# Patient Record
Sex: FEMALE | Race: WHITE | NOT HISPANIC OR LATINO | Employment: FULL TIME | URBAN - METROPOLITAN AREA
[De-identification: names, ages, dates, MRNs, and addresses within clinical notes are randomized per-mention and may not be internally consistent; named-entity substitution may affect disease eponyms.]

---

## 2018-04-23 ENCOUNTER — OFFICE VISIT (OUTPATIENT)
Dept: FAMILY MEDICINE CLINIC | Facility: CLINIC | Age: 53
End: 2018-04-23

## 2018-04-23 VITALS
HEART RATE: 84 BPM | TEMPERATURE: 97.2 F | OXYGEN SATURATION: 98 % | BODY MASS INDEX: 40.18 KG/M2 | WEIGHT: 256 LBS | SYSTOLIC BLOOD PRESSURE: 128 MMHG | DIASTOLIC BLOOD PRESSURE: 80 MMHG | HEIGHT: 67 IN

## 2018-04-23 DIAGNOSIS — G44.219 EPISODIC TENSION-TYPE HEADACHE, NOT INTRACTABLE: ICD-10-CM

## 2018-04-23 DIAGNOSIS — G89.29 CHRONIC PAIN OF LEFT KNEE: Primary | ICD-10-CM

## 2018-04-23 DIAGNOSIS — F31.9 BIPOLAR AFFECTIVE DISORDER, REMISSION STATUS UNSPECIFIED (HCC): ICD-10-CM

## 2018-04-23 DIAGNOSIS — I10 ESSENTIAL HYPERTENSION: ICD-10-CM

## 2018-04-23 DIAGNOSIS — G62.9 NEUROPATHY: ICD-10-CM

## 2018-04-23 DIAGNOSIS — E11.9 TYPE 2 DIABETES MELLITUS WITHOUT COMPLICATION, WITHOUT LONG-TERM CURRENT USE OF INSULIN (HCC): ICD-10-CM

## 2018-04-23 DIAGNOSIS — G45.9 TRANSIENT CEREBRAL ISCHEMIA, UNSPECIFIED TYPE: ICD-10-CM

## 2018-04-23 DIAGNOSIS — E78.2 MIXED HYPERLIPIDEMIA: ICD-10-CM

## 2018-04-23 DIAGNOSIS — M25.562 CHRONIC PAIN OF LEFT KNEE: Primary | ICD-10-CM

## 2018-04-23 PROCEDURE — 99214 OFFICE O/P EST MOD 30 MIN: CPT | Performed by: FAMILY MEDICINE

## 2018-04-23 NOTE — PROGRESS NOTES
Subjective   Marcela Ramírez is a 53 y.o. female    The patient, self-referred, presents to [facility] care.  Previously cared for at the [facility].  Multiple medical problems include GERD, asthma, bipolar disorder, colon polyps, depression, diabetes mellitus, diverticulosis, hypertension, hyperlipidemia, headaches and previous TIA.  Her primary concern today is that of peripheral neuropathy involving both upper and lower extremities both right and left sides.  She has been treated with gabapentin with minimal relief.  She is currently taking 600 mg of gabapentin 3 times a day.  She reports previous lab studies did not reveal any etiology for her neuropathy.  She has never had a nerve conduction testing. Patient has a psychiatrist at [facility] Dr. Reilly who takes care of her mental health needs.  Besides her neuropathy, patient complains of left knee pain.  This is been present for an extended period of time. She has had previous x-rays that were negative she reports occasional giving out and loosening of the knee.  She wears an elastic knee support.  She reports that her initial injury over a year ago was when her knee simply gave out while walking.  She had requested referral to an orthopedist by her primary care but this was refused.      Neurologic Problem   The patient's pertinent negatives include no syncope or weakness. Pertinent negatives include no abdominal pain, back pain, chest pain, confusion, diaphoresis, dizziness, fatigue, fever, headaches, light-headedness, nausea, palpitations, shortness of breath or vomiting.   Knee Pain    Pertinent negatives include no numbness.       The following portions of the patient's history were reviewed and updated as appropriate: allergies, current medications, past social history and problem list    Review of Systems   Constitutional: Negative for appetite change, chills, diaphoresis, fatigue, fever and unexpected weight change.   Eyes: Negative for visual  disturbance.   Respiratory: Negative for cough, chest tightness, shortness of breath and wheezing.    Cardiovascular: Negative for chest pain, palpitations and leg swelling.   Gastrointestinal: Negative for abdominal distention, abdominal pain, diarrhea, nausea and vomiting.        Patient experiencing heartburn/acid reflux     Endocrine: Negative for polydipsia, polyphagia and polyuria.   Genitourinary: Negative for dysuria, frequency and urgency.   Musculoskeletal: Negative for arthralgias, back pain and myalgias.   Skin: Negative for color change and rash.   Neurological: Negative for dizziness, tremors, syncope, weakness, light-headedness, numbness and headaches.   Hematological: Negative for adenopathy. Does not bruise/bleed easily.   Psychiatric/Behavioral: Positive for dysphoric mood and sleep disturbance. Negative for agitation, behavioral problems, confusion, decreased concentration, hallucinations and suicidal ideas. The patient is nervous/anxious. The patient is not hyperactive.        Objective     Vitals:    04/23/18 1313   BP: 128/80   Pulse: 84   Temp: 97.2 °F (36.2 °C)   SpO2: 98%       Physical Exam   Constitutional: She is oriented to person, place, and time. She appears well-developed and well-nourished. No distress.   HENT:   Head: Normocephalic.   Mouth/Throat: Oropharynx is clear and moist.   Eyes: Conjunctivae are normal. Pupils are equal, round, and reactive to light.   Neck: Neck supple. No JVD present. No thyromegaly present.   Cardiovascular: Normal rate, regular rhythm, normal heart sounds, intact distal pulses and normal pulses.    No murmur heard.  Pulmonary/Chest: Effort normal and breath sounds normal. No respiratory distress.   Abdominal: Soft. Bowel sounds are normal. There is no hepatosplenomegaly. There is no tenderness.   Musculoskeletal: She exhibits no edema.        Lumbar back: She exhibits decreased range of motion, tenderness, bony tenderness and pain. She exhibits no  swelling, no deformity and no spasm.   Lymphadenopathy:     She has no cervical adenopathy.   Neurological: She is alert and oriented to person, place, and time. She has normal reflexes. No sensory deficit. Coordination normal.   Skin: Skin is warm and dry. No rash noted. She is not diaphoretic.   Psychiatric: She has a normal mood and affect. Her behavior is normal. Judgment and thought content normal. Cognition and memory are normal. She is attentive.   Nursing note and vitals reviewed.      Assessment/Plan   Problem List Items Addressed This Visit     None      Visit Diagnoses     Chronic pain of left knee    -  Primary    Relevant Orders    Ambulatory Referral to Orthopedic Surgery    Neuropathy        Relevant Orders    EMG & Nerve Conduction Test    Bipolar affective disorder, remission status unspecified        Type 2 diabetes mellitus without complication, without long-term current use of insulin        Relevant Medications    metFORMIN (GLUCOPHAGE) 500 MG tablet    Mixed hyperlipidemia        Essential hypertension        Episodic tension-type headache, not intractable        Transient cerebral ischemia, unspecified type

## 2018-04-27 ENCOUNTER — HOSPITAL ENCOUNTER (EMERGENCY)
Facility: HOSPITAL | Age: 53
Discharge: HOME | End: 2018-04-28
Attending: EMERGENCY MEDICINE | Admitting: EMERGENCY MEDICINE

## 2018-04-27 ENCOUNTER — APPOINTMENT (OUTPATIENT)
Dept: GENERAL RADIOLOGY | Facility: HOSPITAL | Age: 53
End: 2018-04-27

## 2018-04-27 DIAGNOSIS — S86.912A STRAIN OF LEFT KNEE, INITIAL ENCOUNTER: ICD-10-CM

## 2018-04-27 DIAGNOSIS — Z86.39 HISTORY OF DIABETES MELLITUS: ICD-10-CM

## 2018-04-27 DIAGNOSIS — Z86.79 HISTORY OF HYPERTENSION: ICD-10-CM

## 2018-04-27 DIAGNOSIS — M25.562 ACUTE PAIN OF LEFT KNEE: Primary | ICD-10-CM

## 2018-04-27 PROCEDURE — 99283 EMERGENCY DEPT VISIT LOW MDM: CPT

## 2018-04-27 PROCEDURE — 73560 X-RAY EXAM OF KNEE 1 OR 2: CPT

## 2018-04-28 VITALS
HEIGHT: 67 IN | BODY MASS INDEX: 41.59 KG/M2 | HEART RATE: 80 BPM | RESPIRATION RATE: 16 BRPM | OXYGEN SATURATION: 98 % | DIASTOLIC BLOOD PRESSURE: 76 MMHG | SYSTOLIC BLOOD PRESSURE: 143 MMHG | WEIGHT: 265 LBS | TEMPERATURE: 98.8 F

## 2018-05-02 ENCOUNTER — OFFICE VISIT (OUTPATIENT)
Dept: ORTHOPEDIC SURGERY | Facility: CLINIC | Age: 53
End: 2018-05-02

## 2018-05-02 VITALS
DIASTOLIC BLOOD PRESSURE: 94 MMHG | HEIGHT: 67 IN | WEIGHT: 264.99 LBS | HEART RATE: 92 BPM | BODY MASS INDEX: 41.59 KG/M2 | SYSTOLIC BLOOD PRESSURE: 180 MMHG

## 2018-05-02 DIAGNOSIS — M25.562 CHRONIC PAIN OF LEFT KNEE: Primary | ICD-10-CM

## 2018-05-02 DIAGNOSIS — G89.29 CHRONIC PAIN OF LEFT KNEE: Primary | ICD-10-CM

## 2018-05-02 PROCEDURE — 99204 OFFICE O/P NEW MOD 45 MIN: CPT | Performed by: ORTHOPAEDIC SURGERY

## 2018-05-02 NOTE — PROGRESS NOTES
[facility] Note    Subjective     Chief Complaint   Patient presents with   • Left Knee - Pain        HPI      Marcela Ramírez is a 53 y.o. female.  She complains of left knee pain.  It started insidiously with a walk in November.  She was seen in the emergency room and told she had an MCL sprain.  She's had intermittent pain since then she's had locking catching and giving way.  Her pain is 1 out of 10 today but at times is 9 out of 10.  It is aching and shooting in her medial joint line.  She has tried a brace.  Past Medical History:   Diagnosis Date   • Asthma    • Bilateral ovarian cysts    • Bipolar 1 disorder    • Colon polyp    • Depression    • Diabetes mellitus    • Diverticulosis    • Gall stones    • GERD (gastroesophageal reflux disease)    • Headache    • Hyperlipidemia    • Hypertension    • Peripheral neuropathy    • TIA (transient ischemic attack)       Past Surgical History:   Procedure Laterality Date   • CHOLECYSTECTOMY     • COLONOSCOPY     • HYSTERECTOMY     • KNEE SURGERY      right knee      Family History   Problem Relation Age of Onset   • Arthritis Mother    • Arthritis Father    • Diabetes Father    • Hyperlipidemia Father    • Hypertension Father    • Asthma Brother    • Other Brother    • Cancer Maternal Aunt    • Depression Paternal Uncle      Social History     Social History   • Marital status:      Spouse name: N/A   • Number of children: N/A   • Years of education: N/A     Occupational History   • Not on file.     Social History Main Topics   • Smoking status: Never Smoker   • Smokeless tobacco: Never Used   • Alcohol use Yes      Comment: socially   • Drug use: No   • Sexual activity: Defer     Other Topics Concern   • Not on file     Social History Narrative   • No narrative on file      Current Outpatient Prescriptions on File Prior to Visit   Medication Sig Dispense Refill   • albuterol (PROVENTIL HFA;VENTOLIN HFA) 108 (90 Base) MCG/ACT inhaler  "Inhale 2 puffs Every 4 (Four) Hours As Needed for Wheezing. 1 inhaler 0   • ATORVASTATIN CALCIUM PO Take  by mouth.     • desvenlafaxine (PRISTIQ) 50 MG 24 hr tablet Take 50 mg by mouth Daily.     • GABAPENTIN PO Take 1 tablet by mouth 3 (Three) Times a Day. Pt is taking 300mg     • HYDROCHLOROTHIAZIDE PO Take 1 tablet by mouth Daily. Pt is on 25mg     • LamoTRIgine (LAMICTAL PO) Take 1 tablet by mouth Every Night. Pt is on 200mg     • LISINOPRIL PO Take 1 tablet by mouth Daily. Pt is on 20mg     • metFORMIN (GLUCOPHAGE) 500 MG tablet Take 500 mg by mouth 2 (Two) Times a Day With Meals. 2 tablets BID     • omeprazole (priLOSEC) 20 MG capsule Take 20 mg by mouth Daily.     • ROPINIROLE HCL PO Take  by mouth.       No current facility-administered medications on file prior to visit.       Allergies   Allergen Reactions   • Contrast Dye Swelling        The following portions of the patient's history were reviewed and updated as appropriate: allergies, current medications, past family history, past medical history, past social history, past surgical history and problem list.    Review of Systems   Constitutional: Positive for activity change and fatigue.   HENT: Negative.    Eyes: Positive for discharge.   Respiratory: Positive for cough and shortness of breath.    Cardiovascular: Negative.    Gastrointestinal: Positive for nausea.   Endocrine: Positive for heat intolerance.   Genitourinary: Negative.    Musculoskeletal: Positive for arthralgias and back pain.   Skin: Negative.    Allergic/Immunologic: Positive for environmental allergies.   Neurological: Negative.    Hematological: Negative.    Psychiatric/Behavioral: Positive for behavioral problems, decreased concentration and sleep disturbance. The patient is nervous/anxious.         Objective      Physical Exam  /94   Pulse 92   Ht 169.5 cm (66.73\")   Wt 120 kg (264 lb 15.9 oz)   LMP  (LMP Unknown)   BMI 41.84 kg/m²     Body mass index is 41.84 " kg/m².        GENERAL APPEARANCE: awake, alert & oriented x 3, in no acute distress and well developed, well nourished  PSYCH: normal mood and affect  LUNGS:  breathing nonlabored, no wheezing  EYES: sclera anicteric, pupils equal  CARDIOVASCULAR: palpable pulses dorsalis pedis, palpable posterior tibial bilaterally. Capillary refill less than 2 seconds  INTEGUMENTARY: skin intact, no clubbing, cyanosis  NEUROLOGIC:  Normal Sensation and reflexes             Ortho Exam  Peripheral Vascular:    Upper Extremity:   Inspection:  Left--no cyanotic nail beds Right--no cyanotic nail beds   Bilateral:  Pink nail beds with brisk capillary refill   Palpation:  Bilateral radial pulse normal    Musculoskeletal:  Global Assessment:  Overall assessment of Lower Extremity Muscle Strength and Tone:  Left quadriceps--5/5   Left hamstrings--5/5       Left tibialis anterior--5/5  Left gastroc-soleus--5/5  Left EHL--5/5      Lower Extremity:  Knee/Patella:  No digital clubbing or cyanosis.    Examination of left knee reveals:  Normal deep tendon reflexes, coordination, strength, tone, sensation.  No known fractures or deformities.    Inspection and Palpation:    Left knee:  Tenderness:  Medial joint line  Effusion:  none  Crepitus:  none  Pulses:  2+  Ecchymosis:  None  Warmth:  None       ROM:  Right:  Extension:0    Flexion:135  Left:  Extension:0     Flexion:135    Instability:    Left:  Lachman Test:  Negative, Varus stress test negative, Valgus stress test negative   Anterior Drawer Test:  Negative, Posterior Drawer Test:  Negative      Deformities/Malalignments/Discrepancies:    Left:  none  Right:  none    Functional Testing:    Left:  Radha's test: Positive  Patella grind test:  Negative  Q-angle:  Normal  Apprehension Sign:  Negative    Imaging/Studies  Imaging Results (last 7 days)     ** No results found for the last 168 hours. **      I reviewed the x-rays which are negative.    Assessment/Plan        ICD-10-CM ICD-9-CM    1. Chronic pain of left knee M25.562 719.46    G89.29 338.29       Orders Placed This Encounter   Procedures   • MRI Knee Left Without Contrast    I will see her back after the MRI.  She will get a note to be off work today.  She will follow-up after the MRI.    Medical Decision Making  Management Options : over-the-counter medicine  Data/Risk: radiology tests and independent visualization of imaging, lab tests, or EMG/NCV    Nahid Wallace MD  05/02/18  3:49 PM

## 2018-05-14 ENCOUNTER — HOSPITAL ENCOUNTER (OUTPATIENT)
Dept: MRI IMAGING | Facility: HOSPITAL | Age: 53
Discharge: HOME | End: 2018-05-14
Attending: ORTHOPAEDIC SURGERY | Admitting: ORTHOPAEDIC SURGERY

## 2018-05-14 DIAGNOSIS — G89.29 CHRONIC PAIN OF LEFT KNEE: ICD-10-CM

## 2018-05-14 DIAGNOSIS — M25.562 CHRONIC PAIN OF LEFT KNEE: ICD-10-CM

## 2018-05-14 PROCEDURE — 73721 MRI JNT OF LWR EXTRE W/O DYE: CPT

## 2018-05-16 ENCOUNTER — OFFICE VISIT (OUTPATIENT)
Dept: ORTHOPEDIC SURGERY | Facility: CLINIC | Age: 53
End: 2018-05-16

## 2018-05-16 VITALS
BODY MASS INDEX: 41.52 KG/M2 | WEIGHT: 264.55 LBS | HEIGHT: 67 IN | HEART RATE: 79 BPM | SYSTOLIC BLOOD PRESSURE: 164 MMHG | DIASTOLIC BLOOD PRESSURE: 89 MMHG

## 2018-05-16 DIAGNOSIS — M94.20 CHONDROMALACIA: Primary | ICD-10-CM

## 2018-05-16 DIAGNOSIS — G89.29 CHRONIC PAIN OF LEFT KNEE: ICD-10-CM

## 2018-05-16 DIAGNOSIS — M25.562 CHRONIC PAIN OF LEFT KNEE: ICD-10-CM

## 2018-05-16 PROCEDURE — 99213 OFFICE O/P EST LOW 20 MIN: CPT | Performed by: ORTHOPAEDIC SURGERY

## 2018-05-16 NOTE — PROGRESS NOTES
[facility] [place] [facility] Note    Subjective     Chief Complaint   Patient presents with   • Left Knee - Follow-up     2 week MRI f/u        HPI      Marcela Ramírez is a 53 y.o. female.  She is follow-up left knee pain.  She had an MRI.  Her pain is 8 out of 10.  It is popping.  It is worse with standing and walking.        Past Medical History:   Diagnosis Date   • Asthma    • Bilateral ovarian cysts    • Bipolar 1 disorder    • Colon polyp    • Depression    • Diabetes mellitus    • Diverticulosis    • Gall stones    • GERD (gastroesophageal reflux disease)    • Headache    • Hyperlipidemia    • Hypertension    • Peripheral neuropathy    • TIA (transient ischemic attack)       Past Surgical History:   Procedure Laterality Date   • CHOLECYSTECTOMY     • COLONOSCOPY     • HYSTERECTOMY     • KNEE SURGERY      right knee      Family History   Problem Relation Age of Onset   • Arthritis Mother    • Arthritis Father    • Diabetes Father    • Hyperlipidemia Father    • Hypertension Father    • Asthma Brother    • Other Brother    • Cancer Maternal Aunt    • Depression Paternal Uncle      Social History     Social History   • Marital status:      Spouse name: N/A   • Number of children: N/A   • Years of education: N/A     Occupational History   • Not on file.     Social History Main Topics   • Smoking status: Never Smoker   • Smokeless tobacco: Never Used   • Alcohol use Yes      Comment: socially   • Drug use: No   • Sexual activity: Defer     Other Topics Concern   • Not on file     Social History Narrative   • No narrative on file      Current Outpatient Prescriptions on File Prior to Visit   Medication Sig Dispense Refill   • albuterol (PROVENTIL HFA;VENTOLIN HFA) 108 (90 Base) MCG/ACT inhaler Inhale 2 puffs Every 4 (Four) Hours As Needed for Wheezing. 1 inhaler 0   • ATORVASTATIN CALCIUM PO Take  by mouth.     • desvenlafaxine (PRISTIQ) 50 MG 24 hr tablet Take 50 mg by mouth Daily.     •  "GABAPENTIN PO Take 1 tablet by mouth 3 (Three) Times a Day. Pt is taking 300mg     • HYDROCHLOROTHIAZIDE PO Take 1 tablet by mouth Daily. Pt is on 25mg     • LamoTRIgine (LAMICTAL PO) Take 1 tablet by mouth Every Night. Pt is on 200mg     • LISINOPRIL PO Take 1 tablet by mouth Daily. Pt is on 20mg     • metFORMIN (GLUCOPHAGE) 500 MG tablet Take 500 mg by mouth 2 (Two) Times a Day With Meals. 2 tablets BID     • omeprazole (priLOSEC) 20 MG capsule Take 20 mg by mouth Daily.     • ROPINIROLE HCL PO Take  by mouth.       No current facility-administered medications on file prior to visit.       Allergies   Allergen Reactions   • Contrast Dye Swelling        The following portions of the patient's history were reviewed and updated as appropriate: allergies, current medications, past family history, past medical history, past social history, past surgical history and problem list.    Review of Systems   Constitutional: Positive for fatigue.        Night sweats   Eyes: Negative.    Respiratory: Negative.    Cardiovascular: Negative.    Gastrointestinal: Positive for nausea (from meds).   Endocrine: Positive for heat intolerance.   Genitourinary: Negative.    Musculoskeletal: Positive for arthralgias.   Skin: Negative.    Allergic/Immunologic: Positive for environmental allergies.   Neurological: Positive for numbness and headaches.   Hematological: Negative.    Psychiatric/Behavioral: Positive for decreased concentration and sleep disturbance. The patient is nervous/anxious.         Objective      Physical Exam  /89   Pulse 79   Ht 169.5 cm (66.73\")   Wt 120 kg (264 lb 8.8 oz)   LMP  (LMP Unknown)   BMI 41.77 kg/m²     Body mass index is 41.77 kg/m².        GENERAL APPEARANCE: awake, alert & oriented x 3, in no acute distress and well developed, well nourished  PSYCH: normal mood and affect  Ortho Exam  Peripheral Vascular:    Upper Extremity:   Inspection:  Left--no cyanotic nail beds Right--no cyanotic nail " beds   Bilateral:  Pink nail beds with brisk capillary refill   Palpation:  Bilateral radial pulse normal    Musculoskeletal:  Global Assessment:  Overall assessment of Lower Extremity Muscle Strength and Tone:  Left quadriceps--5/5   Left hamstrings--5/5       Left tibialis anterior--5/5  Left gastroc-soleus--5/5  Left EHL --5/5    Lower Extremity:  Knee/Patella:  No digital clubbing or cyanosis.    Examination of left knee reveals:  Normal deep tendon reflexes, coordination, strength, tone, sensation.  No known fractures or deformities.    Inspection and Palpation:  Left knee:  Tenderness:  Over the medial joint line and moderate severity  Effusion:  none  Crepitus:  Positive  Pulses:  2+  Ecchymosis:  None  Warmth:  None     ROM:  Right:  Extension:5    Flexion:120  Left:  Extension:5     Flexion:120    Instability:    Left:  Lachman Test:  Negative, Varus stress test negative, Valgus stress test negative    Deformities/Malalignments/Discrepancies:    Left:  Genu Varum   Right:  No deformity    Functional Testing:  Radha's test:  Negative  Patella grind test:  Positive  Q-angle:  normal    Imaging/Studies  Imaging Results (last 7 days)     ** No results found for the last 168 hours. **      I reviewed the MRI which shows chondral malacia evidence of prior MCL sprain and bone bruise.    Assessment/Plan        ICD-10-CM ICD-9-CM   1. Chondromalacia M94.20 733.92   2. Chronic pain of left knee M25.562 719.46    G89.29 338.29       Orders Placed This Encounter   Procedures   • Ambulatory Referral to Physical Therapy    She will go to physical therapy.  We will give her a hinged knee brace for support.  She'll follow-up in 3 weeks.  If she fails this treatment consider injection or arthroscopy.      Medical Decision Making  Management Options : over-the-counter medicine and physical/occupational therapy  Data/Risk: radiology tests and independent visualization of imaging, lab tests, or EMG/NCV    Nahid Wallace,  MD  05/16/18  10:17 AM         EMR Dragon/Transcription disclaimer:  Much of this encounter note is an electronic transcription of spoken language to printed text. Electronic transcription of spoken language may permit erroneous, or at times, nonsensical words or phrases to be inadvertently transcribed. Although I have reviewed the note for such errors, some may still exist.

## 2018-06-05 ENCOUNTER — HOSPITAL ENCOUNTER (OUTPATIENT)
Dept: NEUROLOGY | Facility: HOSPITAL | Age: 53
Discharge: HOME | End: 2018-06-05
Admitting: FAMILY MEDICINE

## 2018-06-05 DIAGNOSIS — G62.9 NEUROPATHY: ICD-10-CM

## 2018-06-05 PROCEDURE — 95886 MUSC TEST DONE W/N TEST COMP: CPT

## 2018-06-05 PROCEDURE — 95913 NRV CNDJ TEST 13/> STUDIES: CPT

## 2018-06-11 ENCOUNTER — TELEPHONE (OUTPATIENT)
Dept: FAMILY MEDICINE CLINIC | Facility: CLINIC | Age: 53
End: 2018-06-11

## 2018-06-11 DIAGNOSIS — G62.9 NEUROPATHY: Primary | ICD-10-CM

## 2018-06-11 NOTE — PROGRESS NOTES
Okay to refill her gabapentin ×2.  Looks like she takes 6 a day so she will need of 180 per prescription

## 2018-06-12 ENCOUNTER — TELEPHONE (OUTPATIENT)
Dept: FAMILY MEDICINE CLINIC | Facility: CLINIC | Age: 53
End: 2018-06-12

## 2018-06-12 RX ORDER — GABAPENTIN 300 MG/1
CAPSULE ORAL
Qty: 180 CAPSULE | Refills: 2 | OUTPATIENT
Start: 2018-06-12

## 2018-06-12 RX ORDER — GABAPENTIN 300 MG/1
CAPSULE ORAL
Qty: 180 CAPSULE | Refills: 2 | OUTPATIENT
Start: 2018-06-12 | End: 2018-08-21

## 2018-06-12 NOTE — TELEPHONE ENCOUNTER
----- Message from Sukhwinder Duke MD sent at 6/11/2018  5:02 PM EDT -----  Okay to refill her gabapentin ×2.  Looks like she takes 6 a day so she will need of 180 per prescription

## 2018-06-29 PROBLEM — H66.011 ACUTE SUPPURATIVE OTITIS MEDIA OF RIGHT EAR WITH SPONTANEOUS RUPTURE OF TYMPANIC MEMBRANE: Status: ACTIVE | Noted: 2018-06-29

## 2018-07-03 ENCOUNTER — OFFICE VISIT (OUTPATIENT)
Dept: FAMILY MEDICINE CLINIC | Facility: CLINIC | Age: 53
End: 2018-07-03

## 2018-07-03 VITALS
DIASTOLIC BLOOD PRESSURE: 90 MMHG | BODY MASS INDEX: 43.38 KG/M2 | SYSTOLIC BLOOD PRESSURE: 152 MMHG | TEMPERATURE: 96.5 F | HEIGHT: 67 IN | WEIGHT: 276.4 LBS | OXYGEN SATURATION: 97 % | HEART RATE: 86 BPM

## 2018-07-03 DIAGNOSIS — G62.9 NEUROPATHY: Primary | ICD-10-CM

## 2018-07-03 DIAGNOSIS — F41.9 ANXIETY: ICD-10-CM

## 2018-07-03 DIAGNOSIS — G56.23 ULNAR NEUROPATHY OF BOTH UPPER EXTREMITIES: ICD-10-CM

## 2018-07-03 DIAGNOSIS — F32.A DEPRESSION, UNSPECIFIED DEPRESSION TYPE: ICD-10-CM

## 2018-07-03 DIAGNOSIS — G56.03 BILATERAL CARPAL TUNNEL SYNDROME: ICD-10-CM

## 2018-07-03 DIAGNOSIS — H72.91 PERFORATED RIGHT TYMPANIC MEMBRANE ON EXAMINATION: ICD-10-CM

## 2018-07-03 DIAGNOSIS — IMO0001 CLASS 3 OBESITY WITHOUT SERIOUS COMORBIDITY WITH BODY MASS INDEX (BMI) OF 40.0 TO 44.9 IN ADULT, UNSPECIFIED OBESITY TYPE: ICD-10-CM

## 2018-07-03 RX ORDER — ALPRAZOLAM 0.5 MG/1
0.5 TABLET ORAL NIGHTLY PRN
Qty: 30 TABLET | Refills: 2 | Status: SHIPPED | OUTPATIENT
Start: 2018-07-03 | End: 2018-10-30 | Stop reason: SDUPTHER

## 2018-07-03 NOTE — PROGRESS NOTES
Subjective   Marcela Ramírez is a 53 y.o. female    Patient presents for follow-up after recent nerve conduction study.  She has evidence of diffuse mixed motor sensory polyneuropathy as well as bilateral carpal tunnel syndrome and bilateral ulnar neuropathies.  She has an upcoming appointment with neurology.  Other concerns and complaints today include chronic anxiety and depression, a recent upper respiratory infection with otitis media and perforation of her right TM and she is also asking for guidance regarding her obesity.      Peripheral Neuropathy   Pertinent negatives include no abdominal pain, arthralgias, chest pain, chills, coughing, diaphoresis, fatigue, fever, headaches, myalgias, nausea, numbness, rash, vomiting or weakness.   Carpal Tunnel   Pertinent negatives include no abdominal pain, arthralgias, chest pain, chills, coughing, diaphoresis, fatigue, fever, headaches, myalgias, nausea, numbness, rash, vomiting or weakness.       The following portions of the patient's history were reviewed and updated as appropriate: allergies, current medications, past social history and problem list    Review of Systems   Constitutional: Negative for appetite change, chills, diaphoresis, fatigue, fever and unexpected weight change.   Eyes: Negative for visual disturbance.   Respiratory: Negative for cough, chest tightness, shortness of breath and wheezing.    Cardiovascular: Negative for chest pain, palpitations and leg swelling.   Gastrointestinal: Negative for abdominal distention, abdominal pain, diarrhea, nausea and vomiting.        Patient experiencing heartburn/acid reflux     Endocrine: Negative for polydipsia, polyphagia and polyuria.   Genitourinary: Negative for dysuria, frequency and urgency.   Musculoskeletal: Negative for arthralgias, back pain and myalgias.   Skin: Negative for color change and rash.   Neurological: Negative for dizziness, tremors, syncope, weakness, light-headedness, numbness and  headaches.   Hematological: Negative for adenopathy. Does not bruise/bleed easily.   Psychiatric/Behavioral: Positive for dysphoric mood and sleep disturbance. Negative for agitation, behavioral problems, confusion, decreased concentration, hallucinations and suicidal ideas. The patient is nervous/anxious. The patient is not hyperactive.        Objective     Vitals:    07/03/18 1001   BP: 152/90   Pulse: 86   Temp: 96.5 °F (35.8 °C)   SpO2: 97%       Physical Exam   Constitutional: She is oriented to person, place, and time. She appears well-developed and well-nourished. No distress.   HENT:   Head: Normocephalic.   Mouth/Throat: Oropharynx is clear and moist.   Eyes: Conjunctivae are normal. Pupils are equal, round, and reactive to light.   Neck: Neck supple. No JVD present. No thyromegaly present.   Cardiovascular: Normal rate, regular rhythm, normal heart sounds, intact distal pulses and normal pulses.    No murmur heard.  Pulmonary/Chest: Effort normal and breath sounds normal. No respiratory distress.   Abdominal: Soft. Bowel sounds are normal. There is no hepatosplenomegaly. There is no tenderness.   Musculoskeletal: She exhibits no edema.        Lumbar back: She exhibits decreased range of motion, tenderness, bony tenderness and pain. She exhibits no swelling, no deformity and no spasm.   Lymphadenopathy:     She has no cervical adenopathy.   Neurological: She is alert and oriented to person, place, and time. She has normal reflexes. No sensory deficit. Coordination normal.   Skin: Skin is warm and dry. No rash noted. She is not diaphoretic.   Psychiatric: She has a normal mood and affect. Her behavior is normal. Judgment and thought content normal. Cognition and memory are normal. She is attentive.   Nursing note and vitals reviewed.      Assessment/Plan   Problem List Items Addressed This Visit        Other    Depression    Relevant Medications    ALPRAZolam (XANAX) 0.5 MG tablet    Anxiety      Other Visit  Diagnoses     Neuropathy    -  Primary    Bilateral carpal tunnel syndrome        Ulnar neuropathy of both upper extremities        Perforated right tympanic membrane on examination        Class 3 obesity without serious comorbidity with body mass index (BMI) of 40.0 to 44.9 in adult, unspecified obesity type (CMS/[facility])            Neurology consultation.    Low carb diet with daily walking for exercise.

## 2018-07-16 ENCOUNTER — TELEPHONE (OUTPATIENT)
Dept: FAMILY MEDICINE CLINIC | Facility: CLINIC | Age: 53
End: 2018-07-16

## 2018-07-16 RX ORDER — FLUCONAZOLE 150 MG/1
150 TABLET ORAL ONCE
Qty: 1 TABLET | Refills: 0 | Status: SHIPPED | OUTPATIENT
Start: 2018-07-16 | End: 2018-07-16

## 2018-07-16 NOTE — TELEPHONE ENCOUNTER
----- Message from Mroiah Rizvi sent at 7/16/2018  3:51 PM EDT -----  Contact: PT  HAS YEAST  INFECTION FROM BEING ON HER 2ND COURSE OF ANTIBIOTICS (EAR INFECTION, WAS SEEN AT [facility]). WOULD LIKE ROBBY CALLED IN TO     [facility] [address] [place] [address] [address] [place], [place] [address]Y AT [facility] - [phone]  - [phone] (Phone)  [phone] (Fax)

## 2018-07-17 ENCOUNTER — TELEPHONE (OUTPATIENT)
Dept: URGENT CARE | Facility: CLINIC | Age: 53
End: 2018-07-17

## 2018-07-18 ENCOUNTER — OFFICE VISIT (OUTPATIENT)
Dept: FAMILY MEDICINE CLINIC | Facility: CLINIC | Age: 53
End: 2018-07-18

## 2018-07-18 ENCOUNTER — TELEPHONE (OUTPATIENT)
Dept: URGENT CARE | Facility: CLINIC | Age: 53
End: 2018-07-18

## 2018-07-18 VITALS
HEART RATE: 85 BPM | TEMPERATURE: 98 F | OXYGEN SATURATION: 98 % | SYSTOLIC BLOOD PRESSURE: 128 MMHG | WEIGHT: 264 LBS | BODY MASS INDEX: 41.44 KG/M2 | HEIGHT: 67 IN | DIASTOLIC BLOOD PRESSURE: 72 MMHG

## 2018-07-18 DIAGNOSIS — H66.90 ACUTE OTITIS MEDIA, UNSPECIFIED OTITIS MEDIA TYPE: Primary | ICD-10-CM

## 2018-07-18 DIAGNOSIS — H72.91 PERFORATED RIGHT TYMPANIC MEMBRANE ON EXAMINATION: ICD-10-CM

## 2018-07-18 PROCEDURE — 99213 OFFICE O/P EST LOW 20 MIN: CPT | Performed by: FAMILY MEDICINE

## 2018-07-18 RX ORDER — FLUCONAZOLE 100 MG/1
100 TABLET ORAL DAILY
Qty: 10 TABLET | Refills: 1 | Status: SHIPPED | OUTPATIENT
Start: 2018-07-18 | End: 2018-07-24

## 2018-07-18 NOTE — PROGRESS NOTES
Subjective   Marcela Ramírez is a 53 y.o. female    Patient presents today complaining of persisting problem with her right ear and persisting vaginal yeast infection.  Patient was seen here on 7/3/18 with resolving right otitis media and TM perforation.  She continued to feel improvement with the right ear until approximately one week later when her symptoms worsened and she developed purulent drainage from the right ear canal.  She was seen at the urgent care center and oral antibiotics, Augmentin, were started.  The patient has continued to note drainage from the ear, decreased hearing and ear discomfort.  Her yeast infection symptoms have also continued.  She was given a referral to ENT and was told at urgent care that she had to go to  because of her [employer] insurance.  They did not give her an appointment until August 21.  Patient would like to be seen sooner than this because of her symptoms.      Earache    Associated symptoms include ear discharge. Pertinent negatives include no abdominal pain, coughing, diarrhea, headaches, rash or vomiting.       The following portions of the patient's history were reviewed and updated as appropriate: allergies, current medications, past social history and problem list    Review of Systems   Constitutional: Negative for appetite change, chills, diaphoresis, fatigue, fever and unexpected weight change.   HENT: Positive for ear discharge and ear pain.    Eyes: Negative for visual disturbance.   Respiratory: Negative for cough, chest tightness, shortness of breath and wheezing.    Cardiovascular: Negative for chest pain, palpitations and leg swelling.   Gastrointestinal: Negative for abdominal distention, abdominal pain, diarrhea, nausea and vomiting.        Patient experiencing heartburn/acid reflux     Endocrine: Negative for polydipsia, polyphagia and polyuria.   Genitourinary: Negative for dysuria, frequency and urgency.   Musculoskeletal: Negative for arthralgias,  back pain and myalgias.   Skin: Negative for color change and rash.   Neurological: Negative for dizziness, tremors, syncope, weakness, light-headedness, numbness and headaches.   Hematological: Negative for adenopathy. Does not bruise/bleed easily.   Psychiatric/Behavioral: Positive for dysphoric mood and sleep disturbance. Negative for agitation, behavioral problems, confusion, decreased concentration, hallucinations and suicidal ideas. The patient is nervous/anxious. The patient is not hyperactive.        Objective     Vitals:    07/18/18 1602   BP: 128/72   Pulse: 85   Temp: 98 °F (36.7 °C)   SpO2: 98%       Physical Exam   Constitutional: She appears well-developed and well-nourished.   HENT:   Head: Normocephalic.   Left Ear: Tympanic membrane and ear canal normal.   Right ear canal occluded with white debris.   Eyes: Pupils are equal, round, and reactive to light. Conjunctivae are normal.   Cardiovascular: Normal rate and regular rhythm.    Pulmonary/Chest: Effort normal and breath sounds normal.   Nursing note and vitals reviewed.      Assessment/Plan   Problem List Items Addressed This Visit     None      Visit Diagnoses     Acute otitis media, unspecified otitis media type    -  Primary    Relevant Orders    Ambulatory Referral to ENT (Otolaryngology) (Completed)    Perforated right tympanic membrane on examination        Relevant Orders    Ambulatory Referral to ENT (Otolaryngology) (Completed)

## 2018-07-18 NOTE — TELEPHONE ENCOUNTER
Pt called very upset because her referral appt is in August. It is due  to her insurance that she was referred to . Pt will consult with her PCP for follow up.

## 2018-07-23 ENCOUNTER — HOSPITAL ENCOUNTER (EMERGENCY)
Facility: HOSPITAL | Age: 53
Discharge: HOME | End: 2018-07-23
Attending: EMERGENCY MEDICINE | Admitting: EMERGENCY MEDICINE

## 2018-07-23 ENCOUNTER — APPOINTMENT (OUTPATIENT)
Dept: GENERAL RADIOLOGY | Facility: HOSPITAL | Age: 53
End: 2018-07-23

## 2018-07-23 VITALS
HEIGHT: 67 IN | BODY MASS INDEX: 40.81 KG/M2 | RESPIRATION RATE: 18 BRPM | DIASTOLIC BLOOD PRESSURE: 79 MMHG | TEMPERATURE: 98.4 F | WEIGHT: 260 LBS | SYSTOLIC BLOOD PRESSURE: 124 MMHG | HEART RATE: 64 BPM | OXYGEN SATURATION: 93 %

## 2018-07-23 DIAGNOSIS — K21.00 GASTROESOPHAGEAL REFLUX DISEASE WITH ESOPHAGITIS: ICD-10-CM

## 2018-07-23 DIAGNOSIS — K52.9 GASTROENTERITIS: ICD-10-CM

## 2018-07-23 DIAGNOSIS — R07.89 ATYPICAL CHEST PAIN: Primary | ICD-10-CM

## 2018-07-23 LAB
ALBUMIN SERPL-MCNC: 4.1 G/DL (ref 3.2–4.8)
ALBUMIN/GLOB SERPL: 1.4 G/DL (ref 1.5–2.5)
ALP SERPL-CCNC: 86 U/L (ref 25–100)
ALT SERPL W P-5'-P-CCNC: 63 U/L (ref 7–40)
ANION GAP SERPL CALCULATED.3IONS-SCNC: 8 MMOL/L (ref 3–11)
AST SERPL-CCNC: 36 U/L (ref 0–33)
BASOPHILS # BLD AUTO: 0.02 10*3/MM3 (ref 0–0.2)
BASOPHILS NFR BLD AUTO: 0.3 % (ref 0–1)
BILIRUB SERPL-MCNC: 0.4 MG/DL (ref 0.3–1.2)
BNP SERPL-MCNC: 41 PG/ML (ref 0–100)
BUN BLD-MCNC: 14 MG/DL (ref 9–23)
BUN/CREAT SERPL: 14 (ref 7–25)
CALCIUM SPEC-SCNC: 9.3 MG/DL (ref 8.7–10.4)
CHLORIDE SERPL-SCNC: 103 MMOL/L (ref 99–109)
CO2 SERPL-SCNC: 31 MMOL/L (ref 20–31)
CREAT BLD-MCNC: 1 MG/DL (ref 0.6–1.3)
DEPRECATED RDW RBC AUTO: 45.5 FL (ref 37–54)
EOSINOPHIL # BLD AUTO: 0.23 10*3/MM3 (ref 0–0.3)
EOSINOPHIL NFR BLD AUTO: 3.4 % (ref 0–3)
ERYTHROCYTE [DISTWIDTH] IN BLOOD BY AUTOMATED COUNT: 13.7 % (ref 11.3–14.5)
GFR SERPL CREATININE-BSD FRML MDRD: 58 ML/MIN/1.73
GLOBULIN UR ELPH-MCNC: 2.9 GM/DL
GLUCOSE BLD-MCNC: 130 MG/DL (ref 70–100)
HCT VFR BLD AUTO: 35.8 % (ref 34.5–44)
HGB BLD-MCNC: 11.5 G/DL (ref 11.5–15.5)
HOLD SPECIMEN: NORMAL
HOLD SPECIMEN: NORMAL
IMM GRANULOCYTES # BLD: 0.02 10*3/MM3 (ref 0–0.03)
IMM GRANULOCYTES NFR BLD: 0.3 % (ref 0–0.6)
LIPASE SERPL-CCNC: 30 U/L (ref 6–51)
LYMPHOCYTES # BLD AUTO: 2.07 10*3/MM3 (ref 0.6–4.8)
LYMPHOCYTES NFR BLD AUTO: 30.6 % (ref 24–44)
MCH RBC QN AUTO: 29.6 PG (ref 27–31)
MCHC RBC AUTO-ENTMCNC: 32.1 G/DL (ref 32–36)
MCV RBC AUTO: 92.3 FL (ref 80–99)
MONOCYTES # BLD AUTO: 0.56 10*3/MM3 (ref 0–1)
MONOCYTES NFR BLD AUTO: 8.3 % (ref 0–12)
NEUTROPHILS # BLD AUTO: 3.88 10*3/MM3 (ref 1.5–8.3)
NEUTROPHILS NFR BLD AUTO: 57.4 % (ref 41–71)
PLATELET # BLD AUTO: 202 10*3/MM3 (ref 150–450)
PMV BLD AUTO: 10.9 FL (ref 6–12)
POTASSIUM BLD-SCNC: 3.6 MMOL/L (ref 3.5–5.5)
PROT SERPL-MCNC: 7 G/DL (ref 5.7–8.2)
RBC # BLD AUTO: 3.88 10*6/MM3 (ref 3.89–5.14)
SODIUM BLD-SCNC: 142 MMOL/L (ref 132–146)
TROPONIN I SERPL-MCNC: <0.006 NG/ML
TROPONIN I SERPL-MCNC: <0.006 NG/ML
WBC NRBC COR # BLD: 6.76 10*3/MM3 (ref 3.5–10.8)
WHOLE BLOOD HOLD SPECIMEN: NORMAL
WHOLE BLOOD HOLD SPECIMEN: NORMAL

## 2018-07-23 PROCEDURE — 25010000002 ONDANSETRON PER 1 MG: Performed by: EMERGENCY MEDICINE

## 2018-07-23 PROCEDURE — 93005 ELECTROCARDIOGRAM TRACING: CPT | Performed by: EMERGENCY MEDICINE

## 2018-07-23 PROCEDURE — 83690 ASSAY OF LIPASE: CPT | Performed by: EMERGENCY MEDICINE

## 2018-07-23 PROCEDURE — 99285 EMERGENCY DEPT VISIT HI MDM: CPT

## 2018-07-23 PROCEDURE — 80053 COMPREHEN METABOLIC PANEL: CPT | Performed by: EMERGENCY MEDICINE

## 2018-07-23 PROCEDURE — 96361 HYDRATE IV INFUSION ADD-ON: CPT

## 2018-07-23 PROCEDURE — 71045 X-RAY EXAM CHEST 1 VIEW: CPT

## 2018-07-23 PROCEDURE — 85025 COMPLETE CBC W/AUTO DIFF WBC: CPT | Performed by: EMERGENCY MEDICINE

## 2018-07-23 PROCEDURE — 93005 ELECTROCARDIOGRAM TRACING: CPT

## 2018-07-23 PROCEDURE — 83880 ASSAY OF NATRIURETIC PEPTIDE: CPT | Performed by: EMERGENCY MEDICINE

## 2018-07-23 PROCEDURE — 96375 TX/PRO/DX INJ NEW DRUG ADDON: CPT

## 2018-07-23 PROCEDURE — 96374 THER/PROPH/DIAG INJ IV PUSH: CPT

## 2018-07-23 PROCEDURE — 84484 ASSAY OF TROPONIN QUANT: CPT | Performed by: EMERGENCY MEDICINE

## 2018-07-23 RX ORDER — ONDANSETRON 2 MG/ML
4 INJECTION INTRAMUSCULAR; INTRAVENOUS ONCE
Status: COMPLETED | OUTPATIENT
Start: 2018-07-23 | End: 2018-07-23

## 2018-07-23 RX ORDER — ALUMINA, MAGNESIA, AND SIMETHICONE 2400; 2400; 240 MG/30ML; MG/30ML; MG/30ML
15 SUSPENSION ORAL ONCE
Status: COMPLETED | OUTPATIENT
Start: 2018-07-23 | End: 2018-07-23

## 2018-07-23 RX ORDER — FAMOTIDINE 10 MG/ML
20 INJECTION, SOLUTION INTRAVENOUS ONCE
Status: COMPLETED | OUTPATIENT
Start: 2018-07-23 | End: 2018-07-23

## 2018-07-23 RX ORDER — PANTOPRAZOLE SODIUM 40 MG/10ML
40 INJECTION, POWDER, LYOPHILIZED, FOR SOLUTION INTRAVENOUS ONCE
Status: COMPLETED | OUTPATIENT
Start: 2018-07-23 | End: 2018-07-23

## 2018-07-23 RX ORDER — SODIUM CHLORIDE 0.9 % (FLUSH) 0.9 %
10 SYRINGE (ML) INJECTION AS NEEDED
Status: DISCONTINUED | OUTPATIENT
Start: 2018-07-23 | End: 2018-07-23 | Stop reason: HOSPADM

## 2018-07-23 RX ORDER — ASPIRIN 81 MG/1
81 TABLET ORAL DAILY
Qty: 30 TABLET | Refills: 0 | Status: SHIPPED | OUTPATIENT
Start: 2018-07-23 | End: 2019-08-21

## 2018-07-23 RX ORDER — NITROGLYCERIN 0.4 MG/1
0.4 TABLET SUBLINGUAL
Qty: 100 TABLET | Refills: 0 | Status: SHIPPED | OUTPATIENT
Start: 2018-07-23 | End: 2020-04-17

## 2018-07-23 RX ORDER — FAMOTIDINE 40 MG/1
40 TABLET, FILM COATED ORAL DAILY
Qty: 14 TABLET | Refills: 0 | Status: SHIPPED | OUTPATIENT
Start: 2018-07-23 | End: 2018-08-21

## 2018-07-23 RX ORDER — OMEPRAZOLE 20 MG/1
20 CAPSULE, DELAYED RELEASE ORAL DAILY
Qty: 14 CAPSULE | Refills: 0 | Status: SHIPPED | OUTPATIENT
Start: 2018-07-23 | End: 2019-01-04 | Stop reason: SDUPTHER

## 2018-07-23 RX ADMIN — FAMOTIDINE 20 MG: 10 INJECTION INTRAVENOUS at 12:17

## 2018-07-23 RX ADMIN — ONDANSETRON 4 MG: 2 INJECTION INTRAMUSCULAR; INTRAVENOUS at 12:17

## 2018-07-23 RX ADMIN — SODIUM CHLORIDE 1000 ML: 9 INJECTION, SOLUTION INTRAVENOUS at 12:16

## 2018-07-23 RX ADMIN — PANTOPRAZOLE SODIUM 40 MG: 40 INJECTION, POWDER, FOR SOLUTION INTRAVENOUS at 12:16

## 2018-07-23 RX ADMIN — ALUMINUM HYDROXIDE, MAGNESIUM HYDROXIDE, AND DIMETHICONE 15 ML: 400; 400; 40 SUSPENSION ORAL at 12:18

## 2018-07-23 RX ADMIN — LIDOCAINE HYDROCHLORIDE 15 ML: 20 SOLUTION ORAL; TOPICAL at 12:18

## 2018-07-24 ENCOUNTER — OFFICE VISIT (OUTPATIENT)
Dept: NEUROLOGY | Facility: CLINIC | Age: 53
End: 2018-07-24

## 2018-07-24 ENCOUNTER — APPOINTMENT (OUTPATIENT)
Dept: LAB | Facility: HOSPITAL | Age: 53
End: 2018-07-24

## 2018-07-24 ENCOUNTER — LAB (OUTPATIENT)
Dept: LAB | Facility: HOSPITAL | Age: 53
End: 2018-07-24

## 2018-07-24 ENCOUNTER — TRANSCRIBE ORDERS (OUTPATIENT)
Dept: LAB | Facility: HOSPITAL | Age: 53
End: 2018-07-24

## 2018-07-24 VITALS
HEART RATE: 67 BPM | OXYGEN SATURATION: 97 % | HEIGHT: 67 IN | WEIGHT: 260 LBS | DIASTOLIC BLOOD PRESSURE: 80 MMHG | SYSTOLIC BLOOD PRESSURE: 142 MMHG | BODY MASS INDEX: 40.81 KG/M2

## 2018-07-24 DIAGNOSIS — G25.81 RESTLESS LEGS SYNDROME (RLS): ICD-10-CM

## 2018-07-24 DIAGNOSIS — H60.391 INFECTION OF RIGHT EXTERNAL EAR: Primary | ICD-10-CM

## 2018-07-24 DIAGNOSIS — G62.9 POLYNEUROPATHY: Primary | ICD-10-CM

## 2018-07-24 DIAGNOSIS — H60.391 INFECTION OF RIGHT EXTERNAL EAR: ICD-10-CM

## 2018-07-24 LAB
FOLATE SERPL-MCNC: 7.64 NG/ML (ref 3.2–20)
HBA1C MFR BLD: 7.3 % (ref 4.8–5.6)
IRON 24H UR-MRATE: 76 MCG/DL (ref 50–175)
TSH SERPL DL<=0.05 MIU/L-ACNC: 3.23 MIU/ML (ref 0.35–5.35)
VIT B12 BLD-MCNC: 639 PG/ML (ref 211–911)

## 2018-07-24 PROCEDURE — 83540 ASSAY OF IRON: CPT | Performed by: PHYSICIAN ASSISTANT

## 2018-07-24 PROCEDURE — 87070 CULTURE OTHR SPECIMN AEROBIC: CPT

## 2018-07-24 PROCEDURE — 87147 CULTURE TYPE IMMUNOLOGIC: CPT

## 2018-07-24 PROCEDURE — 87186 SC STD MICRODIL/AGAR DIL: CPT

## 2018-07-24 PROCEDURE — 36415 COLL VENOUS BLD VENIPUNCTURE: CPT | Performed by: PHYSICIAN ASSISTANT

## 2018-07-24 PROCEDURE — 87106 FUNGI IDENTIFICATION YEAST: CPT

## 2018-07-24 PROCEDURE — 84443 ASSAY THYROID STIM HORMONE: CPT | Performed by: PHYSICIAN ASSISTANT

## 2018-07-24 PROCEDURE — 82607 VITAMIN B-12: CPT | Performed by: PHYSICIAN ASSISTANT

## 2018-07-24 PROCEDURE — 87205 SMEAR GRAM STAIN: CPT

## 2018-07-24 PROCEDURE — 87077 CULTURE AEROBIC IDENTIFY: CPT

## 2018-07-24 PROCEDURE — 99215 OFFICE O/P EST HI 40 MIN: CPT | Performed by: PHYSICIAN ASSISTANT

## 2018-07-24 PROCEDURE — 82746 ASSAY OF FOLIC ACID SERUM: CPT | Performed by: PHYSICIAN ASSISTANT

## 2018-07-24 PROCEDURE — 83036 HEMOGLOBIN GLYCOSYLATED A1C: CPT | Performed by: PHYSICIAN ASSISTANT

## 2018-07-24 RX ORDER — ROPINIROLE 1 MG/1
1 TABLET, FILM COATED ORAL NIGHTLY
Qty: 30 TABLET | Refills: 11 | Status: SHIPPED | OUTPATIENT
Start: 2018-07-24 | End: 2019-02-05

## 2018-07-24 NOTE — PROGRESS NOTES
"Subjective     Chief Complaint: numbness, paresthesias, pain      History of Present Illness   Marcela Ramírez is a 53 y.o. female with a history of DM who comes to clinic today for evaluation of neuropathy. She initially noted symptoms in 2015 marked by numbness, paresthesias, and shooting pain in her upper and lower extremities bilaterally. This has worsened over time. She notes associated balance impairment as well as decreased  strength, though denies any clear associated weakness. Her symptoms are worse with increased activity. She is currently taking GBP 600mg TID, which may be somewhat beneficial.     She also notes RLS symptoms in her feet primarily at night. She is currently taking ropinirole 0.5mg nightly, which is somewhat beneficial. She states that Xanax is also beneficial for this.     Prior evaluation has included an EMG of the upper and lower extremities bilaterally which was notable for  a moderate axonal and demyelinating peripheral neuropathy, moderate CTS bilaterally, mild-moderate ulnar neuropathy on the left and mild ulnar neuropathy on the right.       I have reviewed and confirmed the past family, social and medical history as accurate on 7/24/18.     Review of Systems   Constitutional: Negative.    HENT: Negative.    Eyes: Negative.    Respiratory: Negative.    Cardiovascular: Negative.    Gastrointestinal: Negative.    Endocrine: Negative.    Genitourinary: Negative.    Musculoskeletal: Negative.    Skin: Negative.    Allergic/Immunologic: Negative.    Neurological: Positive for numbness.   Hematological: Negative.    Psychiatric/Behavioral: The patient is nervous/anxious.        Objective     /80 (BP Location: Right arm, Patient Position: Sitting, Cuff Size: Adult)   Pulse 67   Ht 170.2 cm (67\")   Wt 118 kg (260 lb)   LMP  (LMP Unknown)   SpO2 97%   BMI 40.72 kg/m²     General appearance today is normal.   Peripheral pulses were present and symmetric.  The ophthalmoscopic " exam today is unremarkable. The discs and posterior elements are unremarkable.      Physical Exam   Neurological: She has normal strength. She has a normal Finger-Nose-Finger Test and a normal Romberg Test. Gait normal.   Reflex Scores:       Tricep reflexes are 1+ on the right side and 1+ on the left side.       Bicep reflexes are 1+ on the right side and 1+ on the left side.       Brachioradialis reflexes are 1+ on the right side and 1+ on the left side.       Patellar reflexes are 1+ on the right side and 1+ on the left side.  Psychiatric: Her speech is normal.        Neurologic Exam     Mental Status   Speech: speech is normal   Level of consciousness: alert  Normal comprehension.     Cranial Nerves   Cranial nerves II through XII intact.     Motor Exam   Muscle bulk: normal  Overall muscle tone: normal    Strength   Strength 5/5 throughout.     Sensory Exam   Decreased sensation to light touch in upper and lower extremities bilaterally      Gait, Coordination, and Reflexes     Gait  Gait: normal    Coordination   Romberg: negative  Finger to nose coordination: normal    Tremor   Resting tremor: absent    Reflexes   Right brachioradialis: 1+  Left brachioradialis: 1+  Right biceps: 1+  Left biceps: 1+  Right triceps: 1+  Left triceps: 1+  Right patellar: 1+  Left patellar: 1+        Assessment/Plan   Marcela was seen today for neurologic problem.    Diagnoses and all orders for this visit:    Polyneuropathy  -     Folate  -     TSH  -     Vitamin B12  -     Iron  -     Hemoglobin A1c    Restless legs syndrome (RLS)    Other orders  -     rOPINIRole (REQUIP) 1 MG tablet; Take 1 tablet by mouth Every Night. Take 1 hour before bedtime.          Discussion/Summary   Marcela Ramírez comes to clinic today with a history concerning for a diabetic peripheral neuropathy, carpal tunnel syndrome, and restless leg syndrome. This was discussed at length with the patient. It was elected to obtain screening blood work .  After discussing potential treatment options, it was elected to increase her ropinirole to 1mg nightly and continue on her other medications unchanged. I discussed a referral to [facility] for her CTS, though this was declined for now. Additionally, I recommended that she wear wrist splints at night and discussed the importance of tight glucose control. She will then follow up in 1 month, or sooner if needed.   I spent 35 minutes out of 45 minutes face to face with the patient and discussing diagnosis, prognosis, diagnostic testing, evaluation, current status, treatment options and management as discussed above.       As part of this visit I reviewed prior lab results.      Gladys Wilder PA-C

## 2018-07-29 LAB
BACTERIA SPEC AEROBE CULT: ABNORMAL
BACTERIA SPEC AEROBE CULT: ABNORMAL
GRAM STN SPEC: ABNORMAL
GRAM STN SPEC: ABNORMAL

## 2018-08-06 ENCOUNTER — TRANSCRIBE ORDERS (OUTPATIENT)
Dept: LAB | Facility: HOSPITAL | Age: 53
End: 2018-08-06

## 2018-08-06 ENCOUNTER — LAB (OUTPATIENT)
Dept: LAB | Facility: HOSPITAL | Age: 53
End: 2018-08-06

## 2018-08-06 DIAGNOSIS — H60.391 INFECTION OF RIGHT EXTERNAL EAR: ICD-10-CM

## 2018-08-06 DIAGNOSIS — H60.391 INFECTION OF RIGHT EXTERNAL EAR: Primary | ICD-10-CM

## 2018-08-06 PROCEDURE — 87205 SMEAR GRAM STAIN: CPT

## 2018-08-06 PROCEDURE — 87070 CULTURE OTHR SPECIMN AEROBIC: CPT

## 2018-08-06 PROCEDURE — 87106 FUNGI IDENTIFICATION YEAST: CPT

## 2018-08-09 LAB
BACTERIA SPEC AEROBE CULT: ABNORMAL
GRAM STN SPEC: ABNORMAL
GRAM STN SPEC: ABNORMAL

## 2018-08-14 ENCOUNTER — TRANSCRIBE ORDERS (OUTPATIENT)
Dept: LAB | Facility: HOSPITAL | Age: 53
End: 2018-08-14

## 2018-08-14 ENCOUNTER — LAB (OUTPATIENT)
Dept: LAB | Facility: HOSPITAL | Age: 53
End: 2018-08-14

## 2018-08-14 DIAGNOSIS — H60.391 INFECTION OF RIGHT EXTERNAL EAR: ICD-10-CM

## 2018-08-14 DIAGNOSIS — H60.391 INFECTION OF RIGHT EXTERNAL EAR: Primary | ICD-10-CM

## 2018-08-14 PROCEDURE — 87070 CULTURE OTHR SPECIMN AEROBIC: CPT

## 2018-08-14 PROCEDURE — 87205 SMEAR GRAM STAIN: CPT

## 2018-08-14 PROCEDURE — 87147 CULTURE TYPE IMMUNOLOGIC: CPT

## 2018-08-14 PROCEDURE — 87077 CULTURE AEROBIC IDENTIFY: CPT

## 2018-08-14 PROCEDURE — 87186 SC STD MICRODIL/AGAR DIL: CPT

## 2018-08-14 PROCEDURE — 87106 FUNGI IDENTIFICATION YEAST: CPT

## 2018-08-18 LAB
BACTERIA SPEC AEROBE CULT: ABNORMAL
BACTERIA SPEC AEROBE CULT: ABNORMAL
GRAM STN SPEC: ABNORMAL

## 2018-08-21 ENCOUNTER — OFFICE VISIT (OUTPATIENT)
Dept: NEUROLOGY | Facility: CLINIC | Age: 53
End: 2018-08-21

## 2018-08-21 ENCOUNTER — TRANSCRIBE ORDERS (OUTPATIENT)
Dept: LAB | Facility: HOSPITAL | Age: 53
End: 2018-08-21

## 2018-08-21 ENCOUNTER — TELEPHONE (OUTPATIENT)
Dept: NEUROLOGY | Facility: CLINIC | Age: 53
End: 2018-08-21

## 2018-08-21 ENCOUNTER — LAB (OUTPATIENT)
Dept: LAB | Facility: HOSPITAL | Age: 53
End: 2018-08-21

## 2018-08-21 VITALS
BODY MASS INDEX: 40.81 KG/M2 | DIASTOLIC BLOOD PRESSURE: 84 MMHG | WEIGHT: 260 LBS | HEIGHT: 67 IN | SYSTOLIC BLOOD PRESSURE: 134 MMHG

## 2018-08-21 DIAGNOSIS — G25.81 RESTLESS LEGS SYNDROME (RLS): ICD-10-CM

## 2018-08-21 DIAGNOSIS — H60.391 INFECTION OF RIGHT EXTERNAL EAR: ICD-10-CM

## 2018-08-21 DIAGNOSIS — E11.42 DIABETIC PERIPHERAL NEUROPATHY (HCC): Primary | ICD-10-CM

## 2018-08-21 DIAGNOSIS — H60.391 INFECTION OF RIGHT EXTERNAL EAR: Primary | ICD-10-CM

## 2018-08-21 PROCEDURE — 87186 SC STD MICRODIL/AGAR DIL: CPT

## 2018-08-21 PROCEDURE — 87077 CULTURE AEROBIC IDENTIFY: CPT

## 2018-08-21 PROCEDURE — 87147 CULTURE TYPE IMMUNOLOGIC: CPT

## 2018-08-21 PROCEDURE — 99214 OFFICE O/P EST MOD 30 MIN: CPT | Performed by: PHYSICIAN ASSISTANT

## 2018-08-21 PROCEDURE — 87070 CULTURE OTHR SPECIMN AEROBIC: CPT

## 2018-08-21 PROCEDURE — 87205 SMEAR GRAM STAIN: CPT

## 2018-08-21 NOTE — TELEPHONE ENCOUNTER
----- Message from Gladys Wilder PA-C sent at 8/21/2018 10:46 AM EDT -----  Can you call in Lyrica for Ms. Ramírez? [50mg capsules, first week: take 50mg (one capsule) by month nightly. 2nd week and after: take 100mg (two capsules) nightly.] (60 capsules, 5 refills). Thanks

## 2018-08-21 NOTE — PROGRESS NOTES
"Subjective     Chief Complaint: numbness, paresthesias, pain       History of Present Illness   Marcela Ramírez is a 53 y.o. female who with a history of DM who comes to clinic today for evaluation of neuropathy. She initially noted symptoms in 2015 marked by numbness, paresthesias, and shooting pain in her upper and lower extremities bilaterally. This has worsened over time. She notes associated balance impairment as well as decreased  strength, though denies any clear associated weakness. Her symptoms are worse with increased activity. She is currently taking GBP 600mg TID, which may be somewhat beneficial.      She also notes RLS symptoms in her feet primarily at night. She is currently taking ropinirole 0.5mg nightly, which is somewhat beneficial. She states that Xanax is also beneficial for this.      Prior evaluation has included an EMG of the upper and lower extremities bilaterally which was notable for a moderate axonal and demyelinating peripheral neuropathy, moderate CTS bilaterally, mild-moderate ulnar neuropathy on the left and mild ulnar neuropathy on the right. Screening bloodwork was notable for a hemoglobin A1C of 7.3.     Today: Since her last visit in 7/18, she notes that her symptoms are essentially unchanged.      I have reviewed and confirmed the past family, social and medical history as accurate on 8/21/18.     Review of Systems   Constitutional: Negative.    HENT: Negative.    Eyes: Negative.    Respiratory: Negative.    Cardiovascular: Negative.    Gastrointestinal: Negative.    Endocrine: Negative.    Genitourinary: Negative.    Musculoskeletal: Negative.    Skin: Negative.    Allergic/Immunologic: Negative.    Neurological: Positive for numbness.   Hematological: Negative.    Psychiatric/Behavioral: Negative.        Objective     /84   Ht 170.2 cm (67.01\")   Wt 118 kg (260 lb)   LMP  (LMP Unknown)   BMI 40.71 kg/m²     General appearance today is normal.       Physical Exam "   Neurological: She has normal strength. She has a normal Finger-Nose-Finger Test. Gait normal.   Psychiatric: Her speech is normal.        Neurologic Exam     Mental Status   Speech: speech is normal   Level of consciousness: alert  Normal comprehension.     Cranial Nerves   Cranial nerves II through XII intact.     Motor Exam   Muscle bulk: normal  Overall muscle tone: normal    Strength   Strength 5/5 throughout.     Sensory Exam   Decreased sensation to light touch in upper and lower extremities bilaterally       Gait, Coordination, and Reflexes     Gait  Gait: normal    Coordination   Finger to nose coordination: normal    Tremor   Resting tremor: absent      Assessment/Plan   Marcela was seen today for peripheral neuropathy.    Diagnoses and all orders for this visit:    Diabetic peripheral neuropathy (CMS/HCC)    Restless legs syndrome (RLS)          Discussion/Summary   Marcela Ramírez returns to clinic today with a history consistent with a diabetic peripheral neuropathy, carpal tunnel syndrome, and restless leg syndrome. This was discussed in detail. I again reviewed her current status and treatment options .After discussing potential treatment options, it was elected to discontinue gabapentin and add Lyrica. She will continue on ropinirole unchanged. I again discussed the importance of tight glucose control and recommended a referral to nutrition, which she will consider in the future. She will then follow up in 6 weeks, or sooner if needed.   I spent 25 minutes minutes face to face with the patient with 20 minutes spent on discussing diagnosis, prognosis, evaluation, current status, treatment options and management as discussed above.       As part of this visit I reviewed prior lab results.      Gladys Wilder PA-C

## 2018-08-21 NOTE — TELEPHONE ENCOUNTER
Called in Rx to [facility]. Pharmacist states for insurance purposes he may have to split this into 2 different scripts, but otherwise it's taken care of.

## 2018-08-24 LAB
BACTERIA SPEC AEROBE CULT: ABNORMAL
BACTERIA SPEC AEROBE CULT: ABNORMAL
GRAM STN SPEC: ABNORMAL
GRAM STN SPEC: ABNORMAL
STREP GROUPING: ABNORMAL

## 2018-08-31 ENCOUNTER — APPOINTMENT (OUTPATIENT)
Dept: GENERAL RADIOLOGY | Facility: HOSPITAL | Age: 53
End: 2018-08-31

## 2018-08-31 ENCOUNTER — HOSPITAL ENCOUNTER (EMERGENCY)
Facility: HOSPITAL | Age: 53
Discharge: HOME | End: 2018-08-31
Attending: EMERGENCY MEDICINE | Admitting: EMERGENCY MEDICINE

## 2018-08-31 VITALS
DIASTOLIC BLOOD PRESSURE: 93 MMHG | HEART RATE: 87 BPM | HEIGHT: 67 IN | WEIGHT: 259 LBS | OXYGEN SATURATION: 96 % | SYSTOLIC BLOOD PRESSURE: 128 MMHG | TEMPERATURE: 98.6 F | BODY MASS INDEX: 40.65 KG/M2 | RESPIRATION RATE: 16 BRPM

## 2018-08-31 DIAGNOSIS — J18.9 COMMUNITY ACQUIRED PNEUMONIA OF RIGHT LOWER LOBE OF LUNG: Primary | ICD-10-CM

## 2018-08-31 LAB
ALBUMIN SERPL-MCNC: 4.55 G/DL (ref 3.2–4.8)
ALBUMIN/GLOB SERPL: 1.5 G/DL (ref 1.5–2.5)
ALP SERPL-CCNC: 93 U/L (ref 25–100)
ALT SERPL W P-5'-P-CCNC: 48 U/L (ref 7–40)
ANION GAP SERPL CALCULATED.3IONS-SCNC: 4 MMOL/L (ref 3–11)
AST SERPL-CCNC: 27 U/L (ref 0–33)
BASOPHILS # BLD AUTO: 0.03 10*3/MM3 (ref 0–0.2)
BASOPHILS NFR BLD AUTO: 0.3 % (ref 0–1)
BILIRUB SERPL-MCNC: 0.5 MG/DL (ref 0.3–1.2)
BNP SERPL-MCNC: 8 PG/ML (ref 0–100)
BUN BLD-MCNC: 19 MG/DL (ref 9–23)
BUN/CREAT SERPL: 17.4 (ref 7–25)
CALCIUM SPEC-SCNC: 9.7 MG/DL (ref 8.7–10.4)
CHLORIDE SERPL-SCNC: 103 MMOL/L (ref 99–109)
CO2 SERPL-SCNC: 30 MMOL/L (ref 20–31)
CREAT BLD-MCNC: 1.09 MG/DL (ref 0.6–1.3)
DEPRECATED RDW RBC AUTO: 47.1 FL (ref 37–54)
EOSINOPHIL # BLD AUTO: 0.3 10*3/MM3 (ref 0–0.3)
EOSINOPHIL NFR BLD AUTO: 2.8 % (ref 0–3)
ERYTHROCYTE [DISTWIDTH] IN BLOOD BY AUTOMATED COUNT: 13.8 % (ref 11.3–14.5)
GFR SERPL CREATININE-BSD FRML MDRD: 53 ML/MIN/1.73
GLOBULIN UR ELPH-MCNC: 3.1 GM/DL
GLUCOSE BLD-MCNC: 142 MG/DL (ref 70–100)
HCT VFR BLD AUTO: 39.8 % (ref 34.5–44)
HGB BLD-MCNC: 13 G/DL (ref 11.5–15.5)
HOLD SPECIMEN: NORMAL
HOLD SPECIMEN: NORMAL
IMM GRANULOCYTES # BLD: 0.02 10*3/MM3 (ref 0–0.03)
IMM GRANULOCYTES NFR BLD: 0.2 % (ref 0–0.6)
LIPASE SERPL-CCNC: 33 U/L (ref 6–51)
LYMPHOCYTES # BLD AUTO: 3.03 10*3/MM3 (ref 0.6–4.8)
LYMPHOCYTES NFR BLD AUTO: 28.3 % (ref 24–44)
MCH RBC QN AUTO: 30.5 PG (ref 27–31)
MCHC RBC AUTO-ENTMCNC: 32.7 G/DL (ref 32–36)
MCV RBC AUTO: 93.4 FL (ref 80–99)
MONOCYTES # BLD AUTO: 0.74 10*3/MM3 (ref 0–1)
MONOCYTES NFR BLD AUTO: 6.9 % (ref 0–12)
NEUTROPHILS # BLD AUTO: 6.59 10*3/MM3 (ref 1.5–8.3)
NEUTROPHILS NFR BLD AUTO: 61.7 % (ref 41–71)
PLATELET # BLD AUTO: 229 10*3/MM3 (ref 150–450)
PMV BLD AUTO: 11.2 FL (ref 6–12)
POTASSIUM BLD-SCNC: 3.7 MMOL/L (ref 3.5–5.5)
PROT SERPL-MCNC: 7.6 G/DL (ref 5.7–8.2)
RBC # BLD AUTO: 4.26 10*6/MM3 (ref 3.89–5.14)
SODIUM BLD-SCNC: 137 MMOL/L (ref 132–146)
TROPONIN I SERPL-MCNC: 0 NG/ML (ref 0–0.07)
WBC NRBC COR # BLD: 10.69 10*3/MM3 (ref 3.5–10.8)
WHOLE BLOOD HOLD SPECIMEN: NORMAL
WHOLE BLOOD HOLD SPECIMEN: NORMAL

## 2018-08-31 PROCEDURE — 96374 THER/PROPH/DIAG INJ IV PUSH: CPT

## 2018-08-31 PROCEDURE — 71045 X-RAY EXAM CHEST 1 VIEW: CPT

## 2018-08-31 PROCEDURE — 84484 ASSAY OF TROPONIN QUANT: CPT

## 2018-08-31 PROCEDURE — 80053 COMPREHEN METABOLIC PANEL: CPT | Performed by: EMERGENCY MEDICINE

## 2018-08-31 PROCEDURE — 94760 N-INVAS EAR/PLS OXIMETRY 1: CPT

## 2018-08-31 PROCEDURE — 83690 ASSAY OF LIPASE: CPT | Performed by: EMERGENCY MEDICINE

## 2018-08-31 PROCEDURE — 83880 ASSAY OF NATRIURETIC PEPTIDE: CPT | Performed by: EMERGENCY MEDICINE

## 2018-08-31 PROCEDURE — 94640 AIRWAY INHALATION TREATMENT: CPT

## 2018-08-31 PROCEDURE — 85025 COMPLETE CBC W/AUTO DIFF WBC: CPT | Performed by: EMERGENCY MEDICINE

## 2018-08-31 PROCEDURE — 25010000002 KETOROLAC TROMETHAMINE PER 15 MG: Performed by: EMERGENCY MEDICINE

## 2018-08-31 PROCEDURE — 99284 EMERGENCY DEPT VISIT MOD MDM: CPT

## 2018-08-31 PROCEDURE — 93005 ELECTROCARDIOGRAM TRACING: CPT

## 2018-08-31 PROCEDURE — 93005 ELECTROCARDIOGRAM TRACING: CPT | Performed by: EMERGENCY MEDICINE

## 2018-08-31 RX ORDER — DEXTROMETHORPHAN HYDROBROMIDE AND PROMETHAZINE HYDROCHLORIDE 15; 6.25 MG/5ML; MG/5ML
5 SYRUP ORAL 4 TIMES DAILY PRN
Qty: 120 ML | Refills: 0 | Status: SHIPPED | OUTPATIENT
Start: 2018-08-31 | End: 2018-09-19 | Stop reason: SDUPTHER

## 2018-08-31 RX ORDER — LEVOFLOXACIN 750 MG/1
750 TABLET ORAL DAILY
Qty: 7 TABLET | Refills: 0 | Status: SHIPPED | OUTPATIENT
Start: 2018-08-31 | End: 2019-01-29

## 2018-08-31 RX ORDER — ASPIRIN 81 MG/1
324 TABLET, CHEWABLE ORAL ONCE
Status: DISCONTINUED | OUTPATIENT
Start: 2018-08-31 | End: 2018-08-31 | Stop reason: HOSPADM

## 2018-08-31 RX ORDER — KETOROLAC TROMETHAMINE 15 MG/ML
15 INJECTION, SOLUTION INTRAMUSCULAR; INTRAVENOUS ONCE
Status: COMPLETED | OUTPATIENT
Start: 2018-08-31 | End: 2018-08-31

## 2018-08-31 RX ORDER — SODIUM CHLORIDE 0.9 % (FLUSH) 0.9 %
10 SYRINGE (ML) INJECTION AS NEEDED
Status: DISCONTINUED | OUTPATIENT
Start: 2018-08-31 | End: 2018-08-31 | Stop reason: HOSPADM

## 2018-08-31 RX ORDER — IPRATROPIUM BROMIDE AND ALBUTEROL SULFATE 2.5; .5 MG/3ML; MG/3ML
3 SOLUTION RESPIRATORY (INHALATION) ONCE
Status: COMPLETED | OUTPATIENT
Start: 2018-08-31 | End: 2018-08-31

## 2018-08-31 RX ADMIN — IPRATROPIUM BROMIDE AND ALBUTEROL SULFATE 3 ML: 2.5; .5 SOLUTION RESPIRATORY (INHALATION) at 07:20

## 2018-08-31 RX ADMIN — KETOROLAC TROMETHAMINE 15 MG: 15 INJECTION, SOLUTION INTRAMUSCULAR; INTRAVENOUS at 07:14

## 2018-09-09 ENCOUNTER — HOSPITAL ENCOUNTER (EMERGENCY)
Facility: HOSPITAL | Age: 53
Discharge: HOME | End: 2018-09-09
Attending: EMERGENCY MEDICINE | Admitting: EMERGENCY MEDICINE

## 2018-09-09 ENCOUNTER — APPOINTMENT (OUTPATIENT)
Dept: GENERAL RADIOLOGY | Facility: HOSPITAL | Age: 53
End: 2018-09-09

## 2018-09-09 VITALS
TEMPERATURE: 98.2 F | RESPIRATION RATE: 20 BRPM | BODY MASS INDEX: 41.12 KG/M2 | OXYGEN SATURATION: 99 % | HEIGHT: 67 IN | WEIGHT: 262 LBS | SYSTOLIC BLOOD PRESSURE: 156 MMHG | HEART RATE: 75 BPM | DIASTOLIC BLOOD PRESSURE: 98 MMHG

## 2018-09-09 DIAGNOSIS — J18.9 COMMUNITY ACQUIRED PNEUMONIA, UNSPECIFIED LATERALITY: Primary | ICD-10-CM

## 2018-09-09 LAB
ALBUMIN SERPL-MCNC: 4.5 G/DL (ref 3.2–4.8)
ALBUMIN/GLOB SERPL: 1.5 G/DL (ref 1.5–2.5)
ALP SERPL-CCNC: 105 U/L (ref 25–100)
ALT SERPL W P-5'-P-CCNC: 39 U/L (ref 7–40)
ANION GAP SERPL CALCULATED.3IONS-SCNC: 0 MMOL/L (ref 3–11)
AST SERPL-CCNC: 28 U/L (ref 0–33)
B-HCG UR QL: NEGATIVE
BACTERIA UR QL AUTO: NORMAL /HPF
BASOPHILS # BLD AUTO: 0.03 10*3/MM3 (ref 0–0.2)
BASOPHILS NFR BLD AUTO: 0.4 % (ref 0–1)
BILIRUB SERPL-MCNC: 0.3 MG/DL (ref 0.3–1.2)
BILIRUB UR QL STRIP: NEGATIVE
BNP SERPL-MCNC: 47 PG/ML (ref 0–100)
BUN BLD-MCNC: 18 MG/DL (ref 9–23)
BUN/CREAT SERPL: 17.6 (ref 7–25)
CALCIUM SPEC-SCNC: 9.1 MG/DL (ref 8.7–10.4)
CHLORIDE SERPL-SCNC: 105 MMOL/L (ref 99–109)
CLARITY UR: CLEAR
CO2 SERPL-SCNC: 34 MMOL/L (ref 20–31)
COLOR UR: YELLOW
CREAT BLD-MCNC: 1.02 MG/DL (ref 0.6–1.3)
DEPRECATED RDW RBC AUTO: 44.1 FL (ref 37–54)
EOSINOPHIL # BLD AUTO: 0.22 10*3/MM3 (ref 0–0.3)
EOSINOPHIL NFR BLD AUTO: 2.6 % (ref 0–3)
ERYTHROCYTE [DISTWIDTH] IN BLOOD BY AUTOMATED COUNT: 13.3 % (ref 11.3–14.5)
GFR SERPL CREATININE-BSD FRML MDRD: 57 ML/MIN/1.73
GLOBULIN UR ELPH-MCNC: 3.1 GM/DL
GLUCOSE BLD-MCNC: 190 MG/DL (ref 70–100)
GLUCOSE UR STRIP-MCNC: NEGATIVE MG/DL
HCT VFR BLD AUTO: 37.4 % (ref 34.5–44)
HGB BLD-MCNC: 12.3 G/DL (ref 11.5–15.5)
HGB UR QL STRIP.AUTO: NEGATIVE
HOLD SPECIMEN: NORMAL
HOLD SPECIMEN: NORMAL
HYALINE CASTS UR QL AUTO: NORMAL /LPF
IMM GRANULOCYTES # BLD: 0.06 10*3/MM3 (ref 0–0.03)
IMM GRANULOCYTES NFR BLD: 0.7 % (ref 0–0.6)
KETONES UR QL STRIP: NEGATIVE
LEUKOCYTE ESTERASE UR QL STRIP.AUTO: NEGATIVE
LYMPHOCYTES # BLD AUTO: 2.73 10*3/MM3 (ref 0.6–4.8)
LYMPHOCYTES NFR BLD AUTO: 32.7 % (ref 24–44)
MCH RBC QN AUTO: 30.2 PG (ref 27–31)
MCHC RBC AUTO-ENTMCNC: 32.9 G/DL (ref 32–36)
MCV RBC AUTO: 91.9 FL (ref 80–99)
MONOCYTES # BLD AUTO: 0.51 10*3/MM3 (ref 0–1)
MONOCYTES NFR BLD AUTO: 6.1 % (ref 0–12)
NEUTROPHILS # BLD AUTO: 4.85 10*3/MM3 (ref 1.5–8.3)
NEUTROPHILS NFR BLD AUTO: 58.2 % (ref 41–71)
NITRITE UR QL STRIP: NEGATIVE
PH UR STRIP.AUTO: 6 [PH] (ref 5–8)
PLATELET # BLD AUTO: 220 10*3/MM3 (ref 150–450)
PMV BLD AUTO: 10.5 FL (ref 6–12)
POTASSIUM BLD-SCNC: 3.5 MMOL/L (ref 3.5–5.5)
PROT SERPL-MCNC: 7.6 G/DL (ref 5.7–8.2)
PROT UR QL STRIP: ABNORMAL
RBC # BLD AUTO: 4.07 10*6/MM3 (ref 3.89–5.14)
RBC # UR: NORMAL /HPF
REF LAB TEST METHOD: NORMAL
SODIUM BLD-SCNC: 139 MMOL/L (ref 132–146)
SP GR UR STRIP: 1.03 (ref 1–1.03)
SQUAMOUS #/AREA URNS HPF: NORMAL /HPF
TROPONIN I SERPL-MCNC: 0 NG/ML (ref 0–0.07)
TROPONIN I SERPL-MCNC: 0 NG/ML (ref 0–0.07)
UROBILINOGEN UR QL STRIP: ABNORMAL
WBC NRBC COR # BLD: 8.34 10*3/MM3 (ref 3.5–10.8)
WBC UR QL AUTO: NORMAL /HPF
WHOLE BLOOD HOLD SPECIMEN: NORMAL
WHOLE BLOOD HOLD SPECIMEN: NORMAL

## 2018-09-09 PROCEDURE — 71045 X-RAY EXAM CHEST 1 VIEW: CPT

## 2018-09-09 PROCEDURE — 93005 ELECTROCARDIOGRAM TRACING: CPT | Performed by: EMERGENCY MEDICINE

## 2018-09-09 PROCEDURE — 83880 ASSAY OF NATRIURETIC PEPTIDE: CPT | Performed by: EMERGENCY MEDICINE

## 2018-09-09 PROCEDURE — 81001 URINALYSIS AUTO W/SCOPE: CPT | Performed by: EMERGENCY MEDICINE

## 2018-09-09 PROCEDURE — 81025 URINE PREGNANCY TEST: CPT | Performed by: EMERGENCY MEDICINE

## 2018-09-09 PROCEDURE — 80053 COMPREHEN METABOLIC PANEL: CPT | Performed by: EMERGENCY MEDICINE

## 2018-09-09 PROCEDURE — 93005 ELECTROCARDIOGRAM TRACING: CPT

## 2018-09-09 PROCEDURE — 99284 EMERGENCY DEPT VISIT MOD MDM: CPT

## 2018-09-09 PROCEDURE — 94760 N-INVAS EAR/PLS OXIMETRY 1: CPT

## 2018-09-09 PROCEDURE — 94799 UNLISTED PULMONARY SVC/PX: CPT

## 2018-09-09 PROCEDURE — 84484 ASSAY OF TROPONIN QUANT: CPT

## 2018-09-09 PROCEDURE — 94640 AIRWAY INHALATION TREATMENT: CPT

## 2018-09-09 PROCEDURE — 85025 COMPLETE CBC W/AUTO DIFF WBC: CPT

## 2018-09-09 RX ORDER — ALBUTEROL SULFATE 2.5 MG/3ML
5 SOLUTION RESPIRATORY (INHALATION) ONCE
Status: COMPLETED | OUTPATIENT
Start: 2018-09-09 | End: 2018-09-09

## 2018-09-09 RX ORDER — AZITHROMYCIN 250 MG/1
250 TABLET, FILM COATED ORAL DAILY
Qty: 6 TABLET | Refills: 0 | Status: SHIPPED | OUTPATIENT
Start: 2018-09-09 | End: 2018-10-02

## 2018-09-09 RX ORDER — SODIUM CHLORIDE 0.9 % (FLUSH) 0.9 %
10 SYRINGE (ML) INJECTION AS NEEDED
Status: DISCONTINUED | OUTPATIENT
Start: 2018-09-09 | End: 2018-09-10 | Stop reason: HOSPADM

## 2018-09-09 RX ADMIN — ALBUTEROL SULFATE 5 MG: 2.5 SOLUTION RESPIRATORY (INHALATION) at 21:43

## 2018-09-10 NOTE — ED PROVIDER NOTES
"Subjective   53-year-old female presents complaining of cough and general weakness.  Of note, the patient states that approximately 10 days ago she was diagnosed with pneumonia and completed an outpatient course of antibiotics.  She had several days off of work and felt well after being treated.  However, she states that she feels like she \"overdid it\" at work today and had recurrence of her symptoms.  She is experiencing increased weakness compared to baseline as well as a nonproductive cough.  She also endorses mild right-sided chest pain with cough.  No vomiting.  No diaphoresis.  No leg swelling.  No recent surgery.        History provided by:  Patient  Shortness of Breath   Severity:  Moderate  Onset quality:  Gradual  Duration:  10 days  Timing:  Intermittent  Progression:  Partially resolved  Chronicity:  New  Context comment:  Recent PNA  Relieved by:  Inhaler and rest  Worsened by:  Exertion  Associated symptoms: chest pain and cough    Associated symptoms: no fever        Review of Systems   Constitutional: Negative for fever.   Respiratory: Positive for cough and shortness of breath.    Cardiovascular: Positive for chest pain.   All other systems reviewed and are negative.      Past Medical History:   Diagnosis Date   • Asthma    • Bilateral ovarian cysts    • Bipolar 1 disorder (CMS/HCC)    • Colon polyp    • Depression    • Diabetes mellitus (CMS/HCC)    • Diverticulosis    • Gall stones    • GERD (gastroesophageal reflux disease)    • Headache    • Hyperlipidemia    • Hypertension    • Peripheral neuropathy    • TIA (transient ischemic attack)        Allergies   Allergen Reactions   • Contrast Dye Swelling       Past Surgical History:   Procedure Laterality Date   • CHOLECYSTECTOMY     • COLONOSCOPY     • HYSTERECTOMY     • KNEE SURGERY      right knee       Family History   Problem Relation Age of Onset   • Arthritis Mother    • Arthritis Father    • Diabetes Father    • Hyperlipidemia Father    • " "Hypertension Father    • Asthma Brother    • Other Brother    • Cancer Maternal Aunt    • Depression Paternal Uncle        Social History     Social History   • Marital status:      Social History Main Topics   • Smoking status: Never Smoker   • Smokeless tobacco: Never Used   • Alcohol use Yes      Comment: socially   • Drug use: No   • Sexual activity: Defer     Other Topics Concern   • Not on file         Objective   Physical Exam   Constitutional: She is oriented to person, place, and time. She appears well-developed and well-nourished. No distress.   Well-appearing female in no acute distress   HENT:   Head: Normocephalic and atraumatic.   Mouth/Throat: Oropharynx is clear and moist.   Neck: Normal range of motion. Neck supple.   Cardiovascular: Normal rate, regular rhythm, normal heart sounds and intact distal pulses.  Exam reveals no gallop and no friction rub.    No murmur heard.  Pulmonary/Chest: Effort normal and breath sounds normal. No respiratory distress. She has no wheezes. She has no rales.   Abdominal: Soft. Bowel sounds are normal. She exhibits no distension and no mass. There is no tenderness. There is no rebound and no guarding.   Musculoskeletal: Normal range of motion. She exhibits no edema.   Neurological: She is alert and oriented to person, place, and time.   Skin: Skin is warm and dry. No rash noted. She is not diaphoretic. No erythema.   Psychiatric: She has a normal mood and affect. Judgment and thought content normal.   Nursing note and vitals reviewed.      Procedures         ED Course  ED Course as of Sep 09 2323   Sun Sep 09, 2018   2139 53-year-old female presents complaining of cough and generalized weakness today.  She states that she was recently diagnosed with pneumonia and treated with outpatient antibiotics.  She felt well after completing her in about a course.  However, she states that she \"overdid it\" at work today and had return of her symptoms.  On arrival to the " ED, vision well-appearing.  Lungs clear.  Vital signs reassuring.  EKG unrevealing.  Labs unrevealing.  Albuterol given for bronchospasm.  We will obtain a chest x-ray and we will reassess following initial interventions.  [DD]   2237 Chest x-ray improved from prior but still suggestive of infectious process.  [DD]   2322 Repeat troponin/EKG negative/unchanged.  Doubt emergent cardiothoracic process at this time based on exam, history, clinical presentation, and gestalt, and risk stratification.  Patient appropriate for outpatient antibiotic treatment.  Prescription for Z-Lul.  She will follow-up with her primary care physician within one week.  Agreeable with plan and given appropriate return precautions.  [DD]      ED Course User Index  [DD] Jorge Barger MD     Recent Results (from the past 24 hour(s))   Comprehensive Metabolic Panel    Collection Time: 09/09/18  8:37 PM   Result Value Ref Range    Glucose 190 (H) 70 - 100 mg/dL    BUN 18 9 - 23 mg/dL    Creatinine 1.02 0.60 - 1.30 mg/dL    Sodium 139 132 - 146 mmol/L    Potassium 3.5 3.5 - 5.5 mmol/L    Chloride 105 99 - 109 mmol/L    CO2 34.0 (H) 20.0 - 31.0 mmol/L    Calcium 9.1 8.7 - 10.4 mg/dL    Total Protein 7.6 5.7 - 8.2 g/dL    Albumin 4.50 3.20 - 4.80 g/dL    ALT (SGPT) 39 7 - 40 U/L    AST (SGOT) 28 0 - 33 U/L    Alkaline Phosphatase 105 (H) 25 - 100 U/L    Total Bilirubin 0.3 0.3 - 1.2 mg/dL    eGFR Non African Amer 57 (L) >60 mL/min/1.73    Globulin 3.1 gm/dL    A/G Ratio 1.5 1.5 - 2.5 g/dL    BUN/Creatinine Ratio 17.6 7.0 - 25.0    Anion Gap 0.0 (L) 3.0 - 11.0 mmol/L   BNP    Collection Time: 09/09/18  8:37 PM   Result Value Ref Range    BNP 47.0 0.0 - 100.0 pg/mL   CBC Auto Differential    Collection Time: 09/09/18  8:37 PM   Result Value Ref Range    WBC 8.34 3.50 - 10.80 10*3/mm3    RBC 4.07 3.89 - 5.14 10*6/mm3    Hemoglobin 12.3 11.5 - 15.5 g/dL    Hematocrit 37.4 34.5 - 44.0 %    MCV 91.9 80.0 - 99.0 fL    MCH 30.2 27.0 - 31.0 pg     "MCHC 32.9 32.0 - 36.0 g/dL    RDW 13.3 11.3 - 14.5 %    RDW-SD 44.1 37.0 - 54.0 fl    MPV 10.5 6.0 - 12.0 fL    Platelets 220 150 - 450 10*3/mm3    Neutrophil % 58.2 41.0 - 71.0 %    Lymphocyte % 32.7 24.0 - 44.0 %    Monocyte % 6.1 0.0 - 12.0 %    Eosinophil % 2.6 0.0 - 3.0 %    Basophil % 0.4 0.0 - 1.0 %    Immature Grans % 0.7 (H) 0.0 - 0.6 %    Neutrophils, Absolute 4.85 1.50 - 8.30 10*3/mm3    Lymphocytes, Absolute 2.73 0.60 - 4.80 10*3/mm3    Monocytes, Absolute 0.51 0.00 - 1.00 10*3/mm3    Eosinophils, Absolute 0.22 0.00 - 0.30 10*3/mm3    Basophils, Absolute 0.03 0.00 - 0.20 10*3/mm3    Immature Grans, Absolute 0.06 (H) 0.00 - 0.03 10*3/mm3   POC Troponin, Rapid    Collection Time: 09/09/18  8:43 PM   Result Value Ref Range    Troponin I 0.00 0.00 - 0.07 ng/mL     Note: In addition to lab results from this visit, the labs listed above may include labs taken at another facility or during a different encounter within the last 24 hours. Please correlate lab times with ED admission and discharge times for further clarification of the services performed during this visit.    XR Chest 1 View    (Results Pending)     Vitals:    09/09/18 2029   BP: 178/93   BP Location: Left arm   Patient Position: Sitting   Pulse: 82   Resp: 18   Temp: 98.2 °F (36.8 °C)   TempSrc: Oral   SpO2: 98%   Weight: 119 kg (262 lb)   Height: 170.2 cm (67\")     Medications   sodium chloride 0.9 % flush 10 mL (not administered)   albuterol (PROVENTIL) nebulizer solution 0.083% 2.5 mg/3mL (not administered)     ECG/EMG Results (last 24 hours)     Procedure Component Value Units Date/Time    ECG 12 Lead [[id number]] Collected:  09/09/18 2035     Updated:  09/09/18 2036                    Recent Results (from the past 24 hour(s))   Comprehensive Metabolic Panel    Collection Time: 09/09/18  8:37 PM   Result Value Ref Range    Glucose 190 (H) 70 - 100 mg/dL    BUN 18 9 - 23 mg/dL    Creatinine 1.02 0.60 - 1.30 mg/dL    Sodium 139 132 - 146 mmol/L "    Potassium 3.5 3.5 - 5.5 mmol/L    Chloride 105 99 - 109 mmol/L    CO2 34.0 (H) 20.0 - 31.0 mmol/L    Calcium 9.1 8.7 - 10.4 mg/dL    Total Protein 7.6 5.7 - 8.2 g/dL    Albumin 4.50 3.20 - 4.80 g/dL    ALT (SGPT) 39 7 - 40 U/L    AST (SGOT) 28 0 - 33 U/L    Alkaline Phosphatase 105 (H) 25 - 100 U/L    Total Bilirubin 0.3 0.3 - 1.2 mg/dL    eGFR Non African Amer 57 (L) >60 mL/min/1.73    Globulin 3.1 gm/dL    A/G Ratio 1.5 1.5 - 2.5 g/dL    BUN/Creatinine Ratio 17.6 7.0 - 25.0    Anion Gap 0.0 (L) 3.0 - 11.0 mmol/L   BNP    Collection Time: 09/09/18  8:37 PM   Result Value Ref Range    BNP 47.0 0.0 - 100.0 pg/mL   Light Blue Top    Collection Time: 09/09/18  8:37 PM   Result Value Ref Range    Extra Tube hold for add-on    Green Top (Gel)    Collection Time: 09/09/18  8:37 PM   Result Value Ref Range    Extra Tube Hold for add-ons.    Lavender Top    Collection Time: 09/09/18  8:37 PM   Result Value Ref Range    Extra Tube hold for add-on    Gold Top - SST    Collection Time: 09/09/18  8:37 PM   Result Value Ref Range    Extra Tube Hold for add-ons.    CBC Auto Differential    Collection Time: 09/09/18  8:37 PM   Result Value Ref Range    WBC 8.34 3.50 - 10.80 10*3/mm3    RBC 4.07 3.89 - 5.14 10*6/mm3    Hemoglobin 12.3 11.5 - 15.5 g/dL    Hematocrit 37.4 34.5 - 44.0 %    MCV 91.9 80.0 - 99.0 fL    MCH 30.2 27.0 - 31.0 pg    MCHC 32.9 32.0 - 36.0 g/dL    RDW 13.3 11.3 - 14.5 %    RDW-SD 44.1 37.0 - 54.0 fl    MPV 10.5 6.0 - 12.0 fL    Platelets 220 150 - 450 10*3/mm3    Neutrophil % 58.2 41.0 - 71.0 %    Lymphocyte % 32.7 24.0 - 44.0 %    Monocyte % 6.1 0.0 - 12.0 %    Eosinophil % 2.6 0.0 - 3.0 %    Basophil % 0.4 0.0 - 1.0 %    Immature Grans % 0.7 (H) 0.0 - 0.6 %    Neutrophils, Absolute 4.85 1.50 - 8.30 10*3/mm3    Lymphocytes, Absolute 2.73 0.60 - 4.80 10*3/mm3    Monocytes, Absolute 0.51 0.00 - 1.00 10*3/mm3    Eosinophils, Absolute 0.22 0.00 - 0.30 10*3/mm3    Basophils, Absolute 0.03 0.00 - 0.20 10*3/mm3     Immature Grans, Absolute 0.06 (H) 0.00 - 0.03 10*3/mm3   POC Troponin, Rapid    Collection Time: 09/09/18  8:43 PM   Result Value Ref Range    Troponin I 0.00 0.00 - 0.07 ng/mL   Urinalysis With Microscopic If Indicated (No Culture) - Urine, Clean Catch    Collection Time: 09/09/18 10:21 PM   Result Value Ref Range    Color, UA Yellow Yellow, Straw    Appearance, UA Clear Clear    pH, UA 6.0 5.0 - 8.0    Specific Gravity, UA 1.026 1.001 - 1.030    Glucose, UA Negative Negative    Ketones, UA Negative Negative    Bilirubin, UA Negative Negative    Blood, UA Negative Negative    Protein, UA 30 mg/dL (1+) (A) Negative    Leuk Esterase, UA Negative Negative    Nitrite, UA Negative Negative    Urobilinogen, UA 0.2 E.U./dL 0.2 - 1.0 E.U./dL   Pregnancy, Urine - Urine, Clean Catch    Collection Time: 09/09/18 10:21 PM   Result Value Ref Range    HCG, Urine QL Negative Negative   Urinalysis, Microscopic Only - Urine, Clean Catch    Collection Time: 09/09/18 10:21 PM   Result Value Ref Range    RBC, UA 0-2 None Seen, 0-2 /HPF    WBC, UA None Seen None Seen, 0-2 /HPF    Bacteria, UA None Seen None Seen, Trace /HPF    Squamous Epithelial Cells, UA 0-2 None Seen, 0-2 /HPF    Hyaline Casts, UA None Seen 0 - 6 /LPF    Methodology Automated Microscopy    POC Troponin, Rapid    Collection Time: 09/09/18 11:02 PM   Result Value Ref Range    Troponin I 0.00 0.00 - 0.07 ng/mL     Note: In addition to lab results from this visit, the labs listed above may include labs taken at another facility or during a different encounter within the last 24 hours. Please correlate lab times with ED admission and discharge times for further clarification of the services performed during this visit.    XR Chest 1 View   Final Result     Decreased right infrahilar pulmonary opacity, pneumonia.  Peribronchial    cuffing and trace patchy pulmonary opacities remain.  Recommend followup chest    x-ray in 3 months to ensure resolution (noting that  "radiographic resolution of    pneumonia lags behind clinical improvement).      THIS DOCUMENT HAS BEEN ELECTRONICALLY SIGNED BY CHERELLE CUENCA MD        Vitals:    09/09/18 2029 09/09/18 2143   BP: 178/93 170/93   BP Location: Left arm    Patient Position: Sitting    Pulse: 82 75   Resp: 18 20   Temp: 98.2 °F (36.8 °C)    TempSrc: Oral    SpO2: 98% 98%   Weight: 119 kg (262 lb)    Height: 170.2 cm (67\")      Medications   sodium chloride 0.9 % flush 10 mL (not administered)   albuterol (PROVENTIL) nebulizer solution 0.083% 2.5 mg/3mL (5 mg Nebulization Given 9/9/18 2143)     ECG/EMG Results (last 24 hours)     Procedure Component Value Units Date/Time    ECG 12 Lead [[id number]] Collected:  09/09/18 2035     Updated:  09/09/18 2036    ECG 12 Lead [[id number]] Collected:  09/09/18 2252     Updated:  09/09/18 2254            MDM    Final diagnoses:   Community acquired pneumonia, unspecified laterality       Documentation assistance provided by aga Anglin.  Information recorded by the scribe was done at my direction and has been verified and validated by me.     Heath Anglin  09/09/18 2133       Jorge Barger MD  09/09/18 2066    "

## 2018-09-18 ENCOUNTER — OFFICE VISIT (OUTPATIENT)
Dept: FAMILY MEDICINE CLINIC | Facility: CLINIC | Age: 53
End: 2018-09-18

## 2018-09-18 ENCOUNTER — TELEPHONE (OUTPATIENT)
Dept: FAMILY MEDICINE CLINIC | Facility: CLINIC | Age: 53
End: 2018-09-18

## 2018-09-18 VITALS
BODY MASS INDEX: 40.49 KG/M2 | SYSTOLIC BLOOD PRESSURE: 130 MMHG | OXYGEN SATURATION: 99 % | WEIGHT: 258 LBS | DIASTOLIC BLOOD PRESSURE: 88 MMHG | HEART RATE: 88 BPM | HEIGHT: 67 IN | TEMPERATURE: 97.3 F

## 2018-09-18 DIAGNOSIS — H66.90 ACUTE OTITIS MEDIA, UNSPECIFIED OTITIS MEDIA TYPE: ICD-10-CM

## 2018-09-18 DIAGNOSIS — J18.9 PNEUMONIA OF RIGHT MIDDLE LOBE DUE TO INFECTIOUS ORGANISM: Primary | ICD-10-CM

## 2018-09-18 PROCEDURE — 99213 OFFICE O/P EST LOW 20 MIN: CPT | Performed by: FAMILY MEDICINE

## 2018-09-18 RX ORDER — DOXYCYCLINE HYCLATE 100 MG/1
100 CAPSULE ORAL 2 TIMES DAILY
Qty: 28 CAPSULE | Refills: 0 | Status: SHIPPED | OUTPATIENT
Start: 2018-09-18 | End: 2019-01-29

## 2018-09-18 NOTE — PROGRESS NOTES
Subjective   Marcela Ramírez is a 53 y.o. female    Chief Complaint  Pneumonia  Ear infection      History of Present Illness   Patient concerned due to recent diagnosis of pneumonia.  She was seen in [facility] emergency Department on 8/31/18 where a chest x-ray revealed a right infrahilar pneumonia.  Treated with Levaquin and seemed to get better.  Tried to go back to work but symptoms resumed so she went back to the emergency department where a follow-up chest x-ray revealed a persisting infiltrate.  Patient is frustrated that she has not returned to normal and cannot yet returned to work.  She has completed all of her antibiotics as directed.  Also has ongoing ear infection and is seeing ENT.  Has follow-up with them next week.      The following portions of the patient's history were reviewed and updated as appropriate: allergies, current medications, past social history and problem list    Review of Systems   Constitutional: Negative for chills, fatigue and fever.   HENT: Positive for ear pain and rhinorrhea. Negative for sore throat.    Respiratory: Positive for cough, chest tightness, shortness of breath and wheezing.    Cardiovascular: Positive for chest pain. Negative for leg swelling.   Gastrointestinal: Negative for abdominal pain, nausea and vomiting.   Musculoskeletal: Negative for neck pain.   Skin: Negative for rash.   Neurological: Positive for headaches.       Objective     Vitals:    09/18/18 1513   BP: 130/88   Pulse: 88   Temp: 97.3 °F (36.3 °C)   SpO2: 99%       Physical Exam   Constitutional: She is oriented to person, place, and time. She appears well-developed and well-nourished. No distress.   HENT:   Head: Normocephalic and atraumatic.   Nose: Nose normal.   Mouth/Throat: Oropharynx is clear and moist.   Eyes: Pupils are equal, round, and reactive to light. Conjunctivae are normal.   Neck: Neck supple. No JVD present.   Cardiovascular: Normal rate, regular rhythm and normal heart  sounds.    No murmur heard.  Pulmonary/Chest: Effort normal. No stridor. No respiratory distress. She has no wheezes. She has no rales.   Musculoskeletal: She exhibits no edema.   Lymphadenopathy:     She has no cervical adenopathy.   Neurological: She is alert and oriented to person, place, and time.   Skin: Skin is warm and dry. She is not diaphoretic.   Psychiatric: She has a normal mood and affect. Her behavior is normal.   Nursing note and vitals reviewed.      Assessment/Plan   Problem List Items Addressed This Visit     None      Visit Diagnoses     Pneumonia of right middle lobe due to infectious organism (CMS/[facility])    -  Primary    Relevant Medications    doxycycline (VIBRAMYCIN) 100 MG capsule    Acute otitis media, unspecified otitis media type            Off work for 2 weeks.  Follow-up in 2 weeks.  We'll repeat x-ray at that time.  If abnormalities persist she will need a CT scan of the chest.

## 2018-09-18 NOTE — TELEPHONE ENCOUNTER
----- Message from Moriah Rizvi sent at 9/18/2018  4:16 PM EDT -----  Contact: PT  NEEDS COUGH SYRUP CALLED IN TO     [facility] [address] [place] [address] [address] - [phone]  - [phone] (Phone)  [phone] (Fax)

## 2018-09-19 ENCOUNTER — TELEPHONE (OUTPATIENT)
Dept: FAMILY MEDICINE CLINIC | Facility: CLINIC | Age: 53
End: 2018-09-19

## 2018-09-19 RX ORDER — DEXTROMETHORPHAN HYDROBROMIDE AND PROMETHAZINE HYDROCHLORIDE 15; 6.25 MG/5ML; MG/5ML
5 SYRUP ORAL 4 TIMES DAILY PRN
Qty: 120 ML | Refills: 0 | Status: SHIPPED | OUTPATIENT
Start: 2018-09-19 | End: 2018-09-21 | Stop reason: SDUPTHER

## 2018-09-19 NOTE — TELEPHONE ENCOUNTER
----- Message from Marcela Ramírez sent at 9/19/2018  2:24 PM EDT -----  Regarding: Prescription Question  Contact: [phone]  Dr. Acosta,  When I was in your office yesterday, you gave me a new antibiotic which I picked up. I did call after I left to request a cough syrup but I have not heard from anyone.   Is it possible for you to call one in for me? It would make sleeping easier.     Thank you,     Helen Ramírez  1965

## 2018-09-20 ENCOUNTER — TELEPHONE (OUTPATIENT)
Dept: FAMILY MEDICINE CLINIC | Facility: CLINIC | Age: 53
End: 2018-09-20

## 2018-09-20 NOTE — TELEPHONE ENCOUNTER
----- Message from Marcela Ramírez sent at 9/19/2018  5:51 PM EDT -----  Regarding: RE: Prescription Question  Contact: [phone]  Thank you! I will after I get my meds.       ----- Message -----  From: SERGEI DÍAZ  Sent: 9/19/2018  4:13 PM EDT  To: SCOTT Ramírez  Subject: RE: Prescription Question  Ms. Ramírez,  Your cough syrup has been called to the pharmacy.  I hope you feel better soon.  Let us know if you need anything, and stay out of [facility]!!!  LOL    ----- Message -----  From: Marcela Ramírez  Sent: 9/19/2018   2:24 PM  To: [facility] [place]  Subject: Prescription Question                            Dr. Acosta,  When I was in your office yesterday, you gave me a new antibiotic which I picked up. I did call after I left to request a cough syrup but I have not heard from anyone.   Is it possible for you to call one in for me? It would make sleeping easier.     Thank you,     Scott Ramírez  1965

## 2018-09-21 RX ORDER — DEXTROMETHORPHAN HYDROBROMIDE AND PROMETHAZINE HYDROCHLORIDE 15; 6.25 MG/5ML; MG/5ML
5 SYRUP ORAL 4 TIMES DAILY PRN
Qty: 120 ML | Refills: 0 | OUTPATIENT
Start: 2018-09-21 | End: 2018-11-19

## 2018-09-27 ENCOUNTER — LAB REQUISITION (OUTPATIENT)
Dept: LAB | Facility: HOSPITAL | Age: 53
End: 2018-09-27

## 2018-09-27 DIAGNOSIS — H60.391 OTHER INFECTIVE OTITIS EXTERNA, RIGHT EAR: ICD-10-CM

## 2018-09-27 PROCEDURE — 87077 CULTURE AEROBIC IDENTIFY: CPT | Performed by: OTOLARYNGOLOGY

## 2018-09-27 PROCEDURE — 87147 CULTURE TYPE IMMUNOLOGIC: CPT | Performed by: OTOLARYNGOLOGY

## 2018-09-27 PROCEDURE — 87205 SMEAR GRAM STAIN: CPT | Performed by: OTOLARYNGOLOGY

## 2018-09-27 PROCEDURE — 87070 CULTURE OTHR SPECIMN AEROBIC: CPT | Performed by: OTOLARYNGOLOGY

## 2018-09-27 PROCEDURE — 87186 SC STD MICRODIL/AGAR DIL: CPT | Performed by: OTOLARYNGOLOGY

## 2018-09-30 LAB
BACTERIA SPEC AEROBE CULT: ABNORMAL
BACTERIA SPEC AEROBE CULT: ABNORMAL
GRAM STN SPEC: ABNORMAL

## 2018-10-02 ENCOUNTER — OFFICE VISIT (OUTPATIENT)
Dept: NEUROLOGY | Facility: CLINIC | Age: 53
End: 2018-10-02

## 2018-10-02 ENCOUNTER — OFFICE VISIT (OUTPATIENT)
Dept: FAMILY MEDICINE CLINIC | Facility: CLINIC | Age: 53
End: 2018-10-02

## 2018-10-02 VITALS
OXYGEN SATURATION: 98 % | DIASTOLIC BLOOD PRESSURE: 84 MMHG | SYSTOLIC BLOOD PRESSURE: 132 MMHG | BODY MASS INDEX: 41.12 KG/M2 | HEIGHT: 67 IN | TEMPERATURE: 97.2 F | HEART RATE: 82 BPM | WEIGHT: 262 LBS

## 2018-10-02 VITALS
HEIGHT: 67 IN | WEIGHT: 257.94 LBS | BODY MASS INDEX: 40.48 KG/M2 | DIASTOLIC BLOOD PRESSURE: 82 MMHG | SYSTOLIC BLOOD PRESSURE: 128 MMHG

## 2018-10-02 DIAGNOSIS — G25.81 RESTLESS LEGS SYNDROME (RLS): Primary | ICD-10-CM

## 2018-10-02 DIAGNOSIS — E11.42 DIABETIC PERIPHERAL NEUROPATHY (HCC): ICD-10-CM

## 2018-10-02 DIAGNOSIS — J18.9 PNEUMONIA OF RIGHT MIDDLE LOBE DUE TO INFECTIOUS ORGANISM: Primary | ICD-10-CM

## 2018-10-02 PROCEDURE — 99213 OFFICE O/P EST LOW 20 MIN: CPT | Performed by: FAMILY MEDICINE

## 2018-10-02 PROCEDURE — 99215 OFFICE O/P EST HI 40 MIN: CPT | Performed by: PHYSICIAN ASSISTANT

## 2018-10-02 NOTE — PROGRESS NOTES
Subjective   Marcela Ramírez is a 53 y.o. female    Chief Complaint:    Pneumonia  Fatigue      History of Present Illness   Patient presents today for follow-up after pneumonia that was diagnosed initially on August 31.  I saw the patient 2 weeks ago and she was still recuperating from her pneumonia.  She was struggling trying to return to work and not getting better.  Antibiotics were resumed and she was taken off work and was to return here today.  She is improved and has not noted any fever or chills.  She still has some chest discomfort from time to time.  She still also has some fatigue when exerting herself.  She would like to return to work.      The following portions of the patient's history were reviewed and updated as appropriate: allergies, current medications, past social history and problem list    Review of Systems   Constitutional: Negative for chills, fatigue and fever.   HENT: Negative for ear pain, rhinorrhea and sore throat.    Respiratory: Positive for cough. Negative for chest tightness, shortness of breath and wheezing.    Cardiovascular: Negative for chest pain and leg swelling.   Gastrointestinal: Negative for abdominal pain, nausea and vomiting.   Musculoskeletal: Negative for neck pain.   Skin: Negative for rash.   Neurological: Negative for headaches.       Objective     Vitals:    10/02/18 1522   BP: 132/84   Pulse: 82   Temp: 97.2 °F (36.2 °C)   SpO2: 98%       Physical Exam   Constitutional: She is oriented to person, place, and time. She appears well-developed and well-nourished. No distress.   HENT:   Head: Normocephalic and atraumatic.   Nose: Nose normal.   Mouth/Throat: Oropharynx is clear and moist.   Eyes: Pupils are equal, round, and reactive to light. Conjunctivae are normal.   Neck: Neck supple. No JVD present.   Cardiovascular: Normal rate, regular rhythm and normal heart sounds.    No murmur heard.  Pulmonary/Chest: Effort normal. No stridor. No respiratory distress. She  has no wheezes. She has no rales.   Musculoskeletal: She exhibits no edema.   Lymphadenopathy:     She has no cervical adenopathy.   Neurological: She is alert and oriented to person, place, and time.   Skin: Skin is warm and dry. She is not diaphoretic.   Psychiatric: She has a normal mood and affect. Her behavior is normal.   Nursing note and vitals reviewed.      Assessment/Plan   Problem List Items Addressed This Visit     None      Visit Diagnoses     Pneumonia of right middle lobe due to infectious organism (CMS/[facility])    -  Primary    Relevant Orders    XR Chest PA & Lateral        Patient will be allowed to return to work on 10/4/18 restricted to 20-25 hours of work per week.  She has an order to repeat her chest x-ray.

## 2018-10-02 NOTE — PROGRESS NOTES
"Subjective     Chief Complaint: numbness, paresthesias, pain       History of Present Illness   Marcela Ramírez is a 53 y.o. female with a history of DM who comes to clinic today for evaluation of neuropathy. She initially noted symptoms in 2015 marked by numbness, paresthesias, and shooting pain in her upper and lower extremities bilaterally. This has worsened over time. She notes associated balance impairment as well as decreased  strength, though denies any clear associated weakness. Her symptoms are worse with increased activity. She is currently taking GBP 600mg TID, which may be somewhat beneficial.      She also notes RLS symptoms in her feet primarily at night. She is currently taking ropinirole 0.5mg nightly, which is somewhat beneficial. She states that Xanax is also beneficial for this.      Prior evaluation has included an EMG of the upper and lower extremities bilaterally which was notable for a moderate axonal and demyelinating peripheral neuropathy, moderate CTS bilaterally, mild-moderate ulnar neuropathy on the left and mild ulnar neuropathy on the right. Screening bloodwork was notable for a hemoglobin A1C of 7.3.     Today: Since her last visit in 8/18, she notes that her symptoms are essentially unchanged. She is currently taking Lyrica, which she started approximately 6 days ago.       I have reviewed and confirmed the past family, social and medical history as accurate on 10/2/18.     Review of Systems   Constitutional: Negative.    HENT: Negative.    Eyes: Negative.    Respiratory: Negative.    Cardiovascular: Negative.    Gastrointestinal: Negative.    Endocrine: Negative.    Genitourinary: Negative.    Musculoskeletal: Negative.    Skin: Negative.    Allergic/Immunologic: Negative.    Neurological: Positive for numbness.        Tingling      Hematological: Negative.    Psychiatric/Behavioral: Negative.        Objective     /82   Ht 170.2 cm (67.01\")   Wt 117 kg (257 lb 15 oz)   " LMP  (LMP Unknown)   BMI 40.39 kg/m²     General appearance today is normal.       Physical Exam   Neurological: She has normal strength. She has a normal Finger-Nose-Finger Test. Gait normal.   Psychiatric: Her speech is normal.        Neurologic Exam     Mental Status   Speech: speech is normal   Level of consciousness: alert  Normal comprehension.     Cranial Nerves   Cranial nerves II through XII intact.     Motor Exam   Muscle bulk: normal  Overall muscle tone: normal    Strength   Strength 5/5 throughout.     Sensory Exam   Decreased sensation to light touch in upper and lower extremities bilaterally        Gait, Coordination, and Reflexes     Gait  Gait: normal    Coordination   Finger to nose coordination: normal    Tremor   Resting tremor: absent            Assessment/Plan   Marcela was seen today for peripheral neuropathy.    Diagnoses and all orders for this visit:    Restless legs syndrome (RLS)    Diabetic peripheral neuropathy (CMS/HCC)          Discussion/Summary   Marcela Ramírez returns to clinic today with a history consistent with a diabetic peripheral neuropathy, carpal tunnel syndrome, and restless leg syndrome. This was discussed at length. I again reviewed her current status and treatment options. After discussing potential treatment options, it was elected to continue on Lyrica unchanged for now as well as ropinirole.  I again discussed the importance of tight glucose control and recommended a referral to nutrition, which she will consider in the future. She will then follow up in 1 month, or sooner if needed.   I spent 40 minutes minutes face to face with the patient with 30 minutes spent on discussing diagnosis, prognosis, evaluation, current status, treatment options and management as discussed above.       As part of this visit I discussed the history with the patient .      Gladys Wilder PA-C

## 2018-10-09 ENCOUNTER — HOSPITAL ENCOUNTER (OUTPATIENT)
Dept: GENERAL RADIOLOGY | Facility: HOSPITAL | Age: 53
Discharge: HOME | End: 2018-10-09
Admitting: FAMILY MEDICINE

## 2018-10-09 DIAGNOSIS — J18.9 PNEUMONIA OF RIGHT MIDDLE LOBE DUE TO INFECTIOUS ORGANISM: ICD-10-CM

## 2018-10-09 PROCEDURE — 71046 X-RAY EXAM CHEST 2 VIEWS: CPT

## 2018-10-30 ENCOUNTER — OFFICE VISIT (OUTPATIENT)
Dept: FAMILY MEDICINE CLINIC | Facility: CLINIC | Age: 53
End: 2018-10-30

## 2018-10-30 VITALS
HEIGHT: 67 IN | TEMPERATURE: 97.6 F | SYSTOLIC BLOOD PRESSURE: 138 MMHG | WEIGHT: 266 LBS | BODY MASS INDEX: 41.75 KG/M2 | OXYGEN SATURATION: 99 % | DIASTOLIC BLOOD PRESSURE: 90 MMHG | HEART RATE: 79 BPM

## 2018-10-30 DIAGNOSIS — F41.9 ANXIETY: ICD-10-CM

## 2018-10-30 DIAGNOSIS — E66.01 CLASS 3 SEVERE OBESITY WITHOUT SERIOUS COMORBIDITY WITH BODY MASS INDEX (BMI) OF 40.0 TO 44.9 IN ADULT, UNSPECIFIED OBESITY TYPE (HCC): Primary | ICD-10-CM

## 2018-10-30 DIAGNOSIS — F51.04 PSYCHOPHYSIOLOGICAL INSOMNIA: ICD-10-CM

## 2018-10-30 DIAGNOSIS — E11.9 TYPE 2 DIABETES MELLITUS WITHOUT COMPLICATION, WITHOUT LONG-TERM CURRENT USE OF INSULIN (HCC): ICD-10-CM

## 2018-10-30 DIAGNOSIS — G25.81 RESTLESS LEGS SYNDROME (RLS): ICD-10-CM

## 2018-10-30 DIAGNOSIS — F32.A DEPRESSION, UNSPECIFIED DEPRESSION TYPE: ICD-10-CM

## 2018-10-30 DIAGNOSIS — G62.9 NEUROPATHY: ICD-10-CM

## 2018-10-30 PROCEDURE — 99214 OFFICE O/P EST MOD 30 MIN: CPT | Performed by: FAMILY MEDICINE

## 2018-10-30 RX ORDER — ALPRAZOLAM 0.5 MG/1
0.5 TABLET ORAL NIGHTLY PRN
Qty: 30 TABLET | Refills: 2 | Status: SHIPPED | OUTPATIENT
Start: 2018-10-30 | End: 2019-04-09 | Stop reason: SDUPTHER

## 2018-11-19 PROCEDURE — 87591 N.GONORRHOEAE DNA AMP PROB: CPT | Performed by: PHYSICIAN ASSISTANT

## 2018-11-19 PROCEDURE — 87491 CHLMYD TRACH DNA AMP PROBE: CPT | Performed by: PHYSICIAN ASSISTANT

## 2018-11-21 ENCOUNTER — TELEPHONE (OUTPATIENT)
Dept: URGENT CARE | Facility: CLINIC | Age: 53
End: 2018-11-21

## 2018-11-28 ENCOUNTER — TELEPHONE (OUTPATIENT)
Dept: FAMILY MEDICINE CLINIC | Facility: CLINIC | Age: 53
End: 2018-11-28

## 2018-11-28 NOTE — TELEPHONE ENCOUNTER
----- Message from Lynette Walker sent at 11/27/2018  2:11 PM EST -----  Contact: PATIENT  SHE NEEDS TO  A LETTER STATING THAT SHE IS OK TO RETURN TO WORK AT FULL HOURS. BACK IN OCT. SHE WAS RESTRICTED TO 20 - 25 HRS PER WEEK.    PLEASE CALL HER WHEN ITS READY TO BE PICKED -[phone]

## 2018-11-28 NOTE — TELEPHONE ENCOUNTER
[place], another message that I want Maricel to see.  I have no idea why this was sent to me it does not appear that I've ever seen this patient, please make sure it is sent to the provider that sees her.

## 2018-12-04 ENCOUNTER — TELEPHONE (OUTPATIENT)
Dept: FAMILY MEDICINE CLINIC | Facility: CLINIC | Age: 53
End: 2018-12-04

## 2018-12-04 NOTE — TELEPHONE ENCOUNTER
----- Message from Marcela Ramírez sent at 11/4/2018  7:44 PM EST -----  Regarding: Referral Request  Contact: [phone]  When I was sick I asked Dr. Acosta for a note reducing my hours at work. I am feeling 100% better and would like to see if he would write a statement for me to return to my normal working hours.     I can come by and pick it up if he will do this for me.     Thank you

## 2019-01-04 RX ORDER — OMEPRAZOLE 20 MG/1
20 CAPSULE, DELAYED RELEASE ORAL DAILY
Qty: 30 CAPSULE | Refills: 2 | Status: SHIPPED | OUTPATIENT
Start: 2019-01-04 | End: 2019-06-14 | Stop reason: SDUPTHER

## 2019-01-04 RX ORDER — HYDROCHLOROTHIAZIDE 25 MG/1
25 TABLET ORAL DAILY
Qty: 30 TABLET | Refills: 2 | Status: SHIPPED | OUTPATIENT
Start: 2019-01-04 | End: 2019-06-14 | Stop reason: SDUPTHER

## 2019-01-29 ENCOUNTER — OFFICE VISIT (OUTPATIENT)
Dept: PSYCHIATRY | Facility: CLINIC | Age: 54
End: 2019-01-29

## 2019-01-29 ENCOUNTER — DOCUMENTATION (OUTPATIENT)
Dept: BARIATRICS/WEIGHT MGMT | Facility: CLINIC | Age: 54
End: 2019-01-29

## 2019-01-29 ENCOUNTER — OFFICE VISIT (OUTPATIENT)
Dept: BARIATRICS/WEIGHT MGMT | Facility: CLINIC | Age: 54
End: 2019-01-29

## 2019-01-29 ENCOUNTER — OFFICE VISIT (OUTPATIENT)
Dept: FAMILY MEDICINE CLINIC | Facility: CLINIC | Age: 54
End: 2019-01-29

## 2019-01-29 VITALS
SYSTOLIC BLOOD PRESSURE: 128 MMHG | HEART RATE: 84 BPM | HEIGHT: 67 IN | TEMPERATURE: 97.8 F | OXYGEN SATURATION: 99 % | WEIGHT: 259 LBS | BODY MASS INDEX: 40.65 KG/M2 | DIASTOLIC BLOOD PRESSURE: 82 MMHG | RESPIRATION RATE: 18 BRPM

## 2019-01-29 VITALS
WEIGHT: 263 LBS | OXYGEN SATURATION: 99 % | TEMPERATURE: 97.8 F | BODY MASS INDEX: 41.28 KG/M2 | HEART RATE: 75 BPM | HEIGHT: 67 IN | SYSTOLIC BLOOD PRESSURE: 122 MMHG | DIASTOLIC BLOOD PRESSURE: 84 MMHG

## 2019-01-29 DIAGNOSIS — I10 HYPERTENSION, UNSPECIFIED TYPE: Primary | ICD-10-CM

## 2019-01-29 DIAGNOSIS — F31.9 BIPOLAR 1 DISORDER (HCC): Primary | ICD-10-CM

## 2019-01-29 DIAGNOSIS — E11.42 DIABETIC PERIPHERAL NEUROPATHY (HCC): ICD-10-CM

## 2019-01-29 DIAGNOSIS — E66.01 CLASS 3 SEVERE OBESITY WITHOUT SERIOUS COMORBIDITY WITH BODY MASS INDEX (BMI) OF 40.0 TO 44.9 IN ADULT, UNSPECIFIED OBESITY TYPE (HCC): Primary | ICD-10-CM

## 2019-01-29 DIAGNOSIS — J45.909 ASTHMA, UNSPECIFIED ASTHMA SEVERITY, UNSPECIFIED WHETHER COMPLICATED, UNSPECIFIED WHETHER PERSISTENT: ICD-10-CM

## 2019-01-29 DIAGNOSIS — R06.02 SHORTNESS OF BREATH ON EXERTION: ICD-10-CM

## 2019-01-29 DIAGNOSIS — K21.9 GASTROESOPHAGEAL REFLUX DISEASE, ESOPHAGITIS PRESENCE NOT SPECIFIED: ICD-10-CM

## 2019-01-29 DIAGNOSIS — E66.01 MORBID OBESITY (HCC): ICD-10-CM

## 2019-01-29 DIAGNOSIS — E78.5 HYPERLIPIDEMIA, UNSPECIFIED HYPERLIPIDEMIA TYPE: ICD-10-CM

## 2019-01-29 DIAGNOSIS — R11.0 NAUSEA: ICD-10-CM

## 2019-01-29 DIAGNOSIS — R82.998 DARK URINE: ICD-10-CM

## 2019-01-29 DIAGNOSIS — E66.01 MORBID OBESITY WITH BMI OF 40.0-44.9, ADULT (HCC): ICD-10-CM

## 2019-01-29 DIAGNOSIS — M54.50 ACUTE BILATERAL LOW BACK PAIN WITHOUT SCIATICA: ICD-10-CM

## 2019-01-29 DIAGNOSIS — G45.9 TIA (TRANSIENT ISCHEMIC ATTACK): ICD-10-CM

## 2019-01-29 DIAGNOSIS — G25.81 RLS (RESTLESS LEGS SYNDROME): ICD-10-CM

## 2019-01-29 DIAGNOSIS — F31.9 BIPOLAR 1 DISORDER (HCC): ICD-10-CM

## 2019-01-29 DIAGNOSIS — R60.0 LOWER EXTREMITY EDEMA: ICD-10-CM

## 2019-01-29 LAB
ALBUMIN SERPL-MCNC: 4.51 G/DL (ref 3.2–4.8)
ALBUMIN/GLOB SERPL: 1.5 G/DL (ref 1.5–2.5)
ALP SERPL-CCNC: 112 U/L (ref 25–100)
ALT SERPL-CCNC: 84 U/L (ref 7–40)
AST SERPL-CCNC: 68 U/L (ref 0–33)
BASOPHILS # BLD AUTO: 0.04 10*3/MM3 (ref 0–0.2)
BASOPHILS NFR BLD AUTO: 0.5 % (ref 0–1)
BILIRUB BLD-MCNC: NEGATIVE MG/DL
BILIRUB SERPL-MCNC: 0.4 MG/DL (ref 0.3–1.2)
BUN SERPL-MCNC: 21 MG/DL (ref 9–23)
BUN/CREAT SERPL: 19.6 (ref 7–25)
CALCIUM SERPL-MCNC: 9.6 MG/DL (ref 8.7–10.4)
CHLORIDE SERPL-SCNC: 95 MMOL/L (ref 99–109)
CHOLEST SERPL-MCNC: 199 MG/DL (ref 0–200)
CLARITY, POC: CLEAR
CO2 SERPL-SCNC: 30 MMOL/L (ref 20–31)
COLOR UR: ABNORMAL
CREAT SERPL-MCNC: 1.07 MG/DL (ref 0.6–1.3)
EOSINOPHIL # BLD AUTO: 0.23 10*3/MM3 (ref 0–0.3)
EOSINOPHIL NFR BLD AUTO: 2.7 % (ref 0–3)
ERYTHROCYTE [DISTWIDTH] IN BLOOD BY AUTOMATED COUNT: 12.8 % (ref 11.3–14.5)
GLOBULIN SER CALC-MCNC: 3 GM/DL
GLUCOSE SERPL-MCNC: 279 MG/DL (ref 70–100)
GLUCOSE UR STRIP-MCNC: NEGATIVE MG/DL
HBA1C MFR BLD: 8.7 % (ref 4.8–5.6)
HCT VFR BLD AUTO: 39.6 % (ref 34.5–44)
HDLC SERPL-MCNC: 50 MG/DL (ref 40–60)
HGB BLD-MCNC: 13 G/DL (ref 11.5–15.5)
IMM GRANULOCYTES # BLD AUTO: 0.02 10*3/MM3 (ref 0–0.03)
IMM GRANULOCYTES NFR BLD AUTO: 0.2 % (ref 0–0.6)
KETONES UR QL: NEGATIVE
LDLC SERPL CALC-MCNC: 92 MG/DL (ref 0–100)
LEUKOCYTE EST, POC: NEGATIVE
LYMPHOCYTES # BLD AUTO: 2.61 10*3/MM3 (ref 0.6–4.8)
LYMPHOCYTES NFR BLD AUTO: 30.1 % (ref 24–44)
MCH RBC QN AUTO: 30.1 PG (ref 27–31)
MCHC RBC AUTO-ENTMCNC: 32.8 G/DL (ref 32–36)
MCV RBC AUTO: 91.7 FL (ref 80–99)
MONOCYTES # BLD AUTO: 0.56 10*3/MM3 (ref 0–1)
MONOCYTES NFR BLD AUTO: 6.5 % (ref 0–12)
NEUTROPHILS # BLD AUTO: 5.2 10*3/MM3 (ref 1.5–8.3)
NEUTROPHILS NFR BLD AUTO: 60 % (ref 41–71)
NITRITE UR-MCNC: NEGATIVE MG/ML
PH UR: 5 [PH] (ref 5–8)
PLATELET # BLD AUTO: 238 10*3/MM3 (ref 150–450)
POTASSIUM SERPL-SCNC: 4.2 MMOL/L (ref 3.5–5.5)
PROT SERPL-MCNC: 7.5 G/DL (ref 5.7–8.2)
PROT UR STRIP-MCNC: ABNORMAL MG/DL
RBC # BLD AUTO: 4.32 10*6/MM3 (ref 3.89–5.14)
RBC # UR STRIP: NEGATIVE /UL
SODIUM SERPL-SCNC: 135 MMOL/L (ref 132–146)
SP GR UR: 1.03 (ref 1–1.03)
TRIGL SERPL-MCNC: 283 MG/DL (ref 0–150)
TSH SERPL DL<=0.005 MIU/L-ACNC: 4.88 MIU/ML (ref 0.35–5.35)
UROBILINOGEN UR QL: NORMAL
VLDLC SERPL CALC-MCNC: 56.6 MG/DL
WBC # BLD AUTO: 8.66 10*3/MM3 (ref 3.5–10.8)

## 2019-01-29 PROCEDURE — 99213 OFFICE O/P EST LOW 20 MIN: CPT | Performed by: FAMILY MEDICINE

## 2019-01-29 PROCEDURE — 90791 PSYCH DIAGNOSTIC EVALUATION: CPT | Performed by: PSYCHOLOGIST

## 2019-01-29 PROCEDURE — 99214 OFFICE O/P EST MOD 30 MIN: CPT | Performed by: PHYSICIAN ASSISTANT

## 2019-01-29 RX ORDER — PREGABALIN 25 MG/1
25 CAPSULE ORAL 2 TIMES DAILY
COMMUNITY
End: 2019-08-21

## 2019-01-29 NOTE — PROGRESS NOTES
PROGRESS NOTE    Data:  Marcela Ramírez is a 53 y.o. female who met with the undersigned for a scheduled individual outpatient therapy session from 8:15 - 8:55am.      Clinical Maneuvering/Intervention:      Pt talked about struggling with obesity for several years. Despite trying different weight loss plans and diets, the pt reported being unsuccessful in losing weight. She talked about seeking weight loss surgery and that she has been thinking about doing this for about 10 years. She did not pass her psychological evaluation several years ago, per pt, because she had untreated bipolar symptoms. A psychological evaluation was conducted in order to assess past and current level of functioning. Areas assessed included, but were not limited to: perception of social support, perception of ability to face and deal with challenges in life (positive functioning), anxiety symptoms, depressive symptoms, perspective on beliefs/belief system, coping skills for stress, intelligence level, addiction issues, etc. Therapeutic rapport was established. Interventions conducted today were geared towards assessing the pt's readiness for weight loss surgery and identifying and psychological contraindications for undergoing such a major life change. Social support was deemed strong (specific to weight loss surgery/weight loss in this manner and in a general sense): several family members and her boyfriend. Current psychological struggles were deemed moderate: bipolar symptoms (per pt, now managed at least moderately well), financial stress, and depression related to being overweight and having to take many medications for health problems. Coping skills for distress and related to undergoing a major life change such as weight loss surgery/weight loss were deemed strong and included: being thoughtful/waiting until she felt ready before seeking out weight loss surgery, using resources to meet her needs (Pristiq and counseling), relying on  her strong family support system, resilience, and belief in herself that she will be successful on this weight loss journey. The pt endorsed having characteristics of readiness to improve quality of life through the major life changes inherent in the journey of weight loss surgery: more excited than nervous, having a positive outlook on the process, quitting soda 3 days ago, etc. The pt could also articulate a deep sense of purpose in undergoing this major life change by saying that she want to do it because she believes that she deserves to have a higher quality of life.    Mental Status Exam  Hygiene:  good  Dress: normal  Attitude:  cooperative and proactive  Motor Activity: normal  Speech: normal  Mood:  elevated  Affect:  congruent  Thought Processes: normal  Thought Content:  normal  Suicidal Thoughts:  not endorsed  Homicidal Thoughts:  not endorsed  Crisis Safety Plan: not needed   Hallucinations:  none      Patient's Support Network Includes:  family, friends      Progress toward goal: there is evidence to suggest that she is taking measures to improve the quality of her life including seeking weight loss surgery      Functional Status: moderate      Prognosis: good    Assessment      The pt presented with symptoms congruent with bipolar disorder, symptoms being moderately to well-managed over the past couple of years with medication and counseling. The depressive side of this disorder is often exacerbated by feeling down/defeated by being overweight. She presents to be otherwise a fairly highly functioning person with several good coping skills for stress.    From a psychological standpoint, the pt presents as a good candidate for bariatric surgery.  She is motivated for the surgery, has showed readiness for the lifestyle change in terms of adjusting her eating habits, and seems to have appropriate expectations of how to prepare and how to live after surgery in order to lose weight  successfully.      Plan      In order to diminish symptoms of depression surrounding obesity, the pt is to follow up with her bariatric surgeon in order to receive weight loss surgery as soon as feasible/appropriate and based on success with compliance to adhering to the proper diet. She is to continue taking her medication to manage mood (Pristiq) as prescribed and continue with counseling (ongoing).    Maribel Hernandez, PhD, LP

## 2019-01-29 NOTE — PROGRESS NOTES
Subjective   Marcela Ramírez is a 53 y.o. female    Chief Complaint    Obesity  Back pain  Dark urine      History of Present Illness   Patient presents today beginning a six-month course of regular follow-up as she prepares to have bariatric surgery.  She is required to visit our office monthly for weight and blood pressure checks.  She continues following the prerequisites of the bariatric surgery department.  She is complaining today of some low back pain and dark urine.  She is concerned about her kidneys.  Office urinalysis other than showing than showing glucose and protein is negative for infection.  Her bariatric form is completed.      The following portions of the patient's history were reviewed and updated as appropriate: allergies, current medications, past social history and problem list    Review of Systems   Constitutional: Negative.    HENT: Negative.    Respiratory: Negative.    Cardiovascular: Negative.    Gastrointestinal: Negative.    Genitourinary: Negative.  Negative for difficulty urinating, dysuria, frequency, hematuria and urgency.   Musculoskeletal: Positive for back pain. Negative for arthralgias, gait problem and myalgias.   Neurological: Negative for dizziness, tremors, weakness and numbness.       Objective     Vitals:    01/29/19 1154   BP: 122/84   Pulse: 75   Temp: 97.8 °F (36.6 °C)   SpO2: 99%       Physical Exam   Constitutional: She appears well-developed.   obese   HENT:   Head: Normocephalic and atraumatic.   Eyes: Conjunctivae are normal.   Cardiovascular: Normal rate and regular rhythm.   Pulmonary/Chest: Effort normal and breath sounds normal.   Psychiatric: She has a normal mood and affect. Her behavior is normal.   Nursing note and vitals reviewed.      Assessment/Plan   Problem List Items Addressed This Visit        Endocrine    Diabetic peripheral neuropathy (CMS/HCC)      Other Visit Diagnoses     Acute bilateral low back pain without sciatica    -  Primary    Relevant  Orders    POC Urinalysis Dipstick, Automated (Completed)    Dark urine        Relevant Orders    POC Urinalysis Dipstick, Automated (Completed)

## 2019-01-29 NOTE — PROGRESS NOTES
"[facility] BARIATRIC SURGERY  [address]  [facility] [id number] [phone]      Patient  Name:  Marcela Ramírez  :  1965        Chief Complaint:  weight gain; unable to maintain weight loss    History of Present Illness:  Marcela Ramírez is a 53 y.o. female who presents today for evaluation, education and consultation regarding weight loss surgery. The patient is interested in sleeve gastrectomy.     Marcela has been overweight for at least 31 years, has been 35 pounds or more overweight for at least 25 years, has been 100 pounds or more overweight for 31 or more years and started dieting at age 43.      Previous diet attempts include: Low Carbohydrate, Calorie Counting, Caryl's Diet, Cabbage Soup and Fasting; None; Wellbutrin.  The most weight Marcela lost was 27 pounds on ate much less but was only able to maintain that weight loss for 1 month.  Her maximum lifetime weight is 285 pounds. Says she is \"tired of being fat, tired of not being able to wear real pants, tired of being exhausted.\" She has some friends who had bypass at other offices and \"failed\" also with a friend s/p LSG at [facility] and is doing great.     As above, patient has been overweight for many years, with numerous failed dietary/weight loss attempts.  She now has obesity related comorbidities and as such has decided to pursue weight loss surgery.    GI: GERD controlled with omeprazole 20mg. Remote EGD- esophagitis. No h/o HH or h pylori. S/p spenser with cholelithiasis. Has nausea wtih taking meds, avoid this by taking meds wtih milk    H/o TIA-mimicking symptoms x 8-9 episodes including right sided drooping/ weakness/ vision changes. Extensive workup was negative for CVA with angio imaging- per patient. Was determined to be related to anxiety/ getting really worked up. last episode 2017, says she has not been evaluated last 7 episodes, just waits in her house for symptoms to resolve. No " "residual effects. She sees neurologist for DM peripheral neuropathy. On ASA daily.     Psych: bipolar controlled on meds. Says she faithfully takes her bipolar meds to control symptoms, otherwise is \"really mean and angry\". Says her meds make her not care about anything, but people don't like her when she is off them.     Pulm:h/o asthma, has not required inhaler at all other than recent PNA.     DM: Diagnosed 2017, was on metformin in the past. Last A1C 7%      All past medical, surgical, social and family history have been obtained and discussed as pertinent to bariatric surgery as below.     Past Medical History:   Diagnosis Date   • Arthritis     knees, otc arthritis meds prn, no h/o injections.    • Asthma     has not required inhaler   • Bilateral ovarian cysts    • Bipolar 1 disorder (CMS/HCC)    • Boil     opens them herself   • Carpal tunnel syndrome    • Colon polyp    • Depression    • Diabetes mellitus (CMS/HCC)     Diagnosed 2017, was on metformin in the past. Last A1C 7%   • Diverticulosis    • Fatigue    • GERD (gastroesophageal reflux disease)     controlled with omeprazole 20mg. Remote EGD- esophagitis. No h/o HH or h pylori.    • Headache    • History of blood transfusion     2/2 heavy menses   • Hyperlipidemia    • Hypertension    • Lower extremity edema    • Morbid obesity with BMI of 40.0-44.9, adult (CMS/HCC)    • Nausea     wtih taking meds, avoid this by taking meds wtih milk   • Peripheral neuropathy     2/2 DM   • RLS (restless legs syndrome)    • S/P cholecystectomy     2/2 cholelithiasis   • Shortness of breath on exertion    • TIA (transient ischemic attack)     patient states 8-9 episodes of right sided drooping/ weakness/ vision changes. Workup was negative for CVA- thought to be related to anxiety. last episode 12/2017     Past Surgical History:   Procedure Laterality Date   • COLONOSCOPY  remote    diverticulosis   • ENDOSCOPY  remote    esophagitis    • KNEE ACL RECONSTRUCTION Right " 2013    with miniscal repair   • LAPAROSCOPIC CHOLECYSTECTOMY  2001    cholelithiasis   • LAPAROSCOPIC HYSTERECTOMY  2013    right ovary remains       Allergies   Allergen Reactions   • Contrast Dye Swelling     Nasal congestion/ snot, IV contrast only       Current Outpatient Medications:   •  ALPRAZolam (XANAX) 0.5 MG tablet, Take 1 tablet by mouth At Night As Needed for Anxiety., Disp: 30 tablet, Rfl: 2  •  aspirin 81 MG EC tablet, Take 1 tablet by mouth Daily., Disp: 30 tablet, Rfl: 0  •  desvenlafaxine (PRISTIQ) 50 MG 24 hr tablet, Take 50 mg by mouth Daily., Disp: , Rfl:   •  hydrochlorothiazide (HYDRODIURIL) 25 MG tablet, Take 1 tablet by mouth Daily., Disp: 30 tablet, Rfl: 2  •  LamoTRIgine (LAMICTAL PO), Take 1 tablet by mouth Every Night. Pt is on 200mg, Disp: , Rfl:   •  LISINOPRIL PO, Take 1 tablet by mouth Daily. Pt is on 20mg, Disp: , Rfl:   •  omeprazole (PRILOSEC) 20 MG capsule, Take 1 capsule by mouth Daily., Disp: 30 capsule, Rfl: 2  •  pregabalin (LYRICA) 25 MG capsule, Take 25 mg by mouth 2 (Two) Times a Day., Disp: , Rfl:   •  rOPINIRole (REQUIP) 1 MG tablet, Take 1 tablet by mouth Every Night. Take 1 hour before bedtime., Disp: 30 tablet, Rfl: 11  •  ipratropium (ATROVENT HFA) 17 MCG/ACT inhaler, Inhale 2 puffs 4 (Four) Times a Day., Disp: 1 inhaler, Rfl: 0  •  nitroglycerin (NITROSTAT) 0.4 MG SL tablet, Place 1 tablet under the tongue Every 5 (Five) Minutes As Needed for Chest Pain. Take no more than 3 doses in 15 minutes., Disp: 100 tablet, Rfl: 0    Social History     Socioeconomic History   • Marital status:      Spouse name: Not on file   • Number of children: Not on file   • Years of education: Not on file   • Highest education level: Not on file   Social Needs   • Financial resource strain: Not on file   • Food insecurity - worry: Not on file   • Food insecurity - inability: Not on file   • Transportation needs - medical: Not on file   • Transportation needs - non-medical: Not on  file   Occupational History   • Not on file   Tobacco Use   • Smoking status: Never Smoker   • Smokeless tobacco: Never Used   Substance and Sexual Activity   • Alcohol use: No     Frequency: Never   • Drug use: Yes     Types: Marijuana     Comment: helps with marijauna   • Sexual activity: Defer     Comment: no hormones   Other Topics Concern   • Not on file   Social History Narrative    Lives in [facility] with boyfriend, daughter and her boyfriend and granddaughter and stepson.      Family History   Problem Relation Age of Onset   • Arthritis Mother    • Arthritis Father    • Diabetes Father    • Hyperlipidemia Father    • Hypertension Father    • Asthma Brother    • Other Brother    • Cancer Maternal Aunt    • Depression Paternal Uncle        Review of Systems:  Constitutional:  Reports fatigue, weight gain and denies fevers, chills.  HEENT:  denies headache, ear pain or loss of hearing, blurred or double vision, nasal discharge or sore throat.  Cardiovascular:  Reports HTN, HLD, edema and denies CAD, Atrial Fib, hx heart disease, heart murmur, hx MI, chest pain, palpitations, hx DVT.  Respiratory:  Reports shortness of breath , wheezing, asthma and denies cough , sleep apnea, hx PE.  Gastrointestinal:  Reports dysphagia, vomiting, abdominal pain, heartburn, gallbladder issues and denies dysphagia, vomiting, abdominal pain, IBS, diarrhea, constipation, melena, blood in stool, liver disease, hx pancreatitis.  Genitourinary:  Reports none and denies history of  frequent UTI, incontinence, hematuria, dysuria, polyuria, polydipsia, renal insufficiency, renal failure.    Musculoskeletal:  Reports arthritis and denies fibromyalgia and autoimmune disease.  Neurological:  Reports headaches, numbness /tingling, stroke and denies migraines, dizziness, confusion, seizure.  Psychiatric:  Reports bipolar disorder and denies anxiety, depression, hx suicide attempt, hx self injury, eating disorder.  Endocrine:  Reports  diabetes and denies thyroid disease, gout.  Hematologic:  Reports h/o blood transfusion and denies anemia, bleeding disorder.  Skin:  Reports boils in the past and denies hx MRSA.      Physical Exam:  Vital Signs:  Weight: 117 kg (259 lb)   Body mass index is 40.57 kg/m².  Temp: 97.8 °F (36.6 °C)   Heart Rate: 84   BP: 128/82     Physical Exam   Constitutional: She is oriented to person, place, and time. She appears well-developed and well-nourished.   HENT:   Head: Normocephalic and atraumatic.   Mouth/Throat: Oropharynx is clear and moist.   Eyes: EOM are normal.   Neck: Normal range of motion. Neck supple. No thyromegaly present.   Cardiovascular: Normal rate, regular rhythm and normal heart sounds.   Pulmonary/Chest: Effort normal and breath sounds normal. No respiratory distress. She has no wheezes.   Abdominal: Soft. Bowel sounds are normal. She exhibits no distension. There is no tenderness.   Lap scars  Inferior umbo scar   Musculoskeletal: Normal range of motion.   Neurological: She is alert and oriented to person, place, and time.   Skin: Skin is warm and dry.   Psychiatric: She has a normal mood and affect. Her behavior is normal. Judgment and thought content normal.   Vitals reviewed.      Patient Active Problem List   Diagnosis   • Acute suppurative otitis media of right ear with spontaneous rupture of tympanic membrane   • Depression   • Anxiety   • Diabetic peripheral neuropathy (CMS/HCC)   • Restless legs syndrome (RLS)   • Hypertension   • Hyperlipidemia   • Diverticulosis   • Diabetes mellitus (CMS/HCC)   • TIA (transient ischemic attack)   • Peripheral neuropathy   • GERD (gastroesophageal reflux disease)   • Bipolar 1 disorder (CMS/HCC)   • Bilateral ovarian cysts   • S/P cholecystectomy   • Headache   • Lower extremity edema   • RLS (restless legs syndrome)   • Shortness of breath on exertion   • Carpal tunnel syndrome   • History of blood transfusion   • Nausea   • Colon polyp   • Asthma   •  Arthritis   • Boil   • Morbid obesity with BMI of 40.0-44.9, adult (CMS/[facility])   • Fatigue       Assessment:    Marcela Ramírez is a 53 y.o. year old female with medically complicated obesity pursuing sleeve gastrectomy.    Weight loss surgery is deemed medically necessary given the following obesity related comorbidities including hypertension, dyslipidemia, osteoarthritis, knee pain, GERD, gall bladder disease, asthma, edema and mental health disease with current Weight: 117 kg (259 lb) and Body mass index is 40.57 kg/m²..    Plan:  The consultation plan and program requirements were reviewed with the patient.  The patient has been advised that a letter of medical support must be obtained from her primary care physician or referring provider. A psychological evaluation will be arranged.  A nutritional evaluation will be performed.  The patient was advised to start a high protein and low carbohydrate diet.  Necessary lifestyle modifications were discussed.  Instructions on how to access MELIDA was given to the patient.  MELIDA is an internet based educational video that explains the surgical procedure chosen and answers basic questions regarding that procedure.     Preoperative testing will include: CBC, CMP, Lipids, TSH, HgA1C, H.Pylori, Pulmonary Function Testing, CXR, EKG and EGD     Additional preop clearances required prior to surgery: Cardiac and Neurology.  Patient will schedule with established neurologist.     The patient has been educated on expected postoperative lifestyle changes, including commitment to high protein diet, vitamin regimen, and exercise program.  They are aware that support groups are encouraged for optimal weight loss results. Patient understands that bariatric surgery is not cosmetic surgery but rather a tool to help make a lifelong commitment to lifestyle changes including diet, exercise, behavior modifications, and healthy habits. The procedure was discussed with the patient and all  questions were answered. The importance of avoiding ASA/ NSAIDS/ steroids/ tobacco/ hormones/ immunomodulators perioperatively was discussed.         Janis España PA-C

## 2019-01-29 NOTE — PROGRESS NOTES
"Weight Loss Surgery  Presurgical Nutrition Assessment     Marcela Ramírez  01/29/2019  [id number]  [id number]  1965  female    Surgery desired: Sleeve Gastrectomy  Ht 170.2 cm (67\"); Wt 119 kg (263 #); BMI 40.6  Past Medical History:   Diagnosis Date   • Arthritis     knees, otc arthritis meds prn, no h/o injections.    • Asthma     has not required inhaler   • Bilateral ovarian cysts    • Bipolar 1 disorder (CMS/HCC)    • Boil     opens them herself   • Carpal tunnel syndrome    • Colon polyp    • Depression    • Diabetes mellitus (CMS/HCC)     Diagnosed 2017, was on metformin in the past. Last A1C 7%   • Diverticulosis    • Fatigue    • GERD (gastroesophageal reflux disease)     controlled with omeprazole 20mg. Remote EGD- esophagitis. No h/o HH or h pylori.    • Headache    • History of blood transfusion     2/2 heavy menses   • Hyperlipidemia    • Hypertension    • Lower extremity edema    • Morbid obesity with BMI of 40.0-44.9, adult (CMS/HCC)    • Nausea     wtih taking meds, avoid this by taking meds wtih milk   • Peripheral neuropathy     2/2 DM   • RLS (restless legs syndrome)    • S/P cholecystectomy     2/2 cholelithiasis   • Shortness of breath on exertion    • TIA (transient ischemic attack)     patient states 8-9 episodes of right sided drooping/ weakness/ vision changes. Workup was negative for CVA- thought to be related to anxiety. last episode 12/2017     Past Surgical History:   Procedure Laterality Date   • COLONOSCOPY  remote    diverticulosis   • ENDOSCOPY  remote    esophagitis    • KNEE ACL RECONSTRUCTION Right 2013    with miniscal repair   • LAPAROSCOPIC CHOLECYSTECTOMY  2001    cholelithiasis   • LAPAROSCOPIC HYSTERECTOMY  2013    right ovary remains     Allergies   Allergen Reactions   • Contrast Dye Swelling     Nasal congestion/ snot, IV contrast only       Current Outpatient Medications:   •  ALPRAZolam (XANAX) 0.5 MG tablet, Take 1 tablet by mouth At Night As Needed for " Anxiety., Disp: 30 tablet, Rfl: 2  •  aspirin 81 MG EC tablet, Take 1 tablet by mouth Daily., Disp: 30 tablet, Rfl: 0  •  desvenlafaxine (PRISTIQ) 50 MG 24 hr tablet, Take 50 mg by mouth Daily., Disp: , Rfl:   •  hydrochlorothiazide (HYDRODIURIL) 25 MG tablet, Take 1 tablet by mouth Daily., Disp: 30 tablet, Rfl: 2  •  ipratropium (ATROVENT HFA) 17 MCG/ACT inhaler, Inhale 2 puffs 4 (Four) Times a Day., Disp: 1 inhaler, Rfl: 0  •  LamoTRIgine (LAMICTAL PO), Take 1 tablet by mouth Every Night. Pt is on 200mg, Disp: , Rfl:   •  LISINOPRIL PO, Take 1 tablet by mouth Daily. Pt is on 20mg, Disp: , Rfl:   •  nitroglycerin (NITROSTAT) 0.4 MG SL tablet, Place 1 tablet under the tongue Every 5 (Five) Minutes As Needed for Chest Pain. Take no more than 3 doses in 15 minutes., Disp: 100 tablet, Rfl: 0  •  omeprazole (PRILOSEC) 20 MG capsule, Take 1 capsule by mouth Daily., Disp: 30 capsule, Rfl: 2  •  pregabalin (LYRICA) 25 MG capsule, Take 25 mg by mouth 2 (Two) Times a Day., Disp: , Rfl:   •  rOPINIRole (REQUIP) 1 MG tablet, Take 1 tablet by mouth Every Night. Take 1 hour before bedtime., Disp: 30 tablet, Rfl: 11      Nutrition Assessment    Estimated energy needs:  1840 kcal    Estimated calories for weight loss:  1300 kcal    IBW (Pounds):  160 #        Excess body weight (Pounds):  105 #       Nutrition Recall  24 Hour recall: (B) (L) (D) -  Reviewed & discussed /c pt. Works a night shift as [employer] @ [employer] & usually sleeps in, skipping brkfast. Unusually, on food record date, ate 2 sausage patties & 2 scrambled eggs @10 am.  Lunch @ 2 pm = 2 oz sliced turkey on 2 slices rye bread.  No dinner eaten.  Recently changed eating habits.  Meal from [employer] is often turkey roll /c cheese inside /c soup.  Gave up reg [employer] 3 days ago, stating this was d/t kidney infection.  Long term had been drinking 2 to 4 litres qd.  Is using caffeine drops in water to help her manage w/o caffeine in [employer].  Drinks 4 oz milk /c meds HS.  No  coffee or tea intake.       Exercise  never - c/o of foot, knee & wrist pain d/t arthritis      Education    Provided information packet re:  Sleeve Gastrectomy  1. Reviewed guidelines for higher protein, limited carbohydrate diet to promote weight loss.  Encouraged patient to incorporate these principles of healthy eating from now until approximately 2 weeks prior to bariatric surgery date, when an even lower carbohydrate “liver-shrinking” regimen will be followed. (Information sheet re pre-op diet given).  Explained that after recovery from surgery this diet will again be followed to ensure further loss and for weight maintenance.    2. Encouraged patient to choose an acceptable protein supplement powder or shake for post-surgery liquid diet.  Provided product guidelines and examples.    3. Explained importance of goal setting to help in changing eating behaviors that are not conducive to weight loss.  Targeted several on a worksheet which also included spaces for patient to work on issues specific to them.  4. Provided follow-up options for support, including contact information for dietitians here, if desired.  Web-based support information and apps for smart phones and computers given.  Noted that monthly support group is offered at this clinic, and that support is associated with successful weight loss.    Recommend that team proceed with surgery and follow per protocol.      Nutrition Goals   Dietary Guidelines per information packet as described above  Protein goal:  grams per day   Carbohydrate goal:  100-140 grams per day  Eliminate soda, sweet tea, etc.     Exercise Goals  Continue current exercise routine   Add 15-30 minutes of activity per day as tolerated      Marleny Pedraza, YAMILEX  01/29/2019  12:24 PM

## 2019-02-05 ENCOUNTER — OFFICE VISIT (OUTPATIENT)
Dept: NEUROLOGY | Facility: CLINIC | Age: 54
End: 2019-02-05

## 2019-02-05 VITALS
DIASTOLIC BLOOD PRESSURE: 80 MMHG | SYSTOLIC BLOOD PRESSURE: 126 MMHG | BODY MASS INDEX: 41.28 KG/M2 | HEIGHT: 67 IN | WEIGHT: 263 LBS

## 2019-02-05 DIAGNOSIS — E11.42 DIABETIC PERIPHERAL NEUROPATHY (HCC): Primary | ICD-10-CM

## 2019-02-05 DIAGNOSIS — G25.81 RESTLESS LEGS SYNDROME (RLS): ICD-10-CM

## 2019-02-05 PROCEDURE — 99214 OFFICE O/P EST MOD 30 MIN: CPT | Performed by: PHYSICIAN ASSISTANT

## 2019-02-05 RX ORDER — ROPINIROLE 2 MG/1
2 TABLET, FILM COATED ORAL NIGHTLY
Qty: 30 TABLET | Refills: 11 | Status: SHIPPED | OUTPATIENT
Start: 2019-02-05 | End: 2019-03-19

## 2019-02-05 NOTE — PROGRESS NOTES
"Subjective     Chief Complaint: numbness, paresthesias, pain     History of Present Illness   Marcela Ramírez is a 53 y.o. female whowith a history of DM who comes to clinic today for evaluation of neuropathy. She initially noted symptoms in 2015 marked by numbness, paresthesias, and shooting pain in her upper and lower extremities bilaterally. This has worsened over time. She notes associated balance impairment as well as decreased  strength, though denies any clear associated weakness. Her symptoms are worse with increased activity. She is currently taking GBP 600mg TID, which may be somewhat beneficial.      She also notes RLS symptoms in her feet primarily at night. She is currently taking ropinirole 0.5mg nightly, which is somewhat beneficial. She states that Xanax is also beneficial for this.      Prior evaluation has included an EMG of the upper and lower extremities bilaterally which was notable for a moderate axonal and demyelinating peripheral neuropathy, moderate CTS bilaterally, mild-moderate ulnar neuropathy on the left and mild ulnar neuropathy on the right. Screening bloodwork was notable for a hemoglobin A1C of 7.3.     Today: Since her last visit in 10/8/18, she notes that her RLS has worsened. Her numbness, paresthesias and pain are unchanged.       I have reviewed and confirmed the past family, social and medical history as accurate on 2/5/19.     Review of Systems   Constitutional: Negative.    HENT: Negative.    Eyes: Negative.    Respiratory: Negative.    Cardiovascular: Negative.    Gastrointestinal: Negative.    Endocrine: Negative.    Genitourinary: Negative.    Musculoskeletal: Negative.    Skin: Negative.    Allergic/Immunologic: Negative.    Neurological: Positive for numbness.   Hematological: Negative.    Psychiatric/Behavioral: Negative.        Objective     /80   Ht 170.2 cm (67.01\")   Wt 119 kg (263 lb)   LMP  (LMP Unknown)   BMI 41.18 kg/m²     General appearance " today is normal.       Physical Exam   Neurological: She has normal strength. She has a normal Finger-Nose-Finger Test.   Psychiatric: Her speech is normal.        Neurologic Exam     Mental Status   Speech: speech is normal   Level of consciousness: alert  Normal comprehension.     Cranial Nerves   Cranial nerves II through XII intact.     Motor Exam   Muscle bulk: normal  Overall muscle tone: normal    Strength   Strength 5/5 throughout.     Sensory Exam   Light touch normal.     Gait, Coordination, and Reflexes     Coordination   Finger to nose coordination: normal    Tremor   Resting tremor: absent          Assessment/Plan   Marcela was seen today for restless legs syndrome and peripheral neuropathy.    Diagnoses and all orders for this visit:    Diabetic peripheral neuropathy (CMS/HCC)    Restless legs syndrome (RLS)    Other orders  -     rOPINIRole (REQUIP) 2 MG tablet; Take 1 tablet by mouth Every Night.          Discussion/Summary   Marcela Ramírez returns to clinic today with a history consistent with a diabetic peripheral neuropathy, carpal tunnel syndrome, and restless leg syndrome. This was discussed at length. I again reviewed her current status and treatment options. After discussing potential treatment options, it was elected to increase her ropinirole to 2mg nightly and continue on Lyrica unchanged.  I again discussed the importance of tight glucose control and recommended a referral to nutrition, which she will consider in the future. She will then follow up in 6 weeks, or sooner if needed.   I spent 25 minutes minutes face to face with the patient with 15 minutes spent on discussing diagnosis, prognosis, evaluation, current status, treatment options and management as discussed above.       As part of this visit I discussed the history with the patient .      Gladys Wilder PA-C

## 2019-02-06 ENCOUNTER — TELEPHONE (OUTPATIENT)
Dept: NEUROLOGY | Facility: CLINIC | Age: 54
End: 2019-02-06

## 2019-02-06 NOTE — TELEPHONE ENCOUNTER
Left detailed VM relaying message to Helen and letting her know she may either come pick this up or can call and have it sent to her.

## 2019-02-06 NOTE — TELEPHONE ENCOUNTER
----- Message from Gladys Wilder PA-C sent at 2/6/2019  9:54 AM EST -----  Would you care to let Ms. Ramírez know that I wrote her letter for surgery clearance? Thanks!!

## 2019-02-12 ENCOUNTER — APPOINTMENT (OUTPATIENT)
Dept: PULMONOLOGY | Facility: HOSPITAL | Age: 54
End: 2019-02-12

## 2019-02-18 ENCOUNTER — LAB REQUISITION (OUTPATIENT)
Dept: LAB | Facility: HOSPITAL | Age: 54
End: 2019-02-18

## 2019-02-18 DIAGNOSIS — E66.01 MORBID (SEVERE) OBESITY DUE TO EXCESS CALORIES (HCC): ICD-10-CM

## 2019-02-18 PROCEDURE — 88305 TISSUE EXAM BY PATHOLOGIST: CPT | Performed by: SURGERY

## 2019-02-19 LAB
CYTO UR: NORMAL
LAB AP CASE REPORT: NORMAL
LAB AP CLINICAL INFORMATION: NORMAL
PATH REPORT.FINAL DX SPEC: NORMAL
PATH REPORT.GROSS SPEC: NORMAL

## 2019-02-20 ENCOUNTER — TELEPHONE (OUTPATIENT)
Dept: BARIATRICS/WEIGHT MGMT | Facility: CLINIC | Age: 54
End: 2019-02-20

## 2019-02-20 NOTE — TELEPHONE ENCOUNTER
----- Message from Guido Greenberg MD sent at 2/18/2019  9:28 AM EST -----  I thought she might have some gastric varices on EGD - can you please order a CT scan of the abdomen/pelvis with IV and oral (barium based) contrast?  ty

## 2019-02-22 NOTE — TELEPHONE ENCOUNTER
Tried to contact pt, no answer. LVM. Pt did try to return my call on 2/21/2019, but was in clinic.

## 2019-02-23 NOTE — PROGRESS NOTES
[facility]  Consultation History and Physical  Marcela Ramírez  1965    VISIT DATE:  02/26/19    PCP:   Sukhwinder Acosta MD  [id number] [facility] [address] [place] [address]      CC:  Preop evaluation gastric bypass    Problem List:    1. HTN  2.  HLD Last lipid panel January 29, 2019 total cholesterol 199, triglycerides 283, HDL 50, LDL 92  3.  DM A1c 8.7 not currently on medications,  4.  Prior TIA, reports prior workup at  negative for stroke, told she has complex migraine follows with neurology  5.  GERD    History of Present Illness:  Marcela Ramírez  Is a 53 y.o. female with pertinent cardiac history detailed above.  Undergoing evaluation for possible gastric bypass.  She has hypertension, diabetes for which she is not currently on medication and hypertriglyceridemia.  EKG in clinic today with sinus rhythm normal axis no ischemic changes.  She complete complete greater than 4 metastases of activity without chest pain.  She had an episode of chest pain in September with negative cardiac enzymes normal EKG, and this was in the setting of resolving pneumonia.  No recurrences since.  She walks [id number] steps a day without limitation for her work at [employer]  No palpitations no syncope.  She does have increased social stressors and has been battling some depression worsening of her bipolar disorder.  She has had possible thoughts of harming herself in the past but states she has not acted on them because of her family and her overall improving health.  Blood pressure is controlled today      Patient Active Problem List    Diagnosis Date Noted   • Pre-op evaluation 02/26/2019   • Hypertension    • Hyperlipidemia    • Diverticulosis    • Diabetes mellitus (CMS/[facility])    • TIA (transient ischemic attack)      Note Last Updated: 1/29/2019     patient states 8-9 episodes of right sided drooping/ weakness/ vision changes. Workup was negative for CVA- thought to be  related to anxiety.      • Peripheral neuropathy    • GERD (gastroesophageal reflux disease)    • Bipolar 1 disorder (CMS/[facility])    • Bilateral ovarian cysts    • S/P cholecystectomy      Note Last Updated: 1/29/2019     2/2 cholelithiasis     • Headache    • Lower extremity edema    • RLS (restless legs syndrome)    • Shortness of breath on exertion    • Carpal tunnel syndrome    • History of blood transfusion      Note Last Updated: 1/29/2019     2/2 heavy menses     • Nausea      Note Last Updated: 1/29/2019     wtih taking meds, avoid this by taking meds wtih milk     • Colon polyp    • Asthma      Note Last Updated: 1/29/2019     has not required inhaler     • Arthritis      Note Last Updated: 1/29/2019     knees, otc arthritis meds prn, no h/o injections.      • Boil      Note Last Updated: 1/29/2019     opens them herself     • Morbid obesity with BMI of 40.0-44.9, adult (CMS/[facility])    • Diabetic peripheral neuropathy (CMS/HCC) 07/24/2018   • Restless legs syndrome (RLS) 07/24/2018   • Depression 07/03/2018   • Anxiety 07/03/2018   • Acute suppurative otitis media of right ear with spontaneous rupture of tympanic membrane 06/29/2018       Allergies   Allergen Reactions   • Contrast Dye Swelling     Nasal congestion/ snot, IV contrast only       Social History     Socioeconomic History   • Marital status:      Spouse name: Not on file   • Number of children: Not on file   • Years of education: Not on file   • Highest education level: Not on file   Social Needs   • Financial resource strain: Not on file   • Food insecurity - worry: Not on file   • Food insecurity - inability: Not on file   • Transportation needs - medical: Not on file   • Transportation needs - non-medical: Not on file   Occupational History   • Not on file   Tobacco Use   • Smoking status: Never Smoker   • Smokeless tobacco: Never Used   Substance and Sexual Activity   • Alcohol use: No     Frequency: Never   • Drug use: Yes     Types:  Marijuana     Comment: helps with marijauna   • Sexual activity: Defer     Comment: no hormones   Other Topics Concern   • Not on file   Social History Narrative    Lives in [place] [place] with boyfriend, daughter and her boyfriend and granddaughter and stepson.        Family History   Problem Relation Age of Onset   • Arthritis Mother    • Arthritis Father    • Diabetes Father    • Hyperlipidemia Father    • Hypertension Father    • Asthma Brother    • Other Brother    • Cancer Maternal Aunt    • Depression Paternal Uncle        Current Medications:    Current Outpatient Medications:   •  ALPRAZolam (XANAX) 0.5 MG tablet, Take 1 tablet by mouth At Night As Needed for Anxiety., Disp: 30 tablet, Rfl: 2  •  aspirin 81 MG EC tablet, Take 1 tablet by mouth Daily., Disp: 30 tablet, Rfl: 0  •  desvenlafaxine (PRISTIQ) 50 MG 24 hr tablet, Take 50 mg by mouth Daily., Disp: , Rfl:   •  hydrochlorothiazide (HYDRODIURIL) 25 MG tablet, Take 1 tablet by mouth Daily., Disp: 30 tablet, Rfl: 2  •  ipratropium (ATROVENT HFA) 17 MCG/ACT inhaler, Inhale 2 puffs 4 (Four) Times a Day., Disp: 1 inhaler, Rfl: 0  •  LamoTRIgine (LAMICTAL PO), Take 1 tablet by mouth Every Night. Pt is on 200mg, Disp: , Rfl:   •  LISINOPRIL PO, Take 1 tablet by mouth Daily. Pt is on 20mg, Disp: , Rfl:   •  nitroglycerin (NITROSTAT) 0.4 MG SL tablet, Place 1 tablet under the tongue Every 5 (Five) Minutes As Needed for Chest Pain. Take no more than 3 doses in 15 minutes., Disp: 100 tablet, Rfl: 0  •  omeprazole (PRILOSEC) 20 MG capsule, Take 1 capsule by mouth Daily., Disp: 30 capsule, Rfl: 2  •  pregabalin (LYRICA) 25 MG capsule, Take 25 mg by mouth 2 (Two) Times a Day., Disp: , Rfl:   •  rOPINIRole (REQUIP) 2 MG tablet, Take 1 tablet by mouth Every Night., Disp: 30 tablet, Rfl: 11  •  atorvastatin (LIPITOR) 20 MG tablet, Take 1 tablet by mouth Daily., Disp: 30 tablet, Rfl: 11     Review of Systems   Cardiovascular: Positive for dyspnea on exertion  "(Nonlimiting). Negative for chest pain, irregular heartbeat, leg swelling, near-syncope and orthopnea.   Psychiatric/Behavioral: Positive for depression and suicidal ideas.   All other systems reviewed and are negative.      Vitals:    02/26/19 1100   BP: 138/88   BP Location: Right arm   Patient Position: Sitting   Pulse: 95   SpO2: 97%   Weight: 119 kg (263 lb)   Height: 170.2 cm (67\")       Physical Exam   Constitutional: She is oriented to person, place, and time. She appears well-developed and well-nourished.   HENT:   Head: Normocephalic and atraumatic.   Eyes: EOM are normal.   Neck: Normal range of motion. Neck supple.   Cardiovascular: Normal rate, regular rhythm, normal heart sounds and intact distal pulses. Exam reveals no gallop and no friction rub.   No murmur heard.  No carotid bruits   Pulmonary/Chest: Effort normal and breath sounds normal.   Abdominal: Soft.   Musculoskeletal: Normal range of motion. She exhibits no edema.   Neurological: She is alert and oriented to person, place, and time.   Skin: Skin is warm and dry.   Nursing note and vitals reviewed.      Diagnostic Data:    ECG 12 Lead  Date/Time: 2/26/2019 11:07 AM  Performed by: Jhoan Sweeney MD  Authorized by: Jhoan Sweeney MD   Rhythm: sinus rhythm  Rate: normal  BPM: 83  QRS axis: normal    Clinical impression: normal ECG          Lab Results   Component Value Date    CHLPL 199 01/29/2019    TRIG 283 (H) 01/29/2019    HDL 50 01/29/2019     Lab Results   Component Value Date    GLUCOSE 190 (H) 09/09/2018    BUN 21 01/29/2019    CREATININE 1.07 01/29/2019     01/29/2019    K 4.2 01/29/2019    CL 95 (L) 01/29/2019    CO2 30.0 01/29/2019     Lab Results   Component Value Date    HGBA1C 8.70 (H) 01/29/2019     Lab Results   Component Value Date    WBC 8.66 01/29/2019    HGB 13.0 01/29/2019    HCT 39.6 01/29/2019     01/29/2019       Assessment:   Diagnosis Plan   1. Hyperlipidemia, unspecified hyperlipidemia " type         Plan:        1.  Pre-operative risk assessment  -EKG in clinic today normal sinus without acute ischemic changes  -Blood pressure is controlled on current medications  -She should start on a statin given her diabetes history and hypertriglyceridemia, started atorvastatin 20 mg daily  -She will follow-up with her primary care provider to discuss resuming medication for diabetes as her last A1c was 8.7 she is not on insulin  -Has a normal creatinine with GFR 55  -She can complete greater than 4 mets of activity without any chest pain.  -The TIA reported in her history was negative for any evidence of stroke and was felt to be related to complex migraine  -At this time I would say her revised cardiac risk index score is 0 and she can proceed with gastric sleeve surgery without further cardiac testing  -Continue current antihypertensives  -We will be happy to follow her on as-needed basis    2.  Bipolar, depressions, suicidal thoughts  -Patient is established with psychiatry, states she has contemplated this in the past but never acted on it.  States that her grandchildren and family would prevent her from carrying out the D.  I tried to provide her encouragement that overall her health trajectory is headed in a positive direction      Jhoan Sweeney MD

## 2019-02-25 ENCOUNTER — TELEPHONE (OUTPATIENT)
Dept: CARDIOLOGY | Facility: CLINIC | Age: 54
End: 2019-02-25

## 2019-02-25 DIAGNOSIS — E66.01 MORBID OBESITY WITH BMI OF 40.0-44.9, ADULT (HCC): ICD-10-CM

## 2019-02-25 DIAGNOSIS — K21.9 GASTROESOPHAGEAL REFLUX DISEASE, ESOPHAGITIS PRESENCE NOT SPECIFIED: ICD-10-CM

## 2019-02-25 NOTE — TELEPHONE ENCOUNTER
Called patient to remind them of their appointment on 02/26/2019 @ 1100. Told patient to arrive 30 minutes prior to appointment and bring detailed med list or medication bottles with them.    Left voicemail

## 2019-02-26 ENCOUNTER — CONSULT (OUTPATIENT)
Dept: CARDIOLOGY | Facility: CLINIC | Age: 54
End: 2019-02-26

## 2019-02-26 VITALS
HEIGHT: 67 IN | BODY MASS INDEX: 41.28 KG/M2 | WEIGHT: 263 LBS | HEART RATE: 95 BPM | DIASTOLIC BLOOD PRESSURE: 88 MMHG | OXYGEN SATURATION: 97 % | SYSTOLIC BLOOD PRESSURE: 138 MMHG

## 2019-02-26 DIAGNOSIS — E78.5 HYPERLIPIDEMIA, UNSPECIFIED HYPERLIPIDEMIA TYPE: Primary | ICD-10-CM

## 2019-02-26 DIAGNOSIS — R19.8 ABNORMAL FINDINGS ON ESOPHAGOGASTRODUODENOSCOPY (EGD): Primary | ICD-10-CM

## 2019-02-26 PROBLEM — Z01.818 PRE-OP EVALUATION: Status: ACTIVE | Noted: 2019-02-26

## 2019-02-26 PROCEDURE — 93000 ELECTROCARDIOGRAM COMPLETE: CPT | Performed by: INTERNAL MEDICINE

## 2019-02-26 PROCEDURE — 99244 OFF/OP CNSLTJ NEW/EST MOD 40: CPT | Performed by: INTERNAL MEDICINE

## 2019-02-26 RX ORDER — ATORVASTATIN CALCIUM 20 MG/1
20 TABLET, FILM COATED ORAL DAILY
Qty: 30 TABLET | Refills: 11 | Status: SHIPPED | OUTPATIENT
Start: 2019-02-26 | End: 2020-03-19

## 2019-02-26 RX ORDER — PREDNISONE 50 MG/1
50 TABLET ORAL TAKE AS DIRECTED
Qty: 3 TABLET | Refills: 0 | OUTPATIENT
Start: 2019-02-26 | End: 2019-03-11

## 2019-02-27 NOTE — TELEPHONE ENCOUNTER
Spoke with pt, wrote her a letter w/ instruction on ordering her CT scan and her Medication directions. I told pt to call back if she has any questions and we would be happy to help her in anyway, Pt verbalized understanding.

## 2019-02-28 ENCOUNTER — RESULTS ENCOUNTER (OUTPATIENT)
Dept: BARIATRICS/WEIGHT MGMT | Facility: CLINIC | Age: 54
End: 2019-02-28

## 2019-02-28 DIAGNOSIS — F31.9 BIPOLAR 1 DISORDER (HCC): ICD-10-CM

## 2019-02-28 DIAGNOSIS — E66.01 MORBID OBESITY WITH BMI OF 40.0-44.9, ADULT (HCC): ICD-10-CM

## 2019-02-28 DIAGNOSIS — I10 HYPERTENSION, UNSPECIFIED TYPE: ICD-10-CM

## 2019-02-28 DIAGNOSIS — G25.81 RLS (RESTLESS LEGS SYNDROME): ICD-10-CM

## 2019-02-28 DIAGNOSIS — R11.0 NAUSEA: ICD-10-CM

## 2019-02-28 DIAGNOSIS — R06.02 SHORTNESS OF BREATH ON EXERTION: ICD-10-CM

## 2019-02-28 DIAGNOSIS — R60.0 LOWER EXTREMITY EDEMA: ICD-10-CM

## 2019-02-28 DIAGNOSIS — K21.9 GASTROESOPHAGEAL REFLUX DISEASE, ESOPHAGITIS PRESENCE NOT SPECIFIED: ICD-10-CM

## 2019-02-28 DIAGNOSIS — G45.9 TIA (TRANSIENT ISCHEMIC ATTACK): ICD-10-CM

## 2019-02-28 DIAGNOSIS — J45.909 ASTHMA, UNSPECIFIED ASTHMA SEVERITY, UNSPECIFIED WHETHER COMPLICATED, UNSPECIFIED WHETHER PERSISTENT: ICD-10-CM

## 2019-02-28 DIAGNOSIS — E78.5 HYPERLIPIDEMIA, UNSPECIFIED HYPERLIPIDEMIA TYPE: ICD-10-CM

## 2019-03-01 ENCOUNTER — OFFICE VISIT (OUTPATIENT)
Dept: FAMILY MEDICINE CLINIC | Facility: CLINIC | Age: 54
End: 2019-03-01

## 2019-03-01 VITALS
OXYGEN SATURATION: 98 % | HEIGHT: 67 IN | BODY MASS INDEX: 40.81 KG/M2 | RESPIRATION RATE: 16 BRPM | WEIGHT: 260 LBS | HEART RATE: 85 BPM | DIASTOLIC BLOOD PRESSURE: 80 MMHG | SYSTOLIC BLOOD PRESSURE: 118 MMHG | TEMPERATURE: 98.6 F

## 2019-03-01 DIAGNOSIS — E66.01 CLASS 3 SEVERE OBESITY WITHOUT SERIOUS COMORBIDITY WITH BODY MASS INDEX (BMI) OF 40.0 TO 44.9 IN ADULT, UNSPECIFIED OBESITY TYPE (HCC): Primary | ICD-10-CM

## 2019-03-01 PROCEDURE — 99212 OFFICE O/P EST SF 10 MIN: CPT | Performed by: PHYSICIAN ASSISTANT

## 2019-03-01 NOTE — PROGRESS NOTES
Subjective   Marcela Ramírez is a 53 y.o. female  Weight Gain (Needs monthly paperwork completed for bariatrics)      History of Present Illness  Patient is a pleasant 53-year-old white female who comes in for obesity her BMI is over 40 patient states she has been trying to diet and exercise for weight check and go over diet plan  The following portions of the patient's history were reviewed and updated as appropriate: allergies, current medications, past social history and problem list    Review of Systems   Constitutional: Negative for appetite change, diaphoresis, fatigue and unexpected weight change.   Eyes: Negative for visual disturbance.   Respiratory: Negative for cough, chest tightness and shortness of breath.    Cardiovascular: Negative for chest pain, palpitations and leg swelling.   Gastrointestinal: Negative for diarrhea, nausea and vomiting.   Endocrine: Negative for polydipsia, polyphagia and polyuria.   Skin: Negative for color change and rash.   Neurological: Negative for dizziness, syncope, weakness, light-headedness, numbness and headaches.       Objective     Vitals:    03/01/19 1102   BP: 118/80   Pulse: 85   Resp: 16   Temp: 98.6 °F (37 °C)   SpO2: 98%       Physical Exam   Constitutional: She appears well-developed and well-nourished.   Neck: Neck supple. No JVD present. No thyromegaly present.   Cardiovascular: Normal rate, regular rhythm, normal heart sounds, intact distal pulses and normal pulses.   No murmur heard.  Pulmonary/Chest: Effort normal and breath sounds normal. No respiratory distress.   Abdominal: Soft. Bowel sounds are normal. There is no hepatosplenomegaly. There is no tenderness.   Musculoskeletal: She exhibits no edema.   Lymphadenopathy:     She has no cervical adenopathy.   Neurological: No sensory deficit.   Skin: Skin is warm and dry. She is not diaphoretic.   Nursing note and vitals reviewed.      Assessment/Plan     Diagnoses and all orders for this visit:    Class 3  severe obesity without serious comorbidity with body mass index (BMI) of 40.0 to 44.9 in adult, unspecified obesity type (CMS/HCC)    #1 discussed diet and exercise, low-carb low-calorie, exercise 3-5 times per week for 30 minutes follow-up in 1 month recheck weight she is a candidate for bariatric surgery

## 2019-03-04 RX ORDER — LISINOPRIL 20 MG/1
20 TABLET ORAL DAILY
Qty: 30 TABLET | Refills: 0 | Status: SHIPPED | OUTPATIENT
Start: 2019-03-04 | End: 2019-04-29 | Stop reason: SDUPTHER

## 2019-03-19 ENCOUNTER — OFFICE VISIT (OUTPATIENT)
Dept: NEUROLOGY | Facility: CLINIC | Age: 54
End: 2019-03-19

## 2019-03-19 VITALS — BODY MASS INDEX: 39.24 KG/M2 | WEIGHT: 250 LBS | HEIGHT: 67 IN | RESPIRATION RATE: 18 BRPM

## 2019-03-19 DIAGNOSIS — E11.42 DIABETIC PERIPHERAL NEUROPATHY (HCC): Primary | ICD-10-CM

## 2019-03-19 DIAGNOSIS — G25.81 RLS (RESTLESS LEGS SYNDROME): ICD-10-CM

## 2019-03-19 PROCEDURE — 99214 OFFICE O/P EST MOD 30 MIN: CPT | Performed by: PHYSICIAN ASSISTANT

## 2019-03-19 RX ORDER — ROPINIROLE 2 MG/1
4 TABLET, FILM COATED ORAL NIGHTLY
Qty: 60 TABLET | Refills: 11 | Status: SHIPPED | OUTPATIENT
Start: 2019-03-19 | End: 2019-09-25

## 2019-03-19 NOTE — PROGRESS NOTES
"Subjective     Chief Complaint: numbness, paresthesias, pain         History of Present Illness   Marcela Ramírez is a 53 y.o. female with a history of DM who comes to clinic today for evaluation of neuropathy. She initially noted symptoms in 2015 marked by numbness, paresthesias, and shooting pain in her upper and lower extremities bilaterally. This has worsened over time. She notes associated balance impairment as well as decreased  strength, though denies any clear associated weakness. Her symptoms are worse with increased activity. She is currently taking Lyrica, which is somewhat beneficial. She has previously taken GBP.     She also notes RLS symptoms in her feet primarily at night. She is currently taking ropinirole 0.5mg nightly, which is somewhat beneficial. She states that Xanax is also beneficial for this.      Prior evaluation has included an EMG of the upper and lower extremities bilaterally which was notable for a moderate axonal and demyelinating peripheral neuropathy, moderate CTS bilaterally, mild-moderate ulnar neuropathy on the left and mild ulnar neuropathy on the right. Screening bloodwork was notable for a hemoglobin A1C of 7.3.     Today: Since her last visit in 2/19, she notes that her RLS has worsened as well as her numbness.       I have reviewed and confirmed the past family, social and medical history as accurate on 3/19/19.     Review of Systems   Constitutional: Negative.    HENT: Negative.    Eyes: Negative.    Respiratory: Negative.    Cardiovascular: Negative.    Gastrointestinal: Negative.    Endocrine: Negative.    Genitourinary: Negative.    Musculoskeletal: Negative.    Skin: Negative.    Allergic/Immunologic: Negative.    Neurological: Positive for numbness.   Hematological: Negative.    Psychiatric/Behavioral: Negative.        Objective     Resp 18   Ht 170.2 cm (67\")   Wt 113 kg (250 lb)   LMP  (LMP Unknown)   BMI 39.16 kg/m²     General appearance today is normal. "       Physical Exam   Neurological: She has normal strength. She has a normal Finger-Nose-Finger Test. Gait normal.   Psychiatric: Her speech is normal.        Neurologic Exam     Mental Status   Speech: speech is normal   Level of consciousness: alert  Normal comprehension.     Cranial Nerves   Cranial nerves II through XII intact.     Motor Exam   Muscle bulk: normal  Overall muscle tone: normal    Strength   Strength 5/5 throughout.     Gait, Coordination, and Reflexes     Gait  Gait: normal    Coordination   Finger to nose coordination: normal    Tremor   Resting tremor: absent        Assessment/Plan   Marcela was seen today for peripheral neuropathy.    Diagnoses and all orders for this visit:    Diabetic peripheral neuropathy (CMS/[facility])  -     Ambulatory Referral to Diabetic Education    RLS (restless legs syndrome)    Other orders  -     rOPINIRole (REQUIP) 2 MG tablet; Take 2 tablets by mouth Every Night.          Discussion/Summary   Marcela Ramírez returns to clinic today with a history consistent with a diabetic peripheral neuropathy, carpal tunnel syndrome, and restless leg syndrome. This was discussed at length. I again reviewed her current status and treatment options. After discussing potential treatment options, it was elected to increase her ropinirole to 4mg nightly and continue on Lyrica unchanged.  I again discussed the importance of tight glucose control and have made a referral for diabetic education. She will then follow up in 1 month, or sooner if needed.   I spent 25 minutes face to face with the patient with 20 minutes spent on discussing diagnosis, prognosis, evaluation, current status, treatment options and management as discussed above.       As part of this visit I discussed the history with the patient .      Gladys Wilder PA-C

## 2019-03-26 ENCOUNTER — HOSPITAL ENCOUNTER (OUTPATIENT)
Dept: CT IMAGING | Facility: HOSPITAL | Age: 54
Discharge: HOME | End: 2019-03-26
Admitting: PHYSICIAN ASSISTANT

## 2019-03-26 DIAGNOSIS — R19.8 ABNORMAL FINDINGS ON ESOPHAGOGASTRODUODENOSCOPY (EGD): ICD-10-CM

## 2019-03-26 PROCEDURE — 74160 CT ABDOMEN W/CONTRAST: CPT

## 2019-03-26 PROCEDURE — 25010000002 IOPAMIDOL 61 % SOLUTION: Performed by: PHYSICIAN ASSISTANT

## 2019-03-26 PROCEDURE — 82565 ASSAY OF CREATININE: CPT

## 2019-03-26 RX ADMIN — IOPAMIDOL 95 ML: 612 INJECTION, SOLUTION INTRAVENOUS at 14:47

## 2019-03-26 RX ADMIN — BARIUM SULFATE 450 ML: 21 SUSPENSION ORAL at 13:15

## 2019-03-28 ENCOUNTER — OFFICE VISIT (OUTPATIENT)
Dept: FAMILY MEDICINE CLINIC | Facility: CLINIC | Age: 54
End: 2019-03-28

## 2019-03-28 VITALS
HEIGHT: 67 IN | OXYGEN SATURATION: 97 % | SYSTOLIC BLOOD PRESSURE: 122 MMHG | BODY MASS INDEX: 38.92 KG/M2 | WEIGHT: 248 LBS | DIASTOLIC BLOOD PRESSURE: 80 MMHG | TEMPERATURE: 98 F | HEART RATE: 90 BPM

## 2019-03-28 DIAGNOSIS — H60.63 CHRONIC OTITIS EXTERNA OF BOTH EARS, UNSPECIFIED TYPE: ICD-10-CM

## 2019-03-28 DIAGNOSIS — E66.01 CLASS 3 SEVERE OBESITY WITHOUT SERIOUS COMORBIDITY WITH BODY MASS INDEX (BMI) OF 40.0 TO 44.9 IN ADULT, UNSPECIFIED OBESITY TYPE (HCC): Primary | ICD-10-CM

## 2019-03-28 DIAGNOSIS — R11.0 NAUSEA: ICD-10-CM

## 2019-03-28 LAB — CREAT BLDA-MCNC: 1.1 MG/DL (ref 0.6–1.3)

## 2019-03-28 PROCEDURE — 99213 OFFICE O/P EST LOW 20 MIN: CPT | Performed by: FAMILY MEDICINE

## 2019-03-29 ENCOUNTER — TELEPHONE (OUTPATIENT)
Dept: FAMILY MEDICINE CLINIC | Facility: CLINIC | Age: 54
End: 2019-03-29

## 2019-03-29 DIAGNOSIS — R11.2 NAUSEA AND VOMITING, INTRACTABILITY OF VOMITING NOT SPECIFIED, UNSPECIFIED VOMITING TYPE: ICD-10-CM

## 2019-03-29 RX ORDER — AMOXICILLIN 500 MG/1
500 TABLET, FILM COATED ORAL 3 TIMES DAILY
Qty: 30 TABLET | Refills: 0 | OUTPATIENT
Start: 2019-03-29 | End: 2019-04-03

## 2019-03-29 RX ORDER — ONDANSETRON 4 MG/1
4 TABLET, ORALLY DISINTEGRATING ORAL EVERY 6 HOURS PRN
Qty: 30 TABLET | Refills: 1 | Status: SHIPPED | OUTPATIENT
Start: 2019-03-29 | End: 2019-08-21

## 2019-03-29 NOTE — TELEPHONE ENCOUNTER
Problem: Patient Care Overview  Goal: Plan of Care/Patient Progress Review  Outcome: No Change  Pt denies pain. A/o, however impulsive with disorganized thinking. Up w/ A-1 to st. By. Vitals stable on 4 liters NC. Droplet precautions maintained. Tele A-fib in the low 100's. On nebs and antibiotics for PNA and + BC. IV infiltrated, unable to re-start, waiting IV team. continue to monitor.        Should not work.  Send in prescriptions for Zofran 4 mg, #30, 1 every 6 hours as needed for nausea and amoxicillin 500 mg, #30, 1 capsule 3 times a day.

## 2019-03-29 NOTE — TELEPHONE ENCOUNTER
Called and Left VM that she should not go to work and we were sending in her meds.  Meds were sent to [facility] for her

## 2019-03-29 NOTE — TELEPHONE ENCOUNTER
"----- Message from Alejandra Fang sent at 3/29/2019 10:31 AM EDT -----  Contact: PT.  PT. SAW DR. RUDOLPH YESTERDAY; STILL NOT FEELING BETTER.  SYMPTOMS INCLUDE BENDING OVER:  FEEL'S LIKE PASSING OUT, NAUSEA, NOT ABLE TO DRINK WATER=STOMACH CRAMPING; EAR PAIN STILL.  PT. FEELING \"WRONG\".  PT. WONDERING WHAT IS NEXT PLAN OF CARE?  (PT. WORKS TODAY @ 3 PM; NOT KNOWING IF THIS IS ADVISABLE).    RX=[facility].    PT. CAN BE REACHED @ [facility] #. #PT. WANTING TO BE CALLED.#  "

## 2019-04-03 ENCOUNTER — HOSPITAL ENCOUNTER (EMERGENCY)
Facility: HOSPITAL | Age: 54
Discharge: HOME | End: 2019-04-03
Attending: EMERGENCY MEDICINE | Admitting: EMERGENCY MEDICINE

## 2019-04-03 ENCOUNTER — APPOINTMENT (OUTPATIENT)
Dept: GENERAL RADIOLOGY | Facility: HOSPITAL | Age: 54
End: 2019-04-03

## 2019-04-03 VITALS
DIASTOLIC BLOOD PRESSURE: 97 MMHG | RESPIRATION RATE: 16 BRPM | HEIGHT: 67 IN | TEMPERATURE: 98.7 F | HEART RATE: 87 BPM | OXYGEN SATURATION: 97 % | WEIGHT: 242 LBS | SYSTOLIC BLOOD PRESSURE: 138 MMHG | BODY MASS INDEX: 37.98 KG/M2

## 2019-04-03 DIAGNOSIS — N28.9 RENAL INSUFFICIENCY: ICD-10-CM

## 2019-04-03 DIAGNOSIS — E11.65 UNCONTROLLED TYPE 2 DIABETES MELLITUS WITH HYPERGLYCEMIA (HCC): Primary | ICD-10-CM

## 2019-04-03 LAB
ALBUMIN SERPL-MCNC: 4.72 G/DL (ref 3.2–4.8)
ALBUMIN/GLOB SERPL: 1.8 G/DL (ref 1.5–2.5)
ALP SERPL-CCNC: 176 U/L (ref 25–100)
ALT SERPL W P-5'-P-CCNC: 61 U/L (ref 7–40)
ANION GAP SERPL CALCULATED.3IONS-SCNC: 13 MMOL/L (ref 3–11)
AST SERPL-CCNC: 34 U/L (ref 0–33)
BACTERIA UR QL AUTO: NORMAL /HPF
BASOPHILS # BLD AUTO: 0.03 10*3/MM3 (ref 0–0.2)
BASOPHILS NFR BLD AUTO: 0.3 % (ref 0–1)
BILIRUB SERPL-MCNC: 0.5 MG/DL (ref 0.3–1.2)
BILIRUB UR QL STRIP: NEGATIVE
BUN BLD-MCNC: 32 MG/DL (ref 9–23)
BUN/CREAT SERPL: 22.5 (ref 7–25)
CALCIUM SPEC-SCNC: 10.2 MG/DL (ref 8.7–10.4)
CHLORIDE SERPL-SCNC: 88 MMOL/L (ref 99–109)
CLARITY UR: ABNORMAL
CO2 SERPL-SCNC: 23 MMOL/L (ref 20–31)
COLOR UR: YELLOW
CREAT BLD-MCNC: 1.42 MG/DL (ref 0.6–1.3)
DEPRECATED RDW RBC AUTO: 42.9 FL (ref 37–54)
EOSINOPHIL # BLD AUTO: 0.2 10*3/MM3 (ref 0–0.3)
EOSINOPHIL NFR BLD AUTO: 1.9 % (ref 0–3)
ERYTHROCYTE [DISTWIDTH] IN BLOOD BY AUTOMATED COUNT: 13 % (ref 11.3–14.5)
GFR SERPL CREATININE-BSD FRML MDRD: 39 ML/MIN/1.73
GLOBULIN UR ELPH-MCNC: 2.6 GM/DL
GLUCOSE BLD-MCNC: 672 MG/DL (ref 70–100)
GLUCOSE BLDC GLUCOMTR-MCNC: 530 MG/DL (ref 70–130)
GLUCOSE BLDC GLUCOMTR-MCNC: 562 MG/DL (ref 70–130)
GLUCOSE UR STRIP-MCNC: ABNORMAL MG/DL
HCT VFR BLD AUTO: 43.8 % (ref 34.5–44)
HGB BLD-MCNC: 14.9 G/DL (ref 11.5–15.5)
HGB UR QL STRIP.AUTO: NEGATIVE
HOLD SPECIMEN: NORMAL
HOLD SPECIMEN: NORMAL
HYALINE CASTS UR QL AUTO: NORMAL /LPF
IMM GRANULOCYTES # BLD AUTO: 0.03 10*3/MM3 (ref 0–0.05)
IMM GRANULOCYTES NFR BLD AUTO: 0.3 % (ref 0–0.6)
KETONES UR QL STRIP: NEGATIVE
LEUKOCYTE ESTERASE UR QL STRIP.AUTO: NEGATIVE
LYMPHOCYTES # BLD AUTO: 2.66 10*3/MM3 (ref 0.6–4.8)
LYMPHOCYTES NFR BLD AUTO: 25.5 % (ref 24–44)
MAGNESIUM SERPL-MCNC: 1.9 MG/DL (ref 1.3–2.7)
MCH RBC QN AUTO: 30.8 PG (ref 27–31)
MCHC RBC AUTO-ENTMCNC: 34 G/DL (ref 32–36)
MCV RBC AUTO: 90.7 FL (ref 80–99)
MONOCYTES # BLD AUTO: 0.59 10*3/MM3 (ref 0–1)
MONOCYTES NFR BLD AUTO: 5.7 % (ref 0–12)
NEUTROPHILS # BLD AUTO: 6.96 10*3/MM3 (ref 1.5–8.3)
NEUTROPHILS NFR BLD AUTO: 66.6 % (ref 41–71)
NITRITE UR QL STRIP: NEGATIVE
PH UR STRIP.AUTO: <=5 [PH] (ref 5–8)
PLATELET # BLD AUTO: 239 10*3/MM3 (ref 150–450)
PMV BLD AUTO: 11.9 FL (ref 6–12)
POTASSIUM BLD-SCNC: 4.9 MMOL/L (ref 3.5–5.5)
PROT SERPL-MCNC: 7.3 G/DL (ref 5.7–8.2)
PROT UR QL STRIP: NEGATIVE
RBC # BLD AUTO: 4.83 10*6/MM3 (ref 3.89–5.14)
RBC # UR: NORMAL /HPF
REF LAB TEST METHOD: NORMAL
SODIUM BLD-SCNC: 124 MMOL/L (ref 132–146)
SP GR UR STRIP: 1.03 (ref 1–1.03)
SQUAMOUS #/AREA URNS HPF: NORMAL /HPF
TROPONIN I SERPL-MCNC: 0 NG/ML (ref 0–0.07)
TROPONIN I SERPL-MCNC: 0 NG/ML (ref 0–0.07)
UROBILINOGEN UR QL STRIP: ABNORMAL
WBC NRBC COR # BLD: 10.44 10*3/MM3 (ref 3.5–10.8)
WBC UR QL AUTO: NORMAL /HPF
WHOLE BLOOD HOLD SPECIMEN: NORMAL
WHOLE BLOOD HOLD SPECIMEN: NORMAL

## 2019-04-03 PROCEDURE — 82962 GLUCOSE BLOOD TEST: CPT

## 2019-04-03 PROCEDURE — 81001 URINALYSIS AUTO W/SCOPE: CPT | Performed by: EMERGENCY MEDICINE

## 2019-04-03 PROCEDURE — 71045 X-RAY EXAM CHEST 1 VIEW: CPT

## 2019-04-03 PROCEDURE — 84484 ASSAY OF TROPONIN QUANT: CPT

## 2019-04-03 PROCEDURE — 85025 COMPLETE CBC W/AUTO DIFF WBC: CPT

## 2019-04-03 PROCEDURE — 83735 ASSAY OF MAGNESIUM: CPT | Performed by: EMERGENCY MEDICINE

## 2019-04-03 PROCEDURE — 63710000001 INSULIN REGULAR HUMAN PER 5 UNITS: Performed by: EMERGENCY MEDICINE

## 2019-04-03 PROCEDURE — 93005 ELECTROCARDIOGRAM TRACING: CPT | Performed by: EMERGENCY MEDICINE

## 2019-04-03 PROCEDURE — 80053 COMPREHEN METABOLIC PANEL: CPT | Performed by: EMERGENCY MEDICINE

## 2019-04-03 PROCEDURE — 99285 EMERGENCY DEPT VISIT HI MDM: CPT

## 2019-04-03 RX ORDER — OFLOXACIN 3 MG/ML
1 SOLUTION/ DROPS OPHTHALMIC 4 TIMES DAILY
COMMUNITY
End: 2019-07-26

## 2019-04-03 RX ORDER — SODIUM CHLORIDE 0.9 % (FLUSH) 0.9 %
10 SYRINGE (ML) INJECTION AS NEEDED
Status: DISCONTINUED | OUTPATIENT
Start: 2019-04-03 | End: 2019-04-03 | Stop reason: HOSPADM

## 2019-04-03 RX ADMIN — METFORMIN HYDROCHLORIDE 1000 MG: 500 TABLET, FILM COATED ORAL at 20:58

## 2019-04-03 RX ADMIN — SODIUM CHLORIDE 1000 ML: 9 INJECTION, SOLUTION INTRAVENOUS at 17:13

## 2019-04-03 RX ADMIN — INSULIN HUMAN 10 UNITS: 100 INJECTION, SOLUTION PARENTERAL at 17:14

## 2019-04-03 NOTE — ED PROVIDER NOTES
Subjective   Marcela Ramírez is a 53 y.o. female who presents to the ED with complaints of weakness. The patient reports that for the past week she has been experiencing weakness, dizziness, fatigue, and blurred vision. She states that she also has been experiencing palpitations for the past month and slight shortness of breath. She also complains of always being thirsty and urinating frequently. She states that she was diagnosed with a left ear infection last week and was prescribed antibiotics and has now developed a yeast infection. She denies dysuria, cough, rhinorrhea, abdominal pain, and chest pain. The patient has also been told that she has diabetes in the past, but has been attempting to control it with diet. She states that she was on metformin in the past. There are no other acute complaints at this time.         History provided by:  Patient  Weakness - Generalized   Severity:  Moderate  Onset quality:  Gradual  Duration:  1 week  Chronicity:  New  Relieved by:  Nothing  Associated symptoms: dizziness, frequency and shortness of breath    Associated symptoms: no abdominal pain, no chest pain, no cough and no dysuria        Review of Systems   Constitutional: Positive for fatigue.   HENT: Negative for rhinorrhea.    Eyes:        Blurred vision.    Respiratory: Positive for shortness of breath. Negative for cough.    Cardiovascular: Positive for palpitations. Negative for chest pain.   Gastrointestinal: Negative for abdominal pain.   Genitourinary: Positive for frequency. Negative for dysuria.   Neurological: Positive for dizziness and weakness.   All other systems reviewed and are negative.      Past Medical History:   Diagnosis Date   • Arthritis     knees, otc arthritis meds prn, no h/o injections.    • Asthma     has not required inhaler   • Bilateral ovarian cysts    • Bipolar 1 disorder (CMS/HCC)    • Boil     opens them herself   • Carpal tunnel syndrome    • Colon polyp    • Depression    • Diabetes  mellitus (CMS/HCC)     Diagnosed 2017, was on metformin in the past. Last A1C 7%   • Diverticulosis    • Fatigue    • GERD (gastroesophageal reflux disease)     controlled with omeprazole 20mg. Remote EGD- esophagitis. No h/o HH or h pylori.    • Headache    • History of blood transfusion     2/2 heavy menses   • Hyperlipidemia    • Hypertension    • Lower extremity edema    • Morbid obesity with BMI of 40.0-44.9, adult (CMS/HCC)    • Nausea     wtih taking meds, avoid this by taking meds wtih milk   • Peripheral neuropathy     2/2 DM   • RLS (restless legs syndrome)    • S/P cholecystectomy     2/2 cholelithiasis   • Shortness of breath on exertion    • TIA (transient ischemic attack)     patient states 8-9 episodes of right sided drooping/ weakness/ vision changes. Workup was negative for CVA- thought to be related to anxiety. last episode 12/2017       Allergies   Allergen Reactions   • Contrast Dye Swelling     Nasal congestion/ snot, IV contrast only       Past Surgical History:   Procedure Laterality Date   • COLONOSCOPY  remote    diverticulosis   • ENDOSCOPY  remote    esophagitis    • KNEE ACL RECONSTRUCTION Right 2013    with miniscal repair   • LAPAROSCOPIC CHOLECYSTECTOMY  2001    cholelithiasis   • LAPAROSCOPIC HYSTERECTOMY  2013    right ovary remains       Family History   Problem Relation Age of Onset   • Arthritis Mother    • Arthritis Father    • Diabetes Father    • Hyperlipidemia Father    • Hypertension Father    • Asthma Brother    • Other Brother    • Cancer Maternal Aunt    • Depression Paternal Uncle        Social History     Socioeconomic History   • Marital status:      Spouse name: Not on file   • Number of children: Not on file   • Years of education: Not on file   • Highest education level: Not on file   Tobacco Use   • Smoking status: Never Smoker   • Smokeless tobacco: Never Used   Substance and Sexual Activity   • Alcohol use: No     Frequency: Never   • Drug use: Yes      Types: Marijuana     Comment: helps with rae   • Sexual activity: Defer     Comment: no hormones   Social History Narrative    Lives in [facility] with boyfriend, daughter and her boyfriend and granddaughter and stepson.          Objective   Physical Exam   Constitutional: She is oriented to person, place, and time. She appears well-developed and well-nourished. No distress.   HENT:   Head: Normocephalic and atraumatic.   Pharynx benign. Airway patent. Left external auditory canal is a little narrow with debris in it.    Eyes: Conjunctivae are normal. No scleral icterus.   Neck: Normal range of motion. Neck supple.   Cardiovascular: Normal rate, regular rhythm and normal heart sounds.   Pulmonary/Chest: Effort normal and breath sounds normal.   Abdominal: Soft. There is no tenderness.   Musculoskeletal: Normal range of motion. She exhibits no edema.   No pitting edema.    Neurological: She is alert and oriented to person, place, and time.   Skin:   Skin is cool and slightly damp.    Psychiatric: She has a normal mood and affect. Her behavior is normal.   Nursing note and vitals reviewed.      Procedures         ED Course       Recent Results (from the past 24 hour(s))   Comprehensive Metabolic Panel    Collection Time: 04/03/19  3:49 PM   Result Value Ref Range    Glucose 672 (C) 70 - 100 mg/dL    BUN 32 (H) 9 - 23 mg/dL    Creatinine 1.42 (H) 0.60 - 1.30 mg/dL    Sodium 124 (L) 132 - 146 mmol/L    Potassium 4.9 3.5 - 5.5 mmol/L    Chloride 88 (L) 99 - 109 mmol/L    CO2 23.0 20.0 - 31.0 mmol/L    Calcium 10.2 8.7 - 10.4 mg/dL    Total Protein 7.3 5.7 - 8.2 g/dL    Albumin 4.72 3.20 - 4.80 g/dL    ALT (SGPT) 61 (H) 7 - 40 U/L    AST (SGOT) 34 (H) 0 - 33 U/L    Alkaline Phosphatase 176 (H) 25 - 100 U/L    Total Bilirubin 0.5 0.3 - 1.2 mg/dL    eGFR Non African Amer 39 (L) >60 mL/min/1.73    Globulin 2.6 gm/dL    A/G Ratio 1.8 1.5 - 2.5 g/dL    BUN/Creatinine Ratio 22.5 7.0 - 25.0    Anion Gap 13.0 (H) 3.0 -  11.0 mmol/L   Magnesium    Collection Time: 04/03/19  3:49 PM   Result Value Ref Range    Magnesium 1.9 1.3 - 2.7 mg/dL   Light Blue Top    Collection Time: 04/03/19  3:49 PM   Result Value Ref Range    Extra Tube hold for add-on    Green Top (Gel)    Collection Time: 04/03/19  3:49 PM   Result Value Ref Range    Extra Tube Hold for add-ons.    Lavender Top    Collection Time: 04/03/19  3:49 PM   Result Value Ref Range    Extra Tube hold for add-on    Gold Top - SST    Collection Time: 04/03/19  3:49 PM   Result Value Ref Range    Extra Tube Hold for add-ons.    CBC Auto Differential    Collection Time: 04/03/19  3:49 PM   Result Value Ref Range    WBC 10.44 3.50 - 10.80 10*3/mm3    RBC 4.83 3.89 - 5.14 10*6/mm3    Hemoglobin 14.9 11.5 - 15.5 g/dL    Hematocrit 43.8 34.5 - 44.0 %    MCV 90.7 80.0 - 99.0 fL    MCH 30.8 27.0 - 31.0 pg    MCHC 34.0 32.0 - 36.0 g/dL    RDW 13.0 11.3 - 14.5 %    RDW-SD 42.9 37.0 - 54.0 fl    MPV 11.9 6.0 - 12.0 fL    Platelets 239 150 - 450 10*3/mm3    Neutrophil % 66.6 41.0 - 71.0 %    Lymphocyte % 25.5 24.0 - 44.0 %    Monocyte % 5.7 0.0 - 12.0 %    Eosinophil % 1.9 0.0 - 3.0 %    Basophil % 0.3 0.0 - 1.0 %    Immature Grans % 0.3 0.0 - 0.6 %    Neutrophils, Absolute 6.96 1.50 - 8.30 10*3/mm3    Lymphocytes, Absolute 2.66 0.60 - 4.80 10*3/mm3    Monocytes, Absolute 0.59 0.00 - 1.00 10*3/mm3    Eosinophils, Absolute 0.20 0.00 - 0.30 10*3/mm3    Basophils, Absolute 0.03 0.00 - 0.20 10*3/mm3    Immature Grans, Absolute 0.03 0.00 - 0.05 10*3/mm3   POC Troponin, Rapid    Collection Time: 04/03/19  3:58 PM   Result Value Ref Range    Troponin I 0.00 0.00 - 0.07 ng/mL   Urinalysis With Microscopic If Indicated (No Culture) - Urine, Clean Catch    Collection Time: 04/03/19  4:10 PM   Result Value Ref Range    Color, UA Yellow Yellow, Straw    Appearance, UA Cloudy (A) Clear    pH, UA <=5.0 5.0 - 8.0    Specific Gravity, UA 1.028 1.001 - 1.030    Glucose, UA >=1000 mg/dL (3+) (A) Negative     Ketones, UA Negative Negative    Bilirubin, UA Negative Negative    Blood, UA Negative Negative    Protein, UA Negative Negative    Leuk Esterase, UA Negative Negative    Nitrite, UA Negative Negative    Urobilinogen, UA 0.2 E.U./dL 0.2 - 1.0 E.U./dL   Urinalysis, Microscopic Only - Urine, Clean Catch    Collection Time: 04/03/19  4:10 PM   Result Value Ref Range    RBC, UA 0-2 None Seen, 0-2 /HPF    WBC, UA 0-2 None Seen, 0-2 /HPF    Bacteria, UA None Seen None Seen, Trace /HPF    Squamous Epithelial Cells, UA 0-2 None Seen, 0-2 /HPF    Hyaline Casts, UA 0-6 0 - 6 /LPF    Methodology Automated Microscopy    POC Troponin, Rapid    Collection Time: 04/03/19  6:00 PM   Result Value Ref Range    Troponin I 0.00 0.00 - 0.07 ng/mL   POC Glucose Once    Collection Time: 04/03/19  6:02 PM   Result Value Ref Range    Glucose 562 (C) 70 - 130 mg/dL   POC Glucose Once    Collection Time: 04/03/19  8:20 PM   Result Value Ref Range    Glucose 530 (C) 70 - 130 mg/dL     Note: In addition to lab results from this visit, the labs listed above may include labs taken at another facility or during a different encounter within the last 24 hours. Please correlate lab times with ED admission and discharge times for further clarification of the services performed during this visit.    XR Chest 1 View    (Results Pending)     Vitals:    04/03/19 1630 04/03/19 1700 04/03/19 2030 04/03/19 2100   BP: 136/97 135/83 151/91 138/97   Pulse: 99 97 89 87   Resp:       Temp:       SpO2: 96% 97% 98% 97%   Weight:       Height:         Medications   sodium chloride 0.9 % bolus 1,000 mL (0 mL Intravenous Stopped 4/3/19 1823)   insulin regular (humuLIN R,novoLIN R) injection 10 Units (10 Units Subcutaneous Given 4/3/19 1714)   metFORMIN (GLUCOPHAGE) tablet 1,000 mg (1,000 mg Oral Given 4/3/19 2058)     ECG/EMG Results (last 24 hours)     Procedure Component Value Units Date/Time    ECG 12 Lead [[id number]] Collected:  04/03/19 1758     Updated:   04/03/19 1806    ECG 12 Lead [[id number]] Collected:  04/03/19 1546     Updated:  04/03/19 1825    Narrative:       Test Reason : Weak/Dizzy/AMS protocol  Blood Pressure : **/** mmHG  Vent. Rate : 095 BPM     Atrial Rate : 095 BPM     P-R Int : 132 ms          QRS Dur : 092 ms      QT Int : 340 ms       P-R-T Axes : 043 014 070 degrees     QTc Int : 427 ms    ** Poor data quality, interpretation may be adversely affected  Normal sinus rhythm  Nonspecific ST abnormality  When compared with ECG of 09-SEP-2018 22:52,  No significant change was found    Confirmed by POLI ARAYA MD ([id number]) on 4/3/2019 6:25:04 PM    Referred By:  RENNY           Confirmed By:POLI ARAYA MD        ECG 12 Lead   Final Result   Test Reason : 2ND SET   Blood Pressure : **/** mmHG   Vent. Rate : 086 BPM     Atrial Rate : 086 BPM      P-R Int : 140 ms          QRS Dur : 094 ms       QT Int : 352 ms       P-R-T Axes : 036 021 051 degrees      QTc Int : 421 ms      Normal sinus rhythm   Normal ECG   When compared with ECG of 03-APR-2019 15:46, (Unconfirmed)   No significant change was found   Confirmed by LINDSAY GLEZ MD ([id number]) on 4/3/2019 6:46:04 PM      Referred By:  MAGDALENO COLBERT           Confirmed By:LINDSAY GLEZ MD      ECG 12 Lead   Final Result   Test Reason : Weak/Dizzy/AMS protocol   Blood Pressure : **/** mmHG   Vent. Rate : 095 BPM     Atrial Rate : 095 BPM      P-R Int : 132 ms          QRS Dur : 092 ms       QT Int : 340 ms       P-R-T Axes : 043 014 070 degrees      QTc Int : 427 ms      ** Poor data quality, interpretation may be adversely affected   Normal sinus rhythm   Nonspecific ST abnormality   When compared with ECG of 09-SEP-2018 22:52,   No significant change was found      Confirmed by POLI ARAYA MD ([id number]) on 4/3/2019 6:25:04 PM      Referred By:  RENNY           Confirmed By:POLI ARAYA MD                        [facility]    Final diagnoses:   Uncontrolled type 2 diabetes mellitus with hyperglycemia (CMS/HCC)   Renal  insufficiency       Documentation assistance provided by aga Patton.  Information recorded by the scribe was done at my direction and has been verified and validated by me.     Gladys Patton  04/03/19 1725       Gladys Patton  04/03/19 1838       Nael Guerrero MD  04/04/19 0028

## 2019-04-04 ENCOUNTER — OFFICE VISIT (OUTPATIENT)
Dept: FAMILY MEDICINE CLINIC | Facility: CLINIC | Age: 54
End: 2019-04-04

## 2019-04-04 VITALS
TEMPERATURE: 97.9 F | HEIGHT: 67 IN | SYSTOLIC BLOOD PRESSURE: 142 MMHG | HEART RATE: 90 BPM | WEIGHT: 248 LBS | OXYGEN SATURATION: 98 % | DIASTOLIC BLOOD PRESSURE: 90 MMHG | BODY MASS INDEX: 38.92 KG/M2

## 2019-04-04 DIAGNOSIS — H60.63 CHRONIC OTITIS EXTERNA OF BOTH EARS, UNSPECIFIED TYPE: ICD-10-CM

## 2019-04-04 DIAGNOSIS — E11.65 UNCONTROLLED TYPE 2 DIABETES MELLITUS WITH HYPERGLYCEMIA (HCC): Primary | ICD-10-CM

## 2019-04-04 DIAGNOSIS — E11.9 TYPE 2 DIABETES MELLITUS WITHOUT COMPLICATION, WITHOUT LONG-TERM CURRENT USE OF INSULIN (HCC): ICD-10-CM

## 2019-04-04 DIAGNOSIS — E66.01 CLASS 3 SEVERE OBESITY WITHOUT SERIOUS COMORBIDITY WITH BODY MASS INDEX (BMI) OF 40.0 TO 44.9 IN ADULT, UNSPECIFIED OBESITY TYPE (HCC): ICD-10-CM

## 2019-04-04 DIAGNOSIS — E11.42 DIABETIC PERIPHERAL NEUROPATHY (HCC): ICD-10-CM

## 2019-04-04 LAB — GLUCOSE BLDC GLUCOMTR-MCNC: >599 MG/DL (ref 70–130)

## 2019-04-04 PROCEDURE — 99214 OFFICE O/P EST MOD 30 MIN: CPT | Performed by: FAMILY MEDICINE

## 2019-04-04 RX ORDER — METFORMIN HYDROCHLORIDE 500 MG/1
500 TABLET, EXTENDED RELEASE ORAL
Qty: 60 TABLET | Refills: 5 | Status: SHIPPED | OUTPATIENT
Start: 2019-04-04 | End: 2019-10-23 | Stop reason: SDUPTHER

## 2019-04-04 NOTE — PROGRESS NOTES
Subjective   Marcela Ramírez is a 53 y.o. female    Chief Complaint    Diabetes mellitus uncontrolled    History of Present Illness  Patient presents today complaining of uncontrolled diabetes.  She has been feeling ill for a couple of weeks now and has had recurring ear infections.  She has a history of type 2 diabetes mellitus but had stopped taking her metformin and has never been formally educated about her diabetes.  Her blood sugar in the emergency department last night was greater than 600.  She was given an injection of insulin and 1000 mg of oral metformin.  She is scheduled to start diabetes education sometime within the next week or so.  I have recommended she get a glucose monitor and begin monitoring her blood sugars closely.  I written a prescription for this.  I have also written a prescription for metformin 500 mg twice a day.  I will see her back in a couple of weeks.  She does not feel like she could work and so she will be given an excuse for work until I see her back.    The following portions of the patient's history were reviewed and updated as appropriate: allergies, current medications, past social history and problem list    Review of Systems   Constitutional: Negative for appetite change, diaphoresis, fatigue and unexpected weight change.   HENT: Negative.    Eyes: Negative for visual disturbance.   Respiratory: Negative for cough, chest tightness and shortness of breath.    Cardiovascular: Negative for chest pain, palpitations and leg swelling.   Gastrointestinal: Negative for diarrhea, nausea and vomiting.   Endocrine: Negative for polydipsia, polyphagia and polyuria.   Genitourinary: Negative.    Musculoskeletal: Negative.    Skin: Negative for color change and rash.   Neurological: Negative for dizziness, syncope, weakness, light-headedness, numbness and headaches.   Hematological: Negative.    Psychiatric/Behavioral: Negative.        Objective     Vitals:    04/04/19 0818   BP:  142/90   Pulse: 90   Temp: 97.9 °F (36.6 °C)   SpO2: 98%       Physical Exam   Constitutional: She is oriented to person, place, and time. She appears well-developed and well-nourished.   Neck: Neck supple. No JVD present. No thyromegaly present.   Cardiovascular: Normal rate, regular rhythm, normal heart sounds, intact distal pulses and normal pulses.   No murmur heard.  Pulmonary/Chest: Effort normal and breath sounds normal. No respiratory distress.   Abdominal: Soft. Bowel sounds are normal. There is no hepatosplenomegaly. There is no tenderness.   Musculoskeletal: She exhibits no edema.   Lymphadenopathy:     She has no cervical adenopathy.   Neurological: She is alert and oriented to person, place, and time. No sensory deficit.   Skin: Skin is warm and dry. She is not diaphoretic.   Psychiatric: She has a normal mood and affect. Her behavior is normal.   Nursing note and vitals reviewed.      Assessment/Plan   Problem List Items Addressed This Visit        Endocrine    Diabetic peripheral neuropathy (CMS/HCC)    Relevant Medications    metFORMIN ER (GLUCOPHAGE-XR) 500 MG 24 hr tablet    Diabetes mellitus (CMS/HCC)    Relevant Medications    metFORMIN ER (GLUCOPHAGE-XR) 500 MG 24 hr tablet      Other Visit Diagnoses     Uncontrolled type 2 diabetes mellitus with hyperglycemia (CMS/HCC)    -  Primary    Relevant Medications    metFORMIN ER (GLUCOPHAGE-XR) 500 MG 24 hr tablet    Class 3 severe obesity without serious comorbidity with body mass index (BMI) of 40.0 to 44.9 in adult, unspecified obesity type (CMS/HCC)        Chronic otitis externa of both ears, unspecified type

## 2019-04-04 NOTE — DISCHARGE INSTRUCTIONS
Drink lots of fluids as your high blood sugar makes you dehydrated.    See Dr. Cabezas tomorrow morning as planned.  You will obviously need to discuss further diabetes care; you may very well need to be started on insulin.

## 2019-04-08 ENCOUNTER — HOSPITAL ENCOUNTER (EMERGENCY)
Facility: HOSPITAL | Age: 54
Discharge: HOME | End: 2019-04-09
Attending: EMERGENCY MEDICINE | Admitting: EMERGENCY MEDICINE

## 2019-04-08 ENCOUNTER — APPOINTMENT (OUTPATIENT)
Dept: GENERAL RADIOLOGY | Facility: HOSPITAL | Age: 54
End: 2019-04-08

## 2019-04-08 ENCOUNTER — TELEPHONE (OUTPATIENT)
Dept: FAMILY MEDICINE CLINIC | Facility: CLINIC | Age: 54
End: 2019-04-08

## 2019-04-08 DIAGNOSIS — R73.9 HYPERGLYCEMIA: ICD-10-CM

## 2019-04-08 DIAGNOSIS — R11.2 NON-INTRACTABLE VOMITING WITH NAUSEA, UNSPECIFIED VOMITING TYPE: ICD-10-CM

## 2019-04-08 DIAGNOSIS — K76.0 FATTY LIVER: Primary | ICD-10-CM

## 2019-04-08 DIAGNOSIS — E86.0 DEHYDRATION: ICD-10-CM

## 2019-04-08 DIAGNOSIS — R10.13 EPIGASTRIC PAIN: ICD-10-CM

## 2019-04-08 DIAGNOSIS — R93.5 ABNORMAL COMPUTED TOMOGRAPHY ANGIOGRAPHY (CTA) OF ABDOMEN AND PELVIS: ICD-10-CM

## 2019-04-08 PROCEDURE — 84484 ASSAY OF TROPONIN QUANT: CPT

## 2019-04-08 PROCEDURE — 80053 COMPREHEN METABOLIC PANEL: CPT | Performed by: EMERGENCY MEDICINE

## 2019-04-08 PROCEDURE — 93005 ELECTROCARDIOGRAM TRACING: CPT | Performed by: EMERGENCY MEDICINE

## 2019-04-08 PROCEDURE — 83690 ASSAY OF LIPASE: CPT | Performed by: EMERGENCY MEDICINE

## 2019-04-08 PROCEDURE — 85025 COMPLETE CBC W/AUTO DIFF WBC: CPT | Performed by: EMERGENCY MEDICINE

## 2019-04-08 PROCEDURE — 99284 EMERGENCY DEPT VISIT MOD MDM: CPT

## 2019-04-08 RX ORDER — ONDANSETRON 2 MG/ML
4 INJECTION INTRAMUSCULAR; INTRAVENOUS ONCE
Status: COMPLETED | OUTPATIENT
Start: 2019-04-08 | End: 2019-04-09

## 2019-04-08 RX ORDER — SODIUM CHLORIDE 0.9 % (FLUSH) 0.9 %
10 SYRINGE (ML) INJECTION AS NEEDED
Status: DISCONTINUED | OUTPATIENT
Start: 2019-04-08 | End: 2019-04-09 | Stop reason: HOSPADM

## 2019-04-08 NOTE — TELEPHONE ENCOUNTER
----- Message from Alejandra Fagn sent at 4/8/2019  3:32 PM EDT -----  Contact: PT.  PT. SEE'S DR. RUDOLPH  PT. WENT TO ER ON Wednesday.  PT. STATED HER BLOOD SUGAR MONITOR RESULTS WERE LOW; THEN SPIKED IN AFTERNOON TO:  387 .  WHAT IS NEXT PLAN OF CARE?    PT. CAN BE REACHED @ [facility] #.

## 2019-04-09 ENCOUNTER — APPOINTMENT (OUTPATIENT)
Dept: GENERAL RADIOLOGY | Facility: HOSPITAL | Age: 54
End: 2019-04-09

## 2019-04-09 ENCOUNTER — APPOINTMENT (OUTPATIENT)
Dept: CT IMAGING | Facility: HOSPITAL | Age: 54
End: 2019-04-09

## 2019-04-09 VITALS
HEART RATE: 72 BPM | WEIGHT: 242 LBS | TEMPERATURE: 98.3 F | DIASTOLIC BLOOD PRESSURE: 74 MMHG | BODY MASS INDEX: 37.98 KG/M2 | SYSTOLIC BLOOD PRESSURE: 115 MMHG | OXYGEN SATURATION: 95 % | RESPIRATION RATE: 15 BRPM | HEIGHT: 67 IN

## 2019-04-09 DIAGNOSIS — F41.9 ANXIETY: ICD-10-CM

## 2019-04-09 DIAGNOSIS — F51.04 PSYCHOPHYSIOLOGICAL INSOMNIA: ICD-10-CM

## 2019-04-09 LAB
ALBUMIN SERPL-MCNC: 3.7 G/DL (ref 3.5–5.2)
ALBUMIN/GLOB SERPL: 1.2 G/DL
ALP SERPL-CCNC: 142 U/L (ref 39–117)
ALT SERPL W P-5'-P-CCNC: 51 U/L (ref 1–33)
ANION GAP SERPL CALCULATED.3IONS-SCNC: 13 MMOL/L
AST SERPL-CCNC: 31 U/L (ref 1–32)
BASOPHILS # BLD AUTO: 0.03 10*3/MM3 (ref 0–0.2)
BASOPHILS NFR BLD AUTO: 0.4 % (ref 0–1.5)
BILIRUB SERPL-MCNC: 0.3 MG/DL (ref 0.2–1.2)
BILIRUB UR QL STRIP: NEGATIVE
BUN BLD-MCNC: 20 MG/DL (ref 6–20)
BUN/CREAT SERPL: 22.2 (ref 7–25)
CALCIUM SPEC-SCNC: 8.7 MG/DL (ref 8.6–10.5)
CHLORIDE SERPL-SCNC: 94 MMOL/L (ref 98–107)
CLARITY UR: CLEAR
CO2 SERPL-SCNC: 28 MMOL/L (ref 22–29)
COLOR UR: YELLOW
CREAT BLD-MCNC: 0.9 MG/DL (ref 0.57–1)
DEPRECATED RDW RBC AUTO: 41.3 FL (ref 37–54)
EOSINOPHIL # BLD AUTO: 0.13 10*3/MM3 (ref 0–0.4)
EOSINOPHIL NFR BLD AUTO: 1.9 % (ref 0.3–6.2)
ERYTHROCYTE [DISTWIDTH] IN BLOOD BY AUTOMATED COUNT: 12.5 % (ref 12.3–15.4)
GFR SERPL CREATININE-BSD FRML MDRD: 66 ML/MIN/1.73
GLOBULIN UR ELPH-MCNC: 3.2 GM/DL
GLUCOSE BLD-MCNC: 429 MG/DL (ref 65–99)
GLUCOSE BLDC GLUCOMTR-MCNC: 259 MG/DL (ref 70–130)
GLUCOSE UR STRIP-MCNC: ABNORMAL MG/DL
HCT VFR BLD AUTO: 35.7 % (ref 34–46.6)
HGB BLD-MCNC: 12.3 G/DL (ref 12–15.9)
HGB UR QL STRIP.AUTO: NEGATIVE
HOLD SPECIMEN: NORMAL
HOLD SPECIMEN: NORMAL
IMM GRANULOCYTES # BLD AUTO: 0.02 10*3/MM3 (ref 0–0.05)
IMM GRANULOCYTES NFR BLD AUTO: 0.3 % (ref 0–0.5)
KETONES UR QL STRIP: NEGATIVE
LEUKOCYTE ESTERASE UR QL STRIP.AUTO: NEGATIVE
LIPASE SERPL-CCNC: 39 U/L (ref 13–60)
LYMPHOCYTES # BLD AUTO: 2.34 10*3/MM3 (ref 0.7–3.1)
LYMPHOCYTES NFR BLD AUTO: 34.5 % (ref 19.6–45.3)
MCH RBC QN AUTO: 31.1 PG (ref 26.6–33)
MCHC RBC AUTO-ENTMCNC: 34.5 G/DL (ref 31.5–35.7)
MCV RBC AUTO: 90.2 FL (ref 79–97)
MONOCYTES # BLD AUTO: 0.38 10*3/MM3 (ref 0.1–0.9)
MONOCYTES NFR BLD AUTO: 5.6 % (ref 5–12)
NEUTROPHILS # BLD AUTO: 3.88 10*3/MM3 (ref 1.4–7)
NEUTROPHILS NFR BLD AUTO: 57.3 % (ref 42.7–76)
NITRITE UR QL STRIP: NEGATIVE
PH UR STRIP.AUTO: 7.5 [PH] (ref 5–8)
PLATELET # BLD AUTO: 179 10*3/MM3 (ref 140–450)
PMV BLD AUTO: 11 FL (ref 6–12)
POTASSIUM BLD-SCNC: 4 MMOL/L (ref 3.5–5.2)
PROT SERPL-MCNC: 6.9 G/DL (ref 6–8.5)
PROT UR QL STRIP: NEGATIVE
RBC # BLD AUTO: 3.96 10*6/MM3 (ref 3.77–5.28)
SODIUM BLD-SCNC: 135 MMOL/L (ref 136–145)
SP GR UR STRIP: 1.03 (ref 1–1.03)
TROPONIN I SERPL-MCNC: 0 NG/ML (ref 0–0.07)
UROBILINOGEN UR QL STRIP: ABNORMAL
WBC NRBC COR # BLD: 6.78 10*3/MM3 (ref 3.4–10.8)
WHOLE BLOOD HOLD SPECIMEN: NORMAL
WHOLE BLOOD HOLD SPECIMEN: NORMAL

## 2019-04-09 PROCEDURE — 74176 CT ABD & PELVIS W/O CONTRAST: CPT

## 2019-04-09 PROCEDURE — 96374 THER/PROPH/DIAG INJ IV PUSH: CPT

## 2019-04-09 PROCEDURE — 25010000002 PROMETHAZINE PER 50 MG: Performed by: EMERGENCY MEDICINE

## 2019-04-09 PROCEDURE — 96375 TX/PRO/DX INJ NEW DRUG ADDON: CPT

## 2019-04-09 PROCEDURE — 25010000002 ONDANSETRON PER 1 MG: Performed by: EMERGENCY MEDICINE

## 2019-04-09 PROCEDURE — 71045 X-RAY EXAM CHEST 1 VIEW: CPT

## 2019-04-09 PROCEDURE — 63710000001 INSULIN REGULAR HUMAN PER 5 UNITS: Performed by: EMERGENCY MEDICINE

## 2019-04-09 PROCEDURE — 81003 URINALYSIS AUTO W/O SCOPE: CPT | Performed by: EMERGENCY MEDICINE

## 2019-04-09 PROCEDURE — 82962 GLUCOSE BLOOD TEST: CPT

## 2019-04-09 RX ORDER — ONDANSETRON 2 MG/ML
4 INJECTION INTRAMUSCULAR; INTRAVENOUS ONCE
Status: DISCONTINUED | OUTPATIENT
Start: 2019-04-09 | End: 2019-04-09

## 2019-04-09 RX ORDER — PROMETHAZINE HYDROCHLORIDE 25 MG/ML
12.5 INJECTION, SOLUTION INTRAMUSCULAR; INTRAVENOUS ONCE
Status: COMPLETED | OUTPATIENT
Start: 2019-04-09 | End: 2019-04-09

## 2019-04-09 RX ORDER — FLUCONAZOLE 150 MG/1
150 TABLET ORAL ONCE
Qty: 1 TABLET | Refills: 1 | OUTPATIENT
Start: 2019-04-09 | End: 2019-04-11 | Stop reason: HOSPADM

## 2019-04-09 RX ORDER — PROMETHAZINE HYDROCHLORIDE 25 MG/1
25 TABLET ORAL EVERY 6 HOURS PRN
Qty: 8 TABLET | Refills: 0 | Status: SHIPPED | OUTPATIENT
Start: 2019-04-09 | End: 2019-08-21

## 2019-04-09 RX ADMIN — SODIUM CHLORIDE 1000 ML: 9 INJECTION, SOLUTION INTRAVENOUS at 00:16

## 2019-04-09 RX ADMIN — SODIUM CHLORIDE 1000 ML: 9 INJECTION, SOLUTION INTRAVENOUS at 01:18

## 2019-04-09 RX ADMIN — ONDANSETRON 4 MG: 2 INJECTION INTRAMUSCULAR; INTRAVENOUS at 00:14

## 2019-04-09 RX ADMIN — INSULIN HUMAN 8 UNITS: 100 INJECTION, SOLUTION PARENTERAL at 01:25

## 2019-04-09 RX ADMIN — PROMETHAZINE HYDROCHLORIDE 12.5 MG: 25 INJECTION INTRAMUSCULAR; INTRAVENOUS at 01:20

## 2019-04-09 NOTE — TELEPHONE ENCOUNTER
Pt went to ER last night and they did some testing including a CT of her ABD and esophagus?  She was put on an ABX, and wants to know if she can get some diflucan? Also wanted you to know she has an upcoming FU with you I think she said the 18th.

## 2019-04-09 NOTE — TELEPHONE ENCOUNTER
Patient is on metformin so it is unlikely that she is having any kind of low blood sugar associated with medication.  She may need instruction in how to use her glucometer.

## 2019-04-09 NOTE — DISCHARGE INSTRUCTIONS
Your CT scan did not show any significant acute pathology.  There are some nonspecific changes seen in your epigastric region.  Although I do not highly suspect malignancy you should follow-up with your gastroenterologist to further exclude this.

## 2019-04-09 NOTE — ED PROVIDER NOTES
"Subjective   Ms. Marcela Ramírez is a 53 y.o. female who presents to the ED with complaints of vomiting onset approximately one week ago. Patient reports she was evaluated in this ED on 04/03 for nausea, vomiting, and weakness, and diagnosed with uncontrolled DMII with hyperglycemia. She states prior to last week she had not been officially diagnosed with DM and had not been told to \"pay attention to it.\" One day post discharge she followed up with her PCP and has been started on a DM management plan including Metformin. She notes her lowest fasting glucose has been 387. Since discharge, the nausea, vomiting, and weakness have returned and progressively worsened. She also complains of frequent hard stools, epigastric abdominal pain secondary to frequent vomiting, decreased appetite secondary to nausea and vomiting. She denies fever, shortness of breath, chest pain, chills, dysuria, diarrhea, and any other acute symptoms at this time. Past medical history significant for peripheral neuropathy, GERD, asthma, colon polyp, depression, diverticulosis, TIA, bilateral ovarian cyst, cholecystectomy,  Morbid obesity, colonoscopy, hysterectomy, ACL repair.      Patient is scheduled for gastric sleeve surgery in July of this year.             History provided by:  Patient  Vomiting   The primary symptoms include abdominal pain, nausea and vomiting. Primary symptoms do not include fever or diarrhea. The illness began today. The onset was sudden.   The illness is also significant for constipation (hard stools). The illness does not include chills.       Review of Systems   Constitutional: Negative for chills and fever.   Respiratory: Negative for shortness of breath.    Gastrointestinal: Positive for abdominal pain, constipation (hard stools), nausea and vomiting. Negative for diarrhea.   Neurological: Positive for weakness (generalized).   All other systems reviewed and are negative.      Past Medical History:   Diagnosis " Date   • Arthritis     knees, otc arthritis meds prn, no h/o injections.    • Asthma     has not required inhaler   • Bilateral ovarian cysts    • Bipolar 1 disorder (CMS/[facility])    • Boil     opens them herself   • Carpal tunnel syndrome    • Colon polyp    • Depression    • Diabetes mellitus (CMS/[facility])     Diagnosed 2017, was on metformin in the past. Last A1C 7%   • Diverticulosis    • Fatigue    • GERD (gastroesophageal reflux disease)     controlled with omeprazole 20mg. Remote EGD- esophagitis. No h/o HH or h pylori.    • Headache    • History of blood transfusion     2/2 heavy menses   • Hyperlipidemia    • Hypertension    • Lower extremity edema    • Morbid obesity with BMI of 40.0-44.9, adult (CMS/[facility])    • Nausea     wtih taking meds, avoid this by taking meds wtih milk   • Peripheral neuropathy     2/2 DM   • RLS (restless legs syndrome)    • S/P cholecystectomy     2/2 cholelithiasis   • Shortness of breath on exertion    • TIA (transient ischemic attack)     patient states 8-9 episodes of right sided drooping/ weakness/ vision changes. Workup was negative for CVA- thought to be related to anxiety. last episode 12/2017       Allergies   Allergen Reactions   • Contrast Dye Swelling     Nasal congestion/ snot, IV contrast only       Past Surgical History:   Procedure Laterality Date   • COLONOSCOPY  remote    diverticulosis   • ENDOSCOPY  remote    esophagitis    • KNEE ACL RECONSTRUCTION Right 2013    with miniscal repair   • LAPAROSCOPIC CHOLECYSTECTOMY  2001    cholelithiasis   • LAPAROSCOPIC HYSTERECTOMY  2013    right ovary remains       Family History   Problem Relation Age of Onset   • Arthritis Mother    • Arthritis Father    • Diabetes Father    • Hyperlipidemia Father    • Hypertension Father    • Asthma Brother    • Other Brother    • Cancer Maternal Aunt    • Depression Paternal Uncle        Social History     Socioeconomic History   • Marital status:      Spouse name: Not on file   •  Number of children: Not on file   • Years of education: Not on file   • Highest education level: Not on file   Tobacco Use   • Smoking status: Never Smoker   • Smokeless tobacco: Never Used   Substance and Sexual Activity   • Alcohol use: No     Frequency: Never   • Drug use: Yes     Types: Marijuana     Comment: helps with marijauna   • Sexual activity: Defer     Comment: no hormones   Social History Narrative    Lives in [place] with boyfriend, daughter and her boyfriend and granddaughter and stepson.          Objective   Physical Exam   Constitutional: She is oriented to person, place, and time. She appears well-developed and well-nourished. No distress.   HENT:   Head: Normocephalic and atraumatic.   Right Ear: External ear normal.   Left Ear: External ear normal.   Nose: Nose normal.   Eyes: Conjunctivae are normal. No scleral icterus.   Neck: Normal range of motion.   Cardiovascular: Normal rate, regular rhythm and normal heart sounds. Exam reveals no gallop and no friction rub.   No murmur heard.  Pulmonary/Chest: Effort normal and breath sounds normal. No respiratory distress. She has no wheezes. She has no rales. She exhibits no tenderness.   Abdominal: Soft. Bowel sounds are normal. There is tenderness (Moderate epigastric tenderness to palpation.   ) in the epigastric area. There is no rebound and no guarding.   Musculoskeletal: Normal range of motion. She exhibits no edema (no peripheral edema, equal calf size).   Neurological: She is alert and oriented to person, place, and time.   Skin: Skin is warm and dry. She is not diaphoretic.   Psychiatric: She has a normal mood and affect. Her behavior is normal.   Nursing note and vitals reviewed.      Procedures         ED Course     .  Recent Results (from the past 24 hour(s))   Comprehensive Metabolic Panel    Collection Time: 04/08/19 11:46 PM   Result Value Ref Range    Glucose 429 (C) 65 - 99 mg/dL    BUN 20 6 - 20 mg/dL    Creatinine 0.90 0.57 -  1.00 mg/dL    Sodium 135 (L) 136 - 145 mmol/L    Potassium 4.0 3.5 - 5.2 mmol/L    Chloride 94 (L) 98 - 107 mmol/L    CO2 28.0 22.0 - 29.0 mmol/L    Calcium 8.7 8.6 - 10.5 mg/dL    Total Protein 6.9 6.0 - 8.5 g/dL    Albumin 3.70 3.50 - 5.20 g/dL    ALT (SGPT) 51 (H) 1 - 33 U/L    AST (SGOT) 31 1 - 32 U/L    Alkaline Phosphatase 142 (H) 39 - 117 U/L    Total Bilirubin 0.3 0.2 - 1.2 mg/dL    eGFR Non African Amer 66 >60 mL/min/1.73    Globulin 3.2 gm/dL    A/G Ratio 1.2 g/dL    BUN/Creatinine Ratio 22.2 7.0 - 25.0    Anion Gap 13.0 mmol/L   Lipase    Collection Time: 04/08/19 11:46 PM   Result Value Ref Range    Lipase 39 13 - 60 U/L   Light Blue Top    Collection Time: 04/08/19 11:46 PM   Result Value Ref Range    Extra Tube hold for add-on    Green Top (Gel)    Collection Time: 04/08/19 11:46 PM   Result Value Ref Range    Extra Tube Hold for add-ons.    Lavender Top    Collection Time: 04/08/19 11:46 PM   Result Value Ref Range    Extra Tube hold for add-on    Gold Top - SST    Collection Time: 04/08/19 11:46 PM   Result Value Ref Range    Extra Tube Hold for add-ons.    CBC Auto Differential    Collection Time: 04/08/19 11:46 PM   Result Value Ref Range    WBC 6.78 3.40 - 10.80 10*3/mm3    RBC 3.96 3.77 - 5.28 10*6/mm3    Hemoglobin 12.3 12.0 - 15.9 g/dL    Hematocrit 35.7 34.0 - 46.6 %    MCV 90.2 79.0 - 97.0 fL    MCH 31.1 26.6 - 33.0 pg    MCHC 34.5 31.5 - 35.7 g/dL    RDW 12.5 12.3 - 15.4 %    RDW-SD 41.3 37.0 - 54.0 fl    MPV 11.0 6.0 - 12.0 fL    Platelets 179 140 - 450 10*3/mm3    Neutrophil % 57.3 42.7 - 76.0 %    Lymphocyte % 34.5 19.6 - 45.3 %    Monocyte % 5.6 5.0 - 12.0 %    Eosinophil % 1.9 0.3 - 6.2 %    Basophil % 0.4 0.0 - 1.5 %    Immature Grans % 0.3 0.0 - 0.5 %    Neutrophils, Absolute 3.88 1.40 - 7.00 10*3/mm3    Lymphocytes, Absolute 2.34 0.70 - 3.10 10*3/mm3    Monocytes, Absolute 0.38 0.10 - 0.90 10*3/mm3    Eosinophils, Absolute 0.13 0.00 - 0.40 10*3/mm3    Basophils, Absolute 0.03 0.00 -  0.20 10*3/mm3    Immature Grans, Absolute 0.02 0.00 - 0.05 10*3/mm3   POC Troponin, Rapid    Collection Time: 04/08/19 11:51 PM   Result Value Ref Range    Troponin I 0.00 0.00 - 0.07 ng/mL   Urinalysis With Microscopic If Indicated (No Culture) - Urine, Clean Catch    Collection Time: 04/09/19 12:26 AM   Result Value Ref Range    Color, UA Yellow Yellow, Straw    Appearance, UA Clear Clear    pH, UA 7.5 5.0 - 8.0    Specific Gravity, UA 1.032 (H) 1.001 - 1.030    Glucose, UA >=1000 mg/dL (3+) (A) Negative    Ketones, UA Negative Negative    Bilirubin, UA Negative Negative    Blood, UA Negative Negative    Protein, UA Negative Negative    Leuk Esterase, UA Negative Negative    Nitrite, UA Negative Negative    Urobilinogen, UA 0.2 E.U./dL 0.2 - 1.0 E.U./dL   POC Glucose Once    Collection Time: 04/09/19  3:00 AM   Result Value Ref Range    Glucose 259 (H) 70 - 130 mg/dL     Note: In addition to lab results from this visit, the labs listed above may include labs taken at another facility or during a different encounter within the last 24 hours. Please correlate lab times with ED admission and discharge times for further clarification of the services performed during this visit.    XR Chest 1 View   Final Result   Mild cardiac silhouette enlargement, otherwise no active disease      Signer Name: Hue Farley MD    Signed: 4/9/2019 12:53 AM    Workstation Name: [email or web address]          CT Abdomen Pelvis Without Contrast   Final Result      1. Mild diffuse fatty infiltration liver.   2. There is fat stranding at the root of the mesentery which is new from prior study. This is a nonspecific finding which can be associated with areas infectious, inflammatory, or neoplastic diseases. On previous CT abdomen pelvis from 3/26/2019, concern   was raised for possible abnormal gastric wall thickening. Please correlate with any endoscopy results in the interval since malignancy is in the differential. Please correlate for any clinical  concern for pancreatitis or mesenteric adenitis.   3. Postcholecystectomy.   4. Post hysterectomy.   5. The appendix is radiographically normal. There is no evidence for bowel obstruction free air or drainable fluid collection.            Signer Name: Hue Farley MD    Signed: 4/9/2019 12:43 AM    Workstation Name: [email or web address]            Vitals:    04/09/19 0122 04/09/19 0130 04/09/19 0200 04/09/19 0230   BP:  160/87 133/70 140/79   Pulse: 76 72 69 70   Resp:       Temp:       TempSrc:       SpO2: 96% 97% 94% 94%   Weight:       Height:         Medications   sodium chloride 0.9 % flush 10 mL (not administered)   sodium chloride 0.9 % bolus 1,000 mL (0 mL Intravenous Stopped 4/9/19 0116)   ondansetron (ZOFRAN) injection 4 mg (4 mg Intravenous Given 4/9/19 0014)   insulin regular (humuLIN R,novoLIN R) injection 8 Units (8 Units Intravenous Given 4/9/19 0125)   sodium chloride 0.9 % bolus 1,000 mL (0 mL Intravenous Stopped 4/9/19 0319)   promethazine (PHENERGAN) injection 12.5 mg (12.5 mg Intravenous Given 4/9/19 0120)     ECG/EMG Results (last 24 hours)     Procedure Component Value Units Date/Time    ECG 12 Lead [[id number]] Collected:  04/08/19 2334     Updated:  04/08/19 2333        ECG 12 Lead           Pt feels much better following treatment and is comfortable with discharge at this time.                MDM    Final diagnoses:   Fatty liver   Abnormal computed tomography angiography (CTA) of abdomen and pelvis   Non-intractable vomiting with nausea, unspecified vomiting type   Hyperglycemia   Epigastric pain   Dehydration       Documentation assistance provided by aga Oconnor.  Information recorded by the scribe was done at my direction and has been verified and validated by me.     Vinod Oconnor  04/09/19 0000       Vinod Oconnor  04/09/19 0338       Gurmeet Lopez DO  04/11/19 1116

## 2019-04-10 ENCOUNTER — APPOINTMENT (OUTPATIENT)
Dept: GENERAL RADIOLOGY | Facility: HOSPITAL | Age: 54
End: 2019-04-10

## 2019-04-10 ENCOUNTER — HOSPITAL ENCOUNTER (EMERGENCY)
Facility: HOSPITAL | Age: 54
Discharge: HOME | End: 2019-04-11
Attending: EMERGENCY MEDICINE | Admitting: EMERGENCY MEDICINE

## 2019-04-10 DIAGNOSIS — R11.2 NAUSEA AND VOMITING, INTRACTABILITY OF VOMITING NOT SPECIFIED, UNSPECIFIED VOMITING TYPE: Primary | ICD-10-CM

## 2019-04-10 DIAGNOSIS — E86.0 DEHYDRATION: ICD-10-CM

## 2019-04-10 DIAGNOSIS — R73.9 HYPERGLYCEMIA: ICD-10-CM

## 2019-04-10 LAB
BASOPHILS # BLD AUTO: 0.03 10*3/MM3 (ref 0–0.2)
BASOPHILS NFR BLD AUTO: 0.4 % (ref 0–1.5)
BILIRUB UR QL STRIP: NEGATIVE
CLARITY UR: CLEAR
COLOR UR: YELLOW
DEPRECATED RDW RBC AUTO: 43 FL (ref 37–54)
EOSINOPHIL # BLD AUTO: 0.18 10*3/MM3 (ref 0–0.4)
EOSINOPHIL NFR BLD AUTO: 2.4 % (ref 0.3–6.2)
ERYTHROCYTE [DISTWIDTH] IN BLOOD BY AUTOMATED COUNT: 12.8 % (ref 12.3–15.4)
GLUCOSE UR STRIP-MCNC: ABNORMAL MG/DL
HCT VFR BLD AUTO: 36.2 % (ref 34–46.6)
HGB BLD-MCNC: 11.9 G/DL (ref 12–15.9)
HGB UR QL STRIP.AUTO: NEGATIVE
IMM GRANULOCYTES # BLD AUTO: 0.03 10*3/MM3 (ref 0–0.05)
IMM GRANULOCYTES NFR BLD AUTO: 0.4 % (ref 0–0.5)
KETONES UR QL STRIP: NEGATIVE
LEUKOCYTE ESTERASE UR QL STRIP.AUTO: NEGATIVE
LYMPHOCYTES # BLD AUTO: 2.54 10*3/MM3 (ref 0.7–3.1)
LYMPHOCYTES NFR BLD AUTO: 33.2 % (ref 19.6–45.3)
MCH RBC QN AUTO: 30.4 PG (ref 26.6–33)
MCHC RBC AUTO-ENTMCNC: 32.9 G/DL (ref 31.5–35.7)
MCV RBC AUTO: 92.6 FL (ref 79–97)
MONOCYTES # BLD AUTO: 0.39 10*3/MM3 (ref 0.1–0.9)
MONOCYTES NFR BLD AUTO: 5.1 % (ref 5–12)
NEUTROPHILS # BLD AUTO: 4.5 10*3/MM3 (ref 1.4–7)
NEUTROPHILS NFR BLD AUTO: 58.9 % (ref 42.7–76)
NITRITE UR QL STRIP: NEGATIVE
PH UR STRIP.AUTO: <=5 [PH] (ref 5–8)
PLATELET # BLD AUTO: 184 10*3/MM3 (ref 140–450)
PMV BLD AUTO: 11.3 FL (ref 6–12)
PROT UR QL STRIP: ABNORMAL
RBC # BLD AUTO: 3.91 10*6/MM3 (ref 3.77–5.28)
SP GR UR STRIP: 1.02 (ref 1–1.03)
UROBILINOGEN UR QL STRIP: ABNORMAL
WBC NRBC COR # BLD: 7.64 10*3/MM3 (ref 3.4–10.8)

## 2019-04-10 PROCEDURE — 83690 ASSAY OF LIPASE: CPT | Performed by: EMERGENCY MEDICINE

## 2019-04-10 PROCEDURE — 80053 COMPREHEN METABOLIC PANEL: CPT | Performed by: EMERGENCY MEDICINE

## 2019-04-10 PROCEDURE — 81003 URINALYSIS AUTO W/O SCOPE: CPT

## 2019-04-10 PROCEDURE — 99285 EMERGENCY DEPT VISIT HI MDM: CPT

## 2019-04-10 PROCEDURE — 71045 X-RAY EXAM CHEST 1 VIEW: CPT

## 2019-04-10 PROCEDURE — 85025 COMPLETE CBC W/AUTO DIFF WBC: CPT

## 2019-04-10 PROCEDURE — 84484 ASSAY OF TROPONIN QUANT: CPT

## 2019-04-10 PROCEDURE — 93005 ELECTROCARDIOGRAM TRACING: CPT

## 2019-04-10 PROCEDURE — 93005 ELECTROCARDIOGRAM TRACING: CPT | Performed by: EMERGENCY MEDICINE

## 2019-04-10 RX ORDER — SODIUM CHLORIDE 0.9 % (FLUSH) 0.9 %
10 SYRINGE (ML) INJECTION AS NEEDED
Status: DISCONTINUED | OUTPATIENT
Start: 2019-04-10 | End: 2019-04-11 | Stop reason: HOSPADM

## 2019-04-11 ENCOUNTER — OFFICE VISIT (OUTPATIENT)
Dept: GASTROENTEROLOGY | Facility: CLINIC | Age: 54
End: 2019-04-11

## 2019-04-11 ENCOUNTER — TELEPHONE (OUTPATIENT)
Dept: FAMILY MEDICINE CLINIC | Facility: CLINIC | Age: 54
End: 2019-04-11

## 2019-04-11 VITALS
HEART RATE: 71 BPM | WEIGHT: 253 LBS | SYSTOLIC BLOOD PRESSURE: 179 MMHG | DIASTOLIC BLOOD PRESSURE: 95 MMHG | BODY MASS INDEX: 39.63 KG/M2

## 2019-04-11 VITALS
HEART RATE: 62 BPM | DIASTOLIC BLOOD PRESSURE: 88 MMHG | BODY MASS INDEX: 38.45 KG/M2 | WEIGHT: 245 LBS | OXYGEN SATURATION: 95 % | SYSTOLIC BLOOD PRESSURE: 146 MMHG | RESPIRATION RATE: 16 BRPM | HEIGHT: 67 IN | TEMPERATURE: 98.2 F

## 2019-04-11 DIAGNOSIS — K92.0 HEMATEMESIS, PRESENCE OF NAUSEA NOT SPECIFIED: Primary | ICD-10-CM

## 2019-04-11 DIAGNOSIS — R10.84 GENERALIZED ABDOMINAL PAIN: ICD-10-CM

## 2019-04-11 DIAGNOSIS — R11.0 NAUSEA: Primary | ICD-10-CM

## 2019-04-11 DIAGNOSIS — R10.13 EPIGASTRIC PAIN: ICD-10-CM

## 2019-04-11 LAB
ALBUMIN SERPL-MCNC: 3.8 G/DL (ref 3.5–5.2)
ALBUMIN/GLOB SERPL: 1.4 G/DL
ALP SERPL-CCNC: 91 U/L (ref 39–117)
ALT SERPL W P-5'-P-CCNC: 57 U/L (ref 1–33)
ANION GAP SERPL CALCULATED.3IONS-SCNC: 11 MMOL/L
AST SERPL-CCNC: 32 U/L (ref 1–32)
BILIRUB SERPL-MCNC: 0.3 MG/DL (ref 0.2–1.2)
BUN BLD-MCNC: 13 MG/DL (ref 6–20)
BUN/CREAT SERPL: 12.6 (ref 7–25)
CALCIUM SPEC-SCNC: 9.2 MG/DL (ref 8.6–10.5)
CHLORIDE SERPL-SCNC: 94 MMOL/L (ref 98–107)
CO2 SERPL-SCNC: 30 MMOL/L (ref 22–29)
CREAT BLD-MCNC: 1.03 MG/DL (ref 0.57–1)
GFR SERPL CREATININE-BSD FRML MDRD: 56 ML/MIN/1.73
GLOBULIN UR ELPH-MCNC: 2.7 GM/DL
GLUCOSE BLD-MCNC: 286 MG/DL (ref 65–99)
HOLD SPECIMEN: NORMAL
HOLD SPECIMEN: NORMAL
LIPASE SERPL-CCNC: 27 U/L (ref 13–60)
POTASSIUM BLD-SCNC: 3.6 MMOL/L (ref 3.5–5.2)
PROT SERPL-MCNC: 6.5 G/DL (ref 6–8.5)
SODIUM BLD-SCNC: 135 MMOL/L (ref 136–145)
TROPONIN I SERPL-MCNC: 0 NG/ML (ref 0–0.07)
WHOLE BLOOD HOLD SPECIMEN: NORMAL
WHOLE BLOOD HOLD SPECIMEN: NORMAL

## 2019-04-11 PROCEDURE — 25010000002 ONDANSETRON PER 1 MG: Performed by: NURSE PRACTITIONER

## 2019-04-11 PROCEDURE — 96375 TX/PRO/DX INJ NEW DRUG ADDON: CPT

## 2019-04-11 PROCEDURE — 96374 THER/PROPH/DIAG INJ IV PUSH: CPT

## 2019-04-11 PROCEDURE — 99204 OFFICE O/P NEW MOD 45 MIN: CPT | Performed by: INTERNAL MEDICINE

## 2019-04-11 RX ORDER — ALPRAZOLAM 0.5 MG/1
TABLET ORAL
Qty: 30 TABLET | Refills: 1 | OUTPATIENT
Start: 2019-04-11 | End: 2019-05-28 | Stop reason: SDUPTHER

## 2019-04-11 RX ORDER — SODIUM CHLORIDE 0.9 % (FLUSH) 0.9 %
10 SYRINGE (ML) INJECTION AS NEEDED
Status: DISCONTINUED | OUTPATIENT
Start: 2019-04-11 | End: 2019-04-11 | Stop reason: HOSPADM

## 2019-04-11 RX ORDER — PANTOPRAZOLE SODIUM 40 MG/10ML
80 INJECTION, POWDER, LYOPHILIZED, FOR SOLUTION INTRAVENOUS ONCE
Status: COMPLETED | OUTPATIENT
Start: 2019-04-11 | End: 2019-04-11

## 2019-04-11 RX ORDER — ONDANSETRON 2 MG/ML
4 INJECTION INTRAMUSCULAR; INTRAVENOUS ONCE
Status: COMPLETED | OUTPATIENT
Start: 2019-04-11 | End: 2019-04-11

## 2019-04-11 RX ADMIN — ONDANSETRON 4 MG: 2 SOLUTION INTRAMUSCULAR; INTRAVENOUS at 00:52

## 2019-04-11 RX ADMIN — PANTOPRAZOLE SODIUM 80 MG: 40 INJECTION, POWDER, FOR SOLUTION INTRAVENOUS at 00:54

## 2019-04-11 RX ADMIN — SODIUM CHLORIDE 1000 ML: 9 INJECTION, SOLUTION INTRAVENOUS at 00:50

## 2019-04-11 NOTE — PROGRESS NOTES
PCP:  Sukhwinder Acosta MD Van Bussum, Robert Ritchie, MD  [address] [facility]    [place] [id number]    Chief Complaint   Patient presents with   • Vomiting     New patient    • Nausea   • Diarrhea   • Constipation        HPI   The patient comes in with difficulties with nausea and vomiting.  2 weeks ago the nausea began.  The vomiting started about 3 days ago.  She has had a small amount of hematemesis.  She has been to the emergency room on 3 occasions.  She has some epigastric pain.  She typically vomits up what sounds like bile.  She is being considered for gastric sleeve surgery.  There was some unusual vascularity seen in her stomach on upper endoscopy in February.  This was prior to her symptoms and performed by the bariatric surgeon.  She did have 2 CAT scans.  They seem to suggest thickness in the fundus of the stomach as well as some unusual stranding in the bruit of the mesentery.  She is post cholecystectomy.  She did have a history of stones.  She also has a history of diabetes.    Allergies   Allergen Reactions   • Contrast Dye Swelling     Nasal congestion/ snot, IV contrast only          Current Outpatient Medications:   •  ALPRAZolam (XANAX) 0.5 MG tablet, TAKE ONE TABLET BY MOUTH ONCE NIGHTLY AS NEEDED FOR ANXIETY, Disp: 30 tablet, Rfl: 1  •  aspirin 81 MG EC tablet, Take 1 tablet by mouth Daily., Disp: 30 tablet, Rfl: 0  •  atorvastatin (LIPITOR) 20 MG tablet, Take 1 tablet by mouth Daily., Disp: 30 tablet, Rfl: 11  •  desvenlafaxine (PRISTIQ) 50 MG 24 hr tablet, Take 50 mg by mouth Daily., Disp: , Rfl:   •  hydrochlorothiazide (HYDRODIURIL) 25 MG tablet, Take 1 tablet by mouth Daily., Disp: 30 tablet, Rfl: 2  •  ipratropium (ATROVENT HFA) 17 MCG/ACT inhaler, Inhale 2 puffs 4 (Four) Times a Day. (Patient taking differently: Inhale 2 puffs As Needed.), Disp: 1 inhaler, Rfl: 0  •  LamoTRIgine (LAMICTAL PO), Take 1 tablet by mouth Every Night. Pt is on 200mg, Disp: , Rfl:   •   lisinopril (PRINIVIL,ZESTRIL) 20 MG tablet, Take 1 tablet by mouth Daily. Pt is on 20mg, Disp: 30 tablet, Rfl: 0  •  LYRICA 50 MG capsule, TAKE ONE CAPSULE BY MOUTH EVERY NIGHT AT BEDTIME FOR 7 DAYS, THEN TWO CAPSULES EVERY NIGHT AT BEDTIME THEREAFTER, Disp: 60 capsule, Rfl: 4  •  metFORMIN ER (GLUCOPHAGE-XR) 500 MG 24 hr tablet, Take 1 tablet by mouth 2 (Two) Times a Day Before Meals., Disp: 60 tablet, Rfl: 5  •  nitroglycerin (NITROSTAT) 0.4 MG SL tablet, Place 1 tablet under the tongue Every 5 (Five) Minutes As Needed for Chest Pain. Take no more than 3 doses in 15 minutes., Disp: 100 tablet, Rfl: 0  •  ofloxacin (OCUFLOX) 0.3 % ophthalmic solution, Apply 1 drop to eye(s) as directed by provider 4 (Four) Times a Day., Disp: , Rfl:   •  omeprazole (PRILOSEC) 20 MG capsule, Take 1 capsule by mouth Daily., Disp: 30 capsule, Rfl: 2  •  ondansetron ODT (ZOFRAN-ODT) 4 MG disintegrating tablet, Take 1 tablet by mouth Every 6 (Six) Hours As Needed for Nausea or Vomiting., Disp: 30 tablet, Rfl: 1  •  pregabalin (LYRICA) 25 MG capsule, Take 25 mg by mouth 2 (Two) Times a Day., Disp: , Rfl:   •  promethazine (PHENERGAN) 25 MG tablet, Take 1 tablet by mouth Every 6 (Six) Hours As Needed for Nausea or Vomiting., Disp: 8 tablet, Rfl: 0  •  rOPINIRole (REQUIP) 2 MG tablet, Take 2 tablets by mouth Every Night., Disp: 60 tablet, Rfl: 11  No current facility-administered medications for this visit.      Past Medical History:   Diagnosis Date   • Arthritis     knees, otc arthritis meds prn, no h/o injections.    • Asthma     has not required inhaler   • Bilateral ovarian cysts    • Bipolar 1 disorder (CMS/[facility])    • Boil     opens them herself   • Carpal tunnel syndrome    • Colon polyp    • Depression    • Diabetes mellitus (CMS/[facility])     Diagnosed 2017, was on metformin in the past. Last A1C 7%   • Diverticulosis    • Fatigue    • GERD (gastroesophageal reflux disease)     controlled with omeprazole 20mg. Remote EGD- esophagitis.  No h/o HH or h pylori.    • Headache    • History of blood transfusion     2/2 heavy menses   • Hyperlipidemia    • Hypertension    • Lower extremity edema    • Morbid obesity with BMI of 40.0-44.9, adult (CMS/[facility]CC)    • Nausea     wtih taking meds, avoid this by taking meds wtih milk   • Peripheral neuropathy     2/2 DM   • RLS (restless legs syndrome)    • S/P cholecystectomy     2/2 cholelithiasis   • Shortness of breath on exertion    • TIA (transient ischemic attack)     patient states 8-9 episodes of right sided drooping/ weakness/ vision changes. Workup was negative for CVA- thought to be related to anxiety. last episode 12/2017       Past Surgical History:   Procedure Laterality Date   • COLONOSCOPY  remote    diverticulosis   • ENDOSCOPY  remote    esophagitis    • KNEE ACL RECONSTRUCTION Right 2013    with miniscal repair   • LAPAROSCOPIC CHOLECYSTECTOMY  2001    cholelithiasis   • LAPAROSCOPIC HYSTERECTOMY  2013    right ovary remains        Social History     Socioeconomic History   • Marital status:      Spouse name: Not on file   • Number of children: Not on file   • Years of education: Not on file   • Highest education level: Not on file   Tobacco Use   • Smoking status: Never Smoker   • Smokeless tobacco: Never Used   Substance and Sexual Activity   • Alcohol use: No     Frequency: Never   • Drug use: Yes     Types: Marijuana     Comment: helps with marijauna   • Sexual activity: Defer     Comment: no hormones   Social History Narrative    Lives in [facility] [place] [facility] with boyfriend, daughter and her boyfriend and granddaughter and stepson.         Family History   Problem Relation Age of Onset   • Arthritis Mother    • Arthritis Father    • Diabetes Father    • Hyperlipidemia Father    • Hypertension Father    • Asthma Brother    • Other Brother    • Cancer Maternal Aunt    • Depression Paternal Uncle         Review of Systems   Constitutional: Negative for unexpected weight loss.   HENT: Negative  for trouble swallowing.    Eyes: Negative.    Respiratory: Negative.    Gastrointestinal: Positive for diarrhea, nausea and vomiting. Negative for abdominal distention, abdominal pain, anal bleeding, blood in stool, constipation, rectal pain, GERD and indigestion.   Endocrine: Negative.    Genitourinary: Negative.    Musculoskeletal: Negative.    Skin: Negative.    Allergic/Immunologic: Negative.    Neurological: Negative.    Hematological: Negative.    Psychiatric/Behavioral: Negative.         Vitals:    04/11/19 1427   BP: 179/95   Pulse: 71        Physical Exam   General Appearance: Alert, in no acute distress   Head: Normocephalic, without obvious abnormality, atraumatic   Eyes: Lids and lashes normal, conjunctivae and sclerae normal, no icterus, no pallor, corneas clear, PERRLA   Ears: Ears appear intact with no abnormalities noted   Throat: No oral lesions, no thrush, oral mucosa moist   Neck: No adenopathy, supple, trachea midline, no thyromegaly, no JVD   Lungs: Clear to auscultation,respirations regular, even and unlabored Heart: Regular rhythm and normal rate, normal S1 and S2, no murmur, no gallop, no rub, no click   Chest Wall: Symmetrical respiratory expansion   Abdomen: Normal bowel sounds, no masses, no organomegaly, soft non-tender, non-distended, no guarding, no rebound tenderness   Extremities: Moves all extremities well, no edema, no cyanosis, no redness   Skin: No bleeding, bruising or rash   Neurologic: Cranial nerves 2 - 12 grossly intact, no focal deficits       Marcela was seen today for vomiting, nausea, diarrhea and constipation.    Diagnoses and all orders for this visit:    Nausea    Epigastric pain    Impressions and plan #1 nausea and epigastric pain with vomiting and a small amount of hematemesis: She needs a repeat upper endoscopy.  I need to evaluate this make sure we do not have peptic ulcer disease.  I need to evaluate the unusual vascularity mentioned on previous endoscopy  report.  She will continue her omeprazole at this point in time.  Her nausea started about the time of her CAT scan.  It is difficult for me to implicate the CAT scan as the cause.  I would think that any adverse effect from the CAT scan should have resolved by now.    Gilberto Mark MD

## 2019-04-11 NOTE — TELEPHONE ENCOUNTER
----- Message from Azalia Holloway sent at 4/10/2019  3:24 PM EDT -----  Patient said she is having stomach issues, and has seen Dr.Van Camacho for her gastro issues. She has been to the ER here at [facility] and was diagnosed with Gastroenteritis. This morning she spit up blood, and is having severe diarrhea. Patient wants you to call her at [phone]..regarding her current symptoms..  Thanks,  Azalia..

## 2019-04-11 NOTE — ED PROVIDER NOTES
Subjective   Ms. Marcela Ramírez is a 53 y.o. female who presents to the ED with c/o hematemesis. She reports she has been experiencing generalized abdominal cramping for the past 2 weeks. 2 days ago she developed vomiting and reports this morning she spit out some blood and has been having hematemesis throughout the day. She has had 5 episodes of vomiting today. She also notes she has observed an orange mucous substance on the toilet paper when she wipes after having bowel movements. She denies fever or chills. She does not have a gastroenterologist. There are no other acute symptoms at this time.          History provided by:  Patient  Vomiting   The primary symptoms include abdominal pain, nausea, vomiting and hematemesis. Primary symptoms do not include fever or diarrhea. The illness began yesterday. The onset was gradual. The problem has been gradually worsening.   The abdominal pain is generalized.   The hematemesis is not associated with weakness or dizziness.   The illness does not include chills.       Review of Systems   Constitutional: Negative for chills, diaphoresis and fever.   Respiratory: Negative for cough and shortness of breath.    Cardiovascular: Negative for chest pain.   Gastrointestinal: Positive for abdominal pain, hematemesis, nausea and vomiting. Negative for blood in stool and diarrhea.   Genitourinary: Negative for difficulty urinating, frequency and urgency.   Neurological: Negative for dizziness and weakness.   All other systems reviewed and are negative.      Past Medical History:   Diagnosis Date   • Arthritis     knees, otc arthritis meds prn, no h/o injections.    • Asthma     has not required inhaler   • Bilateral ovarian cysts    • Bipolar 1 disorder (CMS/HCC)    • Boil     opens them herself   • Carpal tunnel syndrome    • Colon polyp    • Depression    • Diabetes mellitus (CMS/HCC)     Diagnosed 2017, was on metformin in the past. Last A1C 7%   • Diverticulosis    • Fatigue     • GERD (gastroesophageal reflux disease)     controlled with omeprazole 20mg. Remote EGD- esophagitis. No h/o HH or h pylori.    • Headache    • History of blood transfusion     2/2 heavy menses   • Hyperlipidemia    • Hypertension    • Lower extremity edema    • Morbid obesity with BMI of 40.0-44.9, adult (CMS/[facility])    • Nausea     wtih taking meds, avoid this by taking meds wtih milk   • Peripheral neuropathy     2/2 DM   • RLS (restless legs syndrome)    • S/P cholecystectomy     2/2 cholelithiasis   • Shortness of breath on exertion    • TIA (transient ischemic attack)     patient states 8-9 episodes of right sided drooping/ weakness/ vision changes. Workup was negative for CVA- thought to be related to anxiety. last episode 12/2017       Allergies   Allergen Reactions   • Contrast Dye Swelling     Nasal congestion/ snot, IV contrast only       Past Surgical History:   Procedure Laterality Date   • COLONOSCOPY  remote    diverticulosis   • ENDOSCOPY  remote    esophagitis    • KNEE ACL RECONSTRUCTION Right 2013    with miniscal repair   • LAPAROSCOPIC CHOLECYSTECTOMY  2001    cholelithiasis   • LAPAROSCOPIC HYSTERECTOMY  2013    right ovary remains       Family History   Problem Relation Age of Onset   • Arthritis Mother    • Arthritis Father    • Diabetes Father    • Hyperlipidemia Father    • Hypertension Father    • Asthma Brother    • Other Brother    • Cancer Maternal Aunt    • Depression Paternal Uncle        Social History     Socioeconomic History   • Marital status:      Spouse name: Not on file   • Number of children: Not on file   • Years of education: Not on file   • Highest education level: Not on file   Tobacco Use   • Smoking status: Never Smoker   • Smokeless tobacco: Never Used   Substance and Sexual Activity   • Alcohol use: No     Frequency: Never   • Drug use: Yes     Types: Marijuana     Comment: helps with marijauna   • Sexual activity: Defer     Comment: no hormones   Social  History Narrative    Lives in [place] with boyfriend, daughter and her boyfriend and granddaughter and stepson.          Objective   Physical Exam   Constitutional: She is oriented to person, place, and time. She appears well-developed and well-nourished. She is cooperative.  Non-toxic appearance. No distress.   HENT:   Head: Normocephalic and atraumatic.   Right Ear: External ear normal.   Left Ear: External ear normal.   Nose: Nose normal.   Mouth/Throat: Oropharynx is clear and moist. No oropharyngeal exudate.   Eyes: Conjunctivae, EOM and lids are normal. Pupils are equal, round, and reactive to light.   Neck: Trachea normal, normal range of motion and full passive range of motion without pain.   Cardiovascular: Regular rhythm, normal heart sounds, intact distal pulses and normal pulses.   Pulmonary/Chest: Effort normal and breath sounds normal. No respiratory distress. She has no decreased breath sounds. She has no wheezes. She has no rhonchi. She has no rales.   Abdominal: Soft. Normal appearance and bowel sounds are normal. She exhibits no distension. There is tenderness (mild epigastric tenderness).   Musculoskeletal: Normal range of motion.   Neurological: She is alert and oriented to person, place, and time. She has normal strength. No cranial nerve deficit.   Skin: Skin is warm, dry and intact. No rash noted.   Psychiatric: She has a normal mood and affect. Her speech is normal and behavior is normal.   Nursing note and vitals reviewed.      Procedures         ED Course  ED Course as of Apr 11 0503   Wed Apr 10, 2019   2359 Hemoglobin: (!) 11.9 [KG]   Thu Apr 11, 2019   0021 Creatinine: (!) 1.03 [KG]   0021 Glucose: (!) 286 [KG]   0202 Patient is advised the results at this time.  Patient will be discharged home.  Patient to follow-up with GI.  Patient to take meds as ordered.  Patient to continue monitoring her blood sugar.  Patient to return to the ED as needed.  Patient agrees and verbalized  understanding.  [KG]      ED Course User Index  [KG] Lala Babcock, APRN     Recent Results (from the past 24 hour(s))   Comprehensive Metabolic Panel    Collection Time: 04/10/19 11:35 PM   Result Value Ref Range    Glucose 286 (H) 65 - 99 mg/dL    BUN 13 6 - 20 mg/dL    Creatinine 1.03 (H) 0.57 - 1.00 mg/dL    Sodium 135 (L) 136 - 145 mmol/L    Potassium 3.6 3.5 - 5.2 mmol/L    Chloride 94 (L) 98 - 107 mmol/L    CO2 30.0 (H) 22.0 - 29.0 mmol/L    Calcium 9.2 8.6 - 10.5 mg/dL    Total Protein 6.5 6.0 - 8.5 g/dL    Albumin 3.80 3.50 - 5.20 g/dL    ALT (SGPT) 57 (H) 1 - 33 U/L    AST (SGOT) 32 1 - 32 U/L    Alkaline Phosphatase 91 39 - 117 U/L    Total Bilirubin 0.3 0.2 - 1.2 mg/dL    eGFR Non African Amer 56 (L) >60 mL/min/1.73    Globulin 2.7 gm/dL    A/G Ratio 1.4 g/dL    BUN/Creatinine Ratio 12.6 7.0 - 25.0    Anion Gap 11.0 mmol/L   Lipase    Collection Time: 04/10/19 11:35 PM   Result Value Ref Range    Lipase 27 13 - 60 U/L   Light Blue Top    Collection Time: 04/10/19 11:35 PM   Result Value Ref Range    Extra Tube hold for add-on    Green Top (Gel)    Collection Time: 04/10/19 11:35 PM   Result Value Ref Range    Extra Tube Hold for add-ons.    Lavender Top    Collection Time: 04/10/19 11:35 PM   Result Value Ref Range    Extra Tube hold for add-on    Gold Top - SST    Collection Time: 04/10/19 11:35 PM   Result Value Ref Range    Extra Tube Hold for add-ons.    CBC Auto Differential    Collection Time: 04/10/19 11:35 PM   Result Value Ref Range    WBC 7.64 3.40 - 10.80 10*3/mm3    RBC 3.91 3.77 - 5.28 10*6/mm3    Hemoglobin 11.9 (L) 12.0 - 15.9 g/dL    Hematocrit 36.2 34.0 - 46.6 %    MCV 92.6 79.0 - 97.0 fL    MCH 30.4 26.6 - 33.0 pg    MCHC 32.9 31.5 - 35.7 g/dL    RDW 12.8 12.3 - 15.4 %    RDW-SD 43.0 37.0 - 54.0 fl    MPV 11.3 6.0 - 12.0 fL    Platelets 184 140 - 450 10*3/mm3    Neutrophil % 58.9 42.7 - 76.0 %    Lymphocyte % 33.2 19.6 - 45.3 %    Monocyte % 5.1 5.0 - 12.0 %    Eosinophil % 2.4 0.3  - 6.2 %    Basophil % 0.4 0.0 - 1.5 %    Immature Grans % 0.4 0.0 - 0.5 %    Neutrophils, Absolute 4.50 1.40 - 7.00 10*3/mm3    Lymphocytes, Absolute 2.54 0.70 - 3.10 10*3/mm3    Monocytes, Absolute 0.39 0.10 - 0.90 10*3/mm3    Eosinophils, Absolute 0.18 0.00 - 0.40 10*3/mm3    Basophils, Absolute 0.03 0.00 - 0.20 10*3/mm3    Immature Grans, Absolute 0.03 0.00 - 0.05 10*3/mm3   Urinalysis With Microscopic If Indicated (No Culture) - Urine, Clean Catch    Collection Time: 04/10/19 11:40 PM   Result Value Ref Range    Color, UA Yellow Yellow, Straw    Appearance, UA Clear Clear    pH, UA <=5.0 5.0 - 8.0    Specific Gravity, UA 1.020 1.001 - 1.030    Glucose,  mg/dL (1+) (A) Negative    Ketones, UA Negative Negative    Bilirubin, UA Negative Negative    Blood, UA Negative Negative    Protein, UA Trace (A) Negative    Leuk Esterase, UA Negative Negative    Nitrite, UA Negative Negative    Urobilinogen, UA 0.2 E.U./dL 0.2 - 1.0 E.U./dL   POC Troponin, Rapid    Collection Time: 04/10/19 11:51 PM   Result Value Ref Range    Troponin I 0.00 0.00 - 0.07 ng/mL     Note: In addition to lab results from this visit, the labs listed above may include labs taken at another facility or during a different encounter within the last 24 hours. Please correlate lab times with ED admission and discharge times for further clarification of the services performed during this visit.    XR Chest 1 View   Final Result   Mild vascular congestion and interstitial edema, new from prior study. Please correlate for clinical evidence of mild volume overload.      Signer Name: Hue Farley MD    Signed: 4/11/2019 1:22 AM    Workstation Name: [email or web address]            Vitals:    04/11/19 0110 04/11/19 0127 04/11/19 0130 04/11/19 0203   BP: 130/89  146/88    BP Location:       Patient Position:       Pulse: 66 61 62 62   Resp: 16      Temp:       TempSrc:       SpO2: 94% 94% 92% 95%   Weight:       Height:         Medications   sodium chloride 0.9 %  bolus 1,000 mL (0 mL Intravenous Stopped 4/11/19 0156)   pantoprazole (PROTONIX) injection 80 mg (80 mg Intravenous Given 4/11/19 0054)   ondansetron (ZOFRAN) injection 4 mg (4 mg Intravenous Given 4/11/19 0052)     ECG/EMG Results (last 24 hours)     Procedure Component Value Units Date/Time    ECG 12 Lead [[id number]] Collected:  04/10/19 2353     Updated:  04/10/19 2352        ECG 12 Lead                             MDM    Final diagnoses:   Nausea and vomiting, intractability of vomiting not specified, unspecified vomiting type   Dehydration   Hyperglycemia       Documentation assistance provided by aga Ornelas.  Information recorded by the scribe was done at my direction and has been verified and validated by me.     Brian Ornelas  04/11/19 0058       Lala Babcock, NOE  04/11/19 4264

## 2019-04-11 NOTE — TELEPHONE ENCOUNTER
MITCHEL:    Went back to ER last night, they want her to see Dr. Mark.   In ER stated she might have some swollen lymph nodes that might be causing the issues. Nausea is so bad she cannot eat anything,  Told her that she did not have any infx.  She told her she isnt 100% sure, WBC's were normal, heart workup was ok.  BS is going down, 337 this morning, and 297 in ER last night.

## 2019-04-16 ENCOUNTER — HOSPITAL ENCOUNTER (OUTPATIENT)
Dept: DIABETES SERVICES | Facility: HOSPITAL | Age: 54
Setting detail: RECURRING SERIES
Discharge: HOME | End: 2019-04-16

## 2019-04-16 PROCEDURE — G0109 DIAB MANAGE TRN IND/GROUP: HCPCS | Performed by: DIETITIAN, REGISTERED

## 2019-04-18 ENCOUNTER — OFFICE VISIT (OUTPATIENT)
Dept: FAMILY MEDICINE CLINIC | Facility: CLINIC | Age: 54
End: 2019-04-18

## 2019-04-18 VITALS
WEIGHT: 244 LBS | DIASTOLIC BLOOD PRESSURE: 94 MMHG | SYSTOLIC BLOOD PRESSURE: 142 MMHG | HEART RATE: 89 BPM | HEIGHT: 67 IN | OXYGEN SATURATION: 98 % | TEMPERATURE: 97.3 F | BODY MASS INDEX: 38.3 KG/M2

## 2019-04-18 DIAGNOSIS — R11.0 NAUSEA: ICD-10-CM

## 2019-04-18 DIAGNOSIS — E11.65 UNCONTROLLED TYPE 2 DIABETES MELLITUS WITH HYPERGLYCEMIA (HCC): Primary | ICD-10-CM

## 2019-04-18 DIAGNOSIS — E66.01 CLASS 3 SEVERE OBESITY WITHOUT SERIOUS COMORBIDITY WITH BODY MASS INDEX (BMI) OF 40.0 TO 44.9 IN ADULT, UNSPECIFIED OBESITY TYPE (HCC): ICD-10-CM

## 2019-04-18 PROCEDURE — 99213 OFFICE O/P EST LOW 20 MIN: CPT | Performed by: FAMILY MEDICINE

## 2019-04-19 ENCOUNTER — LAB REQUISITION (OUTPATIENT)
Dept: LAB | Facility: HOSPITAL | Age: 54
End: 2019-04-19

## 2019-04-19 ENCOUNTER — OUTSIDE FACILITY SERVICE (OUTPATIENT)
Dept: GASTROENTEROLOGY | Facility: CLINIC | Age: 54
End: 2019-04-19

## 2019-04-19 DIAGNOSIS — R11.2 NAUSEA WITH VOMITING: ICD-10-CM

## 2019-04-19 DIAGNOSIS — K92.0 HEMATEMESIS: ICD-10-CM

## 2019-04-19 DIAGNOSIS — R10.9 ABDOMINAL PAIN: ICD-10-CM

## 2019-04-19 DIAGNOSIS — R10.13 EPIGASTRIC PAIN: ICD-10-CM

## 2019-04-19 PROCEDURE — 43239 EGD BIOPSY SINGLE/MULTIPLE: CPT | Performed by: INTERNAL MEDICINE

## 2019-04-19 PROCEDURE — 88305 TISSUE EXAM BY PATHOLOGIST: CPT | Performed by: INTERNAL MEDICINE

## 2019-04-23 ENCOUNTER — OFFICE VISIT (OUTPATIENT)
Dept: NEUROLOGY | Facility: CLINIC | Age: 54
End: 2019-04-23

## 2019-04-23 VITALS
HEIGHT: 67 IN | DIASTOLIC BLOOD PRESSURE: 92 MMHG | BODY MASS INDEX: 38.3 KG/M2 | WEIGHT: 244 LBS | HEART RATE: 68 BPM | SYSTOLIC BLOOD PRESSURE: 146 MMHG

## 2019-04-23 DIAGNOSIS — G25.81 RLS (RESTLESS LEGS SYNDROME): ICD-10-CM

## 2019-04-23 DIAGNOSIS — E11.42 DIABETIC PERIPHERAL NEUROPATHY (HCC): Primary | ICD-10-CM

## 2019-04-23 PROCEDURE — 99214 OFFICE O/P EST MOD 30 MIN: CPT | Performed by: PHYSICIAN ASSISTANT

## 2019-04-23 RX ORDER — LISINOPRIL 20 MG/1
TABLET ORAL
Qty: 30 TABLET | Refills: 0 | Status: CANCELLED | OUTPATIENT
Start: 2019-04-23

## 2019-04-23 NOTE — PROGRESS NOTES
"Subjective     Chief Complaint: numbness, paresthesias, pain      History of Present Illness   Marcela Ramírez is a 54 y.o. female with a history of DM who comes to clinic today for evaluation of neuropathy. She initially noted symptoms in 2015 marked by numbness, paresthesias, and shooting pain in her upper and lower extremities bilaterally. This has worsened over time. She notes associated balance impairment as well as decreased  strength, though denies any clear associated weakness. Her symptoms are worse with increased activity. She is currently taking Lyrica, which is somewhat beneficial. She has previously taken GBP.     She also notes RLS symptoms in her feet primarily at night. She is currently taking ropinirole 0.5mg nightly, which is somewhat beneficial. She states that Xanax is also beneficial for this.      Prior evaluation has included an EMG of the upper and lower extremities bilaterally which was notable for a moderate axonal and demyelinating peripheral neuropathy, moderate CTS bilaterally, mild-moderate ulnar neuropathy on the left and mild ulnar neuropathy on the right. Screening bloodwork was notable for a hemoglobin A1C of 7.3.      Today: Since her last visit in 3/19, she notes that her numbness has improved. Her RLS is essentially unchanged.       I have reviewed and confirmed the past family, social and medical history as accurate on 4/23/19.     Review of Systems   Constitutional: Negative.    HENT: Negative.    Eyes: Negative.    Respiratory: Negative.    Cardiovascular: Negative.    Gastrointestinal: Negative.    Endocrine: Negative.    Genitourinary: Negative.    Musculoskeletal: Negative.    Skin: Negative.    Allergic/Immunologic: Negative.    Neurological: Positive for numbness.   Hematological: Negative.    Psychiatric/Behavioral: Negative.        Objective     Ht 170.2 cm (67\")   Wt 111 kg (244 lb)   LMP  (LMP Unknown)   BMI 38.22 kg/m²     General appearance today is " normal.         Physical Exam   Neurological: She has normal strength. She has a normal Finger-Nose-Finger Test.   Psychiatric: Her speech is normal.        Neurologic Exam     Mental Status   Speech: speech is normal   Level of consciousness: alert  Normal comprehension.     Cranial Nerves   Cranial nerves II through XII intact.     Motor Exam   Muscle bulk: normal  Overall muscle tone: normal    Strength   Strength 5/5 throughout.     Gait, Coordination, and Reflexes     Coordination   Finger to nose coordination: normal    Tremor   Resting tremor: absent        Assessment/Plan   Marcela was seen today for diabetic neuropathy.    Diagnoses and all orders for this visit:    Diabetic peripheral neuropathy (CMS/HCC)    RLS (restless legs syndrome)          Discussion/Summary   Marcela Ramírez returns to clinic today with a history consistent with a diabetic peripheral neuropathy, carpal tunnel syndrome, and restless leg syndrome. This was discussed at length. I again reviewed her current status and treatment options. After discussing potential treatment options, it was elected to continue on Lyrica and ropinirole unchanged for now given her other medical co morbidities. I again emphasized the importance of tight glucose control. She will then follow up in 3 months, or sooner if needed.   I spent 25 minutes face to face with the patient with 20 minutes spent on discussing diagnosis, prognosis, diagnostic testing, evaluation, current status, treatment options and management as discussed above.       As part of this visit I discussed the history with the patient .      Gladys Wilder PA-C

## 2019-04-29 ENCOUNTER — OFFICE VISIT (OUTPATIENT)
Dept: FAMILY MEDICINE CLINIC | Facility: CLINIC | Age: 54
End: 2019-04-29

## 2019-04-29 VITALS
WEIGHT: 243 LBS | TEMPERATURE: 97.4 F | OXYGEN SATURATION: 98 % | SYSTOLIC BLOOD PRESSURE: 130 MMHG | DIASTOLIC BLOOD PRESSURE: 80 MMHG | HEIGHT: 67 IN | HEART RATE: 80 BPM | BODY MASS INDEX: 38.14 KG/M2

## 2019-04-29 DIAGNOSIS — E66.01 CLASS 3 SEVERE OBESITY WITHOUT SERIOUS COMORBIDITY WITH BODY MASS INDEX (BMI) OF 40.0 TO 44.9 IN ADULT, UNSPECIFIED OBESITY TYPE (HCC): Primary | ICD-10-CM

## 2019-04-29 DIAGNOSIS — E11.65 UNCONTROLLED TYPE 2 DIABETES MELLITUS WITH HYPERGLYCEMIA (HCC): ICD-10-CM

## 2019-04-29 PROCEDURE — 99213 OFFICE O/P EST LOW 20 MIN: CPT | Performed by: FAMILY MEDICINE

## 2019-04-29 RX ORDER — LISINOPRIL 20 MG/1
20 TABLET ORAL DAILY
Qty: 30 TABLET | Refills: 11 | Status: SHIPPED | OUTPATIENT
Start: 2019-04-29 | End: 2020-05-19

## 2019-04-29 NOTE — PROGRESS NOTES
Subjective   Marcela Ramírez is a 54 y.o. female    Chief Complaint    Weight gain  New onset type 2 diabetes mellitus    History of Present Illness  Patient presents today for scheduled monthly follow-up regarding her weight gain.  She is being monitored by bariatrics in consideration for possible bariatric surgical intervention for obesity.  Since her program was started she has developed diabetes mellitus.  She has obviously had some changes with the program based on this.  Fortunately she has been able to lose some weight with diet changes although they have been minimal.  Her blood sugars have improved from greater than 300 to the upper 100s presently.    The following portions of the patient's history were reviewed and updated as appropriate: allergies, current medications, past social history and problem list    Review of Systems   Constitutional: Negative for appetite change, diaphoresis, fatigue and unexpected weight change.   Eyes: Negative for visual disturbance.   Respiratory: Negative for cough, chest tightness and shortness of breath.    Cardiovascular: Negative for chest pain, palpitations and leg swelling.   Gastrointestinal: Negative for diarrhea, nausea and vomiting.   Endocrine: Negative for polydipsia, polyphagia and polyuria.   Skin: Negative for color change and rash.   Neurological: Negative for dizziness, syncope, weakness, light-headedness, numbness and headaches.       Objective     Vitals:    04/29/19 1017   BP: 130/80   Pulse: 80   Temp: 97.4 °F (36.3 °C)   SpO2: 98%       Physical Exam   Constitutional: She appears well-developed and well-nourished.   Neck: Neck supple. No JVD present. No thyromegaly present.   Cardiovascular: Normal rate, regular rhythm, normal heart sounds, intact distal pulses and normal pulses.   No murmur heard.  Pulmonary/Chest: Effort normal and breath sounds normal. No respiratory distress.   Abdominal: Soft. Bowel sounds are normal. There is no  hepatosplenomegaly. There is no tenderness.   Musculoskeletal: She exhibits no edema.   Lymphadenopathy:     She has no cervical adenopathy.   Neurological: No sensory deficit.   Skin: Skin is warm and dry. She is not diaphoretic.   Psychiatric: She has a normal mood and affect. Her behavior is normal.   Nursing note and vitals reviewed.      Assessment/Plan   Problem List Items Addressed This Visit     None      Visit Diagnoses     Class 3 severe obesity without serious comorbidity with body mass index (BMI) of 40.0 to 44.9 in adult, unspecified obesity type (CMS/HCC)    -  Primary    Uncontrolled type 2 diabetes mellitus with hyperglycemia (CMS/HCC)

## 2019-05-07 ENCOUNTER — HOSPITAL ENCOUNTER (OUTPATIENT)
Dept: DIABETES SERVICES | Facility: HOSPITAL | Age: 54
Setting detail: RECURRING SERIES
Discharge: HOME | End: 2019-05-07

## 2019-05-07 PROCEDURE — G0109 DIAB MANAGE TRN IND/GROUP: HCPCS | Performed by: DIETITIAN, REGISTERED

## 2019-05-28 ENCOUNTER — OFFICE VISIT (OUTPATIENT)
Dept: FAMILY MEDICINE CLINIC | Facility: CLINIC | Age: 54
End: 2019-05-28

## 2019-05-28 ENCOUNTER — TELEPHONE (OUTPATIENT)
Dept: NEUROLOGY | Facility: CLINIC | Age: 54
End: 2019-05-28

## 2019-05-28 VITALS
TEMPERATURE: 97.6 F | HEIGHT: 67 IN | DIASTOLIC BLOOD PRESSURE: 78 MMHG | HEART RATE: 68 BPM | SYSTOLIC BLOOD PRESSURE: 120 MMHG | OXYGEN SATURATION: 98 % | BODY MASS INDEX: 38.77 KG/M2 | WEIGHT: 247 LBS

## 2019-05-28 DIAGNOSIS — F41.9 ANXIETY: ICD-10-CM

## 2019-05-28 DIAGNOSIS — E66.01 CLASS 3 SEVERE OBESITY WITHOUT SERIOUS COMORBIDITY WITH BODY MASS INDEX (BMI) OF 40.0 TO 44.9 IN ADULT, UNSPECIFIED OBESITY TYPE (HCC): Primary | ICD-10-CM

## 2019-05-28 DIAGNOSIS — F51.04 PSYCHOPHYSIOLOGICAL INSOMNIA: ICD-10-CM

## 2019-05-28 PROCEDURE — 99213 OFFICE O/P EST LOW 20 MIN: CPT | Performed by: FAMILY MEDICINE

## 2019-05-28 RX ORDER — ALPRAZOLAM 0.5 MG/1
0.5 TABLET ORAL NIGHTLY PRN
Qty: 30 TABLET | Refills: 5 | Status: SHIPPED | OUTPATIENT
Start: 2019-05-28 | End: 2020-01-10 | Stop reason: SDUPTHER

## 2019-05-28 RX ORDER — ALPRAZOLAM 0.5 MG/1
0.5 TABLET ORAL NIGHTLY PRN
Qty: 30 TABLET | Refills: 5 | OUTPATIENT
Start: 2019-05-28 | End: 2019-05-28 | Stop reason: SDUPTHER

## 2019-05-28 NOTE — PROGRESS NOTES
Subjective   Marcela Ramírez is a 54 y.o. female    Chief Complaint    Obesity  Anxiety  Insomnia    History of Present Illness  Patient presents today for follow-up visit regarding weight management.  She remains morbidly obese with a BMI of 38.7.  Her weight is gone up since her last visit 1 month ago.  She is requesting refill of her alprazolam which she uses primarily at bedtime to help control her anxiety so she can sleep.  She hopes to be following up soon with bariatric surgery to see if she will qualify for a procedure.    The following portions of the patient's history were reviewed and updated as appropriate: allergies, current medications, past social history and problem list    Review of Systems   Constitutional: Negative for appetite change, diaphoresis, fatigue and unexpected weight change.   Eyes: Negative for visual disturbance.   Respiratory: Negative for cough, chest tightness and shortness of breath.    Cardiovascular: Negative for chest pain, palpitations and leg swelling.   Gastrointestinal: Negative for abdominal pain, diarrhea, nausea and vomiting.   Endocrine: Negative for polydipsia, polyphagia and polyuria.   Skin: Negative for color change and rash.   Neurological: Negative for dizziness, tremors, syncope, weakness, light-headedness, numbness and headaches.   Psychiatric/Behavioral: Positive for dysphoric mood and sleep disturbance. Negative for agitation, behavioral problems, confusion, decreased concentration, hallucinations and suicidal ideas. The patient is nervous/anxious. The patient is not hyperactive.        Objective     Vitals:    05/28/19 1014   BP: 120/78   Pulse: 68   Temp: 97.6 °F (36.4 °C)   SpO2: 98%       Physical Exam   Constitutional: She is oriented to person, place, and time. She appears well-developed and well-nourished. No distress.   Eyes: Conjunctivae are normal.   Cardiovascular: Normal rate and regular rhythm.   Pulmonary/Chest: Effort normal and breath sounds  normal.   Neurological: She is alert and oriented to person, place, and time. Coordination normal.   Skin: She is not diaphoretic.   Psychiatric: She has a normal mood and affect. Her behavior is normal. Judgment and thought content normal. Cognition and memory are normal. She is attentive.   Nursing note and vitals reviewed.      Assessment/Plan   Problem List Items Addressed This Visit        Other    Anxiety    Relevant Medications    ALPRAZolam (XANAX) 0.5 MG tablet      Other Visit Diagnoses     Class 3 severe obesity without serious comorbidity with body mass index (BMI) of 40.0 to 44.9 in adult, unspecified obesity type (CMS/HCC)    -  Primary    Psychophysiological insomnia        Relevant Medications    ALPRAZolam (XANAX) 0.5 MG tablet

## 2019-05-28 NOTE — TELEPHONE ENCOUNTER
Gladys Wilder PA-C Dieruf, Stephanie S, MA             I wrote a letter for her on 2/6/19. It is under letters in the chart. Thanks!    Previous Messages      ----- Message -----   From: Anh Mckinney MA   Sent: 5/28/2019   1:32 PM   To: Gladys Wilder PA-C   Subject: Bariatric Clearance                               Gladys,   Did we verbally clear her for this surgery?     ----- Message -----   From: Maisha Chakraborty   Sent: 5/28/2019   1:24 PM   To: [facility]     Patient is stating that she was verbally told she is cleared for bariatric surgery. I am not sure if I am simply overlooking documentation in the chart or if she was not cleared yet. Is there any way someone could review and let me know? I appreciate your help!     Roxanna,   Maisha

## 2019-05-29 DIAGNOSIS — R94.4 DECREASED GFR: Primary | ICD-10-CM

## 2019-06-03 ENCOUNTER — HOSPITAL ENCOUNTER (OUTPATIENT)
Dept: PULMONOLOGY | Facility: HOSPITAL | Age: 54
Discharge: HOME | End: 2019-06-03
Admitting: PHYSICIAN ASSISTANT

## 2019-06-03 DIAGNOSIS — E66.01 MORBID OBESITY WITH BMI OF 40.0-44.9, ADULT (HCC): ICD-10-CM

## 2019-06-03 DIAGNOSIS — R06.02 SHORTNESS OF BREATH ON EXERTION: ICD-10-CM

## 2019-06-03 DIAGNOSIS — J45.909 ASTHMA, UNSPECIFIED ASTHMA SEVERITY, UNSPECIFIED WHETHER COMPLICATED, UNSPECIFIED WHETHER PERSISTENT: ICD-10-CM

## 2019-06-03 PROCEDURE — 94727 GAS DIL/WSHOT DETER LNG VOL: CPT

## 2019-06-03 PROCEDURE — 94727 GAS DIL/WSHOT DETER LNG VOL: CPT | Performed by: INTERNAL MEDICINE

## 2019-06-03 PROCEDURE — 94729 DIFFUSING CAPACITY: CPT | Performed by: INTERNAL MEDICINE

## 2019-06-03 PROCEDURE — 94060 EVALUATION OF WHEEZING: CPT | Performed by: INTERNAL MEDICINE

## 2019-06-03 PROCEDURE — 94060 EVALUATION OF WHEEZING: CPT

## 2019-06-03 PROCEDURE — 94729 DIFFUSING CAPACITY: CPT

## 2019-06-06 ENCOUNTER — APPOINTMENT (OUTPATIENT)
Dept: DIABETES SERVICES | Facility: HOSPITAL | Age: 54
End: 2019-06-06

## 2019-06-14 RX ORDER — OMEPRAZOLE 20 MG/1
CAPSULE, DELAYED RELEASE ORAL
Qty: 30 CAPSULE | Refills: 1 | Status: ON HOLD | OUTPATIENT
Start: 2019-06-14 | End: 2019-08-23 | Stop reason: SDUPTHER

## 2019-06-14 RX ORDER — HYDROCHLOROTHIAZIDE 25 MG/1
TABLET ORAL
Qty: 30 TABLET | Refills: 1 | Status: ON HOLD | OUTPATIENT
Start: 2019-06-14 | End: 2019-08-23 | Stop reason: SDUPTHER

## 2019-06-27 ENCOUNTER — OFFICE VISIT (OUTPATIENT)
Dept: FAMILY MEDICINE CLINIC | Facility: CLINIC | Age: 54
End: 2019-06-27

## 2019-06-27 VITALS
TEMPERATURE: 98.1 F | OXYGEN SATURATION: 98 % | BODY MASS INDEX: 38.77 KG/M2 | WEIGHT: 247 LBS | DIASTOLIC BLOOD PRESSURE: 82 MMHG | HEIGHT: 67 IN | SYSTOLIC BLOOD PRESSURE: 110 MMHG | HEART RATE: 78 BPM

## 2019-06-27 DIAGNOSIS — E66.01 CLASS 3 SEVERE OBESITY WITHOUT SERIOUS COMORBIDITY WITH BODY MASS INDEX (BMI) OF 40.0 TO 44.9 IN ADULT, UNSPECIFIED OBESITY TYPE (HCC): Primary | ICD-10-CM

## 2019-06-27 DIAGNOSIS — F41.9 ANXIETY: ICD-10-CM

## 2019-06-27 PROCEDURE — 99213 OFFICE O/P EST LOW 20 MIN: CPT | Performed by: FAMILY MEDICINE

## 2019-06-27 NOTE — PROGRESS NOTES
Subjective   Marcela Ramírez is a 54 y.o. female    Chief Complaint    Weight loss follow-up      History of Present Illness  Patient presents today for follow-up regarding weight loss attempt in consideration of bariatric procedure.  She has a form that will need to be completed today.  She admits to ongoing diet indiscretions.  Limited exercise.  She does work on a regular basis however.  She has an order for an H. pylori stool sample test that was ordered by the bariatric surgeon.  She is advised to take this to the lab so they can obtain a specimen and run the test.  She was under the impression that it was done in our office.  She has no new complaints today.    The following portions of the patient's history were reviewed and updated as appropriate: allergies, current medications, past social history and problem list    Review of Systems   Constitutional: Negative for appetite change, diaphoresis, fatigue and unexpected weight change.   Eyes: Negative for visual disturbance.   Respiratory: Negative for cough, chest tightness and shortness of breath.    Cardiovascular: Negative for chest pain, palpitations and leg swelling.   Gastrointestinal: Negative for abdominal pain, diarrhea, nausea and vomiting.   Endocrine: Negative for polydipsia, polyphagia and polyuria.   Skin: Negative for color change and rash.   Neurological: Negative for dizziness, tremors, syncope, weakness, light-headedness, numbness and headaches.   Psychiatric/Behavioral: Positive for dysphoric mood and sleep disturbance. Negative for agitation, behavioral problems, confusion, decreased concentration, hallucinations and suicidal ideas. The patient is nervous/anxious. The patient is not hyperactive.        Objective     Vitals:    06/27/19 1004   BP: 110/82   Pulse: 78   Temp: 98.1 °F (36.7 °C)   SpO2: 98%       Physical Exam   Constitutional: She is oriented to person, place, and time. She appears well-developed and well-nourished. No  distress.   Eyes: Conjunctivae are normal.   Cardiovascular: Normal rate and regular rhythm.   Pulmonary/Chest: Effort normal and breath sounds normal.   Neurological: She is alert and oriented to person, place, and time. Coordination normal.   Skin: She is not diaphoretic.   Psychiatric: She has a normal mood and affect. Her behavior is normal. Judgment and thought content normal. Cognition and memory are normal. She is attentive.   Nursing note and vitals reviewed.      Assessment/Plan   Problem List Items Addressed This Visit        Other    Anxiety      Other Visit Diagnoses     Class 3 severe obesity without serious comorbidity with body mass index (BMI) of 40.0 to 44.9 in adult, unspecified obesity type (CMS/HCC)    -  Primary        Form is completed for bariatric monitoring.

## 2019-07-19 LAB — H PYLORI AG STL QL IA: NEGATIVE

## 2019-07-26 ENCOUNTER — OFFICE VISIT (OUTPATIENT)
Dept: FAMILY MEDICINE CLINIC | Facility: CLINIC | Age: 54
End: 2019-07-26

## 2019-07-26 VITALS
HEIGHT: 67 IN | RESPIRATION RATE: 16 BRPM | WEIGHT: 254.8 LBS | OXYGEN SATURATION: 98 % | SYSTOLIC BLOOD PRESSURE: 142 MMHG | HEART RATE: 81 BPM | TEMPERATURE: 98 F | DIASTOLIC BLOOD PRESSURE: 90 MMHG | BODY MASS INDEX: 39.99 KG/M2

## 2019-07-26 DIAGNOSIS — E66.09 OBESITY DUE TO EXCESS CALORIES WITHOUT SERIOUS COMORBIDITY, UNSPECIFIED CLASSIFICATION: Primary | ICD-10-CM

## 2019-07-26 PROCEDURE — 99213 OFFICE O/P EST LOW 20 MIN: CPT | Performed by: PHYSICIAN ASSISTANT

## 2019-07-26 NOTE — PROGRESS NOTES
Subjective   Marcela Ramírez is a 54 y.o. female  Weight Check (F/U. Last O/V prior to bariatric surgery. Needs form signed. )      History of Present Illness  Patient is a pleasant 54-year-old white female who comes in for weight check/obesity patient is been trying to lose weight diet exercise she is not losing weight she is very frustrated.      The following portions of the patient's history were reviewed and updated as appropriate: allergies, current medications, past social history and problem list    Review of Systems   Constitutional: Negative for appetite change, diaphoresis, fatigue and unexpected weight change.   Eyes: Negative for visual disturbance.   Respiratory: Negative for cough, chest tightness and shortness of breath.    Cardiovascular: Negative for chest pain, palpitations and leg swelling.   Gastrointestinal: Negative for diarrhea, nausea and vomiting.   Endocrine: Negative for polydipsia, polyphagia and polyuria.   Skin: Negative for color change and rash.   Neurological: Negative for dizziness, syncope, weakness, light-headedness, numbness and headaches.       Objective     Vitals:    07/26/19 1030   BP: 142/90   Pulse: 81   Resp: 16   Temp: 98 °F (36.7 °C)   SpO2: 98%       Physical Exam   Constitutional: She appears well-developed and well-nourished.   Neck: Neck supple. No JVD present. No thyromegaly present.   Cardiovascular: Normal rate, regular rhythm, normal heart sounds, intact distal pulses and normal pulses.   No murmur heard.  Pulmonary/Chest: Effort normal and breath sounds normal. No respiratory distress.   Abdominal: Soft. Bowel sounds are normal. There is no hepatosplenomegaly. There is no tenderness.   Musculoskeletal: She exhibits no edema.   Lymphadenopathy:     She has no cervical adenopathy.   Neurological: No sensory deficit.   Skin: Skin is warm and dry. She is not diaphoretic.   Nursing note and vitals reviewed.      Assessment/Plan     Diagnoses and all orders for  this visit:    Obesity due to excess calories without serious comorbidity, unspecified classification    #1 continue diet and exercise lose weight follow-up with bariatric surgery

## 2019-08-07 DIAGNOSIS — R53.83 FATIGUE, UNSPECIFIED TYPE: Primary | ICD-10-CM

## 2019-08-07 DIAGNOSIS — R06.00 DYSPNEA, UNSPECIFIED TYPE: ICD-10-CM

## 2019-08-09 ENCOUNTER — LAB (OUTPATIENT)
Dept: LAB | Facility: HOSPITAL | Age: 54
End: 2019-08-09

## 2019-08-09 DIAGNOSIS — R06.00 DYSPNEA, UNSPECIFIED TYPE: ICD-10-CM

## 2019-08-09 DIAGNOSIS — R53.83 FATIGUE, UNSPECIFIED TYPE: ICD-10-CM

## 2019-08-09 LAB
ALBUMIN SERPL-MCNC: 4.7 G/DL (ref 3.5–5.2)
ALBUMIN/GLOB SERPL: 1.5 G/DL
ALP SERPL-CCNC: 91 U/L (ref 39–117)
ALT SERPL W P-5'-P-CCNC: 32 U/L (ref 1–33)
ANION GAP SERPL CALCULATED.3IONS-SCNC: 14.5 MMOL/L (ref 5–15)
AST SERPL-CCNC: 27 U/L (ref 1–32)
BASOPHILS # BLD AUTO: 0.06 10*3/MM3 (ref 0–0.2)
BASOPHILS NFR BLD AUTO: 0.6 % (ref 0–1.5)
BILIRUB SERPL-MCNC: 0.3 MG/DL (ref 0.2–1.2)
BUN BLD-MCNC: 19 MG/DL (ref 6–20)
BUN/CREAT SERPL: 18.3 (ref 7–25)
CALCIUM SPEC-SCNC: 9.8 MG/DL (ref 8.6–10.5)
CHLORIDE SERPL-SCNC: 98 MMOL/L (ref 98–107)
CO2 SERPL-SCNC: 28.5 MMOL/L (ref 22–29)
CREAT BLD-MCNC: 1.04 MG/DL (ref 0.57–1)
DEPRECATED RDW RBC AUTO: 48.2 FL (ref 37–54)
EOSINOPHIL # BLD AUTO: 0.24 10*3/MM3 (ref 0–0.4)
EOSINOPHIL NFR BLD AUTO: 2.3 % (ref 0.3–6.2)
ERYTHROCYTE [DISTWIDTH] IN BLOOD BY AUTOMATED COUNT: 13.5 % (ref 12.3–15.4)
GFR SERPL CREATININE-BSD FRML MDRD: 56 ML/MIN/1.73
GLOBULIN UR ELPH-MCNC: 3.2 GM/DL
GLUCOSE BLD-MCNC: 126 MG/DL (ref 65–99)
HCT VFR BLD AUTO: 40.6 % (ref 34–46.6)
HGB BLD-MCNC: 13 G/DL (ref 12–15.9)
IMM GRANULOCYTES # BLD AUTO: 0.06 10*3/MM3 (ref 0–0.05)
IMM GRANULOCYTES NFR BLD AUTO: 0.6 % (ref 0–0.5)
LYMPHOCYTES # BLD AUTO: 2.84 10*3/MM3 (ref 0.7–3.1)
LYMPHOCYTES NFR BLD AUTO: 27.7 % (ref 19.6–45.3)
MCH RBC QN AUTO: 31 PG (ref 26.6–33)
MCHC RBC AUTO-ENTMCNC: 32 G/DL (ref 31.5–35.7)
MCV RBC AUTO: 96.9 FL (ref 79–97)
MONOCYTES # BLD AUTO: 0.53 10*3/MM3 (ref 0.1–0.9)
MONOCYTES NFR BLD AUTO: 5.2 % (ref 5–12)
NEUTROPHILS # BLD AUTO: 6.54 10*3/MM3 (ref 1.7–7)
NEUTROPHILS NFR BLD AUTO: 63.6 % (ref 42.7–76)
NRBC BLD AUTO-RTO: 0 /100 WBC (ref 0–0.2)
PLATELET # BLD AUTO: 217 10*3/MM3 (ref 140–450)
PMV BLD AUTO: 11.7 FL (ref 6–12)
POTASSIUM BLD-SCNC: 4.1 MMOL/L (ref 3.5–5.2)
PROT SERPL-MCNC: 7.9 G/DL (ref 6–8.5)
RBC # BLD AUTO: 4.19 10*6/MM3 (ref 3.77–5.28)
SODIUM BLD-SCNC: 141 MMOL/L (ref 136–145)
WBC NRBC COR # BLD: 10.27 10*3/MM3 (ref 3.4–10.8)

## 2019-08-09 PROCEDURE — 80053 COMPREHEN METABOLIC PANEL: CPT

## 2019-08-09 PROCEDURE — 85025 COMPLETE CBC W/AUTO DIFF WBC: CPT

## 2019-08-09 PROCEDURE — 36415 COLL VENOUS BLD VENIPUNCTURE: CPT

## 2019-08-13 ENCOUNTER — CONSULT (OUTPATIENT)
Dept: BARIATRICS/WEIGHT MGMT | Facility: CLINIC | Age: 54
End: 2019-08-13

## 2019-08-13 VITALS
OXYGEN SATURATION: 99 % | BODY MASS INDEX: 38.53 KG/M2 | HEART RATE: 77 BPM | HEIGHT: 67 IN | SYSTOLIC BLOOD PRESSURE: 126 MMHG | WEIGHT: 245.5 LBS | TEMPERATURE: 97.8 F | DIASTOLIC BLOOD PRESSURE: 84 MMHG | RESPIRATION RATE: 18 BRPM

## 2019-08-13 DIAGNOSIS — E66.01 MORBID OBESITY DUE TO EXCESS CALORIES (HCC): Primary | ICD-10-CM

## 2019-08-13 PROCEDURE — 99214 OFFICE O/P EST MOD 30 MIN: CPT | Performed by: SURGERY

## 2019-08-13 RX ORDER — PANTOPRAZOLE SODIUM 40 MG/10ML
40 INJECTION, POWDER, LYOPHILIZED, FOR SOLUTION INTRAVENOUS ONCE
Status: CANCELLED | OUTPATIENT
Start: 2019-08-13 | End: 2019-08-13

## 2019-08-13 RX ORDER — SCOLOPAMINE TRANSDERMAL SYSTEM 1 MG/1
1 PATCH, EXTENDED RELEASE TRANSDERMAL ONCE
Status: CANCELLED | OUTPATIENT
Start: 2019-08-13 | End: 2019-08-13

## 2019-08-13 RX ORDER — CHLORHEXIDINE GLUCONATE 0.12 MG/ML
30 RINSE ORAL
Status: CANCELLED | OUTPATIENT
Start: 2019-08-13 | End: 2019-08-13

## 2019-08-13 RX ORDER — ACETAMINOPHEN 500 MG
1000 TABLET ORAL ONCE
Status: CANCELLED | OUTPATIENT
Start: 2019-08-13 | End: 2019-08-13

## 2019-08-13 RX ORDER — SODIUM CHLORIDE 0.9 % (FLUSH) 0.9 %
3 SYRINGE (ML) INJECTION EVERY 12 HOURS SCHEDULED
Status: CANCELLED | OUTPATIENT
Start: 2019-08-13

## 2019-08-13 RX ORDER — SODIUM CHLORIDE 9 MG/ML
150 INJECTION, SOLUTION INTRAVENOUS CONTINUOUS
Status: CANCELLED | OUTPATIENT
Start: 2019-08-13

## 2019-08-13 RX ORDER — SODIUM CHLORIDE 0.9 % (FLUSH) 0.9 %
3-10 SYRINGE (ML) INJECTION AS NEEDED
Status: CANCELLED | OUTPATIENT
Start: 2019-08-13

## 2019-08-13 RX ORDER — GABAPENTIN 100 MG/1
600 CAPSULE ORAL ONCE
Status: CANCELLED | OUTPATIENT
Start: 2019-08-13 | End: 2019-08-13

## 2019-08-14 PROBLEM — E66.01 MORBID OBESITY DUE TO EXCESS CALORIES: Status: ACTIVE | Noted: 2019-08-14

## 2019-08-19 ENCOUNTER — TELEPHONE (OUTPATIENT)
Dept: FAMILY MEDICINE CLINIC | Facility: CLINIC | Age: 54
End: 2019-08-19

## 2019-08-19 NOTE — PROGRESS NOTES
"[facility] BARIATRIC SURGERY  [address]  [facility] [id number] [phone]      Patient  Name:  Marcela Ramírez  :  1965      Date of Visit: 19    Chief Complaint:  weight gain; unable to maintain weight loss.  Evaluate for possible weight loss surgery    History of Present Illness:  Marcela Ramírez is a 54 y.o. female who presents today for evaluation, education and consultation regarding weight loss surgery.  Since last seen 2019 she has lost 9 pounds.The patient returns for final visit prior to LSG.  Original intake evaluation Janis España PA-C dated 2019 reviewed.    From her evaluation that day:     \"The most weight Marcela lost was 27 pounds on ate much less but was only able to maintain that weight loss for 1 month.  Her maximum lifetime weight is 285 pounds. Says she is \"tired of being fat, tired of not being able to wear real pants, tired of being exhausted.\" She has some friends who had bypass at other offices and \"failed\" also with a friend s/p LSG at [facility] and is doing great.      As above, patient has been overweight for many years, with numerous failed dietary/weight loss attempts.  She now has obesity related comorbidities and as such has decided to pursue weight loss surgery.     GI: GERD controlled with omeprazole 20mg. Remote EGD- esophagitis. No h/o HH or h pylori. S/p spenser with cholelithiasis. Has nausea wtih taking meds, avoid this by taking meds wtih milk     H/o TIA-mimicking symptoms x 8-9 episodes including right sided drooping/ weakness/ vision changes. Extensive workup was negative for CVA with angio imaging- per patient. Was determined to be related to anxiety/ getting really worked up. last episode 2017, says she has not been evaluated last 7 episodes, just waits in her house for symptoms to resolve. No residual effects. She sees neurologist for DM peripheral neuropathy. On ASA daily.      Psych: bipolar " "controlled on meds. Says she faithfully takes her bipolar meds to control symptoms, otherwise is \"really mean and angry\". Says her meds make her not care about anything, but people don't like her when she is off them.      Pulm:h/o asthma, has not required inhaler at all other than recent PNA.      DM: Diagnosed 2017, was on metformin in the past. Last A1C 7%\"       The patient has had issues with morbid obesity for years and only temporary success with non-surgical methods of weight loss.  The patient is seeking LSG to help with the morbid obesity related conditions of arthritis, asthma, ovarian cyst, bipolar disorder, boils, carpal tunnel syndrome, colonic polyps, depression, type II non-insulin-dependent diabetes mellitus, diverticulosis, fatigue, GE reflux disease, headaches, hyperlipidemia, hypertension, peripheral edema, chronic nausea, peripheral neuropathy, restless leg syndrome, history of TIAs, chronic kidney disease.    54-year-old morbidly obese white female from [place].  She dozed repeatedly during discussion of the risks benefits alternative therapies but did voice understanding to all matters concerning avoiding perioperative complications.  She said she tried to have weight loss surgery at [facility] 10 years ago but failed her psychological evaluation and was diagnosed with bipolar disorder at that time.  She has a very poor dietitian evaluation noting that she drank 2 to 4 L of Pepsi daily.  She says she has cut way back but had a few last week which she blames on her  enabling her.  I did emphasize that surgery will not be successful in the face of ongoing high fructose corn syrup but she seemed to have a little insight and seemed to have a poor grasp of discussed concepts in general.  She did pass her psychological evaluation.  She says her reflux symptoms are controlled with proton pump inhibitors.  She has never been told she had gastroparesis.  She said she tested negative for " "sleep apnea \"years ago\".  She said she was in the emergency room recently with a blood sugar of 672.      Past Medical History:   Diagnosis Date   • Arthritis     knees, otc arthritis meds prn, no h/o injections.    • Asthma     has not required inhaler   • Bilateral ovarian cysts    • Bipolar 1 disorder (CMS/[facility])    • Boil     opens them herself   • Carpal tunnel syndrome    • CKD (chronic kidney disease), symptom management only, stage 3 (moderate) (CMS/[facility])     Creatinine 1.04 GFR 56   • Colon polyp    • Depression    • Diabetes mellitus (CMS/[facility])     Diagnosed 2017, was on metformin in the past. Last A1C 7%   • Diverticulosis    • Fatigue    • GERD (gastroesophageal reflux disease)     controlled with omeprazole 20mg. Remote EGD- esophagitis.  EGD no hiatal hernia Z line 40 cm very thick mucosal folds cannot rule out varices.  CT scan thickened fundus, no varices seen   • Headache    • History of blood transfusion     2/2 heavy menses   • Hyperlipidemia    • Hypertension    • Lower extremity edema    • Morbid obesity with BMI of 40.0-44.9, adult (CMS/[facility])    • Nausea     wtih taking meds, avoid this by taking meds wtih milk   • Peripheral neuropathy     2/2 DM   • RLS (restless legs syndrome)    • S/P cholecystectomy     2/2 cholelithiasis   • Shortness of breath on exertion    • TIA (transient ischemic attack)     patient states 8-9 episodes of right sided drooping/ weakness/ vision changes. Workup was negative for CVA- thought to be related to anxiety. last episode 12/2017     Past Surgical History:   Procedure Laterality Date   • COLONOSCOPY  remote    diverticulosis   • ENDOSCOPY  remote    esophagitis    • KNEE ACL RECONSTRUCTION Right 2013    with miniscal repair   • LAPAROSCOPIC CHOLECYSTECTOMY  2001    cholelithiasis   • LAPAROSCOPIC HYSTERECTOMY  2013    right ovary remains       Allergies   Allergen Reactions   • Contrast Dye Swelling     Nasal congestion/ snot, IV contrast only       Current " Outpatient Medications:   •  ALPRAZolam (XANAX) 0.5 MG tablet, Take 1 tablet by mouth At Night As Needed for Anxiety., Disp: 30 tablet, Rfl: 5  •  atorvastatin (LIPITOR) 20 MG tablet, Take 1 tablet by mouth Daily., Disp: 30 tablet, Rfl: 11  •  desvenlafaxine (PRISTIQ) 50 MG 24 hr tablet, Take 50 mg by mouth Daily., Disp: , Rfl:   •  hydrochlorothiazide (HYDRODIURIL) 25 MG tablet, TAKE ONE TABLET BY MOUTH DAILY, Disp: 30 tablet, Rfl: 1  •  LamoTRIgine (LAMICTAL PO), Take 1 tablet by mouth Every Night. Pt is on 200mg, Disp: , Rfl:   •  lisinopril (PRINIVIL,ZESTRIL) 20 MG tablet, Take 1 tablet by mouth Daily., Disp: 30 tablet, Rfl: 11  •  LYRICA 50 MG capsule, TAKE ONE CAPSULE BY MOUTH EVERY NIGHT AT BEDTIME FOR 7 DAYS, THEN TWO CAPSULES EVERY NIGHT AT BEDTIME THEREAFTER, Disp: 60 capsule, Rfl: 4  •  metFORMIN ER (GLUCOPHAGE-XR) 500 MG 24 hr tablet, Take 1 tablet by mouth 2 (Two) Times a Day Before Meals., Disp: 60 tablet, Rfl: 5  •  omeprazole (priLOSEC) 20 MG capsule, TAKE ONE CAPSULE BY MOUTH DAILY, Disp: 30 capsule, Rfl: 1  •  ondansetron ODT (ZOFRAN-ODT) 4 MG disintegrating tablet, Take 1 tablet by mouth Every 6 (Six) Hours As Needed for Nausea or Vomiting., Disp: 30 tablet, Rfl: 1  •  ONE TOUCH ULTRA TEST test strip, TEST GLUCOSE TWO TIMES A DAY, Disp: 100 each, Rfl: 12  •  pregabalin (LYRICA) 25 MG capsule, Take 25 mg by mouth 2 (Two) Times a Day., Disp: , Rfl:   •  rOPINIRole (REQUIP) 2 MG tablet, Take 2 tablets by mouth Every Night., Disp: 60 tablet, Rfl: 11  •  aspirin 81 MG EC tablet, Take 1 tablet by mouth Daily., Disp: 30 tablet, Rfl: 0  •  ipratropium (ATROVENT HFA) 17 MCG/ACT inhaler, Inhale 2 puffs 4 (Four) Times a Day. (Patient taking differently: Inhale 2 puffs As Needed.), Disp: 1 inhaler, Rfl: 0  •  nitroglycerin (NITROSTAT) 0.4 MG SL tablet, Place 1 tablet under the tongue Every 5 (Five) Minutes As Needed for Chest Pain. Take no more than 3 doses in 15 minutes., Disp: 100 tablet, Rfl: 0  •   promethazine (PHENERGAN) 25 MG tablet, Take 1 tablet by mouth Every 6 (Six) Hours As Needed for Nausea or Vomiting., Disp: 8 tablet, Rfl: 0    Social History     Socioeconomic History   • Marital status:      Spouse name: Not on file   • Number of children: Not on file   • Years of education: Not on file   • Highest education level: Not on file   Tobacco Use   • Smoking status: Never Smoker   • Smokeless tobacco: Never Used   Substance and Sexual Activity   • Alcohol use: No     Frequency: Never   • Drug use: Yes     Types: Marijuana     Comment: helps with marijauna   • Sexual activity: Defer     Comment: no hormones   Social History Narrative    Lives in [facility] with boyfriend, daughter and her boyfriend and granddaughter and stepson.      Family History   Problem Relation Age of Onset   • Arthritis Mother    • Arthritis Father    • Diabetes Father    • Hyperlipidemia Father    • Hypertension Father    • Asthma Brother    • Other Brother    • Cancer Maternal Aunt    • Depression Paternal Uncle        Review of Systems   Constitutional: Positive for fatigue. Negative for chills, diaphoresis, fever and unexpected weight loss.   HENT: Negative for congestion and facial swelling.    Eyes: Negative for blurred vision, double vision and discharge.   Respiratory: Negative for chest tightness, shortness of breath and stridor.    Cardiovascular: Negative for chest pain, palpitations and leg swelling.   Gastrointestinal: Positive for GERD. Negative for blood in stool.   Endocrine: Negative for polydipsia.   Genitourinary: Negative for hematuria.   Musculoskeletal: Positive for arthralgias.   Skin: Negative for color change.   Allergic/Immunologic: Negative for immunocompromised state.   Neurological: Negative for confusion.   Psychiatric/Behavioral: Negative for self-injury.       I have reviewed the ROS and confirm that it's accurate today.    Physical Exam:  Vital Signs:  Weight: 111 kg (245 lb 8 oz)   Body  mass index is 38.45 kg/m².  Temp: 97.8 °F (36.6 °C)   Heart Rate: 77   BP: 126/84     Physical Exam   Constitutional: She is oriented to person, place, and time. She appears well-developed and well-nourished.   HENT:   Head: Normocephalic and atraumatic.   Nose: Nose normal.   Eyes: Conjunctivae and EOM are normal. Pupils are equal, round, and reactive to light.   Neck: Normal range of motion. Neck supple. Carotid bruit is not present. No tracheal deviation present. No thyromegaly present.   Cardiovascular: Normal rate, regular rhythm and normal heart sounds.   Pulmonary/Chest: Effort normal and breath sounds normal. No respiratory distress.   Abdominal: Soft. She exhibits no distension. There is no hepatosplenomegaly. There is no tenderness.   Scattered upper abdominal laparoscopy scars subumbilical vertical laparoscopy scar   Musculoskeletal: Normal range of motion. She exhibits no edema or deformity.   Neurological: She is alert and oriented to person, place, and time. No cranial nerve deficit. Coordination normal.   Skin: Skin is warm and dry. No rash noted.   Psychiatric: She has a normal mood and affect. Her behavior is normal. Judgment and thought content normal.   Vitals reviewed.      Patient Active Problem List   Diagnosis   • Acute suppurative otitis media of right ear with spontaneous rupture of tympanic membrane   • Depression   • Anxiety   • Diabetic peripheral neuropathy (CMS/HCC)   • Hypertension   • Hyperlipidemia   • Diverticulosis   • Diabetes mellitus (CMS/HCC)   • TIA (transient ischemic attack)   • Peripheral neuropathy   • GERD (gastroesophageal reflux disease)   • Bipolar 1 disorder (CMS/HCC)   • Bilateral ovarian cysts   • S/P cholecystectomy   • Headache   • Lower extremity edema   • RLS (restless legs syndrome)   • Shortness of breath on exertion   • Carpal tunnel syndrome   • History of blood transfusion   • Nausea   • Colon polyp   • Asthma   • Arthritis   • Boil   • Morbid obesity with  BMI of 40.0-44.9, adult (CMS/HCC)   • Pre-op evaluation   • Epigastric pain   • Morbid obesity due to excess calories (CMS/HCC)       Assessment:    Marcela Ramírez is a 54 y.o. year old female with medically complicated obesity.    Weight loss surgery is deemed medically necessary given the following obesity related comorbidities including arthritis, asthma, ovarian cyst, bipolar disorder, boils, carpal tunnel syndrome, colonic polyps, depression, type II non-insulin-dependent diabetes mellitus, diverticulosis, fatigue, GE reflux disease, headaches, hyperlipidemia, hypertension, peripheral edema, chronic nausea, peripheral neuropathy, restless leg syndrome, history of TIAs, chronic kidney disease with current Weight: 111 kg (245 lb 8 oz) and Body mass index is 38.45 kg/m²..    Encounter Diagnosis   Name Primary?   • Morbid obesity due to excess calories (CMS/HCC) Yes      Patient is aware that surgery is a tool, and that weight loss is not guaranteed but only seen in the context of appropriate use, follow up and exercise.    The patient was present for an approximately a 2.5 hour discussion of the purpose of weight loss surgery, how WLS is a tool to assist in achieving weight loss goals, the most common complications and how best to avoid them, and the strategies for short and long term weight loss.  Ample opportunity to discuss questions was available both in group and during the time of individual examination.    I reviewed [facility] report which is several pages and includes gabapentin Lyrica alprazolam pre-[facility] gastroenterology evaluation dated 4/19/2019 Dr. Gilberto Mark for nausea vomiting hematemesis.  EGD dated 4/19/2019 Dr. Mark showing no hiatal hernia, no obvious Kayla-Greenberg tear, enlarged folds of the antrum with gastritis, no active bleeding.  Pathology of the antrum polyp suggestive of hyperplastic gastric polyp and reactive gastropathic alteration.  Chest x-ray dated 4/11/2019 read as mild  vascular congestion and interstitial edema new from prior study please correlate for clinical evidence of mild volume overload.  Chest x-ray dated 4/9/2019 showing mild cardiac silhouette enlargement otherwise no active process.  EKG dated 4/10/2019 normal sinus rhythm.  CBC and CMP dated 8/9/2019 normal except for a glucose of 126 creatinine of 1.04 GFR 56 of note BUN was 19.  Psychological evaluation dated 1/29/2019 Maribel Hernandez, PhD good candidate.  Dietitian evaluation dated 1/29/2019 Marleny montemayor noting the patient gave a Pepsi 3 days ago (she has since resumed as above) and long-term has been drinking 2 to 4 L of Pepsi daily.  EGD by myself dated 2/18/2019 showing very thick mucosal folds possible prepyloric varices no hiatal hernia Z line 40 cm pathology of the antrum no significant histopathologic change and negative for H. pylori.  Biopsies of the distal esophagus suggestive but not diagnostic of reflux esophagitis.  Negative H. pylori stool antigen dated 7/17/2019.  Pulmonary function tests interpreted by Dr. Ariel Mercado MD showing mild airway obstruction with significant improvement with bronchodilators.  Of note FVC was 79 FEV1 74.  Note from Dr. Sweeney cardiology that it is okay to stop the patient's aspirin 1 week prior in 6 weeks after sleeve gastrectomy.  Cardiology clearance Dr. Sweeney dated 2/26/2019.  He did note at that time that she had suicidal thoughts.  Neurology clearance dated 2/6/2019 Gladys Wilder PA-C.  Nephrology clearance dated 7/22/2019 noting its related to heavy use of anti-inflammatory drugs in the past.  CT scan of the abdomen with IV and oral contrast dated 3/26/2019 with some questionable wall thickening of the fundus for which correlation with endoscopy is recommended, fatty infiltration of the liver.  No varices noted.  Diverticulosis is noted please see scanned records that I have reviewed and signed off on today.  All of this in addition to the patient's unique  "history and exam has been taken into consideration in determining their appropriate candidacy for weight loss surgery.    Complications  of laparoscopic/possible robotic gastric sleeve were discussed. The patient is well aware of the potential complications of surgery that include but not limited to bleeding, infections, deep venous thrombosis, pulmonary embolism, pulmonary complications such as pneumonia, cardiac events, hernias, small bowel obstruction, damage to the spleen or other organs, bowel injury, disfiguring scars, failure to lose weight, need for additional surgery, conversion to an open procedure, and death. Patient is also aware of complications which apply in this particular procedure that can include but are not limited to a \"leak\" at the staple line which in some instances may require conversion to gastric bypass.    The patient is aware if a hiatal hernia is encountered, it likely will be repaired.  R/B/A Rx to hiatal hernia repair were discussed as outlined in our long consent form.  Briefly risks in addition to those for LSG include recurrent hernia, MIYA, dysphagia, esophageal injury, pneumothorax, injury to the vagus nerves, injury to the thoracic duct, aorta or vena cava.    I discussed avoiding all tobacco products and second hand smoke at least 2 weeks pre-operatively and 6 weeks post-operatively to minimize the risk of sleeve leak.  This included discussing the importance of avoiding even secondhand smoke as the risk of leak is increased.  Examples discussed:  I made it very clear that the patient understands they should avoid even riding in a car where someone has previously smoked in the last 2 weeks, living in a house where someone smokes (even if it's in a separate room/patio/attached garage, etc.) we discussed that they should not have a conversation with a group of people who are smoking even if it's outside.  They can be around wood burning fires and barbecue.  I told them I do not " know if marijuana has a same effects but my overall recommendation is to avoid it for 2 weeks prior in 6 weeks after surgery.  They also are aware that nicotine may also increase the risk of leak and I strongly encouraged him to avoid that as well for 2 weeks prior in 6 weeks after surgery.    Discussed the risks, benefits and alternative therapies at great length as outlined in our extensive consent forms, consent videos, and educational teaching process under the direction of the center's .    A copy of the patient's signed informed consent is on file.    R/B/A Rx discussed to postop anticoagulation incl but not limited to bleeding, drug reaction, venothromboembolic events, etc. and the patient declined.    Plan: After evaluation today I think the patient is not an unreasonable candidate for laparoscopic sleeve gastrectomy.  She certainly has some significant medical and psychological issues but did pass her psychological evaluation.  Extremely poor dietitian evaluation and ongoing exposure to high fructose corn syrup in the form of Pepsi.  As discussed above I feel she did not seem to grasp weight loss concepts very well.  Both myself and Dr. Mark noted some significant abnormality with thickening of the mucosa in the antrum, biopsies were negative and CAT scan does not show any varices.  She has not been tested for gastroparesis and is a diabetic.  Given her pulmonary function test consider postoperative around-the-clock aerosols.  Other issues include bipolar disorder, chronic kidney disease, diabetes, history of previous blood transfusions, hyperlipidemia, hypertension, peripheral edema, chronic nausea, peripheral neuropathy, restless leg syndrome, history of TIAs.        Guido Greenberg MD

## 2019-08-19 NOTE — TELEPHONE ENCOUNTER
----- Message from Armida Babb sent at 8/19/2019  4:27 PM EDT -----  Contact: PATIENT  PATIENT WANTS DR. OLIVARES TO KNOW THAT HER GASTRO SLEEVE SURGERY IS Thursday.

## 2019-08-19 NOTE — H&P (VIEW-ONLY)
"[facility] BARIATRIC SURGERY  [address]  [facility] [id number] [phone]      Patient  Name:  Marcela Ramírez  :  1965      Date of Visit: 19    Chief Complaint:  weight gain; unable to maintain weight loss.  Evaluate for possible weight loss surgery    History of Present Illness:  Marcela Ramírez is a 54 y.o. female who presents today for evaluation, education and consultation regarding weight loss surgery.  Since last seen 2019 she has lost 9 pounds.The patient returns for final visit prior to LSG.  Original intake evaluation Janis España PA-C dated 2019 reviewed.    From her evaluation that day:     \"The most weight Marcela lost was 27 pounds on ate much less but was only able to maintain that weight loss for 1 month.  Her maximum lifetime weight is 285 pounds. Says she is \"tired of being fat, tired of not being able to wear real pants, tired of being exhausted.\" She has some friends who had bypass at other offices and \"failed\" also with a friend s/p LSG at [facility] and is doing great.      As above, patient has been overweight for many years, with numerous failed dietary/weight loss attempts.  She now has obesity related comorbidities and as such has decided to pursue weight loss surgery.     GI: GERD controlled with omeprazole 20mg. Remote EGD- esophagitis. No h/o HH or h pylori. S/p spenser with cholelithiasis. Has nausea wtih taking meds, avoid this by taking meds wtih milk     H/o TIA-mimicking symptoms x 8-9 episodes including right sided drooping/ weakness/ vision changes. Extensive workup was negative for CVA with angio imaging- per patient. Was determined to be related to anxiety/ getting really worked up. last episode 2017, says she has not been evaluated last 7 episodes, just waits in her house for symptoms to resolve. No residual effects. She sees neurologist for DM peripheral neuropathy. On ASA daily.      Psych: bipolar " "controlled on meds. Says she faithfully takes her bipolar meds to control symptoms, otherwise is \"really mean and angry\". Says her meds make her not care about anything, but people don't like her when she is off them.      Pulm:h/o asthma, has not required inhaler at all other than recent PNA.      DM: Diagnosed 2017, was on metformin in the past. Last A1C 7%\"       The patient has had issues with morbid obesity for years and only temporary success with non-surgical methods of weight loss.  The patient is seeking LSG to help with the morbid obesity related conditions of arthritis, asthma, ovarian cyst, bipolar disorder, boils, carpal tunnel syndrome, colonic polyps, depression, type II non-insulin-dependent diabetes mellitus, diverticulosis, fatigue, GE reflux disease, headaches, hyperlipidemia, hypertension, peripheral edema, chronic nausea, peripheral neuropathy, restless leg syndrome, history of TIAs, chronic kidney disease.    54-year-old morbidly obese white female from [place].  She dozed repeatedly during discussion of the risks benefits alternative therapies but did voice understanding to all matters concerning avoiding perioperative complications.  She said she tried to have weight loss surgery at [facility] 10 years ago but failed her psychological evaluation and was diagnosed with bipolar disorder at that time.  She has a very poor dietitian evaluation noting that she drank 2 to 4 L of Pepsi daily.  She says she has cut way back but had a few last week which she blames on her  enabling her.  I did emphasize that surgery will not be successful in the face of ongoing high fructose corn syrup but she seemed to have a little insight and seemed to have a poor grasp of discussed concepts in general.  She did pass her psychological evaluation.  She says her reflux symptoms are controlled with proton pump inhibitors.  She has never been told she had gastroparesis.  She said she tested negative for " "sleep apnea \"years ago\".  She said she was in the emergency room recently with a blood sugar of 672.      Past Medical History:   Diagnosis Date   • Arthritis     knees, otc arthritis meds prn, no h/o injections.    • Asthma     has not required inhaler   • Bilateral ovarian cysts    • Bipolar 1 disorder (CMS/[facility])    • Boil     opens them herself   • Carpal tunnel syndrome    • CKD (chronic kidney disease), symptom management only, stage 3 (moderate) (CMS/[facility])     Creatinine 1.04 GFR 56   • Colon polyp    • Depression    • Diabetes mellitus (CMS/[facility])     Diagnosed 2017, was on metformin in the past. Last A1C 7%   • Diverticulosis    • Fatigue    • GERD (gastroesophageal reflux disease)     controlled with omeprazole 20mg. Remote EGD- esophagitis.  EGD no hiatal hernia Z line 40 cm very thick mucosal folds cannot rule out varices.  CT scan thickened fundus, no varices seen   • Headache    • History of blood transfusion     2/2 heavy menses   • Hyperlipidemia    • Hypertension    • Lower extremity edema    • Morbid obesity with BMI of 40.0-44.9, adult (CMS/[facility])    • Nausea     wtih taking meds, avoid this by taking meds wtih milk   • Peripheral neuropathy     2/2 DM   • RLS (restless legs syndrome)    • S/P cholecystectomy     2/2 cholelithiasis   • Shortness of breath on exertion    • TIA (transient ischemic attack)     patient states 8-9 episodes of right sided drooping/ weakness/ vision changes. Workup was negative for CVA- thought to be related to anxiety. last episode 12/2017     Past Surgical History:   Procedure Laterality Date   • COLONOSCOPY  remote    diverticulosis   • ENDOSCOPY  remote    esophagitis    • KNEE ACL RECONSTRUCTION Right 2013    with miniscal repair   • LAPAROSCOPIC CHOLECYSTECTOMY  2001    cholelithiasis   • LAPAROSCOPIC HYSTERECTOMY  2013    right ovary remains       Allergies   Allergen Reactions   • Contrast Dye Swelling     Nasal congestion/ snot, IV contrast only       Current " Outpatient Medications:   •  ALPRAZolam (XANAX) 0.5 MG tablet, Take 1 tablet by mouth At Night As Needed for Anxiety., Disp: 30 tablet, Rfl: 5  •  atorvastatin (LIPITOR) 20 MG tablet, Take 1 tablet by mouth Daily., Disp: 30 tablet, Rfl: 11  •  desvenlafaxine (PRISTIQ) 50 MG 24 hr tablet, Take 50 mg by mouth Daily., Disp: , Rfl:   •  hydrochlorothiazide (HYDRODIURIL) 25 MG tablet, TAKE ONE TABLET BY MOUTH DAILY, Disp: 30 tablet, Rfl: 1  •  LamoTRIgine (LAMICTAL PO), Take 1 tablet by mouth Every Night. Pt is on 200mg, Disp: , Rfl:   •  lisinopril (PRINIVIL,ZESTRIL) 20 MG tablet, Take 1 tablet by mouth Daily., Disp: 30 tablet, Rfl: 11  •  LYRICA 50 MG capsule, TAKE ONE CAPSULE BY MOUTH EVERY NIGHT AT BEDTIME FOR 7 DAYS, THEN TWO CAPSULES EVERY NIGHT AT BEDTIME THEREAFTER, Disp: 60 capsule, Rfl: 4  •  metFORMIN ER (GLUCOPHAGE-XR) 500 MG 24 hr tablet, Take 1 tablet by mouth 2 (Two) Times a Day Before Meals., Disp: 60 tablet, Rfl: 5  •  omeprazole (priLOSEC) 20 MG capsule, TAKE ONE CAPSULE BY MOUTH DAILY, Disp: 30 capsule, Rfl: 1  •  ondansetron ODT (ZOFRAN-ODT) 4 MG disintegrating tablet, Take 1 tablet by mouth Every 6 (Six) Hours As Needed for Nausea or Vomiting., Disp: 30 tablet, Rfl: 1  •  ONE TOUCH ULTRA TEST test strip, TEST GLUCOSE TWO TIMES A DAY, Disp: 100 each, Rfl: 12  •  pregabalin (LYRICA) 25 MG capsule, Take 25 mg by mouth 2 (Two) Times a Day., Disp: , Rfl:   •  rOPINIRole (REQUIP) 2 MG tablet, Take 2 tablets by mouth Every Night., Disp: 60 tablet, Rfl: 11  •  aspirin 81 MG EC tablet, Take 1 tablet by mouth Daily., Disp: 30 tablet, Rfl: 0  •  ipratropium (ATROVENT HFA) 17 MCG/ACT inhaler, Inhale 2 puffs 4 (Four) Times a Day. (Patient taking differently: Inhale 2 puffs As Needed.), Disp: 1 inhaler, Rfl: 0  •  nitroglycerin (NITROSTAT) 0.4 MG SL tablet, Place 1 tablet under the tongue Every 5 (Five) Minutes As Needed for Chest Pain. Take no more than 3 doses in 15 minutes., Disp: 100 tablet, Rfl: 0  •   promethazine (PHENERGAN) 25 MG tablet, Take 1 tablet by mouth Every 6 (Six) Hours As Needed for Nausea or Vomiting., Disp: 8 tablet, Rfl: 0    Social History     Socioeconomic History   • Marital status:      Spouse name: Not on file   • Number of children: Not on file   • Years of education: Not on file   • Highest education level: Not on file   Tobacco Use   • Smoking status: Never Smoker   • Smokeless tobacco: Never Used   Substance and Sexual Activity   • Alcohol use: No     Frequency: Never   • Drug use: Yes     Types: Marijuana     Comment: helps with marijauna   • Sexual activity: Defer     Comment: no hormones   Social History Narrative    Lives in [place] with boyfriend, daughter and her boyfriend and granddaughter and stepson.      Family History   Problem Relation Age of Onset   • Arthritis Mother    • Arthritis Father    • Diabetes Father    • Hyperlipidemia Father    • Hypertension Father    • Asthma Brother    • Other Brother    • Cancer Maternal Aunt    • Depression Paternal Uncle        Review of Systems   Constitutional: Positive for fatigue. Negative for chills, diaphoresis, fever and unexpected weight loss.   HENT: Negative for congestion and facial swelling.    Eyes: Negative for blurred vision, double vision and discharge.   Respiratory: Negative for chest tightness, shortness of breath and stridor.    Cardiovascular: Negative for chest pain, palpitations and leg swelling.   Gastrointestinal: Positive for GERD. Negative for blood in stool.   Endocrine: Negative for polydipsia.   Genitourinary: Negative for hematuria.   Musculoskeletal: Positive for arthralgias.   Skin: Negative for color change.   Allergic/Immunologic: Negative for immunocompromised state.   Neurological: Negative for confusion.   Psychiatric/Behavioral: Negative for self-injury.       I have reviewed the ROS and confirm that it's accurate today.    Physical Exam:  Vital Signs:  Weight: 111 kg (245 lb 8 oz)   Body  mass index is 38.45 kg/m².  Temp: 97.8 °F (36.6 °C)   Heart Rate: 77   BP: 126/84     Physical Exam   Constitutional: She is oriented to person, place, and time. She appears well-developed and well-nourished.   HENT:   Head: Normocephalic and atraumatic.   Nose: Nose normal.   Eyes: Conjunctivae and EOM are normal. Pupils are equal, round, and reactive to light.   Neck: Normal range of motion. Neck supple. Carotid bruit is not present. No tracheal deviation present. No thyromegaly present.   Cardiovascular: Normal rate, regular rhythm and normal heart sounds.   Pulmonary/Chest: Effort normal and breath sounds normal. No respiratory distress.   Abdominal: Soft. She exhibits no distension. There is no hepatosplenomegaly. There is no tenderness.   Scattered upper abdominal laparoscopy scars subumbilical vertical laparoscopy scar   Musculoskeletal: Normal range of motion. She exhibits no edema or deformity.   Neurological: She is alert and oriented to person, place, and time. No cranial nerve deficit. Coordination normal.   Skin: Skin is warm and dry. No rash noted.   Psychiatric: She has a normal mood and affect. Her behavior is normal. Judgment and thought content normal.   Vitals reviewed.      Patient Active Problem List   Diagnosis   • Acute suppurative otitis media of right ear with spontaneous rupture of tympanic membrane   • Depression   • Anxiety   • Diabetic peripheral neuropathy (CMS/HCC)   • Hypertension   • Hyperlipidemia   • Diverticulosis   • Diabetes mellitus (CMS/HCC)   • TIA (transient ischemic attack)   • Peripheral neuropathy   • GERD (gastroesophageal reflux disease)   • Bipolar 1 disorder (CMS/HCC)   • Bilateral ovarian cysts   • S/P cholecystectomy   • Headache   • Lower extremity edema   • RLS (restless legs syndrome)   • Shortness of breath on exertion   • Carpal tunnel syndrome   • History of blood transfusion   • Nausea   • Colon polyp   • Asthma   • Arthritis   • Boil   • Morbid obesity with  BMI of 40.0-44.9, adult (CMS/HCC)   • Pre-op evaluation   • Epigastric pain   • Morbid obesity due to excess calories (CMS/HCC)       Assessment:    Marcela Ramírez is a 54 y.o. year old female with medically complicated obesity.    Weight loss surgery is deemed medically necessary given the following obesity related comorbidities including arthritis, asthma, ovarian cyst, bipolar disorder, boils, carpal tunnel syndrome, colonic polyps, depression, type II non-insulin-dependent diabetes mellitus, diverticulosis, fatigue, GE reflux disease, headaches, hyperlipidemia, hypertension, peripheral edema, chronic nausea, peripheral neuropathy, restless leg syndrome, history of TIAs, chronic kidney disease with current Weight: 111 kg (245 lb 8 oz) and Body mass index is 38.45 kg/m²..    Encounter Diagnosis   Name Primary?   • Morbid obesity due to excess calories (CMS/HCC) Yes      Patient is aware that surgery is a tool, and that weight loss is not guaranteed but only seen in the context of appropriate use, follow up and exercise.    The patient was present for an approximately a 2.5 hour discussion of the purpose of weight loss surgery, how WLS is a tool to assist in achieving weight loss goals, the most common complications and how best to avoid them, and the strategies for short and long term weight loss.  Ample opportunity to discuss questions was available both in group and during the time of individual examination.    I reviewed [facility] report which is several pages and includes gabapentin Lyrica alprazolam pre-[facility] gastroenterology evaluation dated 4/19/2019 Dr. Gilberto Mark for nausea vomiting hematemesis.  EGD dated 4/19/2019 Dr. Mark showing no hiatal hernia, no obvious Kayla-Greenberg tear, enlarged folds of the antrum with gastritis, no active bleeding.  Pathology of the antrum polyp suggestive of hyperplastic gastric polyp and reactive gastropathic alteration.  Chest x-ray dated 4/11/2019 read as mild  vascular congestion and interstitial edema new from prior study please correlate for clinical evidence of mild volume overload.  Chest x-ray dated 4/9/2019 showing mild cardiac silhouette enlargement otherwise no active process.  EKG dated 4/10/2019 normal sinus rhythm.  CBC and CMP dated 8/9/2019 normal except for a glucose of 126 creatinine of 1.04 GFR 56 of note BUN was 19.  Psychological evaluation dated 1/29/2019 Maribel Hernandez, PhD good candidate.  Dietitian evaluation dated 1/29/2019 Marleny montemayor noting the patient gave a Pepsi 3 days ago (she has since resumed as above) and long-term has been drinking 2 to 4 L of Pepsi daily.  EGD by myself dated 2/18/2019 showing very thick mucosal folds possible prepyloric varices no hiatal hernia Z line 40 cm pathology of the antrum no significant histopathologic change and negative for H. pylori.  Biopsies of the distal esophagus suggestive but not diagnostic of reflux esophagitis.  Negative H. pylori stool antigen dated 7/17/2019.  Pulmonary function tests interpreted by Dr. Ariel Mercado MD showing mild airway obstruction with significant improvement with bronchodilators.  Of note FVC was 79 FEV1 74.  Note from Dr. Sweeney cardiology that it is okay to stop the patient's aspirin 1 week prior in 6 weeks after sleeve gastrectomy.  Cardiology clearance Dr. Sweeney dated 2/26/2019.  He did note at that time that she had suicidal thoughts.  Neurology clearance dated 2/6/2019 Gladys Wilder PA-C.  Nephrology clearance dated 7/22/2019 noting its related to heavy use of anti-inflammatory drugs in the past.  CT scan of the abdomen with IV and oral contrast dated 3/26/2019 with some questionable wall thickening of the fundus for which correlation with endoscopy is recommended, fatty infiltration of the liver.  No varices noted.  Diverticulosis is noted please see scanned records that I have reviewed and signed off on today.  All of this in addition to the patient's unique  "history and exam has been taken into consideration in determining their appropriate candidacy for weight loss surgery.    Complications  of laparoscopic/possible robotic gastric sleeve were discussed. The patient is well aware of the potential complications of surgery that include but not limited to bleeding, infections, deep venous thrombosis, pulmonary embolism, pulmonary complications such as pneumonia, cardiac events, hernias, small bowel obstruction, damage to the spleen or other organs, bowel injury, disfiguring scars, failure to lose weight, need for additional surgery, conversion to an open procedure, and death. Patient is also aware of complications which apply in this particular procedure that can include but are not limited to a \"leak\" at the staple line which in some instances may require conversion to gastric bypass.    The patient is aware if a hiatal hernia is encountered, it likely will be repaired.  R/B/A Rx to hiatal hernia repair were discussed as outlined in our long consent form.  Briefly risks in addition to those for LSG include recurrent hernia, MIYA, dysphagia, esophageal injury, pneumothorax, injury to the vagus nerves, injury to the thoracic duct, aorta or vena cava.    I discussed avoiding all tobacco products and second hand smoke at least 2 weeks pre-operatively and 6 weeks post-operatively to minimize the risk of sleeve leak.  This included discussing the importance of avoiding even secondhand smoke as the risk of leak is increased.  Examples discussed:  I made it very clear that the patient understands they should avoid even riding in a car where someone has previously smoked in the last 2 weeks, living in a house where someone smokes (even if it's in a separate room/patio/attached garage, etc.) we discussed that they should not have a conversation with a group of people who are smoking even if it's outside.  They can be around wood burning fires and barbecue.  I told them I do not " know if marijuana has a same effects but my overall recommendation is to avoid it for 2 weeks prior in 6 weeks after surgery.  They also are aware that nicotine may also increase the risk of leak and I strongly encouraged him to avoid that as well for 2 weeks prior in 6 weeks after surgery.    Discussed the risks, benefits and alternative therapies at great length as outlined in our extensive consent forms, consent videos, and educational teaching process under the direction of the center's .    A copy of the patient's signed informed consent is on file.    R/B/A Rx discussed to postop anticoagulation incl but not limited to bleeding, drug reaction, venothromboembolic events, etc. and the patient declined.    Plan: After evaluation today I think the patient is not an unreasonable candidate for laparoscopic sleeve gastrectomy.  She certainly has some significant medical and psychological issues but did pass her psychological evaluation.  Extremely poor dietitian evaluation and ongoing exposure to high fructose corn syrup in the form of Pepsi.  As discussed above I feel she did not seem to grasp weight loss concepts very well.  Both myself and Dr. Mark noted some significant abnormality with thickening of the mucosa in the antrum, biopsies were negative and CAT scan does not show any varices.  She has not been tested for gastroparesis and is a diabetic.  Given her pulmonary function test consider postoperative around-the-clock aerosols.  Other issues include bipolar disorder, chronic kidney disease, diabetes, history of previous blood transfusions, hyperlipidemia, hypertension, peripheral edema, chronic nausea, peripheral neuropathy, restless leg syndrome, history of TIAs.        Guido Greenberg MD

## 2019-08-21 ENCOUNTER — APPOINTMENT (OUTPATIENT)
Dept: PREADMISSION TESTING | Facility: HOSPITAL | Age: 54
End: 2019-08-21

## 2019-08-21 DIAGNOSIS — E66.01 MORBID OBESITY DUE TO EXCESS CALORIES (HCC): ICD-10-CM

## 2019-08-21 LAB
ABO GROUP BLD: NORMAL
BLD GP AB SCN SERPL QL: NEGATIVE
HBA1C MFR BLD: 6.5 % (ref 4.8–5.6)
RH BLD: POSITIVE
T&S EXPIRATION DATE: NORMAL

## 2019-08-21 PROCEDURE — 36415 COLL VENOUS BLD VENIPUNCTURE: CPT

## 2019-08-21 PROCEDURE — 86900 BLOOD TYPING SEROLOGIC ABO: CPT | Performed by: SURGERY

## 2019-08-21 PROCEDURE — 86901 BLOOD TYPING SEROLOGIC RH(D): CPT | Performed by: SURGERY

## 2019-08-21 PROCEDURE — 83036 HEMOGLOBIN GLYCOSYLATED A1C: CPT | Performed by: ANESTHESIOLOGY

## 2019-08-21 PROCEDURE — 86850 RBC ANTIBODY SCREEN: CPT | Performed by: SURGERY

## 2019-08-21 NOTE — PAT
Patient to apply Chlorhexadine wipes  to surgical area (as instructed) the night before procedure and the AM of procedure. Wipes provided.    Patient instructed to drink 20 ounces (or until full) of Gatorade and it needs to be completed 1 hour before given arrival time for procedure (NO RED Gatorade)    Patient verbalized understanding.

## 2019-08-22 ENCOUNTER — ANESTHESIA (OUTPATIENT)
Dept: PERIOP | Facility: HOSPITAL | Age: 54
End: 2019-08-22

## 2019-08-22 ENCOUNTER — HOSPITAL ENCOUNTER (INPATIENT)
Facility: HOSPITAL | Age: 54
LOS: 2 days | Discharge: HOME | End: 2019-08-24
Attending: SURGERY | Admitting: SURGERY

## 2019-08-22 ENCOUNTER — ANESTHESIA EVENT (OUTPATIENT)
Dept: PERIOP | Facility: HOSPITAL | Age: 54
End: 2019-08-22

## 2019-08-22 DIAGNOSIS — E66.01 MORBID OBESITY DUE TO EXCESS CALORIES (HCC): ICD-10-CM

## 2019-08-22 LAB
ABO GROUP BLD: NORMAL
GLUCOSE BLDC GLUCOMTR-MCNC: 112 MG/DL (ref 70–130)
GLUCOSE BLDC GLUCOMTR-MCNC: 128 MG/DL (ref 70–130)
GLUCOSE BLDC GLUCOMTR-MCNC: 176 MG/DL (ref 70–130)
GLUCOSE BLDC GLUCOMTR-MCNC: 203 MG/DL (ref 70–130)
POTASSIUM BLD-SCNC: 3.7 MMOL/L (ref 3.5–5.2)
RH BLD: POSITIVE

## 2019-08-22 PROCEDURE — 0DB64Z3 EXCISION OF STOMACH, PERCUTANEOUS ENDOSCOPIC APPROACH, VERTICAL: ICD-10-PCS | Performed by: SURGERY

## 2019-08-22 PROCEDURE — 25010000002 ONDANSETRON PER 1 MG: Performed by: NURSE ANESTHETIST, CERTIFIED REGISTERED

## 2019-08-22 PROCEDURE — 86900 BLOOD TYPING SEROLOGIC ABO: CPT

## 2019-08-22 PROCEDURE — 25010000002 PROPOFOL 1000 MG/ML EMULSION: Performed by: NURSE ANESTHETIST, CERTIFIED REGISTERED

## 2019-08-22 PROCEDURE — 25010000002 PROPOFOL 10 MG/ML EMULSION: Performed by: NURSE ANESTHETIST, CERTIFIED REGISTERED

## 2019-08-22 PROCEDURE — 25010000002 NEOSTIGMINE 10 MG/10ML SOLUTION: Performed by: NURSE ANESTHETIST, CERTIFIED REGISTERED

## 2019-08-22 PROCEDURE — 25010000002 DEXAMETHASONE PER 1 MG: Performed by: NURSE ANESTHETIST, CERTIFIED REGISTERED

## 2019-08-22 PROCEDURE — 63710000001 INSULIN LISPRO (HUMAN) PER 5 UNITS: Performed by: SURGERY

## 2019-08-22 PROCEDURE — 25010000002 BUPRENORPHINE PER 0.1 MG: Performed by: ANESTHESIOLOGY

## 2019-08-22 PROCEDURE — 86901 BLOOD TYPING SEROLOGIC RH(D): CPT

## 2019-08-22 PROCEDURE — 43281 LAP PARAESOPHAG HERN REPAIR: CPT | Performed by: SURGERY

## 2019-08-22 PROCEDURE — 43775 LAP SLEEVE GASTRECTOMY: CPT | Performed by: SURGERY

## 2019-08-22 PROCEDURE — 0BQT4ZZ REPAIR DIAPHRAGM, PERCUTANEOUS ENDOSCOPIC APPROACH: ICD-10-PCS | Performed by: SURGERY

## 2019-08-22 PROCEDURE — 88307 TISSUE EXAM BY PATHOLOGIST: CPT | Performed by: SURGERY

## 2019-08-22 PROCEDURE — 82962 GLUCOSE BLOOD TEST: CPT

## 2019-08-22 PROCEDURE — 94799 UNLISTED PULMONARY SVC/PX: CPT

## 2019-08-22 PROCEDURE — 84132 ASSAY OF SERUM POTASSIUM: CPT | Performed by: ANESTHESIOLOGY

## 2019-08-22 PROCEDURE — 63710000001 INSULIN LISPRO (HUMAN) PER 5 UNITS

## 2019-08-22 PROCEDURE — 25010000002 DEXAMETHASONE SODIUM PHOSPHATE 10 MG/ML SOLUTION: Performed by: ANESTHESIOLOGY

## 2019-08-22 PROCEDURE — 0DJ08ZZ INSPECTION OF UPPER INTESTINAL TRACT, VIA NATURAL OR ARTIFICIAL OPENING ENDOSCOPIC: ICD-10-PCS | Performed by: SURGERY

## 2019-08-22 PROCEDURE — 25010000002 MIDAZOLAM PER 1 MG: Performed by: ANESTHESIOLOGY

## 2019-08-22 PROCEDURE — 25010000002 CEFAZOLIN PER 500 MG: Performed by: SURGERY

## 2019-08-22 PROCEDURE — 94640 AIRWAY INHALATION TREATMENT: CPT

## 2019-08-22 PROCEDURE — 25010000002 ENOXAPARIN PER 10 MG: Performed by: SURGERY

## 2019-08-22 PROCEDURE — 25010000002 FENTANYL CITRATE (PF) 100 MCG/2ML SOLUTION: Performed by: NURSE ANESTHETIST, CERTIFIED REGISTERED

## 2019-08-22 PROCEDURE — 94660 CPAP INITIATION&MGMT: CPT

## 2019-08-22 DEVICE — REINFORCED INTELLIGENT RELOAD, FOR USE WITH SIGNIA STAPLING SYSTEM
Type: IMPLANTABLE DEVICE | Site: ABDOMEN | Status: FUNCTIONAL
Brand: TRI-STAPLE 2.0

## 2019-08-22 DEVICE — BLACK REINFORCED INTELLIGENT RELOAD, FOR USE WITH SIGNIA STAPLING SYSTEM
Type: IMPLANTABLE DEVICE | Site: ABDOMEN | Status: FUNCTIONAL
Brand: TRI-STAPLE 2.0

## 2019-08-22 RX ORDER — ACETAMINOPHEN 500 MG
1000 TABLET ORAL EVERY 12 HOURS
Status: COMPLETED | OUTPATIENT
Start: 2019-08-22 | End: 2019-08-24

## 2019-08-22 RX ORDER — HYDROMORPHONE HYDROCHLORIDE 2 MG/1
2 TABLET ORAL EVERY 4 HOURS PRN
Status: DISCONTINUED | OUTPATIENT
Start: 2019-08-22 | End: 2019-08-24 | Stop reason: HOSPADM

## 2019-08-22 RX ORDER — ONDANSETRON 2 MG/ML
4 INJECTION INTRAMUSCULAR; INTRAVENOUS ONCE AS NEEDED
Status: DISCONTINUED | OUTPATIENT
Start: 2019-08-22 | End: 2019-08-22 | Stop reason: HOSPADM

## 2019-08-22 RX ORDER — SODIUM CHLORIDE AND POTASSIUM CHLORIDE 150; 450 MG/100ML; MG/100ML
125 INJECTION, SOLUTION INTRAVENOUS CONTINUOUS
Status: DISCONTINUED | OUTPATIENT
Start: 2019-08-23 | End: 2019-08-24 | Stop reason: HOSPADM

## 2019-08-22 RX ORDER — FAMOTIDINE 10 MG/ML
20 INJECTION, SOLUTION INTRAVENOUS ONCE
Status: CANCELLED | OUTPATIENT
Start: 2019-08-22 | End: 2019-08-22

## 2019-08-22 RX ORDER — ALBUTEROL SULFATE 2.5 MG/3ML
2.5 SOLUTION RESPIRATORY (INHALATION)
Status: DISCONTINUED | OUTPATIENT
Start: 2019-08-22 | End: 2019-08-24

## 2019-08-22 RX ORDER — PROMETHAZINE HYDROCHLORIDE 25 MG/ML
6.25 INJECTION, SOLUTION INTRAMUSCULAR; INTRAVENOUS ONCE AS NEEDED
Status: DISCONTINUED | OUTPATIENT
Start: 2019-08-22 | End: 2019-08-22 | Stop reason: HOSPADM

## 2019-08-22 RX ORDER — SODIUM CHLORIDE 0.9 % (FLUSH) 0.9 %
3-10 SYRINGE (ML) INJECTION AS NEEDED
Status: DISCONTINUED | OUTPATIENT
Start: 2019-08-22 | End: 2019-08-22 | Stop reason: HOSPADM

## 2019-08-22 RX ORDER — MIDAZOLAM HYDROCHLORIDE 1 MG/ML
2 INJECTION INTRAMUSCULAR; INTRAVENOUS ONCE
Status: COMPLETED | OUTPATIENT
Start: 2019-08-22 | End: 2019-08-22

## 2019-08-22 RX ORDER — OXYCODONE AND ACETAMINOPHEN 10; 325 MG/1; MG/1
1 TABLET ORAL EVERY 4 HOURS PRN
Status: DISCONTINUED | OUTPATIENT
Start: 2019-08-22 | End: 2019-08-22 | Stop reason: HOSPADM

## 2019-08-22 RX ORDER — DEXAMETHASONE SODIUM PHOSPHATE 10 MG/ML
INJECTION, SOLUTION INTRAMUSCULAR; INTRAVENOUS
Status: COMPLETED | OUTPATIENT
Start: 2019-08-22 | End: 2019-08-22

## 2019-08-22 RX ORDER — PROMETHAZINE HYDROCHLORIDE 25 MG/ML
12.5 INJECTION, SOLUTION INTRAMUSCULAR; INTRAVENOUS EVERY 6 HOURS PRN
Status: DISCONTINUED | OUTPATIENT
Start: 2019-08-22 | End: 2019-08-24 | Stop reason: HOSPADM

## 2019-08-22 RX ORDER — NALOXONE HCL 0.4 MG/ML
0.4 VIAL (ML) INJECTION
Status: DISCONTINUED | OUTPATIENT
Start: 2019-08-22 | End: 2019-08-24 | Stop reason: HOSPADM

## 2019-08-22 RX ORDER — ACETAMINOPHEN 325 MG/1
650 TABLET ORAL ONCE AS NEEDED
Status: DISCONTINUED | OUTPATIENT
Start: 2019-08-22 | End: 2019-08-22 | Stop reason: HOSPADM

## 2019-08-22 RX ORDER — ACETAMINOPHEN 650 MG/1
650 SUPPOSITORY RECTAL ONCE AS NEEDED
Status: DISCONTINUED | OUTPATIENT
Start: 2019-08-22 | End: 2019-08-22 | Stop reason: HOSPADM

## 2019-08-22 RX ORDER — HYDROMORPHONE HYDROCHLORIDE 1 MG/ML
0.5 INJECTION, SOLUTION INTRAMUSCULAR; INTRAVENOUS; SUBCUTANEOUS
Status: DISCONTINUED | OUTPATIENT
Start: 2019-08-22 | End: 2019-08-22 | Stop reason: HOSPADM

## 2019-08-22 RX ORDER — HYDRALAZINE HYDROCHLORIDE 20 MG/ML
10 INJECTION INTRAMUSCULAR; INTRAVENOUS
Status: DISCONTINUED | OUTPATIENT
Start: 2019-08-22 | End: 2019-08-24 | Stop reason: HOSPADM

## 2019-08-22 RX ORDER — HYDROCODONE BITARTRATE AND ACETAMINOPHEN 7.5; 325 MG/1; MG/1
1 TABLET ORAL EVERY 4 HOURS PRN
Status: DISCONTINUED | OUTPATIENT
Start: 2019-08-22 | End: 2019-08-24 | Stop reason: HOSPADM

## 2019-08-22 RX ORDER — FAMOTIDINE 20 MG/1
20 TABLET, FILM COATED ORAL ONCE
Status: CANCELLED | OUTPATIENT
Start: 2019-08-22 | End: 2019-08-22

## 2019-08-22 RX ORDER — GLYCOPYRROLATE 0.2 MG/ML
INJECTION INTRAMUSCULAR; INTRAVENOUS AS NEEDED
Status: DISCONTINUED | OUTPATIENT
Start: 2019-08-22 | End: 2019-08-22 | Stop reason: SURG

## 2019-08-22 RX ORDER — SODIUM CHLORIDE 9 MG/ML
INJECTION, SOLUTION INTRAVENOUS AS NEEDED
Status: DISCONTINUED | OUTPATIENT
Start: 2019-08-22 | End: 2019-08-22 | Stop reason: HOSPADM

## 2019-08-22 RX ORDER — ROCURONIUM BROMIDE 10 MG/ML
INJECTION, SOLUTION INTRAVENOUS AS NEEDED
Status: DISCONTINUED | OUTPATIENT
Start: 2019-08-22 | End: 2019-08-22 | Stop reason: SURG

## 2019-08-22 RX ORDER — FENTANYL CITRATE 50 UG/ML
INJECTION, SOLUTION INTRAMUSCULAR; INTRAVENOUS AS NEEDED
Status: DISCONTINUED | OUTPATIENT
Start: 2019-08-22 | End: 2019-08-22 | Stop reason: SURG

## 2019-08-22 RX ORDER — SCOLOPAMINE TRANSDERMAL SYSTEM 1 MG/1
1 PATCH, EXTENDED RELEASE TRANSDERMAL ONCE
Status: DISCONTINUED | OUTPATIENT
Start: 2019-08-22 | End: 2019-08-22

## 2019-08-22 RX ORDER — BUPRENORPHINE HYDROCHLORIDE 0.32 MG/ML
INJECTION INTRAMUSCULAR; INTRAVENOUS
Status: COMPLETED | OUTPATIENT
Start: 2019-08-22 | End: 2019-08-22

## 2019-08-22 RX ORDER — VECURONIUM BROMIDE 1 MG/ML
INJECTION, POWDER, LYOPHILIZED, FOR SOLUTION INTRAVENOUS AS NEEDED
Status: DISCONTINUED | OUTPATIENT
Start: 2019-08-22 | End: 2019-08-22 | Stop reason: SURG

## 2019-08-22 RX ORDER — GABAPENTIN 300 MG/1
600 CAPSULE ORAL ONCE
Status: COMPLETED | OUTPATIENT
Start: 2019-08-22 | End: 2019-08-22

## 2019-08-22 RX ORDER — FENTANYL CITRATE 50 UG/ML
50 INJECTION, SOLUTION INTRAMUSCULAR; INTRAVENOUS
Status: DISCONTINUED | OUTPATIENT
Start: 2019-08-22 | End: 2019-08-22 | Stop reason: HOSPADM

## 2019-08-22 RX ORDER — SODIUM CHLORIDE 0.9 % (FLUSH) 0.9 %
3 SYRINGE (ML) INJECTION EVERY 12 HOURS SCHEDULED
Status: DISCONTINUED | OUTPATIENT
Start: 2019-08-22 | End: 2019-08-22 | Stop reason: HOSPADM

## 2019-08-22 RX ORDER — PANTOPRAZOLE SODIUM 40 MG/10ML
40 INJECTION, POWDER, LYOPHILIZED, FOR SOLUTION INTRAVENOUS ONCE
Status: COMPLETED | OUTPATIENT
Start: 2019-08-22 | End: 2019-08-22

## 2019-08-22 RX ORDER — LAMOTRIGINE 100 MG/1
200 TABLET ORAL NIGHTLY
Status: DISCONTINUED | OUTPATIENT
Start: 2019-08-22 | End: 2019-08-24 | Stop reason: HOSPADM

## 2019-08-22 RX ORDER — LORAZEPAM 1 MG/1
1 TABLET ORAL EVERY 12 HOURS PRN
Status: DISCONTINUED | OUTPATIENT
Start: 2019-08-22 | End: 2019-08-24 | Stop reason: HOSPADM

## 2019-08-22 RX ORDER — ACETAMINOPHEN 500 MG
1000 TABLET ORAL ONCE
Status: COMPLETED | OUTPATIENT
Start: 2019-08-22 | End: 2019-08-22

## 2019-08-22 RX ORDER — LORAZEPAM 2 MG/ML
0.5 INJECTION INTRAMUSCULAR EVERY 12 HOURS PRN
Status: DISCONTINUED | OUTPATIENT
Start: 2019-08-22 | End: 2019-08-24 | Stop reason: HOSPADM

## 2019-08-22 RX ORDER — MAGNESIUM HYDROXIDE 1200 MG/15ML
LIQUID ORAL AS NEEDED
Status: DISCONTINUED | OUTPATIENT
Start: 2019-08-22 | End: 2019-08-22 | Stop reason: HOSPADM

## 2019-08-22 RX ORDER — ATORVASTATIN CALCIUM 20 MG/1
20 TABLET, FILM COATED ORAL NIGHTLY
Status: DISCONTINUED | OUTPATIENT
Start: 2019-08-22 | End: 2019-08-24 | Stop reason: HOSPADM

## 2019-08-22 RX ORDER — PROPOFOL 10 MG/ML
VIAL (ML) INTRAVENOUS AS NEEDED
Status: DISCONTINUED | OUTPATIENT
Start: 2019-08-22 | End: 2019-08-22 | Stop reason: SURG

## 2019-08-22 RX ORDER — DIPHENHYDRAMINE HYDROCHLORIDE 50 MG/ML
25 INJECTION INTRAMUSCULAR; INTRAVENOUS EVERY 4 HOURS PRN
Status: DISCONTINUED | OUTPATIENT
Start: 2019-08-22 | End: 2019-08-24 | Stop reason: HOSPADM

## 2019-08-22 RX ORDER — SODIUM CHLORIDE, SODIUM LACTATE, POTASSIUM CHLORIDE, CALCIUM CHLORIDE 600; 310; 30; 20 MG/100ML; MG/100ML; MG/100ML; MG/100ML
9 INJECTION, SOLUTION INTRAVENOUS CONTINUOUS
Status: DISCONTINUED | OUTPATIENT
Start: 2019-08-22 | End: 2019-08-22 | Stop reason: HOSPADM

## 2019-08-22 RX ORDER — CEFAZOLIN SODIUM 2 G/100ML
2 INJECTION, SOLUTION INTRAVENOUS EVERY 8 HOURS
Status: COMPLETED | OUTPATIENT
Start: 2019-08-22 | End: 2019-08-23

## 2019-08-22 RX ORDER — CEFAZOLIN SODIUM 2 G/100ML
2 INJECTION, SOLUTION INTRAVENOUS
Status: DISCONTINUED | OUTPATIENT
Start: 2019-08-22 | End: 2019-08-22 | Stop reason: HOSPADM

## 2019-08-22 RX ORDER — ONDANSETRON 2 MG/ML
INJECTION INTRAMUSCULAR; INTRAVENOUS AS NEEDED
Status: DISCONTINUED | OUTPATIENT
Start: 2019-08-22 | End: 2019-08-22 | Stop reason: SURG

## 2019-08-22 RX ORDER — SIMETHICONE 80 MG
80 TABLET,CHEWABLE ORAL 4 TIMES DAILY PRN
Status: DISCONTINUED | OUTPATIENT
Start: 2019-08-22 | End: 2019-08-24 | Stop reason: HOSPADM

## 2019-08-22 RX ORDER — NITROGLYCERIN 0.4 MG/1
0.4 TABLET SUBLINGUAL
Status: DISCONTINUED | OUTPATIENT
Start: 2019-08-22 | End: 2019-08-24 | Stop reason: HOSPADM

## 2019-08-22 RX ORDER — IPRATROPIUM BROMIDE AND ALBUTEROL SULFATE 2.5; .5 MG/3ML; MG/3ML
3 SOLUTION RESPIRATORY (INHALATION) ONCE AS NEEDED
Status: DISCONTINUED | OUTPATIENT
Start: 2019-08-22 | End: 2019-08-22 | Stop reason: HOSPADM

## 2019-08-22 RX ORDER — PROCHLORPERAZINE MALEATE 10 MG
10 TABLET ORAL EVERY 6 HOURS PRN
Status: DISCONTINUED | OUTPATIENT
Start: 2019-08-22 | End: 2019-08-24 | Stop reason: HOSPADM

## 2019-08-22 RX ORDER — NEOSTIGMINE METHYLSULFATE 1 MG/ML
INJECTION, SOLUTION INTRAVENOUS AS NEEDED
Status: DISCONTINUED | OUTPATIENT
Start: 2019-08-22 | End: 2019-08-22 | Stop reason: SURG

## 2019-08-22 RX ORDER — OXYCODONE AND ACETAMINOPHEN 7.5; 325 MG/1; MG/1
1 TABLET ORAL ONCE AS NEEDED
Status: DISCONTINUED | OUTPATIENT
Start: 2019-08-22 | End: 2019-08-22 | Stop reason: HOSPADM

## 2019-08-22 RX ORDER — NALOXONE HCL 0.4 MG/ML
0.1 VIAL (ML) INJECTION
Status: DISCONTINUED | OUTPATIENT
Start: 2019-08-22 | End: 2019-08-24 | Stop reason: HOSPADM

## 2019-08-22 RX ORDER — PANTOPRAZOLE SODIUM 40 MG/10ML
40 INJECTION, POWDER, LYOPHILIZED, FOR SOLUTION INTRAVENOUS
Status: DISCONTINUED | OUTPATIENT
Start: 2019-08-23 | End: 2019-08-24 | Stop reason: HOSPADM

## 2019-08-22 RX ORDER — GABAPENTIN 100 MG/1
100 CAPSULE ORAL 3 TIMES DAILY
Status: COMPLETED | OUTPATIENT
Start: 2019-08-22 | End: 2019-08-24

## 2019-08-22 RX ORDER — SODIUM CHLORIDE 9 MG/ML
150 INJECTION, SOLUTION INTRAVENOUS CONTINUOUS
Status: DISCONTINUED | OUTPATIENT
Start: 2019-08-22 | End: 2019-08-22 | Stop reason: HOSPADM

## 2019-08-22 RX ORDER — CHLORHEXIDINE GLUCONATE 0.12 MG/ML
30 RINSE ORAL
Status: COMPLETED | OUTPATIENT
Start: 2019-08-22 | End: 2019-08-22

## 2019-08-22 RX ORDER — LIDOCAINE HYDROCHLORIDE 10 MG/ML
0.5 INJECTION, SOLUTION EPIDURAL; INFILTRATION; INTRACAUDAL; PERINEURAL ONCE AS NEEDED
Status: COMPLETED | OUTPATIENT
Start: 2019-08-22 | End: 2019-08-22

## 2019-08-22 RX ORDER — CYANOCOBALAMIN 1000 UG/ML
1000 INJECTION, SOLUTION INTRAMUSCULAR; SUBCUTANEOUS ONCE
Status: COMPLETED | OUTPATIENT
Start: 2019-08-23 | End: 2019-08-23

## 2019-08-22 RX ORDER — ROPINIROLE 2 MG/1
4 TABLET, FILM COATED ORAL NIGHTLY
Status: DISCONTINUED | OUTPATIENT
Start: 2019-08-22 | End: 2019-08-24 | Stop reason: HOSPADM

## 2019-08-22 RX ORDER — LIDOCAINE HYDROCHLORIDE 10 MG/ML
INJECTION, SOLUTION EPIDURAL; INFILTRATION; INTRACAUDAL; PERINEURAL AS NEEDED
Status: DISCONTINUED | OUTPATIENT
Start: 2019-08-22 | End: 2019-08-22 | Stop reason: SURG

## 2019-08-22 RX ORDER — MORPHINE SULFATE 4 MG/ML
4 INJECTION, SOLUTION INTRAMUSCULAR; INTRAVENOUS
Status: DISCONTINUED | OUTPATIENT
Start: 2019-08-22 | End: 2019-08-24 | Stop reason: HOSPADM

## 2019-08-22 RX ORDER — DESVENLAFAXINE SUCCINATE 50 MG/1
50 TABLET, EXTENDED RELEASE ORAL NIGHTLY
Status: DISCONTINUED | OUTPATIENT
Start: 2019-08-22 | End: 2019-08-24 | Stop reason: HOSPADM

## 2019-08-22 RX ORDER — LISINOPRIL 20 MG/1
20 TABLET ORAL NIGHTLY
Status: DISCONTINUED | OUTPATIENT
Start: 2019-08-22 | End: 2019-08-24 | Stop reason: HOSPADM

## 2019-08-22 RX ORDER — PREGABALIN 100 MG/1
100 CAPSULE ORAL NIGHTLY
Status: DISCONTINUED | OUTPATIENT
Start: 2019-08-22 | End: 2019-08-24 | Stop reason: HOSPADM

## 2019-08-22 RX ORDER — SODIUM CHLORIDE 9 MG/ML
150 INJECTION, SOLUTION INTRAVENOUS CONTINUOUS
Status: DISCONTINUED | OUTPATIENT
Start: 2019-08-22 | End: 2019-08-24 | Stop reason: HOSPADM

## 2019-08-22 RX ORDER — ALPRAZOLAM 0.5 MG/1
0.5 TABLET ORAL NIGHTLY PRN
Status: DISCONTINUED | OUTPATIENT
Start: 2019-08-22 | End: 2019-08-24 | Stop reason: HOSPADM

## 2019-08-22 RX ORDER — METOCLOPRAMIDE HYDROCHLORIDE 5 MG/ML
10 INJECTION INTRAMUSCULAR; INTRAVENOUS EVERY 6 HOURS PRN
Status: DISCONTINUED | OUTPATIENT
Start: 2019-08-22 | End: 2019-08-24 | Stop reason: HOSPADM

## 2019-08-22 RX ORDER — MEPERIDINE HYDROCHLORIDE 25 MG/ML
12.5 INJECTION INTRAMUSCULAR; INTRAVENOUS; SUBCUTANEOUS
Status: DISCONTINUED | OUTPATIENT
Start: 2019-08-22 | End: 2019-08-22 | Stop reason: HOSPADM

## 2019-08-22 RX ORDER — PROMETHAZINE HYDROCHLORIDE 25 MG/1
25 SUPPOSITORY RECTAL ONCE AS NEEDED
Status: DISCONTINUED | OUTPATIENT
Start: 2019-08-22 | End: 2019-08-22 | Stop reason: HOSPADM

## 2019-08-22 RX ORDER — PROMETHAZINE HYDROCHLORIDE 25 MG/1
25 TABLET ORAL ONCE AS NEEDED
Status: DISCONTINUED | OUTPATIENT
Start: 2019-08-22 | End: 2019-08-22 | Stop reason: HOSPADM

## 2019-08-22 RX ORDER — DEXAMETHASONE SODIUM PHOSPHATE 4 MG/ML
INJECTION, SOLUTION INTRA-ARTICULAR; INTRALESIONAL; INTRAMUSCULAR; INTRAVENOUS; SOFT TISSUE AS NEEDED
Status: DISCONTINUED | OUTPATIENT
Start: 2019-08-22 | End: 2019-08-22 | Stop reason: SURG

## 2019-08-22 RX ORDER — BUPIVACAINE HYDROCHLORIDE 2.5 MG/ML
INJECTION, SOLUTION EPIDURAL; INFILTRATION; INTRACAUDAL
Status: COMPLETED | OUTPATIENT
Start: 2019-08-22 | End: 2019-08-22

## 2019-08-22 RX ADMIN — INSULIN LISPRO 3 UNITS: 100 INJECTION, SOLUTION INTRAVENOUS; SUBCUTANEOUS at 10:35

## 2019-08-22 RX ADMIN — LIDOCAINE HYDROCHLORIDE 50 MG: 10 INJECTION, SOLUTION EPIDURAL; INFILTRATION; INTRACAUDAL; PERINEURAL at 07:42

## 2019-08-22 RX ADMIN — ROCURONIUM BROMIDE 50 MG: 10 INJECTION INTRAVENOUS at 07:42

## 2019-08-22 RX ADMIN — SCOPALAMINE 1 PATCH: 1 PATCH, EXTENDED RELEASE TRANSDERMAL at 07:04

## 2019-08-22 RX ADMIN — DESVENLAFAXINE SUCCINATE 50 MG: 50 TABLET, EXTENDED RELEASE ORAL at 20:32

## 2019-08-22 RX ADMIN — NEOSTIGMINE METHYLSULFATE 5 MG: 1 INJECTION, SOLUTION INTRAVENOUS at 09:04

## 2019-08-22 RX ADMIN — SODIUM CHLORIDE 1000 ML: 9 INJECTION, SOLUTION INTRAVENOUS at 06:45

## 2019-08-22 RX ADMIN — LAMOTRIGINE 200 MG: 100 TABLET ORAL at 20:31

## 2019-08-22 RX ADMIN — GABAPENTIN 100 MG: 100 CAPSULE ORAL at 20:32

## 2019-08-22 RX ADMIN — EPHEDRINE SULFATE 10 MG: 50 INJECTION INTRAMUSCULAR; INTRAVENOUS; SUBCUTANEOUS at 07:57

## 2019-08-22 RX ADMIN — DEXAMETHASONE SODIUM PHOSPHATE 8 MG: 4 INJECTION, SOLUTION INTRAMUSCULAR; INTRAVENOUS at 08:08

## 2019-08-22 RX ADMIN — SODIUM CHLORIDE: 9 INJECTION, SOLUTION INTRAVENOUS at 07:41

## 2019-08-22 RX ADMIN — VECURONIUM BROMIDE 3 MG: 1 INJECTION, POWDER, LYOPHILIZED, FOR SOLUTION INTRAVENOUS at 08:02

## 2019-08-22 RX ADMIN — LIDOCAINE HYDROCHLORIDE 0.5 ML: 10 INJECTION, SOLUTION EPIDURAL; INFILTRATION; INTRACAUDAL; PERINEURAL at 07:04

## 2019-08-22 RX ADMIN — ALPRAZOLAM 0.5 MG: 0.5 TABLET ORAL at 20:44

## 2019-08-22 RX ADMIN — GLYCOPYRROLATE 0.8 MG: 0.2 INJECTION, SOLUTION INTRAMUSCULAR; INTRAVENOUS at 09:04

## 2019-08-22 RX ADMIN — SODIUM CHLORIDE 150 ML/HR: 9 INJECTION, SOLUTION INTRAVENOUS at 16:41

## 2019-08-22 RX ADMIN — BUPIVACAINE HYDROCHLORIDE 60 ML: 2.5 INJECTION, SOLUTION EPIDURAL; INFILTRATION; INTRACAUDAL; PERINEURAL at 07:49

## 2019-08-22 RX ADMIN — HYDROMORPHONE HYDROCHLORIDE 2 MG: 2 TABLET ORAL at 16:41

## 2019-08-22 RX ADMIN — PREGABALIN 100 MG: 100 CAPSULE ORAL at 20:31

## 2019-08-22 RX ADMIN — CHLORHEXIDINE GLUCONATE 15 ML: 1.2 RINSE ORAL at 07:00

## 2019-08-22 RX ADMIN — FENTANYL CITRATE 100 MCG: 50 INJECTION, SOLUTION INTRAMUSCULAR; INTRAVENOUS at 07:42

## 2019-08-22 RX ADMIN — ONDANSETRON 4 MG: 2 INJECTION INTRAMUSCULAR; INTRAVENOUS at 09:00

## 2019-08-22 RX ADMIN — LISINOPRIL 20 MG: 20 TABLET ORAL at 20:29

## 2019-08-22 RX ADMIN — SIMETHICONE CHEW TAB 80 MG 80 MG: 80 TABLET ORAL at 10:14

## 2019-08-22 RX ADMIN — ROPINIROLE HYDROCHLORIDE 4 MG: 2 TABLET, FILM COATED ORAL at 20:28

## 2019-08-22 RX ADMIN — BUPRENORPHINE HYDROCHLORIDE 0.3 MG: 0.32 INJECTION INTRAMUSCULAR; INTRAVENOUS at 07:49

## 2019-08-22 RX ADMIN — ACETAMINOPHEN 1000 MG: 500 TABLET, FILM COATED ORAL at 20:32

## 2019-08-22 RX ADMIN — ALBUTEROL SULFATE 2.5 MG: 2.5 SOLUTION RESPIRATORY (INHALATION) at 19:23

## 2019-08-22 RX ADMIN — SIMETHICONE CHEW TAB 80 MG 80 MG: 80 TABLET ORAL at 13:54

## 2019-08-22 RX ADMIN — PROPOFOL 25 MCG/KG/MIN: 10 INJECTION, EMULSION INTRAVENOUS at 07:51

## 2019-08-22 RX ADMIN — MIDAZOLAM HYDROCHLORIDE 2 MG: 1 INJECTION, SOLUTION INTRAMUSCULAR; INTRAVENOUS at 07:33

## 2019-08-22 RX ADMIN — GABAPENTIN 600 MG: 300 CAPSULE ORAL at 07:04

## 2019-08-22 RX ADMIN — PROPOFOL 200 MG: 10 INJECTION, EMULSION INTRAVENOUS at 07:42

## 2019-08-22 RX ADMIN — INSULIN LISPRO 2 UNITS: 100 INJECTION, SOLUTION INTRAVENOUS; SUBCUTANEOUS at 17:47

## 2019-08-22 RX ADMIN — ACETAMINOPHEN 1000 MG: 500 TABLET ORAL at 07:05

## 2019-08-22 RX ADMIN — DEXAMETHASONE SODIUM PHOSPHATE 4 MG: 10 INJECTION INTRAMUSCULAR; INTRAVENOUS at 07:49

## 2019-08-22 RX ADMIN — PANTOPRAZOLE SODIUM 40 MG: 40 INJECTION, POWDER, FOR SOLUTION INTRAVENOUS at 07:05

## 2019-08-22 RX ADMIN — GABAPENTIN 100 MG: 100 CAPSULE ORAL at 16:41

## 2019-08-22 RX ADMIN — CHLORHEXIDINE GLUCONATE 15 ML: 1.2 RINSE ORAL at 07:06

## 2019-08-22 RX ADMIN — ATORVASTATIN CALCIUM 20 MG: 20 TABLET, FILM COATED ORAL at 20:32

## 2019-08-22 RX ADMIN — HYDROMORPHONE HYDROCHLORIDE 2 MG: 2 TABLET ORAL at 21:58

## 2019-08-22 RX ADMIN — DEXTROSE MONOHYDRATE 2 G: 50 INJECTION, SOLUTION INTRAVENOUS at 16:41

## 2019-08-22 RX ADMIN — SODIUM CHLORIDE, POTASSIUM CHLORIDE, SODIUM LACTATE AND CALCIUM CHLORIDE 9 ML/HR: 600; 310; 30; 20 INJECTION, SOLUTION INTRAVENOUS at 10:35

## 2019-08-22 NOTE — PLAN OF CARE
Problem: Patient Care Overview  Goal: Plan of Care Review  Outcome: Ongoing (interventions implemented as appropriate)   08/22/19 0043   Coping/Psychosocial   Plan of Care Reviewed With patient;spouse   Plan of Care Review   Progress improving       Problem: Bariatric Surgery (Adult,Pediatric)  Goal: Signs and Symptoms of Listed Potential Problems Will be Absent, Minimized or Managed (Bariatric Surgery)  Outcome: Ongoing (interventions implemented as appropriate)   08/22/19 6707   Goal/Outcome Evaluation   Problems Assessed (Bariatric Surgery) all   Problems Present (Bariatric Surgery) bowel motility decreased;pain;postoperative nausea and vomiting

## 2019-08-22 NOTE — ANESTHESIA PROCEDURE NOTES
Airway  Urgency: elective    Date/Time: 8/22/2019 7:42 AM  End Time:8/22/2019 7:46 AM  Airway not difficult    General Information and Staff    Patient location during procedure: OR  CRNA: Xavier Pearson CRNA    Indications and Patient Condition  Indications for airway management: airway protection    Preoxygenated: yes  MILS not maintained throughout  Mask difficulty assessment: 1 - vent by mask    Final Airway Details  Final airway type: endotracheal airway      Successful airway: ETT  Cuffed: yes   Successful intubation technique: direct laryngoscopy  Endotracheal tube insertion site: oral  Blade: Matt  Blade size: 3  ETT size (mm): 7.0  Cormack-Lehane Classification: grade I - full view of glottis  Placement verified by: chest auscultation and capnometry   Measured from: lips  ETT to lips (cm): 22  Number of attempts at approach: 1    Additional Comments  Negative epigastric sounds, Breath sound equal bilaterally with symmetric chest rise and fall

## 2019-08-22 NOTE — BRIEF OP NOTE
GASTRIC SLEEVE LAPAROSCOPIC, PARAESOPHAGEAL HERNIA REPAIR LAPAROSCOPIC, ESOPHAGOGASTRODUODENOSCOPY  Progress Note    Marcela Ramírez  8/22/2019    Pre-op Diagnosis:   Morbid obesity due to excess calories (CMS/HCC) [E66.01]       Post-Op Diagnosis Codes:     * Morbid obesity due to excess calories (CMS/HCC) [E66.01]     * Paraesophageal hiatal hernia [K44.9]    Procedure/CPT® Codes:  MI LAP, YOUSUF RESTRICT PROC, LONGITUDINAL GASTRECTOMY [50595]  MI LAP, REPAIR PARAESOPHAGEAL HERNIA, INCL FUNDOPLASTY W/O MESH [39858]  MI ESOPHAGOGASTRODUODENOSCOPY TRANSORAL DIAGNOSTIC [87758]    Procedure(s):  GASTRIC SLEEVE LAPAROSCOPIC  PARAESOPHAGEAL HERNIA REPAIR LAPAROSCOPIC  ESOPHAGOGASTRODUODENOSCOPY    Surgeon(s):  Guido Greenberg MD    Anesthesia: General with Block    Staff:   Circulator: Urvashi Parsons RN  Scrub Person: Ynes Terrazas  Vendor Representative: Jamie Beck  Nursing Assistant: Jeniffer Herndon    Estimated Blood Loss: minimal    Urine Voided: * No values recorded between 8/22/2019  7:37 AM and 8/22/2019  9:04 AM *    Specimens:                Specimens     ID Source Type Tests Collected By Collected At Frozen?      A Stomach Tissue · TISSUE PATHOLOGY EXAM   Guido Greenberg MD 8/22/19 0810 No     Description: SUB-TOTAL GASTRECTOMY                Drains:      Findings:     Complications: none      Guido Greenberg MD     Date: 8/22/2019  Time: 9:04 AM

## 2019-08-22 NOTE — ANESTHESIA PREPROCEDURE EVALUATION
Anesthesia Evaluation     NPO Solid Status: > 8 hours  NPO Liquid Status: > 8 hours           Airway   Mallampati: II  TM distance: >3 FB  Possible difficult intubation  Dental      Pulmonary     breath sounds clear to auscultation  (+) decreased breath sounds,   (-) not a smoker  Cardiovascular     ECG reviewed  Rhythm: regular  Rate: normal    (+) hypertension,   (-) pacemaker, angina, murmur, cardiac stents      Neuro/Psych  (-) seizures  GI/Hepatic/Renal/Endo    (+) morbid obesity,  liver disease fatty liver disease, diabetes mellitus (NIDDM),     Musculoskeletal     Abdominal    Substance History      OB/GYN          Other                        Anesthesia Plan    ASA 3     general   (GA  TAPS)  intravenous induction     Plan discussed with CRNA.

## 2019-08-22 NOTE — ANESTHESIA PROCEDURE NOTES
Peripheral Block    Pre-sedation assessment completed: 8/22/2019 7:42 AM    Patient reassessed immediately prior to procedure    Patient location during procedure: OR  Start time: 8/22/2019 7:42 AM  Stop time: 8/22/2019 7:49 AM  Reason for block: at surgeon's request and post-op pain management  Performed by  Anesthesiologist: Tino Cody MD  Preanesthetic Checklist  Completed: patient identified, site marked, surgical consent, pre-op evaluation, timeout performed, IV checked, risks and benefits discussed and monitors and equipment checked  Prep:  Pt Position: supine  Sterile barriers:cap, gloves, sterile barriers and mask  Prep: ChloraPrep  Patient monitoring: blood pressure monitoring, continuous pulse oximetry and EKG  Procedure  Sedation:yes  Performed under: general  Guidance:ultrasound guided  Images:still images obtained, printed/placed on chart    Laterality:Bilateral  Block Type:TAP  Injection Technique:single-shot  Needle Type:short-bevel and echogenic  Needle Gauge:20 G  Resistance on Injection: none    Medications Used: buprenorphine (BUPRENEX) injection, 0.3 mg  dexamethasone sodium phosphate injection, 4 mg  bupivacaine PF (MARCAINE) 0.25 % injection, 60 mL  Med admintered at 8/22/2019 7:49 AM      Medications  Comment:Block Injection:  LA dose divided between Right and Left block        Post Assessment  Injection Assessment: negative aspiration for heme, incremental injection and no paresthesia on injection  Patient Tolerance:comfortable throughout block  Complications:no  Additional Notes      Under Ultrasound guidance, a BBraun 4inch 360 degree needle was advanced with Normal Saline hydro dissection of tissue.  The Internal Oblique and Transversus Abdominus muscles where visualized.  At or before the aponeurosis of Internal Oblique, local anesthetic spread was visualized in the Transversus Abdominus Plane. Injection was made incrementally with aspiration every 5 mls.  There was no   intravascular injection,  injection pressure was normal, there was no neural injection, and the procedure was completed without difficulty.  Thank You.

## 2019-08-22 NOTE — ANESTHESIA POSTPROCEDURE EVALUATION
Patient: Marcela Ramírez    Procedure Summary     Date:  08/22/19 Room / Location:   [facility]    Anesthesia Start:  0739 Anesthesia Stop:  0925    Procedures:       GASTRIC SLEEVE LAPAROSCOPIC (N/A Abdomen)      PARAESOPHAGEAL HERNIA REPAIR LAPAROSCOPIC (N/A Abdomen)      ESOPHAGOGASTRODUODENOSCOPY (N/A Esophagus) Diagnosis:       Morbid obesity due to excess calories (CMS/HCC)      Paraesophageal hiatal hernia      (Morbid obesity due to excess calories (CMS/HCC) [E66.01])    Surgeon:  Guido Greenberg MD Provider:  Tino Cody MD    Anesthesia Type:  general ASA Status:  3          Anesthesia Type: general  Last vitals  BP   145/85 (08/22/19 0650)   Temp   97.4 °F (36.3 °C) (08/22/19 0650)   Pulse   75 (08/22/19 0650)   Resp   18 (08/22/19 0650)     SpO2   95 % (08/22/19 0650)     Post Anesthesia Care and Evaluation    Patient location during evaluation: PACU  Patient participation: complete - patient participated  Level of consciousness: awake and alert  Pain score: 0  Pain management: adequate  Airway patency: patent  Anesthetic complications: No anesthetic complications  PONV Status: none  Cardiovascular status: hemodynamically stable and acceptable  Respiratory status: nonlabored ventilation, acceptable and nasal cannula  Hydration status: acceptable

## 2019-08-22 NOTE — OP NOTE
Preoperative Diagnosis:   Morbid with Multiple Co-Morbidities                                                                         Paraesophageal hiatal hernia    Postoperative Diagnosis:   Same    Procedure:                                                      Laparoscopic Sleeve Gastrectomy (85% subtotal vertical gastrectomy)                                                                        Laparoscopic paraesophageal hiatal hernia repair                                                                         EGD                                                                          Surgeon:                                                       MARCOS Greenberg MD    Anesthesia:                                                   GETA    EBL:                                                              Min    Fluids:                                                           Crystalloid    Specimens:                                                   Subtotal gastrectomy    Drains:                                                           None    Counts:                                                          Correct    Complications:                                               None    Indications:   This is a 54 year-old morbidly obese white female who presents for elective laparoscopic sleeve gastrectomy. She's undergone our extensive preoperative education teaching and consent process everything's in order and she wishes to proceed.    Operative technique:     The patient was brought to the operating room, and placed supine upon the operating room table.  SCD hose were placed, she underwent uneventful general endotracheal anesthesia per the anesthesiology staff, she received IV Ancef and subcutaneous Lovenox, the anesthesiology staff performed a tap block, and her abdomen was prepped and draped with ChloraPrep in a sterile fashion, an Ioban was used as well, a Magaña catheter was not placed.    The  peritoneal cavity was entered in the upper abdomen in the left midclavicular line using a 5 mm trocar utilizing an Optiview technique and the abdomen was insufflated to a pressure of 15 mmHg with CO2 gas.  Exploratory laparoscopy revealed no evidence of injury from the entrance technique, a massively enlarged smooth liver, omental adhesions to the LUQ, sp spenser, a rectus diastasis.  Under direct visualization a 5 mm trocar was placed in the left subcostal position.  Above into the right of the umbilicus a fascial splitting 12 mm trocar was placed, and above into the left of the umbilicus a 15 mm trocar was placed.  Very little subcutaneous fat noted in the upper abdomen.  Through a stab incision in the epigastrium a Cindy retractor was used to elevate portions of the left lobe of the liver.  Lateral to the retractor the liver would become ischemic but respond nicely to frequent repositioning. The hiatal hernia was not visible from the anterior view and this was photo documented.  Beginning approximately two thirds of the way around the greater curvature the stomach, the gastrocolic vessels were divided with the Ligasure device.  Splenomegaly noted. This proceeded proximally taking down all the short gastric vessels and exposing the left tess.  A posterolateral paraesophageal hiatal hernia was encountered and photodocumented.   The paraesophageal hiatal hernia sac was incised along the base of the left tess and this was extended up and across the phrenoesophageal membrane.  The pars flaccida was divided, there was not a replaced hepatic vessel.  The hernia sac was incised along the base of the right tess and this was extended up and across the phrenoesophageal membrane.  The paraesophageal hiatal hernia sac and it's lipomatous contents were dissected out of the mediastinum and reduced below the level of the crura. The GE junction was then lengthened to well below the level of the crura by dissecting areolar  tissue well into the mediastinum.  A 1/2 inch penrose drain was used temporarily for esophageal retraction.  The anterior and posterior vagus nerves were preserved.  The crura were dissected to their meeting point inferiorly.  The hiatal hernia repair was performed posteriorly with two interrupted 0-silk suture with good result, photodocumentation obtained before and after repair.  Gastrocolic vessels were then divided medially to a few cm proximal to the pylorus.  No varices seen incidentally.  Some of the filmy attachments of the posterior stomach to the pancreas and retroperitoneum were divided.  The previously placed orogastric tube was positioned into the distal antrum.  The stomach was marked with a kitner saturated with a marking pen 1 cm lateral to the angle of His, 3 cm from the angularis, and 6 cm from the pylorus.  The 25 cm Standard Bariatric clamp was then placed just medial to these markings.  The orogastric tube was removed.  The 85% subtotal vertical sleeve gastrectomy was then performed along the Standard Clamp using a [employer] articulating electronic linear stapler with attached dual absorbable strips.  The first firing was a 60 mm black load, the next 3 firings were 60 mm purple loads, and the final firing was a 45 mm purple load. The Standard Clamp was removed.  The sleeve was performed such that it was uniform in size, no hourglassing or narrowing, especially at the angularis, and the final firing was done a centimeter away from the angle of His to hopefully avoid incorporating esophageal fibers.  The subtotal gastrectomy specimen was placed into a large retrieval bag and withdrawn, it was a larger and thicker than average specimen.  The sleeve was submerged under saline.  Upper endoscopy was performed, and the endoscope was advanced into the duodenal bulb.  No air bubbles or leak seen, no active bleeding at the staple line, no narrowing at the angularis, no pyloric spasm or deformity,  no gastritis, once again enlarged mucosal folds seen prepyloric and photodocumented,  no hiatal hernia or Franco's esophagus, and the endoscope was withdrawn.  The subtotal gastrectomy specimen and sent to pathology for permanent section.  Irrigation fluid was suctioned free.  The sleeve was resting nicely and hemostatic.   Photodocumentation of the sleeve was obtained.  The Cindy retractor was removed. Fascia at the 15 mm trocar site incision was closed with a horizontal mattress 0 Vicryl suture placed with a suture passer under direct visualization and tying the knot extracorporeally.  Remaining trocars were removed under direct visualization, no bleeding noted from their sites.  Subcutaneous tissue in the 15 mm incision was closed with an interrupted 2-0 Vicryl plus suture, and skin in each incision was closed using 3-0 Monocryl plus in an interrupted subcuticular stitch followed by skin-a-fix.  The patient tolerated the procedure well without complication, was taken to the recovery room in stable condition.

## 2019-08-22 NOTE — PLAN OF CARE
Problem: Patient Care Overview  Goal: Plan of Care Review  Outcome: Ongoing (interventions implemented as appropriate)   08/22/19 1333   Coping/Psychosocial   Plan of Care Reviewed With patient;spouse;daughter   Plan of Care Review   Progress no change       Problem: Bariatric Surgery (Adult,Pediatric)  Goal: Signs and Symptoms of Listed Potential Problems Will be Absent, Minimized or Managed (Bariatric Surgery)  Outcome: Ongoing (interventions implemented as appropriate)   08/22/19 7113   Goal/Outcome Evaluation   Problems Assessed (Bariatric Surgery) all   Problems Present (Bariatric Surgery) pain

## 2019-08-23 ENCOUNTER — APPOINTMENT (OUTPATIENT)
Dept: GENERAL RADIOLOGY | Facility: HOSPITAL | Age: 54
End: 2019-08-23

## 2019-08-23 LAB
ALBUMIN SERPL-MCNC: 4.2 G/DL (ref 3.5–5.2)
ALBUMIN/GLOB SERPL: 1.6 G/DL
ALP SERPL-CCNC: 58 U/L (ref 39–117)
ALT SERPL W P-5'-P-CCNC: 41 U/L (ref 1–33)
ANION GAP SERPL CALCULATED.3IONS-SCNC: 12 MMOL/L (ref 5–15)
AST SERPL-CCNC: 33 U/L (ref 1–32)
BASOPHILS # BLD AUTO: 0.01 10*3/MM3 (ref 0–0.2)
BASOPHILS NFR BLD AUTO: 0.1 % (ref 0–1.5)
BILIRUB SERPL-MCNC: 0.3 MG/DL (ref 0.2–1.2)
BUN BLD-MCNC: 15 MG/DL (ref 6–20)
BUN/CREAT SERPL: 15.6 (ref 7–25)
CALCIUM SPEC-SCNC: 8.6 MG/DL (ref 8.6–10.5)
CHLORIDE SERPL-SCNC: 103 MMOL/L (ref 98–107)
CO2 SERPL-SCNC: 26 MMOL/L (ref 22–29)
CREAT BLD-MCNC: 0.96 MG/DL (ref 0.57–1)
CYTO UR: NORMAL
DEPRECATED RDW RBC AUTO: 48.4 FL (ref 37–54)
EOSINOPHIL # BLD AUTO: 0.04 10*3/MM3 (ref 0–0.4)
EOSINOPHIL NFR BLD AUTO: 0.3 % (ref 0.3–6.2)
ERYTHROCYTE [DISTWIDTH] IN BLOOD BY AUTOMATED COUNT: 13.8 % (ref 12.3–15.4)
GFR SERPL CREATININE-BSD FRML MDRD: 61 ML/MIN/1.73
GLOBULIN UR ELPH-MCNC: 2.7 GM/DL
GLUCOSE BLD-MCNC: 124 MG/DL (ref 65–99)
GLUCOSE BLDC GLUCOMTR-MCNC: 116 MG/DL (ref 70–130)
GLUCOSE BLDC GLUCOMTR-MCNC: 120 MG/DL (ref 70–130)
GLUCOSE BLDC GLUCOMTR-MCNC: 124 MG/DL (ref 70–130)
GLUCOSE BLDC GLUCOMTR-MCNC: 133 MG/DL (ref 70–130)
HCT VFR BLD AUTO: 29.5 % (ref 34–46.6)
HCT VFR BLD AUTO: 32.2 % (ref 34–46.6)
HGB BLD-MCNC: 10.4 G/DL (ref 12–15.9)
HGB BLD-MCNC: 9.5 G/DL (ref 12–15.9)
IMM GRANULOCYTES # BLD AUTO: 0.08 10*3/MM3 (ref 0–0.05)
IMM GRANULOCYTES NFR BLD AUTO: 0.6 % (ref 0–0.5)
IRON 24H UR-MRATE: 39 MCG/DL (ref 37–145)
LAB AP CASE REPORT: NORMAL
LAB AP CLINICAL INFORMATION: NORMAL
LYMPHOCYTES # BLD AUTO: 2.45 10*3/MM3 (ref 0.7–3.1)
LYMPHOCYTES NFR BLD AUTO: 18.5 % (ref 19.6–45.3)
MCH RBC QN AUTO: 31 PG (ref 26.6–33)
MCHC RBC AUTO-ENTMCNC: 32.3 G/DL (ref 31.5–35.7)
MCV RBC AUTO: 95.8 FL (ref 79–97)
MONOCYTES # BLD AUTO: 0.9 10*3/MM3 (ref 0.1–0.9)
MONOCYTES NFR BLD AUTO: 6.8 % (ref 5–12)
NEUTROPHILS # BLD AUTO: 9.79 10*3/MM3 (ref 1.7–7)
NEUTROPHILS NFR BLD AUTO: 73.7 % (ref 42.7–76)
NRBC BLD AUTO-RTO: 0 /100 WBC (ref 0–0.2)
PATH REPORT.FINAL DX SPEC: NORMAL
PATH REPORT.GROSS SPEC: NORMAL
PLATELET # BLD AUTO: 191 10*3/MM3 (ref 140–450)
PMV BLD AUTO: 11.1 FL (ref 6–12)
POTASSIUM BLD-SCNC: 4.4 MMOL/L (ref 3.5–5.2)
PROT SERPL-MCNC: 6.9 G/DL (ref 6–8.5)
RBC # BLD AUTO: 3.36 10*6/MM3 (ref 3.77–5.28)
SODIUM BLD-SCNC: 141 MMOL/L (ref 136–145)
TROPONIN T SERPL-MCNC: <0.01 NG/ML (ref 0–0.03)
WBC NRBC COR # BLD: 13.27 10*3/MM3 (ref 3.4–10.8)

## 2019-08-23 PROCEDURE — 25010000002 THIAMINE PER 100 MG: Performed by: SURGERY

## 2019-08-23 PROCEDURE — 84484 ASSAY OF TROPONIN QUANT: CPT | Performed by: SURGERY

## 2019-08-23 PROCEDURE — 0 DIATRIZOATE MEGLUMINE & SODIUM PER 1 ML: Performed by: SURGERY

## 2019-08-23 PROCEDURE — 25010000002 CEFAZOLIN PER 500 MG: Performed by: SURGERY

## 2019-08-23 PROCEDURE — 85014 HEMATOCRIT: CPT | Performed by: SURGERY

## 2019-08-23 PROCEDURE — 85018 HEMOGLOBIN: CPT | Performed by: SURGERY

## 2019-08-23 PROCEDURE — 25010000002 HYDROMORPHONE 1 MG/ML SOLUTION: Performed by: SURGERY

## 2019-08-23 PROCEDURE — 82962 GLUCOSE BLOOD TEST: CPT

## 2019-08-23 PROCEDURE — 83540 ASSAY OF IRON: CPT | Performed by: SURGERY

## 2019-08-23 PROCEDURE — 85025 COMPLETE CBC W/AUTO DIFF WBC: CPT | Performed by: SURGERY

## 2019-08-23 PROCEDURE — 74241: CPT

## 2019-08-23 PROCEDURE — 94799 UNLISTED PULMONARY SVC/PX: CPT

## 2019-08-23 PROCEDURE — 99024 POSTOP FOLLOW-UP VISIT: CPT | Performed by: SURGERY

## 2019-08-23 PROCEDURE — 80053 COMPREHEN METABOLIC PANEL: CPT | Performed by: SURGERY

## 2019-08-23 PROCEDURE — 25010000002 NA FERRIC GLUC CPLX PER 12.5 MG: Performed by: SURGERY

## 2019-08-23 PROCEDURE — 25010000002 CYANOCOBALAMIN PER 1000 MCG: Performed by: SURGERY

## 2019-08-23 PROCEDURE — 71046 X-RAY EXAM CHEST 2 VIEWS: CPT

## 2019-08-23 RX ORDER — SODIUM CHLORIDE 9 MG/ML
250 INJECTION, SOLUTION INTRAVENOUS CONTINUOUS
Status: ACTIVE | OUTPATIENT
Start: 2019-08-23 | End: 2019-08-24

## 2019-08-23 RX ORDER — HYDROCHLOROTHIAZIDE 25 MG/1
TABLET ORAL
Qty: 30 TABLET | Refills: 12 | OUTPATIENT
Start: 2019-08-23 | End: 2019-08-24 | Stop reason: HOSPADM

## 2019-08-23 RX ORDER — OMEPRAZOLE 20 MG/1
CAPSULE, DELAYED RELEASE ORAL
Qty: 30 CAPSULE | Refills: 12 | Status: SHIPPED | OUTPATIENT
Start: 2019-08-23 | End: 2019-08-24 | Stop reason: SDUPTHER

## 2019-08-23 RX ADMIN — SODIUM CHLORIDE 250 ML/HR: 9 INJECTION, SOLUTION INTRAVENOUS at 22:09

## 2019-08-23 RX ADMIN — ACETAMINOPHEN 1000 MG: 500 TABLET, FILM COATED ORAL at 20:37

## 2019-08-23 RX ADMIN — ALBUTEROL SULFATE 2.5 MG: 2.5 SOLUTION RESPIRATORY (INHALATION) at 13:09

## 2019-08-23 RX ADMIN — SIMETHICONE CHEW TAB 80 MG 80 MG: 80 TABLET ORAL at 06:20

## 2019-08-23 RX ADMIN — ROPINIROLE HYDROCHLORIDE 4 MG: 2 TABLET, FILM COATED ORAL at 20:36

## 2019-08-23 RX ADMIN — GABAPENTIN 100 MG: 100 CAPSULE ORAL at 20:36

## 2019-08-23 RX ADMIN — POTASSIUM CHLORIDE AND SODIUM CHLORIDE 125 ML/HR: 450; 150 INJECTION, SOLUTION INTRAVENOUS at 07:50

## 2019-08-23 RX ADMIN — DESVENLAFAXINE SUCCINATE 50 MG: 50 TABLET, EXTENDED RELEASE ORAL at 20:35

## 2019-08-23 RX ADMIN — POTASSIUM CHLORIDE AND SODIUM CHLORIDE 125 ML/HR: 450; 150 INJECTION, SOLUTION INTRAVENOUS at 21:59

## 2019-08-23 RX ADMIN — LISINOPRIL 20 MG: 20 TABLET ORAL at 20:38

## 2019-08-23 RX ADMIN — ACETAMINOPHEN 1000 MG: 500 TABLET, FILM COATED ORAL at 07:49

## 2019-08-23 RX ADMIN — CYANOCOBALAMIN 1000 MCG: 1000 INJECTION, SOLUTION INTRAMUSCULAR; SUBCUTANEOUS at 07:49

## 2019-08-23 RX ADMIN — LAMOTRIGINE 200 MG: 100 TABLET ORAL at 20:36

## 2019-08-23 RX ADMIN — HYDROMORPHONE HYDROCHLORIDE 1 MG: 1 INJECTION, SOLUTION INTRAMUSCULAR; INTRAVENOUS; SUBCUTANEOUS at 20:39

## 2019-08-23 RX ADMIN — PANTOPRAZOLE SODIUM 40 MG: 40 INJECTION, POWDER, FOR SOLUTION INTRAVENOUS at 05:52

## 2019-08-23 RX ADMIN — GABAPENTIN 100 MG: 100 CAPSULE ORAL at 16:35

## 2019-08-23 RX ADMIN — HYDROCODONE BITARTRATE AND ACETAMINOPHEN 1 TABLET: 7.5; 325 TABLET ORAL at 07:49

## 2019-08-23 RX ADMIN — PREGABALIN 100 MG: 100 CAPSULE ORAL at 20:38

## 2019-08-23 RX ADMIN — Medication 30 ML: at 09:18

## 2019-08-23 RX ADMIN — HYDROMORPHONE HYDROCHLORIDE 2 MG: 2 TABLET ORAL at 05:53

## 2019-08-23 RX ADMIN — SODIUM CHLORIDE 125 MG: 9 INJECTION, SOLUTION INTRAVENOUS at 11:29

## 2019-08-23 RX ADMIN — GABAPENTIN 100 MG: 100 CAPSULE ORAL at 09:47

## 2019-08-23 RX ADMIN — HYDROMORPHONE HYDROCHLORIDE 2 MG: 2 TABLET ORAL at 16:35

## 2019-08-23 RX ADMIN — ATORVASTATIN CALCIUM 20 MG: 20 TABLET, FILM COATED ORAL at 20:37

## 2019-08-23 RX ADMIN — SODIUM CHLORIDE 1000 ML: 9 INJECTION, SOLUTION INTRAVENOUS at 20:45

## 2019-08-23 RX ADMIN — ALBUTEROL SULFATE 2.5 MG: 2.5 SOLUTION RESPIRATORY (INHALATION) at 18:38

## 2019-08-23 RX ADMIN — ALPRAZOLAM 0.5 MG: 0.5 TABLET ORAL at 22:09

## 2019-08-23 RX ADMIN — ALBUTEROL SULFATE 2.5 MG: 2.5 SOLUTION RESPIRATORY (INHALATION) at 07:52

## 2019-08-23 RX ADMIN — DEXTROSE MONOHYDRATE 2 G: 50 INJECTION, SOLUTION INTRAVENOUS at 00:05

## 2019-08-23 RX ADMIN — LORAZEPAM 1 MG: 1 TABLET ORAL at 17:44

## 2019-08-23 RX ADMIN — SIMETHICONE CHEW TAB 80 MG 80 MG: 80 TABLET ORAL at 17:44

## 2019-08-23 RX ADMIN — FOLIC ACID 250 ML/HR: 5 INJECTION, SOLUTION INTRAMUSCULAR; INTRAVENOUS; SUBCUTANEOUS at 05:52

## 2019-08-23 NOTE — PAYOR COMM NOTE
"Attention: Lorin Grove, RN Utilization Review [phone]  Fax # [phone]    Ref # [id number]    Pt admitted for scheduled surgery.  Please review clinicals for continued inpt stay.       Mariia Viera (54 y.o. Female)     Date of Birth Social Security Number Address Home Phone MRN    1965  [address]  [address]  [facility] [address] [phone] [id number]    Religious Marital Status          Unknown        Admission Date Admission Type Admitting Provider Attending Provider Department, Room/Bed    8/22/19 Elective Guido Greenberg MD Weiss, George Derek, MD [address], [address]/1    Discharge Date Discharge Disposition Discharge Destination                       Attending Provider:  Guido Greenberg MD    Allergies:  Contrast Dye    Isolation:  None   Infection:  None   Code Status:  CPR    Ht:  170.2 cm (67.01\")   Wt:  111 kg (244 lb 11.4 oz)    Admission Cmt:  None   Principal Problem:  Morbid obesity due to excess calories (CMS/[facility]) [E66.01] More...                 Active Insurance as of 8/22/2019     Primary Coverage     Payor Plan Insurance Group Employer/Plan Group    [employer] [place] [employer]ICA[employer]      Payor Plan Address Payor Plan Phone Number Payor Plan Fax Number Effective Dates    [address] [address] [phone]  1/16/2018 - None Entered    [facility] [id number]       Subscriber Name Subscriber Birth Date Member ID       MARIIA VIERA 1965 [id number]                 Emergency Contacts      (Rel.) Home Phone Work Phone Mobile Phone    Samuel Tim (Significant Other) [phone] -- --               History & Physical      Guido Greenberg MD at 8/22/2019  7:29 AM          H&P reviewed. The patient was examined and there are no changes to the H&P.          Electronically signed by Guido Greenberg MD at 8/22/2019  7:29 AM   Source Note             [facility] BARIATRIC SURGERY  [address] "[address] [address]  [phone]      Patient  Name:  Marcela Ramírez  :  1965      Date of Visit: 19    Chief Complaint:  weight gain; unable to maintain weight loss.  Evaluate for possible weight loss surgery    History of Present Illness:  Marcela Ramírez is a 54 y.o. female who presents today for evaluation, education and consultation regarding weight loss surgery.  Since last seen 2019 she has lost 9 pounds.The patient returns for final visit prior to LSG.  Original intake evaluation Janis España PA-C dated 2019 reviewed.     From her evaluation that day:     \"The most weight Marcela lost was 27 pounds on ate much less but was only able to maintain that weight loss for 1 month.  Her maximum lifetime weight is 285 pounds. Says she is \"tired of being fat, tired of not being able to wear real pants, tired of being exhausted.\" She has some friends who had bypass at other offices and \"failed\" also with a friend s/p LSG at [place] and is doing great.      As above, patient has been overweight for many years, with numerous failed dietary/weight loss attempts.  She now has obesity related comorbidities and as such has decided to pursue weight loss surgery.     GI: GERD controlled with omeprazole 20mg. Remote EGD- esophagitis. No h/o HH or h pylori. S/p spenser with cholelithiasis. Has nausea wtih taking meds, avoid this by taking meds wtih milk     H/o TIA-mimicking symptoms x 8-9 episodes including right sided drooping/ weakness/ vision changes. Extensive workup was negative for CVA with angio imaging- per patient. Was determined to be related to anxiety/ getting really worked up. last episode 2017, says she has not been evaluated last 7 episodes, just waits in her house for symptoms to resolve. No residual effects. She sees neurologist for DM peripheral neuropathy. On ASA daily.      Psych: bipolar controlled on meds. Says she faithfully takes her bipolar meds " "to control symptoms, otherwise is \"really mean and angry\". Says her meds make her not care about anything, but people don't like her when she is off them.      Pulm:h/o asthma, has not required inhaler at all other than recent PNA.      DM: Diagnosed 2017, was on metformin in the past. Last A1C 7%\"       The patient has had issues with morbid obesity for years and only temporary success with non-surgical methods of weight loss.  The patient is seeking LSG to help with the morbid obesity related conditions of arthritis, asthma, ovarian cyst, bipolar disorder, boils, carpal tunnel syndrome, colonic polyps, depression, type II non-insulin-dependent diabetes mellitus, diverticulosis, fatigue, GE reflux disease, headaches, hyperlipidemia, hypertension, peripheral edema, chronic nausea, peripheral neuropathy, restless leg syndrome, history of TIAs, chronic kidney disease.    54-year-old morbidly obese white female from [place].  She dozed repeatedly during discussion of the risks benefits alternative therapies but did voice understanding to all matters concerning avoiding perioperative complications.  She said she tried to have weight loss surgery at [facility] 10 years ago but failed her psychological evaluation and was diagnosed with bipolar disorder at that time.  She has a very poor dietitian evaluation noting that she drank 2 to 4 L of Pepsi daily.  She says she has cut way back but had a few last week which she blames on her  enabling her.  I did emphasize that surgery will not be successful in the face of ongoing high fructose corn syrup but she seemed to have a little insight and seemed to have a poor grasp of discussed concepts in general.  She did pass her psychological evaluation.  She says her reflux symptoms are controlled with proton pump inhibitors.  She has never been told she had gastroparesis.  She said she tested negative for sleep apnea \"years ago\".  She said she was in the emergency room " recently with a blood sugar of 672.      Past Medical History:   Diagnosis Date   • Arthritis     knees, otc arthritis meds prn, no h/o injections.    • Asthma     has not required inhaler   • Bilateral ovarian cysts    • Bipolar 1 disorder (CMS/[facility])    • Boil     opens them herself   • Carpal tunnel syndrome    • CKD (chronic kidney disease), symptom management only, stage 3 (moderate) (CMS/[facility])     Creatinine 1.04 GFR 56   • Colon polyp    • Depression    • Diabetes mellitus (CMS/[facility])     Diagnosed 2017, was on metformin in the past. Last A1C 7%   • Diverticulosis    • Fatigue    • GERD (gastroesophageal reflux disease)     controlled with omeprazole 20mg. Remote EGD- esophagitis.  EGD no hiatal hernia Z line 40 cm very thick mucosal folds cannot rule out varices.  CT scan thickened fundus, no varices seen   • Headache    • History of blood transfusion     2/2 heavy menses   • Hyperlipidemia    • Hypertension    • Lower extremity edema    • Morbid obesity with BMI of 40.0-44.9, adult (CMS/[facility])    • Nausea     wtih taking meds, avoid this by taking meds wtih milk   • Peripheral neuropathy     2/2 DM   • RLS (restless legs syndrome)    • S/P cholecystectomy     2/2 cholelithiasis   • Shortness of breath on exertion    • TIA (transient ischemic attack)     patient states 8-9 episodes of right sided drooping/ weakness/ vision changes. Workup was negative for CVA- thought to be related to anxiety. last episode 12/2017     Past Surgical History:   Procedure Laterality Date   • COLONOSCOPY  remote    diverticulosis   • ENDOSCOPY  remote    esophagitis    • KNEE ACL RECONSTRUCTION Right 2013    with miniscal repair   • LAPAROSCOPIC CHOLECYSTECTOMY  2001    cholelithiasis   • LAPAROSCOPIC HYSTERECTOMY  2013    right ovary remains       Allergies   Allergen Reactions   • Contrast Dye Swelling     Nasal congestion/ snot, IV contrast only       Current Outpatient Medications:   •  ALPRAZolam (XANAX) 0.5 MG tablet, Take 1  tablet by mouth At Night As Needed for Anxiety., Disp: 30 tablet, Rfl: 5  •  atorvastatin (LIPITOR) 20 MG tablet, Take 1 tablet by mouth Daily., Disp: 30 tablet, Rfl: 11  •  desvenlafaxine (PRISTIQ) 50 MG 24 hr tablet, Take 50 mg by mouth Daily., Disp: , Rfl:   •  hydrochlorothiazide (HYDRODIURIL) 25 MG tablet, TAKE ONE TABLET BY MOUTH DAILY, Disp: 30 tablet, Rfl: 1  •  LamoTRIgine (LAMICTAL PO), Take 1 tablet by mouth Every Night. Pt is on 200mg, Disp: , Rfl:   •  lisinopril (PRINIVIL,ZESTRIL) 20 MG tablet, Take 1 tablet by mouth Daily., Disp: 30 tablet, Rfl: 11  •  LYRICA 50 MG capsule, TAKE ONE CAPSULE BY MOUTH EVERY NIGHT AT BEDTIME FOR 7 DAYS, THEN TWO CAPSULES EVERY NIGHT AT BEDTIME THEREAFTER, Disp: 60 capsule, Rfl: 4  •  metFORMIN ER (GLUCOPHAGE-XR) 500 MG 24 hr tablet, Take 1 tablet by mouth 2 (Two) Times a Day Before Meals., Disp: 60 tablet, Rfl: 5  •  omeprazole (priLOSEC) 20 MG capsule, TAKE ONE CAPSULE BY MOUTH DAILY, Disp: 30 capsule, Rfl: 1  •  ondansetron ODT (ZOFRAN-ODT) 4 MG disintegrating tablet, Take 1 tablet by mouth Every 6 (Six) Hours As Needed for Nausea or Vomiting., Disp: 30 tablet, Rfl: 1  •  ONE TOUCH ULTRA TEST test strip, TEST GLUCOSE TWO TIMES A DAY, Disp: 100 each, Rfl: 12  •  pregabalin (LYRICA) 25 MG capsule, Take 25 mg by mouth 2 (Two) Times a Day., Disp: , Rfl:   •  rOPINIRole (REQUIP) 2 MG tablet, Take 2 tablets by mouth Every Night., Disp: 60 tablet, Rfl: 11  •  aspirin 81 MG EC tablet, Take 1 tablet by mouth Daily., Disp: 30 tablet, Rfl: 0  •  ipratropium (ATROVENT HFA) 17 MCG/ACT inhaler, Inhale 2 puffs 4 (Four) Times a Day. (Patient taking differently: Inhale 2 puffs As Needed.), Disp: 1 inhaler, Rfl: 0  •  nitroglycerin (NITROSTAT) 0.4 MG SL tablet, Place 1 tablet under the tongue Every 5 (Five) Minutes As Needed for Chest Pain. Take no more than 3 doses in 15 minutes., Disp: 100 tablet, Rfl: 0  •  promethazine (PHENERGAN) 25 MG tablet, Take 1 tablet by mouth Every 6 (Six)  Hours As Needed for Nausea or Vomiting., Disp: 8 tablet, Rfl: 0    Social History     Socioeconomic History   • Marital status:      Spouse name: Not on file   • Number of children: Not on file   • Years of education: Not on file   • Highest education level: Not on file   Tobacco Use   • Smoking status: Never Smoker   • Smokeless tobacco: Never Used   Substance and Sexual Activity   • Alcohol use: No     Frequency: Never   • Drug use: Yes     Types: Marijuana     Comment: helps with marijauna   • Sexual activity: Defer     Comment: no hormones   Social History Narrative    Lives in [place] with boyfriend, daughter and her boyfriend and granddaughter and stepson.      Family History   Problem Relation Age of Onset   • Arthritis Mother    • Arthritis Father    • Diabetes Father    • Hyperlipidemia Father    • Hypertension Father    • Asthma Brother    • Other Brother    • Cancer Maternal Aunt    • Depression Paternal Uncle        Review of Systems   Constitutional: Positive for fatigue. Negative for chills, diaphoresis, fever and unexpected weight loss.   HENT: Negative for congestion and facial swelling.    Eyes: Negative for blurred vision, double vision and discharge.   Respiratory: Negative for chest tightness, shortness of breath and stridor.    Cardiovascular: Negative for chest pain, palpitations and leg swelling.   Gastrointestinal: Positive for GERD. Negative for blood in stool.   Endocrine: Negative for polydipsia.   Genitourinary: Negative for hematuria.   Musculoskeletal: Positive for arthralgias.   Skin: Negative for color change.   Allergic/Immunologic: Negative for immunocompromised state.   Neurological: Negative for confusion.   Psychiatric/Behavioral: Negative for self-injury.       I have reviewed the ROS and confirm that it's accurate today.    Physical Exam:  Vital Signs:  Weight: 111 kg (245 lb 8 oz)   Body mass index is 38.45 kg/m².  Temp: 97.8 °F (36.6 °C)   Heart Rate: 77   BP:  126/84     Physical Exam   Constitutional: She is oriented to person, place, and time. She appears well-developed and well-nourished.   HENT:   Head: Normocephalic and atraumatic.   Nose: Nose normal.   Eyes: Conjunctivae and EOM are normal. Pupils are equal, round, and reactive to light.   Neck: Normal range of motion. Neck supple. Carotid bruit is not present. No tracheal deviation present. No thyromegaly present.   Cardiovascular: Normal rate, regular rhythm and normal heart sounds.   Pulmonary/Chest: Effort normal and breath sounds normal. No respiratory distress.   Abdominal: Soft. She exhibits no distension. There is no hepatosplenomegaly. There is no tenderness.   Scattered upper abdominal laparoscopy scars subumbilical vertical laparoscopy scar   Musculoskeletal: Normal range of motion. She exhibits no edema or deformity.   Neurological: She is alert and oriented to person, place, and time. No cranial nerve deficit. Coordination normal.   Skin: Skin is warm and dry. No rash noted.   Psychiatric: She has a normal mood and affect. Her behavior is normal. Judgment and thought content normal.   Vitals reviewed.      Patient Active Problem List   Diagnosis   • Acute suppurative otitis media of right ear with spontaneous rupture of tympanic membrane   • Depression   • Anxiety   • Diabetic peripheral neuropathy (CMS/HCC)   • Hypertension   • Hyperlipidemia   • Diverticulosis   • Diabetes mellitus (CMS/HCC)   • TIA (transient ischemic attack)   • Peripheral neuropathy   • GERD (gastroesophageal reflux disease)   • Bipolar 1 disorder (CMS/HCC)   • Bilateral ovarian cysts   • S/P cholecystectomy   • Headache   • Lower extremity edema   • RLS (restless legs syndrome)   • Shortness of breath on exertion   • Carpal tunnel syndrome   • History of blood transfusion   • Nausea   • Colon polyp   • Asthma   • Arthritis   • Boil   • Morbid obesity with BMI of 40.0-44.9, adult (CMS/HCC)   • Pre-op evaluation   • Epigastric pain    • Morbid obesity due to excess calories (CMS/HCC)       Assessment:    Marcela Ramírez is a 54 y.o. year old female with medically complicated obesity.    Weight loss surgery is deemed medically necessary given the following obesity related comorbidities including arthritis, asthma, ovarian cyst, bipolar disorder, boils, carpal tunnel syndrome, colonic polyps, depression, type II non-insulin-dependent diabetes mellitus, diverticulosis, fatigue, GE reflux disease, headaches, hyperlipidemia, hypertension, peripheral edema, chronic nausea, peripheral neuropathy, restless leg syndrome, history of TIAs, chronic kidney disease with current Weight: 111 kg (245 lb 8 oz) and Body mass index is 38.45 kg/m²..    Encounter Diagnosis   Name Primary?   • Morbid obesity due to excess calories (CMS/HCC) Yes      Patient is aware that surgery is a tool, and that weight loss is not guaranteed but only seen in the context of appropriate use, follow up and exercise.    The patient was present for an approximately a 2.5 hour discussion of the purpose of weight loss surgery, how WLS is a tool to assist in achieving weight loss goals, the most common complications and how best to avoid them, and the strategies for short and long term weight loss.  Ample opportunity to discuss questions was available both in group and during the time of individual examination.    I reviewed [facility] report which is several pages and includes gabapentin Lyrica alprazolam pre-[place] gastroenterology evaluation dated 4/19/2019 Dr. Gilberto Mark for nausea vomiting hematemesis.  EGD dated 4/19/2019 Dr. Mark showing no hiatal hernia, no obvious Kayla-Greenberg tear, enlarged folds of the antrum with gastritis, no active bleeding.  Pathology of the antrum polyp suggestive of hyperplastic gastric polyp and reactive gastropathic alteration.  Chest x-ray dated 4/11/2019 read as mild vascular congestion and interstitial edema new from prior study please correlate  for clinical evidence of mild volume overload.  Chest x-ray dated 4/9/2019 showing mild cardiac silhouette enlargement otherwise no active process.  EKG dated 4/10/2019 normal sinus rhythm.  CBC and CMP dated 8/9/2019 normal except for a glucose of 126 creatinine of 1.04 GFR 56 of note BUN was 19.  Psychological evaluation dated 1/29/2019 Maribel Hernandez, PhD good candidate.  Dietitian evaluation dated 1/29/2019 Marleny montemayor noting the patient gave a Pepsi 3 days ago (she has since resumed as above) and long-term has been drinking 2 to 4 L of Pepsi daily.  EGD by myself dated 2/18/2019 showing very thick mucosal folds possible prepyloric varices no hiatal hernia Z line 40 cm pathology of the antrum no significant histopathologic change and negative for H. pylori.  Biopsies of the distal esophagus suggestive but not diagnostic of reflux esophagitis.  Negative H. pylori stool antigen dated 7/17/2019.  Pulmonary function tests interpreted by Dr. Ariel Mercado MD showing mild airway obstruction with significant improvement with bronchodilators.  Of note FVC was 79 FEV1 74.  Note from Dr. Sweeney cardiology that it is okay to stop the patient's aspirin 1 week prior in 6 weeks after sleeve gastrectomy.  Cardiology clearance Dr. Sweeney dated 2/26/2019.  He did note at that time that she had suicidal thoughts.  Neurology clearance dated 2/6/2019 Gladys Wilder PA-C.  Nephrology clearance dated 7/22/2019 noting its related to heavy use of anti-inflammatory drugs in the past.  CT scan of the abdomen with IV and oral contrast dated 3/26/2019 with some questionable wall thickening of the fundus for which correlation with endoscopy is recommended, fatty infiltration of the liver.  No varices noted.  Diverticulosis is noted please see scanned records that I have reviewed and signed off on today.  All of this in addition to the patient's unique history and exam has been taken into consideration in determining their  "appropriate candidacy for weight loss surgery.    Complications  of laparoscopic/possible robotic gastric sleeve were discussed. The patient is well aware of the potential complications of surgery that include but not limited to bleeding, infections, deep venous thrombosis, pulmonary embolism, pulmonary complications such as pneumonia, cardiac events, hernias, small bowel obstruction, damage to the spleen or other organs, bowel injury, disfiguring scars, failure to lose weight, need for additional surgery, conversion to an open procedure, and death. Patient is also aware of complications which apply in this particular procedure that can include but are not limited to a \"leak\" at the staple line which in some instances may require conversion to gastric bypass.    The patient is aware if a hiatal hernia is encountered, it likely will be repaired.  R/B/A Rx to hiatal hernia repair were discussed as outlined in our long consent form.  Briefly risks in addition to those for LSG include recurrent hernia, MIYA, dysphagia, esophageal injury, pneumothorax, injury to the vagus nerves, injury to the thoracic duct, aorta or vena cava.    I discussed avoiding all tobacco products and second hand smoke at least 2 weeks pre-operatively and 6 weeks post-operatively to minimize the risk of sleeve leak.  This included discussing the importance of avoiding even secondhand smoke as the risk of leak is increased.  Examples discussed:  I made it very clear that the patient understands they should avoid even riding in a car where someone has previously smoked in the last 2 weeks, living in a house where someone smokes (even if it's in a separate room/patio/attached garage, etc.) we discussed that they should not have a conversation with a group of people who are smoking even if it's outside.  They can be around wood burning fires and barbecue.  I told them I do not know if marijuana has a same effects but my overall recommendation is to " avoid it for 2 weeks prior in 6 weeks after surgery.  They also are aware that nicotine may also increase the risk of leak and I strongly encouraged him to avoid that as well for 2 weeks prior in 6 weeks after surgery.    Discussed the risks, benefits and alternative therapies at great length as outlined in our extensive consent forms, consent videos, and educational teaching process under the direction of the center's .    A copy of the patient's signed informed consent is on file.    R/B/A Rx discussed to postop anticoagulation incl but not limited to bleeding, drug reaction, venothromboembolic events, etc. and the patient declined.    Plan: After evaluation today I think the patient is not an unreasonable candidate for laparoscopic sleeve gastrectomy.  She certainly has some significant medical and psychological issues but did pass her psychological evaluation.  Extremely poor dietitian evaluation and ongoing exposure to high fructose corn syrup in the form of Pepsi.  As discussed above I feel she did not seem to grasp weight loss concepts very well.  Both myself and Dr. Mark noted some significant abnormality with thickening of the mucosa in the antrum, biopsies were negative and CAT scan does not show any varices.  She has not been tested for gastroparesis and is a diabetic.  Given her pulmonary function test consider postoperative around-the-clock aerosols.  Other issues include bipolar disorder, chronic kidney disease, diabetes, history of previous blood transfusions, hyperlipidemia, hypertension, peripheral edema, chronic nausea, peripheral neuropathy, restless leg syndrome, history of TIAs.        Guido Greenberg MD                 Electronically signed by Guido Greenberg MD at 8/18/2019  9:41 PM             Guido Greenberg MD at 8/13/2019  3:30 PM          [facility] BARIATRIC SURGERY  [address]  [place]  "[id number]  [phone]      Patient  Name:  Marcela Ramírez  :  1965      Date of Visit: 19    Chief Complaint:  weight gain; unable to maintain weight loss.  Evaluate for possible weight loss surgery    History of Present Illness:  Marcela Ramírez is a 54 y.o. female who presents today for evaluation, education and consultation regarding weight loss surgery.  Since last seen 2019 she has lost 9 pounds.The patient returns for final visit prior to LSG.  Original intake evaluation Janis España PA-C dated 2019 reviewed.     From her evaluation that day:     \"The most weight Marcela lost was 27 pounds on ate much less but was only able to maintain that weight loss for 1 month.  Her maximum lifetime weight is 285 pounds. Says she is \"tired of being fat, tired of not being able to wear real pants, tired of being exhausted.\" She has some friends who had bypass at other offices and \"failed\" also with a friend s/p LSG at [place] and is doing great.      As above, patient has been overweight for many years, with numerous failed dietary/weight loss attempts.  She now has obesity related comorbidities and as such has decided to pursue weight loss surgery.     GI: GERD controlled with omeprazole 20mg. Remote EGD- esophagitis. No h/o HH or h pylori. S/p spenser with cholelithiasis. Has nausea wtih taking meds, avoid this by taking meds wtih milk     H/o TIA-mimicking symptoms x 8-9 episodes including right sided drooping/ weakness/ vision changes. Extensive workup was negative for CVA with angio imaging- per patient. Was determined to be related to anxiety/ getting really worked up. last episode 2017, says she has not been evaluated last 7 episodes, just waits in her house for symptoms to resolve. No residual effects. She sees neurologist for DM peripheral neuropathy. On ASA daily.      Psych: bipolar controlled on meds. Says she faithfully takes her bipolar meds to control symptoms, otherwise " "is \"really mean and angry\". Says her meds make her not care about anything, but people don't like her when she is off them.      Pulm:h/o asthma, has not required inhaler at all other than recent PNA.      DM: Diagnosed 2017, was on metformin in the past. Last A1C 7%\"       The patient has had issues with morbid obesity for years and only temporary success with non-surgical methods of weight loss.  The patient is seeking LSG to help with the morbid obesity related conditions of arthritis, asthma, ovarian cyst, bipolar disorder, boils, carpal tunnel syndrome, colonic polyps, depression, type II non-insulin-dependent diabetes mellitus, diverticulosis, fatigue, GE reflux disease, headaches, hyperlipidemia, hypertension, peripheral edema, chronic nausea, peripheral neuropathy, restless leg syndrome, history of TIAs, chronic kidney disease.    54-year-old morbidly obese white female from [place].  She dozed repeatedly during discussion of the risks benefits alternative therapies but did voice understanding to all matters concerning avoiding perioperative complications.  She said she tried to have weight loss surgery at [facility] 10 years ago but failed her psychological evaluation and was diagnosed with bipolar disorder at that time.  She has a very poor dietitian evaluation noting that she drank 2 to 4 L of Pepsi daily.  She says she has cut way back but had a few last week which she blames on her  enabling her.  I did emphasize that surgery will not be successful in the face of ongoing high fructose corn syrup but she seemed to have a little insight and seemed to have a poor grasp of discussed concepts in general.  She did pass her psychological evaluation.  She says her reflux symptoms are controlled with proton pump inhibitors.  She has never been told she had gastroparesis.  She said she tested negative for sleep apnea \"years ago\".  She said she was in the emergency room recently with a blood sugar of " 672.      Past Medical History:   Diagnosis Date   • Arthritis     knees, otc arthritis meds prn, no h/o injections.    • Asthma     has not required inhaler   • Bilateral ovarian cysts    • Bipolar 1 disorder (CMS/[facility])    • Boil     opens them herself   • Carpal tunnel syndrome    • CKD (chronic kidney disease), symptom management only, stage 3 (moderate) (CMS/[facility])     Creatinine 1.04 GFR 56   • Colon polyp    • Depression    • Diabetes mellitus (CMS/[facility])     Diagnosed 2017, was on metformin in the past. Last A1C 7%   • Diverticulosis    • Fatigue    • GERD (gastroesophageal reflux disease)     controlled with omeprazole 20mg. Remote EGD- esophagitis.  EGD no hiatal hernia Z line 40 cm very thick mucosal folds cannot rule out varices.  CT scan thickened fundus, no varices seen   • Headache    • History of blood transfusion     2/2 heavy menses   • Hyperlipidemia    • Hypertension    • Lower extremity edema    • Morbid obesity with BMI of 40.0-44.9, adult (CMS/[facility])    • Nausea     wtih taking meds, avoid this by taking meds wtih milk   • Peripheral neuropathy     2/2 DM   • RLS (restless legs syndrome)    • S/P cholecystectomy     2/2 cholelithiasis   • Shortness of breath on exertion    • TIA (transient ischemic attack)     patient states 8-9 episodes of right sided drooping/ weakness/ vision changes. Workup was negative for CVA- thought to be related to anxiety. last episode 12/2017     Past Surgical History:   Procedure Laterality Date   • COLONOSCOPY  remote    diverticulosis   • ENDOSCOPY  remote    esophagitis    • KNEE ACL RECONSTRUCTION Right 2013    with miniscal repair   • LAPAROSCOPIC CHOLECYSTECTOMY  2001    cholelithiasis   • LAPAROSCOPIC HYSTERECTOMY  2013    right ovary remains       Allergies   Allergen Reactions   • Contrast Dye Swelling     Nasal congestion/ snot, IV contrast only       Current Outpatient Medications:   •  ALPRAZolam (XANAX) 0.5 MG tablet, Take 1 tablet by mouth At Night As Needed  for Anxiety., Disp: 30 tablet, Rfl: 5  •  atorvastatin (LIPITOR) 20 MG tablet, Take 1 tablet by mouth Daily., Disp: 30 tablet, Rfl: 11  •  desvenlafaxine (PRISTIQ) 50 MG 24 hr tablet, Take 50 mg by mouth Daily., Disp: , Rfl:   •  hydrochlorothiazide (HYDRODIURIL) 25 MG tablet, TAKE ONE TABLET BY MOUTH DAILY, Disp: 30 tablet, Rfl: 1  •  LamoTRIgine (LAMICTAL PO), Take 1 tablet by mouth Every Night. Pt is on 200mg, Disp: , Rfl:   •  lisinopril (PRINIVIL,ZESTRIL) 20 MG tablet, Take 1 tablet by mouth Daily., Disp: 30 tablet, Rfl: 11  •  LYRICA 50 MG capsule, TAKE ONE CAPSULE BY MOUTH EVERY NIGHT AT BEDTIME FOR 7 DAYS, THEN TWO CAPSULES EVERY NIGHT AT BEDTIME THEREAFTER, Disp: 60 capsule, Rfl: 4  •  metFORMIN ER (GLUCOPHAGE-XR) 500 MG 24 hr tablet, Take 1 tablet by mouth 2 (Two) Times a Day Before Meals., Disp: 60 tablet, Rfl: 5  •  omeprazole (priLOSEC) 20 MG capsule, TAKE ONE CAPSULE BY MOUTH DAILY, Disp: 30 capsule, Rfl: 1  •  ondansetron ODT (ZOFRAN-ODT) 4 MG disintegrating tablet, Take 1 tablet by mouth Every 6 (Six) Hours As Needed for Nausea or Vomiting., Disp: 30 tablet, Rfl: 1  •  ONE TOUCH ULTRA TEST test strip, TEST GLUCOSE TWO TIMES A DAY, Disp: 100 each, Rfl: 12  •  pregabalin (LYRICA) 25 MG capsule, Take 25 mg by mouth 2 (Two) Times a Day., Disp: , Rfl:   •  rOPINIRole (REQUIP) 2 MG tablet, Take 2 tablets by mouth Every Night., Disp: 60 tablet, Rfl: 11  •  aspirin 81 MG EC tablet, Take 1 tablet by mouth Daily., Disp: 30 tablet, Rfl: 0  •  ipratropium (ATROVENT HFA) 17 MCG/ACT inhaler, Inhale 2 puffs 4 (Four) Times a Day. (Patient taking differently: Inhale 2 puffs As Needed.), Disp: 1 inhaler, Rfl: 0  •  nitroglycerin (NITROSTAT) 0.4 MG SL tablet, Place 1 tablet under the tongue Every 5 (Five) Minutes As Needed for Chest Pain. Take no more than 3 doses in 15 minutes., Disp: 100 tablet, Rfl: 0  •  promethazine (PHENERGAN) 25 MG tablet, Take 1 tablet by mouth Every 6 (Six) Hours As Needed for Nausea or  Vomiting., Disp: 8 tablet, Rfl: 0    Social History     Socioeconomic History   • Marital status:      Spouse name: Not on file   • Number of children: Not on file   • Years of education: Not on file   • Highest education level: Not on file   Tobacco Use   • Smoking status: Never Smoker   • Smokeless tobacco: Never Used   Substance and Sexual Activity   • Alcohol use: No     Frequency: Never   • Drug use: Yes     Types: Marijuana     Comment: helps with marijauna   • Sexual activity: Defer     Comment: no hormones   Social History Narrative    Lives in [facility] with boyfriend, daughter and her boyfriend and granddaughter and stepson.      Family History   Problem Relation Age of Onset   • Arthritis Mother    • Arthritis Father    • Diabetes Father    • Hyperlipidemia Father    • Hypertension Father    • Asthma Brother    • Other Brother    • Cancer Maternal Aunt    • Depression Paternal Uncle        Review of Systems   Constitutional: Positive for fatigue. Negative for chills, diaphoresis, fever and unexpected weight loss.   HENT: Negative for congestion and facial swelling.    Eyes: Negative for blurred vision, double vision and discharge.   Respiratory: Negative for chest tightness, shortness of breath and stridor.    Cardiovascular: Negative for chest pain, palpitations and leg swelling.   Gastrointestinal: Positive for GERD. Negative for blood in stool.   Endocrine: Negative for polydipsia.   Genitourinary: Negative for hematuria.   Musculoskeletal: Positive for arthralgias.   Skin: Negative for color change.   Allergic/Immunologic: Negative for immunocompromised state.   Neurological: Negative for confusion.   Psychiatric/Behavioral: Negative for self-injury.       I have reviewed the ROS and confirm that it's accurate today.    Physical Exam:  Vital Signs:  Weight: 111 kg (245 lb 8 oz)   Body mass index is 38.45 kg/m².  Temp: 97.8 °F (36.6 °C)   Heart Rate: 77   BP: 126/84     Physical Exam    Constitutional: She is oriented to person, place, and time. She appears well-developed and well-nourished.   HENT:   Head: Normocephalic and atraumatic.   Nose: Nose normal.   Eyes: Conjunctivae and EOM are normal. Pupils are equal, round, and reactive to light.   Neck: Normal range of motion. Neck supple. Carotid bruit is not present. No tracheal deviation present. No thyromegaly present.   Cardiovascular: Normal rate, regular rhythm and normal heart sounds.   Pulmonary/Chest: Effort normal and breath sounds normal. No respiratory distress.   Abdominal: Soft. She exhibits no distension. There is no hepatosplenomegaly. There is no tenderness.   Scattered upper abdominal laparoscopy scars subumbilical vertical laparoscopy scar   Musculoskeletal: Normal range of motion. She exhibits no edema or deformity.   Neurological: She is alert and oriented to person, place, and time. No cranial nerve deficit. Coordination normal.   Skin: Skin is warm and dry. No rash noted.   Psychiatric: She has a normal mood and affect. Her behavior is normal. Judgment and thought content normal.   Vitals reviewed.      Patient Active Problem List   Diagnosis   • Acute suppurative otitis media of right ear with spontaneous rupture of tympanic membrane   • Depression   • Anxiety   • Diabetic peripheral neuropathy (CMS/HCC)   • Hypertension   • Hyperlipidemia   • Diverticulosis   • Diabetes mellitus (CMS/HCC)   • TIA (transient ischemic attack)   • Peripheral neuropathy   • GERD (gastroesophageal reflux disease)   • Bipolar 1 disorder (CMS/HCC)   • Bilateral ovarian cysts   • S/P cholecystectomy   • Headache   • Lower extremity edema   • RLS (restless legs syndrome)   • Shortness of breath on exertion   • Carpal tunnel syndrome   • History of blood transfusion   • Nausea   • Colon polyp   • Asthma   • Arthritis   • Boil   • Morbid obesity with BMI of 40.0-44.9, adult (CMS/HCC)   • Pre-op evaluation   • Epigastric pain   • Morbid obesity due  to excess calories (CMS/HCC)       Assessment:    Marcela Ramírez is a 54 y.o. year old female with medically complicated obesity.    Weight loss surgery is deemed medically necessary given the following obesity related comorbidities including arthritis, asthma, ovarian cyst, bipolar disorder, boils, carpal tunnel syndrome, colonic polyps, depression, type II non-insulin-dependent diabetes mellitus, diverticulosis, fatigue, GE reflux disease, headaches, hyperlipidemia, hypertension, peripheral edema, chronic nausea, peripheral neuropathy, restless leg syndrome, history of TIAs, chronic kidney disease with current Weight: 111 kg (245 lb 8 oz) and Body mass index is 38.45 kg/m²..    Encounter Diagnosis   Name Primary?   • Morbid obesity due to excess calories (CMS/HCC) Yes      Patient is aware that surgery is a tool, and that weight loss is not guaranteed but only seen in the context of appropriate use, follow up and exercise.    The patient was present for an approximately a 2.5 hour discussion of the purpose of weight loss surgery, how WLS is a tool to assist in achieving weight loss goals, the most common complications and how best to avoid them, and the strategies for short and long term weight loss.  Ample opportunity to discuss questions was available both in group and during the time of individual examination.    I reviewed [facility] report which is several pages and includes gabapentin Lyrica alprazolam pre-[facility] gastroenterology evaluation dated 4/19/2019 Dr. Gilberto Mark for nausea vomiting hematemesis.  EGD dated 4/19/2019 Dr. Mark showing no hiatal hernia, no obvious Kayla-Greenberg tear, enlarged folds of the antrum with gastritis, no active bleeding.  Pathology of the antrum polyp suggestive of hyperplastic gastric polyp and reactive gastropathic alteration.  Chest x-ray dated 4/11/2019 read as mild vascular congestion and interstitial edema new from prior study please correlate for clinical evidence of  mild volume overload.  Chest x-ray dated 4/9/2019 showing mild cardiac silhouette enlargement otherwise no active process.  EKG dated 4/10/2019 normal sinus rhythm.  CBC and CMP dated 8/9/2019 normal except for a glucose of 126 creatinine of 1.04 GFR 56 of note BUN was 19.  Psychological evaluation dated 1/29/2019 Maribel Hernandez, PhD good candidate.  Dietitian evaluation dated 1/29/2019 Marleny montemayor noting the patient gave a Pepsi 3 days ago (she has since resumed as above) and long-term has been drinking 2 to 4 L of Pepsi daily.  EGD by myself dated 2/18/2019 showing very thick mucosal folds possible prepyloric varices no hiatal hernia Z line 40 cm pathology of the antrum no significant histopathologic change and negative for H. pylori.  Biopsies of the distal esophagus suggestive but not diagnostic of reflux esophagitis.  Negative H. pylori stool antigen dated 7/17/2019.  Pulmonary function tests interpreted by Dr. Ariel Mercado MD showing mild airway obstruction with significant improvement with bronchodilators.  Of note FVC was 79 FEV1 74.  Note from Dr. Sweeney cardiology that it is okay to stop the patient's aspirin 1 week prior in 6 weeks after sleeve gastrectomy.  Cardiology clearance Dr. Sweeney dated 2/26/2019.  He did note at that time that she had suicidal thoughts.  Neurology clearance dated 2/6/2019 Gladys Wilder PA-C.  Nephrology clearance dated 7/22/2019 noting its related to heavy use of anti-inflammatory drugs in the past.  CT scan of the abdomen with IV and oral contrast dated 3/26/2019 with some questionable wall thickening of the fundus for which correlation with endoscopy is recommended, fatty infiltration of the liver.  No varices noted.  Diverticulosis is noted please see scanned records that I have reviewed and signed off on today.  All of this in addition to the patient's unique history and exam has been taken into consideration in determining their appropriate candidacy for weight  "loss surgery.    Complications  of laparoscopic/possible robotic gastric sleeve were discussed. The patient is well aware of the potential complications of surgery that include but not limited to bleeding, infections, deep venous thrombosis, pulmonary embolism, pulmonary complications such as pneumonia, cardiac events, hernias, small bowel obstruction, damage to the spleen or other organs, bowel injury, disfiguring scars, failure to lose weight, need for additional surgery, conversion to an open procedure, and death. Patient is also aware of complications which apply in this particular procedure that can include but are not limited to a \"leak\" at the staple line which in some instances may require conversion to gastric bypass.    The patient is aware if a hiatal hernia is encountered, it likely will be repaired.  R/B/A Rx to hiatal hernia repair were discussed as outlined in our long consent form.  Briefly risks in addition to those for LSG include recurrent hernia, MIYA, dysphagia, esophageal injury, pneumothorax, injury to the vagus nerves, injury to the thoracic duct, aorta or vena cava.    I discussed avoiding all tobacco products and second hand smoke at least 2 weeks pre-operatively and 6 weeks post-operatively to minimize the risk of sleeve leak.  This included discussing the importance of avoiding even secondhand smoke as the risk of leak is increased.  Examples discussed:  I made it very clear that the patient understands they should avoid even riding in a car where someone has previously smoked in the last 2 weeks, living in a house where someone smokes (even if it's in a separate room/patio/attached garage, etc.) we discussed that they should not have a conversation with a group of people who are smoking even if it's outside.  They can be around wood burning fires and barbecue.  I told them I do not know if marijuana has a same effects but my overall recommendation is to avoid it for 2 weeks prior in 6 " weeks after surgery.  They also are aware that nicotine may also increase the risk of leak and I strongly encouraged him to avoid that as well for 2 weeks prior in 6 weeks after surgery.    Discussed the risks, benefits and alternative therapies at great length as outlined in our extensive consent forms, consent videos, and educational teaching process under the direction of the center's .    A copy of the patient's signed informed consent is on file.    R/B/A Rx discussed to postop anticoagulation incl but not limited to bleeding, drug reaction, venothromboembolic events, etc. and the patient declined.    Plan: After evaluation today I think the patient is not an unreasonable candidate for laparoscopic sleeve gastrectomy.  She certainly has some significant medical and psychological issues but did pass her psychological evaluation.  Extremely poor dietitian evaluation and ongoing exposure to high fructose corn syrup in the form of Pepsi.  As discussed above I feel she did not seem to grasp weight loss concepts very well.  Both myself and Dr. Mark noted some significant abnormality with thickening of the mucosa in the antrum, biopsies were negative and CAT scan does not show any varices.  She has not been tested for gastroparesis and is a diabetic.  Given her pulmonary function test consider postoperative around-the-clock aerosols.  Other issues include bipolar disorder, chronic kidney disease, diabetes, history of previous blood transfusions, hyperlipidemia, hypertension, peripheral edema, chronic nausea, peripheral neuropathy, restless leg syndrome, history of TIAs.        Guido Greenberg MD                Electronically signed by Guido Greenberg MD at 8/18/2019  9:41 PM       ICU Vital Signs     Row Name 08/23/19 0800 08/23/19 0752 08/23/19 0732 08/23/19 0500 08/23/19 0048       Vitals    Temp  --  --  98.1 °F (36.7 °C)  98.2 °F (36.8 °C)  98.4 °F (36.9 °C)    Temp src  --  --  Oral   "Oral  Oral    Pulse  --  62  77  68  79    Heart Rate Source  --  --  Monitor  Monitor  Monitor    Resp  --  18  18  18  18    Resp Rate Source  --  --  Visual  Visual  Visual    BP  --  --  129/78  142/78  129/73    Noninvasive MAP (mmHg)  --  --  105  104  88    BP Location  --  --  Right arm  Right arm  Right arm    BP Method  --  --  Automatic  Automatic  Automatic    Patient Position  --  --  Lying  Lying  Lying       Oxygen Therapy    SpO2  --  98 %  96 %  98 %  99 %    Pulse Oximetry Type  --  Continuous  --  --  --    Device (Oxygen Therapy)  room air  --  --  --  --    Row Name 08/22/19 2200 08/22/19 2028 08/22/19 2000 08/22/19 1923 08/22/19 1800       Vitals    Temp  --  98.8 °F (37.1 °C)  --  --  --    Temp src  --  Oral  --  --  --    Pulse  --  77  --  71  --    Heart Rate Source  --  Monitor  --  --  --    Resp  --  18  --  20  --    Resp Rate Source  --  Visual  --  --  --    BP  --  154/87  --  --  --    BP Location  --  Right arm  --  --  --    BP Method  --  Automatic  --  --  --    Patient Position  --  Lying  --  --  --       Oxygen Therapy    SpO2  --  92 %  --  97 %  --    Device (Oxygen Therapy)  CPAP  --  nasal cannula  room air  room air    Flow (L/min)  --  --  2  --  --    Row Name 08/22/19 1649 08/22/19 1600 08/22/19 1546 08/22/19 1233 08/22/19 1200       Height and Weight    Height  --  --  --  170.2 cm (67.01\")  --    Height Method  --  --  --  Stated  --    Weight  --  --  --  111 kg (244 lb 11.4 oz)  --    Weight Method  --  --  --  Stated  --    Ideal Body Weight (IBW) (kg)  --  --  --  61.88  --    BSA (Calculated - sq m)  --  --  --  2.2 sq meters  --    BMI (Calculated)  --  --  --  38.3  --    Weight in (lb) to have BMI = 25  --  --  --  159.3  --       Vitals    Temp  98.6 °F (37 °C)  --  --  98 °F (36.7 °C)  98 °F (36.7 °C)    Temp src  Oral  --  --  Oral  Oral    Pulse  91  --  84  80  72    Heart Rate Source  Monitor  --  --  Monitor  Monitor    Resp  16  --  --  16 18    " Resp Rate Source  Visual  --  --  Visual  Visual    BP  150/90  --  --  149/75  145/79    Noninvasive MAP (mmHg)  112  --  --  --  --    BP Location  Right arm  --  --  Right arm  Right arm    BP Method  Automatic  --  --  Automatic  Automatic    Patient Position  Lying  --  --  Lying  Lying       Oxygen Therapy    SpO2  92 %  --  91 %  91 %  90 %    Pulse Oximetry Type  --  --  --  Continuous  --    Device (Oxygen Therapy)  --  room air  --  --  nasal cannula    Flow (L/min)  --  --  --  --  3    Oximetry Probe Site Changed  --  --  --  No  --    Row Name 08/22/19 1125 08/22/19 1115 08/22/19 1100 08/22/19 1045          Vitals    Temp  --  97.6 °F (36.4 °C)  98 °F (36.7 °C)  97.8 °F (36.6 °C)     Pulse  --  73  65  64     Resp  --  19 18  18     BP  --  150/83  157/92  150/89     Noninvasive MAP (mmHg)  --  123  130  --        Oxygen Therapy    SpO2  --  92 %  91 %  92 %     Pulse Oximetry Type  --  Continuous  Continuous  Continuous     Device (Oxygen Therapy)  nasal cannula  nasal cannula  nasal cannula  nasal cannula     Flow (L/min)  3  3  3  4         Hospital Medications (active)       Dose Frequency Start End    acetaminophen (TYLENOL) tablet 1,000 mg 1,000 mg Every 12 Hours 8/22/2019 8/24/2019    Sig - Route: Take 2 tablets by mouth Every 12 (Twelve) Hours. - Oral    albuterol (PROVENTIL) nebulizer solution 0.083% 2.5 mg/3mL 2.5 mg Every 6 Hours While Awake - RT 8/22/2019     Sig - Route: Take 2.5 mg by nebulization Every 6 (Six) Hours While Awake. - Nebulization    ALPRAZolam (XANAX) tablet 0.5 mg 0.5 mg Nightly PRN 8/22/2019     Sig - Route: Take 1 tablet by mouth At Night As Needed for Anxiety. - Oral    atorvastatin (LIPITOR) tablet 20 mg 20 mg Nightly 8/22/2019     Sig - Route: Take 1 tablet by mouth Every Night. - Oral    ceFAZolin in dextrose (ANCEF) IVPB solution 2 g 2 g Every 8 Hours 8/22/2019 8/23/2019    Sig - Route: Infuse 100 mL into a venous catheter Every 8 (Eight) Hours. - Intravenous     "cyanocobalamin injection 1,000 mcg 1,000 mcg Once 8/23/2019 8/23/2019    Sig - Route: Inject 1 mL into the appropriate muscle as directed by prescriber 1 (One) Time. - Intramuscular    desvenlafaxine (PRISTIQ) 24 hr tablet 50 mg 50 mg Nightly 8/22/2019     Sig - Route: Take 1 tablet by mouth Every Night. - Oral    diatrizoate meglumine-sodium (GASTROGRAFIN) 66-10 % solution 30 mL 30 mL Once in Imaging 8/23/2019 8/23/2019    Sig - Route: Take 30 mL by mouth Once. - Oral    Cosign for Ordering: Required by Guido Greenberg MD    diphenhydrAMINE (BENADRYL) injection 25 mg 25 mg Every 4 Hours PRN 8/22/2019     Sig - Route: Infuse 0.5 mL into a venous catheter Every 4 (Four) Hours As Needed for Itching. - Intravenous    ferric gluconate (FERRLECIT)125 MG IVPB 125 mg Once As Needed 8/23/2019     Sig - Route: Infuse 100 mL into a venous catheter 1 (One) Time As Needed (Iron < 50). - Intravenous    gabapentin (NEURONTIN) capsule 100 mg 100 mg 3 Times Daily 8/22/2019 8/24/2019    Sig - Route: Take 1 capsule by mouth 3 (Three) Times a Day. - Oral    hydrALAZINE (APRESOLINE) injection 10 mg 10 mg Every 30 Minutes PRN 8/22/2019     Sig - Route: Infuse 0.5 mL into a venous catheter Every 30 (Thirty) Minutes As Needed for High Blood Pressure. - Intravenous    HYDROcodone-acetaminophen (NORCO) 7.5-325 MG per tablet 1 tablet 1 tablet Every 4 Hours PRN 8/22/2019 9/1/2019    Sig - Route: Take 1 tablet by mouth Every 4 (Four) Hours As Needed for Moderate Pain  (Please give PO before IV if tolerated). - Oral    HYDROmorphone (DILAUDID) injection 1 mg 1 mg Every 2 Hours PRN 8/22/2019 9/1/2019    Sig - Route: Infuse 1 mL into a venous catheter Every 2 (Two) Hours As Needed for Severe Pain . - Intravenous    Linked Group 1:  \"And\" Linked Group Details        HYDROmorphone (DILAUDID) tablet 2 mg 2 mg Every 4 Hours PRN 8/22/2019 9/1/2019    Sig - Route: Take 1 tablet by mouth Every 4 (Four) Hours As Needed for Moderate Pain  or Severe " "Pain  (also use when can't give Lortab because of IV Tylenol.  Please give PO before IV if tolerated). - Oral    insulin lispro (humaLOG) injection 0-7 Units 0-7 Units 4 Times Daily Before Meals & Nightly 8/22/2019     Sig - Route: Inject 0-7 Units under the skin into the appropriate area as directed 4 (Four) Times a Day Before Meals & at Bedtime. - Subcutaneous    lamoTRIgine (LaMICtal) tablet 200 mg 200 mg Nightly 8/22/2019     Sig - Route: Take 2 tablets by mouth Every Night. - Oral    lisinopril (PRINIVIL,ZESTRIL) tablet 20 mg 20 mg Nightly 8/22/2019     Sig - Route: Take 1 tablet by mouth Every Night. - Oral    LORazepam (ATIVAN) injection 0.5 mg 0.5 mg Every 12 Hours PRN 8/22/2019 9/1/2019    Sig - Route: Infuse 0.25 mL into a venous catheter Every 12 (Twelve) Hours As Needed for Anxiety. - Intravenous    LORazepam (ATIVAN) tablet 1 mg 1 mg Every 12 Hours PRN 8/22/2019 9/1/2019    Sig - Route: Take 1 tablet by mouth Every 12 (Twelve) Hours As Needed for Anxiety. - Oral    metoclopramide (REGLAN) injection 10 mg 10 mg Every 6 Hours PRN 8/22/2019     Sig - Route: Infuse 2 mL into a venous catheter Every 6 (Six) Hours As Needed for Nausea or Vomiting. - Intravenous    Morphine sulfate (PF) injection 4 mg 4 mg Every 2 Hours PRN 8/22/2019 9/1/2019    Sig - Route: Infuse 1 mL into a venous catheter Every 2 (Two) Hours As Needed for Moderate Pain . - Intravenous    Linked Group 2:  \"And\" Linked Group Details        multiple vitamin (M.V.I. Adult) 10 mL, thiamine (B-1) 100 mg, folic acid 1 mg in sodium chloride 0.9 % 1,000 mL infusion 250 mL/hr Once 8/23/2019 8/23/2019    Sig - Route: Infuse 250 mL/hr into a venous catheter 1 (One) Time. - Intravenous    naloxone (NARCAN) injection 0.1 mg 0.1 mg Every 5 Minutes PRN 8/22/2019     Sig - Route: Infuse 0.25 mL into a venous catheter Every 5 (Five) Minutes As Needed for Respiratory Depression. - Intravenous    Linked Group 1:  \"And\" Linked Group Details        naloxone " "(NARCAN) injection 0.4 mg 0.4 mg Every 5 Minutes PRN 8/22/2019     Sig - Route: Infuse 1 mL into a venous catheter Every 5 (Five) Minutes As Needed for Respiratory Depression. - Intravenous    Linked Group 2:  \"And\" Linked Group Details        nitroglycerin (NITROSTAT) SL tablet 0.4 mg 0.4 mg Every 5 Minutes PRN 8/22/2019     Sig - Route: Place 1 tablet under the tongue Every 5 (Five) Minutes As Needed for Chest Pain. - Sublingual    pantoprazole (PROTONIX) injection 40 mg 40 mg Every Early Morning 8/23/2019     Sig - Route: Infuse 10 mL into a venous catheter Every Morning. - Intravenous    phenol (CHLORASEPTIC) 1.4 % liquid 2 spray 2 spray Every 2 Hours PRN 8/22/2019     Sig - Route: Apply 2 sprays to the mouth or throat Every 2 (Two) Hours As Needed for Sore Throat. - Mouth/Throat    pregabalin (LYRICA) capsule 100 mg 100 mg Nightly 8/22/2019     Sig - Route: Take 1 capsule by mouth Every Night. - Oral    prochlorperazine (COMPAZINE) tablet 10 mg 10 mg Every 6 Hours PRN 8/22/2019     Sig - Route: Take 1 tablet by mouth Every 6 (Six) Hours As Needed for Nausea or Vomiting. - Oral    promethazine (PHENERGAN) injection 12.5 mg 12.5 mg Every 6 Hours PRN 8/22/2019     Sig - Route: Infuse 0.5 mL into a venous catheter Every 6 (Six) Hours As Needed for Nausea or Vomiting. - Intravenous    Linked Group 3:  \"Or\" Linked Group Details        promethazine (PHENERGAN) injection 12.5 mg 12.5 mg Every 6 Hours PRN 8/22/2019     Sig - Route: Inject 0.5 mL into the appropriate muscle as directed by prescriber Every 6 (Six) Hours As Needed for Nausea or Vomiting. - Intramuscular    Linked Group 3:  \"Or\" Linked Group Details        rOPINIRole (REQUIP) tablet 4 mg 4 mg Nightly 8/22/2019     Sig - Route: Take 2 tablets by mouth Every Night. - Oral    simethicone (MYLICON) chewable tablet 80 mg 80 mg 4 Times Daily PRN 8/22/2019     Sig - Route: Chew 1 tablet 4 (Four) Times a Day As Needed for Flatulence. - Oral    sodium chloride 0.45 " "% with KCl 20 mEq/L infusion 125 mL/hr Continuous 8/23/2019     Sig - Route: Infuse 125 mL/hr into a venous catheter Continuous. - Intravenous    sodium chloride 0.9 % infusion 150 mL/hr Continuous 8/22/2019     Sig - Route: Infuse 150 mL/hr into a venous catheter Continuous. - Intravenous    acetaminophen (TYLENOL) suppository 650 mg (Discontinued) 650 mg Once As Needed 8/22/2019 8/22/2019    Sig - Route: Insert 1 suppository into the rectum 1 (One) Time As Needed for Mild Pain . - Rectal    Reason for Discontinue: Patient Transfer    Linked Group 4:  \"Or\" Linked Group Details        acetaminophen (TYLENOL) tablet 650 mg (Discontinued) 650 mg Once As Needed 8/22/2019 8/22/2019    Sig - Route: Take 2 tablets by mouth 1 (One) Time As Needed for Mild Pain . - Oral    Reason for Discontinue: Patient Transfer    Linked Group 4:  \"Or\" Linked Group Details        enoxaparin (LOVENOX) syringe 40 mg (Discontinued) 40 mg Daily 8/23/2019 8/23/2019    Sig - Route: Inject 0.4 mL under the skin into the appropriate area as directed Daily. - Subcutaneous    fentaNYL citrate (PF) (SUBLIMAZE) injection 50 mcg (Discontinued) 50 mcg Every 5 Minutes PRN 8/22/2019 8/22/2019    Sig - Route: Infuse 1 mL into a venous catheter Every 5 (Five) Minutes As Needed for Moderate Pain . - Intravenous    Reason for Discontinue: Patient Transfer    HYDROmorphone (DILAUDID) injection 0.5 mg (Discontinued) 0.5 mg Every 5 Minutes PRN 8/22/2019 8/22/2019    Sig - Route: Infuse 0.5 mL into a venous catheter Every 5 (Five) Minutes As Needed for Severe Pain . - Intravenous    Reason for Discontinue: Patient Transfer    ipratropium (ATROVENT HFA) inhaler 2 puff (Discontinued) 2 puff 4 Times Daily - RT 8/22/2019 8/22/2019    Sig - Route: Inhale 2 puffs 4 (Four) Times a Day. - Inhalation    Reason for Discontinue: Formulary change    ipratropium (ATROVENT) nebulizer solution 0.5 mg (Discontinued) 0.5 mg Every 6 Hours PRN 8/22/2019 8/22/2019    Sig - Route: " Take 2.5 mL by nebulization Every 6 (Six) Hours As Needed for Shortness of Air. - Nebulization    ipratropium-albuterol (DUO-NEB) nebulizer solution 3 mL (Discontinued) 3 mL Once As Needed 8/22/2019 8/22/2019    Sig - Route: Take 3 mL by nebulization 1 (One) Time As Needed for Wheezing or Shortness of Air (bronchospasm). - Nebulization    Reason for Discontinue: Patient Transfer    lactated ringers bolus 500 mL (Discontinued) 500 mL Once As Needed 8/22/2019 8/22/2019    Sig - Route: Infuse 500 mL into a venous catheter 1 (One) Time As Needed (for hypovolemia, call anesthesiologist before initiating). - Intravenous    Reason for Discontinue: Patient Transfer    lactated ringers infusion (Discontinued) 9 mL/hr Continuous 8/22/2019 8/22/2019    Sig - Route: Infuse 9 mL/hr into a venous catheter Continuous. - Intravenous    Reason for Discontinue: Patient Transfer    meperidine (DEMEROL) injection 12.5 mg (Discontinued) 12.5 mg Every 5 Minutes PRN 8/22/2019 8/22/2019    Sig - Route: Infuse 0.5 mL into a venous catheter Every 5 (Five) Minutes As Needed for Shivering (May repeat x 1). - Intravenous    Reason for Discontinue: Patient Transfer    ondansetron (ZOFRAN) injection 4 mg (Discontinued) 4 mg Once As Needed 8/22/2019 8/22/2019    Sig - Route: Infuse 2 mL into a venous catheter 1 (One) Time As Needed for Nausea or Vomiting. - Intravenous    Reason for Discontinue: Patient Transfer    oxyCODONE-acetaminophen (PERCOCET)  MG per tablet 1 tablet (Discontinued) 1 tablet Every 4 Hours PRN 8/22/2019 8/22/2019    Sig - Route: Take 1 tablet by mouth Every 4 (Four) Hours As Needed for Severe Pain . - Oral    Reason for Discontinue: Patient Transfer    oxyCODONE-acetaminophen (PERCOCET) 7.5-325 MG per tablet 1 tablet (Discontinued) 1 tablet Once As Needed 8/22/2019 8/22/2019    Sig - Route: Take 1 tablet by mouth 1 (One) Time As Needed for Moderate Pain . - Oral    Reason for Discontinue: Patient Transfer    promethazine  "(PHENERGAN) injection 6.25 mg (Discontinued) 6.25 mg Once As Needed 8/22/2019 8/22/2019    Sig - Route: Infuse 0.25 mL into a venous catheter 1 (One) Time As Needed for Nausea or Vomiting. - Intravenous    Reason for Discontinue: Patient Transfer    Linked Group 5:  \"Or\" Linked Group Details        promethazine (PHENERGAN) injection 6.25 mg (Discontinued) 6.25 mg Once As Needed 8/22/2019 8/22/2019    Sig - Route: Inject 0.25 mL into the appropriate muscle as directed by prescriber 1 (One) Time As Needed for Nausea or Vomiting. - Intramuscular    Reason for Discontinue: Patient Transfer    Linked Group 5:  \"Or\" Linked Group Details        promethazine (PHENERGAN) suppository 25 mg (Discontinued) 25 mg Once As Needed 8/22/2019 8/22/2019    Sig - Route: Insert 1 suppository into the rectum 1 (One) Time As Needed for Nausea or Vomiting. - Rectal    Reason for Discontinue: Patient Transfer    Linked Group 5:  \"Or\" Linked Group Details        promethazine (PHENERGAN) tablet 25 mg (Discontinued) 25 mg Once As Needed 8/22/2019 8/22/2019    Sig - Route: Take 1 tablet by mouth 1 (One) Time As Needed for Nausea or Vomiting. - Oral    Reason for Discontinue: Patient Transfer    Linked Group 5:  \"Or\" Linked Group Details        Scopolamine (TRANSDERM-SCOP) 1.5 MG/3DAYS patch 1 patch (Discontinued) 1 patch Once 8/22/2019 8/22/2019    Sig - Route: Place 1 patch on the skin as directed by provider 1 (One) Time. - Transdermal    sodium chloride 0.9 % infusion (Discontinued) 150 mL/hr Continuous 8/22/2019 8/22/2019    Sig - Route: Infuse 150 mL/hr into a venous catheter Continuous. - Intravenous    Reason for Discontinue: Patient Transfer          Orders (active)     Start     Ordered    08/23/19 1400  Hemoglobin & Hematocrit, Blood  Once     Comments:  Call hemoglobin less than 10      08/23/19 0816    08/23/19 1000  Diet Bariatric; Sleeve; 1; Stage 1 Clear Liquid Sugar Free (No Carbonation, No Straw)  Diet Effective 1000   "   Comments:  Clear liquid, bariatric stage I, may have caffeine.  May use straws.  No high fructose corn syrup products or carbonation.    08/22/19 0746    08/23/19 0900  ferric gluconate (FERRLECIT)125 MG IVPB  Once As Needed      08/22/19 0746    08/23/19 0800  sodium chloride 0.45 % with KCl 20 mEq/L infusion  Continuous      08/22/19 0746    08/23/19 0600  CBC & Differential  Daily     Comments:  With auto diff      08/22/19 0746    08/23/19 0600  Comprehensive Metabolic Panel  Daily      08/22/19 0746    08/23/19 0600  pantoprazole (PROTONIX) injection 40 mg  Every Early Morning      08/22/19 0746    08/23/19 0000  Request and Infuse Ferric Gluconate 125mg/110ml over 1 hour if lab result is < 50  Once     Comments:  Request and Infuse Ferric Gluconate 125mg/110ml Over 1 hour if Iron Lab Result Is < 50 (Normal Iron  mcg/dl)    08/22/19 0746    08/22/19 2100  atorvastatin (LIPITOR) tablet 20 mg  Nightly      08/22/19 0746    08/22/19 2100  desvenlafaxine (PRISTIQ) 24 hr tablet 50 mg  Nightly      08/22/19 0746    08/22/19 2100  lamoTRIgine (LaMICtal) tablet 200 mg  Nightly      08/22/19 0746    08/22/19 2100  lisinopril (PRINIVIL,ZESTRIL) tablet 20 mg  Nightly      08/22/19 0746    08/22/19 2100  pregabalin (LYRICA) capsule 100 mg  Nightly      08/22/19 0746    08/22/19 2100  rOPINIRole (REQUIP) tablet 4 mg  Nightly      08/22/19 0746    08/22/19 2000  acetaminophen (TYLENOL) tablet 1,000 mg  Every 12 Hours      08/22/19 0746    08/22/19 1600  gabapentin (NEURONTIN) capsule 100 mg  3 Times Daily      08/22/19 0746    08/22/19 1300  albuterol (PROVENTIL) nebulizer solution 0.083% 2.5 mg/3mL  Every 6 Hours While Awake - RT      08/22/19 0746    08/22/19 1130  Oscillating Positive Expiratory Pressure (OPEP)  Every 4 Hours While Awake - RT      08/22/19 0746    08/22/19 1130  insulin lispro (humaLOG) injection 0-7 Units  4 Times Daily Before Meals & Nightly      08/22/19 0746    08/22/19 0934  Pulse Oximetry,  Continuous  Continuous      08/22/19 0933    08/22/19 0800  Vital Signs  Every 2 Hours      08/22/19 0746    08/22/19 0800  Snack: Chewing Gum Three Times Daily x30 Minutes to Increase Saliva Flow and Provide Gas Pain Relief  3 Times Daily     Comments:  Chewing Gum Three Times Daily x30 Minutes to Increase Saliva Flow and Provide Gas Pain Relief    08/22/19 0746    08/22/19 0748  sodium chloride 0.9 % infusion  Continuous      08/22/19 0746    08/22/19 0745  Place Sequential Compression Device  Once      08/22/19 0746    08/22/19 0745  Maintain Sequential Compression Device  Continuous      08/22/19 0746    08/22/19 0744  Code Status and Medical Interventions:  Continuous      08/22/19 0746    08/22/19 0744  Intake and Output  Every Shift      08/22/19 0746    08/22/19 0744  Notify physician (specify)  Until Discontinued      08/22/19 0746    08/22/19 0744  Turn cough deep breathe  Once      08/22/19 0746    08/22/19 0744  Incentive Spirometry  Every Hour While Awake      08/22/19 0746    08/22/19 0744  Oxygen Therapy- Nasal Cannula; 2 LPM; Titrate for SPO2: per policy  Continuous      08/22/19 0746    08/22/19 0743  phenol (CHLORASEPTIC) 1.4 % liquid 2 spray  Every 2 Hours PRN      08/22/19 0746    08/22/19 0743  simethicone (MYLICON) chewable tablet 80 mg  4 Times Daily PRN      08/22/19 0746    08/22/19 0743  diphenhydrAMINE (BENADRYL) injection 25 mg  Every 4 Hours PRN      08/22/19 0746    08/22/19 0743  LORazepam (ATIVAN) tablet 1 mg  Every 12 Hours PRN      08/22/19 0746    08/22/19 0743  LORazepam (ATIVAN) injection 0.5 mg  Every 12 Hours PRN      08/22/19 0746    08/22/19 0743  HYDROcodone-acetaminophen (NORCO) 7.5-325 MG per tablet 1 tablet  Every 4 Hours PRN      08/22/19 0746    08/22/19 0743  Morphine sulfate (PF) injection 4 mg  Every 2 Hours PRN      08/22/19 0746    08/22/19 0743  naloxone (NARCAN) injection 0.4 mg  Every 5 Minutes PRN      08/22/19 0746    08/22/19 0743  HYDROmorphone (DILAUDID)  tablet 2 mg  Every 4 Hours PRN      08/22/19 0746    08/22/19 0743  HYDROmorphone (DILAUDID) injection 1 mg  Every 2 Hours PRN      08/22/19 0746    08/22/19 0743  naloxone (NARCAN) injection 0.1 mg  Every 5 Minutes PRN      08/22/19 0746    08/22/19 0743  hydrALAZINE (APRESOLINE) injection 10 mg  Every 30 Minutes PRN      08/22/19 0746    08/22/19 0743  promethazine (PHENERGAN) injection 12.5 mg  Every 6 Hours PRN      08/22/19 0746    08/22/19 0743  promethazine (PHENERGAN) injection 12.5 mg  Every 6 Hours PRN      08/22/19 0746    08/22/19 0743  metoclopramide (REGLAN) injection 10 mg  Every 6 Hours PRN      08/22/19 0746    08/22/19 0743  prochlorperazine (COMPAZINE) tablet 10 mg  Every 6 Hours PRN      08/22/19 0746    08/22/19 0743  nitroglycerin (NITROSTAT) SL tablet 0.4 mg  Every 5 Minutes PRN      08/22/19 0746    08/22/19 0742  ALPRAZolam (XANAX) tablet 0.5 mg  Nightly PRN      08/22/19 0746    Unscheduled  Ambulate with assistance 4 times per shift.  As Needed      08/22/19 0746    Unscheduled  Up in Chair  As Needed      08/22/19 0746    Unscheduled  SCDs while in bed.  As Needed      08/22/19 0746    Unscheduled  Patient May Use Home CPAP / BIPAP For Sleep or As Needed  As Needed      08/22/19 0746             Operative/Procedure Notes (all)      Guido Greenberg MD at 8/22/2019  8:01 AM  Version 1 of 1         GASTRIC SLEEVE LAPAROSCOPIC, PARAESOPHAGEAL HERNIA REPAIR LAPAROSCOPIC, ESOPHAGOGASTRODUODENOSCOPY  Progress Note    Marcela Ramírez  8/22/2019    Pre-op Diagnosis:   Morbid obesity due to excess calories (CMS/HCC) [E66.01]       Post-Op Diagnosis Codes:     * Morbid obesity due to excess calories (CMS/HCC) [E66.01]     * Paraesophageal hiatal hernia [K44.9]    Procedure/CPT® Codes:  LA LAP, YOUSUF RESTRICT PROC, LONGITUDINAL GASTRECTOMY [55178]  LA LAP, REPAIR PARAESOPHAGEAL HERNIA, INCL FUNDOPLASTY W/O MESH [45888]  LA ESOPHAGOGASTRODUODENOSCOPY TRANSORAL DIAGNOSTIC  [86449]    Procedure(s):  GASTRIC SLEEVE LAPAROSCOPIC  PARAESOPHAGEAL HERNIA REPAIR LAPAROSCOPIC  ESOPHAGOGASTRODUODENOSCOPY    Surgeon(s):  Guido Greenberg MD    Anesthesia: General with Block    Staff:   Circulator: Urvashi Parsons RN  Scrub Person: Ynes Terrazas  Vendor Representative: Jamie Beck  Nursing Assistant: Jeniffer Herndon    Estimated Blood Loss: minimal    Urine Voided: * No values recorded between 8/22/2019  7:37 AM and 8/22/2019  9:04 AM *    Specimens:                Specimens     ID Source Type Tests Collected By Collected At Frozen?      A Stomach Tissue · TISSUE PATHOLOGY EXAM   Guido Greenberg MD 8/22/19 0810 No     Description: SUB-TOTAL GASTRECTOMY                Drains:      Findings:     Complications: none      Guido Greenberg MD     Date: 8/22/2019  Time: 9:04 AM        Electronically signed by Guido Greenberg MD at 8/22/2019  9:04 AM     Guido Greenberg MD at 8/22/2019  8:01 AM  Version 1 of 1       Preoperative Diagnosis:   Morbid with Multiple Co-Morbidities                                                                         Paraesophageal hiatal hernia    Postoperative Diagnosis:   Same    Procedure:                                                      Laparoscopic Sleeve Gastrectomy (85% subtotal vertical gastrectomy)                                                                        Laparoscopic paraesophageal hiatal hernia repair                                                                         EGD                                                                          Surgeon:                                                       MARCOS Greenberg MD    Anesthesia:                                                   GETA    EBL:                                                              Min    Fluids:                                                           Crystalloid    Specimens:                                                    Subtotal gastrectomy    Drains:                                                           None    Counts:                                                          Correct    Complications:                                               None    Indications:   This is a 54 year-old morbidly obese white female who presents for elective laparoscopic sleeve gastrectomy. She's undergone our extensive preoperative education teaching and consent process everything's in order and she wishes to proceed.    Operative technique:     The patient was brought to the operating room, and placed supine upon the operating room table.  SCD hose were placed, she underwent uneventful general endotracheal anesthesia per the anesthesiology staff, she received IV Ancef and subcutaneous Lovenox, the anesthesiology staff performed a tap block, and her abdomen was prepped and draped with ChloraPrep in a sterile fashion, an Ioban was used as well, a Magaña catheter was not placed.    The peritoneal cavity was entered in the upper abdomen in the left midclavicular line using a 5 mm trocar utilizing an Optiview technique and the abdomen was insufflated to a pressure of 15 mmHg with CO2 gas.  Exploratory laparoscopy revealed no evidence of injury from the entrance technique, a massively enlarged smooth liver, omental adhesions to the LUQ, sp spenser, a rectus diastasis.  Under direct visualization a 5 mm trocar was placed in the left subcostal position.  Above into the right of the umbilicus a fascial splitting 12 mm trocar was placed, and above into the left of the umbilicus a 15 mm trocar was placed.  Very little subcutaneous fat noted in the upper abdomen.  Through a stab incision in the epigastrium a Cindy retractor was used to elevate portions of the left lobe of the liver.  Lateral to the retractor the liver would become ischemic but respond nicely to frequent repositioning. The hiatal hernia was not visible from the anterior view and this  was photo documented.  Beginning approximately two thirds of the way around the greater curvature the stomach, the gastrocolic vessels were divided with the Ligasure device.  Splenomegaly noted. This proceeded proximally taking down all the short gastric vessels and exposing the left tess.  A posterolateral paraesophageal hiatal hernia was encountered and photodocumented.   The paraesophageal hiatal hernia sac was incised along the base of the left tess and this was extended up and across the phrenoesophageal membrane.  The pars flaccida was divided, there was not a replaced hepatic vessel.  The hernia sac was incised along the base of the right tess and this was extended up and across the phrenoesophageal membrane.  The paraesophageal hiatal hernia sac and it's lipomatous contents were dissected out of the mediastinum and reduced below the level of the crura. The GE junction was then lengthened to well below the level of the crura by dissecting areolar tissue well into the mediastinum.  A 1/2 inch penrose drain was used temporarily for esophageal retraction.  The anterior and posterior vagus nerves were preserved.  The crura were dissected to their meeting point inferiorly.  The hiatal hernia repair was performed posteriorly with two interrupted 0-silk suture with good result, photodocumentation obtained before and after repair.  Gastrocolic vessels were then divided medially to a few cm proximal to the pylorus.  No varices seen incidentally.  Some of the filmy attachments of the posterior stomach to the pancreas and retroperitoneum were divided.  The previously placed orogastric tube was positioned into the distal antrum.  The stomach was marked with a kitner saturated with a marking pen 1 cm lateral to the angle of His, 3 cm from the angularis, and 6 cm from the pylorus.  The 25 cm Standard Bariatric clamp was then placed just medial to these markings.  The orogastric tube was removed.  The 85% subtotal vertical  sleeve gastrectomy was then performed along the Standard Clamp using a [employer] articulating electronic linear stapler with attached dual absorbable strips.  The first firing was a 60 mm black load, the next 3 firings were 60 mm purple loads, and the final firing was a 45 mm purple load. The Standard Clamp was removed.  The sleeve was performed such that it was uniform in size, no hourglassing or narrowing, especially at the angularis, and the final firing was done a centimeter away from the angle of His to hopefully avoid incorporating esophageal fibers.  The subtotal gastrectomy specimen was placed into a large retrieval bag and withdrawn, it was a larger and thicker than average specimen.  The sleeve was submerged under saline.  Upper endoscopy was performed, and the endoscope was advanced into the duodenal bulb.  No air bubbles or leak seen, no active bleeding at the staple line, no narrowing at the angularis, no pyloric spasm or deformity, no gastritis, once again enlarged mucosal folds seen prepyloric and photodocumented,  no hiatal hernia or Franco's esophagus, and the endoscope was withdrawn.  The subtotal gastrectomy specimen and sent to pathology for permanent section.  Irrigation fluid was suctioned free.  The sleeve was resting nicely and hemostatic.   Photodocumentation of the sleeve was obtained.  The Cindy retractor was removed. Fascia at the 15 mm trocar site incision was closed with a horizontal mattress 0 Vicryl suture placed with a suture passer under direct visualization and tying the knot extracorporeally.  Remaining trocars were removed under direct visualization, no bleeding noted from their sites.  Subcutaneous tissue in the 15 mm incision was closed with an interrupted 2-0 Vicryl plus suture, and skin in each incision was closed using 3-0 Monocryl plus in an interrupted subcuticular stitch followed by skin-a-fix.  The patient tolerated the procedure well without complication,  was taken to the recovery room in stable condition.    Electronically signed by Guido Greenberg MD at 8/22/2019  9:12 AM       Physician Progress Notes (all)     No notes of this type exist for this encounter.        Consult Notes (all)     No notes of this type exist for this encounter.

## 2019-08-23 NOTE — PLAN OF CARE
Problem: Patient Care Overview  Goal: Individualization and Mutuality  Outcome: Ongoing (interventions implemented as appropriate)   08/23/19 0457   Individualization   Patient Specific Goals (Include Timeframe) Monitor V/S and bleeding per protocol   Patient Specific Interventions Call light within reach, Provide chewing gum, Provide I/S instruction, Ambulate 3 times per shift       Problem: Bariatric Surgery (Adult,Pediatric)  Goal: Signs and Symptoms of Listed Potential Problems Will be Absent, Minimized or Managed (Bariatric Surgery)  Outcome: Ongoing (interventions implemented as appropriate)   08/23/19 0457   Goal/Outcome Evaluation   Problems Assessed (Bariatric Surgery) postoperative nausea and vomiting   Problems Present (Bariatric Surgery) postoperative nausea and vomiting;situational response

## 2019-08-23 NOTE — PLAN OF CARE
Problem: Patient Care Overview  Goal: Plan of Care Review  Outcome: Ongoing (interventions implemented as appropriate)   08/23/19 1810   Coping/Psychosocial   Plan of Care Reviewed With patient;spouse   Plan of Care Review   Progress improving       Problem: Bariatric Surgery (Adult,Pediatric)  Goal: Signs and Symptoms of Listed Potential Problems Will be Absent, Minimized or Managed (Bariatric Surgery)  Outcome: Ongoing (interventions implemented as appropriate)   08/23/19 1810   Goal/Outcome Evaluation   Problems Assessed (Bariatric Surgery) all   Problems Present (Bariatric Surgery) pain

## 2019-08-23 NOTE — PROGRESS NOTES
"Cc: POD#1 LSG/laparoscopic para esophageal hiatal hernia repair \"chest pain\"    Her  is in the room.  She complains of severe chest and upper back pain.  No cough or shortness of air.  She was able to get her spirometer to 1500 earlier but now the pain is too severe.  Witnessed 1000 with severe discomfort.  She says her abdominal pain is not too bad and got better after she passed some flatus.  She tolerated one protein shake and most of the second.  She says she is ambulating well but that her urine is dark.  No hematemesis or melena.  She denies any dizziness or lightheadedness when she is walked.  She says she has walked the hallways several times.  No fever or tachycardia pulse 76 respirations 16 blood pressure 119/65 she does not appear to be in acute distress but she does appear miserable.  Lungs clear to auscultation.  Heart regular rate and rhythm without tachycardia. III/VI JASWANT noted right second intercostal space.  Abdomen soft, nontender, nondistended, bowel sounds present.  Wounds look okay.  Extremity exam no edema.  H&H @ 1405 9.5/29.5  H/H @ 0721 10.4/32.2  CMP normal except for glucose of 124 SGPT 41 SGOT 33 iron is 39 white blood count 13.3 with 74 segs 19 lymphs 7 monocytes.  Hemoglobin A1c 6.50.  Upper GI unremarkable blood type O+ with negative antibody screen Rh+.    Imp: Postoperative day #1 sleeve gastrectomy and paraesophageal hiatal hernia repair.      - Significant chest pain and poor spirometry.  I suspect this is pulmonary.  She did have cardiology clearance preoperatively.  New heart murmur noted.  Paraesophageal hiatal hernia repair and decreased H&H and could have hemo-thorax.    -Decreased H&H.  No tachycardia or orthostatic symptoms.  She says her urine is dark.  She may have had a falsely elevated H&H preoperatively on chronic diuretic therapy. Iron level normal.  She may have bled postop - possible hemothorax, intraperitoneal bleed, or GI staple line bleed.    -New heart " murmur.      Plan: Continue liquids, out of bed, pulmonary toilet, VTE prophylaxis with frequent ambulation and SCD's.  Re-check H/H am.  Check CXR. Check UA for SG.  Bolus 1 liter NS.  Check troponin.  See orders.

## 2019-08-23 NOTE — PROGRESS NOTES
Discharge Planning Assessment  [facility]     Patient Name: Marcela Ramírez  MRN: [id number]  Today's Date: 8/23/2019    Admit Date: 8/22/2019    Discharge Needs Assessment     Row Name 08/23/19 1457       Living Environment    Lives With  significant other    Name(s) of Who Lives With Patient  Samuel Tim    Current Living Arrangements  home/apartment/condo    Primary Care Provided by  self    Provides Primary Care For  no one    Family Caregiver if Needed  significant other    Family Caregiver Names  SO: Samuel Tim    Quality of Family Relationships  supportive;involved;helpful    Able to Return to Prior Arrangements  yes    Living Arrangement Comments  Lives with SO in house with no steps       Resource/Environmental Concerns    Resource/Environmental Concerns  none    Transportation Concerns  car, none       Transition Planning    Patient/Family Anticipates Transition to  home    Patient/Family Anticipated Services at Transition  none    Transportation Anticipated  family or friend will provide       Discharge Needs Assessment    Readmission Within the Last 30 Days  no previous admission in last 30 days    Concerns to be Addressed  no discharge needs identified    Equipment Currently Used at Home  none    Anticipated Changes Related to Illness  none    Equipment Needed After Discharge  none        Discharge Plan     Row Name 08/23/19 1500       Plan    Plan  Home    Patient/Family in Agreement with Plan  yes    Plan Comments  Met with patient and SO in the room for initial discharge planning.  LIves with SO in apartment with no steps.  Independent with ADL.  No home health or DME.  PCP is Dr. Cabezas.  Plan is home.  Following for needs.     Final Discharge Disposition Code  01 - home or self-care        Destination      No service coordination in this encounter.      Durable Medical Equipment      No service coordination in this encounter.      Dialysis/Infusion      No service coordination in this  encounter.      Home Medical Care      No service coordination in this encounter.      Therapy      No service coordination in this encounter.      Community Resources      No service coordination in this encounter.          Demographic Summary     Row Name 08/23/19 1457       General Information    Admission Type  inpatient    Arrived From  operating room    Referral Source  admission list    Reason for Consult  discharge planning    Preferred Language  English        Functional Status     Row Name 08/23/19 1457       Functional Status    Usual Activity Tolerance  poor    Current Activity Tolerance  poor       Functional Status, IADL    Medications  independent    Meal Preparation  independent    Housekeeping  independent    Laundry  independent    Shopping  independent        Psychosocial    No documentation.       Abuse/Neglect    No documentation.       Legal    No documentation.       Substance Abuse    No documentation.       Patient Forms    No documentation.           Caron Palacios RN

## 2019-08-23 NOTE — PLAN OF CARE
Problem: Patient Care Overview  Goal: Plan of Care Review  Outcome: Ongoing (interventions implemented as appropriate)   08/23/19 0451   Coping/Psychosocial   Plan of Care Reviewed With patient   Plan of Care Review   Progress no change     Goal: Individualization and Mutuality  Outcome: Ongoing (interventions implemented as appropriate)   08/23/19 0451   Individualization   Patient Specific Goals (Include Timeframe) Monitor Potassium levels and V/S per protocol. Fall prevention protocol   Patient Specific Interventions Call light within reach, fall video education       Problem: Bariatric Surgery (Adult,Pediatric)  Goal: Signs and Symptoms of Listed Potential Problems Will be Absent, Minimized or Managed (Bariatric Surgery)  Outcome: Ongoing (interventions implemented as appropriate)   08/23/19 0451   Goal/Outcome Evaluation   Problems Assessed (Bariatric Surgery) infection;pain;postoperative nausea and vomiting;situational response;postoperative urinary retention   Problems Present (Bariatric Surgery) infection;pain;situational response

## 2019-08-24 VITALS
HEIGHT: 67 IN | SYSTOLIC BLOOD PRESSURE: 137 MMHG | BODY MASS INDEX: 38.41 KG/M2 | RESPIRATION RATE: 16 BRPM | TEMPERATURE: 98.5 F | OXYGEN SATURATION: 99 % | DIASTOLIC BLOOD PRESSURE: 76 MMHG | HEART RATE: 80 BPM | WEIGHT: 244.71 LBS

## 2019-08-24 LAB
ALBUMIN SERPL-MCNC: 3.7 G/DL (ref 3.5–5.2)
ALBUMIN/GLOB SERPL: 1.4 G/DL
ALP SERPL-CCNC: 65 U/L (ref 39–117)
ALT SERPL W P-5'-P-CCNC: 27 U/L (ref 1–33)
ANION GAP SERPL CALCULATED.3IONS-SCNC: 9 MMOL/L (ref 5–15)
AST SERPL-CCNC: 20 U/L (ref 1–32)
BASOPHILS # BLD AUTO: 0.02 10*3/MM3 (ref 0–0.2)
BASOPHILS NFR BLD AUTO: 0.2 % (ref 0–1.5)
BILIRUB SERPL-MCNC: 0.4 MG/DL (ref 0.2–1.2)
BILIRUB UR QL STRIP: NEGATIVE
BUN BLD-MCNC: 13 MG/DL (ref 6–20)
BUN/CREAT SERPL: 17.6 (ref 7–25)
CALCIUM SPEC-SCNC: 8.2 MG/DL (ref 8.6–10.5)
CHLORIDE SERPL-SCNC: 108 MMOL/L (ref 98–107)
CLARITY UR: CLEAR
CO2 SERPL-SCNC: 26 MMOL/L (ref 22–29)
COLOR UR: YELLOW
CREAT BLD-MCNC: 0.74 MG/DL (ref 0.57–1)
DEPRECATED RDW RBC AUTO: 49.6 FL (ref 37–54)
EOSINOPHIL # BLD AUTO: 0.24 10*3/MM3 (ref 0–0.4)
EOSINOPHIL NFR BLD AUTO: 2.8 % (ref 0.3–6.2)
ERYTHROCYTE [DISTWIDTH] IN BLOOD BY AUTOMATED COUNT: 14 % (ref 12.3–15.4)
GFR SERPL CREATININE-BSD FRML MDRD: 82 ML/MIN/1.73
GLOBULIN UR ELPH-MCNC: 2.7 GM/DL
GLUCOSE BLD-MCNC: 107 MG/DL (ref 65–99)
GLUCOSE BLDC GLUCOMTR-MCNC: 119 MG/DL (ref 70–130)
GLUCOSE BLDC GLUCOMTR-MCNC: 96 MG/DL (ref 70–130)
GLUCOSE UR STRIP-MCNC: NEGATIVE MG/DL
HCT VFR BLD AUTO: 28.2 % (ref 34–46.6)
HGB BLD-MCNC: 9 G/DL (ref 12–15.9)
HGB UR QL STRIP.AUTO: NEGATIVE
IMM GRANULOCYTES # BLD AUTO: 0.04 10*3/MM3 (ref 0–0.05)
IMM GRANULOCYTES NFR BLD AUTO: 0.5 % (ref 0–0.5)
KETONES UR QL STRIP: NEGATIVE
LEUKOCYTE ESTERASE UR QL STRIP.AUTO: NEGATIVE
LYMPHOCYTES # BLD AUTO: 2.29 10*3/MM3 (ref 0.7–3.1)
LYMPHOCYTES NFR BLD AUTO: 27.2 % (ref 19.6–45.3)
MCH RBC QN AUTO: 31.3 PG (ref 26.6–33)
MCHC RBC AUTO-ENTMCNC: 31.9 G/DL (ref 31.5–35.7)
MCV RBC AUTO: 97.9 FL (ref 79–97)
MONOCYTES # BLD AUTO: 0.67 10*3/MM3 (ref 0.1–0.9)
MONOCYTES NFR BLD AUTO: 7.9 % (ref 5–12)
NEUTROPHILS # BLD AUTO: 5.17 10*3/MM3 (ref 1.7–7)
NEUTROPHILS NFR BLD AUTO: 61.4 % (ref 42.7–76)
NITRITE UR QL STRIP: NEGATIVE
NRBC BLD AUTO-RTO: 0 /100 WBC (ref 0–0.2)
PH UR STRIP.AUTO: 5.5 [PH] (ref 5–8)
PLATELET # BLD AUTO: 156 10*3/MM3 (ref 140–450)
PMV BLD AUTO: 10.6 FL (ref 6–12)
POTASSIUM BLD-SCNC: 3.9 MMOL/L (ref 3.5–5.2)
PROT SERPL-MCNC: 6.4 G/DL (ref 6–8.5)
PROT UR QL STRIP: NEGATIVE
RBC # BLD AUTO: 2.88 10*6/MM3 (ref 3.77–5.28)
SODIUM BLD-SCNC: 143 MMOL/L (ref 136–145)
SP GR UR STRIP: 1.02 (ref 1–1.03)
UROBILINOGEN UR QL STRIP: NORMAL
WBC NRBC COR # BLD: 8.43 10*3/MM3 (ref 3.4–10.8)

## 2019-08-24 PROCEDURE — 82962 GLUCOSE BLOOD TEST: CPT

## 2019-08-24 PROCEDURE — 85025 COMPLETE CBC W/AUTO DIFF WBC: CPT | Performed by: SURGERY

## 2019-08-24 PROCEDURE — 80053 COMPREHEN METABOLIC PANEL: CPT | Performed by: SURGERY

## 2019-08-24 PROCEDURE — 99024 POSTOP FOLLOW-UP VISIT: CPT | Performed by: SURGERY

## 2019-08-24 PROCEDURE — 81003 URINALYSIS AUTO W/O SCOPE: CPT | Performed by: SURGERY

## 2019-08-24 RX ORDER — HYDROMORPHONE HYDROCHLORIDE 2 MG/1
2 TABLET ORAL EVERY 4 HOURS PRN
Qty: 15 TABLET | Refills: 0 | Status: SHIPPED | OUTPATIENT
Start: 2019-08-24 | End: 2019-11-01

## 2019-08-24 RX ORDER — ALBUTEROL SULFATE 2.5 MG/3ML
2.5 SOLUTION RESPIRATORY (INHALATION) EVERY 6 HOURS PRN
Status: DISCONTINUED | OUTPATIENT
Start: 2019-08-24 | End: 2019-08-24 | Stop reason: HOSPADM

## 2019-08-24 RX ORDER — PROMETHAZINE HYDROCHLORIDE 12.5 MG/1
12.5 TABLET ORAL EVERY 4 HOURS PRN
Qty: 20 TABLET | Refills: 0 | Status: SHIPPED | OUTPATIENT
Start: 2019-08-24 | End: 2019-08-30 | Stop reason: SDUPTHER

## 2019-08-24 RX ORDER — OMEPRAZOLE 20 MG/1
40 CAPSULE, DELAYED RELEASE ORAL DAILY
Qty: 60 CAPSULE | Refills: 0 | Status: SHIPPED | OUTPATIENT
Start: 2019-08-24 | End: 2020-09-17

## 2019-08-24 RX ADMIN — PANTOPRAZOLE SODIUM 40 MG: 40 INJECTION, POWDER, FOR SOLUTION INTRAVENOUS at 05:37

## 2019-08-24 RX ADMIN — GABAPENTIN 100 MG: 100 CAPSULE ORAL at 09:03

## 2019-08-24 RX ADMIN — HYDROCODONE BITARTRATE AND ACETAMINOPHEN 1 TABLET: 7.5; 325 TABLET ORAL at 06:21

## 2019-08-24 RX ADMIN — ACETAMINOPHEN 1000 MG: 500 TABLET, FILM COATED ORAL at 09:03

## 2019-08-24 NOTE — PLAN OF CARE
Problem: Patient Care Overview  Goal: Plan of Care Review  Outcome: Ongoing (interventions implemented as appropriate)   08/24/19 1515   Coping/Psychosocial   Plan of Care Reviewed With patient;spouse   Plan of Care Review   Progress improving       Problem: Bariatric Surgery (Adult,Pediatric)  Goal: Signs and Symptoms of Listed Potential Problems Will be Absent, Minimized or Managed (Bariatric Surgery)  Outcome: Ongoing (interventions implemented as appropriate)   08/24/19 1515   Goal/Outcome Evaluation   Problems Assessed (Bariatric Surgery) all   Problems Present (Bariatric Surgery) pain

## 2019-08-24 NOTE — PROGRESS NOTES
"Cc: POD#2 LSG/paraHHR \"I feel much better\"    Her  is in the room.  She looks and feels much better.  Her chest pain is much improved.  She is getting her spirometer to 1500.  No pulmonary complaints.  She is passing flatus, no bowel movement.  She is tolerating her diet well.  She is ambulating and voiding well.  She wants to go home.  No fever or tachycardia pulse 80 respirations 16 blood pressure 137/76  UO 2200 she is in no apparent distress.  Lungs clear to auscultation.  Heart regular rate and rhythm without murmur today.  Abdomen soft, nontender, nondistended, bowel sounds active.  Wounds look okay with some ecchymosis around her periumbilical incisions.  Soft, no induration.  Extremity exam no edema.  CMP normal except for glucose of 107 chloride of 108 calcium 8.2 white blood count normal at 8.43 with a normal differential H&H 9 and 28.2.  Chest x-ray shows some mild bibasilar discoid atelectasis    Impression: Doing okay postoperative day #2 laparoscopic sleeve gastrectomy and paraesophageal hiatal hernia repair.  Anemia without evidence of active bleeding.  Some ecchymosis around her periumbilical incisions noted today and she may have had some abdominal wall bleeding.  Mild bibasilar discoid atelectasis and chest pain much improved.    Plan: Discharge home.  Discharge instructions were discussed.  See orders  "

## 2019-08-24 NOTE — PLAN OF CARE
Problem: Patient Care Overview  Goal: Plan of Care Review  Outcome: Ongoing (interventions implemented as appropriate)   08/24/19 0146   Coping/Psychosocial   Plan of Care Reviewed With patient   Plan of Care Review   Progress improving   OTHER   Outcome Summary Patient's VSS, UOP adequate. Encouraged use of IS/FV. Pain being controlled with po and IV pain medication. SCD's in place. SO @ bedside. Passing flatus, positive bowel sounds. No c/o nausea. Will continue to monitor.       Problem: Bariatric Surgery (Adult,Pediatric)  Goal: Signs and Symptoms of Listed Potential Problems Will be Absent, Minimized or Managed (Bariatric Surgery)  Outcome: Ongoing (interventions implemented as appropriate)   08/24/19 0146   Goal/Outcome Evaluation   Problems Assessed (Bariatric Surgery) all   Problems Present (Bariatric Surgery) pain;situational response

## 2019-08-25 ENCOUNTER — READMISSION MANAGEMENT (OUTPATIENT)
Dept: CALL CENTER | Facility: HOSPITAL | Age: 54
End: 2019-08-25

## 2019-08-25 NOTE — OUTREACH NOTE
Prep Survey      Responses   Facility patient discharged from?  [place]   Is patient eligible?  Yes   Discharge diagnosis  lap gastric sleeve   Does the patient have one of the following disease processes/diagnoses(primary or secondary)?  General Surgery   Does the patient have Home health ordered?  No   Is there a DME ordered?  No   Prep survey completed?  Yes          Skylar Arroyo RN

## 2019-08-26 ENCOUNTER — READMISSION MANAGEMENT (OUTPATIENT)
Dept: CALL CENTER | Facility: HOSPITAL | Age: 54
End: 2019-08-26

## 2019-08-26 NOTE — DISCHARGE SUMMARY
Admission Diagnosis:   Morbid Obesity with Multiple Co-morbidities    Discharge Diagnosis:  Same, paraesophageal hiatal hernia    Principal Procedure Performed:   Laparoscopic sleeve gastrectomy, laparoscopic paraesophageal hiatal hernia repair, EGD    Other procedures performed: Upper GI    Complications: None    Consultations: None    History of present illness:  This is a 54-year-old morbidly obese patient who presents for elective laparoscopic sleeve gastrectomy.  The preoperative testing and evaluation is in order and the patient is admitted for elective surgery.    Hospital Course:  The patient was admitted and underwent uneventful surgery as described.  Intraoperatively found to have a small posterior lateral paraesophageal hiatal hernia which was reduced and repaired.  Postoperatively the patient was transferred to the bariatric telemetry unit. Upper GI on postoperative day #1 was unremarkable.  A lot of chest and upper back pain and poor incentive spirometry.  Chest x-ray showed some mild bibasilar discoid atelectasis.  Her H&H slowly drifted down during the admission and her Lovenox was held.  She had no orthostatic symptoms or tachycardia.  Discharge H&H 9 and 28.2.  She was on chronic diuretic therapy preoperatively and at least part of it may have been dilutional.   She did have some ecchymosis around her periumbilical incisions that developed on postoperative day #2 and was soft and nonindurated.  At the time of discharge on postoperative day #2 the patient is tolerating a diet and pain is controlled with oral medication.  The patient is afebrile and abdominal exam is appropriate, wounds look okay.  The patient is discharged home in good condition.  Discharge instructions were discussed.  The medication reconciliation has been completed.  The patient is to follow-up in 1-2 weeks in the office.

## 2019-08-26 NOTE — OUTREACH NOTE
General Surgery Week 1 Survey      Responses   Facility patient discharged from?  [place]   Does the patient have one of the following disease processes/diagnoses(primary or secondary)?  General Surgery   Is there a successful TCM telephone encounter documented?  No   Week 1 attempt successful?  No   Unsuccessful attempts  Attempt 1          Skylar Arroyo RN

## 2019-08-27 ENCOUNTER — READMISSION MANAGEMENT (OUTPATIENT)
Dept: CALL CENTER | Facility: HOSPITAL | Age: 54
End: 2019-08-27

## 2019-08-27 ENCOUNTER — APPOINTMENT (OUTPATIENT)
Dept: PREADMISSION TESTING | Facility: HOSPITAL | Age: 54
End: 2019-08-27

## 2019-08-27 NOTE — OUTREACH NOTE
General Surgery Week 1 Survey      Responses   Facility patient discharged from?  [place]   Does the patient have one of the following disease processes/diagnoses(primary or secondary)?  General Surgery   Is there a successful TCM telephone encounter documented?  No   Week 1 attempt successful?  Yes   Call start time  1120   Call end time  1127   Discharge diagnosis  lap gastric sleeve   Meds reviewed with patient/caregiver?  Yes   Is the patient having any side effects they believe may be caused by any medication additions or changes?  No   Does the patient have all medications related to this admission filled (includes all antibiotics, pain medications, etc.)  Yes   Is the patient taking all medications as directed (includes completed medication regime)?  Yes   Does the patient have a follow up appointment scheduled with their surgeon?  Yes [Followup on Aug 30 2019]   Has the patient kept scheduled appointments due by today?  N/A   Has home health visited the patient within 72 hours of discharge?  N/A   Psychosocial issues?  No   Did the patient receive a copy of their discharge instructions?  Yes   Nursing interventions  Reviewed instructions with patient   What is the patient's perception of their health status since discharge?  Improving   Nursing interventions  Nurse provided patient education   Is the patient /caregiver able to teach back basic post-op care?  Practice 'cough and deep breath', Drive as instructed by MD in discharge instructions, Take showers only when approved by MD-sponge bathe until then, Keep incision areas clean,dry and protected, No tub bath, swimming, or hot tub until instructed by MD, Lifting as instructed by MD in discharge instructions   Is the patient/caregiver able to teach back signs and symptoms of incisional infection?  Increased redness, swelling or pain at the incisonal site, Increased drainage or bleeding, Incisional warmth, Pus or odor from incision, Fever   Is the  patient/caregiver able to teach back steps to recovery at home?  Set small, achievable goals for return to baseline health, Rest and rebuild strength, gradually increase activity, Make a list of questions for surgeon's appointment   Is the patient/caregiver able to teach back the hierarchy of who to call/visit for symptoms/problems? PCP, Specialist, Home health nurse, Urgent Care, ED, 911  Yes   Week 1 call completed?  Yes          Bernardo Pereira RN

## 2019-08-30 ENCOUNTER — OFFICE VISIT (OUTPATIENT)
Dept: BARIATRICS/WEIGHT MGMT | Facility: CLINIC | Age: 54
End: 2019-08-30

## 2019-08-30 VITALS
HEART RATE: 89 BPM | SYSTOLIC BLOOD PRESSURE: 136 MMHG | BODY MASS INDEX: 38.45 KG/M2 | DIASTOLIC BLOOD PRESSURE: 84 MMHG | WEIGHT: 245 LBS | RESPIRATION RATE: 18 BRPM | HEIGHT: 67 IN | OXYGEN SATURATION: 98 % | TEMPERATURE: 97.7 F

## 2019-08-30 DIAGNOSIS — E66.9 OBESITY, CLASS II, BMI 35-39.9: Primary | ICD-10-CM

## 2019-08-30 DIAGNOSIS — Z98.84 STATUS POST BARIATRIC SURGERY: ICD-10-CM

## 2019-08-30 PROCEDURE — 99024 POSTOP FOLLOW-UP VISIT: CPT | Performed by: PHYSICIAN ASSISTANT

## 2019-08-30 RX ORDER — PROMETHAZINE HYDROCHLORIDE 12.5 MG/1
12.5 TABLET ORAL EVERY 4 HOURS PRN
Qty: 20 TABLET | Refills: 0 | OUTPATIENT
Start: 2019-08-30 | End: 2020-04-17

## 2019-08-30 RX ORDER — PROMETHAZINE HYDROCHLORIDE 12.5 MG/1
12.5 TABLET ORAL EVERY 4 HOURS PRN
Qty: 20 TABLET | Refills: 0 | Status: SHIPPED | OUTPATIENT
Start: 2019-08-30 | End: 2019-08-30 | Stop reason: SDUPTHER

## 2019-08-30 NOTE — PROGRESS NOTES
[facility] Bariatric Surgery  [address]  [facility] [id number] [phone]      Patient Name:  Marcela Ramírez.  :  1965      Reason for Visit:  POD #8    HPI:  Marcela Ramírez is a 54 y.o. female s/p LSG/ paraesophageal HHR by  on 19    POD#1 with c/o severe chest and upper back pain, poor spirometry, decreased H&H. POD#2 improved, ecchymosis around periumbilically incisions, d/c home.     Doing well.  Has nausea with taking home medication that predates LSG, no change since surgery.  Taking promethazine prior to home meds although not helping much.  Some abdominal soreness at incision site periumbilical, required minimal pain meds. Reflux noted in morning, a couple times throughout the day, says was recommended to take PPI BID on hospital d/c.  Chest pressure resolved.  Bruising of abdomen spread down a little further after d/c but is now improving, getting yellow hue.   No other issues/concerns.   Denies dysphagia, vomiting, pulmonary issues and fevers, dizziness.  Tolerating diet progression - on stage 1.  Getting 90-150g prot/day, protein cruz, yogurt, cottage cheese.  Yesterday got 35g- wasn't able to get more yesterday after overdoing it the night before resulting in nausea.  Drinking 64+ fluid oz/day, water, protein water. Has not started vitamins, plans to get them today.  On Omeprazole .  Holding ASA , NSAIDs , Tramadol, Hormones, Diuretics , Steroids and Immunologics.  Ambulating.     Presurgery weight: 245 pounds.  Today's weight is 111 kg (245 lb) pounds, today's  Body mass index is 38.37 kg/m²., and her weight loss since surgery is 0 pounds.       Final Diagnosis   STOMACH, SUBTOTAL GASTRECTOMY:  Benign gastric body type tissue with no significant histopathologic abnormalities.          Past Medical History:   Diagnosis Date   • Arthritis     knees, otc arthritis meds prn, no h/o injections.    • Asthma     has not required inhaler    • Bilateral ovarian cysts    • Bipolar 1 disorder (CMS/HCC)    • Boil     opens them herself   • Carpal tunnel syndrome    • CKD (chronic kidney disease), symptom management only, stage 3 (moderate) (CMS/[facility])     Creatinine 1.04 GFR 56   • Colon polyp    • Depression    • Diabetes mellitus (CMS/HCC)     Diagnosed 2017, was on metformin in the past. Last A1C 7%   • Diverticulosis    • Fatigue    • GERD (gastroesophageal reflux disease)     controlled with omeprazole 20mg. Remote EGD- esophagitis.  EGD no hiatal hernia Z line 40 cm very thick mucosal folds cannot rule out varices.  CT scan thickened fundus, no varices seen   • Headache    • History of bladder infections    • History of blood transfusion 2006 2/2 heavy menses   • History of bronchitis    • Hyperlipidemia    • Hypertension    • Lower extremity edema    • Morbid obesity with BMI of 40.0-44.9, adult (CMS/[facility])    • Nausea     wtih taking meds, avoid this by taking meds wtih milk   • Peripheral neuropathy     2/2 DM   • RLS (restless legs syndrome)    • S/P cholecystectomy     2/2 cholelithiasis   • Shortness of breath on exertion    • TIA (transient ischemic attack)     patient states 8-9 episodes of right sided drooping/ weakness/ vision changes. Workup was negative for CVA- thought to be related to anxiety. last episode 12/2017     Past Surgical History:   Procedure Laterality Date   • COLONOSCOPY  remote    diverticulosis   • ENDOMETRIAL ABLATION     • ENDOSCOPY  remote    esophagitis    • ENDOSCOPY N/A 8/22/2019    Procedure: ESOPHAGOGASTRODUODENOSCOPY;  Surgeon: Guido Greenberg MD;  Location:  [facility];  Service: Bariatric   • GASTRIC SLEEVE LAPAROSCOPIC N/A 8/22/2019    Procedure: GASTRIC SLEEVE LAPAROSCOPIC;  Surgeon: Guido Greenberg MD;  Location:  [facility];  Service: Bariatric   • KNEE ACL RECONSTRUCTION Right 2013    with miniscal repair   • LAPAROSCOPIC CHOLECYSTECTOMY  2001    cholelithiasis   • LAPAROSCOPIC HYSTERECTOMY  2013     right ovary remains   • PARAESOPHAGEAL HERNIA REPAIR N/A 8/22/2019    Procedure: PARAESOPHAGEAL HERNIA REPAIR LAPAROSCOPIC;  Surgeon: Guido Greenberg MD;  Location: [facility];  Service: Bariatric   • SINUS SURGERY     • TUBAL ABDOMINAL LIGATION       Outpatient Medications Marked as Taking for the 8/30/19 encounter (Office Visit) with Janis España PA-C   Medication Sig Dispense Refill   • ALPRAZolam (XANAX) 0.5 MG tablet Take 1 tablet by mouth At Night As Needed for Anxiety. 30 tablet 5   • atorvastatin (LIPITOR) 20 MG tablet Take 1 tablet by mouth Daily. 30 tablet 11   • desvenlafaxine (PRISTIQ) 50 MG 24 hr tablet Take 50 mg by mouth Daily.     • HYDROmorphone (DILAUDID) 2 MG tablet Take 1 tablet by mouth Every 4 (Four) Hours As Needed for Moderate Pain . 15 tablet 0   • LamoTRIgine (LAMICTAL PO) Take 1 tablet by mouth Every Night. Pt is on 200mg     • lisinopril (PRINIVIL,ZESTRIL) 20 MG tablet Take 1 tablet by mouth Daily. 30 tablet 11   • LYRICA 50 MG capsule TAKE ONE CAPSULE BY MOUTH EVERY NIGHT AT BEDTIME FOR 7 DAYS, THEN TWO CAPSULES EVERY NIGHT AT BEDTIME THEREAFTER 60 capsule 4   • metFORMIN ER (GLUCOPHAGE-XR) 500 MG 24 hr tablet Take 1 tablet by mouth 2 (Two) Times a Day Before Meals. 60 tablet 5   • nitroglycerin (NITROSTAT) 0.4 MG SL tablet Place 1 tablet under the tongue Every 5 (Five) Minutes As Needed for Chest Pain. Take no more than 3 doses in 15 minutes. 100 tablet 0   • omeprazole (priLOSEC) 20 MG capsule Take 2 capsules by mouth Daily. 60 capsule 0   • ONE TOUCH ULTRA TEST test strip TEST GLUCOSE TWO TIMES A  each 12   • promethazine (PHENERGAN) 12.5 MG tablet Take 1 tablet by mouth Every 4 (Four) Hours As Needed for Nausea. 20 tablet 0   • rOPINIRole (REQUIP) 2 MG tablet Take 2 tablets by mouth Every Night. 60 tablet 11     Allergies   Allergen Reactions   • Contrast Dye Swelling     Nasal congestion/ snot, IV contrast only       Social History     Socioeconomic History   •  "Marital status:      Spouse name: Not on file   • Number of children: Not on file   • Years of education: Not on file   • Highest education level: Not on file   Tobacco Use   • Smoking status: Never Smoker   • Smokeless tobacco: Never Used   Substance and Sexual Activity   • Alcohol use: Yes     Frequency: Never     Comment: maybe a glass 1-2   • Drug use: Yes     Types: Marijuana     Comment: about 4 years ago   • Sexual activity: Defer     Comment: no hormones   Social History Narrative    Lives in [facility] with boyfriend, daughter and her boyfriend and granddaughter and stepson.        /84 (BP Location: Left arm, Patient Position: Sitting, Cuff Size: Large Adult)   Pulse 89   Temp 97.7 °F (36.5 °C) (Temporal)   Resp 18   Ht 170.2 cm (67\")   Wt 111 kg (245 lb)   LMP  (LMP Unknown)   SpO2 98%   BMI 38.37 kg/m²   Physical Exam   Constitutional: She is oriented to person, place, and time. She appears well-developed and well-nourished.   HENT:   Head: Normocephalic and atraumatic.   Cardiovascular: Normal rate, regular rhythm and normal heart sounds.   Pulmonary/Chest: Effort normal and breath sounds normal. No respiratory distress. She has no wheezes.   Abdominal: Soft. Bowel sounds are normal. She exhibits no distension. There is no tenderness.   Incisions healing well  Dark purple ecchymosis mid abdomen inferior umbilical expending to lower abdomen with surrounding yellowish resolving bruising, soft, no induration or fluctuance.     Neurological: She is alert and oriented to person, place, and time.   Skin: Skin is warm and dry.   Psychiatric: She has a normal mood and affect. Her behavior is normal. Judgment and thought content normal.         Assessment:   POD #8 s/p LSG/ HHR by  on 8/22/19    ICD-10-CM ICD-9-CM   1. Obesity, Class II, BMI 35-39.9 E66.9 278.00   2. Status post bariatric surgery Z98.84 V45.86          Plan:  Doing well. Ecchymosis seemingly improving, VSS without " orthostatic symptoms. Utilize antiemetics prn, phenergan refilled, caution re: sedation. Continue to advance diet per manual.  Increase protein intake to 100g/day.  Increase exercise/activity as tolerated.  Reviewed lifting restrictions, nothing >25 lbs x 2 more weeks.  Start full vitamin regimen.  Continue PPI.  Continue to avoid ASA/NSAIDs/tramadol/tobacco x 6 weeks postop, steroids x 8 weeks postop.  Call w/ problems/concerns.    The patient was instructed to follow up in 3 weeks, sooner if needed.

## 2019-09-04 ENCOUNTER — READMISSION MANAGEMENT (OUTPATIENT)
Dept: CALL CENTER | Facility: HOSPITAL | Age: 54
End: 2019-09-04

## 2019-09-04 NOTE — OUTREACH NOTE
General Surgery Week 2 Survey      Responses   Facility patient discharged from?  [place]   Does the patient have one of the following disease processes/diagnoses(primary or secondary)?  General Surgery   Week 2 attempt successful?  No   Unsuccessful attempts  Attempt 1          Brianda Mena RN

## 2019-09-05 ENCOUNTER — READMISSION MANAGEMENT (OUTPATIENT)
Dept: CALL CENTER | Facility: HOSPITAL | Age: 54
End: 2019-09-05

## 2019-09-05 NOTE — OUTREACH NOTE
General Surgery Week 2 Survey      Responses   Facility patient discharged from?  [facility]   Does the patient have one of the following disease processes/diagnoses(primary or secondary)?  General Surgery   Week 2 attempt successful?  Yes   Call start time  1617   Call end time  1619   Discharge diagnosis  lap gastric sleeve   Is patient permission given to speak with other caregiver?  No   Meds reviewed with patient/caregiver?  Yes   Is the patient taking all medications as directed (includes completed medication regime)?  Yes   Has the patient kept scheduled appointments due by today?  Yes   Has home health visited the patient within 72 hours of discharge?  N/A   What is the patient's perception of their health status since discharge?  Improving   Week 2 call completed?  Yes   Wrap up additional comments  Getting her protein in, has not followed her fluid intake as closely.  Has lost 19 lb since surgery. Can have eggs tomorrow.            Swati Harrison RN

## 2019-09-13 ENCOUNTER — READMISSION MANAGEMENT (OUTPATIENT)
Dept: CALL CENTER | Facility: HOSPITAL | Age: 54
End: 2019-09-13

## 2019-09-13 NOTE — OUTREACH NOTE
General Surgery Week 3 Survey      Responses   Facility patient discharged from?  [facility]   Does the patient have one of the following disease processes/diagnoses(primary or secondary)?  General Surgery   Week 3 attempt successful?  No   Unsuccessful attempts  Attempt 1          Saray Dye RN

## 2019-09-15 ENCOUNTER — READMISSION MANAGEMENT (OUTPATIENT)
Dept: CALL CENTER | Facility: HOSPITAL | Age: 54
End: 2019-09-15

## 2019-09-15 NOTE — OUTREACH NOTE
General Surgery Week 3 Survey      Responses   Facility patient discharged from?  [facility]   Does the patient have one of the following disease processes/diagnoses(primary or secondary)?  General Surgery   Week 3 attempt successful?  Yes   Call start time  1241   Call end time  1244   Meds reviewed with patient/caregiver?  Yes   Is the patient taking all medications as directed (includes completed medication regime)?  Yes   Has the patient kept scheduled appointments due by today?  Yes   What is the patient's perception of their health status since discharge?  Improving   Week 3 call completed?  Yes   Revoked  No further contact(revokes)-requires comment   Graduated/Revoked comments  Back to work today, doing well has lost almost 30 lbs is excited, other than some nausea with meds is all           Caron Koenig RN

## 2019-09-23 NOTE — TELEPHONE ENCOUNTER
Pt is requesting refill RX pregabalin (LYRICA) 50 mg cap bid. Pt canceled last 2 scheduled appts and has no scheduled fu appt. Please advise. Thanks.

## 2019-09-24 RX ORDER — PREGABALIN 50 MG/1
CAPSULE ORAL
Qty: 60 CAPSULE | Refills: 0 | Status: SHIPPED | OUTPATIENT
Start: 2019-09-24 | End: 2019-11-01

## 2019-09-24 NOTE — TELEPHONE ENCOUNTER
Called pt and informed that she will need to schedule an appt before we can refill pregabalin (LYRICA). Pt scheduled appt for tomorrow. Thanks.

## 2019-09-24 NOTE — TELEPHONE ENCOUNTER
Called and informed pt that RX pregabalin (LYRICA) 50 mg was sent into pharmacy. Pt stated she was having some nausea with some of her medications and that she may hold on Lyrica until after appt. Informed pt that JESS Mclean  may be able to address her concerns at follow up appt tomorrow. Pt stated understanding. Thanks.

## 2019-09-25 ENCOUNTER — TELEPHONE (OUTPATIENT)
Dept: NEUROLOGY | Facility: CLINIC | Age: 54
End: 2019-09-25

## 2019-09-25 ENCOUNTER — OFFICE VISIT (OUTPATIENT)
Dept: NEUROLOGY | Facility: CLINIC | Age: 54
End: 2019-09-25

## 2019-09-25 VITALS
DIASTOLIC BLOOD PRESSURE: 80 MMHG | HEIGHT: 67 IN | SYSTOLIC BLOOD PRESSURE: 128 MMHG | WEIGHT: 232 LBS | BODY MASS INDEX: 36.41 KG/M2

## 2019-09-25 DIAGNOSIS — G25.81 RLS (RESTLESS LEGS SYNDROME): ICD-10-CM

## 2019-09-25 DIAGNOSIS — E11.42 DIABETIC PERIPHERAL NEUROPATHY (HCC): Primary | ICD-10-CM

## 2019-09-25 PROCEDURE — 99215 OFFICE O/P EST HI 40 MIN: CPT | Performed by: PHYSICIAN ASSISTANT

## 2019-09-25 NOTE — PROGRESS NOTES
"Subjective     Chief Complaint: numbness, paresthesias, pain      History of Present Illness   Marcela Ramírez is a 54 y.o. female who with a history of DM who comes to clinic today for evaluation of neuropathy. She initially noted symptoms in 2015 marked by numbness, paresthesias, and shooting pain in her upper and lower extremities bilaterally. This has worsened over time. She notes associated balance impairment as well as decreased  strength, though denies any clear associated weakness. Her symptoms are worse with increased activity. She is currently taking Lyrica, which is somewhat beneficial. She has previously taken GBP.     She also notes RLS symptoms in her feet primarily at night. She is currently taking ropinirole 0.5mg nightly, which is somewhat beneficial. She states that Xanax is also beneficial for this.      Prior evaluation has included an EMG of the upper and lower extremities bilaterally which was notable for a moderate axonal and demyelinating peripheral neuropathy, moderate CTS bilaterally, mild-moderate ulnar neuropathy on the left and mild ulnar neuropathy on the right. Screening bloodwork was notable for a hemoglobin A1C of 7.3.     Today: Since her last visit in 4/19, she notes that her neuropathy is essentially unchanged and her RLS symptoms have worsened. She discontinued Lyrica recently due to nausea. It is noted that she underwent a gastric sleeve in 8/19.       I have reviewed and confirmed the past family, social and medical history as accurate on 9/25/19.     Review of Systems   Constitutional: Negative.    HENT: Negative.    Eyes: Negative.    Respiratory: Negative.    Cardiovascular: Negative.    Gastrointestinal: Negative.    Endocrine: Negative.    Genitourinary: Negative.    Musculoskeletal: Negative.    Skin: Negative.    Allergic/Immunologic: Negative.    Neurological: Positive for numbness.   Hematological: Negative.        Objective     /80   Ht 170.2 cm (67\")   " Wt 105 kg (232 lb)   LMP  (LMP Unknown)   BMI 36.34 kg/m²     General appearance today is normal.       Physical Exam   Neurological: She has normal strength. She has a normal Finger-Nose-Finger Test. Gait normal.   Psychiatric: Her speech is normal.        Neurologic Exam     Mental Status   Speech: speech is normal   Level of consciousness: alert  Normal comprehension.     Cranial Nerves   Cranial nerves II through XII intact.     Motor Exam   Muscle bulk: normal  Overall muscle tone: normal    Strength   Strength 5/5 throughout.     Gait, Coordination, and Reflexes     Gait  Gait: normal    Coordination   Finger to nose coordination: normal    Tremor   Resting tremor: absent          Assessment/Plan   Marcela was seen today for restless legs syndrome.    Diagnoses and all orders for this visit:    Diabetic peripheral neuropathy (CMS/HCC)    RLS (restless legs syndrome)    Other orders  -     rotigotine (NEUPRO) 1 MG/24HR patch 24 hour 24 hour patch; Place 1 patch on the skin as directed by provider Daily.          Discussion/Summary   Marcela Ramírez returns to clinic today with a history consistent with a diabetic peripheral neuropathy, carpal tunnel syndrome, and restless leg syndrome. I again reviewed her current status and treatment options. After discussing potential treatment options, it was elected to discontinue ropinirole and add Neupro given her nausea. I again discussed the importance of tight glucose control. She will then follow up in 2 months, or sooner if needed.   I spent 40 minutes face to face with the patient with 25 minutes spent on discussing diagnosis, prognosis, diagnostic testing, evaluation, current status, treatment options and management as discussed above.       As part of this visit I discussed the history with the patient .      Gladys Wilder PA-C

## 2019-09-25 NOTE — TELEPHONE ENCOUNTER
Called Helen who states she received a notice from the pharmacy that the Neupro Patch is not covered by her insurance. Notified Helen this is the reason JESS Mclean gave her the sample at [facility] and I am currently working on a Prior Authorization right now. Notified should hear back about a denial/approval decision within 24-72 hours.   Patient stated understanding.

## 2019-09-25 NOTE — TELEPHONE ENCOUNTER
----- Message from Marisa Flores sent at 9/25/2019  2:09 PM EDT -----  Contact: Nolan Graham Pt called in regards to her RX not being covered by her insurance. Please call back and advise.

## 2019-09-26 ENCOUNTER — PRIOR AUTHORIZATION (OUTPATIENT)
Dept: NEUROLOGY | Facility: CLINIC | Age: 54
End: 2019-09-26

## 2019-09-27 ENCOUNTER — OFFICE VISIT (OUTPATIENT)
Dept: FAMILY MEDICINE CLINIC | Facility: CLINIC | Age: 54
End: 2019-09-27

## 2019-09-27 VITALS
BODY MASS INDEX: 36.1 KG/M2 | HEIGHT: 67 IN | DIASTOLIC BLOOD PRESSURE: 98 MMHG | HEART RATE: 68 BPM | SYSTOLIC BLOOD PRESSURE: 142 MMHG | WEIGHT: 230 LBS | OXYGEN SATURATION: 98 % | TEMPERATURE: 98 F

## 2019-09-27 DIAGNOSIS — F41.9 ANXIETY: ICD-10-CM

## 2019-09-27 DIAGNOSIS — F31.9 BIPOLAR 1 DISORDER (HCC): Primary | ICD-10-CM

## 2019-09-27 DIAGNOSIS — I10 ESSENTIAL HYPERTENSION: ICD-10-CM

## 2019-09-27 DIAGNOSIS — Z98.84 S/P LAPAROSCOPIC SLEEVE GASTRECTOMY: ICD-10-CM

## 2019-09-27 PROCEDURE — 99214 OFFICE O/P EST MOD 30 MIN: CPT | Performed by: FAMILY MEDICINE

## 2019-09-27 RX ORDER — DESVENLAFAXINE SUCCINATE 50 MG/1
50 TABLET, EXTENDED RELEASE ORAL DAILY
Qty: 30 TABLET | Refills: 11 | Status: SHIPPED | OUTPATIENT
Start: 2019-09-27 | End: 2020-09-17

## 2019-09-27 RX ORDER — LAMOTRIGINE 200 MG/1
200 TABLET ORAL NIGHTLY
Qty: 30 TABLET | Refills: 11 | Status: SHIPPED | OUTPATIENT
Start: 2019-09-27 | End: 2020-09-17

## 2019-09-27 NOTE — PROGRESS NOTES
Subjective   Marcela Ramírez is a 54 y.o. female    Chief Complaint    Bipolar disorder  Anxiety and depression  High blood pressure  Status post laparoscopic sleeve gastrectomy    History of Present Illness  Patient presents today with a primary concern of bipolar disorder, anxiety and depression.  She is asking that I take over the prescription duties for her Pristiq and Lamictal as she has missed too many appointments at her mental health providers office and been discharged from their practice.  Patient's blood pressure noted to be elevated today.  She states she has been compliant with her medication.  She had a laparoscopic sleeve gastrectomy recently and has had a nice recovery.  She has back to work and reports that her energy level is good.  She is losing weight nicely and actually having trouble getting in all of her protein supplements due to early satiety.    The following portions of the patient's history were reviewed and updated as appropriate: allergies, current medications, past social history and problem list    Review of Systems   Constitutional: Negative for appetite change and fatigue.   HENT: Negative.    Respiratory: Negative for chest tightness and shortness of breath.    Cardiovascular: Negative.    Gastrointestinal: Negative for abdominal pain, diarrhea and nausea.   Genitourinary: Negative.    Musculoskeletal: Negative.    Skin: Negative.    Neurological: Negative for dizziness, tremors, weakness, light-headedness and headaches.   Hematological: Negative.    Psychiatric/Behavioral: Positive for dysphoric mood and sleep disturbance. Negative for agitation, behavioral problems, confusion, decreased concentration and suicidal ideas. The patient is nervous/anxious.        Objective     Vitals:    09/27/19 0951   BP: 142/98   Pulse: 68   Temp: 98 °F (36.7 °C)   SpO2: 98%       Physical Exam   Constitutional: She is oriented to person, place, and time. She appears well-developed and  well-nourished.   Eyes: Conjunctivae are normal. Pupils are equal, round, and reactive to light.   Neck: Normal range of motion. Neck supple.   Cardiovascular: Normal rate and regular rhythm.   Pulmonary/Chest: Effort normal and breath sounds normal.   Abdominal: Soft. There is no tenderness.   Neurological: She is alert and oriented to person, place, and time.   Skin: Skin is warm and dry.   Psychiatric: She has a normal mood and affect. Her behavior is normal.   Nursing note and vitals reviewed.      Assessment/Plan   Problem List Items Addressed This Visit        Cardiovascular and Mediastinum    Hypertension       Other    Anxiety    Relevant Medications    desvenlafaxine (PRISTIQ) 50 MG 24 hr tablet    Bipolar 1 disorder (CMS/HCC) - Primary    Relevant Medications    desvenlafaxine (PRISTIQ) 50 MG 24 hr tablet    lamoTRIgine (LAMICTAL) 200 MG tablet      Other Visit Diagnoses     S/P laparoscopic sleeve gastrectomy

## 2019-10-02 ENCOUNTER — TELEPHONE (OUTPATIENT)
Dept: NEUROLOGY | Facility: CLINIC | Age: 54
End: 2019-10-02

## 2019-10-02 NOTE — TELEPHONE ENCOUNTER
Called Helen back and notified her that after completing a PA for her Neupro patch it has been approved! She should be able to go pick this up at her pharmacy today.  Helen stated understanding and will call if she has any problems.

## 2019-10-02 NOTE — TELEPHONE ENCOUNTER
----- Message from Marisa Flores sent at 10/2/2019  8:39 AM EDT -----  Contact: Nolan Graham Pt called in regards to getting another sample of rotigotine (NEUPRO) 1 MG/24HR .

## 2019-10-23 DIAGNOSIS — E11.65 UNCONTROLLED TYPE 2 DIABETES MELLITUS WITH HYPERGLYCEMIA (HCC): ICD-10-CM

## 2019-10-25 RX ORDER — METFORMIN HYDROCHLORIDE 500 MG/1
TABLET, EXTENDED RELEASE ORAL
Qty: 60 TABLET | Refills: 4 | Status: SHIPPED | OUTPATIENT
Start: 2019-10-25 | End: 2020-03-23

## 2019-11-01 ENCOUNTER — OFFICE VISIT (OUTPATIENT)
Dept: BARIATRICS/WEIGHT MGMT | Facility: CLINIC | Age: 54
End: 2019-11-01

## 2019-11-01 ENCOUNTER — HOSPITAL ENCOUNTER (EMERGENCY)
Facility: HOSPITAL | Age: 54
Discharge: HOME | End: 2019-11-01
Attending: EMERGENCY MEDICINE | Admitting: EMERGENCY MEDICINE

## 2019-11-01 ENCOUNTER — APPOINTMENT (OUTPATIENT)
Dept: CT IMAGING | Facility: HOSPITAL | Age: 54
End: 2019-11-01

## 2019-11-01 VITALS
TEMPERATURE: 95.5 F | SYSTOLIC BLOOD PRESSURE: 148 MMHG | RESPIRATION RATE: 18 BRPM | DIASTOLIC BLOOD PRESSURE: 86 MMHG | WEIGHT: 220 LBS | HEIGHT: 67 IN | BODY MASS INDEX: 34.53 KG/M2 | OXYGEN SATURATION: 99 % | HEART RATE: 75 BPM

## 2019-11-01 VITALS
TEMPERATURE: 100.4 F | HEIGHT: 67 IN | RESPIRATION RATE: 18 BRPM | SYSTOLIC BLOOD PRESSURE: 130 MMHG | HEART RATE: 79 BPM | BODY MASS INDEX: 34.53 KG/M2 | WEIGHT: 220 LBS | OXYGEN SATURATION: 98 % | DIASTOLIC BLOOD PRESSURE: 86 MMHG

## 2019-11-01 DIAGNOSIS — Z90.3 POSTGASTRECTOMY MALABSORPTION: ICD-10-CM

## 2019-11-01 DIAGNOSIS — R11.0 NAUSEA: ICD-10-CM

## 2019-11-01 DIAGNOSIS — K91.2 POSTGASTRECTOMY MALABSORPTION: ICD-10-CM

## 2019-11-01 DIAGNOSIS — E55.9 HYPOVITAMINOSIS D: ICD-10-CM

## 2019-11-01 DIAGNOSIS — R10.9 ABDOMINAL PAIN, UNSPECIFIED ABDOMINAL LOCATION: ICD-10-CM

## 2019-11-01 DIAGNOSIS — Z13.0 SCREENING, IRON DEFICIENCY ANEMIA: ICD-10-CM

## 2019-11-01 DIAGNOSIS — R10.32 LLQ PAIN: ICD-10-CM

## 2019-11-01 DIAGNOSIS — K52.9 COLITIS: Primary | ICD-10-CM

## 2019-11-01 DIAGNOSIS — Z13.21 MALNUTRITION SCREEN: ICD-10-CM

## 2019-11-01 DIAGNOSIS — E66.9 OBESITY, CLASS I, BMI 30-34.9: Primary | ICD-10-CM

## 2019-11-01 DIAGNOSIS — R53.83 FATIGUE, UNSPECIFIED TYPE: ICD-10-CM

## 2019-11-01 LAB
ALBUMIN SERPL-MCNC: 4.1 G/DL (ref 3.5–5.2)
ALBUMIN/GLOB SERPL: 1.2 G/DL
ALP SERPL-CCNC: 90 U/L (ref 39–117)
ALT SERPL W P-5'-P-CCNC: 13 U/L (ref 1–33)
ANION GAP SERPL CALCULATED.3IONS-SCNC: 9 MMOL/L (ref 5–15)
AST SERPL-CCNC: 18 U/L (ref 1–32)
BASOPHILS # BLD AUTO: 0.03 10*3/MM3 (ref 0–0.2)
BASOPHILS NFR BLD AUTO: 0.3 % (ref 0–1.5)
BILIRUB SERPL-MCNC: 0.5 MG/DL (ref 0.2–1.2)
BILIRUB UR QL STRIP: NEGATIVE
BUN BLD-MCNC: 12 MG/DL (ref 6–20)
BUN/CREAT SERPL: 12.9 (ref 7–25)
CALCIUM SPEC-SCNC: 9.5 MG/DL (ref 8.6–10.5)
CHLORIDE SERPL-SCNC: 100 MMOL/L (ref 98–107)
CLARITY UR: CLEAR
CO2 SERPL-SCNC: 32 MMOL/L (ref 22–29)
COLOR UR: YELLOW
CREAT BLD-MCNC: 0.93 MG/DL (ref 0.57–1)
DEPRECATED RDW RBC AUTO: 43.5 FL (ref 37–54)
EOSINOPHIL # BLD AUTO: 0.12 10*3/MM3 (ref 0–0.4)
EOSINOPHIL NFR BLD AUTO: 1 % (ref 0.3–6.2)
ERYTHROCYTE [DISTWIDTH] IN BLOOD BY AUTOMATED COUNT: 12.9 % (ref 12.3–15.4)
GFR SERPL CREATININE-BSD FRML MDRD: 63 ML/MIN/1.73
GLOBULIN UR ELPH-MCNC: 3.4 GM/DL
GLUCOSE BLD-MCNC: 96 MG/DL (ref 65–99)
GLUCOSE UR STRIP-MCNC: NEGATIVE MG/DL
HCT VFR BLD AUTO: 38.7 % (ref 34–46.6)
HGB BLD-MCNC: 12.4 G/DL (ref 12–15.9)
HGB UR QL STRIP.AUTO: NEGATIVE
HOLD SPECIMEN: NORMAL
HOLD SPECIMEN: NORMAL
IMM GRANULOCYTES # BLD AUTO: 0.04 10*3/MM3 (ref 0–0.05)
IMM GRANULOCYTES NFR BLD AUTO: 0.3 % (ref 0–0.5)
KETONES UR QL STRIP: NEGATIVE
LEUKOCYTE ESTERASE UR QL STRIP.AUTO: NEGATIVE
LIPASE SERPL-CCNC: 12 U/L (ref 13–60)
LYMPHOCYTES # BLD AUTO: 1.75 10*3/MM3 (ref 0.7–3.1)
LYMPHOCYTES NFR BLD AUTO: 15.1 % (ref 19.6–45.3)
MCH RBC QN AUTO: 29.6 PG (ref 26.6–33)
MCHC RBC AUTO-ENTMCNC: 32 G/DL (ref 31.5–35.7)
MCV RBC AUTO: 92.4 FL (ref 79–97)
MONOCYTES # BLD AUTO: 0.78 10*3/MM3 (ref 0.1–0.9)
MONOCYTES NFR BLD AUTO: 6.7 % (ref 5–12)
NEUTROPHILS # BLD AUTO: 8.86 10*3/MM3 (ref 1.7–7)
NEUTROPHILS NFR BLD AUTO: 76.6 % (ref 42.7–76)
NITRITE UR QL STRIP: NEGATIVE
NRBC BLD AUTO-RTO: 0 /100 WBC (ref 0–0.2)
PH UR STRIP.AUTO: 8.5 [PH] (ref 5–8)
PLATELET # BLD AUTO: 193 10*3/MM3 (ref 140–450)
PMV BLD AUTO: 10.9 FL (ref 6–12)
POTASSIUM BLD-SCNC: 4.1 MMOL/L (ref 3.5–5.2)
PROT SERPL-MCNC: 7.5 G/DL (ref 6–8.5)
PROT UR QL STRIP: NEGATIVE
RBC # BLD AUTO: 4.19 10*6/MM3 (ref 3.77–5.28)
SODIUM BLD-SCNC: 141 MMOL/L (ref 136–145)
SP GR UR STRIP: 1.02 (ref 1–1.03)
UROBILINOGEN UR QL STRIP: ABNORMAL
WBC NRBC COR # BLD: 11.58 10*3/MM3 (ref 3.4–10.8)
WHOLE BLOOD HOLD SPECIMEN: NORMAL
WHOLE BLOOD HOLD SPECIMEN: NORMAL

## 2019-11-01 PROCEDURE — 74176 CT ABD & PELVIS W/O CONTRAST: CPT

## 2019-11-01 PROCEDURE — 99284 EMERGENCY DEPT VISIT MOD MDM: CPT

## 2019-11-01 PROCEDURE — 96375 TX/PRO/DX INJ NEW DRUG ADDON: CPT

## 2019-11-01 PROCEDURE — 96376 TX/PRO/DX INJ SAME DRUG ADON: CPT

## 2019-11-01 PROCEDURE — 83690 ASSAY OF LIPASE: CPT | Performed by: EMERGENCY MEDICINE

## 2019-11-01 PROCEDURE — 80053 COMPREHEN METABOLIC PANEL: CPT | Performed by: EMERGENCY MEDICINE

## 2019-11-01 PROCEDURE — 96374 THER/PROPH/DIAG INJ IV PUSH: CPT

## 2019-11-01 PROCEDURE — 81003 URINALYSIS AUTO W/O SCOPE: CPT | Performed by: EMERGENCY MEDICINE

## 2019-11-01 PROCEDURE — 85025 COMPLETE CBC W/AUTO DIFF WBC: CPT | Performed by: EMERGENCY MEDICINE

## 2019-11-01 PROCEDURE — 99024 POSTOP FOLLOW-UP VISIT: CPT | Performed by: PHYSICIAN ASSISTANT

## 2019-11-01 PROCEDURE — 25010000002 ONDANSETRON PER 1 MG: Performed by: EMERGENCY MEDICINE

## 2019-11-01 PROCEDURE — 25010000002 HYDROMORPHONE PER 4 MG: Performed by: EMERGENCY MEDICINE

## 2019-11-01 RX ORDER — ONDANSETRON 2 MG/ML
4 INJECTION INTRAMUSCULAR; INTRAVENOUS ONCE
Status: COMPLETED | OUTPATIENT
Start: 2019-11-01 | End: 2019-11-01

## 2019-11-01 RX ORDER — HYDROMORPHONE HYDROCHLORIDE 1 MG/ML
0.5 INJECTION, SOLUTION INTRAMUSCULAR; INTRAVENOUS; SUBCUTANEOUS ONCE
Status: COMPLETED | OUTPATIENT
Start: 2019-11-01 | End: 2019-11-01

## 2019-11-01 RX ORDER — SODIUM CHLORIDE 0.9 % (FLUSH) 0.9 %
10 SYRINGE (ML) INJECTION AS NEEDED
Status: DISCONTINUED | OUTPATIENT
Start: 2019-11-01 | End: 2019-11-01 | Stop reason: HOSPADM

## 2019-11-01 RX ORDER — HYDROCODONE BITARTRATE AND ACETAMINOPHEN 5; 325 MG/1; MG/1
1 TABLET ORAL EVERY 6 HOURS PRN
Qty: 12 TABLET | Refills: 0 | OUTPATIENT
Start: 2019-11-01 | End: 2020-04-17

## 2019-11-01 RX ORDER — CIPROFLOXACIN 500 MG/1
500 TABLET, FILM COATED ORAL 2 TIMES DAILY
Qty: 20 TABLET | Refills: 0 | OUTPATIENT
Start: 2019-11-01 | End: 2020-04-17

## 2019-11-01 RX ORDER — METRONIDAZOLE 500 MG/1
500 TABLET ORAL 3 TIMES DAILY
Qty: 30 TABLET | Refills: 0 | OUTPATIENT
Start: 2019-11-01 | End: 2020-04-17

## 2019-11-01 RX ADMIN — SODIUM CHLORIDE 1000 ML: 9 INJECTION, SOLUTION INTRAVENOUS at 16:07

## 2019-11-01 RX ADMIN — ONDANSETRON 4 MG: 2 INJECTION INTRAMUSCULAR; INTRAVENOUS at 16:05

## 2019-11-01 RX ADMIN — HYDROMORPHONE HYDROCHLORIDE 0.5 MG: 1 INJECTION, SOLUTION INTRAMUSCULAR; INTRAVENOUS; SUBCUTANEOUS at 16:05

## 2019-11-01 RX ADMIN — HYDROMORPHONE HYDROCHLORIDE 0.5 MG: 1 INJECTION, SOLUTION INTRAMUSCULAR; INTRAVENOUS; SUBCUTANEOUS at 19:01

## 2019-11-01 NOTE — ED PROVIDER NOTES
Subjective   Marcela Ramírez is a 54 y.o female who presents to the ED with complaints of abdominal pain. Patient reports she began experiencing left lower abdominal pain with an onset 3 days ago. Her pain radiates into the suprapubic region of her abdomen. She also complains of fever and diarrhea. However, patients complains of a new onset of constipation which began today. No dysuria, frequency, or urgency. She has a past medical history of arthritis, asthma, CKD, diabetes mellitus, diverticulosis, GERD, hyperlipidemia, hypertension, peripheral neuropathy, and TIA. Additionally, patient had a recent gastric sleeve in 08/2019. There are no other acute symptoms at this time.        History provided by:  Patient  Abdominal Pain   Pain location:  LLQ  Pain radiates to:  Suprapubic region  Pain severity:  Moderate  Onset quality:  Sudden  Duration:  3 days  Timing:  Constant  Progression:  Worsening  Chronicity:  New  Associated symptoms: constipation, diarrhea and fever    Associated symptoms: no dysuria    Diarrhea:     Quality:  Mucous    Severity:  Moderate    Timing:  Constant    Progression:  Unchanged  Fever:     Timing:  Constant    Progression:  Improving      Review of Systems   Constitutional: Positive for fever.   Gastrointestinal: Positive for abdominal pain, constipation and diarrhea.   Genitourinary: Negative for dysuria, frequency and urgency.   All other systems reviewed and are negative.      Past Medical History:   Diagnosis Date   • Arthritis     knees, otc arthritis meds prn, no h/o injections.    • Asthma     has not required inhaler   • Bilateral ovarian cysts    • Bipolar 1 disorder (CMS/HCC)    • Boil     opens them herself   • Carpal tunnel syndrome    • CKD (chronic kidney disease), symptom management only, stage 3 (moderate) (CMS/HCC)     Creatinine 1.04 GFR 56   • Colon polyp    • Depression    • Diabetes mellitus (CMS/HCC)     Diagnosed 2017, was on metformin in the past. Last A1C 7%   •  Diverticulosis    • Fatigue    • GERD (gastroesophageal reflux disease)     controlled with omeprazole 20mg. Remote EGD- esophagitis.  EGD no hiatal hernia Z line 40 cm very thick mucosal folds cannot rule out varices.  CT scan thickened fundus, no varices seen   • Headache    • History of bladder infections    • History of blood transfusion 2006 2/2 heavy menses   • History of bronchitis    • Hyperlipidemia    • Hypertension    • Lower extremity edema    • Morbid obesity with BMI of 40.0-44.9, adult (CMS/HCC)    • Nausea     wtih taking meds, avoid this by taking meds wtih milk   • Peripheral neuropathy     2/2 DM   • RLS (restless legs syndrome)    • S/P cholecystectomy     2/2 cholelithiasis   • Shortness of breath on exertion    • TIA (transient ischemic attack)     patient states 8-9 episodes of right sided drooping/ weakness/ vision changes. Workup was negative for CVA- thought to be related to anxiety. last episode 12/2017       Allergies   Allergen Reactions   • Contrast Dye Swelling     Nasal congestion/ snot, IV contrast only       Past Surgical History:   Procedure Laterality Date   • COLONOSCOPY  remote    diverticulosis   • ENDOMETRIAL ABLATION     • ENDOSCOPY  remote    esophagitis    • ENDOSCOPY N/A 8/22/2019    Procedure: ESOPHAGOGASTRODUODENOSCOPY;  Surgeon: Guido Greenberg MD;  Location:  [facility];  Service: Bariatric   • GASTRIC SLEEVE LAPAROSCOPIC N/A 8/22/2019    Procedure: GASTRIC SLEEVE LAPAROSCOPIC;  Surgeon: Guido Greenberg MD;  Location:  [facility];  Service: Bariatric   • KNEE ACL RECONSTRUCTION Right 2013    with miniscal repair   • LAPAROSCOPIC CHOLECYSTECTOMY  2001    cholelithiasis   • LAPAROSCOPIC HYSTERECTOMY  2013    right ovary remains   • PARAESOPHAGEAL HERNIA REPAIR N/A 8/22/2019    Procedure: PARAESOPHAGEAL HERNIA REPAIR LAPAROSCOPIC;  Surgeon: Guido Greenberg MD;  Location:  [facility];  Service: Bariatric   • SINUS SURGERY     • TUBAL ABDOMINAL LIGATION          Family History   Problem Relation Age of Onset   • Arthritis Mother    • Arthritis Father    • Diabetes Father    • Hyperlipidemia Father    • Hypertension Father    • Asthma Brother    • Other Brother    • Cancer Maternal Aunt    • Depression Paternal Uncle        Social History     Socioeconomic History   • Marital status:      Spouse name: Not on file   • Number of children: Not on file   • Years of education: Not on file   • Highest education level: Not on file   Tobacco Use   • Smoking status: Never Smoker   • Smokeless tobacco: Never Used   Substance and Sexual Activity   • Alcohol use: Yes     Frequency: Never     Comment: maybe a glass 1-2   • Drug use: Yes     Types: Marijuana     Comment: about 4 years ago   • Sexual activity: Defer     Comment: no hormones   Social History Narrative    Lives in [facility] with boyfriend, daughter and her boyfriend and granddaughter and stepson.          Objective   Physical Exam   Constitutional: She is oriented to person, place, and time. She appears well-developed and well-nourished. No distress.   HENT:   Head: Normocephalic and atraumatic.   Nose: Nose normal.   Eyes: Conjunctivae are normal. No scleral icterus.   Neck: Normal range of motion. Neck supple.   Cardiovascular: Normal rate, regular rhythm and normal heart sounds.   No murmur heard.  Pulmonary/Chest: Effort normal and breath sounds normal. No respiratory distress.   Abdominal: Soft. Bowel sounds are normal. There is tenderness in the left lower quadrant. There is guarding. There is no rigidity and no rebound.   Voluntary guarding.   Musculoskeletal: Normal range of motion. She exhibits no edema.   Neurological: She is alert and oriented to person, place, and time.   Skin: Skin is warm and dry.   Psychiatric: She has a normal mood and affect. Her behavior is normal.   Nursing note and vitals reviewed.      Procedures         ED Course  ED Course as of Nov 01 1902 Fri Nov 01, 2019   1328  Temp: 100.4 °F (38 °C) [RS]   1858 AZIZA request number [id number]. AZIZA query complete. Treatment plan to include limited course of prescribed controlled substance. Risks including addiction, benefits, and alternatives presented to patient.    [PK]      ED Course User Index  [PK] Franc Perera  [RS] Yanick Cash MD     Recent Results (from the past 24 hour(s))   Comprehensive Metabolic Panel    Collection Time: 11/01/19  2:43 PM   Result Value Ref Range    Glucose 96 65 - 99 mg/dL    BUN 12 6 - 20 mg/dL    Creatinine 0.93 0.57 - 1.00 mg/dL    Sodium 141 136 - 145 mmol/L    Potassium 4.1 3.5 - 5.2 mmol/L    Chloride 100 98 - 107 mmol/L    CO2 32.0 (H) 22.0 - 29.0 mmol/L    Calcium 9.5 8.6 - 10.5 mg/dL    Total Protein 7.5 6.0 - 8.5 g/dL    Albumin 4.10 3.50 - 5.20 g/dL    ALT (SGPT) 13 1 - 33 U/L    AST (SGOT) 18 1 - 32 U/L    Alkaline Phosphatase 90 39 - 117 U/L    Total Bilirubin 0.5 0.2 - 1.2 mg/dL    eGFR Non African Amer 63 >60 mL/min/1.73    Globulin 3.4 gm/dL    A/G Ratio 1.2 g/dL    BUN/Creatinine Ratio 12.9 7.0 - 25.0    Anion Gap 9.0 5.0 - 15.0 mmol/L   Lipase    Collection Time: 11/01/19  2:43 PM   Result Value Ref Range    Lipase 12 (L) 13 - 60 U/L   Light Blue Top    Collection Time: 11/01/19  2:43 PM   Result Value Ref Range    Extra Tube hold for add-on    Green Top (Gel)    Collection Time: 11/01/19  2:43 PM   Result Value Ref Range    Extra Tube Hold for add-ons.    Lavender Top    Collection Time: 11/01/19  2:43 PM   Result Value Ref Range    Extra Tube hold for add-on    Gold Top - SST    Collection Time: 11/01/19  2:43 PM   Result Value Ref Range    Extra Tube Hold for add-ons.    CBC Auto Differential    Collection Time: 11/01/19  2:43 PM   Result Value Ref Range    WBC 11.58 (H) 3.40 - 10.80 10*3/mm3    RBC 4.19 3.77 - 5.28 10*6/mm3    Hemoglobin 12.4 12.0 - 15.9 g/dL    Hematocrit 38.7 34.0 - 46.6 %    MCV 92.4 79.0 - 97.0 fL    MCH 29.6 26.6 - 33.0 pg    MCHC 32.0 31.5 - 35.7  g/dL    RDW 12.9 12.3 - 15.4 %    RDW-SD 43.5 37.0 - 54.0 fl    MPV 10.9 6.0 - 12.0 fL    Platelets 193 140 - 450 10*3/mm3    Neutrophil % 76.6 (H) 42.7 - 76.0 %    Lymphocyte % 15.1 (L) 19.6 - 45.3 %    Monocyte % 6.7 5.0 - 12.0 %    Eosinophil % 1.0 0.3 - 6.2 %    Basophil % 0.3 0.0 - 1.5 %    Immature Grans % 0.3 0.0 - 0.5 %    Neutrophils, Absolute 8.86 (H) 1.70 - 7.00 10*3/mm3    Lymphocytes, Absolute 1.75 0.70 - 3.10 10*3/mm3    Monocytes, Absolute 0.78 0.10 - 0.90 10*3/mm3    Eosinophils, Absolute 0.12 0.00 - 0.40 10*3/mm3    Basophils, Absolute 0.03 0.00 - 0.20 10*3/mm3    Immature Grans, Absolute 0.04 0.00 - 0.05 10*3/mm3    nRBC 0.0 0.0 - 0.2 /100 WBC   Urinalysis With Microscopic If Indicated (No Culture) - Urine, Clean Catch    Collection Time: 11/01/19  2:44 PM   Result Value Ref Range    Color, UA Yellow Yellow, Straw    Appearance, UA Clear Clear    pH, UA 8.5 (H) 5.0 - 8.0    Specific Gravity, UA 1.018 1.001 - 1.030    Glucose, UA Negative Negative    Ketones, UA Negative Negative    Bilirubin, UA Negative Negative    Blood, UA Negative Negative    Protein, UA Negative Negative    Leuk Esterase, UA Negative Negative    Nitrite, UA Negative Negative    Urobilinogen, UA 0.2 E.U./dL 0.2 - 1.0 E.U./dL     Note: In addition to lab results from this visit, the labs listed above may include labs taken at another facility or during a different encounter within the last 24 hours. Please correlate lab times with ED admission and discharge times for further clarification of the services performed during this visit.    CT Abdomen Pelvis Without Contrast   Preliminary Result   Acute inflammatory change in the distal descending and   proximal sigmoid colon consistent with acute diverticulitis without   perforation or abscess.                Vitals:    11/01/19 1609 11/01/19 1630 11/01/19 1800 11/01/19 1822   BP:  134/77 129/70    BP Location:       Patient Position:       Pulse: 77 75 75 76   Resp:       Temp:        TempSrc:       SpO2: 98% 95% 94% 96%   Weight:       Height:         Medications   sodium chloride 0.9 % flush 10 mL (not administered)   HYDROmorphone (DILAUDID) injection 0.5 mg (not administered)   sodium chloride 0.9 % bolus 1,000 mL (1,000 mL Intravenous New Bag 11/1/19 1607)   HYDROmorphone (DILAUDID) injection 0.5 mg (0.5 mg Intravenous Given 11/1/19 1605)   ondansetron (ZOFRAN) injection 4 mg (4 mg Intravenous Given 11/1/19 1605)     ECG/EMG Results (last 24 hours)     ** No results found for the last 24 hours. **        No orders to display                     MDM  Number of Diagnoses or Management Options  Colitis: new and requires workup  LLQ pain: new and requires workup     Amount and/or Complexity of Data Reviewed  Clinical lab tests: reviewed and ordered  Tests in the radiology section of CPT®: reviewed and ordered  Tests in the medicine section of CPT®: ordered and reviewed  Discuss the patient with other providers: yes    Patient Progress  Patient progress: stable      Final diagnoses:   Colitis   LLQ pain       Documentation assistance provided by aga Perera.  Information recorded by the aga was done at my direction and has been verified and validated by me.     Franc Perera  11/01/19 4733       Enmanuel Pereira PA  11/02/19 2933

## 2019-11-01 NOTE — PROGRESS NOTES
"[facility]  [address]  [facility] [id number] [phone]        Patient Name:  Marcela Ramírez.  :  1965      Reason for Visit:   10 weeks postop postop      HPI: Marcela Ramírez is a 54 y.o. female  s/p LSG/ paraesophageal HHR by  on 19    LOV POD#8- overall doing well, 1 month appt cancelled.     Presents today with 2 days of acute onset lower abdominal pain, wraps around bilaterally into back, LLQ> RLQ.  Has been constant, though worse with standing/moving. Has doubled her over in pain.   Up until yesterday has been having daily, regular BM. Passed gas yesterday with no BM, had small BM today with large amount of mucus. Pain 10/10 at its worst, 5/10 at other times.  Described as \"poop pains\", but not relieved with passing BM.  Nausea x 1 day, decreased appetite, not eating much.  Denies any upper abdominal pain. Denies dysphagia, reflux, vomiting, pulmonary issues and fevers.  Has not seen PCP.  Has h/o diverticulosis, was told she had touch of diverticulitis in the past but pain later thought to be related to uterine issues. S/p hysterectomy with only R ovary remaining. Denies urinary symptoms of dysuria, hematuria. Denies blood in BM.  Prior to last 2 days, was doing great in regards to LSG. Getting 100+g prot/day, shake in coffee in mornings. Cheese, pepporoni, high protein.   Eating 3-4 times day.   Drinking 64+ fluid oz/day.  Taking MVI, B12, B1, Calcium, Vit D, iron and Vit C.  On Omeprazole , phenergan prn- has not taken it the last couple days.  Exercising- walking to and from work.    Presurgery weight: 245 pounds.  Today's weight is 99.8 kg (220 lb) pounds, today's  Body mass index is 34.46 kg/m²., and her weight loss since surgery is 25 pounds.      Past Medical History:   Diagnosis Date   • Arthritis     knees, otc arthritis meds prn, no h/o injections.    • Asthma     has not required inhaler   • Bilateral ovarian " cysts    • Bipolar 1 disorder (CMS/HCC)    • Boil     opens them herself   • Carpal tunnel syndrome    • CKD (chronic kidney disease), symptom management only, stage 3 (moderate) (CMS/[facility])     Creatinine 1.04 GFR 56   • Colon polyp    • Depression    • Diabetes mellitus (CMS/HCC)     Diagnosed 2017, was on metformin in the past. Last A1C 7%   • Diverticulosis    • Fatigue    • GERD (gastroesophageal reflux disease)     controlled with omeprazole 20mg. Remote EGD- esophagitis.  EGD no hiatal hernia Z line 40 cm very thick mucosal folds cannot rule out varices.  CT scan thickened fundus, no varices seen   • Headache    • History of bladder infections    • History of blood transfusion 2006    2/2 heavy menses   • History of bronchitis    • Hyperlipidemia    • Hypertension    • Lower extremity edema    • Morbid obesity with BMI of 40.0-44.9, adult (CMS/[facility])    • Nausea     wtih taking meds, avoid this by taking meds wtih milk   • Peripheral neuropathy     2/2 DM   • RLS (restless legs syndrome)    • S/P cholecystectomy     2/2 cholelithiasis   • Shortness of breath on exertion    • TIA (transient ischemic attack)     patient states 8-9 episodes of right sided drooping/ weakness/ vision changes. Workup was negative for CVA- thought to be related to anxiety. last episode 12/2017     Past Surgical History:   Procedure Laterality Date   • COLONOSCOPY  remote    diverticulosis   • ENDOMETRIAL ABLATION     • ENDOSCOPY  remote    esophagitis    • ENDOSCOPY N/A 8/22/2019    Procedure: ESOPHAGOGASTRODUODENOSCOPY;  Surgeon: Guido Greenberg MD;  Location:  [facility];  Service: Bariatric   • GASTRIC SLEEVE LAPAROSCOPIC N/A 8/22/2019    Procedure: GASTRIC SLEEVE LAPAROSCOPIC;  Surgeon: Guido Greenberg MD;  Location:  [facility];  Service: Bariatric   • KNEE ACL RECONSTRUCTION Right 2013    with miniscal repair   • LAPAROSCOPIC CHOLECYSTECTOMY  2001    cholelithiasis   • LAPAROSCOPIC HYSTERECTOMY  2013    right ovary remains    • PARAESOPHAGEAL HERNIA REPAIR N/A 8/22/2019    Procedure: PARAESOPHAGEAL HERNIA REPAIR LAPAROSCOPIC;  Surgeon: Guido Greenberg MD;  Location: [facility];  Service: Bariatric   • SINUS SURGERY     • TUBAL ABDOMINAL LIGATION       Outpatient Medications Marked as Taking for the 11/1/19 encounter (Office Visit) with Janis España PA-C   Medication Sig Dispense Refill   • ALPRAZolam (XANAX) 0.5 MG tablet Take 1 tablet by mouth At Night As Needed for Anxiety. 30 tablet 5   • atorvastatin (LIPITOR) 20 MG tablet Take 1 tablet by mouth Daily. 30 tablet 11   • desvenlafaxine (PRISTIQ) 50 MG 24 hr tablet Take 1 tablet by mouth Daily. 30 tablet 11   • lamoTRIgine (LAMICTAL) 200 MG tablet Take 1 tablet by mouth Every Night. Pt is on 200mg 30 tablet 11   • lisinopril (PRINIVIL,ZESTRIL) 20 MG tablet Take 1 tablet by mouth Daily. 30 tablet 11   • metFORMIN ER (GLUCOPHAGE-XR) 500 MG 24 hr tablet TAKE ONE TABLET BY MOUTH TWICE A DAY BEFORE MEALS 60 tablet 4   • omeprazole (priLOSEC) 20 MG capsule Take 2 capsules by mouth Daily. 60 capsule 0   • ONE TOUCH ULTRA TEST test strip TEST GLUCOSE TWO TIMES A  each 12   • promethazine (PHENERGAN) 12.5 MG tablet Take 1 tablet by mouth Every 4 (Four) Hours As Needed for Nausea. 20 tablet 0   • rotigotine (NEUPRO) 1 MG/24HR patch 24 hour 24 hour patch Place 1 patch on the skin as directed by provider Daily. 30 patch 11       Allergies   Allergen Reactions   • Contrast Dye Swelling     Nasal congestion/ snot, IV contrast only       Social History     Socioeconomic History   • Marital status:      Spouse name: Not on file   • Number of children: Not on file   • Years of education: Not on file   • Highest education level: Not on file   Tobacco Use   • Smoking status: Never Smoker   • Smokeless tobacco: Never Used   Substance and Sexual Activity   • Alcohol use: Yes     Frequency: Never     Comment: maybe a glass 1-2   • Drug use: Yes     Types: Marijuana     Comment:  "about 4 years ago   • Sexual activity: Defer     Comment: no hormones   Social History Narrative    Lives in [place] with boyfriend, daughter and her boyfriend and granddaughter and stepson.        /86 (BP Location: Left arm, Patient Position: Sitting, Cuff Size: Large Adult)   Pulse 75   Temp 95.5 °F (35.3 °C) (Temporal)   Resp 18   Ht 170.2 cm (67\")   Wt 99.8 kg (220 lb)   LMP  (LMP Unknown)   SpO2 99%   BMI 34.46 kg/m²     Physical Exam   Constitutional: She is oriented to person, place, and time. She appears well-developed and well-nourished.   Wincing in pain with lying back on exam table   HENT:   Head: Normocephalic and atraumatic.   Cardiovascular: Normal rate and regular rhythm.   Pulmonary/Chest: Effort normal and breath sounds normal.   Abdominal: Soft. Bowel sounds are normal. She exhibits no distension. There is tenderness (TTP diffusely over lower abdomen, worse in LLQ).   Incisions healing well   Neurological: She is alert and oriented to person, place, and time.   Skin: Skin is warm and dry.   Psychiatric: She has a normal mood and affect. Her behavior is normal. Judgment and thought content normal.         Assessment:  10 weeks s/p LSG/ paraesophageal HHR by  on 8/22/19      ICD-10-CM ICD-9-CM   1. Obesity, Class I, BMI 30-34.9 E66.9 278.00   2. Fatigue, unspecified type R53.83 780.79   3. Hypovitaminosis D E55.9 268.9   4. Screening, iron deficiency anemia Z13.0 V78.0   5. Malnutrition screen Z13.21 V77.2   6. Postgastrectomy malabsorption K91.2 579.3    Z90.3    7. Nausea R11.0 787.02   8. Abdominal pain, unspecified abdominal location R10.9 789.00         Plan: Abdominal pain Likely unrelated to LSG given symptom presentation. Discussed possible differential diagnosis, advised seeing PCP today if able or to ED for emergent evaluation with severe abdominal pain.  Otherwise, continue w/ good food choices and healthy habits.  Continue protein 70-100g/day.  Continue fluids " 64oz daily. Continue routine exercise.  Routine bariatric labs ordered.  Continue vitamins w/ adjustments pending lab results.  Call w/ problems/concerns.     The patient was instructed to follow up in 3 months, sooner if needed.      Total time spent w/ patient 25 minutes and 15 minutes spent counseling the patient on nutrition and necessary dietary/lifestyle modifications.

## 2019-11-10 LAB
25(OH)D3+25(OH)D2 SERPL-MCNC: 39 NG/ML (ref 30–100)
ALBUMIN SERPL-MCNC: 4.6 G/DL (ref 3.5–5.5)
ALBUMIN/GLOB SERPL: 1.7 {RATIO} (ref 1.2–2.2)
ALP SERPL-CCNC: 96 IU/L (ref 39–117)
ALT SERPL-CCNC: 14 IU/L (ref 0–32)
AST SERPL-CCNC: 16 IU/L (ref 0–40)
BASOPHILS # BLD AUTO: 0 X10E3/UL (ref 0–0.2)
BASOPHILS NFR BLD AUTO: 0 %
BILIRUB SERPL-MCNC: 0.5 MG/DL (ref 0–1.2)
BUN SERPL-MCNC: 13 MG/DL (ref 6–24)
BUN/CREAT SERPL: 13 (ref 9–23)
CALCIUM SERPL-MCNC: 9.8 MG/DL (ref 8.7–10.2)
CHLORIDE SERPL-SCNC: 97 MMOL/L (ref 96–106)
CO2 SERPL-SCNC: 25 MMOL/L (ref 20–29)
CREAT SERPL-MCNC: 1.02 MG/DL (ref 0.57–1)
EOSINOPHIL # BLD AUTO: 0.2 X10E3/UL (ref 0–0.4)
EOSINOPHIL NFR BLD AUTO: 1 %
ERYTHROCYTE [DISTWIDTH] IN BLOOD BY AUTOMATED COUNT: 13.5 % (ref 12.3–15.4)
FERRITIN SERPL-MCNC: 90 NG/ML (ref 15–150)
FOLATE SERPL-MCNC: 17 NG/ML
GLOBULIN SER CALC-MCNC: 2.7 G/DL (ref 1.5–4.5)
GLUCOSE SERPL-MCNC: 95 MG/DL (ref 65–99)
HCT VFR BLD AUTO: 39.2 % (ref 34–46.6)
HGB BLD-MCNC: 13.2 G/DL (ref 11.1–15.9)
IMM GRANULOCYTES # BLD AUTO: 0 X10E3/UL (ref 0–0.1)
IMM GRANULOCYTES NFR BLD AUTO: 0 %
IRON SERPL-MCNC: 18 UG/DL (ref 27–159)
LYMPHOCYTES # BLD AUTO: 1.8 X10E3/UL (ref 0.7–3.1)
LYMPHOCYTES NFR BLD AUTO: 14 %
Lab: NORMAL
MCH RBC QN AUTO: 30.1 PG (ref 26.6–33)
MCHC RBC AUTO-ENTMCNC: 33.7 G/DL (ref 31.5–35.7)
MCV RBC AUTO: 89 FL (ref 79–97)
METHYLMALONATE SERPL-SCNC: 140 NMOL/L (ref 0–378)
MONOCYTES # BLD AUTO: 0.7 X10E3/UL (ref 0.1–0.9)
MONOCYTES NFR BLD AUTO: 6 %
NEUTROPHILS # BLD AUTO: 9.9 X10E3/UL (ref 1.4–7)
NEUTROPHILS NFR BLD AUTO: 79 %
PLATELET # BLD AUTO: 236 X10E3/UL (ref 150–450)
POTASSIUM SERPL-SCNC: 4.2 MMOL/L (ref 3.5–5.2)
PREALB SERPL-MCNC: 23 MG/DL (ref 10–36)
PROT SERPL-MCNC: 7.3 G/DL (ref 6–8.5)
RBC # BLD AUTO: 4.39 X10E6/UL (ref 3.77–5.28)
SODIUM SERPL-SCNC: 140 MMOL/L (ref 134–144)
VIT B1 BLD-SCNC: 242.1 NMOL/L (ref 66.5–200)
WBC # BLD AUTO: 12.6 X10E3/UL (ref 3.4–10.8)

## 2019-11-14 ENCOUNTER — TELEPHONE (OUTPATIENT)
Dept: FAMILY MEDICINE CLINIC | Facility: CLINIC | Age: 54
End: 2019-11-14

## 2019-11-14 NOTE — TELEPHONE ENCOUNTER
Patient called to request for Dr. Acosta to call her in a prescription for a yeast infection. The patient stated that she went to the ER a couple of weeks ago and was diagnosed with diverticulitis and put on an antibiotic. After taking the antibiotic, the patient got a yeast infection. The patient said that she believes the diverticulitis is gone now and she is no longer experiencing those symptoms, but she needs medication to get rid of the yeast infection. I confirmed the correct pharmacy with the patient to be [facility]. If there are any questions or concerns please call the patient back at [phone] or the pharmacy at [facility] at [phone].

## 2019-11-14 NOTE — TELEPHONE ENCOUNTER
Patient called to request for Dr. Acosta to call her in a prescription for a yeast infection. The patient stated that she went to the ER a couple of weeks ago and was diagnosed with diverticulitis and put on an antibiotic. After taking the antibiotic, the patient got a yeast infection. The patient said that she believes the diverticulitis is gone now and she is no longer experiencing those symptoms, but she needs medication to get rid of the yeast infection. I confirmed the correct pharmacy with the patient to be [facility]. If there are any questions or concerns please call the patient back at [phone] or the pharmacy at [facility] at [phone].

## 2019-11-15 RX ORDER — FLUCONAZOLE 150 MG/1
150 TABLET ORAL ONCE
Qty: 1 TABLET | Refills: 0 | Status: SHIPPED | OUTPATIENT
Start: 2019-11-15 | End: 2019-11-15

## 2019-12-22 DIAGNOSIS — E11.42 DIABETIC PERIPHERAL NEUROPATHY (HCC): Primary | ICD-10-CM

## 2019-12-23 ENCOUNTER — TELEPHONE (OUTPATIENT)
Dept: NEUROLOGY | Facility: CLINIC | Age: 54
End: 2019-12-23

## 2019-12-23 NOTE — TELEPHONE ENCOUNTER
Called and left vm for pt to please call office to schedule fu appt to be evaluated so that we may continue refilling medication. Thanks.

## 2019-12-26 ENCOUNTER — TELEPHONE (OUTPATIENT)
Dept: NEUROLOGY | Facility: CLINIC | Age: 54
End: 2019-12-26

## 2019-12-26 NOTE — TELEPHONE ENCOUNTER
It looks like she follows with Gladys. Please either check with Gladys, or offer follow-up with her, and if she feels Lyrica should be refilled I'm happy to do so. Thanks.

## 2019-12-26 NOTE — TELEPHONE ENCOUNTER
Pt calling in to schedule appointment because she was informed she had to do so in order to refill Lyrica. I am showing that Lyrica was discontinued by  b/c therapy was completed. Please advise.

## 2019-12-26 NOTE — TELEPHONE ENCOUNTER
Called pt left vm regarding scheduling fu appt for evaluation so that we may continue refilling medications. Asked to please give office a call back. Thanks.

## 2019-12-27 DIAGNOSIS — E11.42 DIABETIC PERIPHERAL NEUROPATHY (HCC): ICD-10-CM

## 2019-12-27 DIAGNOSIS — G62.9 POLYNEUROPATHY: Primary | ICD-10-CM

## 2019-12-27 RX ORDER — PREGABALIN 50 MG/1
CAPSULE ORAL
Qty: 60 CAPSULE | Refills: 3 | Status: SHIPPED | OUTPATIENT
Start: 2019-12-27 | End: 2020-06-23

## 2019-12-27 RX ORDER — PREGABALIN 50 MG/1
100 CAPSULE ORAL NIGHTLY
Qty: 60 CAPSULE | Refills: 2 | Status: SHIPPED | OUTPATIENT
Start: 2019-12-27 | End: 2020-12-01 | Stop reason: SDUPTHER

## 2020-01-10 ENCOUNTER — TELEPHONE (OUTPATIENT)
Dept: FAMILY MEDICINE CLINIC | Facility: CLINIC | Age: 55
End: 2020-01-10

## 2020-01-10 DIAGNOSIS — F41.9 ANXIETY: ICD-10-CM

## 2020-01-10 DIAGNOSIS — F51.04 PSYCHOPHYSIOLOGICAL INSOMNIA: ICD-10-CM

## 2020-01-10 RX ORDER — ALPRAZOLAM 0.5 MG/1
0.5 TABLET ORAL NIGHTLY PRN
Qty: 30 TABLET | Refills: 0 | Status: SHIPPED | OUTPATIENT
Start: 2020-01-10 | End: 2020-01-28 | Stop reason: SDUPTHER

## 2020-01-10 NOTE — TELEPHONE ENCOUNTER
Patient requesting a refill on the following medication:    ALPRAZolam (XANAX) 0.5 MG tablet 30 tablet 5 5/28/2019     Sig - Route: Take 1 tablet by mouth At Night As Needed for Anxiety. - Oral    Class: Print    Associated Diagnoses     Anxiety       Psychophysiological insomnia       Pharmacy     [address] - [phone] [facility] [phone] FX

## 2020-01-14 ENCOUNTER — OFFICE VISIT (OUTPATIENT)
Dept: NEUROLOGY | Facility: CLINIC | Age: 55
End: 2020-01-14

## 2020-01-14 VITALS
BODY MASS INDEX: 33.9 KG/M2 | OXYGEN SATURATION: 97 % | WEIGHT: 216 LBS | HEIGHT: 67 IN | SYSTOLIC BLOOD PRESSURE: 142 MMHG | HEART RATE: 64 BPM | DIASTOLIC BLOOD PRESSURE: 90 MMHG

## 2020-01-14 DIAGNOSIS — G25.81 RLS (RESTLESS LEGS SYNDROME): ICD-10-CM

## 2020-01-14 DIAGNOSIS — E11.42 DIABETIC PERIPHERAL NEUROPATHY (HCC): Primary | ICD-10-CM

## 2020-01-14 PROCEDURE — 99214 OFFICE O/P EST MOD 30 MIN: CPT | Performed by: PHYSICIAN ASSISTANT

## 2020-01-14 NOTE — PROGRESS NOTES
"Subjective     Chief Complaint: numbness, paresthesias, pain          History of Present Illness   Marcela Ramírez is a 54 y.o. female with a history of DM who comes to clinic today for evaluation of neuropathy. She initially noted symptoms in 2015 marked by numbness, paresthesias, and shooting pain in her upper and lower extremities bilaterally. This has worsened over time. She notes associated balance impairment as well as decreased  strength, though denies any clear associated weakness. Her symptoms are worse with increased activity. She is currently taking Lyrica, which is somewhat beneficial. She has previously taken GBP.     She also notes RLS symptoms in her feet primarily at night. She is currently taking neupro 1mg daily. She previously tried ropinirole.      Prior evaluation has included an EMG of the upper and lower extremities bilaterally which was notable for a moderate axonal and demyelinating peripheral neuropathy, moderate CTS bilaterally, mild-moderate ulnar neuropathy on the left and mild ulnar neuropathy on the right. Screening bloodwork was notable for a hemoglobin A1C of 7.3.      Today: Since her last visit in 9/19, she notes that her symptoms have improved and are currently manageable.      I have reviewed and confirmed the past family, social and medical history as accurate on 1/14/2020.     Review of Systems   Constitutional: Negative.    HENT: Negative.    Eyes: Negative.    Respiratory: Negative.    Cardiovascular: Negative.    Gastrointestinal: Negative.    Endocrine: Negative.    Genitourinary: Negative.    Musculoskeletal: Negative.    Skin: Negative.    Allergic/Immunologic: Negative.    Neurological: Positive for numbness.   Hematological: Negative.    Psychiatric/Behavioral: Negative.        Objective     /90   Pulse 64   Ht 170.2 cm (67\")   Wt 98 kg (216 lb)   LMP  (LMP Unknown)   SpO2 97%   BMI 33.83 kg/m²     General appearance today is normal.       Physical " Exam   Neurological: She has normal strength. She has a normal Finger-Nose-Finger Test. Gait normal.   Psychiatric: Her speech is normal.        Neurologic Exam     Mental Status   Speech: speech is normal   Level of consciousness: alert  Normal comprehension.     Cranial Nerves   Cranial nerves II through XII intact.     Motor Exam   Muscle bulk: normal  Overall muscle tone: normal    Strength   Strength 5/5 throughout.     Gait, Coordination, and Reflexes     Gait  Gait: normal    Coordination   Finger to nose coordination: normal    Tremor   Resting tremor: absent          Assessment/Plan   Marcela was seen today for follow-up.    Diagnoses and all orders for this visit:    Diabetic peripheral neuropathy (CMS/HCC)    RLS (restless legs syndrome)          Discussion/Summary   Marcela Ramírez returns to clinic today with a history consistent with a diabetic peripheral neuropathy, carpal tunnel syndrome, and restless leg syndrome. I again reviewed her current status and treatment options. After discussing potential treatment options, it was elected to continue on her current medications unchanged as she is doing well overall. She will then follow up in 6 months , or sooner if needed.   I spent 25 minutes face to face with the patient with 15 minutes spent on discussing diagnosis, prognosis, evaluation, current status, treatment options and management as discussed above.       As part of this visit I discussed the history with the patient .      Gladys Wilder PA-C

## 2020-01-28 ENCOUNTER — OFFICE VISIT (OUTPATIENT)
Dept: FAMILY MEDICINE CLINIC | Facility: CLINIC | Age: 55
End: 2020-01-28

## 2020-01-28 ENCOUNTER — LAB (OUTPATIENT)
Dept: LAB | Facility: HOSPITAL | Age: 55
End: 2020-01-28

## 2020-01-28 VITALS
OXYGEN SATURATION: 99 % | RESPIRATION RATE: 16 BRPM | DIASTOLIC BLOOD PRESSURE: 84 MMHG | HEIGHT: 67 IN | BODY MASS INDEX: 34.06 KG/M2 | WEIGHT: 217 LBS | HEART RATE: 58 BPM | SYSTOLIC BLOOD PRESSURE: 128 MMHG

## 2020-01-28 DIAGNOSIS — E78.2 MIXED HYPERLIPIDEMIA: ICD-10-CM

## 2020-01-28 DIAGNOSIS — F41.9 ANXIETY: ICD-10-CM

## 2020-01-28 DIAGNOSIS — E11.65 UNCONTROLLED TYPE 2 DIABETES MELLITUS WITH HYPERGLYCEMIA (HCC): ICD-10-CM

## 2020-01-28 DIAGNOSIS — F51.04 PSYCHOPHYSIOLOGICAL INSOMNIA: ICD-10-CM

## 2020-01-28 DIAGNOSIS — E11.65 UNCONTROLLED TYPE 2 DIABETES MELLITUS WITH HYPERGLYCEMIA (HCC): Primary | ICD-10-CM

## 2020-01-28 LAB
CHOLEST SERPL-MCNC: 139 MG/DL (ref 0–200)
HBA1C MFR BLD: 5.6 % (ref 4.8–5.6)
HDLC SERPL-MCNC: 57 MG/DL (ref 40–60)
LDLC SERPL CALC-MCNC: 57 MG/DL (ref 0–100)
LDLC/HDLC SERPL: 0.99 {RATIO}
TRIGL SERPL-MCNC: 127 MG/DL (ref 0–150)
VLDLC SERPL-MCNC: 25.4 MG/DL (ref 5–40)

## 2020-01-28 PROCEDURE — 80061 LIPID PANEL: CPT

## 2020-01-28 PROCEDURE — 99214 OFFICE O/P EST MOD 30 MIN: CPT | Performed by: FAMILY MEDICINE

## 2020-01-28 PROCEDURE — 83036 HEMOGLOBIN GLYCOSYLATED A1C: CPT

## 2020-01-28 PROCEDURE — 36415 COLL VENOUS BLD VENIPUNCTURE: CPT

## 2020-01-28 RX ORDER — ALPRAZOLAM 0.5 MG/1
0.5 TABLET ORAL NIGHTLY PRN
Qty: 30 TABLET | Refills: 5 | Status: SHIPPED | OUTPATIENT
Start: 2020-01-28 | End: 2020-08-28 | Stop reason: SDUPTHER

## 2020-01-29 NOTE — PROGRESS NOTES
Subjective   Marcela Ramírez is a 54 y.o. female    Chief Complaint    Anxiety  Insomnia  Diabetes mellitus  High cholesterol    History of Present Illness  Patient presents today for a recheck related to chronic issues of anxiety and insomnia.  She also has a type 2 diabetes patient has had recent gastric sleeve surgery and significant weight loss.  She also has chronic hyperlipidemia.  She admits that her diet although improved is not good.  She eats admittedly from stress.  She initially had good weight loss after her gastric sleeve surgery but she plateaued quite early and has not lost any further weight.  She is due for follow-up lab testing.  She also is due today for multiple medication refills.    The following portions of the patient's history were reviewed and updated as appropriate: allergies, current medications, past social history and problem list    Review of Systems   Constitutional: Negative for appetite change and fatigue.   HENT: Negative.    Respiratory: Negative for chest tightness and shortness of breath.    Cardiovascular: Negative.    Gastrointestinal: Negative for abdominal pain, diarrhea and nausea.   Genitourinary: Negative.    Musculoskeletal: Negative.    Skin: Negative.    Neurological: Negative for dizziness, tremors, weakness, light-headedness and headaches.   Hematological: Negative.    Psychiatric/Behavioral: Positive for dysphoric mood and sleep disturbance. Negative for agitation, behavioral problems, confusion, decreased concentration and suicidal ideas. The patient is nervous/anxious.        Objective     Vitals:    01/28/20 1050   BP: 128/84   Pulse: 58   Resp: 16   SpO2: 99%       Physical Exam   Constitutional: She is oriented to person, place, and time. She appears well-developed and well-nourished.   Eyes: Pupils are equal, round, and reactive to light. Conjunctivae are normal.   Neck: Normal range of motion. Neck supple.   Cardiovascular: Normal rate and regular rhythm.    Pulmonary/Chest: Effort normal and breath sounds normal.   Abdominal: Soft. There is no tenderness.   Neurological: She is alert and oriented to person, place, and time.   Skin: Skin is warm and dry.   Psychiatric: She has a normal mood and affect. Her behavior is normal.   Nursing note and vitals reviewed.      Assessment/Plan   Problem List Items Addressed This Visit        Cardiovascular and Mediastinum    Hyperlipidemia    Relevant Orders    Lipid Panel       Other    Anxiety    Relevant Medications    ALPRAZolam (XANAX) 0.5 MG tablet      Other Visit Diagnoses     Uncontrolled type 2 diabetes mellitus with hyperglycemia (CMS/[facility])    -  Primary    Relevant Orders    Hemoglobin A1c (Completed)    Psychophysiological insomnia        Relevant Medications    ALPRAZolam (XANAX) 0.5 MG tablet

## 2020-01-31 ENCOUNTER — TELEPHONE (OUTPATIENT)
Dept: FAMILY MEDICINE CLINIC | Facility: CLINIC | Age: 55
End: 2020-01-31

## 2020-02-02 NOTE — TELEPHONE ENCOUNTER
Decrease metformin from twice a day to once a day.Decrease atorvastatin to 10 mg, currently on 20 mg, so cut tablet in half.Follow-up in 3 months to repeat labs to see if we can further wean off her medicines.

## 2020-03-19 RX ORDER — ATORVASTATIN CALCIUM 20 MG/1
TABLET, FILM COATED ORAL
Qty: 30 TABLET | Refills: 1 | Status: SHIPPED | OUTPATIENT
Start: 2020-03-19 | End: 2020-05-19 | Stop reason: SDUPTHER

## 2020-03-21 DIAGNOSIS — E11.65 UNCONTROLLED TYPE 2 DIABETES MELLITUS WITH HYPERGLYCEMIA (HCC): ICD-10-CM

## 2020-03-23 RX ORDER — METFORMIN HYDROCHLORIDE 500 MG/1
TABLET, EXTENDED RELEASE ORAL
Qty: 60 TABLET | Refills: 3 | Status: SHIPPED | OUTPATIENT
Start: 2020-03-23 | End: 2020-07-23

## 2020-03-23 RX ORDER — ROPINIROLE 2 MG/1
TABLET, FILM COATED ORAL
Qty: 60 TABLET | Refills: 10 | OUTPATIENT
Start: 2020-03-23 | End: 2020-04-17

## 2020-04-17 ENCOUNTER — E-VISIT (OUTPATIENT)
Dept: FAMILY MEDICINE CLINIC | Facility: CLINIC | Age: 55
End: 2020-04-17

## 2020-04-17 DIAGNOSIS — R05.9 COUGH: ICD-10-CM

## 2020-04-17 DIAGNOSIS — R50.9 FEVER, UNSPECIFIED FEVER CAUSE: Primary | ICD-10-CM

## 2020-04-17 DIAGNOSIS — R06.02 SHORTNESS OF BREATH: ICD-10-CM

## 2020-04-17 PROCEDURE — 87635 SARS-COV-2 COVID-19 AMP PRB: CPT | Performed by: FAMILY MEDICINE

## 2020-04-17 PROCEDURE — 99421 OL DIG E/M SVC 5-10 MIN: CPT | Performed by: FAMILY MEDICINE

## 2020-04-17 PROCEDURE — U0003 INFECTIOUS AGENT DETECTION BY NUCLEIC ACID (DNA OR RNA); SEVERE ACUTE RESPIRATORY SYNDROME CORONAVIRUS 2 (SARS-COV-2) (CORONAVIRUS DISEASE [COVID-19]), AMPLIFIED PROBE TECHNIQUE, MAKING USE OF HIGH THROUGHPUT TECHNOLOGIES AS DESCRIBED BY CMS-2020-01-R: HCPCS | Performed by: FAMILY MEDICINE

## 2020-04-17 NOTE — PATIENT INSTRUCTIONS
I feel it would be best if patient were evaluated for possible testing at the urgent care respiratory evaluation clinic on [facility]

## 2020-04-17 NOTE — PROGRESS NOTES
This is an E visit    Patient reports multiple symptoms including fever, chills, cough and shortness of breath.  Underlying risk factors include asthma.    Diagnosis fever with cough and shortness of breath    Plan recommend evaluation for possible COVID 19 testing at urgent care on [facility].    Time spent on the visit 8 minutes

## 2020-04-21 ENCOUNTER — TELEPHONE (OUTPATIENT)
Dept: URGENT CARE | Facility: CLINIC | Age: 55
End: 2020-04-21

## 2020-04-21 NOTE — TELEPHONE ENCOUNTER
Patient notified of negative covid-19 results. Patient states she is feeling some better but has persisting cough. Patient instructed to stay home until she is 7 days out from the onset of symptoms and she is 72 hours fever free, and to follow-up with PCP via E-visit if cough persists. Patient VU

## 2020-05-18 RX ORDER — ATORVASTATIN CALCIUM 20 MG/1
TABLET, FILM COATED ORAL
Qty: 30 TABLET | Refills: 0 | OUTPATIENT
Start: 2020-05-18

## 2020-05-19 RX ORDER — LISINOPRIL 20 MG/1
TABLET ORAL
Qty: 30 TABLET | Refills: 1 | Status: SHIPPED | OUTPATIENT
Start: 2020-05-19 | End: 2020-07-16

## 2020-05-19 NOTE — TELEPHONE ENCOUNTER
PHARMACY CALLED REQUESTING A REFILL FOR:  atorvastatin (LIPITOR) 20 MG tablet    [facility] [address], [place] - [address] AT [address] - [phone]  - [phone] FX

## 2020-05-20 RX ORDER — ATORVASTATIN CALCIUM 20 MG/1
20 TABLET, FILM COATED ORAL DAILY
Qty: 30 TABLET | Refills: 2 | Status: SHIPPED | OUTPATIENT
Start: 2020-05-20 | End: 2020-08-20

## 2020-06-22 DIAGNOSIS — E11.42 DIABETIC PERIPHERAL NEUROPATHY (HCC): ICD-10-CM

## 2020-06-23 RX ORDER — PREGABALIN 50 MG/1
CAPSULE ORAL
Qty: 60 CAPSULE | Refills: 2 | Status: SHIPPED | OUTPATIENT
Start: 2020-06-23 | End: 2020-09-23

## 2020-07-14 ENCOUNTER — OFFICE VISIT (OUTPATIENT)
Dept: NEUROLOGY | Facility: CLINIC | Age: 55
End: 2020-07-14

## 2020-07-14 VITALS — HEIGHT: 67 IN | BODY MASS INDEX: 32.65 KG/M2 | WEIGHT: 208 LBS | TEMPERATURE: 98.6 F

## 2020-07-14 DIAGNOSIS — E11.42 DIABETIC PERIPHERAL NEUROPATHY (HCC): Primary | ICD-10-CM

## 2020-07-14 DIAGNOSIS — F32.89 OTHER DEPRESSION: ICD-10-CM

## 2020-07-14 DIAGNOSIS — G25.81 RLS (RESTLESS LEGS SYNDROME): ICD-10-CM

## 2020-07-14 PROCEDURE — 99215 OFFICE O/P EST HI 40 MIN: CPT | Performed by: PHYSICIAN ASSISTANT

## 2020-07-14 NOTE — PROGRESS NOTES
"Subjective     Chief Complaint: numbness, paresthesias, pain       History of Present Illness   Marcela Ramírez is a 55 y.o. female with a history of DM who comes to clinic today for evaluation of neuropathy. She initially noted symptoms in 2015 marked by numbness, paresthesias, and shooting pain in her upper and lower extremities bilaterally. This has worsened over time. She notes associated balance impairment as well as decreased  strength, though denies any clear associated weakness. Her symptoms are worse with increased activity. She is currently taking Lyrica, which is somewhat beneficial. She has previously taken GBP.     She also notes RLS symptoms in her feet primarily at night. She is currently taking neupro 1mg daily. She previously tried ropinirole.      Prior evaluation has included an EMG of the upper and lower extremities bilaterally which was notable for a moderate axonal and demyelinating peripheral neuropathy, moderate CTS bilaterally, mild-moderate ulnar neuropathy on the left and mild ulnar neuropathy on the right. Screening bloodwork was notable for a hemoglobin A1C of 7.3.     Today: Since her last visit in 1/20, she feels essentially unchanged. She has noted increased anxiety related to family stressors. She is now drinking 1-2 shots a day.       I have reviewed and confirmed the past family, social and medical history as accurate on 7/14/2020.     Review of Systems   Constitutional: Negative.    HENT: Negative.    Eyes: Negative.    Respiratory: Negative.    Cardiovascular: Negative.    Gastrointestinal: Negative.    Endocrine: Negative.    Genitourinary: Negative.    Musculoskeletal: Negative.    Skin: Negative.    Allergic/Immunologic: Negative.    Neurological: Positive for numbness.   Hematological: Negative.    Psychiatric/Behavioral: Negative.        Objective     Temp 98.6 °F (37 °C)   Ht 170.2 cm (67\")   Wt 94.3 kg (208 lb)   LMP  (LMP Unknown)   BMI 32.58 kg/m²     General " appearance today is normal.       Physical Exam   Neurological: Gait normal.   Psychiatric: Her speech is normal.        Neurologic Exam     Mental Status   Speech: speech is normal   Level of consciousness: alert  Normal comprehension.     Cranial Nerves   Cranial nerves II through XII intact.     Motor Exam   Muscle bulk: normal    Gait, Coordination, and Reflexes     Gait  Gait: normal    Tremor   Resting tremor: absent          Assessment/Plan   Marcela was seen today for peripheral neuropathy.    Diagnoses and all orders for this visit:    Diabetic peripheral neuropathy (CMS/HCC)    RLS (restless legs syndrome)    Other depression  -     Ambulatory Referral to Psychology          Discussion/Summary   Marcela Ramírez to clinic today with a history consistent with a diabetic peripheral neuropathy, carpal tunnel syndrome, and restless leg syndrome. I again reviewed her current status and treatment options. After discussing potential treatment options, it was elected to continue on her current medications unchanged. I have also made a referral to Maribel Hernandez, PhD for her anxiety and depression. Additionally, I counseled her on the importance of alcohol cessation and tight glucose control. She will then follow up in 6 months , or sooner if needed.   I spent 40 minutes face to face with the patient with 25 minutes spent on discussing diagnosis, evaluation, current status, treatment options and management as discussed above.       As part of this visit I discussed the history with the patient .      Gladys Wilder PA-C

## 2020-07-14 NOTE — ADDENDUM NOTE
Addended by: RUPALI LOZANO on: 7/14/2020 01:37 PM     Modules accepted: Orders, Level of Service

## 2020-07-16 RX ORDER — LISINOPRIL 20 MG/1
TABLET ORAL
Qty: 30 TABLET | Refills: 6 | Status: SHIPPED | OUTPATIENT
Start: 2020-07-16 | End: 2021-03-01

## 2020-07-18 DIAGNOSIS — E11.65 UNCONTROLLED TYPE 2 DIABETES MELLITUS WITH HYPERGLYCEMIA (HCC): ICD-10-CM

## 2020-07-22 ENCOUNTER — E-VISIT (OUTPATIENT)
Dept: FAMILY MEDICINE CLINIC | Facility: CLINIC | Age: 55
End: 2020-07-22

## 2020-07-23 RX ORDER — METFORMIN HYDROCHLORIDE 500 MG/1
TABLET, EXTENDED RELEASE ORAL
Qty: 60 TABLET | Refills: 2 | Status: SHIPPED | OUTPATIENT
Start: 2020-07-23 | End: 2020-07-24 | Stop reason: SDUPTHER

## 2020-07-24 DIAGNOSIS — E11.65 UNCONTROLLED TYPE 2 DIABETES MELLITUS WITH HYPERGLYCEMIA (HCC): ICD-10-CM

## 2020-07-24 RX ORDER — METFORMIN HYDROCHLORIDE 500 MG/1
500 TABLET, EXTENDED RELEASE ORAL
Qty: 60 TABLET | Refills: 2 | Status: SHIPPED | OUTPATIENT
Start: 2020-07-24 | End: 2021-01-26

## 2020-08-20 RX ORDER — ATORVASTATIN CALCIUM 20 MG/1
TABLET, FILM COATED ORAL
Qty: 30 TABLET | Refills: 1 | Status: SHIPPED | OUTPATIENT
Start: 2020-08-20 | End: 2020-10-21

## 2020-08-28 ENCOUNTER — TELEPHONE (OUTPATIENT)
Dept: FAMILY MEDICINE CLINIC | Facility: CLINIC | Age: 55
End: 2020-08-28

## 2020-08-28 ENCOUNTER — OFFICE VISIT (OUTPATIENT)
Dept: PSYCHIATRY | Facility: CLINIC | Age: 55
End: 2020-08-28

## 2020-08-28 DIAGNOSIS — F10.20 ALCOHOLISM (HCC): ICD-10-CM

## 2020-08-28 DIAGNOSIS — F51.04 PSYCHOPHYSIOLOGICAL INSOMNIA: ICD-10-CM

## 2020-08-28 DIAGNOSIS — F41.9 ANXIETY: ICD-10-CM

## 2020-08-28 DIAGNOSIS — F31.9 BIPOLAR 1 DISORDER (HCC): Primary | ICD-10-CM

## 2020-08-28 PROCEDURE — 90791 PSYCH DIAGNOSTIC EVALUATION: CPT | Performed by: PSYCHOLOGIST

## 2020-08-28 RX ORDER — ALPRAZOLAM 0.5 MG/1
0.5 TABLET ORAL NIGHTLY PRN
Qty: 10 TABLET | Refills: 0 | Status: SHIPPED | OUTPATIENT
Start: 2020-08-28 | End: 2020-09-08 | Stop reason: SDUPTHER

## 2020-08-28 NOTE — PROGRESS NOTES
PROGRESS NOTE    Data:  Marcela Ramírez is a 55 y.o. female who met with the undersigned for a scheduled individual outpatient therapy session from 11:03 - 11:50am.      Clinical Maneuvering/Intervention:      The pt talked about struggling with several things: drinking too much (alcohol), her son being in prison and making poor choices in life (per pt), work stress, and keeping bipolar symptoms under control. Although she is doing better with the latter and she is grateful for her medication to help, it can be hard on her to not feel as much as she did before. She also struggles with weight. A psychological evaluation was conducted in order to assess past and current level of functioning. Areas assessed included, but were not limited to: perception of social support, perception of ability to face and deal with challenges in life (positive functioning), anxiety symptoms, depressive symptoms, perspective on beliefs/belief system, coping skills for stress, intelligence level, addiction issues, etc. Therapeutic rapport was established. Interventions conducted today were geared towards venting about pent up frustrations, identifying existing coping skills, and identifying counseling needs. Stressors were processed individually and in detail. Venting of frustrations was conducted in order to help the pt feel less tense emotionally and gain insight into issues. Feelings were processed and validated several times in session. Homework was assigned tailored to pt's needs. She expressed not wanting to start AA, so was assigned the task of seeing how many days she can go without drinking, noting two days ago as the date of her last drink. Recovery from alcoholism will be continued in subsequent sessions. Education was also provided as to the nature of counseling and what to expect in subsequent sessions. A treatment plan was initiated tailored to meeting pt’s presenting needs. The pt was encouraged to return for additional  sessions and agreed to do so.             Mental Status Exam  Hygiene:  good  Dress: normal  Attitude:  cooperative and proactive  Motor Activity: normal  Speech: normal  Mood:  tense, labile  Affect:  congruent  Thought Processes: normal  Thought Content:  normal  Suicidal Thoughts:  not endorsed  Homicidal Thoughts:  not endorsed  Crisis Safety Plan: not needed   Hallucinations:  none      Patient's Support Network Includes:  family, friends      Progress toward goal: there is evidence to suggest that she is taking measures to improve the quality of her life including seeking counseling and being open to the process.      Functional Status: moderate to high      Prognosis: good    Assessment      The pt presented to be struggling with managing her moods related to having bipolar disorder. She also struggles to stop drinking, but is unsure if she would call herself an alcoholic. It is highly likely that she is suffering from alcoholism, and that it is a concern to her, but there is ambivalence on her part about whether or not she really needs to quit altogether and/or how much help she thinks she need to quit.      Plan      In order to diminish symptoms of bipolar disorder and improve with mood management, she will continue with psychotropic medication as prescribed, see how many days she can go without a drink of alcohol (ongoing), and continue with counseling (ongoing).     Maribel Hernandez, PhD, LP

## 2020-08-28 NOTE — TELEPHONE ENCOUNTER
PATIENT CALLED WANTING A REFILL OF HER XANAX.  I MADE AN APPT  FOR THE PATIENT FOR NEXT WEEK; HOWEVER THE PATIENT IS OUT OF HER XANAX

## 2020-09-08 ENCOUNTER — OFFICE VISIT (OUTPATIENT)
Dept: FAMILY MEDICINE CLINIC | Facility: CLINIC | Age: 55
End: 2020-09-08

## 2020-09-08 VITALS
RESPIRATION RATE: 15 BRPM | HEIGHT: 67 IN | BODY MASS INDEX: 37.51 KG/M2 | HEART RATE: 60 BPM | SYSTOLIC BLOOD PRESSURE: 144 MMHG | DIASTOLIC BLOOD PRESSURE: 96 MMHG | OXYGEN SATURATION: 100 % | WEIGHT: 239 LBS | TEMPERATURE: 97.3 F

## 2020-09-08 DIAGNOSIS — F10.10 ALCOHOL ABUSE: ICD-10-CM

## 2020-09-08 DIAGNOSIS — F51.04 PSYCHOPHYSIOLOGICAL INSOMNIA: ICD-10-CM

## 2020-09-08 DIAGNOSIS — R63.5 WEIGHT GAIN: ICD-10-CM

## 2020-09-08 DIAGNOSIS — F41.9 ANXIETY: Primary | ICD-10-CM

## 2020-09-08 PROCEDURE — 99214 OFFICE O/P EST MOD 30 MIN: CPT | Performed by: FAMILY MEDICINE

## 2020-09-08 RX ORDER — ALPRAZOLAM 0.5 MG/1
0.5 TABLET ORAL NIGHTLY PRN
Qty: 30 TABLET | Refills: 5 | Status: SHIPPED | OUTPATIENT
Start: 2020-09-08 | End: 2021-03-22

## 2020-09-08 NOTE — PROGRESS NOTES
Subjective   Marcela Ramírez is a 55 y.o. female    Chief Complaint    Anxiety  Insomnia  Alcohol use  Weight gain    History of Present Illness  Patient presents today for renewal of her alprazolam which she uses primarily at night to help with nocturnal anxiety and insomnia.  She has been consistent with the medication use.  Unfortunately since I last saw the patient reports that she is started drinking alcohol again and after her bariatric procedure and subsequent weight loss she has now gained 31 pounds in 2 months.  She has cut back on her drinking at the present time and is seeing a psychologist for help.    I have spent 30 minutes face-to-face with the patient with greater than 50% of time spent counseling patient regarding alcohol use and abuse.      The following portions of the patient's history were reviewed and updated as appropriate: allergies, current medications, past social history and problem list    Review of Systems   Constitutional: Positive for unexpected weight change. Negative for appetite change and fatigue.   Respiratory: Negative for chest tightness and shortness of breath.    Cardiovascular: Negative for chest pain and palpitations.   Gastrointestinal: Negative for abdominal pain, diarrhea and nausea.   Musculoskeletal: Negative.    Skin: Negative.    Neurological: Negative for dizziness, tremors, weakness, light-headedness and headaches.   Psychiatric/Behavioral: Positive for dysphoric mood and sleep disturbance. Negative for agitation, behavioral problems, confusion, decreased concentration and suicidal ideas. The patient is nervous/anxious.        Objective     Vitals:    09/08/20 1020   BP: 144/96   Pulse: 60   Resp: 15   Temp: 97.3 °F (36.3 °C)   SpO2: 100%       Physical Exam   Constitutional: She is oriented to person, place, and time. She appears well-developed.   obese   HENT:   Head: Normocephalic and atraumatic.   Eyes: Pupils are equal, round, and reactive to light.  Conjunctivae are normal.   Neck: Neck supple. No thyromegaly present.   Cardiovascular: Normal rate and regular rhythm.   Pulmonary/Chest: Effort normal and breath sounds normal.   Neurological: She is alert and oriented to person, place, and time.   Psychiatric: She has a normal mood and affect. Her behavior is normal.   Nursing note and vitals reviewed.      Assessment/Plan   Problem List Items Addressed This Visit        Other    Anxiety - Primary    Relevant Medications    ALPRAZolam (XANAX) 0.5 MG tablet      Other Visit Diagnoses     Psychophysiological insomnia        Relevant Medications    ALPRAZolam (XANAX) 0.5 MG tablet    Alcohol abuse        Relevant Medications    ALPRAZolam (XANAX) 0.5 MG tablet    Weight gain            Encouraged to continue psychiatric care and abstinence from alcohol

## 2020-09-17 DIAGNOSIS — F31.9 BIPOLAR 1 DISORDER (HCC): ICD-10-CM

## 2020-09-17 DIAGNOSIS — F41.9 ANXIETY: ICD-10-CM

## 2020-09-17 RX ORDER — DESVENLAFAXINE SUCCINATE 50 MG/1
TABLET, EXTENDED RELEASE ORAL
Qty: 30 TABLET | Refills: 10 | Status: SHIPPED | OUTPATIENT
Start: 2020-09-17 | End: 2021-09-10

## 2020-09-17 RX ORDER — LAMOTRIGINE 200 MG/1
TABLET ORAL
Qty: 30 TABLET | Refills: 10 | Status: SHIPPED | OUTPATIENT
Start: 2020-09-17 | End: 2021-09-10

## 2020-09-17 RX ORDER — OMEPRAZOLE 20 MG/1
CAPSULE, DELAYED RELEASE ORAL
Qty: 30 CAPSULE | Refills: 11 | Status: SHIPPED | OUTPATIENT
Start: 2020-09-17 | End: 2021-10-11

## 2020-09-23 DIAGNOSIS — E11.42 DIABETIC PERIPHERAL NEUROPATHY (HCC): ICD-10-CM

## 2020-09-23 RX ORDER — PREGABALIN 50 MG/1
CAPSULE ORAL
Qty: 60 CAPSULE | Refills: 1 | OUTPATIENT
Start: 2020-09-23 | End: 2021-05-10

## 2020-10-08 PROCEDURE — U0003 INFECTIOUS AGENT DETECTION BY NUCLEIC ACID (DNA OR RNA); SEVERE ACUTE RESPIRATORY SYNDROME CORONAVIRUS 2 (SARS-COV-2) (CORONAVIRUS DISEASE [COVID-19]), AMPLIFIED PROBE TECHNIQUE, MAKING USE OF HIGH THROUGHPUT TECHNOLOGIES AS DESCRIBED BY CMS-2020-01-R: HCPCS | Performed by: FAMILY MEDICINE

## 2020-10-11 ENCOUNTER — TELEPHONE (OUTPATIENT)
Dept: URGENT CARE | Facility: CLINIC | Age: 55
End: 2020-10-11

## 2020-10-11 NOTE — TELEPHONE ENCOUNTER
Advised of negative results, pt is still feeling the same and will take precautions and follow up as needed.

## 2020-10-12 DIAGNOSIS — R05.9 COUGH: Primary | ICD-10-CM

## 2020-10-12 RX ORDER — PREDNISONE 20 MG/1
20 TABLET ORAL
Qty: 10 TABLET | Refills: 0 | OUTPATIENT
Start: 2020-10-12 | End: 2021-05-10

## 2020-10-12 RX ORDER — DEXTROMETHORPHAN HYDROBROMIDE AND PROMETHAZINE HYDROCHLORIDE 15; 6.25 MG/5ML; MG/5ML
5 SYRUP ORAL NIGHTLY PRN
Qty: 120 ML | Refills: 0 | Status: SHIPPED | OUTPATIENT
Start: 2020-10-12 | End: 2021-01-17

## 2020-10-19 RX ORDER — ROTIGOTINE 1 MG/24H
PATCH, EXTENDED RELEASE TRANSDERMAL
Qty: 30 PATCH | Refills: 10 | Status: SHIPPED | OUTPATIENT
Start: 2020-10-19 | End: 2021-10-18

## 2020-10-21 ENCOUNTER — OFFICE VISIT (OUTPATIENT)
Dept: PSYCHIATRY | Facility: CLINIC | Age: 55
End: 2020-10-21

## 2020-10-21 DIAGNOSIS — F31.9 BIPOLAR 1 DISORDER (HCC): Primary | ICD-10-CM

## 2020-10-21 PROCEDURE — 90834 PSYTX W PT 45 MINUTES: CPT | Performed by: PSYCHOLOGIST

## 2020-10-21 RX ORDER — ATORVASTATIN CALCIUM 20 MG/1
TABLET, FILM COATED ORAL
Qty: 30 TABLET | Refills: 0 | Status: SHIPPED | OUTPATIENT
Start: 2020-10-21 | End: 2020-11-19

## 2020-10-21 NOTE — PROGRESS NOTES
PROGRESS NOTE    Data:  Marcela Ramírez is a 55 y.o. female who met with the undersigned for a scheduled individual outpatient therapy session from 8:46 - 9:30am.      Clinical Maneuvering/Intervention:      The pt talked about recently quitting a job that was stressful, only to start another one that his been quite stressful. 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 The other main stressor discussed today was tension she has between herself and her . Stressors were processed individually and in detail. Venting of frustrations was conducted in order to help the pt feel less tense emotionally and gain insight into issues. Feelings were processed and validated several times in session. What she can learn from her outburst was discussed. What she would do and would not do again were discussed. Improving communication with her  was processed in session. Her role in the tension, but her role in improving connection were both discussed. Role playing was conducted in order to help the pt gain insight into how to improve her communication skills, decrease tension with the other person, and notice things the pt may be doing (and needs to stop doing) in order to improve the quality of the relationship. The pt was assisted in recognizing progress in order to show encouragement and promote motivation to keep making positive changes in life. She stopped drinking alcohol for several days in a row. She is to continue to abstain. Homework was assigned tailored to pt's needs. The pt expressed gratitude for today's session.    Mental Status Exam  Hygiene:  good  Dress: normal  Attitude:  cooperative and proactive  Motor Activity: normal  Speech: normal  Mood:  tense  Affect:  congruent  Thought Processes: normal  Thought Content:  normal  Suicidal Thoughts:  not endorsed  Homicidal Thoughts:  not endorsed  Crisis Safety Plan: not needed   Hallucinations:   none      Patient's Support Network Includes:  family, friends      Progress toward goal: there is evidence to suggest that she is owning up to her issues and is open to learn more effective and positive ways of being.      Functional Status: moderate to high      Prognosis: good    Assessment      The pt presented to be struggling with managing her moods related to having bipolar disorder. She is unsure if she would call herself an alcoholic. She tends to benefit from assistance in improving communication with others and with help with anger management.      Plan      In order to diminish symptoms of bipolar disorder and improve with mood management, she will continue with psychotropic medication as prescribed, see how many days she can go without a drink of alcohol (ongoing), and continue with counseling (ongoing). She will practice the communication skills conducted in session today with her  this week in order for them to connect better.    Maribel Hernandez, PhD, LP

## 2020-11-02 ENCOUNTER — OFFICE VISIT (OUTPATIENT)
Dept: PSYCHIATRY | Facility: CLINIC | Age: 55
End: 2020-11-02

## 2020-11-02 DIAGNOSIS — F31.9 BIPOLAR 1 DISORDER (HCC): Primary | ICD-10-CM

## 2020-11-02 PROCEDURE — 90834 PSYTX W PT 45 MINUTES: CPT | Performed by: PSYCHOLOGIST

## 2020-11-02 NOTE — PROGRESS NOTES
PROGRESS NOTE    Data:  Marcela Ramírez is a 55 y.o. female who met with the undersigned for a scheduled individual outpatient therapy session from 9:31 - 10:15am.       Clinical Maneuvering/Intervention:      The pt talked about feeling irritable, having a stressful job, and struggling to connect with her . Issues such as the negative political climate just before the presidential election, racism in general, and extreme Orthodoxy belief were brought up and connected to her feelings of irritability/anger. She was open about a terrible experience when her feelings took over and her anger phillip to the level of yelling and crying; her  helped her cope with it and eventually calm her down. Stressors were processed individually and in detail. Venting of frustrations was conducted in order to help the pt feel less tense emotionally and gain insight into issues. Feelings were processed and validated several times in session. Dealing/coping with mood swings was processed. Things that help her manage moods (e.g., regularly taking her two types of medication Pristiq and Lamictal) were discussed in detail. Coping with strong negative emotions and preventing build up of emotional tension were discussed. Education about disliking, if not hating, someone's actions but not the person was provided and explained as a method to help her calm and protect her mental health. Perspective taking was conducted multiple times in order to help her feel less daunted, less overwhelmed, and see challenges as much more manageable. For example, ways she is already adjusting and doing well at work were noted. Gratitude for being able to talk to her  and have his support in her adjusting to her new job was elicited as well.  The pt expressed gratitude for today's session.    Mental Status Exam  Hygiene:  good  Dress: normal  Attitude:  cooperative and proactive  Motor Activity: normal  Speech: tangential at times  Mood:   tense  Affect:  congruent  Thought Processes: normal  Thought Content:  normal  Suicidal Thoughts:  not endorsed  Homicidal Thoughts:  not endorsed  Crisis Safety Plan: not needed   Hallucinations:  none      Patient's Support Network Includes:  family, friends      Progress toward goal: there is evidence to suggest that she is owning up to her issues and is open to learn more effective and positive ways of being.      Functional Status: moderate to high      Prognosis: good    Assessment      The pt presented to be struggling with managing her moods related to having bipolar disorder. She tends to benefit from assistance in improving communication with others and with  anger management. She is a driven and caring person, someone who is open to learning new things and growing as a person.       Plan      In order to diminish symptoms of bipolar disorder and improve with mood management, she will continue with psychotropic medication as prescribed (ongoing), practice preventing building of emotional tension as discussed today (including being transparent with her  about how she feels) and continue with counseling (ongoing).  Maribel Hernandez, PhD, LP

## 2020-11-16 ENCOUNTER — OFFICE VISIT (OUTPATIENT)
Dept: PSYCHIATRY | Facility: CLINIC | Age: 55
End: 2020-11-16

## 2020-11-16 DIAGNOSIS — R45.4 FEELING IRRITABLE: ICD-10-CM

## 2020-11-16 DIAGNOSIS — R45.89 FEELING SAD: ICD-10-CM

## 2020-11-16 DIAGNOSIS — F31.9 BIPOLAR 1 DISORDER (HCC): Primary | ICD-10-CM

## 2020-11-16 PROCEDURE — 90837 PSYTX W PT 60 MINUTES: CPT | Performed by: PSYCHOLOGIST

## 2020-11-16 NOTE — PROGRESS NOTES
PROGRESS NOTE    Data:  Marcela Ramírez is a 55 y.o. female who met with the undersigned for a scheduled individual outpatient therapy session from 11:01 - 11:55am.       Clinical Maneuvering/Intervention:      The pt talked about feeling very upset lately, having a 'melt down' at work, and feeling very depressed. She talked about how difficult it can be, dealing with bipolar disorder. While, she is grateful for the medication she takes to help with the 'highs,' or manic-like states, she has been feeling very low lately. She reported having been crying a lot. Stressors were processed individually and in detail. Venting of frustrations was conducted in order to help the pt feel less tense emotionally and gain insight into issues. Feelings were processed and validated several times in session. She was particularly open today, admitting to feelings of deep sadness, wanting life to end sometimes even though she denied intentions of actually harming herself. At the same time, resources were provided for her to use should she need them in the future (24 hour text line, 24 hotline, and place that is a walk-in clinic for behavioral health issues). She denied again that she would hurt herself, particularly because the love she has for people in her life, but because she just does not want to do it. Medication consultation was discussed and she will talk with her doctor today or as soon as possible to adjust medication as needed. Several issues were discussed and processed today: finding hope in work as she just obtained a better position, gratitude for her team of people on her side in life (therapist, doctor, , co-workers, other family members), guilt/shame of things in her past, and venting about difficulties of having bipolar disorder. She was assisted in seeing how she is doing many things to improve her quality of life already. The pt expressed gratitude for today's session a few times. Homework was assigned  tailored to pt's needs.     Mental Status Exam  Hygiene:  good  Dress: normal  Attitude:  cooperative and proactive  Motor Activity: normal  Speech: tangential at times  Mood:  tense/labile  Affect:  congruent  Thought Processes: normal  Thought Content:  normal  Suicidal Thoughts:  not endorsed  Homicidal Thoughts:  not endorsed  Crisis Safety Plan: not needed   Hallucinations:  none      Patient's Support Network Includes:  family, friends      Progress toward goal: there is evidence to suggest that she had a setback with worsening symptoms of depression related to bipolar disorder       Functional Status: moderate       Prognosis: good    Assessment      The pt presented to be struggling with managing her moods related to having bipolar disorder. Possibly due to work stress, she has struggled more with depression lately and has been having crying spell. She tends to benefit from having a place where, as she stated, she can be herself and not be judged.       Plan      In order to diminish symptoms of bipolar disorder and improve with mood management, she will continue with psychotropic medication as prescribed (ongoing), follow up with her physician as soon as possible for a medication consult, and continue with counseling (ongoing). She will keep resources discussed above nearby as a reminder of additional support should it be needed. She is not to make any major or big decisions in life at this point until she starts feeling better emotionally; she discussed her new position as easily, but will still be patient with herself in order to get adjusted (as soon as possible).    Maribel Hernandez, PhD, LP

## 2020-11-19 RX ORDER — ATORVASTATIN CALCIUM 20 MG/1
TABLET, FILM COATED ORAL
Qty: 30 TABLET | Refills: 5 | Status: SHIPPED | OUTPATIENT
Start: 2020-11-19 | End: 2021-06-04

## 2020-11-19 NOTE — TELEPHONE ENCOUNTER
Last OV 09/08/20  Last RF 10/21/20x0    Last Lipids 01/28/20    Ref Range & Units 9mo ago   Total Cholesterol   0 - 200 mg/dL 139    Triglycerides   0 - 150 mg/dL 127    HDL Cholesterol   40 - 60 mg/dL 57    LDL Cholesterol    0 - 100 mg/dL 57    VLDL Cholesterol   5 - 40 mg/dL 25.4    LDL/HDL Ratio  0.99

## 2020-11-22 PROCEDURE — U0004 COV-19 TEST NON-CDC HGH THRU: HCPCS | Performed by: FAMILY MEDICINE

## 2020-12-01 DIAGNOSIS — E11.42 DIABETIC PERIPHERAL NEUROPATHY (HCC): ICD-10-CM

## 2020-12-01 RX ORDER — PREGABALIN 50 MG/1
100 CAPSULE ORAL NIGHTLY
Qty: 60 CAPSULE | Refills: 2 | OUTPATIENT
Start: 2020-12-01 | End: 2021-05-10

## 2020-12-01 NOTE — TELEPHONE ENCOUNTER
PT STATES THAT THE PHARMACY CLAIMED THIS WASN'T ALREADY SENT IN BUT PATIENT THOUGHT SHE SEEN THIS IN [facility]. I DIDN'T SEE IT IN THE CHART FOR REFILL.

## 2021-01-17 PROCEDURE — U0004 COV-19 TEST NON-CDC HGH THRU: HCPCS | Performed by: NURSE PRACTITIONER

## 2021-01-25 DIAGNOSIS — E11.65 UNCONTROLLED TYPE 2 DIABETES MELLITUS WITH HYPERGLYCEMIA (HCC): ICD-10-CM

## 2021-01-26 RX ORDER — METFORMIN HYDROCHLORIDE 500 MG/1
TABLET, EXTENDED RELEASE ORAL
Qty: 60 TABLET | Refills: 2 | Status: SHIPPED | OUTPATIENT
Start: 2021-01-26 | End: 2021-05-04

## 2021-01-27 PROCEDURE — 87086 URINE CULTURE/COLONY COUNT: CPT | Performed by: FAMILY MEDICINE

## 2021-01-29 ENCOUNTER — TELEPHONE (OUTPATIENT)
Dept: URGENT CARE | Facility: CLINIC | Age: 56
End: 2021-01-29

## 2021-01-29 NOTE — TELEPHONE ENCOUNTER
Re:urine culture contamination. She can return to clinic or follow up with PCP for urinary symptoms.

## 2021-02-08 PROCEDURE — U0004 COV-19 TEST NON-CDC HGH THRU: HCPCS | Performed by: FAMILY MEDICINE

## 2021-03-01 RX ORDER — LISINOPRIL 20 MG/1
TABLET ORAL
Qty: 30 TABLET | Refills: 5 | Status: SHIPPED | OUTPATIENT
Start: 2021-03-01 | End: 2021-06-08

## 2021-03-11 PROCEDURE — U0004 COV-19 TEST NON-CDC HGH THRU: HCPCS | Performed by: NURSE PRACTITIONER

## 2021-03-12 ENCOUNTER — TELEPHONE (OUTPATIENT)
Dept: URGENT CARE | Facility: CLINIC | Age: 56
End: 2021-03-12

## 2021-03-14 ENCOUNTER — TELEPHONE (OUTPATIENT)
Dept: URGENT CARE | Facility: CLINIC | Age: 56
End: 2021-03-14

## 2021-03-14 NOTE — TELEPHONE ENCOUNTER
Advisd of negative results, pt is still having symptoms, suggested to continue quarantine and f/u as needed.

## 2021-03-19 ENCOUNTER — HOSPITAL ENCOUNTER (EMERGENCY)
Facility: HOSPITAL | Age: 56
Discharge: HOME | End: 2021-03-19
Attending: EMERGENCY MEDICINE | Admitting: EMERGENCY MEDICINE

## 2021-03-19 VITALS
SYSTOLIC BLOOD PRESSURE: 207 MMHG | WEIGHT: 223 LBS | HEIGHT: 67 IN | HEART RATE: 67 BPM | BODY MASS INDEX: 35 KG/M2 | TEMPERATURE: 98.6 F | RESPIRATION RATE: 18 BRPM | OXYGEN SATURATION: 99 % | DIASTOLIC BLOOD PRESSURE: 112 MMHG

## 2021-03-19 DIAGNOSIS — S90.822A FRICTION BLISTER OF THE FOOT, LEFT, INITIAL ENCOUNTER: ICD-10-CM

## 2021-03-19 DIAGNOSIS — L03.031 CELLULITIS OF TOE OF RIGHT FOOT: ICD-10-CM

## 2021-03-19 DIAGNOSIS — S90.821A FRICTION BLISTER OF THE FOOT, RIGHT, INITIAL ENCOUNTER: ICD-10-CM

## 2021-03-19 DIAGNOSIS — E11.42 DIABETIC POLYNEUROPATHY ASSOCIATED WITH TYPE 2 DIABETES MELLITUS (HCC): Primary | ICD-10-CM

## 2021-03-19 LAB
GLUCOSE BLDC GLUCOMTR-MCNC: 80 MG/DL (ref 70–130)
HOLD SPECIMEN: NORMAL
WHOLE BLOOD HOLD SPECIMEN: NORMAL
WHOLE BLOOD HOLD SPECIMEN: NORMAL

## 2021-03-19 PROCEDURE — 99283 EMERGENCY DEPT VISIT LOW MDM: CPT

## 2021-03-19 PROCEDURE — 82962 GLUCOSE BLOOD TEST: CPT

## 2021-03-19 RX ORDER — SULFAMETHOXAZOLE AND TRIMETHOPRIM 800; 160 MG/1; MG/1
1 TABLET ORAL 2 TIMES DAILY
Qty: 20 TABLET | Refills: 0 | OUTPATIENT
Start: 2021-03-19 | End: 2021-05-10

## 2021-03-19 NOTE — ED PROVIDER NOTES
Subjective   Patient is a 55-year-old white female who presents to the ER today with complaints of bilateral toe ulcers that have been present for 3 weeks and a new ulcer that developed 3 days ago.  Has history of diabetes, and peripheral neuropathy.  Reports that she had worn some socks that had holes in them and initially developed friction blisters on her toes, which would not heal and now the skin has peeled back and her right great toe is red.  Denies any pain as she has neuropathy.  Denies any fever, or drainage from the areas.  Got some new shoes and socks, and has been using Neosporin occasionally, but currently is wearing shoes without socks.      History provided by:  Patient  Toe Injury  Location:  Toe  Toe location:  R great toe, L little toe and R little toe  Pain details:     Quality: no pain.    Severity:  No pain    Onset quality:  Sudden    Duration:  3 weeks  Chronicity:  New  Prior injury to area:  No  Relieved by:  Nothing  Worsened by:  Activity  Ineffective treatments: Neosporin.  Associated symptoms: numbness (chronic)    Associated symptoms: no fever    Risk factors: obesity    Risk factors comment:  Diabetes, neuropathy      Review of Systems   Constitutional: Negative for fever.   Skin: Positive for color change and wound (see HPI).   Neurological: Positive for numbness (chronic peripheral ).   All other systems reviewed and are negative.      Past Medical History:   Diagnosis Date   • Arthritis     knees, otc arthritis meds prn, no h/o injections.    • Asthma     has not required inhaler   • Bilateral ovarian cysts    • Bipolar 1 disorder (CMS/HCC)    • Boil     opens them herself   • Carpal tunnel syndrome    • CKD (chronic kidney disease), symptom management only, stage 3 (moderate) (CMS/HCC)     Creatinine 1.04 GFR 56   • Colon polyp    • Depression    • Diabetes mellitus (CMS/HCC)     Diagnosed 2017, was on metformin in the past. Last A1C 7%   • Diverticulosis    • Fatigue    • GERD  (gastroesophageal reflux disease)     controlled with omeprazole 20mg. Remote EGD- esophagitis.  EGD no hiatal hernia Z line 40 cm very thick mucosal folds cannot rule out varices.  CT scan thickened fundus, no varices seen   • Headache    • History of bladder infections    • History of blood transfusion 2006 2/2 heavy menses   • History of bronchitis    • Hyperlipidemia    • Hypertension    • Lower extremity edema    • Morbid obesity with BMI of 40.0-44.9, adult (CMS/HCC)    • Nausea     wtih taking meds, avoid this by taking meds wtih milk   • Peripheral neuropathy     2/2 DM   • RLS (restless legs syndrome)    • S/P cholecystectomy     2/2 cholelithiasis   • Shortness of breath on exertion    • TIA (transient ischemic attack)     patient states 8-9 episodes of right sided drooping/ weakness/ vision changes. Workup was negative for CVA- thought to be related to anxiety. last episode 12/2017       Allergies   Allergen Reactions   • Contrast Dye Swelling     Nasal congestion/ snot, IV contrast only       Past Surgical History:   Procedure Laterality Date   • COLONOSCOPY  remote    diverticulosis   • ENDOMETRIAL ABLATION     • ENDOSCOPY  remote    esophagitis    • ENDOSCOPY N/A 8/22/2019    Procedure: ESOPHAGOGASTRODUODENOSCOPY;  Surgeon: Guido Greenberg MD;  Location:  [facility];  Service: Bariatric   • GASTRIC SLEEVE LAPAROSCOPIC N/A 8/22/2019    Procedure: GASTRIC SLEEVE LAPAROSCOPIC;  Surgeon: Guido Greenberg MD;  Location:  [facility];  Service: Bariatric   • KNEE ACL RECONSTRUCTION Right 2013    with miniscal repair   • LAPAROSCOPIC CHOLECYSTECTOMY  2001    cholelithiasis   • LAPAROSCOPIC HYSTERECTOMY  2013    right ovary remains   • PARAESOPHAGEAL HERNIA REPAIR N/A 8/22/2019    Procedure: PARAESOPHAGEAL HERNIA REPAIR LAPAROSCOPIC;  Surgeon: Guido Greenberg MD;  Location:  [facility];  Service: Bariatric   • SINUS SURGERY     • TUBAL ABDOMINAL LIGATION         Family History   Problem Relation Age  of Onset   • Arthritis Mother    • Arthritis Father    • Diabetes Father    • Hyperlipidemia Father    • Hypertension Father    • Asthma Brother    • Other Brother    • Cancer Maternal Aunt    • Depression Paternal Uncle        Social History     Socioeconomic History   • Marital status:      Spouse name: Not on file   • Number of children: Not on file   • Years of education: Not on file   • Highest education level: Not on file   Tobacco Use   • Smoking status: Never Smoker   • Smokeless tobacco: Never Used   Substance and Sexual Activity   • Alcohol use: Yes     Comment: maybe a glass 1-2   • Drug use: Yes     Types: Marijuana     Comment: about 4 years ago   • Sexual activity: Defer     Comment: no hormones           Objective   Physical Exam  Vitals and nursing note reviewed.   Constitutional:       Appearance: Normal appearance.   HENT:      Head: Normocephalic.   Cardiovascular:      Rate and Rhythm: Normal rate and regular rhythm.      Pulses:           Dorsalis pedis pulses are 2+ on the right side and 2+ on the left side.      Comments: Brisk capillary refill bilateral lower extremities  Pulmonary:      Effort: Pulmonary effort is normal.      Breath sounds: Normal breath sounds.   Musculoskeletal:      Right lower leg: No edema.      Left lower leg: No edema.   Skin:     General: Skin is warm.      Capillary Refill: Capillary refill takes less than 2 seconds.      Comments: Right great toe medial aspect with 1 cm healing ulcer with dry peeling skin surrounding.  There is mild to moderate erythema of the toe down to the base.  No lymphangitis, no swelling.  There is no drainage noted.  0.5 cm open ulceration with macerated appearing skin to the lateral aspects of the right fifth toe, and left fifth toe consistent with friction ulcer.  No drainage, no surrounding erythema, no lymphangitis or swelling noted   Neurological:      General: No focal deficit present.      Mental Status: She is alert.       "Sensory: Sensory deficit (mod deficit BLE toes-chronic) present.   Psychiatric:         Mood and Affect: Mood normal.         Behavior: Behavior normal.         Procedures           ED Course      Recent Results (from the past 24 hour(s))   Light Blue Top    Collection Time: 03/19/21 12:15 PM   Result Value Ref Range    Extra Tube hold for add-on    Green Top (Gel)    Collection Time: 03/19/21 12:15 PM   Result Value Ref Range    Extra Tube Hold for add-ons.    Lavender Top    Collection Time: 03/19/21 12:15 PM   Result Value Ref Range    Extra Tube hold for add-on    Gold Top - SST    Collection Time: 03/19/21 12:15 PM   Result Value Ref Range    Extra Tube Hold for add-ons.    Gray Top - Ice    Collection Time: 03/19/21 12:15 PM   Result Value Ref Range    Extra Tube Hold for add-ons.      Note: In addition to lab results from this visit, the labs listed above may include labs taken at another facility or during a different encounter within the last 24 hours. Please correlate lab times with ED admission and discharge times for further clarification of the services performed during this visit.    No orders to display     Vitals:    03/19/21 1205   BP: (!) 180/108   BP Location: Left arm   Patient Position: Sitting   Pulse: 73   Resp: 16   Temp: 98.6 °F (37 °C)   TempSrc: Oral   SpO2: 100%   Weight: 101 kg (223 lb)   Height: 170.2 cm (67\")     Medications - No data to display  ECG/EMG Results (last 24 hours)     ** No results found for the last 24 hours. **        No orders to display       Advised patient to keep areas clean and dry.  Advised to wash feet and dry them completely before applying the antibiotic ointment.  Instructed to take prescription antibiotics exactly as prescribed, and finish entire course of therapy.  Instructed to follow-up with primary care provider in the next 3 to 4 days or return to the ER with worsening of symptoms or new symptoms.  Also counseled on the importance of properly fitting " shoes and the use of socks.  Patient verbalized understanding of all discussed                                     MDM    Final diagnoses:   Diabetic polyneuropathy associated with type 2 diabetes mellitus (CMS/HCC)   Friction blister of the foot, left, initial encounter   Friction blister of the foot, right, initial encounter   Cellulitis of toe of right foot       ED Disposition  ED Disposition     ED Disposition Condition Comment    Discharge Stable           Sukhwinder Acosta MD  [address] [facility] [address]  [place] [address]  [phone]    In 3 days      [facility] Emergency Department  [address] [id number] [phone]    As needed, If symptoms worsen         Medication List      New Prescriptions    mupirocin 2 % ointment  Commonly known as: BACTROBAN  Apply  topically to the appropriate area as directed 3 (Three) Times a Day.     sulfamethoxazole-trimethoprim 800-160 MG per tablet  Commonly known as: BACTRIM DS,SEPTRA DS  Take 1 tablet by mouth 2 (Two) Times a Day.           Where to Get Your Medications      These medications were sent to [facility] [address] [facility] - [phone] [facility] [phone] [address] [address]    Phone: [phone]   · mupirocin 2 % ointment  · sulfamethoxazole-trimethoprim 800-160 MG per tablet          Joyce Brian, APRN  03/19/21 1335

## 2021-03-20 DIAGNOSIS — F51.04 PSYCHOPHYSIOLOGICAL INSOMNIA: ICD-10-CM

## 2021-03-20 DIAGNOSIS — F41.9 ANXIETY: ICD-10-CM

## 2021-03-22 ENCOUNTER — TELEPHONE (OUTPATIENT)
Dept: FAMILY MEDICINE CLINIC | Facility: CLINIC | Age: 56
End: 2021-03-22

## 2021-03-22 DIAGNOSIS — L08.9: Primary | ICD-10-CM

## 2021-03-22 DIAGNOSIS — S90.829D: Primary | ICD-10-CM

## 2021-03-22 RX ORDER — ALPRAZOLAM 0.5 MG/1
TABLET ORAL
Qty: 30 TABLET | Refills: 0 | Status: SHIPPED | OUTPATIENT
Start: 2021-03-22 | End: 2021-04-20

## 2021-03-22 NOTE — TELEPHONE ENCOUNTER
"Pt informed to call back each morning and check to see if there's any cancellations; I told her I would check to see if we can put in referral to podiatry for \"blisters on the feet\", she was seen at  ER on 03/19 and they gave her an atb which isn't helping.  "

## 2021-03-22 NOTE — TELEPHONE ENCOUNTER
PATIENT CALLED AND STATED THAT SHE WOULD LIKE TO BE SEEN SOONER THAN 03/30/2021 PATIENT STATED SHE WOULD LIKE A SHORT TERM DISABILITY NOTE FOR WORK DUE TOT HER FEET. PATIENT WOULD LIKE A REFERRAL TO SEE A PEDIATRIST BEFORE RETURNING TO WORK.     CALL BACK:[phone]

## 2021-03-30 ENCOUNTER — TELEPHONE (OUTPATIENT)
Dept: FAMILY MEDICINE CLINIC | Facility: CLINIC | Age: 56
End: 2021-03-30

## 2021-03-30 ENCOUNTER — OFFICE VISIT (OUTPATIENT)
Dept: FAMILY MEDICINE CLINIC | Facility: CLINIC | Age: 56
End: 2021-03-30

## 2021-03-30 VITALS
HEART RATE: 89 BPM | BODY MASS INDEX: 35.79 KG/M2 | OXYGEN SATURATION: 100 % | DIASTOLIC BLOOD PRESSURE: 96 MMHG | HEIGHT: 67 IN | WEIGHT: 228 LBS | RESPIRATION RATE: 16 BRPM | SYSTOLIC BLOOD PRESSURE: 192 MMHG

## 2021-03-30 DIAGNOSIS — F31.9 BIPOLAR 1 DISORDER (HCC): ICD-10-CM

## 2021-03-30 DIAGNOSIS — F32.A DEPRESSION, UNSPECIFIED DEPRESSION TYPE: ICD-10-CM

## 2021-03-30 DIAGNOSIS — F41.9 ANXIETY: Primary | ICD-10-CM

## 2021-03-30 DIAGNOSIS — L08.9: ICD-10-CM

## 2021-03-30 DIAGNOSIS — S90.829D: ICD-10-CM

## 2021-03-30 PROCEDURE — 99214 OFFICE O/P EST MOD 30 MIN: CPT | Performed by: FAMILY MEDICINE

## 2021-04-05 ENCOUNTER — OFFICE VISIT (OUTPATIENT)
Dept: PSYCHIATRY | Facility: CLINIC | Age: 56
End: 2021-04-05

## 2021-04-05 DIAGNOSIS — Z91.51 H/O: ATTEMPTED SUICIDE: ICD-10-CM

## 2021-04-05 DIAGNOSIS — R45.4 IRRITABLE: ICD-10-CM

## 2021-04-05 DIAGNOSIS — F31.9 BIPOLAR 1 DISORDER (HCC): Primary | ICD-10-CM

## 2021-04-05 PROCEDURE — 90837 PSYTX W PT 60 MINUTES: CPT | Performed by: PSYCHOLOGIST

## 2021-04-05 NOTE — PROGRESS NOTES
PROGRESS NOTE    Data:  Marcela Ramírez is a 55 y.o. female who met with the undersigned for a scheduled individual outpatient therapy session from 8:00 - 9:00am.       Clinical Maneuvering/Intervention:      The pt talked about feeling very upset lately and having melt-downs. She talked very quickly, was redirectable, but redirection was needed numerous times in session. She was tearful at one point, when talking about feeling irritable, stressed, and angry. The pt said that she has attempted suicide recently and so most of the session was processing this, evaluating her extensiveness in planning, and coming up with a specific safety plan for her today. She denied being suicidal towards the end of the session, however, a plan was still delineated. Should she feel suicidal again, she will [facility]. She will call the number provided and start her check-in process that way, before heading over and checking in. If she is not suicidal, but still quite overwhelmed, she will use the walk-in urgent care [facility] which that information was provided as well. If she just wants to talk to someone, she will used the provided 24 suicide prevention hotline. She will also be working with her prescriber in order to adjust her medications in order to better manage mood. As far as stress at home, the pt will discuss having her own space at home, setting up the spare room just for her, a thing she discussed with him in the past, but has not yet done. Fears and guilt of doing this were ameliorated. Follow up for counseling was discussed. Issues related to her continuing with medication, reaching out for help, being open/honest, etc were highlighted for her towards the end of session. The pt expressed a lot of gratitude for today's session.    Mental Status Exam  Hygiene:  good  Dress: normal  Attitude:  cooperative and proactive  Motor Activity: fidgety  Speech: tangential and pressured  Mood:   tense/labile  Affect:  congruent  Thought Processes: normal  Thought Content:  normal  Suicidal Thoughts:  denied  Homicidal Thoughts:  not endorsed  Crisis Safety Plan: established today in detail  Hallucinations:  none      Patient's Support Network Includes:  family, friends      Progress toward goal: there is evidence to suggest that she had a setback with worsening symptoms of depression related to bipolar disorder       Functional Status: moderate       Prognosis: good    Assessment      The pt presented to be struggling with managing her moods related to having bipolar disorder. Several stressors have exacerbated her symptoms. Medication adjustment/increase seem needed at this time. She seemed quite open and grateful for the plan set today to help her feel better and prevent relapse of suicidal thoughts.        Plan      In order to diminish symptoms of bipolar disorder and improve with mood management, she will continue with psychotropic medication as prescribed (ongoing), follow up with her physician as soon as possible for a medication consult, and continue with counseling (ongoing). She will perform the specific tasks listed above in order to prevent relapse of suicidal ideation and act on the plan as noted should she feel suicidal again (immediately).    Maribel Hernandez, PhD, LP

## 2021-04-09 NOTE — TELEPHONE ENCOUNTER
Patient is aware of medication and will make an appt   Well-controlled, continue current treatment plan, lifestyle modifications recommended and medication adherence emphasized

## 2021-04-12 ENCOUNTER — OFFICE VISIT (OUTPATIENT)
Dept: PSYCHIATRY | Facility: CLINIC | Age: 56
End: 2021-04-12

## 2021-04-12 DIAGNOSIS — F31.9 BIPOLAR 1 DISORDER (HCC): Primary | ICD-10-CM

## 2021-04-12 PROCEDURE — 90834 PSYTX W PT 45 MINUTES: CPT | Performed by: PSYCHOLOGIST

## 2021-04-12 NOTE — PROGRESS NOTES
PROGRESS NOTE    Data:  Marcela Ramírez is a 56 y.o. female who met with the undersigned for a scheduled individual outpatient therapy session from 8: 46 - 9:30am.      Clinical Maneuvering/Intervention:      The pt talked about feeling irritable/having mood swings. She talked at a fast pace and somewhat tangentially, but with less pressure than the previous session. She explained that her job could be stressful, but home life is stressful too. The pt talked about feeling easily overwhelmed/distracted. Stressors were processed individually and in detail. Venting of frustrations was conducted in order to help the pt feel less tense emotionally and gain insight into issues. Feelings were processed and validated several times in session. She was assisted with talking out solutions to current stressors. She was assisted with identifying specific needs, such as an increase or alteration of medication to help her better manage moods. The pt was assisted in recognizing progress in order to show encouragement and promote motivation to keep making positive changes in life. She achieved making her own space at home, or having a room to herself where she can go and relax. Her mood has improved some as has her hope for live improving still. She has done well getting back on track with weight loss by having healthier foods, eating in a way that has helped her lose weight in the past, combined with having weight loss surgery in 2019. Perspective taking was conducted multiple times in order to help her feel less stuck, less overwhelmed, and see challenges as much more manageable. Homework was assigned tailored to pt's needs (medication consultation follow up, see if eating better helps with mood). The pt expressed gratitude for today's session.     Mental Status Exam  Hygiene:  good  Dress: normal  Attitude:  cooperative and proactive  Motor Activity: fidgety  Speech: rapid  Mood:  tense/labile  Affect:  congruent  Thought  Processes: normal  Thought Content:  normal  Suicidal Thoughts:  not endorsed  Homicidal Thoughts:  not endorsed  Crisis Safety Plan: not needed  Hallucinations:  none      Patient's Support Network Includes:  family, friends      Progress toward goal: there is evidence to suggest that she struggles with mood swings/mood management      Functional Status: moderate       Prognosis: good    Assessment      The pt presented to be struggling with managing her moods related to having bipolar disorder. She has good insight into her issues, many strong coping skills for stress, and seems to be making a positive turn in her health as she is eating in a healthier way as of late.        Plan      In order to diminish symptoms of bipolar disorder and improve with mood management, she will continue with psychotropic medication as prescribed (ongoing), follow up with her physician as soon as possible for a medication consult, and continue with counseling (ongoing). She will do the homework discussed today and detailed above in terms of eating in a healthier manner (ongoing).    Maribel Hernandez, PhD, LP

## 2021-04-19 ENCOUNTER — OFFICE VISIT (OUTPATIENT)
Dept: PSYCHIATRY | Facility: CLINIC | Age: 56
End: 2021-04-19

## 2021-04-19 ENCOUNTER — TELEPHONE (OUTPATIENT)
Dept: FAMILY MEDICINE CLINIC | Facility: CLINIC | Age: 56
End: 2021-04-19

## 2021-04-19 DIAGNOSIS — F31.9 BIPOLAR 1 DISORDER (HCC): Primary | ICD-10-CM

## 2021-04-19 DIAGNOSIS — Z91.51 H/O: ATTEMPTED SUICIDE: ICD-10-CM

## 2021-04-19 PROCEDURE — 90834 PSYTX W PT 45 MINUTES: CPT | Performed by: PSYCHOLOGIST
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
## 2021-04-20 DIAGNOSIS — F41.9 ANXIETY: ICD-10-CM

## 2021-04-20 DIAGNOSIS — F51.04 PSYCHOPHYSIOLOGICAL INSOMNIA: ICD-10-CM

## 2021-04-20 RX ORDER — ALPRAZOLAM 0.5 MG/1
TABLET ORAL
Qty: 30 TABLET | Refills: 0 | Status: SHIPPED | OUTPATIENT
Start: 2021-04-20 | End: 2021-05-26 | Stop reason: SDUPTHER

## 2021-04-26 ENCOUNTER — OFFICE VISIT (OUTPATIENT)
Dept: PSYCHIATRY | Facility: CLINIC | Age: 56
End: 2021-04-26

## 2021-04-26 DIAGNOSIS — F31.9 BIPOLAR 1 DISORDER (HCC): Primary | ICD-10-CM

## 2021-04-26 PROCEDURE — 90834 PSYTX W PT 45 MINUTES: CPT | Performed by: PSYCHOLOGIST
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
## 2021-05-04 DIAGNOSIS — E11.65 UNCONTROLLED TYPE 2 DIABETES MELLITUS WITH HYPERGLYCEMIA (HCC): ICD-10-CM

## 2021-05-04 RX ORDER — METFORMIN HYDROCHLORIDE 500 MG/1
TABLET, EXTENDED RELEASE ORAL
Qty: 60 TABLET | Refills: 1 | Status: SHIPPED | OUTPATIENT
Start: 2021-05-04 | End: 2021-07-06

## 2021-05-10 PROCEDURE — U0004 COV-19 TEST NON-CDC HGH THRU: HCPCS | Performed by: FAMILY MEDICINE

## 2021-05-11 ENCOUNTER — TELEPHONE (OUTPATIENT)
Dept: URGENT CARE | Facility: CLINIC | Age: 56
End: 2021-05-11

## 2021-05-11 DIAGNOSIS — R05.9 COUGH: Primary | ICD-10-CM

## 2021-05-11 RX ORDER — BENZONATATE 100 MG/1
200 CAPSULE ORAL 3 TIMES DAILY PRN
Qty: 20 CAPSULE | Refills: 0 | Status: SHIPPED | OUTPATIENT
Start: 2021-05-11 | End: 2021-06-08

## 2021-05-11 NOTE — TELEPHONE ENCOUNTER
Patient calls concerned of harsh cough overnight.  She was seen yesterday and tested positive for strep throat infection.  COVID-19 test results negative.    New Rx sent to patient's pharmacy for Tessalon Perles 200 mg 3 times daily as needed for cough.  Follow-up with primary care if symptoms do not improve.  Patient agrees and verbalizes plan of care.

## 2021-05-13 ENCOUNTER — TELEPHONE (OUTPATIENT)
Dept: NEUROLOGY | Facility: CLINIC | Age: 56
End: 2021-05-13

## 2021-05-13 DIAGNOSIS — E11.42 DIABETIC PERIPHERAL NEUROPATHY (HCC): ICD-10-CM

## 2021-05-13 RX ORDER — PREGABALIN 50 MG/1
100 CAPSULE ORAL NIGHTLY
Qty: 60 CAPSULE | Refills: 2 | Status: SHIPPED | OUTPATIENT
Start: 2021-05-13 | End: 2021-07-20

## 2021-05-13 NOTE — TELEPHONE ENCOUNTER
Provider: RUPALI LOZANO PA-C  Caller: MARIIA VIERA  Relationship to Patient: SELF  Pharmacy: [facility]    Reason for Call: PT HAS BEEN OUT OF LYRICA 50 MG Take 2 capsules by mouth Every Night.SHE IS WANTING TO SEE IF RUPALI WOULD PUT IN A PRESCRIPTION UNTIL SHE CAN SEE HER IN July 20-21  When was the patient last seen: 7-      PLEASE CALL HER BACK -[phone]

## 2021-05-24 DIAGNOSIS — F51.04 PSYCHOPHYSIOLOGICAL INSOMNIA: ICD-10-CM

## 2021-05-24 DIAGNOSIS — F41.9 ANXIETY: ICD-10-CM

## 2021-05-26 ENCOUNTER — TELEPHONE (OUTPATIENT)
Dept: FAMILY MEDICINE CLINIC | Facility: CLINIC | Age: 56
End: 2021-05-26

## 2021-05-26 DIAGNOSIS — F51.04 PSYCHOPHYSIOLOGICAL INSOMNIA: ICD-10-CM

## 2021-05-26 DIAGNOSIS — F41.9 ANXIETY: ICD-10-CM

## 2021-05-26 RX ORDER — ALPRAZOLAM 0.5 MG/1
TABLET ORAL
Qty: 30 TABLET | Refills: 0 | OUTPATIENT
Start: 2021-05-26

## 2021-05-26 RX ORDER — ALPRAZOLAM 0.5 MG/1
0.5 TABLET ORAL NIGHTLY PRN
Qty: 30 TABLET | Refills: 0 | Status: SHIPPED | OUTPATIENT
Start: 2021-05-26 | End: 2021-06-17 | Stop reason: SDUPTHER

## 2021-06-07 RX ORDER — ATORVASTATIN CALCIUM 20 MG/1
TABLET, FILM COATED ORAL
Qty: 30 TABLET | Refills: 2 | Status: SHIPPED | OUTPATIENT
Start: 2021-06-07 | End: 2021-09-10

## 2021-06-08 ENCOUNTER — OFFICE VISIT (OUTPATIENT)
Dept: FAMILY MEDICINE CLINIC | Facility: CLINIC | Age: 56
End: 2021-06-08

## 2021-06-08 VITALS
SYSTOLIC BLOOD PRESSURE: 168 MMHG | DIASTOLIC BLOOD PRESSURE: 104 MMHG | HEART RATE: 83 BPM | TEMPERATURE: 96.9 F | OXYGEN SATURATION: 99 % | WEIGHT: 241.8 LBS | RESPIRATION RATE: 14 BRPM | BODY MASS INDEX: 37.95 KG/M2 | HEIGHT: 67 IN

## 2021-06-08 DIAGNOSIS — F51.01 PRIMARY INSOMNIA: ICD-10-CM

## 2021-06-08 DIAGNOSIS — F41.1 GAD (GENERALIZED ANXIETY DISORDER): ICD-10-CM

## 2021-06-08 DIAGNOSIS — I10 ESSENTIAL HYPERTENSION: Primary | ICD-10-CM

## 2021-06-08 PROCEDURE — 99214 OFFICE O/P EST MOD 30 MIN: CPT | Performed by: PHYSICIAN ASSISTANT

## 2021-06-17 ENCOUNTER — OFFICE VISIT (OUTPATIENT)
Dept: FAMILY MEDICINE CLINIC | Facility: CLINIC | Age: 56
End: 2021-06-17

## 2021-06-17 VITALS
DIASTOLIC BLOOD PRESSURE: 100 MMHG | OXYGEN SATURATION: 98 % | WEIGHT: 245.2 LBS | BODY MASS INDEX: 38.48 KG/M2 | SYSTOLIC BLOOD PRESSURE: 140 MMHG | HEIGHT: 67 IN | RESPIRATION RATE: 15 BRPM | TEMPERATURE: 97.2 F | HEART RATE: 83 BPM

## 2021-06-17 DIAGNOSIS — F41.9 ANXIETY: ICD-10-CM

## 2021-06-17 DIAGNOSIS — I10 ESSENTIAL HYPERTENSION: Primary | ICD-10-CM

## 2021-06-17 DIAGNOSIS — F51.01 PRIMARY INSOMNIA: ICD-10-CM

## 2021-06-17 DIAGNOSIS — F51.04 PSYCHOPHYSIOLOGICAL INSOMNIA: ICD-10-CM

## 2021-06-17 PROCEDURE — 99214 OFFICE O/P EST MOD 30 MIN: CPT | Performed by: PHYSICIAN ASSISTANT

## 2021-06-17 RX ORDER — HYDROCHLOROTHIAZIDE 25 MG/1
25 TABLET ORAL DAILY
Qty: 30 TABLET | Refills: 11 | Status: SHIPPED | OUTPATIENT
Start: 2021-06-17 | End: 2022-07-05

## 2021-06-17 RX ORDER — ALPRAZOLAM 0.5 MG/1
0.5 TABLET ORAL NIGHTLY PRN
Qty: 30 TABLET | Refills: 0 | Status: SHIPPED | OUTPATIENT
Start: 2021-06-17 | End: 2021-06-21 | Stop reason: DRUGHIGH

## 2021-06-17 NOTE — PROGRESS NOTES
Subjective   Marcela Ramírez is a 56 y.o. female  Hypertension (1 week follow up), Anxiety, and Insomnia      History of Present Illness  Patient is a pleasant 56-year-old white female who comes in for follow-up hypertension blood pressure is somewhat improved but still running 140/100 patient states has been running higher at home no shortness of breath no chest pain patient been trying to watch her salt intake and exercise    Patient states increasing Xanax 1 mg at bedtime to help symptomatically sleeping better feels much better anxiety is improved needs refill no SI/HI  The following portions of the patient's history were reviewed and updated as appropriate: allergies, current medications, past social history and problem list    Review of Systems   Constitutional: Negative for appetite change, diaphoresis, fatigue and unexpected weight change.   Eyes: Negative for visual disturbance.   Respiratory: Negative for cough, chest tightness and shortness of breath.    Cardiovascular: Negative for chest pain, palpitations and leg swelling.   Gastrointestinal: Negative for diarrhea, nausea and vomiting.   Endocrine: Negative for polydipsia, polyphagia and polyuria.   Skin: Negative for color change and rash.   Neurological: Negative for dizziness, syncope, weakness, light-headedness, numbness and headaches.       Objective     Vitals:    06/17/21 1250   BP: 140/100   Pulse: 83   Resp: 15   Temp: 97.2 °F (36.2 °C)   SpO2: 98%       Physical Exam  Vitals and nursing note reviewed.   Constitutional:       Appearance: She is well-developed. She is not diaphoretic.   Neck:      Thyroid: No thyromegaly.      Vascular: No JVD.   Cardiovascular:      Rate and Rhythm: Normal rate and regular rhythm.      Pulses: Normal pulses.      Heart sounds: Normal heart sounds. No murmur heard.     Pulmonary:      Effort: Pulmonary effort is normal. No respiratory distress.      Breath sounds: Normal breath sounds.   Abdominal:       General: Bowel sounds are normal.      Palpations: Abdomen is soft.      Tenderness: There is no abdominal tenderness.   Musculoskeletal:      Cervical back: Neck supple.   Lymphadenopathy:      Cervical: No cervical adenopathy.   Skin:     General: Skin is warm and dry.   Neurological:      Sensory: No sensory deficit.         Assessment/Plan     Diagnoses and all orders for this visit:    1. Essential hypertension (Primary)  -     hydroCHLOROthiazide (HYDRODIURIL) 25 MG tablet; Take 1 tablet by mouth Daily.  Dispense: 30 tablet; Refill: 11    2. Primary insomnia    3. Anxiety    Increase Xanax to 1 mg nightly dispense 30x5 refills     follow-up with Dr. Duke on blood pressure and insomnia

## 2021-06-21 ENCOUNTER — OFFICE VISIT (OUTPATIENT)
Dept: PSYCHIATRY | Facility: CLINIC | Age: 56
End: 2021-06-21

## 2021-06-21 DIAGNOSIS — F31.9 BIPOLAR 1 DISORDER (HCC): Primary | ICD-10-CM

## 2021-06-21 DIAGNOSIS — F51.01 PRIMARY INSOMNIA: Primary | ICD-10-CM

## 2021-06-21 DIAGNOSIS — R45.89 FEELING SAD: ICD-10-CM

## 2021-06-21 DIAGNOSIS — R45.4 IRRITABLE: ICD-10-CM

## 2021-06-21 DIAGNOSIS — F10.20 ALCOHOLISM (HCC): ICD-10-CM

## 2021-06-21 PROCEDURE — 90834 PSYTX W PT 45 MINUTES: CPT | Performed by: PSYCHOLOGIST

## 2021-06-21 RX ORDER — ALPRAZOLAM 1 MG/1
1 TABLET ORAL NIGHTLY
Qty: 30 TABLET | Refills: 5 | Status: SHIPPED | OUTPATIENT
Start: 2021-06-21 | End: 2022-02-15 | Stop reason: SDUPTHER

## 2021-06-21 NOTE — PROGRESS NOTES
PROGRESS NOTE    Data:  Marcela Ramírez is a 56 y.o. female who met with the undersigned for a scheduled individual outpatient therapy session from 1:46 - 2:30 pm.      Clinical Maneuvering/Intervention:      The pt talked about feeling distressed, mainly about family issues. She talked rapidly and tangentially, mainly about her two youngest children and their 'poor' decisions in life. She was tearful at one point and was fidgety in session. Stressors were processed individually and in detail. Venting of frustrations was conducted in order to help the pt feel less tense emotionally and gain insight into issues. Feelings were processed and validated several times in session. Perspective taking was conducted multiple times in order to help her feel less stuck, less overwhelmed, and see challenges as much more manageable. Healthy boundaries were discussed in terms of what they look like, how to improve upon them, and how keeping healthy boundaries not only helps diminish the pt's distress but also helps the other person over time. It was noted that medication may need to be adjusted and going back to the [facility] may not be a bad idea. The session was shifted some, in terms of identifying other copings skills: stopping drinking alcohol, being better to herself, and 'letting go of the rope' when it comes her her grown children's decisions in life. Homework was assigned tailored to pt's needs. The pt expressed gratitude for today's session.    Mental Status Exam  Hygiene:  good  Dress: normal  Attitude:  cooperative and proactive  Motor Activity: fidgety  Speech: rapid  Mood:  tense  Affect:  congruent  Thought Processes: normal  Thought Content:  normal  Suicidal Thoughts:  not endorsed  Homicidal Thoughts:  not endorsed  Crisis Safety Plan: not needed  Hallucinations:  none      Patient's Support Network Includes:  family, friends      Progress toward goal: there is evidence to suggest that she struggles  with mood swings/mood management      Functional Status: moderate       Prognosis: good    Assessment      The pt presented to be struggling with managing her moods related to having bipolar disorder. Moods have been a little more stable as of late, but she still feels down and drained. She has good insight into her issues, many strong coping skills for stress, and seems to be making a positive turn in her health as she is eating in a healthier way as of late. She expressed drinking again, hiding it, but not being sure if she really wants to quit drinking.       Plan      In order to diminish symptoms of bipolar disorder and improve with mood management, she will continue with psychotropic medication as prescribed (ongoing), continue with counseling (ongoing), and make a point to do what was discussed in terms of 'letting go of the rope' with others she cannot control (this week, ongoing as needed). She will quite drinking.    Maribel Hernandez, PhD, LP

## 2021-06-21 NOTE — TELEPHONE ENCOUNTER
Patient was seen last week by Demarco and Xanax was supposed to be increased to 1mg but 0.5mg was sent in. It is pending for you to send..

## 2021-06-28 ENCOUNTER — TELEPHONE (OUTPATIENT)
Dept: BARIATRICS/WEIGHT MGMT | Facility: CLINIC | Age: 56
End: 2021-06-28

## 2021-07-03 DIAGNOSIS — E11.65 UNCONTROLLED TYPE 2 DIABETES MELLITUS WITH HYPERGLYCEMIA (HCC): ICD-10-CM

## 2021-07-06 RX ORDER — METFORMIN HYDROCHLORIDE 500 MG/1
TABLET, EXTENDED RELEASE ORAL
Qty: 60 TABLET | Refills: 5 | Status: SHIPPED | OUTPATIENT
Start: 2021-07-06 | End: 2022-01-31

## 2021-07-12 ENCOUNTER — OFFICE VISIT (OUTPATIENT)
Dept: PSYCHIATRY | Facility: CLINIC | Age: 56
End: 2021-07-12

## 2021-07-12 ENCOUNTER — TRANSCRIBE ORDERS (OUTPATIENT)
Dept: FAMILY MEDICINE CLINIC | Facility: CLINIC | Age: 56
End: 2021-07-12

## 2021-07-12 ENCOUNTER — TELEPHONE (OUTPATIENT)
Dept: FAMILY MEDICINE CLINIC | Facility: CLINIC | Age: 56
End: 2021-07-12

## 2021-07-12 DIAGNOSIS — F31.9 BIPOLAR 1 DISORDER (HCC): Primary | ICD-10-CM

## 2021-07-12 DIAGNOSIS — N63.15 BREAST LUMP ON RIGHT SIDE AT 9 O'CLOCK POSITION: Primary | ICD-10-CM

## 2021-07-12 DIAGNOSIS — R45.89 FEELING SAD: ICD-10-CM

## 2021-07-12 PROCEDURE — 90834 PSYTX W PT 45 MINUTES: CPT | Performed by: PSYCHOLOGIST

## 2021-07-12 NOTE — PROGRESS NOTES
PROGRESS NOTE    Data:  Marcela Ramírez is a 56 y.o. female who met with the undersigned for a scheduled individual outpatient therapy session from 10:16 - 11:00am.      Clinical Maneuvering/Intervention:      The pt talked about struggling with mood swings, work stress, her son who (per pt) abuses substances, and feeling badly about herself. Self-esteem was a focus of the session. Stressors were processed individually and in detail. Venting of frustrations was conducted in order to help the pt feel less tense emotionally and gain insight into issues. Feelings were processed and validated several times in session. The pt was assisted in addressing several areas of life, but also assisted in evaluated 'what is working,' in terms of mood management. Perspective taking was conducted multiple times in order to help her feel less stuck, less overwhelmed, and see challenges as much more manageable. Maladaptive thought patterns were identified, challenged, and evaluated for validity in order to allow for the pt to chose different and more adaptive ways of thinking. The pt was assisted in recognizing progress in order to show encouragement and promote motivation to keep making positive changes in life. She is working more hours, being open/vulnerable in counseling and with her boyfriend, and is planning to see a psychiatrist for medication (though she has had an increase in anxiety meds, which was noted as helpful). She was tearful when talking about intimacy problems with her boyfriend and blamed herself quite a bit for the problem. She was assisted with seeing a remedy and in the bigger picture, seeing how she does not need to be so hard on herself. The technique of 'no longer believing the lies we tell ourselves' was taught to her and practiced in session.  Homework was assigned tailored to pt's needs. The pt expressed gratitude for today's session.    Mental Status Exam  Hygiene:  good  Dress: normal  Attitude:   cooperative and proactive  Motor Activity: fidgety  Speech: rapid  Mood:  tense  Affect:  congruent  Thought Processes: normal  Thought Content:  normal  Suicidal Thoughts:  not endorsed  Homicidal Thoughts:  not endorsed  Crisis Safety Plan: not needed  Hallucinations:  none      Patient's Support Network Includes:  family, friends      Progress toward goal: there is evidence to suggest that she struggles with mood swings/mood management      Functional Status: moderate       Prognosis: good    Assessment      The pt presented to be struggling with managing her moods related to having bipolar disorder. Moods have been a little more stable as of late, but a medicaiton consult is still appropriate. She has good insight into her issues, many strong coping skills for stress, and seems to be making a positive turn in her health as she is eating in a healthier way as of late. She expressed drinking again, hiding it, but not being sure if she really wants to quit drinking.       Plan      In order to diminish symptoms of bipolar disorder and improve with mood management, she will continue with psychotropic medication as prescribed (ongoing), continue with counseling (ongoing), and look into having a medication consult (as soon as feasible). Also, she will practice the technique of 'no longer believing the lies we tell ourselves' as conducted in session today (ongoing).     Maribel Hernandez, PhD, LP

## 2021-07-19 ENCOUNTER — OFFICE VISIT (OUTPATIENT)
Dept: PSYCHIATRY | Facility: CLINIC | Age: 56
End: 2021-07-19

## 2021-07-19 DIAGNOSIS — F10.20 ALCOHOLISM (HCC): ICD-10-CM

## 2021-07-19 DIAGNOSIS — F31.9 BIPOLAR 1 DISORDER (HCC): Primary | ICD-10-CM

## 2021-07-19 PROCEDURE — 90834 PSYTX W PT 45 MINUTES: CPT | Performed by: PSYCHOLOGIST

## 2021-07-19 NOTE — PROGRESS NOTES
PROGRESS NOTE    Data:  Marcela Ramírez is a 56 y.o. female who met with the undersigned for a scheduled individual outpatient therapy session from 11:00 - 11:50am.      Clinical Maneuvering/Intervention:      The pt talked about struggling with mood swings, work stress, and (per pt) poor choices her children are making. Stressors were processed individually and in detail. Venting of frustrations was conducted in order to help the pt feel less tense emotionally and gain insight into issues. Feelings were processed and validated several times in session. It was noted that many times in session, when asked about herself, she tended to talk about others (e.g., children). She was directed back towards herself, while the difficulty in doing so was normalized. She was open about her drinking alcohol and that she often drinks more than she intended/has a drinking problem. Education about quitting drinking, including attending Alcoholics Anonymous (AA), was provided. What to expect at a meeting, how to not let things/thoughts prevent her from going, and reasons to get sober were discussed. Perspective taking was conducted multiple times in order to help her feel less stuck, less overwhelmed, and see challenges as much more manageable. Groups support was noted as an advantage of attending AA. The pt was assisted with coming to her own solutions in session as well. She was able to note that she can be co-dependent on others, but that she is tired of this and wants to set better boundaries with others. She is tired over being overweight and recognized that overeating is related to stress of worry about her children. The pt's strengths were identified in order to help identify abilities to use to better face/overcome challenges. Homework was assigned tailored to pt's needs. The pt expressed gratitude for today's session.     Mental Status Exam  Hygiene:  good  Dress: normal  Attitude:  cooperative and proactive  Motor  Activity: fidgety  Speech: rapid  Mood:  slightly tense  Affect:  congruent  Thought Processes: normal  Thought Content:  normal  Suicidal Thoughts:  not endorsed  Homicidal Thoughts:  not endorsed  Crisis Safety Plan: not needed  Hallucinations:  none      Patient's Support Network Includes:  family, friends      Progress toward goal: there is evidence to suggest that she struggles with mood swings/mood management      Functional Status: moderate       Prognosis: good    Assessment      The pt presented to be struggling with managing her moods related to having bipolar disorder. She admits to struggling to quit drinking alcohol, but was open to discussing a recovery program (Alcoholics Anonymous, AA).       Plan      In order to diminish symptoms of bipolar disorder and improve with mood management, she will continue with psychotropic medication as prescribed (ongoing), continue with counseling (ongoing), and attend one AA meeting (as soon as feasible). She will practice boundary setting with her children (ongoing).    Maribel Hernandez, PhD, LP

## 2021-07-20 ENCOUNTER — OFFICE VISIT (OUTPATIENT)
Dept: NEUROLOGY | Facility: CLINIC | Age: 56
End: 2021-07-20

## 2021-07-20 ENCOUNTER — LAB (OUTPATIENT)
Dept: LAB | Facility: HOSPITAL | Age: 56
End: 2021-07-20

## 2021-07-20 VITALS
OXYGEN SATURATION: 97 % | HEART RATE: 78 BPM | WEIGHT: 245 LBS | BODY MASS INDEX: 38.45 KG/M2 | HEIGHT: 67 IN | DIASTOLIC BLOOD PRESSURE: 100 MMHG | SYSTOLIC BLOOD PRESSURE: 180 MMHG

## 2021-07-20 DIAGNOSIS — E11.42 DIABETIC PERIPHERAL NEUROPATHY (HCC): ICD-10-CM

## 2021-07-20 DIAGNOSIS — F31.9 BIPOLAR 1 DISORDER (HCC): Primary | ICD-10-CM

## 2021-07-20 LAB — HBA1C MFR BLD: 5.5 % (ref 4.8–5.6)

## 2021-07-20 PROCEDURE — 36415 COLL VENOUS BLD VENIPUNCTURE: CPT

## 2021-07-20 PROCEDURE — 83036 HEMOGLOBIN GLYCOSYLATED A1C: CPT

## 2021-07-20 PROCEDURE — 99215 OFFICE O/P EST HI 40 MIN: CPT | Performed by: PHYSICIAN ASSISTANT

## 2021-07-20 RX ORDER — PREGABALIN 50 MG/1
50 CAPSULE ORAL 3 TIMES DAILY
Qty: 90 CAPSULE | Refills: 5 | Status: SHIPPED | OUTPATIENT
Start: 2021-07-20 | End: 2022-01-04 | Stop reason: SDUPTHER

## 2021-07-20 RX ORDER — LISINOPRIL 20 MG/1
TABLET ORAL
COMMUNITY
Start: 2021-07-13 | End: 2021-09-10

## 2021-07-20 NOTE — PROGRESS NOTES
"Subjective       Chief Complaint: numbness, paresthesias, pain       History of Present Illness   Marcela Ramírez is a 56 y.o. female with a history of DM who comes to clinic today for evaluation of neuropathy. She initially noted symptoms in 2015 marked by numbness, paresthesias, and shooting pain in her upper and lower extremities bilaterally. This has worsened over time. She notes associated balance impairment as well as decreased  strength, though denies any clear associated weakness. Her symptoms are worse with increased activity. She is currently taking Lyrica, which is somewhat beneficial. She has previously taken GBP.     She also notes RLS symptoms in her feet primarily at night. She is currently taking neupro 1mg daily. She previously tried ropinirole.      Prior evaluation has included an EMG of the upper and lower extremities bilaterally which was notable for a moderate axonal and demyelinating peripheral neuropathy, moderate CTS bilaterally, mild-moderate ulnar neuropathy on the left and mild ulnar neuropathy on the right. Screening bloodwork was notable for a hemoglobin A1C of 7.3.     Today: Since her last visit in 7/20, she feels that her RLS remains controlled. Her lower extremity pain and numbness has worsened and is singificnatly affecting her balance. She reports ongoing depression, for which she is working with Dr. Hernandez. She is drinking EtOH regularly.       I have reviewed and confirmed the past family, social and medical history as accurate on 7/20/21.     Review of Systems   Constitutional: Negative.    HENT: Negative.    Eyes: Negative.    Respiratory: Negative.    Cardiovascular: Negative.    Gastrointestinal: Negative.    Endocrine: Negative.    Genitourinary: Negative.    Musculoskeletal: Negative.    Skin: Negative.    Allergic/Immunologic: Negative.    Hematological: Negative.        Objective     /100   Pulse 78   Ht 170.2 cm (67.01\")   Wt 111 kg (245 lb)   LMP  (LMP " Unknown)   SpO2 97%   BMI 38.36 kg/m²     General appearance today is normal.       Physical Exam  Neurological:      Coordination: Finger-Nose-Finger Test normal.      Deep Tendon Reflexes: Strength normal.   Psychiatric:         Speech: Speech normal.          Neurologic Exam     Mental Status   Speech: speech is normal   Level of consciousness: alert  Normal comprehension.     Cranial Nerves   Cranial nerves II through XII intact.     Motor Exam   Muscle bulk: normal  Overall muscle tone: normal    Strength   Strength 5/5 throughout.     Gait, Coordination, and Reflexes     Coordination   Finger to nose coordination: normal    Tremor   Resting tremor: absent          Assessment/Plan   Diagnoses and all orders for this visit:    1. Bipolar 1 disorder (CMS/HCC) (Primary)  -     Ambulatory Referral to Psychiatry    2. Diabetic peripheral neuropathy (CMS/HCC)  -     Hemoglobin A1c; Future  -     Ambulatory Referral to Endocrinology          Discussion/Summary   Marcela Ramírez returns to clinic today with a history of diabetic peripheral neuropathy, carpal tunnel syndrome, and restless leg syndrome. I again reviewed her current status and treatment options. It was elected to obtain screening blood work . After discussing potential treatment options, it was elected to increase her Lyrica to 50mg qAM/100mg qHS. I have also made a referral to endocrinology for her uncontrolled DM and psychiatry for her history of Bipolar Disorder. Additionally, I counseled her on the importance of alcohol cessation and tight glucose control. She will then follow up in 6 months, or sooner if needed.   Total time of visit today: 40 minutes. As part of this visit I discussed the history with the patient . I also discussed diagnosis, prognosis, diagnostic testing, evaluation, current status, treatment options and management as discussed above.         Gladys Wilder PA-C

## 2021-08-02 ENCOUNTER — OFFICE VISIT (OUTPATIENT)
Dept: PSYCHIATRY | Facility: CLINIC | Age: 56
End: 2021-08-02

## 2021-08-02 DIAGNOSIS — F10.20 ALCOHOLISM (HCC): ICD-10-CM

## 2021-08-02 DIAGNOSIS — R45.4 IRRITABLE: ICD-10-CM

## 2021-08-02 DIAGNOSIS — F31.9 BIPOLAR 1 DISORDER (HCC): Primary | ICD-10-CM

## 2021-08-02 PROCEDURE — 90834 PSYTX W PT 45 MINUTES: CPT | Performed by: PSYCHOLOGIST

## 2021-08-02 NOTE — PROGRESS NOTES
PROGRESS NOTE    Data:  Marcela Ramírez is a 56 y.o. female who met with the undersigned for a scheduled individual outpatient therapy session from 11:00 - 11:50am.      Clinical Maneuvering/Intervention:      The pt talked about struggling with mood swings, family conflicts, and poor/impaired self-esteem. She admitted to drinking alcohol, using it a times to feel better after feeling stressed and/or upset. Stressors were processed individually and in detail. Venting of frustrations was conducted in order to help the pt feel less tense emotionally and gain insight into issues. Feelings were processed and validated several times in session. Education about alcoholism was provided. The process and phases of getting 'sober' were discussed in detail today: knowing if you have a problem, knowing if you are an alcoholic, and identifying when to reach out for help (work a program of recovery). She was commended for her honesty today and several aspects of hope in getting sober were provided, as were the negative consequences that may happen if she continues to drink. Dealing with stressors by identifying her own role in them (e.g., the cause or potential solution in the matter) were discussed. Perspective taking was conducted multiple times in order to help her feel less stuck, less overwhelmed, and see challenges as much more manageable. The pt's strengths were identified in order to help identify abilities to use to better face/overcome challenges. Some humor was used in session as it is one of the pt's coping skills. Humor was used too, to help the pt see things as less challenging and more manageable than they first seemed. Humor was used as well, to model use of the skill as a way to decrease tension ongoing. Homework was assigned tailored to pt's needs.  She will practice accepting and loving G, for who he is, for example. The pt expressed gratitude for today's session.    Mental Status Exam  Hygiene:   good  Dress: normal  Attitude:  cooperative and proactive  Motor Activity: fidgety  Speech: rapid  Mood:  tense  Affect:  congruent  Thought Processes: normal  Thought Content:  normal  Suicidal Thoughts:  not endorsed  Homicidal Thoughts:  not endorsed  Crisis Safety Plan: not needed  Hallucinations:  none      Patient's Support Network Includes:  family, friends      Progress toward goal: there is evidence to suggest that she struggles with mood swings/mood management      Functional Status: moderate       Prognosis: good    Assessment      The pt presented to be struggling with managing her moods related to having bipolar disorder. She admits to struggling to quit drinking alcohol, but was open to discussing a recovery program in this session as well (Alcoholics Anonymous, AA).       Plan      In order to diminish symptoms of bipolar disorder and improve with mood management, she will continue with psychotropic medication as prescribed (ongoing), continue with counseling (ongoing), and attend one AA meeting (as soon as feasible). She will practice accepting and loving G more for who he is (ongoing).     Maribel Hernandez, PhD, LP

## 2021-08-03 ENCOUNTER — HOSPITAL ENCOUNTER (OUTPATIENT)
Dept: MAMMOGRAPHY | Facility: HOSPITAL | Age: 56
Discharge: HOME | End: 2021-08-03

## 2021-08-03 ENCOUNTER — HOSPITAL ENCOUNTER (OUTPATIENT)
Dept: ULTRASOUND IMAGING | Facility: HOSPITAL | Age: 56
Discharge: HOME | End: 2021-08-03

## 2021-08-03 DIAGNOSIS — N63.15 BREAST LUMP ON RIGHT SIDE AT 9 O'CLOCK POSITION: ICD-10-CM

## 2021-08-03 PROCEDURE — 77062 BREAST TOMOSYNTHESIS BI: CPT | Performed by: RADIOLOGY

## 2021-08-03 PROCEDURE — 76642 ULTRASOUND BREAST LIMITED: CPT | Performed by: RADIOLOGY

## 2021-08-03 PROCEDURE — G0279 TOMOSYNTHESIS, MAMMO: HCPCS

## 2021-08-03 PROCEDURE — 77066 DX MAMMO INCL CAD BI: CPT

## 2021-08-03 PROCEDURE — 77066 DX MAMMO INCL CAD BI: CPT | Performed by: RADIOLOGY

## 2021-08-03 PROCEDURE — 76642 ULTRASOUND BREAST LIMITED: CPT

## 2021-08-09 ENCOUNTER — OFFICE VISIT (OUTPATIENT)
Dept: PSYCHIATRY | Facility: CLINIC | Age: 56
End: 2021-08-09

## 2021-08-09 DIAGNOSIS — F10.20 ALCOHOLISM (HCC): ICD-10-CM

## 2021-08-09 DIAGNOSIS — F31.9 BIPOLAR 1 DISORDER (HCC): Primary | ICD-10-CM

## 2021-08-09 PROCEDURE — 90834 PSYTX W PT 45 MINUTES: CPT | Performed by: PSYCHOLOGIST

## 2021-08-09 NOTE — PROGRESS NOTES
PROGRESS NOTE    Data:  Marcela Ramírez is a 56 y.o. female who met with the undersigned for a scheduled individual outpatient therapy session from 11:00 - 11:50am.      Clinical Maneuvering/Intervention:      The pt talked about struggling with mood swings, stress, and feeling tired. She talked about how she has not had a drink of alcohol in the past three days, but has not started a program of recovery from alcohol. Stressors were processed individually and in detail. Venting of frustrations was conducted in order to help the pt feel less tense emotionally and gain insight into issues. Feelings were processed and validated several times in session. She was assisted with processing the degree of alcohol being a problem in her life. Education about how 'things' will come up for her when she does not use alcohol, because she was using alcohol to avoid these 'things.' At the same, the pt expressed wanting to get things together, figure out alcohol/medication for mood, before taking on another weight loss surgery. Getting into a program of recovery for alcoholism was advised, in part due to the fact that she does not need to fight alcoholism/get sober on her own. Perspective taking was conducted multiple times in order to help her feel less stuck, less overwhelmed, and see challenges as much more manageable.An action plan was designed to empower the pt to know what to do. Simple steps of one, two, three were laid out in order to help the pt feel more in control of things and feel less stressed. Homework was assigned tailored to pt's needs. The pt expressed gratitude for today's session.    Mental Status Exam  Hygiene:  good  Dress: normal  Attitude:  cooperative and proactive  Motor Activity: fidgety  Speech: rapid  Mood:  tense  Affect:  congruent  Thought Processes: normal  Thought Content:  normal  Suicidal Thoughts:  not endorsed  Homicidal Thoughts:  not endorsed  Crisis Safety Plan: not needed  Hallucinations:   none      Patient's Support Network Includes:  family, friends      Progress toward goal: there is evidence to suggest that she struggles with mood swings/mood management      Functional Status: moderate       Prognosis: good    Assessment      The pt presented to be struggling with managing her moods related to having bipolar disorder. She admits to struggling to quit drinking alcohol, but was open to discussing a recovery program in this session as well (Alcoholics Anonymous, AA).       Plan      In order to diminish symptoms of bipolar disorder and improve with mood management, she will continue with psychotropic medication as prescribed (ongoing), have a medication consult (as soon as feasible), and start attending AA meetings (as soon as feasible). She will continue with counseling (ongoing).    Maribel Hernandez, PhD, LP

## 2021-08-23 ENCOUNTER — OFFICE VISIT (OUTPATIENT)
Dept: PSYCHIATRY | Facility: CLINIC | Age: 56
End: 2021-08-23

## 2021-08-23 DIAGNOSIS — R45.4 IRRITABLE: ICD-10-CM

## 2021-08-23 DIAGNOSIS — F31.9 BIPOLAR 1 DISORDER (HCC): Primary | ICD-10-CM

## 2021-08-23 DIAGNOSIS — F10.20 ALCOHOLISM (HCC): ICD-10-CM

## 2021-08-23 PROCEDURE — 90834 PSYTX W PT 45 MINUTES: CPT | Performed by: PSYCHOLOGIST

## 2021-08-23 NOTE — PROGRESS NOTES
PROGRESS NOTE    Data:  Marcela Ramírez is a 56 y.o. female who met with the undersigned for a scheduled individual outpatient therapy session from 11:20am - 12:05pm.      Clinical Maneuvering/Intervention:      The pt talked about struggling with mood swings, irritability and stress. She describes her work as demanding, but she struggles too with decisions her son makes (e.g., drug use) and getting along with others too. Stressors were processed individually and in detail. Venting of frustrations was conducted in order to help the pt feel less tense emotionally and gain insight into issues. Feelings were processed and validated several times in session. Active listening was conducted in order to help the pt make sense of stressors and start moving towards potential solutions. The pt was assisted with finding solutions based on existing skill-set and abilities. Perspective taking was conducted multiple times in order to help her feel less stuck, less overwhelmed, and see challenges as much more manageable. Recovery from alcoholism was addressed. Building her up and empowering her success as of late (not drinking for a week now) was conducted to help with motivation and to diminish distress. The pt was assisted in recognizing progress in order to show encouragement and promote motivation to keep making positive changes in life. She is doing better at self-care and boundary setting. The pt expressed gratitude for today's session.    Mental Status Exam  Hygiene:  good  Dress: normal  Attitude:  cooperative and proactive  Motor Activity: fidgety  Speech: rapid  Mood:  tense  Affect:  congruent  Thought Processes: normal  Thought Content:  normal  Suicidal Thoughts:  not endorsed  Homicidal Thoughts:  not endorsed  Crisis Safety Plan: not needed  Hallucinations:  none      Patient's Support Network Includes:  family, friends      Progress toward goal: there is evidence to suggest that she struggles with mood  swings/mood management      Functional Status: moderate       Prognosis: good    Assessment      The pt presented to be struggling with managing her moods related to having bipolar disorder. She admits to struggling to quit drinking alcohol, but was open to discussing a recovery program in this session as well (Alcoholics Anonymous, AA). Self-esteem seems impaired, but needs to be addressed more directly in sessions in the future.       Plan      In order to diminish symptoms of bipolar disorder and improve with mood management, she will continue with psychotropic medication as prescribed (ongoing), quit drinking alcohol, and continue with counseling (ongoing). Self-esteem and self-talk will be addressed in subsequent sessions.     Maribel Hernandez, PhD, LP

## 2021-08-26 PROCEDURE — U0004 COV-19 TEST NON-CDC HGH THRU: HCPCS | Performed by: NURSE PRACTITIONER

## 2021-08-30 ENCOUNTER — OFFICE VISIT (OUTPATIENT)
Dept: PSYCHIATRY | Facility: CLINIC | Age: 56
End: 2021-08-30

## 2021-08-30 DIAGNOSIS — F10.20 ALCOHOLISM (HCC): ICD-10-CM

## 2021-08-30 DIAGNOSIS — R45.89 FEELING SAD: ICD-10-CM

## 2021-08-30 DIAGNOSIS — F31.9 BIPOLAR 1 DISORDER (HCC): Primary | ICD-10-CM

## 2021-08-30 PROCEDURE — 90837 PSYTX W PT 60 MINUTES: CPT | Performed by: PSYCHOLOGIST

## 2021-08-30 NOTE — PROGRESS NOTES
PROGRESS NOTE    Data:  Marcela Ramírez is a 56 y.o. female who met with the undersigned for a scheduled individual outpatient therapy session from 11:15am - 12:15pm.      Clinical Maneuvering/Intervention:      The pt talked about struggling with family conflict, with her son and son's girlfriend primarily. She did also talk about her struggles with alcoholism, including having a relapse. Stressors were processed individually and in detail. Venting of frustrations was conducted in order to help the pt feel less tense emotionally and gain insight into issues. Feelings were processed and validated several times in session. She was assisted with processing the many stressful aspects about her son's drug use, anger issues, and the recent argument that they had. The pt was tearful talking about the things her son said to her. Perspective taking was conducted multiple times in order to help her feel less stuck, less overwhelmed, and see challenges as much more manageable. Despite current stressors, the pt made several insightful comments and these were pointed out. The pt was assisted in recognizing progress in order to show encouragement and promote motivation to keep making positive changes in life. She was noting how her son needs to hit his own 'rock bottom,' and no one can do that for him, for example. Active listening was conducted in order to help the pt make sense of stressors and start moving towards potential solutions. The pt was assisted with finding solutions based on existing skill-set and abilities. Her journey of recovery from alcoholism was discussed in detail as well. Why she would get sober, how she knows whether or not she is an alcoholic, and building up courage to work a program of recovery were discussed today. Fears of working a program were processed. Homework was assigned tailored to pt's needs. She will practice the simple, but effective mindset of 'just for today' type thinking (examples  provided). The pt expressed gratitude for today's session.    Mental Status Exam  Hygiene:  good  Dress: normal  Attitude:  cooperative and proactive  Motor Activity: fidgety  Speech: rapid  Mood:  tense  Affect:  congruent  Thought Processes: normal  Thought Content:  normal  Suicidal Thoughts:  not endorsed  Homicidal Thoughts:  not endorsed  Crisis Safety Plan: not needed  Hallucinations:  none      Patient's Support Network Includes:  family, friends      Progress toward goal: there is evidence to suggest that she struggles with mood swings/mood management      Functional Status: moderate       Prognosis: good    Assessment      The pt presented to be struggling with managing her moods related to having bipolar disorder. She admits to struggling to quit drinking alcohol, but seems ambivalent about working a program of recovery (Alcoholics Anonymous, AA). Self-esteem seems impaired, but needs to be addressed more directly in sessions in the future.       Plan      In order to diminish symptoms of bipolar disorder and improve with mood management, she will continue with psychotropic medication as prescribed (ongoing), quit drinking alcohol, and continue with counseling (ongoing). She will start working a program of recovery from alcoholism with support outside of counseling (as soon as possible).     Maribel Hernandez, PhD, LP

## 2021-09-07 ENCOUNTER — APPOINTMENT (OUTPATIENT)
Dept: GENERAL RADIOLOGY | Facility: HOSPITAL | Age: 56
End: 2021-09-07

## 2021-09-07 LAB
ALBUMIN SERPL-MCNC: 4.4 G/DL (ref 3.5–5.2)
ALBUMIN/GLOB SERPL: 1.4 G/DL
ALP SERPL-CCNC: 97 U/L (ref 39–117)
ALT SERPL W P-5'-P-CCNC: 16 U/L (ref 1–33)
ANION GAP SERPL CALCULATED.3IONS-SCNC: 10 MMOL/L (ref 5–15)
AST SERPL-CCNC: 15 U/L (ref 1–32)
BASOPHILS # BLD AUTO: 0.04 10*3/MM3 (ref 0–0.2)
BASOPHILS NFR BLD AUTO: 0.4 % (ref 0–1.5)
BILIRUB SERPL-MCNC: 0.2 MG/DL (ref 0–1.2)
BUN SERPL-MCNC: 16 MG/DL (ref 6–20)
BUN/CREAT SERPL: 13.8 (ref 7–25)
CALCIUM SPEC-SCNC: 9.4 MG/DL (ref 8.6–10.5)
CHLORIDE SERPL-SCNC: 100 MMOL/L (ref 98–107)
CO2 SERPL-SCNC: 30 MMOL/L (ref 22–29)
CREAT SERPL-MCNC: 1.16 MG/DL (ref 0.57–1)
D DIMER PPP FEU-MCNC: <0.27 MCGFEU/ML (ref 0–0.56)
DEPRECATED RDW RBC AUTO: 44.7 FL (ref 37–54)
EOSINOPHIL # BLD AUTO: 0.34 10*3/MM3 (ref 0–0.4)
EOSINOPHIL NFR BLD AUTO: 3.3 % (ref 0.3–6.2)
ERYTHROCYTE [DISTWIDTH] IN BLOOD BY AUTOMATED COUNT: 12.9 % (ref 12.3–15.4)
GFR SERPL CREATININE-BSD FRML MDRD: 49 ML/MIN/1.73
GLOBULIN UR ELPH-MCNC: 3.1 GM/DL
GLUCOSE SERPL-MCNC: 91 MG/DL (ref 65–99)
HCT VFR BLD AUTO: 38 % (ref 34–46.6)
HGB BLD-MCNC: 12.7 G/DL (ref 12–15.9)
HOLD SPECIMEN: NORMAL
HOLD SPECIMEN: NORMAL
IMM GRANULOCYTES # BLD AUTO: 0.04 10*3/MM3 (ref 0–0.05)
IMM GRANULOCYTES NFR BLD AUTO: 0.4 % (ref 0–0.5)
LIPASE SERPL-CCNC: 19 U/L (ref 13–60)
LYMPHOCYTES # BLD AUTO: 2.97 10*3/MM3 (ref 0.7–3.1)
LYMPHOCYTES NFR BLD AUTO: 28.9 % (ref 19.6–45.3)
MCH RBC QN AUTO: 32.1 PG (ref 26.6–33)
MCHC RBC AUTO-ENTMCNC: 33.4 G/DL (ref 31.5–35.7)
MCV RBC AUTO: 96 FL (ref 79–97)
MONOCYTES # BLD AUTO: 0.63 10*3/MM3 (ref 0.1–0.9)
MONOCYTES NFR BLD AUTO: 6.1 % (ref 5–12)
NEUTROPHILS NFR BLD AUTO: 6.27 10*3/MM3 (ref 1.7–7)
NEUTROPHILS NFR BLD AUTO: 60.9 % (ref 42.7–76)
NRBC BLD AUTO-RTO: 0 /100 WBC (ref 0–0.2)
NT-PROBNP SERPL-MCNC: 123.3 PG/ML (ref 0–900)
PLATELET # BLD AUTO: 192 10*3/MM3 (ref 140–450)
PMV BLD AUTO: 11.1 FL (ref 6–12)
POTASSIUM SERPL-SCNC: 3.4 MMOL/L (ref 3.5–5.2)
PROT SERPL-MCNC: 7.5 G/DL (ref 6–8.5)
RBC # BLD AUTO: 3.96 10*6/MM3 (ref 3.77–5.28)
SODIUM SERPL-SCNC: 140 MMOL/L (ref 136–145)
TROPONIN T SERPL-MCNC: <0.01 NG/ML (ref 0–0.03)
WBC # BLD AUTO: 10.29 10*3/MM3 (ref 3.4–10.8)
WHOLE BLOOD HOLD SPECIMEN: NORMAL
WHOLE BLOOD HOLD SPECIMEN: NORMAL

## 2021-09-07 PROCEDURE — 80053 COMPREHEN METABOLIC PANEL: CPT

## 2021-09-07 PROCEDURE — 83880 ASSAY OF NATRIURETIC PEPTIDE: CPT

## 2021-09-07 PROCEDURE — 84484 ASSAY OF TROPONIN QUANT: CPT

## 2021-09-07 PROCEDURE — 71045 X-RAY EXAM CHEST 1 VIEW: CPT

## 2021-09-07 PROCEDURE — 93005 ELECTROCARDIOGRAM TRACING: CPT

## 2021-09-07 PROCEDURE — 85025 COMPLETE CBC W/AUTO DIFF WBC: CPT

## 2021-09-07 PROCEDURE — 85379 FIBRIN DEGRADATION QUANT: CPT | Performed by: EMERGENCY MEDICINE

## 2021-09-07 PROCEDURE — 99285 EMERGENCY DEPT VISIT HI MDM: CPT

## 2021-09-07 PROCEDURE — 83690 ASSAY OF LIPASE: CPT

## 2021-09-07 PROCEDURE — 93005 ELECTROCARDIOGRAM TRACING: CPT | Performed by: EMERGENCY MEDICINE

## 2021-09-07 RX ORDER — ASPIRIN 81 MG/1
324 TABLET, CHEWABLE ORAL ONCE
Status: DISCONTINUED | OUTPATIENT
Start: 2021-09-07 | End: 2021-09-09 | Stop reason: HOSPADM

## 2021-09-07 RX ORDER — SODIUM CHLORIDE 0.9 % (FLUSH) 0.9 %
10 SYRINGE (ML) INJECTION AS NEEDED
Status: DISCONTINUED | OUTPATIENT
Start: 2021-09-07 | End: 2021-09-09 | Stop reason: HOSPADM

## 2021-09-08 ENCOUNTER — HOSPITAL ENCOUNTER (OUTPATIENT)
Facility: HOSPITAL | Age: 56
Setting detail: OBSERVATION
Discharge: HOME | End: 2021-09-09
Attending: EMERGENCY MEDICINE | Admitting: INTERNAL MEDICINE

## 2021-09-08 DIAGNOSIS — Z86.39 HISTORY OF HYPERLIPIDEMIA: ICD-10-CM

## 2021-09-08 DIAGNOSIS — R07.9 ACUTE CHEST PAIN: ICD-10-CM

## 2021-09-08 DIAGNOSIS — I16.1 HYPERTENSIVE EMERGENCY: Primary | ICD-10-CM

## 2021-09-08 DIAGNOSIS — R42 DIZZINESS: ICD-10-CM

## 2021-09-08 DIAGNOSIS — Z86.39 HISTORY OF DIABETES MELLITUS: ICD-10-CM

## 2021-09-08 LAB
AMPHET+METHAMPHET UR QL: NEGATIVE
AMPHETAMINES UR QL: NEGATIVE
BARBITURATES UR QL SCN: NEGATIVE
BENZODIAZ UR QL SCN: POSITIVE
BILIRUB UR QL STRIP: NEGATIVE
BUPRENORPHINE SERPL-MCNC: NEGATIVE NG/ML
CANNABINOIDS SERPL QL: NEGATIVE
CLARITY UR: CLEAR
COCAINE UR QL: NEGATIVE
COLOR UR: YELLOW
ETHANOL BLD-MCNC: <10 MG/DL (ref 0–10)
GLUCOSE BLDC GLUCOMTR-MCNC: 108 MG/DL (ref 70–130)
GLUCOSE BLDC GLUCOMTR-MCNC: 118 MG/DL (ref 70–130)
GLUCOSE BLDC GLUCOMTR-MCNC: 120 MG/DL (ref 70–130)
GLUCOSE UR STRIP-MCNC: NEGATIVE MG/DL
HGB UR QL STRIP.AUTO: NEGATIVE
HOLD SPECIMEN: NORMAL
KETONES UR QL STRIP: NEGATIVE
LEUKOCYTE ESTERASE UR QL STRIP.AUTO: NEGATIVE
MAGNESIUM SERPL-MCNC: 2.1 MG/DL (ref 1.6–2.6)
METHADONE UR QL SCN: NEGATIVE
NITRITE UR QL STRIP: NEGATIVE
OPIATES UR QL: NEGATIVE
OXYCODONE UR QL SCN: NEGATIVE
PCP UR QL SCN: NEGATIVE
PH UR STRIP.AUTO: 7 [PH] (ref 5–8)
PROPOXYPH UR QL: NEGATIVE
PROT UR QL STRIP: NEGATIVE
QT INTERVAL: 376 MS
QT INTERVAL: 428 MS
QTC INTERVAL: 411 MS
QTC INTERVAL: 420 MS
SARS-COV-2 RNA RESP QL NAA+PROBE: NOT DETECTED
SP GR UR STRIP: 1.01 (ref 1–1.03)
T4 SERPL-MCNC: 7.23 MCG/DL (ref 4.5–11.7)
TRICYCLICS UR QL SCN: NEGATIVE
TROPONIN T SERPL-MCNC: <0.01 NG/ML (ref 0–0.03)
TSH SERPL DL<=0.05 MIU/L-ACNC: 9.74 UIU/ML (ref 0.27–4.2)
UROBILINOGEN UR QL STRIP: NORMAL

## 2021-09-08 PROCEDURE — U0003 INFECTIOUS AGENT DETECTION BY NUCLEIC ACID (DNA OR RNA); SEVERE ACUTE RESPIRATORY SYNDROME CORONAVIRUS 2 (SARS-COV-2) (CORONAVIRUS DISEASE [COVID-19]), AMPLIFIED PROBE TECHNIQUE, MAKING USE OF HIGH THROUGHPUT TECHNOLOGIES AS DESCRIBED BY CMS-2020-01-R: HCPCS | Performed by: EMERGENCY MEDICINE

## 2021-09-08 PROCEDURE — 82962 GLUCOSE BLOOD TEST: CPT

## 2021-09-08 PROCEDURE — 96375 TX/PRO/DX INJ NEW DRUG ADDON: CPT

## 2021-09-08 PROCEDURE — 84436 ASSAY OF TOTAL THYROXINE: CPT | Performed by: INTERNAL MEDICINE

## 2021-09-08 PROCEDURE — 84443 ASSAY THYROID STIM HORMONE: CPT | Performed by: NURSE PRACTITIONER

## 2021-09-08 PROCEDURE — 96372 THER/PROPH/DIAG INJ SC/IM: CPT

## 2021-09-08 PROCEDURE — 80306 DRUG TEST PRSMV INSTRMNT: CPT | Performed by: NURSE PRACTITIONER

## 2021-09-08 PROCEDURE — G0378 HOSPITAL OBSERVATION PER HR: HCPCS

## 2021-09-08 PROCEDURE — 96365 THER/PROPH/DIAG IV INF INIT: CPT

## 2021-09-08 PROCEDURE — 84484 ASSAY OF TROPONIN QUANT: CPT | Performed by: EMERGENCY MEDICINE

## 2021-09-08 PROCEDURE — 82077 ASSAY SPEC XCP UR&BREATH IA: CPT | Performed by: NURSE PRACTITIONER

## 2021-09-08 PROCEDURE — 99220 PR INITIAL OBSERVATION CARE/DAY 70 MINUTES: CPT | Performed by: INTERNAL MEDICINE

## 2021-09-08 PROCEDURE — 25010000002 ENOXAPARIN PER 10 MG

## 2021-09-08 PROCEDURE — 81003 URINALYSIS AUTO W/O SCOPE: CPT | Performed by: INTERNAL MEDICINE

## 2021-09-08 PROCEDURE — 25010000002 ONDANSETRON PER 1 MG: Performed by: FAMILY MEDICINE

## 2021-09-08 PROCEDURE — C9803 HOPD COVID-19 SPEC COLLECT: HCPCS

## 2021-09-08 PROCEDURE — 83735 ASSAY OF MAGNESIUM: CPT | Performed by: NURSE PRACTITIONER

## 2021-09-08 PROCEDURE — 96366 THER/PROPH/DIAG IV INF ADDON: CPT

## 2021-09-08 RX ORDER — SODIUM CHLORIDE 0.9 % (FLUSH) 0.9 %
10 SYRINGE (ML) INJECTION EVERY 12 HOURS SCHEDULED
Status: DISCONTINUED | OUTPATIENT
Start: 2021-09-08 | End: 2021-09-09 | Stop reason: HOSPADM

## 2021-09-08 RX ORDER — CHOLECALCIFEROL (VITAMIN D3) 125 MCG
5 CAPSULE ORAL NIGHTLY PRN
Status: DISCONTINUED | OUTPATIENT
Start: 2021-09-08 | End: 2021-09-09 | Stop reason: HOSPADM

## 2021-09-08 RX ORDER — POTASSIUM CHLORIDE 7.45 MG/ML
10 INJECTION INTRAVENOUS
Status: DISCONTINUED | OUTPATIENT
Start: 2021-09-08 | End: 2021-09-09 | Stop reason: HOSPADM

## 2021-09-08 RX ORDER — LISINOPRIL 20 MG/1
20 TABLET ORAL
Status: DISCONTINUED | OUTPATIENT
Start: 2021-09-08 | End: 2021-09-09 | Stop reason: HOSPADM

## 2021-09-08 RX ORDER — ONDANSETRON 4 MG/1
4 TABLET, FILM COATED ORAL EVERY 6 HOURS PRN
Status: DISCONTINUED | OUTPATIENT
Start: 2021-09-08 | End: 2021-09-09 | Stop reason: HOSPADM

## 2021-09-08 RX ORDER — MAGNESIUM SULFATE HEPTAHYDRATE 40 MG/ML
4 INJECTION, SOLUTION INTRAVENOUS AS NEEDED
Status: DISCONTINUED | OUTPATIENT
Start: 2021-09-08 | End: 2021-09-09 | Stop reason: HOSPADM

## 2021-09-08 RX ORDER — ACETAMINOPHEN 325 MG/1
650 TABLET ORAL EVERY 6 HOURS PRN
Status: DISCONTINUED | OUTPATIENT
Start: 2021-09-08 | End: 2021-09-09 | Stop reason: HOSPADM

## 2021-09-08 RX ORDER — ONDANSETRON 2 MG/ML
4 INJECTION INTRAMUSCULAR; INTRAVENOUS EVERY 6 HOURS PRN
Status: DISCONTINUED | OUTPATIENT
Start: 2021-09-08 | End: 2021-09-09 | Stop reason: HOSPADM

## 2021-09-08 RX ORDER — POTASSIUM CHLORIDE 750 MG/1
40 CAPSULE, EXTENDED RELEASE ORAL AS NEEDED
Status: DISCONTINUED | OUTPATIENT
Start: 2021-09-08 | End: 2021-09-09 | Stop reason: HOSPADM

## 2021-09-08 RX ORDER — DEXTROSE MONOHYDRATE 25 G/50ML
25 INJECTION, SOLUTION INTRAVENOUS
Status: DISCONTINUED | OUTPATIENT
Start: 2021-09-08 | End: 2021-09-09 | Stop reason: HOSPADM

## 2021-09-08 RX ORDER — POLYETHYLENE GLYCOL 3350 17 G/17G
17 POWDER, FOR SOLUTION ORAL DAILY PRN
Status: DISCONTINUED | OUTPATIENT
Start: 2021-09-08 | End: 2021-09-09 | Stop reason: HOSPADM

## 2021-09-08 RX ORDER — SODIUM CHLORIDE 0.9 % (FLUSH) 0.9 %
10 SYRINGE (ML) INJECTION AS NEEDED
Status: DISCONTINUED | OUTPATIENT
Start: 2021-09-08 | End: 2021-09-09 | Stop reason: HOSPADM

## 2021-09-08 RX ORDER — ALPRAZOLAM 1 MG/1
1 TABLET ORAL NIGHTLY
Status: DISCONTINUED | OUTPATIENT
Start: 2021-09-08 | End: 2021-09-09 | Stop reason: HOSPADM

## 2021-09-08 RX ORDER — ATORVASTATIN CALCIUM 20 MG/1
20 TABLET, FILM COATED ORAL DAILY
Status: DISCONTINUED | OUTPATIENT
Start: 2021-09-08 | End: 2021-09-09 | Stop reason: HOSPADM

## 2021-09-08 RX ORDER — DESVENLAFAXINE SUCCINATE 50 MG/1
50 TABLET, EXTENDED RELEASE ORAL DAILY
Status: DISCONTINUED | OUTPATIENT
Start: 2021-09-08 | End: 2021-09-09 | Stop reason: HOSPADM

## 2021-09-08 RX ORDER — BISACODYL 10 MG
10 SUPPOSITORY, RECTAL RECTAL DAILY PRN
Status: DISCONTINUED | OUTPATIENT
Start: 2021-09-08 | End: 2021-09-09 | Stop reason: HOSPADM

## 2021-09-08 RX ORDER — PANTOPRAZOLE SODIUM 40 MG/1
40 TABLET, DELAYED RELEASE ORAL
Status: DISCONTINUED | OUTPATIENT
Start: 2021-09-08 | End: 2021-09-09 | Stop reason: HOSPADM

## 2021-09-08 RX ORDER — POTASSIUM CHLORIDE 1.5 G/1.77G
40 POWDER, FOR SOLUTION ORAL AS NEEDED
Status: DISCONTINUED | OUTPATIENT
Start: 2021-09-08 | End: 2021-09-09 | Stop reason: HOSPADM

## 2021-09-08 RX ORDER — MAGNESIUM SULFATE HEPTAHYDRATE 40 MG/ML
2 INJECTION, SOLUTION INTRAVENOUS AS NEEDED
Status: DISCONTINUED | OUTPATIENT
Start: 2021-09-08 | End: 2021-09-09 | Stop reason: HOSPADM

## 2021-09-08 RX ORDER — AMLODIPINE BESYLATE 5 MG/1
5 TABLET ORAL
Status: DISCONTINUED | OUTPATIENT
Start: 2021-09-08 | End: 2021-09-09 | Stop reason: HOSPADM

## 2021-09-08 RX ORDER — AMOXICILLIN 250 MG
2 CAPSULE ORAL 2 TIMES DAILY
Status: DISCONTINUED | OUTPATIENT
Start: 2021-09-08 | End: 2021-09-09 | Stop reason: HOSPADM

## 2021-09-08 RX ORDER — LAMOTRIGINE 100 MG/1
200 TABLET ORAL DAILY
Status: DISCONTINUED | OUTPATIENT
Start: 2021-09-08 | End: 2021-09-09 | Stop reason: HOSPADM

## 2021-09-08 RX ORDER — PREGABALIN 50 MG/1
50 CAPSULE ORAL 3 TIMES DAILY
Status: DISCONTINUED | OUTPATIENT
Start: 2021-09-08 | End: 2021-09-09 | Stop reason: HOSPADM

## 2021-09-08 RX ORDER — LAMOTRIGINE 100 MG/1
200 TABLET ORAL NIGHTLY
Status: DISCONTINUED | OUTPATIENT
Start: 2021-09-08 | End: 2021-09-08

## 2021-09-08 RX ORDER — HYDROCHLOROTHIAZIDE 25 MG/1
25 TABLET ORAL DAILY
Status: DISCONTINUED | OUTPATIENT
Start: 2021-09-08 | End: 2021-09-09 | Stop reason: HOSPADM

## 2021-09-08 RX ORDER — BISACODYL 5 MG/1
5 TABLET, DELAYED RELEASE ORAL DAILY PRN
Status: DISCONTINUED | OUTPATIENT
Start: 2021-09-08 | End: 2021-09-09 | Stop reason: HOSPADM

## 2021-09-08 RX ORDER — NICOTINE POLACRILEX 4 MG
15 LOZENGE BUCCAL
Status: DISCONTINUED | OUTPATIENT
Start: 2021-09-08 | End: 2021-09-09 | Stop reason: HOSPADM

## 2021-09-08 RX ADMIN — LISINOPRIL 20 MG: 20 TABLET ORAL at 11:20

## 2021-09-08 RX ADMIN — SODIUM CHLORIDE, PRESERVATIVE FREE 10 ML: 5 INJECTION INTRAVENOUS at 10:32

## 2021-09-08 RX ADMIN — ENOXAPARIN SODIUM 40 MG: 100 INJECTION SUBCUTANEOUS at 10:33

## 2021-09-08 RX ADMIN — PREGABALIN 50 MG: 50 CAPSULE ORAL at 21:14

## 2021-09-08 RX ADMIN — PANTOPRAZOLE SODIUM 40 MG: 40 TABLET, DELAYED RELEASE ORAL at 11:21

## 2021-09-08 RX ADMIN — POTASSIUM CHLORIDE 40 MEQ: 750 CAPSULE, EXTENDED RELEASE ORAL at 21:14

## 2021-09-08 RX ADMIN — HYDROCHLOROTHIAZIDE 25 MG: 25 TABLET ORAL at 12:37

## 2021-09-08 RX ADMIN — PREGABALIN 50 MG: 50 CAPSULE ORAL at 16:45

## 2021-09-08 RX ADMIN — NICARDIPINE HYDROCHLORIDE 7.5 MG/HR: 0.1 INJECTION, SOLUTION INTRAVENOUS at 05:41

## 2021-09-08 RX ADMIN — ATORVASTATIN CALCIUM 20 MG: 40 TABLET, FILM COATED ORAL at 12:37

## 2021-09-08 RX ADMIN — AMLODIPINE BESYLATE 5 MG: 5 TABLET ORAL at 11:20

## 2021-09-08 RX ADMIN — NICARDIPINE HYDROCHLORIDE 7.5 MG/HR: 0.1 INJECTION, SOLUTION INTRAVENOUS at 08:57

## 2021-09-08 RX ADMIN — ACETAMINOPHEN 650 MG: 325 TABLET, FILM COATED ORAL at 14:29

## 2021-09-08 RX ADMIN — ONDANSETRON 4 MG: 2 INJECTION INTRAMUSCULAR; INTRAVENOUS at 10:31

## 2021-09-08 RX ADMIN — LAMOTRIGINE 200 MG: 100 TABLET ORAL at 12:37

## 2021-09-08 RX ADMIN — NICARDIPINE HYDROCHLORIDE 5 MG/HR: 0.1 INJECTION, SOLUTION INTRAVENOUS at 04:48

## 2021-09-08 RX ADMIN — POTASSIUM CHLORIDE 40 MEQ: 750 CAPSULE, EXTENDED RELEASE ORAL at 16:45

## 2021-09-08 RX ADMIN — ALPRAZOLAM 1 MG: 1 TABLET ORAL at 21:14

## 2021-09-08 NOTE — CASE MANAGEMENT/SOCIAL WORK
Discharge Planning Assessment  [facility]     Patient Name: Marcela Ramírez  MRN: [id number]  Today's Date: 9/8/2021    Admit Date: 9/8/2021    Discharge Needs Assessment     Row Name 09/08/21 1108       Living Environment    Lives With significant other;spouse    Name(s) of Who Lives With Patient Lives with spouse/SO in [place]    Current Living Arrangements home/apartment/condo    Primary Care Provided by self    Provides Primary Care For no one    Caregiving Concerns Reports she is independent with all ADL's    Family Caregiver if Needed spouse;significant other    Family Caregiver Names Spouse/SO, Samuel Tim at [phone]    Quality of Family Relationships helpful;involved    Able to Return to Prior Arrangements yes    Living Arrangement Comments Resides in private residence with spouse/SO       Resource/Environmental Concerns    Resource/Environmental Concerns none    Transportation Concerns car, none       Transition Planning    Patient/Family Anticipates Transition to home    Patient/Family Anticipated Services at Transition     Transportation Anticipated family or friend will provide       Discharge Needs Assessment    Readmission Within the Last 30 Days no previous admission in last 30 days    Equipment Currently Used at Home none    Concerns to be Addressed no discharge needs identified;denies needs/concerns at this time    Concerns Comments No needs in ED, Case Management following    Anticipated Changes Related to Illness none    Patient's Choice of Community Agency(s) NA    Discharge Coordination/Progress Anticipate patient will return home when medically stable        Discharge Plan     Row Name 09/08/21 1111       Plan    Plan Home    Patient/Family in Agreement with Plan -yes Patient    Plan Comments Planning hospital admission, met with patient at bedside.  Reports she is independent with all ADL's with no DME use or home health involvement, planning to return home at  discharge.  Made aware Case Management following for all needs, appreciative.    Final Discharge Disposition Code 01 - home or self-care        Continued Care and Services - Admitted Since 9/8/2021    Coordination has not been started for this encounter.         Demographic Summary     Row Name 09/08/21 1109       General Information    Arrived From  emergency department;home    Referral Source  admission list;emergency department    Reason for Consult  discharge planning    Preferred Language  English     Used During This Interaction  no        Functional Status    No documentation.       Psychosocial    No documentation.       Abuse/Neglect    No documentation.       Legal    No documentation.       Substance Abuse    No documentation.       Patient Forms    No documentation.           ROSA Macias

## 2021-09-08 NOTE — ED PROVIDER NOTES
[facility]    EMERGENCY DEPARTMENT ENCOUNTER      Pt Name: Marcela Ramírez  MRN: [id number]  YOB: 1965  Date of evaluation: 9/7/2021  Provider: Hector Tim MD    CHIEF COMPLAINT       Chief Complaint   Patient presents with   • Shortness of Breath         HISTORY OF PRESENT ILLNESS  (Location/Symptom, Timing/Onset, Context/Setting, Quality, Duration, Modifying Factors, Severity.)   Marcela Ramírez is a 56 y.o. female who presents to the emergency department with 3 to 4 days of progressively worsening symptoms.  She has had rhinorrhea, mild aching bifrontal headache, generalized body aches, and intermittent substernal chest pressure that is nonradiating and not associated with any vomiting or diaphoresis.  She also notes exertional dizziness/lightheadedness that occurs anytime she has been active.  She is not currently having any chest pain.  She has noticed some difficulty breathing with these episodes as well.  Denies any abdominal pain.  She has no prior cardiac history but does take medication for diabetes, hypertension, and hyperlipidemia and endorses compliance with all of these medications.      Nursing notes were reviewed.    REVIEW OF SYSTEMS    (2-9 systems for level 4, 10 or more for level 5)   ROS:  General: Weakness  Cardiovascular: Chest pain  Respiratory: Shortness of breath  Gastrointestinal:  No pain, no nausea, no vomiting, no diarrhea  Musculoskeletal: Body aches  Skin:  No rash  Neurologic:  No speech problems, no headache, no extremity numbness, no extremity tingling, no extremity weakness  Psychiatric:  No anxiety  Genitourinary:  No dysuria, no hematuria    Except as noted above the remainder of the review of systems was reviewed and negative.       PAST MEDICAL HISTORY     Past Medical History:   Diagnosis Date   • Arthritis     knees, otc arthritis meds prn, no h/o injections.    • Asthma     has not required inhaler   • Bilateral ovarian cysts    • Bipolar 1 disorder  (CMS/Formerly [facility])    • Boil     opens them herself   • Breast injury 07/13/2021    bruise on lateral rt breast from fall   • Carpal tunnel syndrome    • CKD (chronic kidney disease), symptom management only, stage 3 (moderate) (CMS/Formerly [facility])     Creatinine 1.04 GFR 56   • Colon polyp    • Depression    • Diabetes mellitus (CMS/Formerly [facility])     Diagnosed 2017, was on metformin in the past. Last A1C 7%   • Diverticulosis    • Fatigue    • GERD (gastroesophageal reflux disease)     controlled with omeprazole 20mg. Remote EGD- esophagitis.  EGD no hiatal hernia Z line 40 cm very thick mucosal folds cannot rule out varices.  CT scan thickened fundus, no varices seen   • Headache    • History of bladder infections    • History of blood transfusion 2006    2/2 heavy menses   • History of bronchitis    • Hyperlipidemia    • Hypertension    • Lower extremity edema    • Morbid obesity with BMI of 40.0-44.9, adult (CMS/Formerly [facility])    • Nausea     wtih taking meds, avoid this by taking meds wtih milk   • Peripheral neuropathy     2/2 DM   • RLS (restless legs syndrome)    • S/P cholecystectomy     2/2 cholelithiasis   • Shortness of breath on exertion    • TIA (transient ischemic attack)     patient states 8-9 episodes of right sided drooping/ weakness/ vision changes. Workup was negative for CVA- thought to be related to anxiety. last episode 12/2017         SURGICAL HISTORY       Past Surgical History:   Procedure Laterality Date   • COLONOSCOPY  remote    diverticulosis   • ENDOMETRIAL ABLATION     • ENDOSCOPY  remote    esophagitis    • ENDOSCOPY N/A 8/22/2019    Procedure: ESOPHAGOGASTRODUODENOSCOPY;  Surgeon: Guido Greenberg MD;  Location:  [facility];  Service: Bariatric   • GASTRIC SLEEVE LAPAROSCOPIC N/A 8/22/2019    Procedure: GASTRIC SLEEVE LAPAROSCOPIC;  Surgeon: Guido Greenberg MD;  Location:  [facility];  Service: Bariatric   • KNEE ACL RECONSTRUCTION Right 2013    with miniscal repair   • LAPAROSCOPIC CHOLECYSTECTOMY  2001     cholelithiasis   • LAPAROSCOPIC HYSTERECTOMY  2013    right ovary remains   • PARAESOPHAGEAL HERNIA REPAIR N/A 8/22/2019    Procedure: PARAESOPHAGEAL HERNIA REPAIR LAPAROSCOPIC;  Surgeon: Guido Greenberg MD;  Location: [facility];  Service: Bariatric   • SINUS SURGERY     • TUBAL ABDOMINAL LIGATION           CURRENT MEDICATIONS       Current Facility-Administered Medications:   •  aspirin chewable tablet 324 mg, 324 mg, Oral, Once, Hector Tim MD  •  niCARdipine (CARDENE) 20 mg in 200 mL NS infusion, 5-15 mg/hr, Intravenous, Titrated, Hector Tim MD, Last Rate: 50 mL/hr at 09/08/21 0448, 5 mg/hr at 09/08/21 0448  •  sodium chloride 0.9 % flush 10 mL, 10 mL, Intravenous, PRN, Hector Tim MD    Current Outpatient Medications:   •  ALPRAZolam (Xanax) 1 MG tablet, Take 1 tablet by mouth Every Night., Disp: 30 tablet, Rfl: 5  •  atorvastatin (LIPITOR) 20 MG tablet, TAKE ONE TABLET BY MOUTH DAILY, Disp: 30 tablet, Rfl: 2  •  desvenlafaxine (PRISTIQ) 50 MG 24 hr tablet, TAKE ONE TABLET BY MOUTH DAILY, Disp: 30 tablet, Rfl: 10  •  lamoTRIgine (LaMICtal) 200 MG tablet, TAKE ONE TABLET BY MOUTH ONCE NIGHTLY, Disp: 30 tablet, Rfl: 10  •  lisinopril (PRINIVIL,ZESTRIL) 20 MG tablet, , Disp: , Rfl:   •  metFORMIN ER (GLUCOPHAGE-XR) 500 MG 24 hr tablet, TAKE ONE TABLET BY MOUTH TWICE A DAY BEFORE MEALS, Disp: 60 tablet, Rfl: 5  •  methylPREDNISolone (MEDROL) 4 MG dose pack, Take as directed on package instructions., Disp: 21 tablet, Rfl: 0  •  Neupro 1 MG/24HR patch 24 hour 24 hour patch, APPLY ONE PATCH TO THE SKIN DAILY AS DIRECTED BY PROVIDER, Disp: 30 patch, Rfl: 10  •  omeprazole (priLOSEC) 20 MG capsule, TAKE ONE CAPSULE BY MOUTH DAILY, Disp: 30 capsule, Rfl: 11  •  ONE TOUCH ULTRA TEST test strip, TEST GLUCOSE TWO TIMES A DAY, Disp: 100 each, Rfl: 12  •  pregabalin (Lyrica) 50 MG capsule, Take 1 capsule by mouth 3 (Three) Times a Day., Disp: 90 capsule, Rfl: 5  •  promethazine-dextromethorphan  (PROMETHAZINE-DM) 6.25-15 MG/5ML syrup, Take 5 mL by mouth 4 (Four) Times a Day As Needed for Cough for up to 10 days., Disp: 240 mL, Rfl: 0  •  hydroCHLOROthiazide (HYDRODIURIL) 25 MG tablet, Take 1 tablet by mouth Daily., Disp: 30 tablet, Rfl: 11    ALLERGIES     Contrast dye    FAMILY HISTORY       Family History   Problem Relation Age of Onset   • Arthritis Mother    • Arthritis Father    • Diabetes Father    • Hyperlipidemia Father    • Hypertension Father    • Asthma Brother    • Other Brother    • Cancer Maternal Aunt    • Depression Paternal Uncle    • Breast cancer Neg Hx    • Ovarian cancer Neg Hx           SOCIAL HISTORY       Social History     Socioeconomic History   • Marital status:      Spouse name: Not on file   • Number of children: Not on file   • Years of education: Not on file   • Highest education level: Not on file   Tobacco Use   • Smoking status: Never Smoker   • Smokeless tobacco: Never Used   Vaping Use   • Vaping Use: Never used   Substance and Sexual Activity   • Alcohol use: Yes     Comment: maybe a glass 1-2   • Drug use: Yes     Types: Marijuana     Comment: about 4 years ago   • Sexual activity: Defer     Comment: no hormones         PHYSICAL EXAM    (up to 7 for level 4, 8 or more for level 5)     Vitals:    09/08/21 0456 09/08/21 0457 09/08/21 0458 09/08/21 0459   BP:       Pulse: 62 61 61 62   Resp:       Temp:       SpO2:  96% 96% 98%   Weight:       Height:           Physical Exam  General: Awake, alert, no acute distress.  HEENT: Conjunctivae normal.  Neck: Trachea midline.  Cardiac: Heart regular rate, rhythm, no murmurs, rubs, or gallops  Lungs: Lungs are clear to auscultation, there is no wheezing, rhonchi, or rales. There is no use of accessory muscles.  Chest wall: There is no tenderness to palpation over the chest wall or over ribs  Abdomen: Abdomen is soft, nontender, nondistended. There are no firm or pulsatile masses, no rebound rigidity or guarding.    Musculoskeletal: No deformity.  Neuro: Alert and oriented x 4.  Dermatology: Skin is warm and dry  Psych: Mentation is grossly normal, cognition is grossly normal. Affect is appropriate.        DIAGNOSTIC RESULTS     EKG: All EKG's are interpreted by the Emergency Department Physician who either signs or Co-signs this chart in the absence of a cardiologist.    ECG 12 Lead   Final Result   Test Reason : chest pain   Blood Pressure :   */*   mmHG   Vent. Rate :  58 BPM     Atrial Rate :  58 BPM      P-R Int : 154 ms          QRS Dur :  96 ms       QT Int : 428 ms       P-R-T Axes :  40  27  46 degrees      QTc Int : 420 ms      Sinus bradycardia   Septal infarct , age undetermined   Abnormal ECG   When compared with ECG of 07-SEP-2021 20:29, (Unconfirmed)   Nonspecific T wave abnormality no longer evident in Lateral leads   Confirmed by LLOYD TIM ([id number]) on 9/8/2021 4:56:21 AM      Referred By: EDMD           Confirmed By: LLOYD TIM      ECG 12 Lead   Final Result   Test Reason : chest pain   Blood Pressure :   */*   mmHG   Vent. Rate :  72 BPM     Atrial Rate :  72 BPM      P-R Int : 142 ms          QRS Dur :  92 ms       QT Int : 376 ms       P-R-T Axes :  32   8  60 degrees      QTc Int : 411 ms      Normal sinus rhythm   Nonspecific ST and T wave abnormality   Abnormal ECG   When compared with ECG of 10-APR-2019 23:53,   Nonspecific T wave abnormality now evident in Lateral leads   Confirmed by LLOYD TIM ([id number]) on 9/8/2021 4:56:14 AM      Referred By:            Confirmed By: LLOYD TIM          RADIOLOGY:   Non-plain film images such as CT, Ultrasound and MRI are read by the radiologist. Plain radiographic images are visualized and preliminarily interpreted by the emergency physician with the below findings:      [x] Radiologist's Report Reviewed:  XR Chest 1 View   Final Result   No acute cardiopulmonary findings.      Signer Name: PORTIA Tim MD    Signed: 9/7/2021 11:35 PM     Workstation Name: [id number]     [facility]            ED BEDSIDE ULTRASOUND:   Performed by ED Physician - none    LABS:    I have reviewed and interpreted all of the currently available lab results from this visit (if applicable):  Results for orders placed or performed during the hospital encounter of 09/08/21   Troponin    Specimen: Blood   Result Value Ref Range    Troponin T <0.010 0.000 - 0.030 ng/mL   Comprehensive Metabolic Panel    Specimen: Blood   Result Value Ref Range    Glucose 91 65 - 99 mg/dL    BUN 16 6 - 20 mg/dL    Creatinine 1.16 (H) 0.57 - 1.00 mg/dL    Sodium 140 136 - 145 mmol/L    Potassium 3.4 (L) 3.5 - 5.2 mmol/L    Chloride 100 98 - 107 mmol/L    CO2 30.0 (H) 22.0 - 29.0 mmol/L    Calcium 9.4 8.6 - 10.5 mg/dL    Total Protein 7.5 6.0 - 8.5 g/dL    Albumin 4.40 3.50 - 5.20 g/dL    ALT (SGPT) 16 1 - 33 U/L    AST (SGOT) 15 1 - 32 U/L    Alkaline Phosphatase 97 39 - 117 U/L    Total Bilirubin 0.2 0.0 - 1.2 mg/dL    eGFR Non African Amer 49 (L) >60 mL/min/1.73    Globulin 3.1 gm/dL    A/G Ratio 1.4 g/dL    BUN/Creatinine Ratio 13.8 7.0 - 25.0    Anion Gap 10.0 5.0 - 15.0 mmol/L   Lipase    Specimen: Blood   Result Value Ref Range    Lipase 19 13 - 60 U/L   BNP    Specimen: Blood   Result Value Ref Range    proBNP 123.3 0.0 - 900.0 pg/mL   CBC Auto Differential    Specimen: Blood   Result Value Ref Range    WBC 10.29 3.40 - 10.80 10*3/mm3    RBC 3.96 3.77 - 5.28 10*6/mm3    Hemoglobin 12.7 12.0 - 15.9 g/dL    Hematocrit 38.0 34.0 - 46.6 %    MCV 96.0 79.0 - 97.0 fL    MCH 32.1 26.6 - 33.0 pg    MCHC 33.4 31.5 - 35.7 g/dL    RDW 12.9 12.3 - 15.4 %    RDW-SD 44.7 37.0 - 54.0 fl    MPV 11.1 6.0 - 12.0 fL    Platelets 192 140 - 450 10*3/mm3    Neutrophil % 60.9 42.7 - 76.0 %    Lymphocyte % 28.9 19.6 - 45.3 %    Monocyte % 6.1 5.0 - 12.0 %    Eosinophil % 3.3 0.3 - 6.2 %    Basophil % 0.4 0.0 - 1.5 %    Immature Grans % 0.4 0.0 - 0.5 %    Neutrophils, Absolute 6.27 1.70  - 7.00 10*3/mm3    Lymphocytes, Absolute 2.97 0.70 - 3.10 10*3/mm3    Monocytes, Absolute 0.63 0.10 - 0.90 10*3/mm3    Eosinophils, Absolute 0.34 0.00 - 0.40 10*3/mm3    Basophils, Absolute 0.04 0.00 - 0.20 10*3/mm3    Immature Grans, Absolute 0.04 0.00 - 0.05 10*3/mm3    nRBC 0.0 0.0 - 0.2 /100 WBC   D-dimer, Quantitative    Specimen: Blood   Result Value Ref Range    D-Dimer, Quantitative <0.27 0.00 - 0.56 MCGFEU/mL   ECG 12 Lead   Result Value Ref Range    QT Interval 376 ms    QTC Interval 411 ms   ECG 12 Lead   Result Value Ref Range    QT Interval 428 ms    QTC Interval 420 ms   Green Top (Gel)   Result Value Ref Range    Extra Tube Hold for add-ons.    Lavender Top   Result Value Ref Range    Extra Tube hold for add-on    Gold Top - SST   Result Value Ref Range    Extra Tube Hold for add-ons.    Gray Top   Result Value Ref Range    Extra Tube Hold for add-ons.    Light Blue Top   Result Value Ref Range    Extra Tube hold for add-on         All other labs were within normal range or not returned as of this dictation.      EMERGENCY DEPARTMENT COURSE and DIFFERENTIAL DIAGNOSIS/MDM:   Vitals:    Vitals:    09/08/21 0456 09/08/21 0457 09/08/21 0458 09/08/21 0459   BP:       Pulse: 62 61 61 62   Resp:       Temp:       SpO2:  96% 96% 98%   Weight:       Height:           ED Course as of Sep 08 0505   Wed Sep 08, 2021   0452 Patient presents with what sound to be infectious symptoms although she does have some other concerning symptoms associated with this.  Her Covid swab is pending at the time of admission but her chest x-ray demonstrates no evidence of pneumonia, pneumothorax, or other acute thoracic process.  Her ECG and troponin demonstrate no evidence of myocardial injury or dysrhythmia.  D-dimer is negative in the setting of low risk, making pulmonary embolism embolus very unlikely.  Her laboratory evaluation demonstrates no significant electrolyte derangement, anemia, or leukocytosis.  On recheck when the  patient was placed in the room, her systolic blood pressure had reached 247 despite her report of taking her blood pressure medication at home.  Given her reports of intermittent chest pressure, multiple risk factors, and exertional dizziness over the course of the past several days as well, I do have some concern for hypertensive emergency/myocardial strain as a result of her markedly elevated blood pressure.  For this reason, I have elected to start her on Cardene and admit her to the hospitalist service for further work-up.    [NS]   [id number] Spoke with Dr. Ceja who accepts the patient for admission      [NS]      ED Course User Index  [NS] Hector Tim MD         MEDICATIONS ADMINISTERED IN ED:  Medications   sodium chloride 0.9 % flush 10 mL (has no administration in time range)   aspirin chewable tablet 324 mg (has no administration in time range)   niCARdipine (CARDENE) 20 mg in 200 mL NS infusion (5 mg/hr Intravenous New Bag 9/8/21 5609)         CRITICAL CARE TIME    Approximately 35 minutes of discontinuous critical care time was provided to this patient by myself absent of any time spent performing procedures.  Patient presents critically ill with with acute hypertensive emergency with markedly elevated blood pressure and associated exertional chest pain/dyspnea/dizziness placing the cardiovascular, respiratory, neurologic, and renal systems at risk requiring the following interventions: Initiation of Cardene infusion, interpretation of lab/ECG/imaging/vital signs, frequent reassessment, coordination of admission with the following response: Improvement in blood pressure.  Patient at high risk of deterioration and possibly death without these interventions.        FINAL IMPRESSION      1. Hypertensive emergency    2. Acute chest pain    3. Dizziness    4. History of diabetes mellitus    5. History of hyperlipidemia          DISPOSITION/PLAN     ED Disposition     ED Disposition Condition Comment     Decision to Admit              Hector Tim MD  Attending Emergency Physician               Hector Tim MD  09/08/21 7893

## 2021-09-08 NOTE — H&P
[facility] Medicine Services  HISTORY AND PHYSICAL    Patient Name: Marcela Ramírez  : 1965  MRN: [id number]  Primary Care Physician: Sukhwinder Acosta MD  Date of admission: 2021    Subjective   Subjective     Chief Complaint:  Fatigue    HPI:  Marcela Ramírez is a 56 y.o. female with PMH of HTN, HLD, bipolar disorder, CKD stage 3, T2DM, h/o gastric sleeve surgery, GERD, alcohol use who presents to [facility] ED for evaluation of fatigue, dizziness and headache. Reports blood pressure at home over 200/100. 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 URI symptoms started on Friday and she was seen at [facility] on . She denies shortness of breath or chest pain; some palpitations. Also noting she has been taking OTC Sweetie seltzer cough and cold medicine since . She has been running a low grade temp ~ 99; tells me her normal temp runs 96.    She reports compliance with her medications but has not taken her evening medications from last night due to coming to the hospital. She is very concerned about her elevated blood pressure, initially 227/125 and was started on cardene IV drip, down to 184/97. She is not hypoxic, room air sats 99%. Her COVID-19 swab was negative.    COVID Details:        Symptoms: [] NONE [] Fever [x]  Cough [x] Shortness of breath [] Change in taste or smell  The patient has a COVID HM Topic on their chart, and they are fully vaccinated.    Review of Systems   Constitutional: Positive for fatigue and fever. Negative for chills.   HENT: Positive for congestion.    Respiratory: Positive for cough and shortness of breath.    Cardiovascular: Positive for palpitations.   Neurological: Positive for dizziness, weakness (generalized) and  headaches.   All other systems reviewed and are negative.       All other systems reviewed and are negative.     Personal History     Past Medical History:   Diagnosis Date   • Arthritis     knees, otc arthritis meds prn, no h/o injections.    • Asthma     has not required inhaler   • Bilateral ovarian cysts    • Bipolar 1 disorder (CMS/[facility])    • Boil     opens them herself   • Breast injury 07/13/2021    bruise on lateral rt breast from fall   • Carpal tunnel syndrome    • CKD (chronic kidney disease), symptom management only, stage 3 (moderate) (CMS/[facility])     Creatinine 1.04 GFR 56   • Colon polyp    • Depression    • Diabetes mellitus (CMS/[facility])     Diagnosed 2017, was on metformin in the past. Last A1C 7%   • Diverticulosis    • Fatigue    • GERD (gastroesophageal reflux disease)     controlled with omeprazole 20mg. Remote EGD- esophagitis.  EGD no hiatal hernia Z line 40 cm very thick mucosal folds cannot rule out varices.  CT scan thickened fundus, no varices seen   • Headache    • History of bladder infections    • History of blood transfusion 2006    2/2 heavy menses   • History of bronchitis    • Hyperlipidemia    • Hypertension    • Lower extremity edema    • Morbid obesity with BMI of 40.0-44.9, adult (CMS/[facility])    • Nausea     wtih taking meds, avoid this by taking meds wtih milk   • Peripheral neuropathy     2/2 DM   • RLS (restless legs syndrome)    • S/P cholecystectomy     2/2 cholelithiasis   • Shortness of breath on exertion    • TIA (transient ischemic attack)     patient states 8-9 episodes of right sided drooping/ weakness/ vision changes. Workup was negative for CVA- thought to be related to anxiety. last episode 12/2017       Past Surgical History:   Procedure Laterality Date   • COLONOSCOPY  remote    diverticulosis   • ENDOMETRIAL ABLATION     • ENDOSCOPY  remote    esophagitis    • ENDOSCOPY N/A 8/22/2019    Procedure: ESOPHAGOGASTRODUODENOSCOPY;  Surgeon: Guido Greenberg MD;  Location:   [facility];  Service: Bariatric   • GASTRIC SLEEVE LAPAROSCOPIC N/A 8/22/2019    Procedure: GASTRIC SLEEVE LAPAROSCOPIC;  Surgeon: Guido Greenberg MD;  Location:  [facility];  Service: Bariatric   • KNEE ACL RECONSTRUCTION Right 2013    with miniscal repair   • LAPAROSCOPIC CHOLECYSTECTOMY  2001    cholelithiasis   • LAPAROSCOPIC HYSTERECTOMY  2013    right ovary remains   • PARAESOPHAGEAL HERNIA REPAIR N/A 8/22/2019    Procedure: PARAESOPHAGEAL HERNIA REPAIR LAPAROSCOPIC;  Surgeon: Guido Greenberg MD;  Location:  [facility];  Service: Bariatric   • SINUS SURGERY     • TUBAL ABDOMINAL LIGATION         Family History:  family history includes Arthritis in her father and mother; Asthma in her brother; Cancer in her maternal aunt; Depression in her paternal uncle; Diabetes in her father; Hyperlipidemia in her father; Hypertension in her father; Other in her brother. Otherwise pertinent FHx was reviewed and unremarkable.     Social History:  reports that she has never smoked. She has never used smokeless tobacco. She reports current alcohol use. She reports current drug use. Drug: Marijuana.  Social History     Social History Narrative    Lives in [place] with boyfriend, daughter and her boyfriend and granddaughter and stepson.    she has a lot of stress at home especially her son who is on drugs and expecting a baby    Medications:  ALPRAZolam, atorvastatin, desvenlafaxine, glucose blood, hydroCHLOROthiazide, lamoTRIgine, lisinopril, metFORMIN ER, methylPREDNISolone, omeprazole, pregabalin, promethazine-dextromethorphan, and rotigotine    Allergies   Allergen Reactions   • Contrast Dye Swelling     Nasal congestion/ snot, IV contrast only       Objective   Objective     Vital Signs:   Temp:  [98.3 °F (36.8 °C)] 98.3 °F (36.8 °C)  Heart Rate:  [58-75] 66  Resp:  [18] 18  BP: (168-246)/() 171/98  Stable on RA  Physical Exam  Constitutional:       General: She is not in acute distress.     Appearance: She  is obese. She is not ill-appearing or toxic-appearing.   HENT:      Head: Normocephalic and atraumatic.      Nose: Nose normal.      Mouth/Throat:      Mouth: Mucous membranes are moist.      Pharynx: No oropharyngeal exudate.   Eyes:      Extraocular Movements: Extraocular movements intact.      Pupils: Pupils are equal, round, and reactive to light.   Cardiovascular:      Rate and Rhythm: Regular rhythm. Bradycardia present.      Pulses: Normal pulses.      Heart sounds: Normal heart sounds. No murmur heard.     Pulmonary:      Effort: Pulmonary effort is normal. No respiratory distress.      Breath sounds: Normal breath sounds. No wheezing or rales.   Abdominal:      General: Bowel sounds are normal. There is no distension.      Palpations: Abdomen is soft.      Tenderness: There is no abdominal tenderness. There is no guarding.   Musculoskeletal:         General: Normal range of motion.      Cervical back: Normal range of motion and neck supple.   Skin:     General: Skin is warm and dry.      Capillary Refill: Capillary refill takes less than 2 seconds.   Neurological:      General: No focal deficit present.      Mental Status: She is alert and oriented to person, place, and time.   Psychiatric:         Mood and Affect: Mood normal.         Behavior: Behavior normal.         Thought Content: Thought content normal.         Judgment: Judgment normal.        Result Review:  I have personally reviewed the results from the time of this admission to 09/08/21 5:55 AM EDT and agree with these findings:  [x]  Laboratory  [x]  Microbiology  [x]  Radiology  [x]  EKG/Telemetry   []  Cardiology/Vascular   []  Pathology  [x]  Old records  []  Other:  Most notable findings include:       LAB RESULTS:      Lab 09/07/21  2315 09/07/21 2032   WBC  --  10.29   HEMOGLOBIN  --  12.7   HEMATOCRIT  --  38.0   PLATELETS  --  192   NEUTROS ABS  --  6.27   IMMATURE GRANS (ABS)  --  0.04   LYMPHS ABS  --  2.97   MONOS ABS  --  0.63    EOS ABS  --  0.34   MCV  --  96.0   D DIMER QUANT <0.27  --          Lab 09/08/21 0431 09/07/21 2032   SODIUM  --  140   POTASSIUM  --  3.4*   CHLORIDE  --  100   CO2  --  30.0*   ANION GAP  --  10.0   BUN  --  16   CREATININE  --  1.16*   GLUCOSE  --  91   CALCIUM  --  9.4   MAGNESIUM 2.1  --          Lab 09/07/21 2032   TOTAL PROTEIN 7.5   ALBUMIN 4.40   GLOBULIN 3.1   ALT (SGPT) 16   AST (SGOT) 15   BILIRUBIN 0.2   ALK PHOS 97   LIPASE 19         Lab 09/08/21 0431 09/07/21 2032   PROBNP  --  123.3   TROPONIN T <0.010 <0.010                 UA    Urinalysis 1/27/21   Ketones, UA 15 mg/dL (A)   Leukocytes, UA Negative   (A) Abnormal value            Microbiology Results (last 10 days)     Procedure Component Value - Date/Time    COVID PRE-OP / PRE-PROCEDURE SCREENING ORDER (NO ISOLATION) - Swab, Nasopharynx [[id number]]  (Normal) Collected: 09/08/21 0433    Lab Status: Final result Specimen: Swab from Nasopharynx Updated: 09/08/21 0551    Narrative:      The following orders were created for panel order COVID PRE-OP / PRE-PROCEDURE SCREENING ORDER (NO ISOLATION) - Swab, Nasopharynx.  Procedure                               Abnormality         Status                     ---------                               -----------         ------                     COVID-19,CEPHEID/LUIS,LE...[[id number]]  Normal              Final result                 Please view results for these tests on the individual orders.    COVID-19,CEPHEID/LUIS,[facility] IN-HOUSE(OR EMERGENT/ADD-ON),NP SWAB IN TRANSPORT MEDIA 3-4 HR TAT - Swab, Nasopharynx [[id number]]  (Normal) Collected: 09/08/21 0433    Lab Status: Final result Specimen: Swab from Nasopharynx Updated: 09/08/21 0551     COVID19 Not Detected    Narrative:      Fact sheet for providers: https://www.fda.gov/media/851373/download     Fact sheet for patients: https://www.fda.gov/media/699540/download  Fact sheet for providers: https://www.fda.gov/media/427544/download     Fact sheet for  patients: https://www.fda.gov/media/039013/download          XR Chest 1 View    Result Date: 9/7/2021  CR Chest 1 Vw INDICATION: Chest pain. History of hypertension and asthma. COMPARISON:  8/23/2019 FINDINGS: Portable AP view(s) of the chest.  Mild cardiomegaly without failure. No infiltrates or effusions. No pneumothorax. Osseous structures unremarkable.     Impression: No acute cardiopulmonary findings. Signer Name: PORTIA Tim MD  Signed: 9/7/2021 11:35 PM  Workstation Name: [facility]  [facility]          Assessment/Plan   Assessment & Plan       Hypertensive emergency    Depression    Anxiety    Hypertension    Hyperlipidemia    Diabetes mellitus (CMS/HCC)    GERD (gastroesophageal reflux disease)    Bipolar 1 disorder (CMS/[facility])    Marcela Ramírez is a 56 y.o. female with PMH of HTN, HLD, bipolar disorder, CKD stage 3, T2DM, h/o gastric sleeve surgery, GERD, alcohol use who presents to [facility] ED for evaluation of fatigue, dizziness and headache. Reports blood pressure at home over 200/100.     Hypertensive emergency  HTN  - cardene drip- start norvasc, wean cardene  - hold steroids  - hold OTC cold medications  - clarify lisinopril dosing  - continue HCTZ, lisinopril w/ close monitoring of renal function  -discussed her pristiq and other medications that could elevate her BP    CKD  Hypokalemia  - add mag  - replace per protocol  - creatinine 1.16, normally runs 0.74 - 1.04  - repeat BMP this am    HLD  - continue statin    Obesity  T2DM  - moderate dose SSI    ELEvated TSH  -check T4-  -outpatient follow up to consider treatment    GERD  - controlled, continue PPI    Bipolar 1 disorder  Depression  Anxiety  - continue pristiq, lamictal, xanax    DVT prophylaxis:  Lovenox    CODE STATUS:   Code Status: CPR  Medical Interventions (Level of Support Prior to Arrest): Full      This note has been completed as part of a split-shared workflow.   Signature: Electronically signed by  Manda Tompkins, NOE, 09/08/21, 6:21 AM EDT    Attending   Admission Attestation       I have seen and examined the patient, performing an independent face-to-face diagnostic evaluation with plan of care reviewed and developed with the advanced practice clinician (APC).      Brief Summary Statement:     Marcela Ramírez is a 56 y.o. female with PMH of HTN, HLD, bipolar disorder, CKD stage 3, T2DM, h/o gastric sleeve surgery, GERD, alcohol use who presents to [facility] ED for evaluation of fatigue, dizziness and headache. Reports blood pressure at home over 200/100.   Patient reports extreme headache and fatigue  Some chest tightness  Lots of stress with her son.  Remainder of detailed HPI is as noted by APC and has been reviewed and/or edited by me for completeness.    Attending Physical Exam:  Temp:  [98.3 °F (36.8 °C)] 98.3 °F (36.8 °C)  Heart Rate:  [58-75] 66  Resp:  [18] 18  BP: (168-246)/() 171/98    Patient is alert and talkative in no distress at rest, obese  Neck is without mass or JVD  Heart is Reg wo murmur  Lungs are clear wo wheeze or crackle  Abdomen is soft without HSM or mass, not tender or distended  MAEW, no edema  Skin is without rash  Neurologic exam is nonfocal   Mood is appropriate    Brief Assessment/Plan :  See detailed assessment and plan developed with APC which I have reviewed and/or edited for completeness.    I believe this patient meets INPATIENT status due to requirement for IV medications to treat her accelerated hypertension..  I feel patient’s risk for adverse outcomes and need for care warrant INPATIENT evaluation and I predict the patient’s care encounter to likely last beyond 2 midnights.      Electronically signed by Maegan Lawson MD, 09/08/21, 8:43 AM EDT.

## 2021-09-08 NOTE — PLAN OF CARE
Goal Outcome Evaluation:  Plan of Care Reviewed With: patient        Progress: improving  Outcome Summary: Pt admitted to floor. BP stable. RA. Cardene drip off.

## 2021-09-09 ENCOUNTER — READMISSION MANAGEMENT (OUTPATIENT)
Dept: CALL CENTER | Facility: HOSPITAL | Age: 56
End: 2021-09-09

## 2021-09-09 VITALS
BODY MASS INDEX: 37.35 KG/M2 | WEIGHT: 238 LBS | OXYGEN SATURATION: 96 % | HEIGHT: 67 IN | SYSTOLIC BLOOD PRESSURE: 153 MMHG | DIASTOLIC BLOOD PRESSURE: 97 MMHG | RESPIRATION RATE: 18 BRPM | HEART RATE: 79 BPM | TEMPERATURE: 98.3 F

## 2021-09-09 PROBLEM — E03.9 HYPOTHYROIDISM: Status: ACTIVE | Noted: 2021-09-09

## 2021-09-09 LAB
GLUCOSE BLDC GLUCOMTR-MCNC: 107 MG/DL (ref 70–130)
POTASSIUM SERPL-SCNC: 4.5 MMOL/L (ref 3.5–5.2)

## 2021-09-09 PROCEDURE — 82962 GLUCOSE BLOOD TEST: CPT

## 2021-09-09 PROCEDURE — G0378 HOSPITAL OBSERVATION PER HR: HCPCS

## 2021-09-09 PROCEDURE — 25010000002 ENOXAPARIN PER 10 MG

## 2021-09-09 PROCEDURE — 96372 THER/PROPH/DIAG INJ SC/IM: CPT

## 2021-09-09 PROCEDURE — 84132 ASSAY OF SERUM POTASSIUM: CPT | Performed by: INTERNAL MEDICINE

## 2021-09-09 PROCEDURE — 99217 PR OBSERVATION CARE DISCHARGE MANAGEMENT: CPT | Performed by: INTERNAL MEDICINE

## 2021-09-09 RX ORDER — LEVOTHYROXINE SODIUM 0.05 MG/1
50 TABLET ORAL
Qty: 30 TABLET | Refills: 2 | Status: SHIPPED | OUTPATIENT
Start: 2021-09-09

## 2021-09-09 RX ORDER — AMLODIPINE BESYLATE 5 MG/1
5 TABLET ORAL
Qty: 30 TABLET | Refills: 2 | Status: SHIPPED | OUTPATIENT
Start: 2021-09-09

## 2021-09-09 RX ORDER — LEVOTHYROXINE SODIUM 0.05 MG/1
50 TABLET ORAL
Status: DISCONTINUED | OUTPATIENT
Start: 2021-09-09 | End: 2021-09-09 | Stop reason: HOSPADM

## 2021-09-09 RX ADMIN — ENOXAPARIN SODIUM 40 MG: 100 INJECTION SUBCUTANEOUS at 08:30

## 2021-09-09 RX ADMIN — AMLODIPINE BESYLATE 5 MG: 5 TABLET ORAL at 08:32

## 2021-09-09 RX ADMIN — HYDROCHLOROTHIAZIDE 25 MG: 25 TABLET ORAL at 08:31

## 2021-09-09 RX ADMIN — LEVOTHYROXINE SODIUM 50 MCG: 50 TABLET ORAL at 08:31

## 2021-09-09 RX ADMIN — LISINOPRIL 20 MG: 20 TABLET ORAL at 08:31

## 2021-09-09 RX ADMIN — PREGABALIN 50 MG: 50 CAPSULE ORAL at 08:31

## 2021-09-09 RX ADMIN — DESVENLAFAXINE SUCCINATE 50 MG: 50 TABLET, EXTENDED RELEASE ORAL at 08:30

## 2021-09-09 RX ADMIN — ATORVASTATIN CALCIUM 20 MG: 40 TABLET, FILM COATED ORAL at 08:31

## 2021-09-09 RX ADMIN — LAMOTRIGINE 200 MG: 100 TABLET ORAL at 08:31

## 2021-09-09 RX ADMIN — PANTOPRAZOLE SODIUM 40 MG: 40 TABLET, DELAYED RELEASE ORAL at 05:06

## 2021-09-09 NOTE — PLAN OF CARE
Goal Outcome Evaluation:  Plan of Care Reviewed With: patient        Progress: improving   A/Ox4; VSS; currently NSR on the monitor; and on room air. Pt denies pain or discomfort and is currently resting in bed appearing asleep. Will continue to monitor.

## 2021-09-10 ENCOUNTER — TRANSITIONAL CARE MANAGEMENT TELEPHONE ENCOUNTER (OUTPATIENT)
Dept: CALL CENTER | Facility: HOSPITAL | Age: 56
End: 2021-09-10

## 2021-09-10 DIAGNOSIS — F31.9 BIPOLAR 1 DISORDER (HCC): ICD-10-CM

## 2021-09-10 DIAGNOSIS — F41.9 ANXIETY: ICD-10-CM

## 2021-09-10 RX ORDER — ATORVASTATIN CALCIUM 20 MG/1
TABLET, FILM COATED ORAL
Qty: 30 TABLET | Refills: 2 | Status: SHIPPED | OUTPATIENT
Start: 2021-09-10 | End: 2021-12-23

## 2021-09-10 RX ORDER — LAMOTRIGINE 200 MG/1
TABLET ORAL
Qty: 30 TABLET | Refills: 10 | Status: SHIPPED | OUTPATIENT
Start: 2021-09-10 | End: 2022-09-08

## 2021-09-10 RX ORDER — DESVENLAFAXINE SUCCINATE 50 MG/1
TABLET, EXTENDED RELEASE ORAL
Qty: 30 TABLET | Refills: 10 | Status: SHIPPED | OUTPATIENT
Start: 2021-09-10 | End: 2022-09-08

## 2021-09-10 RX ORDER — LISINOPRIL 20 MG/1
TABLET ORAL
Qty: 30 TABLET | Refills: 2 | Status: SHIPPED | OUTPATIENT
Start: 2021-09-10 | End: 2021-09-22

## 2021-09-10 NOTE — OUTREACH NOTE
Prep Survey      Responses   [facility] facility patient discharged from?  [place]   Is LACE score < 7 ?  No   Emergency Room discharge w/ pulse ox?  No   Eligibility  [facility]   Date of Admission  09/08/21   Date of Discharge  09/09/21   Discharge Disposition  Home or Self Care   Discharge diagnosis  Hypertensive emergency, T2DM   Does the patient have one of the following disease processes/diagnoses(primary or secondary)?  Other   Does the patient have Home health ordered?  No   Is there a DME ordered?  No   Prep survey completed?  Yes          Peg Tim RN

## 2021-09-10 NOTE — OUTREACH NOTE
Call Center TCM Note      Responses   [facility] patient discharged from?  [place]   Does the patient have one of the following disease processes/diagnoses(primary or secondary)?  Other   TCM attempt successful?  Yes   Call start time  1704   Call end time  1709   Discharge diagnosis  Hypertensive emergency, T2DM   Meds reviewed with patient/caregiver?  Yes   Is the patient having any side effects they believe may be caused by any medication additions or changes?  No   Does the patient have all medications ordered at discharge?  Yes   Is the patient taking all medications as directed (includes completed medication regime)?  Yes   Does the patient have a primary care provider?   Yes   Does the patient have an appointment with their PCP within 7 days of discharge?  Yes   Comments regarding PCP  Patient has a hospital followup on 9/22/2021 with Dr Duke    Has the patient kept scheduled appointments due by today?  N/A   Has home health visited the patient within 72 hours of discharge?  N/A   Psychosocial issues?  No   Did the patient receive a copy of their discharge instructions?  Yes   Nursing interventions  Reviewed instructions with patient   What is the patient's perception of their health status since discharge?  Improving   Is the patient/caregiver able to teach back signs and symptoms related to disease process for when to call PCP?  Yes   Is the patient/caregiver able to teach back signs and symptoms related to disease process for when to call 911?  Yes   Is the patient/caregiver able to teach back the hierarchy of who to call/visit for symptoms/problems? PCP, Specialist, Home health nurse, Urgent Care, ED, 911  Yes   If the patient is a current smoker, are they able to teach back resources for cessation?  Not a smoker   TCM call completed?  Yes   Wrap up additional comments  Very appreciative of hospital staff, rebekah Dr Lawson!          Bernardo Pereira RN    9/10/2021, 17:09 EDT

## 2021-09-13 ENCOUNTER — OFFICE VISIT (OUTPATIENT)
Dept: BEHAVIORAL HEALTH | Facility: CLINIC | Age: 56
End: 2021-09-13

## 2021-09-13 DIAGNOSIS — F10.20 ALCOHOLISM (HCC): ICD-10-CM

## 2021-09-13 DIAGNOSIS — F31.9 BIPOLAR 1 DISORDER (HCC): Primary | ICD-10-CM

## 2021-09-13 PROCEDURE — 90834 PSYTX W PT 45 MINUTES: CPT | Performed by: PSYCHOLOGIST

## 2021-09-13 NOTE — PROGRESS NOTES
PROGRESS NOTE    Data:  Marcela Ramírez is a 56 y.o. female who met with the undersigned for a scheduled individual outpatient therapy session from 11:15am - 12:00pm.      Clinical Maneuvering/Intervention:      The pt talked about struggling with physical illness and family conflict. She also talked about how difficult it is to be around her son and son's girlfriend who often argue. Stressors were processed individually and in detail. Venting of frustrations was conducted in order to help the pt feel less tense emotionally and gain insight into issues. Feelings were processed and validated several times in session. She was assisted with recognizing issues for herself, or finding her own solutions in her problems. Actions preceding her falling ill were discussed and she could see how better care of herself may have prevented her sickness (e.g. stay on top of blood pressure medication). Shame, guilt, embarrassment related to seeking out a second weight loss surgery were processed. Maladaptive thought patterns were identified, challenged, and evaluated for validity in order to allow for the pt to chose different and more adaptive ways of thinking. Stress reduction techniques were used today in different forms. For example, avoiding comparing herself to others who have lost weight was noted as something to avoid. She was assisted with 'staying in her own marino,' and focusing on her own 'journey.' Active listening was conducted in order to help the pt make sense of stressors and start moving towards potential solutions. The pt was assisted with finding solutions based on existing skill-set and abilities. Perspective taking was conducted multiple times in order to help her feel less stuck, less overwhelmed, and see challenges as much more manageable. The pt expressed gratitude for today's session.    Mental Status Exam  Hygiene:  good  Dress: normal  Attitude:  cooperative and proactive  Motor Activity: normal  Speech:  fast  Mood:  tense  Affect:  congruent  Thought Processes: normal  Thought Content:  normal  Suicidal Thoughts:  not endorsed  Homicidal Thoughts:  not endorsed  Crisis Safety Plan: not needed  Hallucinations:  none      Patient's Support Network Includes:  family, friends      Progress toward goal: there is evidence to suggest that she struggles with mood swings/mood management      Functional Status: moderate       Prognosis: good    Assessment      The pt presented to be struggling with managing her moods related to having bipolar disorder. She is in early sobriety, abstaining from alcohol for 11 days now. She seems hesitant to work a program of recovery from alcoholism, neither opposing it nor presenting as motivated to start it.       Plan      In order to diminish symptoms of bipolar disorder and improve with mood management, she will continue with counseling (ongoing), abstain from alcohol use or using/abusing any other substance (ongoing) and consult with her doctor about psychotropic medication management (as soon as feasible).     Maribel Hernandez, PhD, LP

## 2021-09-20 ENCOUNTER — OFFICE VISIT (OUTPATIENT)
Dept: BEHAVIORAL HEALTH | Facility: CLINIC | Age: 56
End: 2021-09-20

## 2021-09-20 DIAGNOSIS — F10.20 ALCOHOLISM (HCC): ICD-10-CM

## 2021-09-20 DIAGNOSIS — F43.9 STRESS AT HOME: ICD-10-CM

## 2021-09-20 DIAGNOSIS — F31.9 BIPOLAR 1 DISORDER (HCC): Primary | ICD-10-CM

## 2021-09-20 PROCEDURE — 90834 PSYTX W PT 45 MINUTES: CPT | Performed by: PSYCHOLOGIST

## 2021-09-20 NOTE — PROGRESS NOTES
PROGRESS NOTE    Data:  Marcela Ramírez is a 56 y.o. female who met with the undersigned for a scheduled individual outpatient therapy session from 10:30 - 11:15am.      Clinical Maneuvering/Intervention:      The pt talked about struggling with health problems, family conflict, and alcohol. She talked about how she is struggling and worried about it, but is to the point that she would like her son and son's girlfriend to move out into their own place. Stressors were processed individually and in detail. Venting of frustrations was conducted in order to help the pt feel less tense emotionally and gain insight into issues. Feelings were processed and validated several times in session. Perspective taking was conducted multiple times in order to help her feel less stuck, less overwhelmed, and see challenges as much more manageable. Education about recovery from alcoholism was included in the session, though the pt was focused the most on having her son and son's girlfriend move into income based housing. Active listening was conducted in order to help the pt make sense of stressors and start moving towards potential solutions. The pt was assisted with finding solutions based on existing skill-set and abilities. An action plan was designed to empower the pt to know what to do. Simple steps of one, two, three were laid out in order to help the pt feel more in control of things and feel less stressed.  She will have them move out as soon as possible and then focus again on sobriety, but taking it a step further by making sobriety (and recovery) a priority in her life. The pt's strengths were identified in order to help identify abilities to use to better face/overcome challenges. Homework was assigned tailored to pt's needs. The pt expressed gratitude for today's session.    Mental Status Exam  Hygiene:  good  Dress: normal  Attitude:  cooperative and proactive  Motor Activity: normal  Speech: fast  Mood:   tense  Affect:  congruent  Thought Processes: normal  Thought Content:  normal  Suicidal Thoughts:  not endorsed  Homicidal Thoughts:  not endorsed  Crisis Safety Plan: not needed  Hallucinations:  none      Patient's Support Network Includes:  family, friends      Progress toward goal: there is evidence to suggest that she struggles with mood swings/mood management      Functional Status: moderate       Prognosis: good    Assessment      The pt presented to be struggling with managing her moods related to having bipolar disorder. Home environment is stressful with her son and son's girlfriend living there and their tendency to abuse substances. She started drinking alcohol again; she seems hesitant to work a program of recovery from alcoholism, neither opposing it nor presenting as motivated to start it.       Plan      In order to diminish symptoms of bipolar disorder and improve with mood management, she will continue with counseling (ongoing) and work her plan of action today in terms of improving her home environment and abstaining from alcohol (this week, longer as needed).    Maribel Hernandez, PhD, LP

## 2021-09-21 ENCOUNTER — READMISSION MANAGEMENT (OUTPATIENT)
Dept: CALL CENTER | Facility: HOSPITAL | Age: 56
End: 2021-09-21

## 2021-09-21 NOTE — OUTREACH NOTE
"Medical Week 2 Survey      Responses   [facility] patient discharged from?  [place]   Does the patient have one of the following disease processes/diagnoses(primary or secondary)?  Other   Week 2 attempt successful?  Yes   Call start time  0926   Discharge diagnosis  Hypertensive emergency, T2DM   Call end time  0929   Meds reviewed with patient/caregiver?  Yes   Is the patient taking all medications as directed (includes completed medication regime)?  Yes   Has the patient kept scheduled appointments due by today?  N/A   What is the patient's perception of their health status since discharge?  Same [Pt states, \"my BP is still high\". Early this AM BP was 197/119.]   Week 2 Call Completed?  Yes   Wrap up additional comments  Pt is on disability right now r/t BP.           Nuvia Kirkland RN  "

## 2021-09-22 ENCOUNTER — OFFICE VISIT (OUTPATIENT)
Dept: FAMILY MEDICINE CLINIC | Facility: CLINIC | Age: 56
End: 2021-09-22

## 2021-09-22 VITALS
DIASTOLIC BLOOD PRESSURE: 102 MMHG | HEART RATE: 71 BPM | TEMPERATURE: 96.8 F | HEIGHT: 67 IN | RESPIRATION RATE: 16 BRPM | WEIGHT: 247.6 LBS | BODY MASS INDEX: 38.86 KG/M2 | OXYGEN SATURATION: 98 % | SYSTOLIC BLOOD PRESSURE: 168 MMHG

## 2021-09-22 DIAGNOSIS — E11.65 UNCONTROLLED TYPE 2 DIABETES MELLITUS WITH HYPERGLYCEMIA (HCC): ICD-10-CM

## 2021-09-22 DIAGNOSIS — F41.9 ANXIETY: ICD-10-CM

## 2021-09-22 DIAGNOSIS — F10.10 ALCOHOL ABUSE: ICD-10-CM

## 2021-09-22 DIAGNOSIS — F31.9 BIPOLAR 1 DISORDER (HCC): ICD-10-CM

## 2021-09-22 DIAGNOSIS — I10 ESSENTIAL HYPERTENSION: Primary | ICD-10-CM

## 2021-09-22 DIAGNOSIS — N39.498 OTHER URINARY INCONTINENCE: ICD-10-CM

## 2021-09-22 PROCEDURE — 99214 OFFICE O/P EST MOD 30 MIN: CPT | Performed by: FAMILY MEDICINE

## 2021-09-22 RX ORDER — METOPROLOL SUCCINATE 50 MG/1
50 TABLET, EXTENDED RELEASE ORAL DAILY
Qty: 30 TABLET | Refills: 5 | Status: SHIPPED | OUTPATIENT
Start: 2021-09-22 | End: 2022-03-29

## 2021-09-22 RX ORDER — LISINOPRIL 40 MG/1
40 TABLET ORAL DAILY
Qty: 30 TABLET | Refills: 5 | Status: SHIPPED | OUTPATIENT
Start: 2021-09-22 | End: 2022-03-29

## 2021-09-22 NOTE — PROGRESS NOTES
Subjective   Marcela Ramírez is a 56 y.o. female    Chief Complaint    Hypertension     History of Present Illness  The patient presents today for evaluation of hypertension and urinary tract symptoms. She reports she was lethargic and can't remember 2 days of her life. The patient states she stayed in bed and drank water, but she forced it like she could not drink any liquids. She reports she felt horrible with a slight cough and a pressure feeling. The patient tried to take a deep breath and it hurt a little bit. She reports she was at work sneezing a lot, and thought that is one of the first symptoms of a breakthrough of COVID. The patient states she was vaccinated in 03/2021 and 04/2021.     She said her blood pressure is always high at 179/80-90 mmHg and that is on lisinopril. The patient notes 2 things that lower her blood pressure are alcohol and Delta 8 gummies. She states she has almost quit drinking. The patient states she has dealt with high blood pressure since the birth of her child 21 years ago. She notes she had preeclampsia and was told she might deal with high blood pressure for the rest of her life. The patient had bariatric surgery and got down to 209 pounds. Her blood pressure was still high.     She has an appointment on 09/29/2021 to start the process of a revision surgery. The patient took 1 month off work at [employer] and was off 3 months before going to work for [employer], and then took 3 months off from [employer] because she was suicidal. She is on disability again. The patient has a new position that she is supposed to start on 09/26/2021. The easiest position in the entire building, and if she can get back fast enough the job will be hers.     She needs a note that she is able to return to work, hopefully on Sunday, 09/26/2021.    She reports her stress level is high and her anxiety is high. Her son, who has an addiction, has moved in with her along with his pregnant girlfriend who also has the  "same addiction. She reports her daughter keeps telling her he has been doing this for 5 years. The last time she worked was 09/10/2021. The patient does not know why it completely \"blew up\" and she did not test positive for anything. She states she does not remember ever missing her medications since her surgery.     The patient is taking 6 tablets of lisinopril 20 mg throughout the day to lower her blood pressure. She continues to take all of her vitamins. The patient is unsure if the Xanax is lowering her blood pressure at night or if it is the Delta 8 gummies she takes. Waking up in the morning the worst blood pressure measurement was 197/119 mmHg. Three days ago it was 177/144 mmHg after taking her morning medications. The patient said the last day she worked her blood pressure was 198/137 mmHg, and she felt weak and disoriented. She is scared. The patient has a history of of stress-induced transient ischemic attack.    She has lowered her salt intake and she is preparing herself for the revision surgery due to her weight loss surgery. The patient reports her weight slowly came back, but she has been holding steady between 230 and 240 pounds. She has not hit her original weight. She does not eat frozen meals.    The patient said she is having difficulty emptying her bladder since her weight loss surgery. It has steadily gotten worse. She is status post hysterectomy with only the right ovary left. The patient is status post cholecystectomy. The patient also has stress incontinence.    The following portions of the patient's history were reviewed and updated as appropriate: allergies, current medications, past social history and problem list    Review of Systems   Constitutional: Negative for appetite change, chills, fatigue, fever and unexpected weight change.   Respiratory: Negative for cough, chest tightness, shortness of breath and wheezing.    Cardiovascular: Negative for chest pain, palpitations and leg " swelling.   Gastrointestinal: Negative for abdominal pain, diarrhea, nausea and vomiting.   Endocrine: Negative for polydipsia, polyphagia and polyuria.   Genitourinary: Positive for difficulty urinating. Negative for dysuria, frequency, hematuria and urgency.   Musculoskeletal: Negative for arthralgias, back pain and myalgias.   Skin: Negative for color change and rash.   Neurological: Negative for dizziness, tremors, syncope, weakness, light-headedness and headaches.   Hematological: Negative for adenopathy. Does not bruise/bleed easily.   Psychiatric/Behavioral: Positive for dysphoric mood and sleep disturbance. Negative for agitation, behavioral problems, confusion, decreased concentration and suicidal ideas. The patient is nervous/anxious.        Objective     Vitals:    09/22/21 0936   BP: (!) 168/102   Pulse: 71   Resp: 16   Temp: 96.8 °F (36 °C)   SpO2: 98%       Physical Exam  Vitals and nursing note reviewed.   Constitutional:       Appearance: She is well-developed.   HENT:      Head: Normocephalic.   Eyes:      Conjunctiva/sclera: Conjunctivae normal.      Pupils: Pupils are equal, round, and reactive to light.   Neck:      Thyroid: No thyromegaly.      Vascular: No JVD.   Cardiovascular:      Rate and Rhythm: Normal rate and regular rhythm.      Pulses: Normal pulses.      Heart sounds: Normal heart sounds. No murmur heard.     Pulmonary:      Effort: Pulmonary effort is normal. No respiratory distress.      Breath sounds: Normal breath sounds.   Abdominal:      General: Bowel sounds are normal. There is no distension.      Palpations: Abdomen is soft.      Tenderness: There is no abdominal tenderness. There is no guarding or rebound.   Musculoskeletal:      Cervical back: Neck supple.   Lymphadenopathy:      Cervical: No cervical adenopathy.   Skin:     General: Skin is warm and dry.      Findings: No rash.   Neurological:      Mental Status: She is alert and oriented to person, place, and time.    Psychiatric:         Behavior: Behavior normal.         Assessment/Plan   Problems Addressed this Visit        Cardiac and Vasculature    Hypertension - Primary    Relevant Medications    metoprolol succinate XL (Toprol XL) 50 MG 24 hr tablet    lisinopril (PRINIVIL,ZESTRIL) 40 MG tablet       Mental Health    Anxiety    Bipolar 1 disorder (CMS/HCC)      Other Visit Diagnoses     Uncontrolled type 2 diabetes mellitus with hyperglycemia (HCC)        Alcohol abuse        Other urinary incontinence          Diagnoses       Codes Comments    Essential hypertension    -  Primary ICD-10-CM: I10  ICD-9-CM: 401.9     Uncontrolled type 2 diabetes mellitus with hyperglycemia (HCC)     ICD-10-CM: E11.65  ICD-9-CM: 250.02     Bipolar 1 disorder (HCC)     ICD-10-CM: F31.9  ICD-9-CM: 296.7     Anxiety     ICD-10-CM: F41.9  ICD-9-CM: 300.00     Alcohol abuse     ICD-10-CM: F10.10  ICD-9-CM: 305.00     Other urinary incontinence     ICD-10-CM: N39.498  ICD-9-CM: 788.39         I spent 35 minutes in patient care: Reviewing records prior to the visit, examining the patient, entering orders and documentation.    Please note that portions of this document were completed with a voice recognition program. Efforts were made to edit the dictations, but occasionally words are mis-transcribed         Transcribed from ambient dictation for KAT Acosta MD by Melissa Bueno.  09/22/21   11:28 EDT    I have personally performed the services described in this document as transcribed by the above individual, and it is both accurate and complete.  KAT Acosta MD  9/22/2021  21:50 EDT

## 2021-09-27 ENCOUNTER — OFFICE VISIT (OUTPATIENT)
Dept: BEHAVIORAL HEALTH | Facility: CLINIC | Age: 56
End: 2021-09-27

## 2021-09-27 ENCOUNTER — TELEPHONE (OUTPATIENT)
Dept: FAMILY MEDICINE CLINIC | Facility: CLINIC | Age: 56
End: 2021-09-27

## 2021-09-27 ENCOUNTER — OFFICE VISIT (OUTPATIENT)
Dept: BARIATRICS/WEIGHT MGMT | Facility: CLINIC | Age: 56
End: 2021-09-27

## 2021-09-27 VITALS
HEIGHT: 67 IN | BODY MASS INDEX: 37.9 KG/M2 | OXYGEN SATURATION: 99 % | TEMPERATURE: 98.1 F | RESPIRATION RATE: 18 BRPM | DIASTOLIC BLOOD PRESSURE: 68 MMHG | HEART RATE: 77 BPM | SYSTOLIC BLOOD PRESSURE: 122 MMHG | WEIGHT: 241.5 LBS

## 2021-09-27 DIAGNOSIS — F10.20 ALCOHOLISM (HCC): ICD-10-CM

## 2021-09-27 DIAGNOSIS — F43.9 STRESS AT HOME: ICD-10-CM

## 2021-09-27 DIAGNOSIS — F31.9 BIPOLAR 1 DISORDER (HCC): Primary | ICD-10-CM

## 2021-09-27 DIAGNOSIS — K21.9 GASTROESOPHAGEAL REFLUX DISEASE, UNSPECIFIED WHETHER ESOPHAGITIS PRESENT: Primary | ICD-10-CM

## 2021-09-27 PROCEDURE — 90837 PSYTX W PT 60 MINUTES: CPT | Performed by: PSYCHOLOGIST

## 2021-09-27 PROCEDURE — 99215 OFFICE O/P EST HI 40 MIN: CPT | Performed by: PHYSICIAN ASSISTANT

## 2021-09-27 NOTE — TELEPHONE ENCOUNTER
Caller: Marcela Ramírez    Relationship: Self    Best call back number: [phone]   What form or medical record are you requesting: SHORT TERM DISABILITY FORM     Who is requesting this form or medical record from you:     How would you like to receive the form or medical records (pick-up, mail, fax): PICK-UP    Timeframe paperwork needed: ASAP PLEASE.     Additional notes: THE PATIENT REPORTS THAT SHE HAD A HOSPITAL FOLLOW-UP VISIT WITH DR OLIVARES ON 09/22/2021 AND REPORTS THAT SHE GAVE DR OLIVARES A SHORT TERM DISABILITY FORM THAT NEEDS TO BE FILLED OUT AND FAXED TO [facility] [employer] [facility] [employer]. THE PATIENT REPORTS THAT THE PAPERWORK WAS NOT FAXED TO [employer] AS OF 09/24/2021 AND STATES THAT SHE HAS MISSED ADDITIONAL DAYS THAT ARE NOT ON THE FORM, THAT NEEDS TO BE ON THE FORM. SHE IS REQUESTING THE DATES ON THE FORM BE UPDATED FROM 09/15/2021 UNTIL 10/03/2021, WITHOUT ACCOMMODATIONS. THE PATIENT IS REQUESTING TO PICK THIS PAPERWORK UP ASAP. PLEASE CALL AND ADVISE THE PATIENT WHEN READY TO PICK--[phone].

## 2021-09-27 NOTE — PROGRESS NOTES
PROGRESS NOTE    Data:  Marcela Ramírez is a 56 y.o. female who met with the undersigned for a scheduled individual outpatient therapy session from 11:16am - 12:08pm.      Clinical Maneuvering/Intervention:      The pt talked about struggling with health problems and family conflict. She talked about falling ill again, last week, but now starting to feel better. Stressors were processed individually and in detail. Venting of frustrations was conducted in order to help the pt feel less tense emotionally and gain insight into issues. Feelings were processed and validated several times in session. Perspective taking was conducted multiple times in order to help her feel less stuck, less overwhelmed, and see challenges as much more manageable. Active listening was conducted in order to help the pt make sense of stressors and start moving towards potential solutions. The pt was assisted with finding solutions based on existing skill-set and abilities. It was noted that the pt seemed more centered, calmer, and more 'on top of things,' than in many sessions. The pt was assisted in recognizing progress in order to show encouragement and promote motivation to keep making positive changes in life. She was assisted with seeing her own doing (owning her strengths) that has led to such improvements. She was assisted still, however, to talk about how she will be successful with current tasks/challenges today with the least amount of stress possible. This intervention was conducted to build insight and self-esteem. The pt's strengths were identified in order to help identify abilities to use to better face/overcome challenges. Some humor was used in session as it is one of the pt's coping skills. Humor was used too, to help the pt see things as less challenging and more manageable than they first seemed. Humor was used as well, to model use of the skill as a way to decrease tension ongoing.  Homework was assigned tailored to  pt's needs.  The pt expressed gratitude for today's session.    Mental Status Exam  Hygiene:  good  Dress: normal  Attitude:  cooperative and proactive  Motor Activity: normal  Speech: fast  Mood:  level  Affect:  congruent  Thought Processes: normal  Thought Content:  normal  Suicidal Thoughts:  not endorsed  Homicidal Thoughts:  not endorsed  Crisis Safety Plan: not needed  Hallucinations:  none      Patient's Support Network Includes:  family, friends      Progress toward goal: there is evidence to suggest that she struggles with mood swings/mood management typically, but was calmer today.       Functional Status: moderate       Prognosis: good    Assessment      The pt presented to be struggling less with managing her moods related to having bipolar disorder. Home environment is stressful with her son and son's girlfriend living there and their tendency to abuse substances. She has cut back on drinking alcohol but still seems hesitant to work a program of recovery from alcoholism.      Plan      In order to diminish symptoms of bipolar disorder and improve with mood management, she will continue with counseling (ongoing) and cut back further on alcohol use, abstain altogether as soon as possible. She will work her plan of action today in terms of being 'successful,' but with the least amount of stress/enjoying today (repeating this process tomorrow).     Maribel Hernandez, PhD, LP

## 2021-09-27 NOTE — PROGRESS NOTES
[facility] BARIATRIC SURGERY  [address]  [address]  [facility]ly [facility] [id number] [phone]      Patient  Name:  Marcela Ramírez  :  1965        Chief Complaint:  weight gain; unable to maintain weight loss    History of Present Illness:  Marcela Ramírez is a 56 y.o. female  s/p LSG/ paraesophageal HHR by  on 19 who presents today for evaluation, education and consultation regarding bariatric and metabolic surgery. The patient is interested in revision with Dr. Greenberg.       Marcela has been overweight for many years with numerous failed dietary/weight loss attempts.  She now has obesity related comorbidities and as such has decided to pursue revision weight loss surgery.    Pt is 2 years s/p LSG, she was seen 1 week postop and 10 weeks postop LSG for acute abd pain, no other bariatric followup postop. Lowest weight 202lb, was very excited and happy with progress.  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  Has gained since and is really hoping that she can have a second chance at bariatric surgery to allow for further weightloss.  Continues to note restriction with very small portion sizes.  Trying to wean off pepsi and pasta. Had significant intake of pepsi prior to LSG and doesn't feel she can go off completely due to caffeine need.   Takes omeprazole daily 20mg- reflux is baseline from prior to LSG.   Taking full vitamin regimen from preop, has been very diligent to stay on track with vitamins.     GI: GERD controlled with omeprazole 20mg. Last EGD with Dr. Greenberg- no hiatal hernia Z line 40 cm very thick mucosal folds cannot rule out varices.  CT scan thickened fundus, no varices seen. No h/o HH or h pylori. S/p spenser with cholelithiasis. Diverticulosis. S/p spenser with cholelithiasis.      H/o HTN, HLD, LE edema. TIA-mimicking symptoms x 8-9 episodes including right sided  "drooping/ weakness/ vision changes. Extensive workup was negative for CVA with angio imaging- per patient. Was determined to be related to anxiety/ getting really worked up. No residual effects. She did see neurology for clearance prior to her LSG and follows with them regularly for DM neuropathy and RLS.  DM Diagnosed 2017, was on metformin in the past. Last A1C 5.5%. CKD- last GFR 49. h/o blood transfusion with heavy menses 2006.  Carpal tunnel. H/o astham- not on inhalers. Arthritis- knees, otc arthritis meds prn, no h/o injections.  bipolar controlled on meds. Says she faithfully takes her bipolar meds to control symptoms, otherwise is \"really mean and angry\".     All other past medical, surgical, social and family history have been obtained and discussed as pertinent to bariatric surgery as below.     Past Medical History:   Diagnosis Date   • Arthritis     knees, otc arthritis meds prn, no h/o injections.    • Asthma     has not required inhaler   • Bilateral ovarian cysts    • Bipolar 1 disorder (CMS/[facility])    • Boil     opens them herself   • Breast injury 07/13/2021    bruise on lateral rt breast from fall   • Carpal tunnel syndrome    • CKD (chronic kidney disease), symptom management only, stage 3 (moderate) (CMS/[facility])     Creatinine 1.04 GFR 56   • Colon polyp    • Depression    • Diabetes mellitus (CMS/[facility])     Diagnosed 2017, was on metformin in the past. Last A1C 5.5%   • Diverticulosis    • Fatigue    • GERD (gastroesophageal reflux disease)     controlled with omeprazole 20mg. Remote EGD- esophagitis.  EGD no hiatal hernia Z line 40 cm very thick mucosal folds cannot rule out varices.  CT scan thickened fundus, no varices seen   • Headache    • History of bladder infections    • History of blood transfusion 2006    2/2 heavy menses   • History of bronchitis    • Hyperlipidemia    • Hypertension    • Hypothyroidism    • Lower extremity edema    • Morbid obesity with BMI of 40.0-44.9, adult (CMS/[facility])  "   • Peripheral neuropathy     2/2 DM   • RLS (restless legs syndrome)    • S/P spenser    • S/P cholecystectomy     2/2 cholelithiasis   • Shortness of breath on exertion    • TIA (transient ischemic attack)     patient states 8-9 episodes of right sided drooping/ weakness/ vision changes. Workup was negative for CVA- thought to be related to anxiety. last episode 12/2017     Past Surgical History:   Procedure Laterality Date   • COLONOSCOPY  remote    diverticulosis   • ENDOMETRIAL ABLATION     • ENDOSCOPY  remote    esophagitis    • ENDOSCOPY N/A 8/22/2019    Procedure: ESOPHAGOGASTRODUODENOSCOPY;  Surgeon: Guido Greenberg MD;  Location:  [facility];  Service: Bariatric   • GASTRIC SLEEVE LAPAROSCOPIC N/A 8/22/2019    Procedure: GASTRIC SLEEVE LAPAROSCOPIC;  Surgeon: Guido Greenberg MD;  Location:  [facility];  Service: Bariatric   • KNEE ACL RECONSTRUCTION Right 2013    with miniscal repair   • LAPAROSCOPIC CHOLECYSTECTOMY  2001    cholelithiasis   • LAPAROSCOPIC HYSTERECTOMY  2013    right ovary remains   • PARAESOPHAGEAL HERNIA REPAIR N/A 8/22/2019    Procedure: PARAESOPHAGEAL HERNIA REPAIR LAPAROSCOPIC;  Surgeon: Guido Greenberg MD;  Location:  [facility];  Service: Bariatric   • SINUS SURGERY     • TUBAL ABDOMINAL LIGATION         Allergies   Allergen Reactions   • Contrast Dye Other (See Comments)     Nasal congestion/ snot, IV contrast only       Current Outpatient Medications:   •  ALPRAZolam (Xanax) 1 MG tablet, Take 1 tablet by mouth Every Night. (Patient taking differently: Take 1 mg by mouth At Night As Needed for Anxiety or Sleep.), Disp: 30 tablet, Rfl: 5  •  amLODIPine (NORVASC) 5 MG tablet, Take 1 tablet by mouth Daily., Disp: 30 tablet, Rfl: 2  •  atorvastatin (LIPITOR) 20 MG tablet, TAKE ONE TABLET BY MOUTH DAILY, Disp: 30 tablet, Rfl: 2  •  desvenlafaxine (PRISTIQ) 50 MG 24 hr tablet, TAKE ONE TABLET BY MOUTH DAILY, Disp: 30 tablet, Rfl: 10  •  hydroCHLOROthiazide (HYDRODIURIL) 25 MG  tablet, Take 1 tablet by mouth Daily., Disp: 30 tablet, Rfl: 11  •  lamoTRIgine (LaMICtal) 200 MG tablet, TAKE ONE TABLET BY MOUTH ONCE NIGHTLY, Disp: 30 tablet, Rfl: 10  •  levothyroxine (SYNTHROID, LEVOTHROID) 50 MCG tablet, Take 1 tablet by mouth Every Morning., Disp: 30 tablet, Rfl: 2  •  lisinopril (PRINIVIL,ZESTRIL) 40 MG tablet, Take 1 tablet by mouth Daily., Disp: 30 tablet, Rfl: 5  •  metFORMIN ER (GLUCOPHAGE-XR) 500 MG 24 hr tablet, TAKE ONE TABLET BY MOUTH TWICE A DAY BEFORE MEALS, Disp: 60 tablet, Rfl: 5  •  metoprolol succinate XL (Toprol XL) 50 MG 24 hr tablet, Take 1 tablet by mouth Daily., Disp: 30 tablet, Rfl: 5  •  Neupro 1 MG/24HR patch 24 hour 24 hour patch, APPLY ONE PATCH TO THE SKIN DAILY AS DIRECTED BY PROVIDER, Disp: 30 patch, Rfl: 10  •  omeprazole (priLOSEC) 20 MG capsule, TAKE ONE CAPSULE BY MOUTH DAILY, Disp: 30 capsule, Rfl: 11  •  pregabalin (Lyrica) 50 MG capsule, Take 1 capsule by mouth 3 (Three) Times a Day., Disp: 90 capsule, Rfl: 5  •  ONE TOUCH ULTRA TEST test strip, TEST GLUCOSE TWO TIMES A DAY, Disp: 100 each, Rfl: 12    Social History     Socioeconomic History   • Marital status:      Spouse name: Not on file   • Number of children: Not on file   • Years of education: Not on file   • Highest education level: Not on file   Tobacco Use   • Smoking status: Never Smoker   • Smokeless tobacco: Never Used   Vaping Use   • Vaping Use: Never used   Substance and Sexual Activity   • Alcohol use: Not Currently   • Drug use: Not Currently     Types: Marijuana, Cocaine(coke)     Comment: about 4 years ago, 32 years ago - cocaine and THC   • Sexual activity: Defer     Comment: no hormones     Family History   Problem Relation Age of Onset   • Arthritis Mother    • Arthritis Father    • Diabetes Father    • Hyperlipidemia Father    • Hypertension Father    • Asthma Brother    • Other Brother    • Cancer Maternal Aunt    • Depression Paternal Uncle    • Breast cancer Neg Hx    •  Ovarian cancer Neg Hx        Review of Systems:  Constitutional:  Reports fatigue, weight gain and denies fevers, chills.  HEENT:  denies headache, ear pain or loss of hearing, blurred or double vision, nasal discharge or sore throat.  Cardiovascular:  Reports HTN, HLD, edema and denies CAD, Atrial Fib, hx heart disease, heart murmur, hx MI, chest pain, palpitations, hx DVT.  Respiratory:  Reports dyspnea on exertion, asthma and denies cough , wheezing, sleep apnea, hx PE.  Gastrointestinal:  Reports heartburn, gallbladder issues and denies dysphagia, nausea, vomiting, abdominal pain, IBS, diarrhea, constipation, melena, blood in stool, liver disease, hx pancreatitis.  Genitourinary:  Reports past CKD with NSAID use and denies history of  frequent UTI, incontinence, hematuria, dysuria, polyuria, polydipsia, renal failure.    Musculoskeletal:  Reports joint pain and denies autoimmune disease.  Neurological:  Reports numbness /tingling and denies headaches, migraines, dizziness, confusion, seizure, stroke.  Psychiatric:  Reports hx depression, bipolar disorder and denies bipolar disorder, suicidal ideation, hx suicide attempt, hx self injury, hx substance abuse, eating disorder.  Endocrine:  Reports diabetes and denies thyroid disease, gout.  Hematologic:  Reports hx blood transfusion and denies bruising, bleeding disorder, hx anemia.  Skin:  Reports none and denies rashes, hx MRSA.      Physical Exam:  Vital Signs:  Weight: 110 kg (241 lb 8 oz)   Body mass index is 37.82 kg/m².  Temp: 98.1 °F (36.7 °C)   Heart Rate: 77   BP: 122/68     Physical Exam  Vitals reviewed.   Constitutional:       Appearance: She is well-developed. She is obese.   HENT:      Head: Normocephalic.      Comments: mask  Neck:      Thyroid: No thyromegaly.   Cardiovascular:      Rate and Rhythm: Normal rate and regular rhythm.      Heart sounds: Normal heart sounds.   Pulmonary:      Effort: Pulmonary effort is normal. No respiratory distress.       Breath sounds: Normal breath sounds. No wheezing.   Abdominal:      General: Bowel sounds are normal. There is no distension.      Palpations: Abdomen is soft.      Tenderness: There is no abdominal tenderness.      Comments: Lap scars   Musculoskeletal:         General: Normal range of motion.      Cervical back: Normal range of motion and neck supple.   Skin:     General: Skin is warm and dry.   Neurological:      Mental Status: She is alert and oriented to person, place, and time.   Psychiatric:         Mood and Affect: Mood normal.         Behavior: Behavior normal.         Thought Content: Thought content normal.         Judgment: Judgment normal.         Patient Active Problem List   Diagnosis   • Acute suppurative otitis media of right ear with spontaneous rupture of tympanic membrane   • Depression   • Anxiety   • Diabetic peripheral neuropathy (CMS/HCC)   • Hypertension   • Hyperlipidemia   • Diverticulosis   • Type 2 diabetes mellitus (CMS/HCC)   • TIA (transient ischemic attack)   • Peripheral neuropathy   • GERD (gastroesophageal reflux disease)   • Bipolar 1 disorder (CMS/HCC)   • Bilateral ovarian cysts   • S/P cholecystectomy   • Headache   • Lower extremity edema   • RLS (restless legs syndrome)   • Shortness of breath on exertion   • Carpal tunnel syndrome   • History of blood transfusion   • Nausea   • Colon polyp   • Asthma   • Arthritis   • Boil   • Morbid obesity with BMI of 40.0-44.9, adult (CMS/HCC)   • Pre-op evaluation   • Epigastric pain   • Morbid obesity due to excess calories (CMS/[facility])   • Hypertensive emergency   • Hypothyroidism   • S/P spenser   • Hypothyroidism       Assessment:    Marcela Ramírez is a 56 y.o. year old female with medically complicated obesity pursuing Revision.    Weight loss surgery is deemed medically necessary given the following obesity related comorbidities including hypertension, dyslipidemia, osteoarthritis, back pain, knee pain, GERD, gall bladder  disease, edema, depression and mental health disease with current Weight: 110 kg (241 lb 8 oz) and Body mass index is 37.82 kg/m²..    Plan:  The consultation plan and program requirements were reviewed with the patient.  The patient has been advised that a letter of medical support must be obtained from her primary care physician or referring provider. A psychological evaluation will be arranged.  A nutritional evaluation will be performed.  The patient was advised to start a high protein and low carbohydrate diet.  Necessary lifestyle modifications were discussed.  Instructions on how to access MELIDA was given to the patient.  MELIDA is an internet based educational video that explains the surgical procedure chosen and answers basic questions regarding that procedure.     Patient's Body mass index is 37.82 kg/m². indicating that she is obese (BMI >30). Obesity-related health conditions include the following: as above. Obesity is worsening. BMI is is above average; BMI management plan is completed. We discussed consulting a Bariatric surgeon..      Will proceed with UGI followed by EGD, should pt be a candidate for revision surgery, further testing  will include: CBC, CMP, Lipids, TSH, HgA1C, EKG, CXR, Pulmonary Function Testing, Gastric Emptying Study, and HP serum.  (PFT completed 2019)    Additional preop clearances required prior to surgery: Cardiology, Nephrology and Neurology.      The patient has been educated on expected postoperative lifestyle changes, including commitment to high protein diet, vitamin regimen, and exercise program.  They are aware that support groups are encouraged for optimal weight loss results. Patient understands that bariatric surgery is not cosmetic surgery but rather a tool to help make a lifelong commitment to lifestyle changes including diet, exercise, behavior modifications, and healthy habits. The procedure was discussed with the patient and all questions were answered. The  importance of avoiding ASA/ NSAIDS/ steroids/ tobacco/ hormones/ immunomodulators perioperatively was discussed.         Janis España PA-C      I spent 45 minutes caring for Marcela on this date of service. This time includes time spent by me in the following activities: preparing for the visit, reviewing tests, performing a medically appropriate examination and/or evaluation, counseling and educating the patient/family/caregiver, ordering medications, tests, or procedures and documenting information in the medical record.

## 2021-09-27 NOTE — TELEPHONE ENCOUNTER
Notified pt those were faxed on 09/24 with the confirmation receipt, she says it's most likely an [employer] issue, and now she can't start back until the beginning of her next work week 10/03, paperwork edited and she says she will  copy today.

## 2021-09-29 ENCOUNTER — READMISSION MANAGEMENT (OUTPATIENT)
Dept: CALL CENTER | Facility: HOSPITAL | Age: 56
End: 2021-09-29

## 2021-09-29 NOTE — OUTREACH NOTE
Medical Week 3 Survey      Responses   [facility] patient discharged from?  [facility]   Does the patient have one of the following disease processes/diagnoses(primary or secondary)?  Other   Week 3 attempt successful?  Yes   Call start time  0746   Call end time  0751   Discharge diagnosis  Hypertensive emergency, T2DM   Is patient permission given to speak with other caregiver?  No   Meds reviewed with patient/caregiver?  Yes   Is the patient having any side effects they believe may be caused by any medication additions or changes?  No   Does the patient have all medications ordered at discharge?  Yes   Is the patient taking all medications as directed (includes completed medication regime)?  Yes   Medication comments  Her PCP added a new medication for Bp.    Does the patient have a primary care provider?   Yes   Comments regarding PCP  She  was seen by PCP.    Does the patient have an appointment with their PCP within 7 days of discharge?  Yes   Has the patient kept scheduled appointments due by today?  Yes   Has home health visited the patient within 72 hours of discharge?  N/A   Psychosocial issues?  No   Did the patient receive a copy of their discharge instructions?  Yes   Nursing interventions  Reviewed instructions with patient   What is the patient's perception of their health status since discharge?  Improving [SHe states her BP is doing well for her- it is 128/80 A1C is 5. ]   Is the patient/caregiver able to teach back signs and symptoms related to disease process for when to call PCP?  Yes   Is the patient/caregiver able to teach back signs and symptoms related to disease process for when to call 911?  Yes   Is the patient/caregiver able to teach back the hierarchy of who to call/visit for symptoms/problems? PCP, Specialist, Home health nurse, Urgent Care, ED, 911  Yes   If the patient is a current smoker, are they able to teach back resources for cessation?  Not a smoker   Week 3 Call Completed?   Yes   Graduated  Yes   Is the patient interested in additional calls from an ambulatory ?  NOTE:  applies to high risk patients requiring additional follow-up.  No   Did the patient feel the follow up calls were helpful during their recovery period?  Yes   Was the number of calls appropriate?  Yes   Wrap up additional comments  She is doing very well.           Manda Saunders RN

## 2021-10-11 RX ORDER — OMEPRAZOLE 20 MG/1
CAPSULE, DELAYED RELEASE ORAL
Qty: 30 CAPSULE | Refills: 11 | Status: SHIPPED | OUTPATIENT
Start: 2021-10-11 | End: 2022-11-03

## 2021-10-12 ENCOUNTER — APPOINTMENT (OUTPATIENT)
Dept: GENERAL RADIOLOGY | Facility: HOSPITAL | Age: 56
End: 2021-10-12

## 2021-10-15 DIAGNOSIS — Z20.822 ENCOUNTER FOR PREOPERATIVE SCREENING LABORATORY TESTING FOR COVID-19 VIRUS: Primary | ICD-10-CM

## 2021-10-15 DIAGNOSIS — Z01.812 ENCOUNTER FOR PREOPERATIVE SCREENING LABORATORY TESTING FOR COVID-19 VIRUS: Primary | ICD-10-CM

## 2021-10-17 ENCOUNTER — APPOINTMENT (OUTPATIENT)
Dept: PREADMISSION TESTING | Facility: HOSPITAL | Age: 56
End: 2021-10-17

## 2021-10-18 RX ORDER — ROTIGOTINE 1 MG/24H
PATCH, EXTENDED RELEASE TRANSDERMAL
Qty: 30 PATCH | Refills: 10 | Status: SHIPPED | OUTPATIENT
Start: 2021-10-18 | End: 2022-10-05

## 2021-10-19 ENCOUNTER — HOSPITAL ENCOUNTER (EMERGENCY)
Facility: HOSPITAL | Age: 56
Discharge: HOME | End: 2021-10-20
Attending: EMERGENCY MEDICINE | Admitting: EMERGENCY MEDICINE

## 2021-10-19 DIAGNOSIS — Z72.89 SELF-MUTILATION: ICD-10-CM

## 2021-10-19 DIAGNOSIS — F10.920 ACUTE ALCOHOL INTOXICATION, UNCOMPLICATED (HCC): Primary | ICD-10-CM

## 2021-10-19 DIAGNOSIS — Z86.73 HISTORY OF TIA (TRANSIENT ISCHEMIC ATTACK): ICD-10-CM

## 2021-10-19 DIAGNOSIS — Z86.79 HISTORY OF HYPERTENSION: ICD-10-CM

## 2021-10-19 DIAGNOSIS — Z86.59 HISTORY OF BIPOLAR DISORDER: ICD-10-CM

## 2021-10-19 DIAGNOSIS — R45.89 EMOTIONAL UPSET: ICD-10-CM

## 2021-10-19 DIAGNOSIS — R45.851 VERBALIZES SUICIDAL THOUGHTS: ICD-10-CM

## 2021-10-19 LAB
ALBUMIN SERPL-MCNC: 4.6 G/DL (ref 3.5–5.2)
ALBUMIN/GLOB SERPL: 1.5 G/DL
ALP SERPL-CCNC: 93 U/L (ref 39–117)
ALT SERPL W P-5'-P-CCNC: 17 U/L (ref 1–33)
AMPHET+METHAMPHET UR QL: NEGATIVE
AMPHETAMINES UR QL: NEGATIVE
ANION GAP SERPL CALCULATED.3IONS-SCNC: 12 MMOL/L (ref 5–15)
APAP SERPL-MCNC: <5 MCG/ML (ref 0–30)
AST SERPL-CCNC: 22 U/L (ref 1–32)
BARBITURATES UR QL SCN: NEGATIVE
BASOPHILS # BLD AUTO: 0.05 10*3/MM3 (ref 0–0.2)
BASOPHILS NFR BLD AUTO: 0.5 % (ref 0–1.5)
BENZODIAZ UR QL SCN: POSITIVE
BILIRUB SERPL-MCNC: 0.4 MG/DL (ref 0–1.2)
BILIRUB UR QL STRIP: NEGATIVE
BUN SERPL-MCNC: 20 MG/DL (ref 6–20)
BUN/CREAT SERPL: 14.1 (ref 7–25)
BUPRENORPHINE SERPL-MCNC: NEGATIVE NG/ML
CALCIUM SPEC-SCNC: 9.4 MG/DL (ref 8.6–10.5)
CANNABINOIDS SERPL QL: POSITIVE
CHLORIDE SERPL-SCNC: 99 MMOL/L (ref 98–107)
CLARITY UR: CLEAR
CO2 SERPL-SCNC: 30 MMOL/L (ref 22–29)
COCAINE UR QL: NEGATIVE
COLOR UR: YELLOW
CREAT SERPL-MCNC: 1.42 MG/DL (ref 0.57–1)
DEPRECATED RDW RBC AUTO: 46.5 FL (ref 37–54)
EOSINOPHIL # BLD AUTO: 0.23 10*3/MM3 (ref 0–0.4)
EOSINOPHIL NFR BLD AUTO: 2.5 % (ref 0.3–6.2)
ERYTHROCYTE [DISTWIDTH] IN BLOOD BY AUTOMATED COUNT: 13.3 % (ref 12.3–15.4)
ETHANOL BLD-MCNC: 139 MG/DL (ref 0–10)
GFR SERPL CREATININE-BSD FRML MDRD: 39 ML/MIN/1.73
GLOBULIN UR ELPH-MCNC: 3.1 GM/DL
GLUCOSE SERPL-MCNC: 136 MG/DL (ref 65–99)
GLUCOSE UR STRIP-MCNC: NEGATIVE MG/DL
HCT VFR BLD AUTO: 37.3 % (ref 34–46.6)
HGB BLD-MCNC: 12.3 G/DL (ref 12–15.9)
HGB UR QL STRIP.AUTO: NEGATIVE
HOLD SPECIMEN: NORMAL
IMM GRANULOCYTES # BLD AUTO: 0.05 10*3/MM3 (ref 0–0.05)
IMM GRANULOCYTES NFR BLD AUTO: 0.5 % (ref 0–0.5)
KETONES UR QL STRIP: NEGATIVE
LEUKOCYTE ESTERASE UR QL STRIP.AUTO: NEGATIVE
LIPASE SERPL-CCNC: 24 U/L (ref 13–60)
LYMPHOCYTES # BLD AUTO: 2.37 10*3/MM3 (ref 0.7–3.1)
LYMPHOCYTES NFR BLD AUTO: 25.9 % (ref 19.6–45.3)
MCH RBC QN AUTO: 32.1 PG (ref 26.6–33)
MCHC RBC AUTO-ENTMCNC: 33 G/DL (ref 31.5–35.7)
MCV RBC AUTO: 97.4 FL (ref 79–97)
METHADONE UR QL SCN: NEGATIVE
MONOCYTES # BLD AUTO: 0.59 10*3/MM3 (ref 0.1–0.9)
MONOCYTES NFR BLD AUTO: 6.5 % (ref 5–12)
NEUTROPHILS NFR BLD AUTO: 5.85 10*3/MM3 (ref 1.7–7)
NEUTROPHILS NFR BLD AUTO: 64.1 % (ref 42.7–76)
NITRITE UR QL STRIP: NEGATIVE
NRBC BLD AUTO-RTO: 0 /100 WBC (ref 0–0.2)
OPIATES UR QL: NEGATIVE
OXYCODONE UR QL SCN: NEGATIVE
PCP UR QL SCN: NEGATIVE
PH UR STRIP.AUTO: 6.5 [PH] (ref 5–8)
PLATELET # BLD AUTO: 193 10*3/MM3 (ref 140–450)
PMV BLD AUTO: 11.2 FL (ref 6–12)
POTASSIUM SERPL-SCNC: 3.3 MMOL/L (ref 3.5–5.2)
PROPOXYPH UR QL: NEGATIVE
PROT SERPL-MCNC: 7.7 G/DL (ref 6–8.5)
PROT UR QL STRIP: NEGATIVE
RBC # BLD AUTO: 3.83 10*6/MM3 (ref 3.77–5.28)
SALICYLATES SERPL-MCNC: <0.3 MG/DL
SODIUM SERPL-SCNC: 141 MMOL/L (ref 136–145)
SP GR UR STRIP: 1.01 (ref 1–1.03)
TRICYCLICS UR QL SCN: NEGATIVE
TSH SERPL DL<=0.05 MIU/L-ACNC: 2.22 UIU/ML (ref 0.27–4.2)
UROBILINOGEN UR QL STRIP: NORMAL
WBC # BLD AUTO: 9.14 10*3/MM3 (ref 3.4–10.8)
WHOLE BLOOD HOLD SPECIMEN: NORMAL
WHOLE BLOOD HOLD SPECIMEN: NORMAL

## 2021-10-19 PROCEDURE — 80179 DRUG ASSAY SALICYLATE: CPT | Performed by: EMERGENCY MEDICINE

## 2021-10-19 PROCEDURE — 99285 EMERGENCY DEPT VISIT HI MDM: CPT

## 2021-10-19 PROCEDURE — 81003 URINALYSIS AUTO W/O SCOPE: CPT | Performed by: EMERGENCY MEDICINE

## 2021-10-19 PROCEDURE — 80050 GENERAL HEALTH PANEL: CPT | Performed by: EMERGENCY MEDICINE

## 2021-10-19 PROCEDURE — 80143 DRUG ASSAY ACETAMINOPHEN: CPT | Performed by: EMERGENCY MEDICINE

## 2021-10-19 PROCEDURE — 83690 ASSAY OF LIPASE: CPT | Performed by: EMERGENCY MEDICINE

## 2021-10-19 PROCEDURE — 82077 ASSAY SPEC XCP UR&BREATH IA: CPT

## 2021-10-19 PROCEDURE — 80306 DRUG TEST PRSMV INSTRMNT: CPT

## 2021-10-19 RX ORDER — ACETAMINOPHEN 500 MG
1000 TABLET ORAL ONCE
Status: COMPLETED | OUTPATIENT
Start: 2021-10-19 | End: 2021-10-19

## 2021-10-19 RX ADMIN — ACETAMINOPHEN 1000 MG: 500 TABLET, FILM COATED ORAL at 22:16

## 2021-10-19 NOTE — ED PROVIDER NOTES
Subjective   MsIris Ramírez is a 56 y.o. female who presents to the ED with c/o suicide attempt. She was on her way home around 1500 this afternoon when she felt like crashing her car to kill herself, but did not. When she got home she began fighting with her son. She began drinking 80 proof liquor and then started cutting her wrists under hot water. She has lacerations on both her forearms. She has bandages on both her forearms. She states she is still suicidal. She has history of bipolar disorder and sees Dr. Duke. Her last A1c was 5.5% and still takes metformin. She has been suicidal in the past. There are no other acute symptoms at this time.      History provided by:  Patient  Suicidal  Presenting symptoms: suicidal thoughts, suicidal threats and suicide attempt    Degree of incapacity (severity):  Moderate  Onset quality:  Sudden  Timing:  Constant  Progression:  Unchanged  Chronicity:  Recurrent  Relieved by:  Nothing  Worsened by:  Nothing  Risk factors: hx of suicide attempts        Review of Systems   Skin: Positive for wound.   Psychiatric/Behavioral: Positive for suicidal ideas.   All other systems reviewed and are negative.      Past Medical History:   Diagnosis Date   • Arthritis     knees, otc arthritis meds prn, no h/o injections.    • Asthma     has not required inhaler   • Bilateral ovarian cysts    • Bipolar 1 disorder (CMS/HCC)    • Boil     opens them herself   • Breast injury 07/13/2021    bruise on lateral rt breast from fall   • Carpal tunnel syndrome    • CKD (chronic kidney disease), symptom management only, stage 3 (moderate) (CMS/HCC)     Creatinine 1.04 GFR 56   • Colon polyp    • Depression    • Diabetes mellitus (CMS/HCC)     Diagnosed 2017, was on metformin in the past. Last A1C 5.5%   • Diverticulosis    • Fatigue    • GERD (gastroesophageal reflux disease)     controlled with omeprazole 20mg. Remote EGD- esophagitis.  EGD no hiatal hernia Z line 40 cm very thick mucosal  folds cannot rule out varices.  CT scan thickened fundus, no varices seen   • Headache    • History of bladder infections    • History of blood transfusion 2006 2/2 heavy menses   • History of bronchitis    • Hyperlipidemia    • Hypertension    • Hypothyroidism    • Lower extremity edema    • Morbid obesity with BMI of 40.0-44.9, adult (CMS/HCC)    • Peripheral neuropathy     2/2 DM   • RLS (restless legs syndrome)    • S/P spenser    • S/P cholecystectomy     2/2 cholelithiasis   • Shortness of breath on exertion    • TIA (transient ischemic attack)     patient states 8-9 episodes of right sided drooping/ weakness/ vision changes. Workup was negative for CVA- thought to be related to anxiety. last episode 12/2017       Allergies   Allergen Reactions   • Contrast Dye Other (See Comments)     Nasal congestion/ snot, IV contrast only       Past Surgical History:   Procedure Laterality Date   • COLONOSCOPY  remote    diverticulosis   • ENDOMETRIAL ABLATION     • ENDOSCOPY  remote    esophagitis    • ENDOSCOPY N/A 8/22/2019    Procedure: ESOPHAGOGASTRODUODENOSCOPY;  Surgeon: Guido Greenberg MD;  Location:  [facility];  Service: Bariatric   • GASTRIC SLEEVE LAPAROSCOPIC N/A 8/22/2019    Procedure: GASTRIC SLEEVE LAPAROSCOPIC;  Surgeon: Guido Greenberg MD;  Location:  [facility];  Service: Bariatric   • KNEE ACL RECONSTRUCTION Right 2013    with miniscal repair   • LAPAROSCOPIC CHOLECYSTECTOMY  2001    cholelithiasis   • LAPAROSCOPIC HYSTERECTOMY  2013    right ovary remains   • PARAESOPHAGEAL HERNIA REPAIR N/A 8/22/2019    Procedure: PARAESOPHAGEAL HERNIA REPAIR LAPAROSCOPIC;  Surgeon: Guido Greenberg MD;  Location:  [facility];  Service: Bariatric   • SINUS SURGERY     • TUBAL ABDOMINAL LIGATION         Family History   Problem Relation Age of Onset   • Arthritis Mother    • Arthritis Father    • Diabetes Father    • Hyperlipidemia Father    • Hypertension Father    • Asthma Brother    • Other Brother    •  Cancer Maternal Aunt    • Depression Paternal Uncle    • Breast cancer Neg Hx    • Ovarian cancer Neg Hx        Social History     Socioeconomic History   • Marital status:    Tobacco Use   • Smoking status: Never Smoker   • Smokeless tobacco: Never Used   Vaping Use   • Vaping Use: Never used   Substance and Sexual Activity   • Alcohol use: Not Currently   • Drug use: Not Currently     Types: Marijuana, Cocaine(coke)     Comment: about 4 years ago, 32 years ago - cocaine and THC   • Sexual activity: Defer     Comment: no hormones         Objective   Physical Exam  Vitals and nursing note reviewed.   Constitutional:       General: She is not in acute distress.     Appearance: She is well-developed.      Comments: She is intoxicated.   HENT:      Head: Normocephalic and atraumatic.      Nose: Nose normal.   Eyes:      General: No scleral icterus.     Conjunctiva/sclera: Conjunctivae normal.   Cardiovascular:      Rate and Rhythm: Normal rate and regular rhythm.      Heart sounds: Normal heart sounds. No murmur heard.      Pulmonary:      Effort: Pulmonary effort is normal. No respiratory distress.      Breath sounds: Normal breath sounds.   Abdominal:      Palpations: Abdomen is soft.      Tenderness: There is no abdominal tenderness.   Musculoskeletal:         General: Normal range of motion.      Cervical back: Normal range of motion and neck supple.      Comments: Multiple overall superficial lacerations on the bilateral forearms.  The deepest laceration is on the left upper extremity extending into the dermis with approximately 2 mm of separation.  No active bleeding.  Neurovascular intact.   Skin:     General: Skin is warm and dry.      Comments: Multiple longitudinal cuts on her volar bilateral forearms.     Neurological:      Mental Status: She is alert and oriented to person, place, and time.   Psychiatric:         Behavior: Behavior normal.         Procedures         ED Course  ED Course as of  10/20/21 1010   Tue Oct 19, 2021   1858 Ethanol(!): 139 [RS]   1858 Creatinine(!): 1.42 [RS]   Wed Oct 20, 2021   0049 Jewell from [facility].  She has talked with patient over 1 hour.  Patient is completely sober during psych eval.  Pt is very remorseful and had an argument with her son and went and drank ETOH.  She is established in the outpatient setting with psych and can follow up closely.  Her last suicide attempt ws 40 years ago.  Patient lives with her .  Her son is on drugs.  She reports strong family support from her , other children, and multiple grandchildren.  Behavioral assessment is stable at this time and patient is not felt to be a harm to herself and has good judgement at this time. [FC]   0053 Psychiatric evaluation reports close outpatient follow up with patient on 10/22/21.   [FC]   0108 I performed a thorough evaluation after I discussed the case with mobile assessment, Bhumi.  Patient is very remorseful and is sober at this time.  Her  is at the bedside.  Patient is adamant that she does not want to hurt herself in any way.  She has multiple things that she wants to live for and she says that this was a brief lapse in judgment after  her son left her a terrible voicemail.  Patient does not want to hurt herself and I advised that if she has recurrent feelings of depression and suicidal thoughts, she needs to return immediately.  She verbalized understanding and is agreeable with above treatment plan. [FC]   0110 Patient says that she follows up with her therapist, Dr. Hernandez, through [facility] closely. [FC]   0114 Patient has been observed in our ER for over 8 hours and we feel that this is an extensive amount of time to thoroughly assess patient from a psychiatric standpoint. [FC]      ED Course User Index  [FC] Juliet Pineda PA-C  [RS] Yanick Cash MD     Recent Results (from the past 24 hour(s))   Ethanol     Collection Time: 10/19/21  6:00 PM    Specimen: Blood   Result Value Ref Range    Ethanol 139 (H) 0 - 10 mg/dL   Green Top (Gel)    Collection Time: 10/19/21  6:00 PM   Result Value Ref Range    Extra Tube Hold for add-ons.    Lavender Top    Collection Time: 10/19/21  6:00 PM   Result Value Ref Range    Extra Tube hold for add-on    Gold Top - SST    Collection Time: 10/19/21  6:00 PM   Result Value Ref Range    Extra Tube Hold for add-ons.    Gray Top    Collection Time: 10/19/21  6:00 PM   Result Value Ref Range    Extra Tube Hold for add-ons.    Light Blue Top    Collection Time: 10/19/21  6:00 PM   Result Value Ref Range    Extra Tube hold for add-on    Comprehensive Metabolic Panel    Collection Time: 10/19/21  6:00 PM    Specimen: Blood   Result Value Ref Range    Glucose 136 (H) 65 - 99 mg/dL    BUN 20 6 - 20 mg/dL    Creatinine 1.42 (H) 0.57 - 1.00 mg/dL    Sodium 141 136 - 145 mmol/L    Potassium 3.3 (L) 3.5 - 5.2 mmol/L    Chloride 99 98 - 107 mmol/L    CO2 30.0 (H) 22.0 - 29.0 mmol/L    Calcium 9.4 8.6 - 10.5 mg/dL    Total Protein 7.7 6.0 - 8.5 g/dL    Albumin 4.60 3.50 - 5.20 g/dL    ALT (SGPT) 17 1 - 33 U/L    AST (SGOT) 22 1 - 32 U/L    Alkaline Phosphatase 93 39 - 117 U/L    Total Bilirubin 0.4 0.0 - 1.2 mg/dL    eGFR Non African Amer 39 (L) >60 mL/min/1.73    Globulin 3.1 gm/dL    A/G Ratio 1.5 g/dL    BUN/Creatinine Ratio 14.1 7.0 - 25.0    Anion Gap 12.0 5.0 - 15.0 mmol/L   Lipase    Collection Time: 10/19/21  6:00 PM    Specimen: Blood   Result Value Ref Range    Lipase 24 13 - 60 U/L   Acetaminophen Level    Collection Time: 10/19/21  6:00 PM    Specimen: Blood   Result Value Ref Range    Acetaminophen <5.0 0.0 - 30.0 mcg/mL   Salicylate Level    Collection Time: 10/19/21  6:00 PM    Specimen: Blood   Result Value Ref Range    Salicylate <0.3 <=30.0 mg/dL   TSH    Collection Time: 10/19/21  6:00 PM    Specimen: Blood   Result Value Ref Range    TSH 2.220 0.270 - 4.200 uIU/mL   CBC Auto  Differential    Collection Time: 10/19/21  6:00 PM    Specimen: Blood   Result Value Ref Range    WBC 9.14 3.40 - 10.80 10*3/mm3    RBC 3.83 3.77 - 5.28 10*6/mm3    Hemoglobin 12.3 12.0 - 15.9 g/dL    Hematocrit 37.3 34.0 - 46.6 %    MCV 97.4 (H) 79.0 - 97.0 fL    MCH 32.1 26.6 - 33.0 pg    MCHC 33.0 31.5 - 35.7 g/dL    RDW 13.3 12.3 - 15.4 %    RDW-SD 46.5 37.0 - 54.0 fl    MPV 11.2 6.0 - 12.0 fL    Platelets 193 140 - 450 10*3/mm3    Neutrophil % 64.1 42.7 - 76.0 %    Lymphocyte % 25.9 19.6 - 45.3 %    Monocyte % 6.5 5.0 - 12.0 %    Eosinophil % 2.5 0.3 - 6.2 %    Basophil % 0.5 0.0 - 1.5 %    Immature Grans % 0.5 0.0 - 0.5 %    Neutrophils, Absolute 5.85 1.70 - 7.00 10*3/mm3    Lymphocytes, Absolute 2.37 0.70 - 3.10 10*3/mm3    Monocytes, Absolute 0.59 0.10 - 0.90 10*3/mm3    Eosinophils, Absolute 0.23 0.00 - 0.40 10*3/mm3    Basophils, Absolute 0.05 0.00 - 0.20 10*3/mm3    Immature Grans, Absolute 0.05 0.00 - 0.05 10*3/mm3    nRBC 0.0 0.0 - 0.2 /100 WBC   Urine Drug Screen - Urine, Clean Catch    Collection Time: 10/19/21  6:14 PM    Specimen: Urine, Clean Catch   Result Value Ref Range    THC, Screen, Urine Positive (A) Negative    Phencyclidine (PCP), Urine Negative Negative    Cocaine Screen, Urine Negative Negative    Methamphetamine, Ur Negative Negative    Opiate Screen Negative Negative    Amphetamine Screen, Urine Negative Negative    Benzodiazepine Screen, Urine Positive (A) Negative    Tricyclic Antidepressants Screen Negative Negative    Methadone Screen, Urine Negative Negative    Barbiturates Screen, Urine Negative Negative    Oxycodone Screen, Urine Negative Negative    Propoxyphene Screen Negative Negative    Buprenorphine, Screen, Urine Negative Negative   Urinalysis With Microscopic If Indicated (No Culture) - Urine, Clean Catch    Collection Time: 10/19/21  6:14 PM    Specimen: Urine, Clean Catch   Result Value Ref Range    Color, UA Yellow Yellow, Straw    Appearance, UA Clear Clear    pH, UA  "6.5 5.0 - 8.0    Specific Gravity, UA 1.006 1.001 - 1.030    Glucose, UA Negative Negative    Ketones, UA Negative Negative    Bilirubin, UA Negative Negative    Blood, UA Negative Negative    Protein, UA Negative Negative    Leuk Esterase, UA Negative Negative    Nitrite, UA Negative Negative    Urobilinogen, UA 0.2 E.U./dL 0.2 - 1.0 E.U./dL     Note: In addition to lab results from this visit, the labs listed above may include labs taken at another facility or during a different encounter within the last 24 hours. Please correlate lab times with ED admission and discharge times for further clarification of the services performed during this visit.    No orders to display     Vitals:    10/19/21 1709 10/19/21 2030 10/20/21 0200   BP: 162/96 125/67 140/91   BP Location: Right arm Right arm Right arm   Patient Position: Lying  Sitting   Pulse: 95 66 77   Resp: 18 18 20   Temp: 99.5 °F (37.5 °C)     TempSrc: Oral     SpO2: 95% 94% 93%   Weight: 110 kg (242 lb)     Height: 170.2 cm (67\")       Medications   acetaminophen (TYLENOL) tablet 1,000 mg (1,000 mg Oral Given 10/19/21 2216)     ECG/EMG Results (last 24 hours)     ** No results found for the last 24 hours. **        No orders to display                                            MDM    Final diagnoses:   Acute alcohol intoxication, uncomplicated (HCC)   Verbalizes suicidal thoughts   Self-mutilation   History of hypertension   History of bipolar disorder   History of TIA (transient ischemic attack)   Emotional upset     DISCHARGE    Patient discharged in stable condition.    Reviewed implications of results, diagnosis, meds, responsibility to follow up, warning signs and symptoms of possible worsening, potential complications and reasons to return to ER.    Patient/Family voiced understanding of above instructions.    Discussed plan for discharge, as there is no emergent indication for admission.  Pt/family is agreeable and understands need for follow up and " possible repeat testing.  Pt/family is aware that discharge does not mean that nothing is wrong but that it indicates no emergency is currently present that requires admission and they must continue care with follow-up as given below or with a physician of their choice.     FOLLOW-UP  Maribel Hernandez, PhD  [address] [id number] [phone]      Follow-up as scheduled on October 22, 2021    [facility] Emergency Department  [address] [facility] [address] [phone]    If symptoms worsen         Medication List      Changed    ALPRAZolam 1 MG tablet  Commonly known as: Xanax  Take 1 tablet by mouth Every Night.  What changed:   · when to take this  · reasons to take this          Documentation assistance provided by aga Ornelas.  Information recorded by the aga was done at my direction and has been verified and validated by me.     Brian Ornelas  10/19/21 4327       Yanick Cash MD  10/20/21 1011

## 2021-10-20 VITALS
TEMPERATURE: 99.5 F | RESPIRATION RATE: 20 BRPM | DIASTOLIC BLOOD PRESSURE: 91 MMHG | HEART RATE: 77 BPM | WEIGHT: 242 LBS | SYSTOLIC BLOOD PRESSURE: 140 MMHG | HEIGHT: 67 IN | OXYGEN SATURATION: 93 % | BODY MASS INDEX: 37.98 KG/M2
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
## 2021-10-20 NOTE — DISCHARGE INSTRUCTIONS
ER evaluation reveals stable CBC and chemistries and normal urinalysis.  Patient had mild uncomplicated alcohol intoxication and had some self-harm behavior with verbalization of suicidal thoughts.  Patient was observed in our ER for over 8 hours.  She had extensive psychiatric evaluation with our behavioral assessment team and patient was adamant against suicidal thoughts or plan.  Upon my reevaluation, patient denied any suicidal thoughts and has strong family support with her  at the bedside.  She has sound judgment and has close follow-up with her psychiatrist, Dr. Hernandez, on October 22, 2021.  Strongly advised patient not to drink alcohol when she is feeling emotionally upset.  Continue with all other current medical management.  Return to the ER if worsening symptoms.

## 2021-10-22 ENCOUNTER — OFFICE VISIT (OUTPATIENT)
Dept: BEHAVIORAL HEALTH | Facility: CLINIC | Age: 56
End: 2021-10-22

## 2021-10-22 DIAGNOSIS — F31.9 BIPOLAR 1 DISORDER (HCC): Primary | ICD-10-CM

## 2021-10-22 DIAGNOSIS — Z63.8 FAMILY CONFLICT: ICD-10-CM

## 2021-10-22 DIAGNOSIS — T14.91XA SUICIDAL BEHAVIOR WITH ATTEMPTED SELF-INJURY (HCC): ICD-10-CM

## 2021-10-22 DIAGNOSIS — F10.20 ALCOHOLISM (HCC): ICD-10-CM

## 2021-10-22 PROCEDURE — 90837 PSYTX W PT 60 MINUTES: CPT | Performed by: PSYCHOLOGIST

## 2021-10-22 SDOH — SOCIAL STABILITY - SOCIAL INSECURITY: OTHER SPECIFIED PROBLEMS RELATED TO PRIMARY SUPPORT GROUP: Z63.8
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
## 2021-10-29 ENCOUNTER — OFFICE VISIT (OUTPATIENT)
Dept: BEHAVIORAL HEALTH | Facility: CLINIC | Age: 56
End: 2021-10-29

## 2021-10-29 DIAGNOSIS — F31.9 BIPOLAR 1 DISORDER (HCC): Primary | ICD-10-CM

## 2021-10-29 PROCEDURE — 90834 PSYTX W PT 45 MINUTES: CPT | Performed by: PSYCHOLOGIST
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
## 2021-11-05 ENCOUNTER — OFFICE VISIT (OUTPATIENT)
Dept: BEHAVIORAL HEALTH | Facility: CLINIC | Age: 56
End: 2021-11-05

## 2021-11-05 DIAGNOSIS — F31.9 BIPOLAR 1 DISORDER (HCC): Primary | ICD-10-CM

## 2021-11-05 DIAGNOSIS — Z63.8 FAMILY CONFLICT: ICD-10-CM

## 2021-11-05 PROCEDURE — 90834 PSYTX W PT 45 MINUTES: CPT | Performed by: PSYCHOLOGIST

## 2021-11-05 SDOH — SOCIAL STABILITY - SOCIAL INSECURITY: OTHER SPECIFIED PROBLEMS RELATED TO PRIMARY SUPPORT GROUP: Z63.8

## 2021-11-05 NOTE — PROGRESS NOTES
PROGRESS NOTE    Data:  Marcela Ramírez is a 56 y.o. female who met with the undersigned for a scheduled individual outpatient therapy session from 11:15am - 12:10pm.      Clinical Maneuvering/Intervention:      The pt talked about struggling with family conflict and emotional distress (guilt over fighting with son, worry about son's drug addiction, feeling bad about herself). Stressors were processed individually and in detail. Venting of frustrations was conducted in order to help the pt feel less tense emotionally and gain insight into issues. Feelings were processed and validated several times in session. She was tearful in session when she talked about the anger she felt towards others and herself. She was tearful when talking about how her , MARKELL, was kind to her and pointed out how she needs to be kinder/love herself. Boundaries, assertiveness, and identifying psychological needs were all processed in depth today. Active listening was conducted in order to help the pt make sense of stressors and start moving towards potential solutions. The pt was assisted with finding solutions based on existing skill-set and abilities. Some humor was used in session as it is one of the pt's coping skills. Humor was used too, to help the pt see things as less challenging and more manageable than they first seemed. Humor was used as well, to model use of the skill as a way to decrease tension ongoing. The struggle with emotions and how boundary setting could help her cope better with the stressors in her life. Homework was assigned tailored to pt's needs. The pt expressed gratitude for today's session.    Mental Status Exam  Hygiene:  good  Dress: normal  Attitude:  cooperative and proactive  Motor Activity: normal  Speech: normal  Mood:  upset  Affect:  congruent  Thought Processes: normal  Thought Content:  normal  Suicidal Thoughts:  recently yes, but denied having them now  Homicidal Thoughts:  not endorsed  Crisis  Safety Plan: established and reviewed in detail today  Hallucinations:  none      Patient's Support Network Includes:  family, friends      Progress toward goal: there is evidence to suggest that she struggles with mood swings/mood management      Functional Status: moderate       Prognosis: good with counseling, medication, and abstinence from alcohol    Assessment      The pt presented to be strugglingwith managing her moods related to having bipolar disorder. She seems to have been in a state of emotional crisis as of late and is still struggles with coping skills to handle bipolar symptoms.      Plan      In order to diminish symptoms of bipolar disorder and abstain from alcohol: the pt is to continue with counseling (ongoing), continue with psychotropic medication as prescribed (ongoing), and practice coping skills discussed today related to stress tolerance (ongoing).    Maribel Hernandez, PhD, LP

## 2021-11-29 ENCOUNTER — TELEPHONE (OUTPATIENT)
Dept: NEUROLOGY | Facility: OTHER | Age: 56
End: 2021-11-29

## 2021-11-30 ENCOUNTER — TELEPHONE (OUTPATIENT)
Dept: FAMILY MEDICINE CLINIC | Facility: CLINIC | Age: 56
End: 2021-11-30

## 2021-11-30 NOTE — TELEPHONE ENCOUNTER
Provider: LUZ MARIA INFANTE     Caller: RAYMUNDO     Relationship to Patient: [facility]     Phone Number: [phone]    Reason for Call: RAYMUNDO STATED THAT THE PATIENT RECEIVED AN EXTENSION ON HER DISABILITY UNTIL 10/3/21 BUT THE UPDATED EXTENSION PAPERWORK DID NOT HAVE A SIGNATURE.  RAYMUNDO STATED THAT THERE WAS AN ARROW POINTING TO THE EXTENSION DATE AND WANTED TO VERIFY THAT THIS IS ACCURATE

## 2021-12-13 ENCOUNTER — OFFICE VISIT (OUTPATIENT)
Dept: BEHAVIORAL HEALTH | Facility: CLINIC | Age: 56
End: 2021-12-13

## 2021-12-13 DIAGNOSIS — F31.9 BIPOLAR 1 DISORDER (HCC): Primary | ICD-10-CM

## 2021-12-13 DIAGNOSIS — F43.9 FEELING STRESSED OUT: ICD-10-CM

## 2021-12-13 PROCEDURE — 90834 PSYTX W PT 45 MINUTES: CPT | Performed by: PSYCHOLOGIST
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
## 2021-12-21 PROCEDURE — U0004 COV-19 TEST NON-CDC HGH THRU: HCPCS | Performed by: PHYSICIAN ASSISTANT

## 2021-12-23 RX ORDER — ATORVASTATIN CALCIUM 20 MG/1
TABLET, FILM COATED ORAL
Qty: 30 TABLET | Refills: 2 | Status: SHIPPED | OUTPATIENT
Start: 2021-12-23 | End: 2022-03-30

## 2022-01-03 ENCOUNTER — OFFICE VISIT (OUTPATIENT)
Dept: BEHAVIORAL HEALTH | Facility: CLINIC | Age: 57
End: 2022-01-03

## 2022-01-03 DIAGNOSIS — F43.9 FEELING STRESSED OUT: ICD-10-CM

## 2022-01-03 DIAGNOSIS — F31.9 BIPOLAR 1 DISORDER: Primary | ICD-10-CM

## 2022-01-03 DIAGNOSIS — Z63.8 FAMILY CONFLICT: ICD-10-CM

## 2022-01-03 PROCEDURE — 90834 PSYTX W PT 45 MINUTES: CPT | Performed by: PSYCHOLOGIST

## 2022-01-03 SDOH — SOCIAL STABILITY - SOCIAL INSECURITY: OTHER SPECIFIED PROBLEMS RELATED TO PRIMARY SUPPORT GROUP: Z63.8

## 2022-01-03 NOTE — PROGRESS NOTES
PROGRESS NOTE    Data:  Marcela Ramírez is a 56 y.o. female who met with the undersigned for a scheduled individual outpatient therapy session from 10:45 - 11:40am.      Clinical Maneuvering/Intervention:      The pt talked about struggling with deep sadness due to her son's drug use and him living from place to place. She expressed significant worry about him, but also not wanting to let him back in her life, as it leads to arguments and fights. She did file a restraining order against him, but also worries about the fact that he may not know that she did this. Stressors were processed individually and in detail. Venting of frustrations was conducted in order to help the pt feel less tense emotionally and gain insight into issues. Feelings were processed and validated several times in session. Perspective taking was conducted multiple times in order to help her feel less stuck, less overwhelmed, and see challenges as much more manageable. Active listening was conducted in order to help the pt make sense of stressors and start moving towards potential solutions. The pt was assisted with finding solutions based on existing skill-set and abilities. She talked about the difficulties in not having her son, D, around for [place] or New Year's. She was assisted with her own solution finding methods. Belief was expressed in her that she knows what to do (and not do) in order to help herself and not enable him to continue acting out/using/etc. An action plan was designed to empower the pt to know what to do. Simple steps of one, two, three were laid out in order to help the pt feel more in control of things and feel less stressed. She will abide by the EPO, not contact him, maintain distance, process her feelings with loved ones, etc. Homework was assigned tailored to pt's needs. The pt expressed gratitude for today's session.    Mental Status Exam  Hygiene:  good  Dress: normal  Attitude:  cooperative and  proactive  Motor Activity: normal  Speech: normal  Mood:  sad  Affect:  congruent  Thought Processes: normal  Thought Content:  normal  Suicidal Thoughts:  not endorsed  Homicidal Thoughts:  not endorsed  Crisis Safety Plan: not needed  Hallucinations:  none      Patient's Support Network Includes:  family, friends      Progress toward goal: there is evidence to suggest that she struggles with mood swings/mood management      Functional Status: moderate       Prognosis: good with counseling, medication, and abstinence from alcohol    Assessment      The pt presented to be struggling with managing her moods related to having bipolar disorder, often being triggered by worries related to D and D's drug use. Use of alcohol exacerbates problems with her mood, but she seems to abstain from alcohol for the most part. She is on the fence in terms of working a program of recovery from alcoholism.      Plan      In order to diminish symptoms of bipolar disorder and abstain from alcohol: the pt is to continue with counseling (ongoing), continue with psychotropic medication as prescribed (ongoing), and work her plan of action in terms of keeping distance from D until he gets clean/works a plan of recovery from addiciton (ongoing).     Maribel Hernandez, PhD, LP

## 2022-01-04 DIAGNOSIS — E11.42 DIABETIC PERIPHERAL NEUROPATHY: ICD-10-CM

## 2022-01-04 DIAGNOSIS — F51.01 PRIMARY INSOMNIA: ICD-10-CM

## 2022-01-04 RX ORDER — PREGABALIN 50 MG/1
50 CAPSULE ORAL 3 TIMES DAILY
Qty: 90 CAPSULE | Refills: 5 | Status: SHIPPED | OUTPATIENT
Start: 2022-01-04 | End: 2022-02-08

## 2022-01-05 RX ORDER — ALPRAZOLAM 1 MG/1
TABLET ORAL
Qty: 30 TABLET | OUTPATIENT
Start: 2022-01-05

## 2022-01-10 ENCOUNTER — OFFICE VISIT (OUTPATIENT)
Dept: BEHAVIORAL HEALTH | Facility: CLINIC | Age: 57
End: 2022-01-10

## 2022-01-10 DIAGNOSIS — F31.9 BIPOLAR 1 DISORDER: Primary | ICD-10-CM

## 2022-01-10 PROCEDURE — 90834 PSYTX W PT 45 MINUTES: CPT | Performed by: PSYCHOLOGIST

## 2022-01-10 NOTE — PROGRESS NOTES
PROGRESS NOTE    Data:  Marcela Ramírez is a 56 y.o. female who met with the undersigned for a scheduled individual outpatient therapy session from 1:30 - 2:26pm.      Clinical Maneuvering/Intervention:      The pt talked about struggling with mood swings and at times, intense anger. Stressors were processed individually and in detail. Venting of frustrations was conducted in order to help the pt feel less tense emotionally and gain insight into issues. Feelings were processed and validated several times in session. Perspective taking was conducted multiple times in order to help her feel less stuck, less overwhelmed, and see challenges as much more manageable. Active listening was conducted in order to help the pt make sense of stressors and start moving towards potential solutions. The pt was assisted with finding solutions based on existing skill-set and abilities. The pt was assisted with putting feelings into words several times in session in order to both help diminish emotional tension and to highlight direction for change. She played part of a song that helped her express herself and what it is like having bipolar disorder. Experiences were further processed and then turned into a discussion of what she does/can do in order to prevent emotional distress from building on an ongoing basis. The pt was assisted in recognizing progress in order to show encouragement and promote motivation to keep making positive changes in life. She is crying less about her son D and practicing some healthy boundary setting mentally with him and verbally with her daughter. Homework was assigned tailored to pt's needs. The pt expressed gratitude for today's session.    Mental Status Exam  Hygiene:  good  Dress: normal  Attitude:  cooperative and proactive  Motor Activity: normal  Speech: normal  Mood:  sad, down  Affect:  congruent  Thought Processes: normal  Thought Content:  normal  Suicidal Thoughts:  not endorsed  Homicidal  Thoughts:  not endorsed  Crisis Safety Plan: not needed  Hallucinations:  none      Patient's Support Network Includes:  family, friends      Progress toward goal: there is evidence to suggest that she struggles with mood swings       Functional Status: moderate       Prognosis: good with counseling, medication, and abstinence from alcohol    Assessment      The pt presented to be struggling with managing her moods related to having bipolar disorder. Practicing habits to prevent tension from building on a daily and ongoing basis is likely needed at this time. She seems motivated to improve her psychotropic medication regimen as she plans to see her doctor about this in the next week.      Plan      In order to diminish symptoms of bipolar disorder: the pt is to continue with counseling (ongoing), continue with psychotropic medication as prescribed (ongoing), and give thought to increasing physical activity in order to release emotional and physical tension (ongoing).    Maribel Hernandez, PhD, LP

## 2022-01-17 ENCOUNTER — OFFICE VISIT (OUTPATIENT)
Dept: BEHAVIORAL HEALTH | Facility: CLINIC | Age: 57
End: 2022-01-17

## 2022-01-17 DIAGNOSIS — R45.81 LOW SELF ESTEEM: ICD-10-CM

## 2022-01-17 DIAGNOSIS — F31.9 BIPOLAR 1 DISORDER: Primary | ICD-10-CM

## 2022-01-17 DIAGNOSIS — Z63.8 FAMILY CONFLICT: ICD-10-CM

## 2022-01-17 PROCEDURE — 90837 PSYTX W PT 60 MINUTES: CPT | Performed by: PSYCHOLOGIST

## 2022-01-17 SDOH — SOCIAL STABILITY - SOCIAL INSECURITY: OTHER SPECIFIED PROBLEMS RELATED TO PRIMARY SUPPORT GROUP: Z63.8

## 2022-01-17 NOTE — PROGRESS NOTES
PROGRESS NOTE    Data:  Marcela Ramírez is a 56 y.o. female who met with the undersigned for a scheduled individual outpatient therapy session from 9:45 - 10:45am.      Clinical Maneuvering/Intervention:      The pt talked about struggling with mood swings, crying spells, and deep sadness when she thinks about her son. She talked about his drug use and him living on the street, as well as the terrible arguments they have had in the past. Stressors were processed individually and in detail. Venting of frustrations was conducted in order to help the pt feel less tense emotionally and gain insight into issues. Feelings were processed and validated several times in session. She was tearful while talking about her son and how she cried quite a bit recently thinking about him out in the cold. She did talk about having a good time at concert with her  later, but then getting angry/irritated by women seated next to them. Perspective taking was conducted multiple times in order to help her feel less stuck, less overwhelmed, and see challenges as much more manageable. She was assisted with seeing how even though she was very upset, she was able to think things through, be thoughtful, and handle each scenario in a healthy way. It was noted however, that self-talk is quite negative. Maladaptive thought patterns were identified, challenged, and evaluated for validity in order to allow for the pt to chose different and more adaptive ways of thinking. She was taught how to cope with this over time. Homework was assigned tailored to pt's needs.  She was assisted with learning the technique of spotting the lie, challenging it, and changing it to something more realistic. Active listening was conducted in order to help the pt make sense of stressors and start moving towards potential solutions. The pt was assisted with finding solutions based on existing skill-set and abilities. The pt expressed gratitude for today's  session.     Mental Status Exam  Hygiene:  good  Dress: normal  Attitude:  cooperative and proactive  Motor Activity: normal  Speech: normal  Mood:  sad, down  Affect:  congruent  Thought Processes: normal  Thought Content:  normal  Suicidal Thoughts:  not endorsed  Homicidal Thoughts:  not endorsed  Crisis Safety Plan: not needed  Hallucinations:  none      Patient's Support Network Includes:  family, friends      Progress toward goal: there is evidence to suggest that she struggles with mood swings       Functional Status: moderate       Prognosis: good with counseling, medication, and abstinence from alcohol    Assessment      The pt presented to be struggling with managing her moods related to having bipolar disorder. Practicing habits to prevent tension from building on a daily and ongoing basis is likely needed at this time. She seems motivated to improve her psychotropic medication regimen as she plans to see her doctor about this in the next week.      Plan      In order to diminish symptoms of bipolar disorder: the pt is to continue with counseling (ongoing), continue with psychotropic medication as prescribed (ongoing), and practice the spotting the lie, challenging it, and changing it to something more realistic (ongoing).    Marbiel Hernandez, PhD, LP

## 2022-01-18 ENCOUNTER — TELEPHONE (OUTPATIENT)
Dept: FAMILY MEDICINE CLINIC | Facility: CLINIC | Age: 57
End: 2022-01-18

## 2022-01-18 NOTE — TELEPHONE ENCOUNTER
Caller: Marcela Ramírez    Relationship to patient: Self    Best call back number: [phone]     Date of exposure:     Date of positive COVID19 test:1-18-22    Date if possible COVID19 exposure:     COVID19 symptoms:   Date of initial quarantine:   Additional information or concerns: PATIENT ASKED FOR A LETTER STATING WHEN SHE CAN RETURN BACK TO WORK.    What is the patients preferred pharmacy:

## 2022-01-18 NOTE — TELEPHONE ENCOUNTER
This is managed by the health department, patient was notified that HD will call her and let her know when she is able to go back to work.

## 2022-01-29 DIAGNOSIS — E11.65 UNCONTROLLED TYPE 2 DIABETES MELLITUS WITH HYPERGLYCEMIA: ICD-10-CM

## 2022-01-31 ENCOUNTER — OFFICE VISIT (OUTPATIENT)
Dept: BEHAVIORAL HEALTH | Facility: CLINIC | Age: 57
End: 2022-01-31

## 2022-01-31 DIAGNOSIS — Z63.8 FAMILY CONFLICT: ICD-10-CM

## 2022-01-31 DIAGNOSIS — F31.9 BIPOLAR 1 DISORDER: Primary | ICD-10-CM

## 2022-01-31 PROCEDURE — 90834 PSYTX W PT 45 MINUTES: CPT | Performed by: PSYCHOLOGIST

## 2022-01-31 RX ORDER — METFORMIN HYDROCHLORIDE 500 MG/1
TABLET, EXTENDED RELEASE ORAL
Qty: 60 TABLET | Refills: 5 | Status: SHIPPED | OUTPATIENT
Start: 2022-01-31 | End: 2022-08-08

## 2022-01-31 SDOH — SOCIAL STABILITY - SOCIAL INSECURITY: OTHER SPECIFIED PROBLEMS RELATED TO PRIMARY SUPPORT GROUP: Z63.8

## 2022-01-31 NOTE — PROGRESS NOTES
PROGRESS NOTE    Data:  Marcela Ramírez is a 56 y.o. female who met with the undersigned for a scheduled individual outpatient therapy session from 2:15 - 3:00pm.      Clinical Maneuvering/Intervention:      The pt talked about struggling with being sick with COVID-19/variant recently, arguing with her daughter S, and worrying about her son who (per pt) abuses substances. She expressed concern about her other daughter getting custody of her son's  baby and how her son has yet to see his baby. Stressors were processed individually and in detail. Venting of frustrations was conducted in order to help the pt feel less tense emotionally and gain insight into issues. Feelings were processed and validated several times in session. Perspective taking was conducted multiple times in order to help her feel less stuck, less overwhelmed, and see challenges as much more manageable. Maladaptive thought patterns were identified, challenged, and evaluated for validity in order to allow for the pt to chose different and more adaptive ways of thinking. Active listening was conducted in order to help the pt make sense of stressors and start moving towards potential solutions. The pt was assisted with finding solutions based on existing skill-set and abilities. She expressed frustration some with her , but with some changes in perception, she could see how he is kind/good to her. In fact, despite the many stressors she discussed, it was noted that she seemed rather focused and centered. The pt was assisted in recognizing progress in order to show encouragement and promote motivation to keep making positive changes in life. Homework was assigned tailored to pt's needs. The pt expressed gratitude for today's session.    Mental Status Exam  Hygiene:  good  Dress: normal  Attitude:  cooperative and proactive  Motor Activity: normal  Speech: normal  Mood:  sad, down  Affect:  congruent  Thought Processes: normal  Thought  Content:  normal  Suicidal Thoughts:  not endorsed  Homicidal Thoughts:  not endorsed  Crisis Safety Plan: not needed  Hallucinations:  none      Patient's Support Network Includes:  family, friends      Progress toward goal: there is evidence to suggest that she struggles with mood swings       Functional Status: moderate       Prognosis: good with counseling, medication, and abstinence from alcohol    Assessment      The pt presented to be struggling with managing her moods related to having bipolar disorder. She seems to benefit from a process of venting, perspective changing to see things as less stressful, and encouragement in terms of applying her assets to better face stressors.       Plan      In order to diminish symptoms of bipolar disorder: the pt is to continue with counseling (ongoing), continue with psychotropic medication as prescribed (ongoing), check in with her physician for medication consult (this week), and give thought to what is working in her life to help her be more centered (this week).     Maribel Hernandez, PhD, LP

## 2022-02-02 ENCOUNTER — TELEPHONE (OUTPATIENT)
Dept: FAMILY MEDICINE CLINIC | Facility: CLINIC | Age: 57
End: 2022-02-02

## 2022-02-02 NOTE — TELEPHONE ENCOUNTER
Pt is requesting a work excuse for when she was off work due to having covid. Pt states her job only allows 5 days off and she had to be off longer due to the severity of her symptoms.   Pt states it was around the 3rd week of last month.     Pt is also requesting a refill of her xanax medication, she has been out for 3 weeks.

## 2022-02-08 ENCOUNTER — OFFICE VISIT (OUTPATIENT)
Dept: NEUROLOGY | Facility: CLINIC | Age: 57
End: 2022-02-08

## 2022-02-08 DIAGNOSIS — G25.81 RLS (RESTLESS LEGS SYNDROME): ICD-10-CM

## 2022-02-08 DIAGNOSIS — E11.42 DIABETIC PERIPHERAL NEUROPATHY: Primary | ICD-10-CM

## 2022-02-08 PROCEDURE — 99215 OFFICE O/P EST HI 40 MIN: CPT | Performed by: PHYSICIAN ASSISTANT

## 2022-02-08 RX ORDER — PREGABALIN 75 MG/1
75 CAPSULE ORAL 3 TIMES DAILY
Qty: 90 CAPSULE | Refills: 5 | Status: SHIPPED | OUTPATIENT
Start: 2022-02-08 | End: 2022-08-12
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
## 2022-02-08 NOTE — PATIENT INSTRUCTIONS
Things discussed:     · Increase lyrica to 75mg three times daily.  · Increase frequency of blood sugar checks each week.

## 2022-02-15 ENCOUNTER — OFFICE VISIT (OUTPATIENT)
Dept: FAMILY MEDICINE CLINIC | Facility: CLINIC | Age: 57
End: 2022-02-15

## 2022-02-15 VITALS
DIASTOLIC BLOOD PRESSURE: 84 MMHG | OXYGEN SATURATION: 99 % | SYSTOLIC BLOOD PRESSURE: 128 MMHG | HEART RATE: 86 BPM | RESPIRATION RATE: 15 BRPM | BODY MASS INDEX: 38.61 KG/M2 | WEIGHT: 246 LBS | TEMPERATURE: 98 F | HEIGHT: 67 IN

## 2022-02-15 DIAGNOSIS — E11.65 UNCONTROLLED TYPE 2 DIABETES MELLITUS WITH HYPERGLYCEMIA: ICD-10-CM

## 2022-02-15 DIAGNOSIS — M79.645 PAIN OF LEFT THUMB: ICD-10-CM

## 2022-02-15 DIAGNOSIS — F31.9 BIPOLAR 1 DISORDER: ICD-10-CM

## 2022-02-15 DIAGNOSIS — F10.10 ALCOHOL ABUSE: ICD-10-CM

## 2022-02-15 DIAGNOSIS — I10 ESSENTIAL HYPERTENSION: ICD-10-CM

## 2022-02-15 DIAGNOSIS — F51.01 PRIMARY INSOMNIA: Primary | ICD-10-CM

## 2022-02-15 PROCEDURE — 99214 OFFICE O/P EST MOD 30 MIN: CPT | Performed by: FAMILY MEDICINE

## 2022-02-15 RX ORDER — ALPRAZOLAM 1 MG/1
1 TABLET ORAL NIGHTLY PRN
Qty: 30 TABLET | Refills: 5 | Status: SHIPPED | OUTPATIENT
Start: 2022-02-15 | End: 2022-08-19
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
## 2022-02-21 ENCOUNTER — OFFICE VISIT (OUTPATIENT)
Dept: BEHAVIORAL HEALTH | Facility: CLINIC | Age: 57
End: 2022-02-21

## 2022-02-21 DIAGNOSIS — F31.9 BIPOLAR 1 DISORDER: Primary | ICD-10-CM

## 2022-02-21 PROCEDURE — 90834 PSYTX W PT 45 MINUTES: CPT | Performed by: PSYCHOLOGIST

## 2022-02-22 ENCOUNTER — OFFICE VISIT (OUTPATIENT)
Dept: ORTHOPEDIC SURGERY | Facility: CLINIC | Age: 57
End: 2022-02-22

## 2022-02-22 VITALS
BODY MASS INDEX: 38.75 KG/M2 | DIASTOLIC BLOOD PRESSURE: 82 MMHG | SYSTOLIC BLOOD PRESSURE: 142 MMHG | HEIGHT: 67 IN | WEIGHT: 246.91 LBS

## 2022-02-22 DIAGNOSIS — M18.12 ARTHRITIS OF CARPOMETACARPAL (CMC) JOINT OF LEFT THUMB: ICD-10-CM

## 2022-02-22 DIAGNOSIS — G89.29 CHRONIC PAIN OF LEFT THUMB: Primary | ICD-10-CM

## 2022-02-22 DIAGNOSIS — M79.645 CHRONIC PAIN OF LEFT THUMB: Primary | ICD-10-CM

## 2022-02-22 DIAGNOSIS — M65.312 TRIGGER THUMB OF LEFT HAND: ICD-10-CM

## 2022-02-22 PROCEDURE — 20550 NJX 1 TENDON SHEATH/LIGAMENT: CPT | Performed by: PHYSICIAN ASSISTANT

## 2022-02-22 PROCEDURE — 99214 OFFICE O/P EST MOD 30 MIN: CPT | Performed by: PHYSICIAN ASSISTANT

## 2022-02-22 RX ADMIN — LIDOCAINE HYDROCHLORIDE 0.5 ML: 10 INJECTION, SOLUTION EPIDURAL; INFILTRATION; INTRACAUDAL; PERINEURAL at 13:57

## 2022-02-22 RX ADMIN — TRIAMCINOLONE ACETONIDE 20 MG: 40 INJECTION, SUSPENSION INTRA-ARTICULAR; INTRAMUSCULAR at 13:57

## 2022-02-22 NOTE — PROGRESS NOTES
Procedure   Small Joint Arthrocentesis  Consent given by: patient  Site marked: site marked  Timeout: Immediately prior to procedure a time out was called to verify the correct patient, procedure, equipment, support staff and site/side marked as required   Supporting Documentation  Indications: pain   Procedure Details  Location: thumb - Thumb joint: Left Trigger Thumb.  Preparation: Patient was prepped and draped in the usual sterile fashion  Needle size: 25 G  Approach: volar  Medications administered: 20 mg triamcinolone acetonide 40 MG/ML; 0.5 mL lidocaine PF 1% 1 %  Patient tolerance: patient tolerated the procedure well with no immediate complications

## 2022-02-22 NOTE — PROGRESS NOTES
PROGRESS NOTE    Data:  Marcela Ramírez is a 56 y.o. female who met with the undersigned for a scheduled individual outpatient therapy session from 3:00 - 3:45pm.      Clinical Maneuvering/Intervention:      The pt talked about struggling with feeling very low, worrying about her son, and feeling disconnected with others. She was tearful in session. Stressors were processed individually and in detail. Venting of frustrations was conducted in order to help the pt feel less tense emotionally and gain insight into issues. Feelings were processed and validated several times in session. Perspective taking was conducted multiple times in order to help her feel less stuck, less overwhelmed, and see challenges as much more manageable. Active listening was conducted in order to help the pt make sense of stressors and start moving towards potential solutions. The pt was assisted with finding solutions based on existing skill-set and abilities. She was assisted several times in session to discuss, identify, and tap into gratitude when it comes to the many individuals she has in her life who love and support her. Notions of getting out, doing things outside her house with people in order to feel less down/depressed were discussed. The friends she has a work were discussed and the bruna in those relationships were processed. She talked about guilt for putting her  through a lot, but perspective taking was conducted on this as well. Maladaptive thought patterns were identified, challenged, and evaluated for validity in order to allow for the pt to chose different and more adaptive ways of thinking. Homework was assigned tailored to pt's needs (continuing to lean on loved ones and process her experiences).  Some humor was used in session as it is one of the pt's coping skills. Humor was used too, to help the pt see things as less challenging and more manageable than they first seemed. Humor was used as well, to model use  of the skill as a way to decrease tension ongoing.  The pt expressed gratitude for today's session.     Mental Status Exam  Hygiene:  good  Dress: normal  Attitude:  cooperative and proactive  Motor Activity: normal  Speech: normal  Mood:  sad, down  Affect:  congruent  Thought Processes: normal  Thought Content:  normal  Suicidal Thoughts:  not endorsed  Homicidal Thoughts:  not endorsed  Crisis Safety Plan: not needed  Hallucinations:  none      Patient's Support Network Includes:  family, friends      Progress toward goal: there is evidence to suggest that she struggles with mood swings       Functional Status: moderate       Prognosis: good with counseling, medication, and abstinence from alcohol    Assessment      The pt presented to be struggling with managing her moods related to having bipolar disorder. She seems to benefit from a process of venting, perspective changing to see things as less stressful, and encouragement in terms of applying her assets to better face stressors.       Plan      In order to diminish symptoms of bipolar disorder: the pt is to continue with counseling (ongoing), continue with psychotropic medication as prescribed (ongoing), do the homework as discussed above in terms of leaning on loved ones (ongoing).    Maribel Hernandez, PhD, LP

## 2022-02-22 NOTE — PROGRESS NOTES
[facility] Note        Subjective     Pain of the Left Hand      HPI    Marcela Ramírez is a 56 y.o. female.  This is a very pleasant right-hand-dominant female presenting today to discuss her left thumb pain.  She denies trauma or injury.  She reports pain with use of the thumb.  She reports it gets stuck both extension and flex.  Is been bothering her for approximately 4 months.  Pain 0-10/10.  She complains of popping and catching.  Works at [employer].    Past Medical History:   Diagnosis Date   • Arthritis     knees, otc arthritis meds prn, no h/o injections.    • Asthma     has not required inhaler   • Bilateral ovarian cysts    • Bipolar 1 disorder (formerly [facility])    • Boil     opens them herself   • Breast injury 07/13/2021    bruise on lateral rt breast from fall   • Carpal tunnel syndrome    • CKD (chronic kidney disease), symptom management only, stage 3 (moderate) (formerly [facility])     Creatinine 1.04 GFR 56   • Colon polyp    • Depression    • Diabetes mellitus (formerly [facility])     Diagnosed 2017, was on metformin in the past. Last A1C 5.5%   • Diverticulosis    • Fatigue    • GERD (gastroesophageal reflux disease)     controlled with omeprazole 20mg. Remote EGD- esophagitis.  EGD no hiatal hernia Z line 40 cm very thick mucosal folds cannot rule out varices.  CT scan thickened fundus, no varices seen   • Headache    • History of bladder infections    • History of blood transfusion 2006    2/2 heavy menses   • History of bronchitis    • Hyperlipidemia    • Hypertension    • Hypothyroidism    • Lower extremity edema    • Morbid obesity with BMI of 40.0-44.9, adult (formerly [facility])    • Peripheral neuropathy     2/2 DM   • RLS (restless legs syndrome)    • S/P spenser    • S/P cholecystectomy     2/2 cholelithiasis   • Shortness of breath on exertion    • TIA (transient ischemic attack)     patient states 8-9 episodes of right sided drooping/ weakness/ vision changes. Workup was negative for CVA- thought to be related to  anxiety. last episode 12/2017      Past Surgical History:   Procedure Laterality Date   • COLONOSCOPY  remote    diverticulosis   • ENDOMETRIAL ABLATION     • ENDOSCOPY  remote    esophagitis    • ENDOSCOPY N/A 8/22/2019    Procedure: ESOPHAGOGASTRODUODENOSCOPY;  Surgeon: Guido Greenberg MD;  Location:  [facility];  Service: Bariatric   • GASTRIC SLEEVE LAPAROSCOPIC N/A 8/22/2019    Procedure: GASTRIC SLEEVE LAPAROSCOPIC;  Surgeon: Guido Greenberg MD;  Location:  [facility];  Service: Bariatric   • KNEE ACL RECONSTRUCTION Right 2013    with miniscal repair   • LAPAROSCOPIC CHOLECYSTECTOMY  2001    cholelithiasis   • LAPAROSCOPIC HYSTERECTOMY  2013    right ovary remains   • PARAESOPHAGEAL HERNIA REPAIR N/A 8/22/2019    Procedure: PARAESOPHAGEAL HERNIA REPAIR LAPAROSCOPIC;  Surgeon: Guido Greenberg MD;  Location:  [facility];  Service: Bariatric   • SINUS SURGERY     • TUBAL ABDOMINAL LIGATION        Family History   Problem Relation Age of Onset   • Arthritis Mother    • Arthritis Father    • Diabetes Father    • Hyperlipidemia Father    • Hypertension Father    • Asthma Brother    • Other Brother    • Cancer Maternal Aunt    • Depression Paternal Uncle    • Breast cancer Neg Hx    • Ovarian cancer Neg Hx      Social History     Socioeconomic History   • Marital status:    Tobacco Use   • Smoking status: Never Smoker   • Smokeless tobacco: Never Used   Vaping Use   • Vaping Use: Never used   Substance and Sexual Activity   • Alcohol use: Not Currently   • Drug use: Not Currently     Types: Marijuana, Cocaine(coke)     Comment: about 4 years ago, 32 years ago - cocaine and THC   • Sexual activity: Defer     Comment: no hormones      Current Outpatient Medications on File Prior to Visit   Medication Sig Dispense Refill   • ALPRAZolam (Xanax) 1 MG tablet Take 1 tablet by mouth At Night As Needed for Anxiety or Sleep. 30 tablet 5   • amLODIPine (NORVASC) 5 MG tablet Take 1 tablet by mouth Daily. 30  tablet 2   • atorvastatin (LIPITOR) 20 MG tablet TAKE ONE TABLET BY MOUTH DAILY 30 tablet 2   • desvenlafaxine (PRISTIQ) 50 MG 24 hr tablet TAKE ONE TABLET BY MOUTH DAILY 30 tablet 10   • hydroCHLOROthiazide (HYDRODIURIL) 25 MG tablet Take 1 tablet by mouth Daily. 30 tablet 11   • lamoTRIgine (LaMICtal) 200 MG tablet TAKE ONE TABLET BY MOUTH ONCE NIGHTLY 30 tablet 10   • levothyroxine (SYNTHROID, LEVOTHROID) 50 MCG tablet Take 1 tablet by mouth Every Morning. 30 tablet 2   • lisinopril (PRINIVIL,ZESTRIL) 40 MG tablet Take 1 tablet by mouth Daily. 30 tablet 5   • metFORMIN ER (GLUCOPHAGE-XR) 500 MG 24 hr tablet TAKE ONE TABLET BY MOUTH TWICE A DAY BEFORE MEALS 60 tablet 5   • metoprolol succinate XL (Toprol XL) 50 MG 24 hr tablet Take 1 tablet by mouth Daily. 30 tablet 5   • Neupro 1 MG/24HR patch 24 hour 24 hour patch APPLY ONE PATCH TO THE SKIN DAILY 30 patch 10   • omeprazole (priLOSEC) 20 MG capsule TAKE ONE CAPSULE BY MOUTH DAILY 30 capsule 11   • ONE TOUCH ULTRA TEST test strip TEST GLUCOSE TWO TIMES A  each 12   • pregabalin (Lyrica) 75 MG capsule Take 1 capsule by mouth 3 (Three) Times a Day. 90 capsule 5     No current facility-administered medications on file prior to visit.      Allergies   Allergen Reactions   • Contrast Dye Other (See Comments)     Nasal congestion/ snot, IV contrast only          Review of Systems   Constitutional: Negative.    HENT: Negative.    Eyes: Negative.    Respiratory: Negative.    Cardiovascular: Negative.    Gastrointestinal: Negative.    Endocrine: Negative.    Genitourinary: Negative.    Musculoskeletal: Positive for arthralgias.   Skin: Negative.    Allergic/Immunologic: Negative.    Neurological: Negative.    Hematological: Negative.    Psychiatric/Behavioral: Negative.         I reviewed the patient's chief complaint, history of present illness, review of systems, past medical history, surgical history, family history, social history, medications and allergy  "list.        Objective      Physical Exam  /82   Ht 170.2 cm (67.01\")   Wt 112 kg (246 lb 14.6 oz)   LMP  (LMP Unknown)   BMI 38.66 kg/m²     Body mass index is 38.66 kg/m².    General  Mental Status - alert  General Appearance - cooperative, well groomed, not in acute distress  Orientation - Oriented X3  Build & Nutrition - well developed and well nourished  Posture - normal posture  Gait -normal       Ortho Exam  Left hand exam: Tender palpation of the A1 pulley of the left thumb.  Normal motion and strength of all digits.  Patient able to make a composite fist with good strength.  Neurovascular intact distally.    Assessment    Assessment:  1. Chronic pain of left thumb    2. Trigger thumb of left hand          Plan:  1. Recommend over-the-counter medication as needed for discomfort  2. Left trigger thumb.  I reviewed today's x-rays clinical findings past and current treatment the patient.  X-rays show thumb CMC arthritis, however, I not think the source of her pain.  I think her pain is secondary to trigger thumb.  We discussed treatment including cortisone injection.  I explained this to resolve the problem by 30% of the time.  If it continues to bother her she may want to consider surgical weeks.  Patient is happy with this plan.    I discussed with the patient the potential benefits of performing a therapeutic injection of the left thumb trigger finger tendon sheath as well as potential risks including but not limited to infection, swelling, pain, bleeding, bruising, nerve/vessel damage, skin color changes, transient elevation in blood glucose levels, and fat atrophy. After informed consent and verifying correct patient, procedure site, and type of procedure, the area was prepped with Hibiclens, ethyl chloride was used to numb the skin. Via the volar approach, 1 cc of 1% lidocaine and  20 mg/ml of Kenalog were injected into the finger tendon sheath. The patient tolerated the procedure well. There " were no complications.             Swati Mccollum PA-C  02/23/22  14:30 EST

## 2022-02-23 RX ORDER — TRIAMCINOLONE ACETONIDE 40 MG/ML
20 INJECTION, SUSPENSION INTRA-ARTICULAR; INTRAMUSCULAR
Status: COMPLETED | OUTPATIENT
Start: 2022-02-22 | End: 2022-02-22

## 2022-02-23 RX ORDER — LIDOCAINE HYDROCHLORIDE 10 MG/ML
0.5 INJECTION, SOLUTION EPIDURAL; INFILTRATION; INTRACAUDAL; PERINEURAL
Status: COMPLETED | OUTPATIENT
Start: 2022-02-22 | End: 2022-02-22

## 2022-02-28 ENCOUNTER — OFFICE VISIT (OUTPATIENT)
Dept: BEHAVIORAL HEALTH | Facility: CLINIC | Age: 57
End: 2022-02-28

## 2022-02-28 DIAGNOSIS — Z63.8 FAMILY CONFLICT: ICD-10-CM

## 2022-02-28 DIAGNOSIS — F31.9 BIPOLAR 1 DISORDER: Primary | ICD-10-CM

## 2022-02-28 PROCEDURE — 90834 PSYTX W PT 45 MINUTES: CPT | Performed by: PSYCHOLOGIST

## 2022-02-28 SDOH — SOCIAL STABILITY - SOCIAL INSECURITY: OTHER SPECIFIED PROBLEMS RELATED TO PRIMARY SUPPORT GROUP: Z63.8

## 2022-03-01 NOTE — PROGRESS NOTES
PROGRESS NOTE    Data:  Marcela Ramírez is a 56 y.o. female who met with the undersigned for a scheduled individual outpatient therapy session from 3:00 - 3:45pm.      Clinical Maneuvering/Intervention:      The pt talked about struggling with feeling very low, worrying about her son, and having crying spells. Stressors were processed individually and in detail. Venting of frustrations was conducted in order to help the pt feel less tense emotionally and gain insight into issues. Feelings were processed and validated several times in session. Perspective taking was conducted multiple times in order to help her feel less stuck, less overwhelmed, and see challenges as much more manageable. Active listening was conducted in order to help the pt make sense of stressors and start moving towards potential solutions. The pt was assisted with finding solutions based on existing skill-set and abilities. She was assisted with making sense of her son's situation, what she can do to be there for him (write letters to him in long term, give him some money in long term, etc), but not overstep her boundaries/continue to care for herself. She was assisted with opening up her view of her scenario, seeing the many people she loves, what she enjoys about daily life, and finding hope in her situations. Some humor was used in session as it is one of the pt's coping skills. Humor was used too, to help the pt see things as less challenging and more manageable than they first seemed. Humor was used as well, to model use of the skill as a way to decrease tension ongoing. Homework was assigned tailored to pt's needs.  The pt expressed gratitude for today's session.    Mental Status Exam  Hygiene:  good  Dress: normal  Attitude:  cooperative and proactive  Motor Activity: normal  Speech: normal  Mood:  sad, down  Affect:  congruent  Thought Processes: normal  Thought Content:  normal  Suicidal Thoughts:  not endorsed  Homicidal Thoughts:  not  endorsed  Crisis Safety Plan: not needed  Hallucinations:  none      Patient's Support Network Includes:  family, friends      Progress toward goal: there is evidence to suggest that she struggles with mood swings       Functional Status: moderate       Prognosis: good with counseling, medication, and abstinence from alcohol    Assessment      The pt presented to be struggling with managing her moods related to having bipolar disorder. She seems to benefit from a process of venting, perspective changing to see things as less stressful, and encouragement in terms of applying her assets to better face stressors.       Plan      In order to diminish symptoms of bipolar disorder: the pt is to continue with counseling (ongoing), continue with psychotropic medication as prescribed (ongoing), do the homework as discussed above in terms of leaning on loved ones and looking for ways to love her son while protecting her mental health (ongoing).    Maribel Hernandez, PhD, LP

## 2022-03-14 ENCOUNTER — OFFICE VISIT (OUTPATIENT)
Dept: BEHAVIORAL HEALTH | Facility: CLINIC | Age: 57
End: 2022-03-14

## 2022-03-14 DIAGNOSIS — F43.9 FEELING STRESSED OUT: ICD-10-CM

## 2022-03-14 DIAGNOSIS — Z63.8 FAMILY CONFLICT: ICD-10-CM

## 2022-03-14 DIAGNOSIS — F31.9 BIPOLAR 1 DISORDER: Primary | ICD-10-CM

## 2022-03-14 PROCEDURE — 90834 PSYTX W PT 45 MINUTES: CPT | Performed by: PSYCHOLOGIST

## 2022-03-14 SDOH — SOCIAL STABILITY - SOCIAL INSECURITY: OTHER SPECIFIED PROBLEMS RELATED TO PRIMARY SUPPORT GROUP: Z63.8
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
## 2022-03-28 ENCOUNTER — OFFICE VISIT (OUTPATIENT)
Dept: BEHAVIORAL HEALTH | Facility: CLINIC | Age: 57
End: 2022-03-28

## 2022-03-28 DIAGNOSIS — F31.9 BIPOLAR 1 DISORDER: Primary | ICD-10-CM

## 2022-03-28 DIAGNOSIS — F43.9 FEELING STRESSED OUT: ICD-10-CM

## 2022-03-28 PROCEDURE — 90834 PSYTX W PT 45 MINUTES: CPT | Performed by: PSYCHOLOGIST

## 2022-03-28 NOTE — PROGRESS NOTES
PROGRESS NOTE    Data:  Marcela Ramírez is a 56 y.o. female who met with the undersigned for a scheduled individual outpatient therapy session from 10:30 - 11:15am.       Clinical Maneuvering/Intervention:      The pt talked about feeling low, stressed, and overwhelmed. She talked about her son D in legal and substance abuse issues, having mood swings lately, and getting irritated easily. Stressors were processed individually and in detail. Venting of frustrations was conducted in order to help the pt feel less tense emotionally and gain insight into issues. Feelings were processed and validated several times in session. Perspective taking was conducted multiple times in order to help her feel less stuck, less overwhelmed, and see challenges as much more manageable. Active listening was conducted in order to help the pt make sense of stressors and start moving towards potential solutions. The pt was assisted with finding solutions based on existing skill-set and abilities. She was assisted with noticing several examples of progress, however, as they became apparent in session (including taking into account all of her sessions and background). The pt was assisted in recognizing progress in order to show encouragement and promote motivation to keep making positive changes in life. She is less defensive with others who do not seem to understand bipolar disorder, more centered as a person, and more comfortable with herself (e.g., with what she believes). She is becoming more assertive with others in general, in a firm and respectful way, such as with her daughter S when dealing with the new baby I. Homework was assigned tailored to pt's needs. The pt expressed gratitude for today's session.    Mental Status Exam  Hygiene:  good  Dress: normal  Attitude:  cooperative and proactive  Motor Activity: normal  Speech: normal  Mood:  sad, upset  Affect:  congruent  Thought Processes: normal  Thought Content:   normal  Suicidal Thoughts:  not endorsed  Homicidal Thoughts:  not endorsed  Crisis Safety Plan: not needed  Hallucinations:  none      Patient's Support Network Includes:  family, friends      Progress toward goal: there is evidence to suggest that she struggles with mood swings       Functional Status: moderate       Prognosis: good with counseling, medication    Assessment      The pt presented to be struggling with managing her moods related to having bipolar disorder. She seems to benefit from a process of venting, perspective changing to see things as less stressful, and encouragement in terms of applying her assets to better face stressors.       Plan      In order to diminish symptoms of bipolar disorder: the pt is to continue with counseling (ongoing), continue with psychotropic medication as prescribed (ongoing), do the homework in terms of giving thought to the several points of progress that seemed to come up today (this week).    Maribel Hernandez, PhD, LP

## 2022-03-29 RX ORDER — LISINOPRIL 40 MG/1
TABLET ORAL
Qty: 30 TABLET | Refills: 5 | Status: SHIPPED | OUTPATIENT
Start: 2022-03-29 | End: 2022-10-05

## 2022-03-29 RX ORDER — METOPROLOL SUCCINATE 50 MG/1
TABLET, EXTENDED RELEASE ORAL
Qty: 30 TABLET | Refills: 5 | Status: SHIPPED | OUTPATIENT
Start: 2022-03-29 | End: 2022-10-05

## 2022-03-30 RX ORDER — ATORVASTATIN CALCIUM 20 MG/1
TABLET, FILM COATED ORAL
Qty: 30 TABLET | Refills: 2 | Status: SHIPPED | OUTPATIENT
Start: 2022-03-30 | End: 2022-07-05

## 2022-04-11 ENCOUNTER — OFFICE VISIT (OUTPATIENT)
Dept: BEHAVIORAL HEALTH | Facility: CLINIC | Age: 57
End: 2022-04-11

## 2022-04-11 DIAGNOSIS — F31.9 BIPOLAR 1 DISORDER: Primary | ICD-10-CM

## 2022-04-11 PROCEDURE — 90837 PSYTX W PT 60 MINUTES: CPT | Performed by: PSYCHOLOGIST
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
## 2022-04-25 PROCEDURE — U0004 COV-19 TEST NON-CDC HGH THRU: HCPCS | Performed by: NURSE PRACTITIONER

## 2022-05-23 ENCOUNTER — TELEMEDICINE (OUTPATIENT)
Dept: BEHAVIORAL HEALTH | Facility: CLINIC | Age: 57
End: 2022-05-23

## 2022-05-23 DIAGNOSIS — F31.9 BIPOLAR 1 DISORDER: Primary | ICD-10-CM

## 2022-05-23 DIAGNOSIS — Z63.8 FAMILY CONFLICT: ICD-10-CM

## 2022-05-23 DIAGNOSIS — F43.9 FEELING STRESSED OUT: ICD-10-CM

## 2022-05-23 PROCEDURE — 90834 PSYTX W PT 45 MINUTES: CPT | Performed by: PSYCHOLOGIST

## 2022-05-23 SDOH — SOCIAL STABILITY - SOCIAL INSECURITY: OTHER SPECIFIED PROBLEMS RELATED TO PRIMARY SUPPORT GROUP: Z63.8

## 2022-05-24 NOTE — PROGRESS NOTES
PROGRESS NOTE    Data:  Marcela Ramírez is a 56 y.o. female who met with the undersigned for a scheduled telehealth therapy session from 2:15 - 3:00pm.    The pt consented to a video visit; she connected via zoom from her home in [place], [place]      Clinical Maneuvering/Intervention:      The pt talked about her recent stressors including her son (D) being sentenced to go to long-term soon, feeling guilty/highly distressed by her son's choices in life, and feeling guilty for putting her problems onto others (G). Stressors were processed individually and in detail. Venting of frustrations was conducted in order to help the pt feel less tense emotionally and gain insight into issues. Feelings were processed and validated several times in session. Perspective taking was conducted multiple times in order to help the pt feel less stuck, less overwhelmed, and see challenges as much more manageable. Active listening was conducted in order to help the pt make sense of stressors and start moving towards potential solutions. The pt was assisted with finding solutions based on existing skill-set and abilities. Maladaptive thought patterns were identified, challenged, and evaluated for validity in order to allow for the pt to chose different and more adaptive ways of thinking. She was assisted with noting her own evidence that she is a good mother, thinks rather clearly in a crisis, and has a good understanding of mental illness/addiction. The pt's strengths were identified in order to help identify abilities to use to better face/overcome challenges. Time was allocated specifically to assess what is 'working' in the pt's life, versus what is 'not working,' (if anything) in terms of helping to improve mood and quality of life. Some humor was used in session as it is one of the pt's coping skills. Humor was used too, to help the pt see things as less challenging and more manageable than they first seemed. Humor was used as well,  to model use of the skill as a way to decrease tension ongoing.  Homework was assigned tailored to pt's needs. The pt expressed gratitude for today's session.     Mental Status Exam  Hygiene:  good  Dress: normal  Attitude:  cooperative and proactive  Motor Activity: normal  Speech: normal  Mood:  sad, drained, stressed  Affect:  congruent  Thought Processes: normal  Thought Content:  normal  Suicidal Thoughts:  not endorsed  Homicidal Thoughts:  not endorsed  Crisis Safety Plan: not needed  Hallucinations:  none      Patient's Support Network Includes:  family, friends      Progress toward goal: there is evidence to suggest that she struggles with mood swings       Functional Status: moderate       Prognosis: good with counseling, medication    Assessment      The pt presented to be struggling with managing her moods related to having bipolar disorder. She seems to benefit from a process of venting, perspective changing to see things as less stressful, and encouragement in terms of applying her assets to better face stressors.       Plan      In order to diminish symptoms of bipolar disorder: the pt is to continue with counseling (ongoing), continue with psychotropic medication as prescribed (ongoing), do the homework in terms of self-care and connecting more with G (this week).    Maribel Hernandez, PhD, LP

## 2022-06-13 ENCOUNTER — TELEMEDICINE (OUTPATIENT)
Dept: BEHAVIORAL HEALTH | Facility: CLINIC | Age: 57
End: 2022-06-13

## 2022-06-13 DIAGNOSIS — F31.9 BIPOLAR 1 DISORDER: Primary | ICD-10-CM

## 2022-06-13 DIAGNOSIS — Z63.8 FAMILY CONFLICT: ICD-10-CM

## 2022-06-13 PROCEDURE — 90791 PSYCH DIAGNOSTIC EVALUATION: CPT | Performed by: PSYCHOLOGIST

## 2022-06-13 SDOH — SOCIAL STABILITY - SOCIAL INSECURITY: OTHER SPECIFIED PROBLEMS RELATED TO PRIMARY SUPPORT GROUP: Z63.8
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
## 2022-06-27 ENCOUNTER — OFFICE VISIT (OUTPATIENT)
Dept: BEHAVIORAL HEALTH | Facility: CLINIC | Age: 57
End: 2022-06-27

## 2022-06-27 DIAGNOSIS — F43.9 FEELING STRESSED OUT: ICD-10-CM

## 2022-06-27 DIAGNOSIS — F31.9 BIPOLAR 1 DISORDER: Primary | ICD-10-CM

## 2022-06-27 DIAGNOSIS — Z63.8 FAMILY CONFLICT: ICD-10-CM

## 2022-06-27 PROCEDURE — 90837 PSYTX W PT 60 MINUTES: CPT | Performed by: PSYCHOLOGIST

## 2022-06-27 SDOH — SOCIAL STABILITY - SOCIAL INSECURITY: OTHER SPECIFIED PROBLEMS RELATED TO PRIMARY SUPPORT GROUP: Z63.8

## 2022-06-28 NOTE — PROGRESS NOTES
PROGRESS NOTE    Data:  Marcela Ramírez is a 57 y.o. female who met with the undersigned for a scheduled telehealth therapy session from 2:50 - 3:45pm.      Clinical Maneuvering/Intervention:      The pt talked about her recent stressors including her son's (D's) legal and drug problems, feeling worried much of the time, and having difficulty managing her moods at work. Stressors were processed individually and in detail. Venting of frustrations was conducted in order to help the pt feel less tense emotionally and gain insight into issues. Feelings were processed and validated several times in session. Perspective taking was conducted multiple times in order to help the pt feel less stuck, less overwhelmed, and see challenges as much more manageable. Active listening was conducted in order to help the pt make sense of stressors and start moving towards potential solutions. The pt was assisted with finding solutions based on existing skill-set and abilities. She was assisted with noting patterns of when her mood is calmer: when getting distance from D (until he gets clean/sober), communicating openly with G, and when she is connecting with loved ones. Time was allocated specifically to assess what is 'working' in the pt's life, versus what is 'not working,' (if anything) in terms of helping to improve mood and quality of life. Fears of things worsening in life when they are actually going better, were dispelled. Maladaptive thought patterns were identified, challenged, and evaluated for validity in order to allow for the pt to chose different and more adaptive ways of thinking. The pt's strengths were identified in order to help identify abilities to use to better face/overcome challenges. Some humor was used in session as it is one of the pt's coping skills. Humor was used too, to help the pt see things as less challenging and more manageable than they first seemed. Humor was used as well, to model use of the  skill as a way to decrease tension ongoing. She was able to talk about hope in terms of managing her mood and enjoying work more, as she hopes for a new position with a boss of whom she likes better than her current boss. Homework was assigned tailored to pt's needs. The pt expressed gratitude for today's session.    Mental Status Exam  Hygiene:  good  Dress: normal  Attitude:  cooperative and proactive  Motor Activity: normal  Speech: normal  Mood:  tense  Affect:  congruent  Thought Processes: normal  Thought Content:  normal  Suicidal Thoughts:  not endorsed  Homicidal Thoughts:  not endorsed  Crisis Safety Plan: not needed  Hallucinations:  none      Patient's Support Network Includes:  family, friends      Progress toward goal: there is evidence to suggest that she struggles with mood swings       Functional Status: moderate       Prognosis: good with counseling, medication    Assessment      The pt presented to be struggling with managing her moods related to having bipolar disorder. She seems to benefit from venting, having her experiences validated, and being assisted in discovering her own solutions to her problems.       Plan      In order to diminish symptoms of bipolar disorder: the pt is to continue with counseling (ongoing), continue with psychotropic medication as prescribed (ongoing), and make a point to 'act on' what works for her as far as coping with emotional distress discussed today (ongoing).     Maribel Hernandez, PhD, LP

## 2022-07-02 DIAGNOSIS — I10 ESSENTIAL HYPERTENSION: ICD-10-CM

## 2022-07-05 RX ORDER — ATORVASTATIN CALCIUM 20 MG/1
TABLET, FILM COATED ORAL
Qty: 30 TABLET | Refills: 2 | Status: SHIPPED | OUTPATIENT
Start: 2022-07-05 | End: 2022-10-05

## 2022-07-05 RX ORDER — HYDROCHLOROTHIAZIDE 25 MG/1
TABLET ORAL
Qty: 30 TABLET | Refills: 2 | Status: SHIPPED | OUTPATIENT
Start: 2022-07-05

## 2022-07-11 ENCOUNTER — TELEMEDICINE (OUTPATIENT)
Dept: BEHAVIORAL HEALTH | Facility: CLINIC | Age: 57
End: 2022-07-11

## 2022-07-11 DIAGNOSIS — F31.9 BIPOLAR 1 DISORDER: Primary | ICD-10-CM

## 2022-07-11 DIAGNOSIS — Z63.8 FAMILY CONFLICT: ICD-10-CM

## 2022-07-11 DIAGNOSIS — Z56.6 WORK STRESS: ICD-10-CM

## 2022-07-11 PROCEDURE — 90834 PSYTX W PT 45 MINUTES: CPT | Performed by: PSYCHOLOGIST

## 2022-07-11 SDOH — SOCIAL STABILITY - SOCIAL INSECURITY: OTHER SPECIFIED PROBLEMS RELATED TO PRIMARY SUPPORT GROUP: Z63.8

## 2022-07-11 SDOH — HEALTH STABILITY - MENTAL HEALTH: OTHER PHYSICAL AND MENTAL STRAIN RELATED TO WORK: Z56.6

## 2022-07-11 NOTE — PROGRESS NOTES
PROGRESS NOTE    Data:  Marcela Ramírez is a 57 y.o. female who met with the undersigned for a scheduled telehealth therapy session from 11:15am - 12:00pm.    The pt consented to a video visit. She connected via Zoom from her car outside her work in [place], [place].   Provider connected via Zoom at: [facility], [address], [place], [place].      Clinical Maneuvering/Intervention:      The pt talked about her recent stressors including worrying about her son's drug addiction, feeling put down/criticized at work, and stressed at work in general. Stressors were processed individually and in detail. Venting of frustrations was conducted in order to help the pt feel less tense emotionally and gain insight into issues. Feelings were processed and validated several times in session. Perspective taking was conducted multiple times in order to help the pt feel less stuck, less overwhelmed, and see challenges as much more manageable. Active listening was conducted in order to help the pt make sense of stressors and start moving towards potential solutions. The pt was assisted with finding solutions based on existing skill-set and abilities. Anger management was addressed to day specifically, helping her see first where she did well in expressing and controlling it, including knowing when to walk away from a negative situation. She talked about not having control over emotions, but it was noted that she was able to control herself quite well when she was very angry at someone at work. Maladaptive thought patterns were identified, challenged, and evaluated for validity in order to allow for the pt to chose different and more adaptive ways of thinking. Assistance was provided today specific to helping her believe in her abilities to control/manage emotions, provided based on evidence of her successes. She too, was assisted in seeing how her mindset in the start of the day can help set  the tone of the day. The pt's strengths were identified in order to help identify abilities to use to better face/overcome challenges. Homework was assigned tailored to pt's needs. The pt expressed gratitude for today's session.    Mental Status Exam  Hygiene:  good  Dress: normal  Attitude:  cooperative and proactive  Motor Activity: normal  Speech: normal  Mood:  tense  Affect:  congruent  Thought Processes: normal  Thought Content:  normal  Suicidal Thoughts:  not endorsed  Homicidal Thoughts:  not endorsed  Crisis Safety Plan: not needed  Hallucinations:  none      Patient's Support Network Includes:  family, friends      Progress toward goal: there is evidence to suggest that she struggles with mood swings       Functional Status: moderate       Prognosis: good with counseling, medication    Assessment      The pt presented to be struggling with managing her moods related to having bipolar disorder. She seems to benefit from venting, having her experiences validated, and being assisted in discovering her own solutions to her problems.       Plan      In order to diminish symptoms of bipolar disorder: the pt is to continue with counseling (ongoing), continue with psychotropic medication as prescribed (ongoing), and make a point employ skills noted today that come naturally to her (care for others, insight, humor, etc.) (ongoing).     Maribel Hernandez, PhD, LP

## 2022-07-15 PROCEDURE — U0004 COV-19 TEST NON-CDC HGH THRU: HCPCS | Performed by: NURSE PRACTITIONER

## 2022-07-17 ENCOUNTER — TELEPHONE (OUTPATIENT)
Dept: URGENT CARE | Facility: CLINIC | Age: 57
End: 2022-07-17

## 2022-07-17 NOTE — TELEPHONE ENCOUNTER
----- Message from NOE Anthony sent at 7/17/2022  8:34 AM EDT -----  Positive.  Please notify patient and reviewed quarantine protocol.

## 2022-07-17 NOTE — TELEPHONE ENCOUNTER
Notify patient of positive COVID-19 test results.  Advised patient to follow quarantine protocols per [place] COVID[email or web address].  She can follow-up with her primary care as needed.

## 2022-07-25 ENCOUNTER — OFFICE VISIT (OUTPATIENT)
Dept: BEHAVIORAL HEALTH | Facility: CLINIC | Age: 57
End: 2022-07-25

## 2022-07-25 DIAGNOSIS — Z91.410 H/O ADULT PHYSICAL AND SEXUAL ABUSE: ICD-10-CM

## 2022-07-25 DIAGNOSIS — F31.9 BIPOLAR 1 DISORDER: Primary | ICD-10-CM

## 2022-07-25 PROCEDURE — 90791 PSYCH DIAGNOSTIC EVALUATION: CPT | Performed by: PSYCHOLOGIST
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
## 2022-08-01 ENCOUNTER — OFFICE VISIT (OUTPATIENT)
Dept: BEHAVIORAL HEALTH | Facility: CLINIC | Age: 57
End: 2022-08-01

## 2022-08-01 DIAGNOSIS — Z91.410 H/O ADULT PHYSICAL AND SEXUAL ABUSE: ICD-10-CM

## 2022-08-01 DIAGNOSIS — F31.9 BIPOLAR 1 DISORDER: Primary | ICD-10-CM

## 2022-08-01 PROCEDURE — 90837 PSYTX W PT 60 MINUTES: CPT | Performed by: PSYCHOLOGIST
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
## 2022-08-06 DIAGNOSIS — E11.65 UNCONTROLLED TYPE 2 DIABETES MELLITUS WITH HYPERGLYCEMIA: ICD-10-CM

## 2022-08-08 ENCOUNTER — OFFICE VISIT (OUTPATIENT)
Dept: BEHAVIORAL HEALTH | Facility: CLINIC | Age: 57
End: 2022-08-08

## 2022-08-08 DIAGNOSIS — F43.9 FEELING STRESSED OUT: ICD-10-CM

## 2022-08-08 DIAGNOSIS — Z91.410 H/O ADULT PHYSICAL AND SEXUAL ABUSE: ICD-10-CM

## 2022-08-08 DIAGNOSIS — F31.9 BIPOLAR 1 DISORDER: Primary | ICD-10-CM

## 2022-08-08 PROCEDURE — 90837 PSYTX W PT 60 MINUTES: CPT | Performed by: PSYCHOLOGIST

## 2022-08-08 RX ORDER — METFORMIN HYDROCHLORIDE 500 MG/1
TABLET, EXTENDED RELEASE ORAL
Qty: 60 TABLET | Refills: 5 | Status: SHIPPED | OUTPATIENT
Start: 2022-08-08
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
## 2022-08-12 DIAGNOSIS — E11.42 DIABETIC PERIPHERAL NEUROPATHY: ICD-10-CM

## 2022-08-12 RX ORDER — PREGABALIN 75 MG/1
CAPSULE ORAL
Qty: 90 CAPSULE | Refills: 1 | Status: SHIPPED | OUTPATIENT
Start: 2022-08-12 | End: 2022-08-16 | Stop reason: SDUPTHER

## 2022-08-15 ENCOUNTER — TELEMEDICINE (OUTPATIENT)
Dept: BEHAVIORAL HEALTH | Facility: CLINIC | Age: 57
End: 2022-08-15

## 2022-08-15 DIAGNOSIS — F31.9 BIPOLAR 1 DISORDER: Primary | ICD-10-CM

## 2022-08-15 DIAGNOSIS — Z91.410 H/O ADULT PHYSICAL AND SEXUAL ABUSE: ICD-10-CM

## 2022-08-15 DIAGNOSIS — Z56.6 WORK STRESS: ICD-10-CM

## 2022-08-15 PROCEDURE — 90834 PSYTX W PT 45 MINUTES: CPT | Performed by: PSYCHOLOGIST

## 2022-08-15 SDOH — HEALTH STABILITY - MENTAL HEALTH: OTHER PHYSICAL AND MENTAL STRAIN RELATED TO WORK: Z56.6

## 2022-08-16 ENCOUNTER — OFFICE VISIT (OUTPATIENT)
Dept: FAMILY MEDICINE CLINIC | Facility: CLINIC | Age: 57
End: 2022-08-16

## 2022-08-16 ENCOUNTER — OFFICE VISIT (OUTPATIENT)
Dept: NEUROLOGY | Facility: CLINIC | Age: 57
End: 2022-08-16

## 2022-08-16 VITALS
RESPIRATION RATE: 18 BRPM | DIASTOLIC BLOOD PRESSURE: 84 MMHG | SYSTOLIC BLOOD PRESSURE: 144 MMHG | HEART RATE: 55 BPM | OXYGEN SATURATION: 94 %

## 2022-08-16 VITALS
HEIGHT: 67 IN | BODY MASS INDEX: 38.3 KG/M2 | WEIGHT: 244 LBS | TEMPERATURE: 98 F | HEART RATE: 75 BPM | RESPIRATION RATE: 18 BRPM | OXYGEN SATURATION: 96 % | DIASTOLIC BLOOD PRESSURE: 90 MMHG | SYSTOLIC BLOOD PRESSURE: 142 MMHG

## 2022-08-16 DIAGNOSIS — F10.10 ALCOHOL ABUSE: ICD-10-CM

## 2022-08-16 DIAGNOSIS — E11.42 DIABETIC PERIPHERAL NEUROPATHY: Primary | ICD-10-CM

## 2022-08-16 DIAGNOSIS — F41.9 ANXIETY: ICD-10-CM

## 2022-08-16 DIAGNOSIS — F51.01 PRIMARY INSOMNIA: ICD-10-CM

## 2022-08-16 DIAGNOSIS — F31.9 BIPOLAR 1 DISORDER: Primary | ICD-10-CM

## 2022-08-16 DIAGNOSIS — G25.81 RLS (RESTLESS LEGS SYNDROME): ICD-10-CM

## 2022-08-16 DIAGNOSIS — I10 ESSENTIAL HYPERTENSION: ICD-10-CM

## 2022-08-16 PROCEDURE — 99214 OFFICE O/P EST MOD 30 MIN: CPT | Performed by: FAMILY MEDICINE

## 2022-08-16 PROCEDURE — 99215 OFFICE O/P EST HI 40 MIN: CPT | Performed by: PHYSICIAN ASSISTANT

## 2022-08-16 RX ORDER — PREGABALIN 75 MG/1
75 CAPSULE ORAL 3 TIMES DAILY
Qty: 90 CAPSULE | Refills: 5 | Status: SHIPPED | OUTPATIENT
Start: 2022-08-16 | End: 2023-03-08 | Stop reason: SDUPTHER
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
## 2022-08-16 NOTE — PROGRESS NOTES
Subjective     Chief Complaint: numbness, paresthesias, pain      History of Present Illness   Marcela Ramírez is a 57 y.o. female with a history of DM who comes to clinic today for evaluation of neuropathy. She initially noted symptoms in 2015 marked by numbness, paresthesias, and shooting pain in her upper and lower extremities bilaterally. This has worsened over time. She notes associated balance impairment as well as decreased  strength, though denies any clear associated weakness. Her symptoms are worse with increased activity. She is currently taking Lyrica, which is somewhat beneficial. She has previously taken GBP.     She also notes RLS symptoms in her feet primarily at night. She is currently taking neupro 1mg daily. She previously tried ropinirole.      Prior evaluation has included an EMG of the upper and lower extremities bilaterally which was notable for a moderate axonal and demyelinating peripheral neuropathy, moderate CTS bilaterally, mild-moderate ulnar neuropathy on the left and mild ulnar neuropathy on the right. Screening bloodwork was notable for a hemoglobin A1C of 7.3.     Today: Since her last visit in 2/22, she notes that her numbness, paresthesias and shooting pains have worsened and now radiate up to her thighs bilaterally. Lyrica 75mg TID has been beneficial. She has not consumed alcohol in approximately 9 months.      I have reviewed and confirmed the past family, social and medical history as accurate on 8/16/22.     Review of Systems   Constitutional: Negative.    HENT: Negative.    Eyes: Negative.    Respiratory: Negative.    Cardiovascular: Negative.    Gastrointestinal: Negative.    Endocrine: Negative.    Genitourinary: Negative.    Musculoskeletal: Negative.    Skin: Negative.    Allergic/Immunologic: Negative.    Hematological: Negative.        Objective     /84   Pulse 55   Resp 18   LMP  (LMP Unknown)   SpO2 94%     General appearance today is normal.      Physical Exam  Neurological:      Gait: Gait is intact.   Psychiatric:         Speech: Speech normal.          Neurologic Exam     Mental Status   Speech: speech is normal   Level of consciousness: alert  Normal comprehension.     Cranial Nerves   Cranial nerves II through XII intact.     Motor Exam   Muscle bulk: normal    Gait, Coordination, and Reflexes     Gait  Gait: normal    Tremor   Resting tremor: absent          Assessment & Plan   Diagnoses and all orders for this visit:    1. Diabetic peripheral neuropathy (HCC) (Primary)    2. RLS (restless legs syndrome)          Discussion/Summary   Marcela Ramírez returns to clinic today for evaluation of diabetic peripheral neuropathy, carpal tunnel syndrome, and RLS. I again reviewed her current status and treatment options. After discussing potential treatment options, it was elected to continue on her current medications unchanged. She will then follow up in 6 months , or sooner if needed.   Total time of visit today: 45 minutes. As part of this visit I discussed the history with the patient . I also discussed diagnosis, prognosis, evaluation, current status, treatment options and management as discussed above.         Gladys Wilder PA-C

## 2022-08-19 DIAGNOSIS — F51.01 PRIMARY INSOMNIA: ICD-10-CM

## 2022-08-19 RX ORDER — ALPRAZOLAM 1 MG/1
TABLET ORAL
Qty: 30 TABLET | Refills: 5 | Status: SHIPPED | OUTPATIENT
Start: 2022-08-19 | End: 2023-02-28

## 2022-08-29 ENCOUNTER — TELEMEDICINE (OUTPATIENT)
Dept: BEHAVIORAL HEALTH | Facility: CLINIC | Age: 57
End: 2022-08-29

## 2022-08-29 DIAGNOSIS — Z59.9 FINANCIAL DIFFICULTIES: ICD-10-CM

## 2022-08-29 DIAGNOSIS — F31.9 BIPOLAR 1 DISORDER: Primary | ICD-10-CM

## 2022-08-29 DIAGNOSIS — Z91.410 H/O ADULT PHYSICAL AND SEXUAL ABUSE: ICD-10-CM

## 2022-08-29 PROCEDURE — 90834 PSYTX W PT 45 MINUTES: CPT | Performed by: PSYCHOLOGIST

## 2022-08-29 SDOH — ECONOMIC STABILITY - INCOME SECURITY: PROBLEM RELATED TO HOUSING AND ECONOMIC CIRCUMSTANCES, UNSPECIFIED: Z59.9
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
## 2022-09-08 DIAGNOSIS — F41.9 ANXIETY: ICD-10-CM

## 2022-09-08 DIAGNOSIS — F31.9 BIPOLAR 1 DISORDER: ICD-10-CM

## 2022-09-08 RX ORDER — DESVENLAFAXINE SUCCINATE 50 MG/1
TABLET, EXTENDED RELEASE ORAL
Qty: 30 TABLET | Refills: 10 | Status: SHIPPED | OUTPATIENT
Start: 2022-09-08

## 2022-09-08 RX ORDER — LAMOTRIGINE 200 MG/1
TABLET ORAL
Qty: 30 TABLET | Refills: 10 | Status: SHIPPED | OUTPATIENT
Start: 2022-09-08

## 2022-09-12 ENCOUNTER — TELEMEDICINE (OUTPATIENT)
Dept: BEHAVIORAL HEALTH | Facility: CLINIC | Age: 57
End: 2022-09-12

## 2022-09-12 DIAGNOSIS — Z63.9 RELATIONSHIP PROBLEMS: ICD-10-CM

## 2022-09-12 DIAGNOSIS — F31.9 BIPOLAR 1 DISORDER: Primary | ICD-10-CM

## 2022-09-12 DIAGNOSIS — Z56.6 WORK STRESS: ICD-10-CM

## 2022-09-12 PROCEDURE — 90834 PSYTX W PT 45 MINUTES: CPT | Performed by: PSYCHOLOGIST

## 2022-09-12 SDOH — SOCIAL STABILITY - SOCIAL INSECURITY: PROBLEM RELATED TO PRIMARY SUPPORT GROUP, UNSPECIFIED: Z63.9

## 2022-09-12 SDOH — HEALTH STABILITY - MENTAL HEALTH: OTHER PHYSICAL AND MENTAL STRAIN RELATED TO WORK: Z56.6

## 2022-09-12 NOTE — PROGRESS NOTES
PROGRESS NOTE    Data:  Marcela Ramírez is a 57 y.o. female who met with the undersigned for a scheduled telehealth therapy session from 11:15am - 12:00pm.    The pt consented to a video visit. She connected via Zoom from her car outside her work in [place], [place].   Provider connected via Zoom at: [facility], [address], [place], [place].      Clinical Maneuvering/Intervention:      The pt talked about her recent stressors including problems at work, not connecting/feeling upset with G, and financial stress. Stressors were processed individually and in detail. Venting of frustrations was conducted in order to help the pt feel less tense emotionally and gain insight into issues. Feelings were processed and validated several times in session. Perspective taking was conducted multiple times in order to help the pt feel less stuck, less overwhelmed, and see challenges as much more manageable. Active listening was conducted in order to help the pt make sense of stressors and start moving towards potential solutions. The pt was assisted with finding solutions based on existing skill-set and abilities. Coping with stress at work (e.g., staying in her role and continuing good communication with co-workers), connecting better with G, and de-stressing when it comes to finances were themes of the session. After processing her work experiences, she was assisted in seeing how doing her best in her role without overstepping seemed an effective way to cope with work stress. She was assisted with seeing how she can connect better with G, by using her current abilities of being transparent with her thoughts/feelings, instead of jumping to defensiveness. In fact, worries that fuel defensiveness were brought to the surface and dispelled. Maladaptive thought patterns were identified, challenged, and evaluated for validity in order to allow for the pt to chose different and more adaptive  ways of thinking. Sources of worry were identified. Alternatives to worry were discussed as well. Practicing how she will communicate better was conducted in session. Homework was assigned tailored to pt's needs. The pt expressed gratitude for today's session.    Mental Status Exam  Hygiene:  good  Dress: normal  Attitude:  cooperative and proactive  Motor Activity: normal  Speech: normal  Mood:  tense  Affect:  congruent  Thought Processes: normal  Thought Content:  normal  Suicidal Thoughts:  not endorsed  Homicidal Thoughts:  not endorsed  Crisis Safety Plan: not needed  Hallucinations:  none      Patient's Support Network Includes:  family, friends      Progress toward goal: there is evidence to suggest that she struggles with mood swings       Functional Status: moderate       Prognosis: good with counseling, medication    Assessment      The pt presented to be struggling with managing her moods related to having bipolar disorder. She seems to benefit from venting, having her experiences validated, and being assisted in discovering her own solutions to her problems.       Plan      In order to diminish symptoms of bipolar disorder: the pt is to continue with counseling (ongoing), continue with psychotropic medication as prescribed (ongoing), and make a point to use the communication techniques with G as soon as feasible (ongoing).    Maribel Hernandez, PhD, LP

## 2022-09-26 ENCOUNTER — OFFICE VISIT (OUTPATIENT)
Dept: BEHAVIORAL HEALTH | Facility: CLINIC | Age: 57
End: 2022-09-26

## 2022-09-26 DIAGNOSIS — F31.9 BIPOLAR 1 DISORDER: Primary | ICD-10-CM

## 2022-09-26 DIAGNOSIS — Z63.9 RELATIONSHIP PROBLEMS: ICD-10-CM

## 2022-09-26 PROCEDURE — 90834 PSYTX W PT 45 MINUTES: CPT | Performed by: PSYCHOLOGIST

## 2022-09-26 SDOH — SOCIAL STABILITY - SOCIAL INSECURITY: PROBLEM RELATED TO PRIMARY SUPPORT GROUP, UNSPECIFIED: Z63.9

## 2022-09-27 NOTE — PROGRESS NOTES
PROGRESS NOTE    Data:  Marcela Ramírez is a 57 y.o. female who met with the undersigned for a scheduled telehealth therapy session from 3:00 - 3:45pm.      Clinical Maneuvering/Intervention:      The pt talked about her recent stressors including problems in her relationship with her long-time boyfriend MARKELL, mood swings, and worrying about her son Peter. Stressors were processed individually and in detail. Venting of frustrations was conducted in order to help the pt feel less tense emotionally and gain insight into issues. Feelings were processed and validated several times in session. Perspective taking was conducted multiple times in order to help the pt feel less stuck, less overwhelmed, and see challenges as much more manageable. Active listening was conducted in order to help the pt make sense of stressors and start moving towards potential solutions. The pt was assisted with finding solutions based on existing skill-set and abilities. She was assisted with processing recent events with MARKELL, the cruise that they took, and the opportunity for things to 'come out,' that needed addressing in their relationship. She processed disagreements with him, how and why she felt hurt, and what is working in their relationship (the latter being with assistance in session). The pt was assisted in recognizing progress in order to show encouragement and promote motivation to keep making positive changes in life. In her past, she used to yell and get quite worked up, something she was used to in previous relationships. She does not do this anymore and behaviors 'working' for them were highlighted in session. Time was allocated specifically to assess what is 'working' in the pt's life, versus what is 'not working,' (if anything) in terms of helping to improve mood and quality of life. She was assisted with noting what she can continue doing to cultivate a healthy relationship with him. Continue to be transparent with each other,  but making a point to have it come from a place of love. The pt expressed gratitude for today's session.    Mental Status Exam  Hygiene:  good  Dress: normal  Attitude:  cooperative and proactive  Motor Activity: normal  Speech: normal  Mood:  tense  Affect:  congruent  Thought Processes: normal  Thought Content:  normal  Suicidal Thoughts:  not endorsed  Homicidal Thoughts:  not endorsed  Crisis Safety Plan: not needed  Hallucinations:  none      Patient's Support Network Includes:  family, friends      Progress toward goal: there is evidence to suggest that she struggles with mood swings       Functional Status: moderate       Prognosis: good with counseling, medication    Assessment      The pt presented to be struggling at times with managing her moods related to having bipolar disorder. She seems to benefit from venting, having her experiences validated, and being assisted in discovering her own solutions to her problems.       Plan      In order to diminish symptoms of bipolar disorder: the pt is to continue with counseling (ongoing), continue with psychotropic medication as prescribed (ongoing), and make a point to use the communication techniques with G as discussed in sessions (ongoing).    Maribel Hernandez, PhD, LP

## 2022-10-03 ENCOUNTER — OFFICE VISIT (OUTPATIENT)
Dept: BEHAVIORAL HEALTH | Facility: CLINIC | Age: 57
End: 2022-10-03

## 2022-10-03 DIAGNOSIS — F31.9 BIPOLAR 1 DISORDER: Primary | ICD-10-CM

## 2022-10-03 DIAGNOSIS — Z56.6 WORK STRESS: ICD-10-CM

## 2022-10-03 DIAGNOSIS — Z63.9 RELATIONSHIP PROBLEMS: ICD-10-CM

## 2022-10-03 PROCEDURE — 90834 PSYTX W PT 45 MINUTES: CPT | Performed by: PSYCHOLOGIST

## 2022-10-03 SDOH — HEALTH STABILITY - MENTAL HEALTH: OTHER PHYSICAL AND MENTAL STRAIN RELATED TO WORK: Z56.6

## 2022-10-03 SDOH — SOCIAL STABILITY - SOCIAL INSECURITY: PROBLEM RELATED TO PRIMARY SUPPORT GROUP, UNSPECIFIED: Z63.9

## 2022-10-04 NOTE — PROGRESS NOTES
PROGRESS NOTE    Data:  Marcela Ramírez is a 57 y.o. female who met with the undersigned for a scheduled telehealth therapy session from 3:00 - 3:45pm.      Clinical Maneuvering/Intervention:      The pt talked about her recent stressors including problems in her relationship with her long-time boyfriend MARKELL, mood swings, and work stress. Stressors were processed individually and in detail. Venting of frustrations was conducted in order to help the pt feel less tense emotionally and gain insight into issues. Feelings were processed and validated several times in session. Perspective taking was conducted multiple times in order to help the pt feel less stuck, less overwhelmed, and see challenges as much more manageable. Active listening was conducted in order to help the pt make sense of stressors and start moving towards potential solutions. The pt was assisted with finding solutions based on existing skill-set and abilities. She was assisted with identifying ways to communicate more effectively, but also assisted in noting what is working. Time was allocated specifically to assess what is 'working' in the pt's life, versus what is 'not working,' (if anything) in terms of helping to improve mood and quality of life. She talked about not feeling like MARKELL understands her and how she is tired of trying to be calmer/like she does not care about certain things just to keep the peace. Role playing was conducted in order to help the pt gain insight into how to improve her communication skills, decrease tension with the other person, and notice things the pt may be doing (and needs to stop doing) in order to improve the quality of the relationship. The pt's strengths were identified in order to help identify abilities to use to better face/overcome challenges. Homework was assigned tailored to pt's needs. The pt expressed gratitude for today's session.    Mental Status Exam  Hygiene:  good  Dress: normal  Attitude:   cooperative and proactive  Motor Activity: normal  Speech: normal  Mood:  tense  Affect:  congruent  Thought Processes: normal  Thought Content:  normal  Suicidal Thoughts:  not endorsed  Homicidal Thoughts:  not endorsed  Crisis Safety Plan: not needed  Hallucinations:  none      Patient's Support Network Includes:  family, friends      Progress toward goal: there is evidence to suggest that she struggles with mood swings       Functional Status: moderate       Prognosis: good with counseling, medication    Assessment      The pt presented to be struggling at times with managing her moods related to having bipolar disorder. She seems to benefit from venting, having her experiences validated, and being assisted in discovering her own solutions to her problems.       Plan      In order to diminish symptoms of bipolar disorder: the pt is to continue with counseling (ongoing), continue with psychotropic medication as prescribed (ongoing), and make a point to use the communication techniques with G as discussed in sessions (ongoing).    Maribel Hernandez, PhD, LP

## 2022-10-05 RX ORDER — ATORVASTATIN CALCIUM 20 MG/1
TABLET, FILM COATED ORAL
Qty: 30 TABLET | Refills: 2 | Status: SHIPPED | OUTPATIENT
Start: 2022-10-05 | End: 2023-01-30

## 2022-10-05 RX ORDER — ROTIGOTINE 1 MG/24H
PATCH, EXTENDED RELEASE TRANSDERMAL
Qty: 30 PATCH | Refills: 10 | Status: SHIPPED | OUTPATIENT
Start: 2022-10-05

## 2022-10-05 RX ORDER — LISINOPRIL 40 MG/1
TABLET ORAL
Qty: 30 TABLET | Refills: 5 | Status: SHIPPED | OUTPATIENT
Start: 2022-10-05

## 2022-10-05 RX ORDER — METOPROLOL SUCCINATE 50 MG/1
TABLET, EXTENDED RELEASE ORAL
Qty: 30 TABLET | Refills: 5 | Status: SHIPPED | OUTPATIENT
Start: 2022-10-05

## 2022-10-10 ENCOUNTER — OFFICE VISIT (OUTPATIENT)
Dept: BEHAVIORAL HEALTH | Facility: CLINIC | Age: 57
End: 2022-10-10

## 2022-10-10 DIAGNOSIS — F31.9 BIPOLAR 1 DISORDER: Primary | ICD-10-CM

## 2022-10-10 DIAGNOSIS — Z63.9 RELATIONSHIP PROBLEMS: ICD-10-CM

## 2022-10-10 DIAGNOSIS — Z63.8 FAMILY CONFLICT: ICD-10-CM

## 2022-10-10 PROCEDURE — 90834 PSYTX W PT 45 MINUTES: CPT | Performed by: PSYCHOLOGIST

## 2022-10-10 SDOH — SOCIAL STABILITY - SOCIAL INSECURITY: PROBLEM RELATED TO PRIMARY SUPPORT GROUP, UNSPECIFIED: Z63.9

## 2022-10-10 SDOH — SOCIAL STABILITY - SOCIAL INSECURITY: OTHER SPECIFIED PROBLEMS RELATED TO PRIMARY SUPPORT GROUP: Z63.8

## 2022-10-11 NOTE — PROGRESS NOTES
PROGRESS NOTE    Data:  Marcela Ramírez is a 57 y.o. female who met with the undersigned for a scheduled telehealth therapy session from 3:00 - 3:45pm.      Clinical Maneuvering/Intervention:      The pt talked about her recent stressors including problems in her relationship with G and feeling overwhelmed/not appreciated at work. She also expressed feeling upset about her son being in alf and choices he makes in life in general. Stressors were processed individually and in detail. Venting of frustrations was conducted in order to help the pt feel less tense emotionally and gain insight into issues. Feelings were processed and validated several times in session. Active listening was conducted in order to help the pt make sense of stressors and start moving towards potential solutions. The pt was assisted with finding solutions based on existing skill-set and abilities. Perspective taking was conducted multiple times in order to help the pt feel less stuck, less overwhelmed, and see challenges as much more manageable. She was assisted with improving on communication skills with G. Time was allocated specifically to assess what is 'working' in the pt's life, versus what is 'not working,' (if anything) in terms of helping to improve mood and quality of life. Being transparent with each other, expressing needs, and validating feelings were all noted as helpful in their relationship. The pt was assisted in recognizing progress in order to show encouragement and promote motivation to keep making positive changes in life. She is more open with him than ever before, and does so in a way that is respectful. The pt expressed hurt feelings in a way, that when he does something kind now, he is just doing it to appease her. Maladaptive thought patterns were identified, challenged, and evaluated for validity in order to allow for the pt to chose different and more adaptive ways of thinking. Time was used to review and  confirm her role in her son's life choices, or lack thereof. Some humor was used in session as it is one of the pt's coping skills. Humor was used too, to help the pt see things as less challenging and more manageable than they first seemed. Humor was used as well, to model use of the skill as a way to decrease tension ongoing. Homework was assigned tailored to pt's needs. The pt expressed gratitude for today's session.    Mental Status Exam  Hygiene:  good  Dress: normal  Attitude:  cooperative and proactive  Motor Activity: normal  Speech: normal  Mood:  slightly tense, more level  Affect:  congruent  Thought Processes: normal  Thought Content:  normal  Suicidal Thoughts:  not endorsed  Homicidal Thoughts:  not endorsed  Crisis Safety Plan: not needed  Hallucinations:  none      Patient's Support Network Includes:  family, friends      Progress toward goal: there is evidence to suggest that she struggles with mood swings       Functional Status: moderate       Prognosis: good with counseling, medication    Assessment      The pt presented to be struggling at times with managing her moods related to having bipolar disorder. She seems to benefit from venting, having her experiences validated, and being assisted in discovering her own solutions to her problems.       Plan      In order to diminish symptoms of bipolar disorder: the pt is to continue with counseling (ongoing), continue with psychotropic medication as prescribed (ongoing), and make a point to use the communication techniques identified/practiced in session with G in order to strengthen their relationship (next few weeks, longer as needed).     Maribel Hernandez, PhD, LP

## 2022-10-24 ENCOUNTER — OFFICE VISIT (OUTPATIENT)
Dept: BEHAVIORAL HEALTH | Facility: CLINIC | Age: 57
End: 2022-10-24

## 2022-10-24 DIAGNOSIS — Z63.9 RELATIONSHIP PROBLEMS: ICD-10-CM

## 2022-10-24 DIAGNOSIS — F31.9 BIPOLAR 1 DISORDER: Primary | ICD-10-CM

## 2022-10-24 DIAGNOSIS — Z63.8 FAMILY CONFLICT: ICD-10-CM

## 2022-10-24 PROCEDURE — 90834 PSYTX W PT 45 MINUTES: CPT | Performed by: PSYCHOLOGIST

## 2022-10-24 SDOH — SOCIAL STABILITY - SOCIAL INSECURITY: OTHER SPECIFIED PROBLEMS RELATED TO PRIMARY SUPPORT GROUP: Z63.8

## 2022-10-24 SDOH — SOCIAL STABILITY - SOCIAL INSECURITY: PROBLEM RELATED TO PRIMARY SUPPORT GROUP, UNSPECIFIED: Z63.9

## 2022-10-25 NOTE — PROGRESS NOTES
PROGRESS NOTE    Data:  Marcela Ramírez is a 57 y.o. female who met with the undersigned for a scheduled telehealth therapy session from 3:00 - 3:45pm.      Clinical Maneuvering/Intervention:      The pt talked about her recent stressors including feeling hurt by her daughters, work stress, and feeling hurt by some of G's remarks lately. Stressors were processed individually and in detail. Venting of frustrations was conducted in order to help the pt feel less tense emotionally and gain insight into issues. Feelings were processed and validated several times in session. Perspective taking was conducted multiple times in order to help the pt feel less stuck, less overwhelmed, and see challenges as much more manageable. Active listening was conducted in order to help the pt make sense of stressors and start moving towards potential solutions. The pt was assisted with finding solutions based on existing skill-set and abilities. Role playing was conducted in order to help the pt gain insight into how to improve her communication skills, decrease tension with the other person, and notice things the pt may be doing (and needs to stop doing) in order to improve the quality of the relationship. The pt was assisted in recognizing progress in order to show encouragement and promote motivation to keep making positive changes in life. She is being more transparent with G, and their connection is improving. She is responding differently at work and improving communication there which has led to her feeling less stressed at work. Evidence that the pt is more than capable of being assertive and then connecting better with her daughters was provided. The pt's strengths were identified in order to help identify abilities to use to better face/overcome challenges. Homework was assigned tailored to pt's needs. The pt expressed gratitude for today's session.    Mental Status Exam  Hygiene:  good  Dress: normal  Attitude:   cooperative and proactive  Motor Activity: normal  Speech: normal  Mood:  slightly tense, more level  Affect:  congruent  Thought Processes: normal  Thought Content:  normal  Suicidal Thoughts:  not endorsed  Homicidal Thoughts:  not endorsed  Crisis Safety Plan: not needed  Hallucinations:  none      Patient's Support Network Includes:  family, friends      Progress toward goal: there is evidence to suggest that she struggles with mood swings       Functional Status: moderate       Prognosis: good with counseling, medication    Assessment      The pt presented to be struggling at times with managing her moods related to having bipolar disorder. She seems to benefit from venting, having her experiences validated, and being assisted in discovering her own solutions to her problems.       Plan      In order to diminish symptoms of bipolar disorder: the pt is to continue with counseling (ongoing), continue with psychotropic medication as prescribed (ongoing), and make a point to use the communication techniques identified/practiced in session this week with her daughters (next few weeks, longer as needed).     Maribel Hernandez, PhD, LP

## 2022-10-31 ENCOUNTER — TELEMEDICINE (OUTPATIENT)
Dept: BEHAVIORAL HEALTH | Facility: CLINIC | Age: 57
End: 2022-10-31

## 2022-10-31 DIAGNOSIS — Z63.9 RELATIONSHIP PROBLEMS: ICD-10-CM

## 2022-10-31 DIAGNOSIS — F31.9 BIPOLAR 1 DISORDER: Primary | ICD-10-CM

## 2022-10-31 PROCEDURE — 90834 PSYTX W PT 45 MINUTES: CPT | Performed by: PSYCHOLOGIST

## 2022-10-31 SDOH — SOCIAL STABILITY - SOCIAL INSECURITY: PROBLEM RELATED TO PRIMARY SUPPORT GROUP, UNSPECIFIED: Z63.9

## 2022-11-01 NOTE — PROGRESS NOTES
PROGRESS NOTE    Data:  Marcela aRmírez is a 57 y.o. female who met with the undersigned for a scheduled telehealth therapy session from 11:15am - 12:00pm.    The pt consented to a video visit. She connected via Zoom from her car outside her work in [place], [place].   Provider connected via Zoom at: [facility], [address], [place], [place].      Clinical Maneuvering/Intervention:      The pt talked about her recent stressors including work, worrying about her son in intermediate, and often feeling hurt by those close to her (G, or her children). Stressors were processed individually and in detail. Venting of frustrations was conducted in order to help the pt feel less tense emotionally and gain insight into issues. Feelings were processed and validated several times in session. Perspective taking was conducted multiple times in order to help the pt feel less stuck, less overwhelmed, and see challenges as much more manageable. Active listening was conducted in order to help the pt make sense of stressors and start moving towards potential solutions. The pt was assisted with finding solutions based on existing skill-set and abilities. She was assisted with noting her role in her stressors, the good and the bad. The pt was assisted in recognizing progress in order to show encouragement and promote motivation to keep making positive changes in life. She is communicating more effectively with co-workers and with G. Time was allocated specifically to assess what is 'working' in the pt's life, versus what is 'not working,' (if anything) in terms of helping to improve mood and quality of life. The pt's strengths were identified in order to help identify abilities to use to better face/overcome challenges. She is particularly good at being a practical and understanding person, and also being very articulate. Connections between feeling more centered and improved communication were  discussed. Homework was assigned tailored to pt's needs. The pt expressed gratitude for today's session.    Mental Status Exam  Hygiene:  good  Dress: normal  Attitude:  cooperative and proactive  Motor Activity: normal  Speech: normal  Mood:  level  Affect:  congruent  Thought Processes: normal  Thought Content:  normal  Suicidal Thoughts:  not endorsed  Homicidal Thoughts:  not endorsed  Crisis Safety Plan: not needed  Hallucinations:  none      Patient's Support Network Includes:  family, friends      Progress toward goal: there is evidence to suggest that she struggles with mood swings       Functional Status: moderate       Prognosis: good with counseling, medication    Assessment      The pt presented to be struggling with managing her moods related to having bipolar disorder. She seems to benefit from venting, having her experiences validated, and being assisted in discovering her own solutions to her problems.       Plan      In order to diminish symptoms of bipolar disorder: the pt is to continue with counseling (ongoing), continue with psychotropic medication as prescribed (ongoing), and make a point to use the communication techniques practiced in session with G and others (ongoing).    Maribel Hernandez, PhD, LP

## 2022-11-03 RX ORDER — OMEPRAZOLE 20 MG/1
CAPSULE, DELAYED RELEASE ORAL
Qty: 30 CAPSULE | Refills: 11 | Status: SHIPPED | OUTPATIENT
Start: 2022-11-03

## 2022-11-14 ENCOUNTER — OFFICE VISIT (OUTPATIENT)
Dept: BEHAVIORAL HEALTH | Facility: CLINIC | Age: 57
End: 2022-11-14

## 2022-11-14 DIAGNOSIS — F31.9 BIPOLAR 1 DISORDER: Primary | ICD-10-CM

## 2022-11-14 DIAGNOSIS — Z63.8 FAMILY CONFLICT: ICD-10-CM

## 2022-11-14 PROCEDURE — 90791 PSYCH DIAGNOSTIC EVALUATION: CPT | Performed by: PSYCHOLOGIST

## 2022-11-14 SDOH — SOCIAL STABILITY - SOCIAL INSECURITY: OTHER SPECIFIED PROBLEMS RELATED TO PRIMARY SUPPORT GROUP: Z63.8

## 2022-11-14 NOTE — PROGRESS NOTES
PROGRESS NOTE    Data:  Marcela Ramírez is a 57 y.o. female who met with the undersigned for a scheduled telehealth therapy session from 2:15 - 3:00pm.      Clinical Maneuvering/Intervention:      The pt talked about her recent stressors including work, feeling hurt/disrespected by her daughters, and having mood swings. Stressors were processed individually and in detail. Venting of frustrations was conducted in order to help the pt feel less tense emotionally and gain insight into issues. Feelings were processed and validated several times in session. Perspective taking was conducted multiple times in order to help the pt feel less stuck, less overwhelmed, and see challenges as much more manageable. Active listening was conducted in order to help the pt make sense of stressors and start moving towards potential solutions. The pt was assisted with finding solutions based on existing skill-set and abilities. The pt was assisted in recognizing progress in order to show encouragement and promote motivation to keep making positive changes in life. She is taking better care of herself and communicating more effectively with others. She was assisted with identifying and practicing boundary setting with her daughters. Evidence was provided to show her how she is quite capable of setting boundaries and improving her communication with her daughters. Some humor was used in session as it is one of the pt's coping skills. Humor was used too, to help the pt see things as less challenging and more manageable than they first seemed. Humor was used as well, to model use of the skill as a way to decrease tension ongoing.  Homework was assigned tailored to pt's needs. The pt expressed gratitude for today's session.    Mental Status Exam  Hygiene:  good  Dress: normal  Attitude:  cooperative and proactive  Motor Activity: normal  Speech: normal  Mood:  level  Affect:  congruent  Thought Processes: normal  Thought Content:   normal  Suicidal Thoughts:  not endorsed  Homicidal Thoughts:  not endorsed  Crisis Safety Plan: not needed  Hallucinations:  none      Patient's Support Network Includes:  family, friends      Progress toward goal: there is evidence to suggest that she struggles with mood swings       Functional Status: moderate       Prognosis: good with counseling, medication    Assessment      The pt presented to be struggling with managing her moods related to having bipolar disorder. She seems to benefit from venting, having her experiences validated, and being assisted in discovering her own solutions to her problems.       Plan      In order to diminish symptoms of bipolar disorder: the pt is to continue with counseling (ongoing), continue with psychotropic medication as prescribed (ongoing), and make a point to use the communication techniques practiced in session with her daughters (ongoing).    Maribel Hernandez, PhD, LP

## 2022-11-28 ENCOUNTER — OFFICE VISIT (OUTPATIENT)
Dept: BEHAVIORAL HEALTH | Facility: CLINIC | Age: 57
End: 2022-11-28

## 2022-11-28 DIAGNOSIS — F31.9 BIPOLAR 1 DISORDER: Primary | ICD-10-CM

## 2022-11-28 PROCEDURE — 90834 PSYTX W PT 45 MINUTES: CPT | Performed by: PSYCHOLOGIST

## 2022-11-29 NOTE — PROGRESS NOTES
PROGRESS NOTE    Data:  Marcela Ramírez is a 57 y.o. female who met with the undersigned for a scheduled telehealth therapy session from 3:05 - 3:50pm.      Clinical Maneuvering/Intervention:      The pt talked about her recent stressors including getting sick, missing work, and feeling distraught about certain things in her past (trauma). Stressors were processed individually and in detail. Venting of frustrations was conducted in order to help the pt feel less tense emotionally and gain insight into issues. Feelings were processed and validated several times in session. Perspective taking was conducted multiple times in order to help the pt feel less stuck, less overwhelmed, and see challenges as much more manageable. Active listening was conducted in order to help the pt make sense of stressors and start moving towards potential solutions. The pt was assisted with finding solutions based on existing skill-set and abilities. The pt was assisted in recognizing progress in order to show encouragement and promote motivation to keep making positive changes in life. Her mood has been more level. Time was allocated specifically to assess what is 'working' in the pt's life, versus what is 'not working,' (if anything) in terms of helping to improve mood and quality of life. She will continue being transparent with her partner, G, for example. Some humor was used in session as it is one of the pt's coping skills. Humor was used too, to help the pt see things as less challenging and more manageable than they first seemed. Humor was used as well, to model use of the skill as a way to decrease tension ongoing. Homework was assigned tailored to pt's needs. The pt expressed gratitude for today's session.    Mental Status Exam  Hygiene:  good  Dress: normal  Attitude:  cooperative and proactive  Motor Activity: normal  Speech: normal  Mood:  level  Affect:  congruent  Thought Processes: normal  Thought Content:   normal  Suicidal Thoughts:  not endorsed  Homicidal Thoughts:  not endorsed  Crisis Safety Plan: not needed  Hallucinations:  none      Patient's Support Network Includes:  family, friends      Progress toward goal: there is evidence to suggest that she struggles with mood swings, but moods are calmer as of late       Functional Status: moderate       Prognosis: good with counseling, medication    Assessment      The pt presented to be struggling with managing her moods related to having bipolar disorder, though moods are calmer as of late. She seems to benefit from venting, having her experiences validated, and being assisted in discovering her own solutions to her problems.       Plan      In order to diminish symptoms of bipolar disorder: the pt is to continue with counseling (ongoing), continue with psychotropic medication as prescribed (ongoing), and make a point to use the communication techniques practiced in session with her daughters and G (ongoing).    Maribel Hernandez, PhD, LP

## 2022-12-05 ENCOUNTER — OFFICE VISIT (OUTPATIENT)
Dept: BEHAVIORAL HEALTH | Facility: CLINIC | Age: 57
End: 2022-12-05

## 2022-12-05 ENCOUNTER — OFFICE VISIT (OUTPATIENT)
Dept: BARIATRICS/WEIGHT MGMT | Facility: CLINIC | Age: 57
End: 2022-12-05

## 2022-12-05 VITALS
BODY MASS INDEX: 38.69 KG/M2 | OXYGEN SATURATION: 98 % | DIASTOLIC BLOOD PRESSURE: 86 MMHG | HEIGHT: 67 IN | SYSTOLIC BLOOD PRESSURE: 134 MMHG | TEMPERATURE: 97.4 F | WEIGHT: 246.5 LBS | RESPIRATION RATE: 16 BRPM | HEART RATE: 80 BPM

## 2022-12-05 DIAGNOSIS — Z13.0 SCREENING, IRON DEFICIENCY ANEMIA: ICD-10-CM

## 2022-12-05 DIAGNOSIS — F31.9 BIPOLAR 1 DISORDER: Primary | ICD-10-CM

## 2022-12-05 DIAGNOSIS — E55.9 HYPOVITAMINOSIS D: ICD-10-CM

## 2022-12-05 DIAGNOSIS — Z90.3 POSTGASTRECTOMY MALABSORPTION: ICD-10-CM

## 2022-12-05 DIAGNOSIS — K91.2 POSTGASTRECTOMY MALABSORPTION: ICD-10-CM

## 2022-12-05 DIAGNOSIS — E66.9 OBESITY, CLASS II, BMI 35-39.9: ICD-10-CM

## 2022-12-05 DIAGNOSIS — Z63.9 RELATIONSHIP PROBLEMS: ICD-10-CM

## 2022-12-05 DIAGNOSIS — R10.13 DYSPEPSIA: ICD-10-CM

## 2022-12-05 DIAGNOSIS — R53.83 FATIGUE, UNSPECIFIED TYPE: Primary | ICD-10-CM

## 2022-12-05 DIAGNOSIS — Z13.21 MALNUTRITION SCREEN: ICD-10-CM

## 2022-12-05 DIAGNOSIS — Z63.8 FAMILY CONFLICT: ICD-10-CM

## 2022-12-05 DIAGNOSIS — Z98.84 STATUS POST BARIATRIC SURGERY: ICD-10-CM

## 2022-12-05 PROCEDURE — 90834 PSYTX W PT 45 MINUTES: CPT | Performed by: PSYCHOLOGIST

## 2022-12-05 PROCEDURE — 99214 OFFICE O/P EST MOD 30 MIN: CPT | Performed by: PHYSICIAN ASSISTANT

## 2022-12-05 SDOH — SOCIAL STABILITY - SOCIAL INSECURITY: OTHER SPECIFIED PROBLEMS RELATED TO PRIMARY SUPPORT GROUP: Z63.8

## 2022-12-05 SDOH — SOCIAL STABILITY - SOCIAL INSECURITY: PROBLEM RELATED TO PRIMARY SUPPORT GROUP, UNSPECIFIED: Z63.9

## 2022-12-05 NOTE — PROGRESS NOTES
[facility]  [address]  [address]  [facility] [id number] [phone]        Patient Name:  Marcela Ramírez.  :  1965      Reason for Visit:  Weight regain    HPI: Marceal Ramírez is a 57 y.o. female s/p LSG/ paraesophageal HHR by  on 19     LOV 21 for revision intake: Pt is 2 years s/p LSG, she was seen 1 week postop and 10 weeks postop LSG for acute abd pain, no other bariatric followup postop. Lowest weight 202lb, was very excited and happy with progress.  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  Has gained since and is really hoping that she can have a second chance at bariatric surgery to allow for further weightloss.  Continues to note restriction with very small portion sizes.  Trying to wean off pepsi and pasta. Had significant intake of pepsi prior to LSG and doesn't feel she can go off completely due to caffeine need.   Takes omeprazole daily 20mg- reflux is baseline from prior to LSG.   Taking full vitamin regimen from preop, has been very diligent to stay on track with vitamins.      GI: GERD controlled with omeprazole 20mg. Last EGD with Dr. Greenberg- no hiatal hernia Z line 40 cm very thick mucosal folds cannot rule out varices.  CT scan thickened fundus, no varices seen. No h/o HH or h pylori. S/p spenser with cholelithiasis. Diverticulosis. S/p spenser with cholelithiasis.      H/o HTN, HLD, LE edema. TIA-mimicking symptoms x 8-9 episodes including right sided drooping/ weakness/ vision changes. Extensive workup was negative for CVA with angio imaging- per patient. Was determined to be related to anxiety/ getting really worked up. No residual effects. She did see neurology for clearance prior to her LSG and follows with them regularly for DM neuropathy and RLS.  DM Diagnosed 2017, was on metformin in the past. Last A1C 5.5%. CKD- last GFR  "49. h/o blood transfusion with heavy menses 2006.  Carpal tunnel. H/o astham- not on inhalers. Arthritis- knees, otc arthritis meds prn, no h/o injections.  bipolar controlled on meds. Says she faithfully takes her bipolar meds to control symptoms, otherwise is \"really mean and angry\".    Patient re-presents today to further discuss weight regain and possible revision options. Says she did well for the first 6 months postop and got down do 202lb. She then started drinking alcohol and feels that propelled her into weight regain.  Would like to have \"the fear\" that Dr. Greenberg instilled in her postop to keep her on track. Is seeing Dr. Hernandez monthly to work through mental health issues.  Reports that after her last visit and discussion of revision surgery she was nervous to proceed and did not have her imaging evaluation completed.  She has now continued to gain weight and is revisiting the idea of further surgery.  Says at her lowest weight she had more energy and improved quality of life with increased mobility and able to take better care of herself and her family.  Is tearful in discussing that she is longing to lose weight.  Also is concerned about the malabsorptive procedures involved with revision with working long 10-hour shifts at [employer].    Complains of chronic GI issues with \"bubble guts\" near every day, frequent BM, more oily stool x last 6 months. Omeprazole 20mg controls reflux well.  Denies dysphagia, nausea, vomiting, abdominal pain, pulmonary issues and fevers.  Attributes some of this to increased coffee use after discontinuing Pepsi which she also notes that she drinks to control constipation.  Overall has a poor diet.  She attributes much of this to her boyfriend suggesting fast food, pizza, junk food on a daily basis.  This makes it hard for her to follow an appropriate diet plan to allow for weight loss.  She has taken the initiative to stop drinking Pepsi recently which she had gone back to after " having surgery.  Taking MVI, B12, B1, Calcium, Vit D and iron.  On Omeprazole .  Not exercising. Stands all day at work.   Currently back to sitting on the couch and not moving much otherwise.    Presurgery weight 245lb,  Today's weight is 112 kg (246 lb 8 oz) pounds    Past Medical History:   Diagnosis Date   • Arthritis     knees, otc arthritis meds prn, no h/o injections.    • Asthma     has not required inhaler   • Bilateral ovarian cysts    • Bipolar 1 disorder ([facility])    • Boil     opens them herself   • Breast injury 07/13/2021    bruise on lateral rt breast from fall   • Carpal tunnel syndrome    • CKD (chronic kidney disease), symptom management only, stage 3 (moderate) ([facility])     Creatinine 1.04 GFR 56   • Colon polyp    • Depression    • Diabetes mellitus ([facility])     Diagnosed 2017, was on metformin in the past. Last A1C 5.5%   • Diverticulosis    • Fatigue    • GERD (gastroesophageal reflux disease)     controlled with omeprazole 20mg. Remote EGD- esophagitis.  EGD no hiatal hernia Z line 40 cm very thick mucosal folds cannot rule out varices.  CT scan thickened fundus, no varices seen   • Headache    • History of bladder infections    • History of blood transfusion 2006    2/2 heavy menses   • History of bronchitis    • Hyperlipidemia    • Hypertension    • Hypothyroidism    • Lower extremity edema    • Morbid obesity with BMI of 40.0-44.9, adult ([facility])    • Peripheral neuropathy     2/2 DM   • RLS (restless legs syndrome)    • S/P spenser    • S/P cholecystectomy     2/2 cholelithiasis   • Shortness of breath on exertion    • TIA (transient ischemic attack)     patient states 8-9 episodes of right sided drooping/ weakness/ vision changes. Workup was negative for CVA- thought to be related to anxiety. last episode 12/2017     Past Surgical History:   Procedure Laterality Date   • COLONOSCOPY  remote    diverticulosis   • ENDOMETRIAL ABLATION     • ENDOSCOPY  remote    esophagitis    • ENDOSCOPY N/A 8/22/2019     Procedure: ESOPHAGOGASTRODUODENOSCOPY;  Surgeon: Guido Greenberg MD;  Location:  [facility];  Service: Bariatric   • GASTRIC SLEEVE LAPAROSCOPIC N/A 8/22/2019    Procedure: GASTRIC SLEEVE LAPAROSCOPIC;  Surgeon: Guido Greenberg MD;  Location:  [facility];  Service: Bariatric   • KNEE ACL RECONSTRUCTION Right 2013    with miniscal repair   • LAPAROSCOPIC CHOLECYSTECTOMY  2001    cholelithiasis   • LAPAROSCOPIC HYSTERECTOMY  2013    right ovary remains   • PARAESOPHAGEAL HERNIA REPAIR N/A 8/22/2019    Procedure: PARAESOPHAGEAL HERNIA REPAIR LAPAROSCOPIC;  Surgeon: Guido Greenberg MD;  Location:  [facility];  Service: Bariatric   • SINUS SURGERY     • TUBAL ABDOMINAL LIGATION       Outpatient Medications Marked as Taking for the 12/5/22 encounter (Office Visit) with Janis España PA-C   Medication Sig Dispense Refill   • ALPRAZolam (XANAX) 1 MG tablet TAKE ONE TABLET BY MOUTH ONCE NIGHTLY AS NEEDED FOR ANXIETY OR SLEEP 30 tablet 5   • amLODIPine (NORVASC) 5 MG tablet Take 1 tablet by mouth Daily. 30 tablet 2   • atorvastatin (LIPITOR) 20 MG tablet TAKE ONE TABLET BY MOUTH DAILY 30 tablet 2   • desvenlafaxine (PRISTIQ) 50 MG 24 hr tablet TAKE ONE TABLET BY MOUTH DAILY 30 tablet 10   • hydroCHLOROthiazide (HYDRODIURIL) 25 MG tablet TAKE ONE TABLET BY MOUTH DAILY 30 tablet 2   • lamoTRIgine (LaMICtal) 200 MG tablet TAKE ONE TABLET BY MOUTH ONCE NIGHTLY 30 tablet 10   • levothyroxine (SYNTHROID, LEVOTHROID) 50 MCG tablet Take 1 tablet by mouth Every Morning. 30 tablet 2   • lisinopril (PRINIVIL,ZESTRIL) 40 MG tablet TAKE ONE TABLET BY MOUTH DAILY 30 tablet 5   • metFORMIN ER (GLUCOPHAGE-XR) 500 MG 24 hr tablet TAKE ONE TABLET BY MOUTH TWICE A DAY BEFORE MEALS 60 tablet 5   • metoprolol succinate XL (TOPROL-XL) 50 MG 24 hr tablet TAKE ONE TABLET BY MOUTH DAILY 30 tablet 5   • Neupro 1 MG/24HR patch 24 hour 24 hour patch APPLY ONE PATCH TO THE SKIN DAILY 30 patch 10   • omeprazole (priLOSEC) 20 MG capsule TAKE  "ONE CAPSULE BY MOUTH DAILY 30 capsule 11   • ONE TOUCH ULTRA TEST test strip TEST GLUCOSE TWO TIMES A  each 12   • pregabalin (LYRICA) 75 MG capsule Take 1 capsule by mouth 3 (Three) Times a Day. 90 capsule 5       Allergies   Allergen Reactions   • Contrast Dye Other (See Comments)     Nasal congestion/ snot, IV contrast only       Social History     Socioeconomic History   • Marital status:    Tobacco Use   • Smoking status: Never   • Smokeless tobacco: Never   Vaping Use   • Vaping Use: Never used   Substance and Sexual Activity   • Alcohol use: Not Currently   • Drug use: Not Currently     Types: Marijuana, Cocaine(coke)     Comment: about 4 years ago, 32 years ago - cocaine and THC   • Sexual activity: Defer     Comment: no hormones       /86 (BP Location: Left arm, Patient Position: Sitting)   Pulse 80   Temp 97.4 °F (36.3 °C)   Resp 16   Ht 170.2 cm (67\")   Wt 112 kg (246 lb 8 oz)   LMP  (LMP Unknown)   SpO2 98%   BMI 38.61 kg/m²     Physical Exam  Constitutional:       Appearance: She is well-developed. She is obese.   HENT:      Head: Normocephalic and atraumatic.   Cardiovascular:      Rate and Rhythm: Normal rate and regular rhythm.   Pulmonary:      Effort: Pulmonary effort is normal.      Breath sounds: Normal breath sounds.   Abdominal:      General: Bowel sounds are normal.      Palpations: Abdomen is soft.   Skin:     General: Skin is warm and dry.   Neurological:      Mental Status: She is alert.   Psychiatric:         Mood and Affect: Mood normal.         Behavior: Behavior normal.         Thought Content: Thought content normal.         Judgment: Judgment normal.           Assessment:  s/p LSG/ paraesophageal HHR by  on 8/22/19       ICD-10-CM ICD-9-CM   1. Fatigue, unspecified type  R53.83 780.79   2. Obesity, Class II, BMI 35-39.9  E66.9 278.00   3. Hypovitaminosis D  E55.9 268.9   4. Screening, iron deficiency anemia  Z13.0 V78.0   5. Malnutrition screen  " Z13.21 V77.2   6. Postgastrectomy malabsorption  K91.2 579.3    Z90.3    7. Status post bariatric surgery  Z98.84 V45.86         Plan: Discussed overall dietary goals to help with weight loss regardless of revision surgery.  Will have dietitian follow-up for consult.  We can consider medical weight management referral should patient be motivated and dedicated to dietary changes after 4-week plan with dietitian.  Can also consider revision surgery options and will place a new order for upper GI x-ray to start imaging evaluation.  We will then follow with EGD and can discuss further with Dr. Greenberg.  She will need a new intake evaluation being over a year out at this point and can discuss this with scheduling.  Focus on protein 70-100g/day.  Continue fluids 64oz daily.  Encourage routine exercise.  Routine bariatric labs ordered.  Continue vitamins w/ adjustments pending lab results.  Call w/ problems/concerns.     Class 2 Severe Obesity (BMI >=35 and <=39.9). Obesity-related health conditions include the following: as above. Obesity is worsening. BMI is is above average; BMI management plan is completed. We discussed low calorie, low carb based diet program, portion control and increasing exercise.        The patient was instructed to follow up pending imaging, sooner if needed.

## 2022-12-06 NOTE — PROGRESS NOTES
PROGRESS NOTE    Data:  Marcela Ramírez is a 57 y.o. female who met with the undersigned for a scheduled telehealth therapy session from 3:00 - 3:45pm.      Clinical Maneuvering/Intervention:      The pt talked about her recent stressors including mood swings, crying spell yesterday, and fear of the well-being of her son getting out of [facility] and going into rehab. She worries that he will not work a program of recovery and end up relapsing/abusing substances. Stressors were processed individually and in detail. Venting of frustrations was conducted in order to help the pt feel less tense emotionally and gain insight into issues. Feelings were processed and validated several times in session. Perspective taking was conducted multiple times in order to help the pt feel less stuck, less overwhelmed, and see challenges as much more manageable. Active listening was conducted in order to help the pt make sense of stressors and start moving towards potential solutions. The pt was assisted with finding solutions based on existing skill-set and abilities. Maladaptive thought patterns were identified, challenged, and evaluated for validity in order to allow for the pt to chose different and more adaptive ways of thinking. An action plan was designed to empower the pt to know what to do. Simple steps of one, two, three were laid out in order to help the pt feel more in control of things and feel less stressed.  The pt's strengths were identified in order to help identify abilities to use to better face/overcome challenges. Some humor was used in session as it is one of the pt's coping skills. Humor was used too, to help the pt see things as less challenging and more manageable than they first seemed. Humor was used as well, to model use of the skill as a way to decrease tension ongoing. Homework was assigned tailored to pt's needs. The pt expressed gratitude for today's session.    Mental Status Exam  Hygiene:  good  Dress:  normal  Attitude:  cooperative and proactive  Motor Activity: normal  Speech: normal  Mood:  level  Affect:  congruent  Thought Processes: normal  Thought Content:  normal  Suicidal Thoughts:  not endorsed  Homicidal Thoughts:  not endorsed  Crisis Safety Plan: not needed  Hallucinations:  none      Patient's Support Network Includes:  family, friends      Progress toward goal: there is evidence to suggest that she struggles with mood swings, but moods are calmer as of late       Functional Status: moderate       Prognosis: good with counseling, medication    Assessment      The pt presented to be struggling with managing her moods related to having bipolar disorder, though moods are calmer as of late. She seems to benefit from venting, having her experiences validated, and being assisted in discovering her own solutions to her problems.       Plan      In order to diminish symptoms of bipolar disorder: the pt is to continue with counseling (ongoing), continue with psychotropic medication as prescribed (ongoing), and make a point to use the communication techniques practiced in session with her daughters and G (ongoing).    Maribel Hernandez, PhD, LP

## 2022-12-09 LAB
25(OH)D3+25(OH)D2 SERPL-MCNC: 46.3 NG/ML (ref 30–100)
ALBUMIN SERPL-MCNC: 4.4 G/DL (ref 3.8–4.9)
ALBUMIN/GLOB SERPL: 1.7 {RATIO} (ref 1.2–2.2)
ALP SERPL-CCNC: 97 IU/L (ref 44–121)
ALT SERPL-CCNC: 16 IU/L (ref 0–32)
AST SERPL-CCNC: 17 IU/L (ref 0–40)
BASOPHILS # BLD AUTO: 0.1 X10E3/UL (ref 0–0.2)
BASOPHILS NFR BLD AUTO: 1 %
BILIRUB SERPL-MCNC: 0.3 MG/DL (ref 0–1.2)
BUN SERPL-MCNC: 18 MG/DL (ref 6–24)
BUN/CREAT SERPL: 16 (ref 9–23)
CALCIUM SERPL-MCNC: 9.7 MG/DL (ref 8.7–10.2)
CHLORIDE SERPL-SCNC: 100 MMOL/L (ref 96–106)
CO2 SERPL-SCNC: 29 MMOL/L (ref 20–29)
CREAT SERPL-MCNC: 1.13 MG/DL (ref 0.57–1)
EGFRCR SERPLBLD CKD-EPI 2021: 57 ML/MIN/1.73
EOSINOPHIL # BLD AUTO: 0.3 X10E3/UL (ref 0–0.4)
EOSINOPHIL NFR BLD AUTO: 4 %
ERYTHROCYTE [DISTWIDTH] IN BLOOD BY AUTOMATED COUNT: 13.1 % (ref 11.7–15.4)
FERRITIN SERPL-MCNC: 88 NG/ML (ref 15–150)
FOLATE SERPL-MCNC: 19.6 NG/ML
GLOBULIN SER CALC-MCNC: 2.6 G/DL (ref 1.5–4.5)
GLUCOSE SERPL-MCNC: 113 MG/DL (ref 70–99)
HCT VFR BLD AUTO: 37.7 % (ref 34–46.6)
HGB BLD-MCNC: 12.9 G/DL (ref 11.1–15.9)
IMM GRANULOCYTES # BLD AUTO: 0 X10E3/UL (ref 0–0.1)
IMM GRANULOCYTES NFR BLD AUTO: 0 %
IRON SERPL-MCNC: 53 UG/DL (ref 27–159)
LYMPHOCYTES # BLD AUTO: 2.3 X10E3/UL (ref 0.7–3.1)
LYMPHOCYTES NFR BLD AUTO: 27 %
MCH RBC QN AUTO: 31.1 PG (ref 26.6–33)
MCHC RBC AUTO-ENTMCNC: 34.2 G/DL (ref 31.5–35.7)
MCV RBC AUTO: 91 FL (ref 79–97)
METHYLMALONATE SERPL-SCNC: 149 NMOL/L (ref 0–378)
MONOCYTES # BLD AUTO: 0.6 X10E3/UL (ref 0.1–0.9)
MONOCYTES NFR BLD AUTO: 7 %
NEUTROPHILS # BLD AUTO: 5 X10E3/UL (ref 1.4–7)
NEUTROPHILS NFR BLD AUTO: 61 %
PLATELET # BLD AUTO: 208 X10E3/UL (ref 150–450)
POTASSIUM SERPL-SCNC: 4.1 MMOL/L (ref 3.5–5.2)
PREALB SERPL-MCNC: 28 MG/DL (ref 10–36)
PROT SERPL-MCNC: 7 G/DL (ref 6–8.5)
RBC # BLD AUTO: 4.15 X10E6/UL (ref 3.77–5.28)
SODIUM SERPL-SCNC: 141 MMOL/L (ref 134–144)
VIT B1 BLD-SCNC: 280 NMOL/L (ref 66.5–200)
WBC # BLD AUTO: 8.2 X10E3/UL (ref 3.4–10.8)

## 2022-12-10 ENCOUNTER — APPOINTMENT (OUTPATIENT)
Dept: CT IMAGING | Facility: HOSPITAL | Age: 57
End: 2022-12-10

## 2022-12-10 ENCOUNTER — HOSPITAL ENCOUNTER (EMERGENCY)
Facility: HOSPITAL | Age: 57
Discharge: HOME | End: 2022-12-10
Attending: EMERGENCY MEDICINE | Admitting: EMERGENCY MEDICINE

## 2022-12-10 ENCOUNTER — APPOINTMENT (OUTPATIENT)
Dept: GENERAL RADIOLOGY | Facility: HOSPITAL | Age: 57
End: 2022-12-10

## 2022-12-10 VITALS
DIASTOLIC BLOOD PRESSURE: 92 MMHG | OXYGEN SATURATION: 96 % | SYSTOLIC BLOOD PRESSURE: 123 MMHG | WEIGHT: 245 LBS | HEART RATE: 55 BPM | RESPIRATION RATE: 20 BRPM | BODY MASS INDEX: 38.45 KG/M2 | HEIGHT: 67 IN | TEMPERATURE: 98.8 F

## 2022-12-10 DIAGNOSIS — R07.9 LEFT-SIDED CHEST PAIN: ICD-10-CM

## 2022-12-10 DIAGNOSIS — R10.2 PELVIC PAIN: ICD-10-CM

## 2022-12-10 DIAGNOSIS — N30.91 HEMORRHAGIC CYSTITIS: Primary | ICD-10-CM

## 2022-12-10 LAB
ALBUMIN SERPL-MCNC: 4.6 G/DL (ref 3.5–5.2)
ALBUMIN/GLOB SERPL: 1.4 G/DL
ALP SERPL-CCNC: 91 U/L (ref 39–117)
ALT SERPL W P-5'-P-CCNC: 13 U/L (ref 1–33)
ANION GAP SERPL CALCULATED.3IONS-SCNC: 9 MMOL/L (ref 5–15)
AST SERPL-CCNC: 18 U/L (ref 1–32)
BACTERIA UR QL AUTO: ABNORMAL /HPF
BASOPHILS # BLD AUTO: 0.04 10*3/MM3 (ref 0–0.2)
BASOPHILS NFR BLD AUTO: 0.4 % (ref 0–1.5)
BILIRUB SERPL-MCNC: 0.4 MG/DL (ref 0–1.2)
BILIRUB UR QL STRIP: ABNORMAL
BUN SERPL-MCNC: 16 MG/DL (ref 6–20)
BUN/CREAT SERPL: 14.4 (ref 7–25)
CALCIUM SPEC-SCNC: 9.6 MG/DL (ref 8.6–10.5)
CHLORIDE SERPL-SCNC: 101 MMOL/L (ref 98–107)
CLARITY UR: ABNORMAL
CO2 SERPL-SCNC: 30 MMOL/L (ref 22–29)
COLOR UR: ABNORMAL
CREAT SERPL-MCNC: 1.11 MG/DL (ref 0.57–1)
D DIMER PPP FEU-MCNC: 0.29 MCGFEU/ML (ref 0–0.55)
DEPRECATED RDW RBC AUTO: 47.2 FL (ref 37–54)
EGFRCR SERPLBLD CKD-EPI 2021: 58.8 ML/MIN/1.73
EOSINOPHIL # BLD AUTO: 0.33 10*3/MM3 (ref 0–0.4)
EOSINOPHIL NFR BLD AUTO: 3.2 % (ref 0.3–6.2)
ERYTHROCYTE [DISTWIDTH] IN BLOOD BY AUTOMATED COUNT: 13.4 % (ref 12.3–15.4)
GLOBULIN UR ELPH-MCNC: 3.2 GM/DL
GLUCOSE SERPL-MCNC: 92 MG/DL (ref 65–99)
GLUCOSE UR STRIP-MCNC: ABNORMAL MG/DL
HCT VFR BLD AUTO: 38.7 % (ref 34–46.6)
HGB BLD-MCNC: 12.9 G/DL (ref 12–15.9)
HGB UR QL STRIP.AUTO: ABNORMAL
HYALINE CASTS UR QL AUTO: ABNORMAL /LPF
IMM GRANULOCYTES # BLD AUTO: 0.03 10*3/MM3 (ref 0–0.05)
IMM GRANULOCYTES NFR BLD AUTO: 0.3 % (ref 0–0.5)
KETONES UR QL STRIP: NEGATIVE
LEUKOCYTE ESTERASE UR QL STRIP.AUTO: ABNORMAL
LYMPHOCYTES # BLD AUTO: 2.1 10*3/MM3 (ref 0.7–3.1)
LYMPHOCYTES NFR BLD AUTO: 20.1 % (ref 19.6–45.3)
MCH RBC QN AUTO: 31.9 PG (ref 26.6–33)
MCHC RBC AUTO-ENTMCNC: 33.3 G/DL (ref 31.5–35.7)
MCV RBC AUTO: 95.8 FL (ref 79–97)
MONOCYTES # BLD AUTO: 0.65 10*3/MM3 (ref 0.1–0.9)
MONOCYTES NFR BLD AUTO: 6.2 % (ref 5–12)
NEUTROPHILS NFR BLD AUTO: 69.8 % (ref 42.7–76)
NEUTROPHILS NFR BLD AUTO: 7.32 10*3/MM3 (ref 1.7–7)
NITRITE UR QL STRIP: POSITIVE
NRBC BLD AUTO-RTO: 0 /100 WBC (ref 0–0.2)
PH UR STRIP.AUTO: 6.5 [PH] (ref 5–8)
PLATELET # BLD AUTO: 177 10*3/MM3 (ref 140–450)
PMV BLD AUTO: 11.1 FL (ref 6–12)
POTASSIUM SERPL-SCNC: 3.8 MMOL/L (ref 3.5–5.2)
PROT SERPL-MCNC: 7.8 G/DL (ref 6–8.5)
PROT UR QL STRIP: ABNORMAL
QT INTERVAL: 428 MS
QTC INTERVAL: 413 MS
RBC # BLD AUTO: 4.04 10*6/MM3 (ref 3.77–5.28)
RBC # UR STRIP: ABNORMAL /HPF
REF LAB TEST METHOD: ABNORMAL
SODIUM SERPL-SCNC: 140 MMOL/L (ref 136–145)
SP GR UR STRIP: 1.02 (ref 1–1.03)
SQUAMOUS #/AREA URNS HPF: ABNORMAL /HPF
TROPONIN T SERPL-MCNC: <0.01 NG/ML (ref 0–0.03)
TROPONIN T SERPL-MCNC: <0.01 NG/ML (ref 0–0.03)
UROBILINOGEN UR QL STRIP: ABNORMAL
WBC # UR STRIP: ABNORMAL /HPF
WBC NRBC COR # BLD: 10.47 10*3/MM3 (ref 3.4–10.8)

## 2022-12-10 PROCEDURE — 36415 COLL VENOUS BLD VENIPUNCTURE: CPT

## 2022-12-10 PROCEDURE — 74176 CT ABD & PELVIS W/O CONTRAST: CPT

## 2022-12-10 PROCEDURE — 25010000002 KETOROLAC TROMETHAMINE PER 15 MG: Performed by: EMERGENCY MEDICINE

## 2022-12-10 PROCEDURE — 96365 THER/PROPH/DIAG IV INF INIT: CPT

## 2022-12-10 PROCEDURE — 85025 COMPLETE CBC W/AUTO DIFF WBC: CPT | Performed by: EMERGENCY MEDICINE

## 2022-12-10 PROCEDURE — 25010000002 HYDROMORPHONE PER 4 MG: Performed by: EMERGENCY MEDICINE

## 2022-12-10 PROCEDURE — 96375 TX/PRO/DX INJ NEW DRUG ADDON: CPT

## 2022-12-10 PROCEDURE — 25010000002 ONDANSETRON PER 1 MG: Performed by: EMERGENCY MEDICINE

## 2022-12-10 PROCEDURE — 87086 URINE CULTURE/COLONY COUNT: CPT | Performed by: EMERGENCY MEDICINE

## 2022-12-10 PROCEDURE — 84484 ASSAY OF TROPONIN QUANT: CPT | Performed by: EMERGENCY MEDICINE

## 2022-12-10 PROCEDURE — 81001 URINALYSIS AUTO W/SCOPE: CPT | Performed by: EMERGENCY MEDICINE

## 2022-12-10 PROCEDURE — 85379 FIBRIN DEGRADATION QUANT: CPT | Performed by: EMERGENCY MEDICINE

## 2022-12-10 PROCEDURE — 80053 COMPREHEN METABOLIC PANEL: CPT | Performed by: EMERGENCY MEDICINE

## 2022-12-10 PROCEDURE — 99284 EMERGENCY DEPT VISIT MOD MDM: CPT

## 2022-12-10 PROCEDURE — 71045 X-RAY EXAM CHEST 1 VIEW: CPT

## 2022-12-10 PROCEDURE — 93005 ELECTROCARDIOGRAM TRACING: CPT | Performed by: EMERGENCY MEDICINE

## 2022-12-10 PROCEDURE — 25010000002 CEFTRIAXONE PER 250 MG: Performed by: EMERGENCY MEDICINE

## 2022-12-10 RX ORDER — PHENAZOPYRIDINE HYDROCHLORIDE 100 MG/1
200 TABLET, FILM COATED ORAL ONCE
Status: COMPLETED | OUTPATIENT
Start: 2022-12-10 | End: 2022-12-10

## 2022-12-10 RX ORDER — PHENAZOPYRIDINE HYDROCHLORIDE 100 MG/1
100 TABLET, FILM COATED ORAL 3 TIMES DAILY PRN
Qty: 6 TABLET | Refills: 0 | Status: SHIPPED | OUTPATIENT
Start: 2022-12-10

## 2022-12-10 RX ORDER — KETOROLAC TROMETHAMINE 15 MG/ML
15 INJECTION, SOLUTION INTRAMUSCULAR; INTRAVENOUS ONCE
Status: COMPLETED | OUTPATIENT
Start: 2022-12-10 | End: 2022-12-10

## 2022-12-10 RX ORDER — CEFDINIR 300 MG/1
300 CAPSULE ORAL 2 TIMES DAILY
Qty: 14 CAPSULE | Refills: 0 | Status: SHIPPED | OUTPATIENT
Start: 2022-12-10 | End: 2022-12-10 | Stop reason: SDUPTHER

## 2022-12-10 RX ORDER — HYDROCODONE BITARTRATE AND ACETAMINOPHEN 5; 325 MG/1; MG/1
1-2 TABLET ORAL EVERY 6 HOURS PRN
Qty: 15 TABLET | Refills: 0 | Status: SHIPPED | OUTPATIENT
Start: 2022-12-10 | End: 2022-12-10 | Stop reason: SDUPTHER

## 2022-12-10 RX ORDER — ONDANSETRON 2 MG/ML
4 INJECTION INTRAMUSCULAR; INTRAVENOUS ONCE
Status: COMPLETED | OUTPATIENT
Start: 2022-12-10 | End: 2022-12-10

## 2022-12-10 RX ORDER — FLUCONAZOLE 150 MG/1
150 TABLET ORAL ONCE
Qty: 1 TABLET | Refills: 0 | Status: SHIPPED | OUTPATIENT
Start: 2022-12-10 | End: 2022-12-10

## 2022-12-10 RX ORDER — ACETAMINOPHEN 500 MG
1000 TABLET ORAL ONCE
Status: COMPLETED | OUTPATIENT
Start: 2022-12-10 | End: 2022-12-10

## 2022-12-10 RX ORDER — PHENAZOPYRIDINE HYDROCHLORIDE 100 MG/1
100 TABLET, FILM COATED ORAL 3 TIMES DAILY PRN
Qty: 6 TABLET | Refills: 0 | Status: SHIPPED | OUTPATIENT
Start: 2022-12-10 | End: 2022-12-10 | Stop reason: SDUPTHER

## 2022-12-10 RX ORDER — HYDROCODONE BITARTRATE AND ACETAMINOPHEN 5; 325 MG/1; MG/1
1-2 TABLET ORAL EVERY 6 HOURS PRN
Qty: 15 TABLET | Refills: 0 | Status: SHIPPED | OUTPATIENT
Start: 2022-12-10 | End: 2023-01-22

## 2022-12-10 RX ORDER — CEFDINIR 300 MG/1
300 CAPSULE ORAL 2 TIMES DAILY
Qty: 14 CAPSULE | Refills: 0 | Status: SHIPPED | OUTPATIENT
Start: 2022-12-10 | End: 2023-01-22

## 2022-12-10 RX ORDER — HYDROMORPHONE HYDROCHLORIDE 1 MG/ML
0.5 INJECTION, SOLUTION INTRAMUSCULAR; INTRAVENOUS; SUBCUTANEOUS ONCE
Status: COMPLETED | OUTPATIENT
Start: 2022-12-10 | End: 2022-12-10

## 2022-12-10 RX ADMIN — SODIUM CHLORIDE 1000 ML: 9 INJECTION, SOLUTION INTRAVENOUS at 15:54

## 2022-12-10 RX ADMIN — CEFTRIAXONE 2 G: 2 INJECTION, POWDER, FOR SOLUTION INTRAMUSCULAR; INTRAVENOUS at 18:00

## 2022-12-10 RX ADMIN — HYDROMORPHONE HYDROCHLORIDE 0.5 MG: 1 INJECTION, SOLUTION INTRAMUSCULAR; INTRAVENOUS; SUBCUTANEOUS at 15:56

## 2022-12-10 RX ADMIN — PHENAZOPYRIDINE 200 MG: 100 TABLET ORAL at 17:57

## 2022-12-10 RX ADMIN — ONDANSETRON 4 MG: 2 INJECTION INTRAMUSCULAR; INTRAVENOUS at 15:55

## 2022-12-10 RX ADMIN — ACETAMINOPHEN 1000 MG: 500 TABLET ORAL at 17:57

## 2022-12-10 RX ADMIN — KETOROLAC TROMETHAMINE 15 MG: 15 INJECTION, SOLUTION INTRAMUSCULAR; INTRAVENOUS at 17:58

## 2022-12-10 NOTE — DISCHARGE INSTRUCTIONS
Push fluids.    Several studies have shown that the combination of ibuprofen and acetaminophen is more effective at pain relief than narcotics.    Take 2 over-the-counter Ibuprofen tablets every 6 hours as needed for pain. Try to take the medication with food. If you develop upper abdominal discomfort, discontinue use.    If not taking another medication which contains Tylenol/acetaminophen, you may also take Tylenol every 6 hours, with a maximum 1,000 mg (two extra strength tablets) per dose.    Take current narcotics sparingly.    Finish full course of antibiotics taking her next dose tomorrow.

## 2022-12-10 NOTE — ED PROVIDER NOTES
EMERGENCY DEPARTMENT ENCOUNTER    Pt Name: Marcela Ramírez  MRN: [id number]  Pt :   1965  Room Number:    Date of encounter:  12/10/2022  PCP: Sukhwinder Acosta MD  ED Provider: Jamie Yoo MD    Historian: Patient      HPI:  Chief Complaint: Bloody urine with pelvic pain        Context: Marcela Ramírez is a 57 y.o. female who presents to the ED c/o symptoms which started around 9 AM this morning.  The patient felt urinary urgency.  When she went to use the restroom she noted bladder spasms and then pain.  She wiped and saw blood on the toilet paper.  The bleeding increased and the spasms increased.  She rated her discomfort 10 out of 10 on the pain scale prior to coming to the emergency department and it is only slightly less than that at this point.  The patient is status post hysterectomy and has had no vaginal bleeding that she is aware of.  She believes the blood is coming from her bladder.  She has never had this happen previously.  She denies fevers and chills.  She has not taken any medications for symptom relief.      PAST MEDICAL HISTORY  Past Medical History:   Diagnosis Date   • Arthritis     knees, otc arthritis meds prn, no h/o injections.    • Asthma     has not required inhaler   • Bilateral ovarian cysts    • Bipolar 1 disorder (HCC)    • Boil     opens them herself   • Breast injury 2021    bruise on lateral rt breast from fall   • Carpal tunnel syndrome    • CKD (chronic kidney disease), symptom management only, stage 3 (moderate) ([facility])     Creatinine 1.04 GFR 56   • Colon polyp    • Depression    • Diabetes mellitus (HCC)     Diagnosed , was on metformin in the past. Last A1C 5.5%   • Diverticulosis    • Fatigue    • GERD (gastroesophageal reflux disease)     controlled with omeprazole 20mg. Remote EGD- esophagitis.  EGD no hiatal hernia Z line 40 cm very thick mucosal folds cannot rule out varices.  CT scan thickened fundus, no varices seen   •  Headache    • History of bladder infections    • History of blood transfusion 2006 2/2 heavy menses   • History of bronchitis    • Hyperlipidemia    • Hypertension    • Hypothyroidism    • Lower extremity edema    • Morbid obesity with BMI of 40.0-44.9, adult (HCC)    • Peripheral neuropathy     2/2 DM   • RLS (restless legs syndrome)    • S/P spenser    • S/P cholecystectomy     2/2 cholelithiasis   • Shortness of breath on exertion    • TIA (transient ischemic attack)     patient states 8-9 episodes of right sided drooping/ weakness/ vision changes. Workup was negative for CVA- thought to be related to anxiety. last episode 12/2017         PAST SURGICAL HISTORY  Past Surgical History:   Procedure Laterality Date   • COLONOSCOPY  remote    diverticulosis   • ENDOMETRIAL ABLATION     • ENDOSCOPY  remote    esophagitis    • ENDOSCOPY N/A 8/22/2019    Procedure: ESOPHAGOGASTRODUODENOSCOPY;  Surgeon: Guido Greenberg MD;  Location:  [facility];  Service: Bariatric   • GASTRIC SLEEVE LAPAROSCOPIC N/A 8/22/2019    Procedure: GASTRIC SLEEVE LAPAROSCOPIC;  Surgeon: Guido Greenberg MD;  Location:  [facility];  Service: Bariatric   • KNEE ACL RECONSTRUCTION Right 2013    with miniscal repair   • LAPAROSCOPIC CHOLECYSTECTOMY  2001    cholelithiasis   • LAPAROSCOPIC HYSTERECTOMY  2013    right ovary remains   • PARAESOPHAGEAL HERNIA REPAIR N/A 8/22/2019    Procedure: PARAESOPHAGEAL HERNIA REPAIR LAPAROSCOPIC;  Surgeon: Guido Greenberg MD;  Location:  [facility];  Service: Bariatric   • SINUS SURGERY     • TUBAL ABDOMINAL LIGATION           FAMILY HISTORY  Family History   Problem Relation Age of Onset   • Arthritis Mother    • Arthritis Father    • Diabetes Father    • Hyperlipidemia Father    • Hypertension Father    • Asthma Brother    • Other Brother    • Cancer Maternal Aunt    • Depression Paternal Uncle    • Breast cancer Neg Hx    • Ovarian cancer Neg Hx          SOCIAL HISTORY  Social History     Socioeconomic  History   • Marital status:    Tobacco Use   • Smoking status: Never   • Smokeless tobacco: Never   Vaping Use   • Vaping Use: Never used   Substance and Sexual Activity   • Alcohol use: Not Currently   • Drug use: Not Currently     Types: Marijuana, Cocaine(coke)     Comment: about 4 years ago, 32 years ago - cocaine and THC   • Sexual activity: Defer     Comment: no hormones         ALLERGIES  Contrast dye        REVIEW OF SYSTEMS  Review of Systems       All systems reviewed and negative except for those discussed in HPI.       PHYSICAL EXAM    I have reviewed the triage vital signs and nursing notes.    ED Triage Vitals [12/10/22 1513]   Temp Heart Rate Resp BP SpO2   98.8 °F (37.1 °C) 67 20 (!) 179/103 97 %      Temp src Heart Rate Source Patient Position BP Location FiO2 (%)   Oral Monitor Sitting Left arm --       Physical Exam  GENERAL:   Appears uncomfortable but nontoxic when I meet her in the triage area.  HENT: Nares patent.  EYES: No scleral icterus.  CV: Regular rhythm, regular rate.  No murmurs gallops rubs  RESPIRATORY: Normal effort.  No audible wheezes, rales or rhonchi.  Clear to auscultation  ABDOMEN: Soft, only mildly tender to palpation in the suprapubic region only.  No hepatosplenomegaly or masses palpable.  MUSCULOSKELETAL: No deformities.  No CVA tenderness  NEURO: Alert, moves all extremities, follows commands.  SKIN: Warm, dry, no rash visualized.        LAB RESULTS  Recent Results (from the past 24 hour(s))   POC Urinalysis Dipstick, Multipro (Automated Dipstick)    Collection Time: 12/10/22  1:39 PM    Specimen: Urine   Result Value Ref Range    Color Red (A)     Clarity, UA Turbid (A)     Glucose, UA Negative mg/dL    Bilirubin Moderate (2+) (A)     Ketones, UA 1+ (A)     Specific Gravity  1.020 1.005 - 1.030    Blood, UA 3+ (A)     pH, Urine 5.5 5.0 - 8.0    Protein, POC 3+ (A) mg/dL    Urobilinogen, UA 4 E.U./dL (A)     Nitrite, UA Positive (A)     Leukocytes Large (3+) (A)     Comprehensive Metabolic Panel    Collection Time: 12/10/22  3:48 PM    Specimen: Blood   Result Value Ref Range    Glucose 92 65 - 99 mg/dL    BUN 16 6 - 20 mg/dL    Creatinine 1.11 (H) 0.57 - 1.00 mg/dL    Sodium 140 136 - 145 mmol/L    Potassium 3.8 3.5 - 5.2 mmol/L    Chloride 101 98 - 107 mmol/L    CO2 30.0 (H) 22.0 - 29.0 mmol/L    Calcium 9.6 8.6 - 10.5 mg/dL    Total Protein 7.8 6.0 - 8.5 g/dL    Albumin 4.60 3.50 - 5.20 g/dL    ALT (SGPT) 13 1 - 33 U/L    AST (SGOT) 18 1 - 32 U/L    Alkaline Phosphatase 91 39 - 117 U/L    Total Bilirubin 0.4 0.0 - 1.2 mg/dL    Globulin 3.2 gm/dL    A/G Ratio 1.4 g/dL    BUN/Creatinine Ratio 14.4 7.0 - 25.0    Anion Gap 9.0 5.0 - 15.0 mmol/L    eGFR 58.8 (L) >60.0 mL/min/1.73   CBC Auto Differential    Collection Time: 12/10/22  3:48 PM    Specimen: Blood   Result Value Ref Range    WBC 10.47 3.40 - 10.80 10*3/mm3    RBC 4.04 3.77 - 5.28 10*6/mm3    Hemoglobin 12.9 12.0 - 15.9 g/dL    Hematocrit 38.7 34.0 - 46.6 %    MCV 95.8 79.0 - 97.0 fL    MCH 31.9 26.6 - 33.0 pg    MCHC 33.3 31.5 - 35.7 g/dL    RDW 13.4 12.3 - 15.4 %    RDW-SD 47.2 37.0 - 54.0 fl    MPV 11.1 6.0 - 12.0 fL    Platelets 177 140 - 450 10*3/mm3    Neutrophil % 69.8 42.7 - 76.0 %    Lymphocyte % 20.1 19.6 - 45.3 %    Monocyte % 6.2 5.0 - 12.0 %    Eosinophil % 3.2 0.3 - 6.2 %    Basophil % 0.4 0.0 - 1.5 %    Immature Grans % 0.3 0.0 - 0.5 %    Neutrophils, Absolute 7.32 (H) 1.70 - 7.00 10*3/mm3    Lymphocytes, Absolute 2.10 0.70 - 3.10 10*3/mm3    Monocytes, Absolute 0.65 0.10 - 0.90 10*3/mm3    Eosinophils, Absolute 0.33 0.00 - 0.40 10*3/mm3    Basophils, Absolute 0.04 0.00 - 0.20 10*3/mm3    Immature Grans, Absolute 0.03 0.00 - 0.05 10*3/mm3    nRBC 0.0 0.0 - 0.2 /100 WBC   Troponin    Collection Time: 12/10/22  3:48 PM    Specimen: Blood   Result Value Ref Range    Troponin T <0.010 0.000 - 0.030 ng/mL   D-dimer, Quantitative    Collection Time: 12/10/22  3:48 PM    Specimen: Blood   Result Value Ref  Range    D-Dimer, Quantitative 0.29 0.00 - 0.55 MCGFEU/mL   Urinalysis With Culture If Indicated - Urine, Clean Catch    Collection Time: 12/10/22  3:49 PM    Specimen: Urine, Clean Catch   Result Value Ref Range    Color, UA Red (A) Yellow, Straw    Appearance, UA Turbid (A) Clear    pH, UA 6.5 5.0 - 8.0    Specific Gravity, UA 1.022 1.001 - 1.030    Glucose,  mg/dL (Trace) (A) Negative    Ketones, UA Negative Negative    Bilirubin, UA Large (3+) (A) Negative    Blood, UA Moderate (2+) (A) Negative    Protein,  mg/dL (2+) (A) Negative    Leuk Esterase, UA Large (3+) (A) Negative    Nitrite, UA Positive (A) Negative    Urobilinogen, UA 0.2 E.U./dL 0.2 - 1.0 E.U./dL   Urinalysis, Microscopic Only - Urine, Clean Catch    Collection Time: 12/10/22  3:49 PM    Specimen: Urine, Clean Catch   Result Value Ref Range    RBC, UA Too Numerous to Count (A) None Seen, 0-2 /HPF    WBC, UA None Seen None Seen, 0-2 /HPF    Bacteria, UA Trace None Seen, Trace /HPF    Squamous Epithelial Cells, UA 7-12 (A) None Seen, 0-2 /HPF    Hyaline Casts, UA 0-6 0 - 6 /LPF    Methodology Manual Light Microscopy    ECG 12 Lead Chest Pain    Collection Time: 12/10/22  6:24 PM   Result Value Ref Range    QT Interval 428 ms    QTC Interval 413 ms   Troponin    Collection Time: 12/10/22  8:31 PM    Specimen: Arm, Left; Blood   Result Value Ref Range    Troponin T <0.010 0.000 - 0.030 ng/mL       If labs were ordered, I independently reviewed the results.        RADIOLOGY  CT Abdomen Pelvis Without Contrast    Result Date: 12/10/2022  DATE OF EXAM: 12/10/2022 4:43 PM  PROCEDURE: CT ABDOMEN PELVIS WO CONTRAST-  INDICATIONS: hematuria, suprapubic pain  COMPARISON: 11/01/2019  TECHNIQUE: Routine transaxial slices were obtained through the abdomen and pelvis without the administration of intravenous contrast. Reconstructed coronal and sagittal images were also obtained. Automated exposure control and iterative construction methods were used.   The radiation dose reduction device was turned on for each scan per the ALARA (As Low as Reasonably Achievable) protocol.  FINDINGS: The lung bases are clear without evidence for focal consolidation or significant pleural effusion.  There is hepatosplenomegaly. The liver, spleen, and pancreas are otherwise unremarkable without contrast. The patient is status post cholecystectomy. The bilateral adrenal glands are symmetric and unremarkable without contrast.  No definitive nephrolithiasis is seen bilaterally. There is no hydronephrosis or hydroureter. There is no evidence for obstructive uropathy. No stones are seen in the bladder. The bilateral kidneys are otherwise unremarkable without contrast.  Postoperative changes are noted suggesting prior gastric sleeve procedure. There is no bowel dilatation or obstruction. A normal-appearing appendix is observed. There is no evidence for acute appendicitis. There is diverticulosis throughout the colon without signs of acute diverticulitis. No significant free fluid is observed. No abnormal fluid collections are identified. No significant lymphadenopathy is seen throughout the abdomen or pelvis.  Bladder wall thickening is noted. There is stranding in the soft tissues adjacent to the bladder. The findings suggest changes of cystitis. There may be associated debridement within the bladder. The bladder is partially decompressed.  No acute osseous abnormalities are observed.      1.  No evidence for significant nephrolithiasis. There is no evidence for obstructive uropathy. 2.  Findings indicating changes of cystitis.  This report was finalized on 12/10/2022 4:55 PM by Jorge Pichardo MD.        I ordered and reviewed the above noted radiographic studies.      I viewed images of CT abdomen pelvis which showed no visible renal stones per my independent interpretation.    See radiologist's dictation for official interpretation.        PROCEDURES    Procedures    ECG 12 Lead Chest  Pain   Final Result   Test Reason : Chest Pain   Blood Pressure :   */*   mmHG   Vent. Rate :  56 BPM     Atrial Rate :  56 BPM      P-R Int : 164 ms          QRS Dur :  94 ms       QT Int : 428 ms       P-R-T Axes :  18  10  19 degrees      QTc Int : 413 ms      Sinus bradycardia   Otherwise normal ECG   Confirmed by BEAR CABRERA MD ([id number]) on 12/10/2022 9:31:38 PM      Referred By: EDMD           Confirmed By: BEAR CABRERA MD          MEDICATIONS GIVEN IN ER    Medications   HYDROmorphone (DILAUDID) injection 0.5 mg (0.5 mg Intravenous Given 12/10/22 1556)   ondansetron (ZOFRAN) injection 4 mg (4 mg Intravenous Given 12/10/22 1555)   sodium chloride 0.9 % bolus 1,000 mL (0 mL Intravenous Stopped 12/10/22 1758)   ketorolac (TORADOL) injection 15 mg (15 mg Intravenous Given 12/10/22 1758)   acetaminophen (TYLENOL) tablet 1,000 mg (1,000 mg Oral Given 12/10/22 1757)   cefTRIAXone (ROCEPHIN) 2 g/100 mL 0.9% NS IVPB (MBP) (0 g Intravenous Stopped 12/10/22 1838)   phenazopyridine (PYRIDIUM) tablet 200 mg (200 mg Oral Given 12/10/22 1757)         PROGRESS, DATA ANALYSIS, CONSULTS, AND MEDICAL DECISION MAKING    All labs have been independently reviewed by me.  All radiology studies have been reviewed by me and the radiologist dictating the report.   EKG's have been independently viewed and interpreted by me.      Differential diagnoses: Probable hemorrhagic cystitis.  As far as her chest discomfort this could be related to a strain or acute coronary syndrome, etc.      ED Course as of 12/10/22 2136   Sat Dec 10, 2022   1702 Patient's history, CT scan, lab work indicate hemorrhagic cystitis.  I have ordered multiple medications to treat this to include Dilaudid, Zofran, normal saline, Tylenol, Toradol, Pyridium. [MS]   1820 Prior to discharge the patient now notes that she has been having left-sided anterior chest pain which worsens with inspiration.  We will evaluate further. [MS]   2131 I spoke with the patient about  findings and she is comfortable with outpatient follow-up. [MS]   2133 HEART Pathway for Early Discharge in Acute Chest Pain - MDCalc  Calculated on Dec 10 2022 9:33 PM  3 points -> HEART Pathway Score  Low risk -> 0.9-1.7% 30-day MACE Repeat troponin at 3 hours and if negative, discharge home with outpatient follow-up. [MS]      ED Course User Index  [MS] Jamie Yoo MD             AS OF 21:36 EST VITALS:    BP - 123/92  HR - 55  TEMP - 98.8 °F (37.1 °C) (Oral)  O2 SATS - 96%      AZIZA reviewed by Jamie Yoo MD           DIAGNOSIS  Final diagnoses:   Hemorrhagic cystitis   Pelvic pain   Left-sided chest pain         DISPOSITION  DISCHARGE    Patient discharged in stable condition.    Reviewed implications of results, diagnosis, meds, responsibility to follow up, warning signs and symptoms of possible worsening, potential complications and reasons to return to ER.    Patient/Family voiced understanding of above instructions.    Discussed plan for discharge, as there is no emergent indication for admission.  Pt/family is agreeable and understands need for follow up and possible repeat testing.  Pt/family is aware that discharge does not mean that nothing is wrong but that it indicates no emergency is currently present that requires admission and they must continue care with follow-up as given below or with a physician of their choice.     FOLLOW-UP  Sukhwinder Acosta MD  [address] [facility] [facility] [address]    [place] [id number] [phone]      NEXT AVAILABLE APPOINTMENT - RECHECK OF CONDITION    [facility] Emergency Department  [address] [facility] [address] [phone]    IF YOU HAVE ANY CONCERN OF WORSENING CONDITION    [facility] CARDIOLOGY  [address]  [address]  [facility] [id number] [phone]             Medication List      New Prescriptions    cefdinir 300 MG capsule  Commonly known as: OMNICEF  Take 1 capsule by  mouth 2 (Two) Times a Day.     fluconazole 150 MG tablet  Commonly known as: DIFLUCAN  Take 1 tablet by mouth 1 (One) Time for 1 dose.     HYDROcodone-acetaminophen 5-325 MG per tablet  Commonly known as: NORCO  Take 1-2 tablets by mouth Every 6 (Six) Hours As Needed for Severe Pain.     phenazopyridine 100 MG tablet  Commonly known as: PYRIDIUM  Take 1 tablet by mouth 3 (Three) Times a Day As Needed for Bladder Spasms.           Where to Get Your Medications      These medications were sent to [facility]/pharmacy #[id number] - [place], [place] - [address]  - [phone]  - [phone]   [address] [facility] [address]    Hours: 24-hours Phone: [phone]   · cefdinir 300 MG capsule  · fluconazole 150 MG tablet  · HYDROcodone-acetaminophen 5-325 MG per tablet  · phenazopyridine 100 MG tablet                  Jamie Yoo MD  12/11/22 0015

## 2022-12-12 ENCOUNTER — OFFICE VISIT (OUTPATIENT)
Dept: BEHAVIORAL HEALTH | Facility: CLINIC | Age: 57
End: 2022-12-12

## 2022-12-12 DIAGNOSIS — Z63.8 FAMILY CONFLICT: ICD-10-CM

## 2022-12-12 DIAGNOSIS — F31.9 BIPOLAR 1 DISORDER: Primary | ICD-10-CM

## 2022-12-12 DIAGNOSIS — Z63.9 RELATIONSHIP PROBLEMS: ICD-10-CM

## 2022-12-12 LAB — BACTERIA SPEC AEROBE CULT: NO GROWTH

## 2022-12-12 PROCEDURE — 90834 PSYTX W PT 45 MINUTES: CPT | Performed by: PSYCHOLOGIST

## 2022-12-12 SDOH — SOCIAL STABILITY - SOCIAL INSECURITY: OTHER SPECIFIED PROBLEMS RELATED TO PRIMARY SUPPORT GROUP: Z63.8

## 2022-12-12 SDOH — SOCIAL STABILITY - SOCIAL INSECURITY: PROBLEM RELATED TO PRIMARY SUPPORT GROUP, UNSPECIFIED: Z63.9

## 2022-12-13 NOTE — PROGRESS NOTES
PROGRESS NOTE    Data:  Marcela Ramírez is a 57 y.o. female who met with the undersigned for a scheduled telehealth therapy session from 3:00 - 3:45pm.      Clinical Maneuvering/Intervention:      The pt talked about her recent stressors including mood swings, crying spell yesterday, and fear of the well-being of her son getting out of prison and going into rehab. She worries that he will not work a program of recovery and end up relapsing/abusing substances. Stressors were processed individually and in detail. Venting of frustrations was conducted in order to help the pt feel less tense emotionally and gain insight into issues. Feelings were processed and validated several times in session. Perspective taking was conducted multiple times in order to help the pt feel less stuck, less overwhelmed, and see challenges as much more manageable. Active listening was conducted in order to help the pt make sense of stressors and start moving towards potential solutions. The pt was assisted with finding solutions based on existing skill-set and abilities. Maladaptive thought patterns were identified, challenged, and evaluated for validity in order to allow for the pt to chose different and more adaptive ways of thinking. An action plan was designed to empower the pt to know what to do. Simple steps of one, two, three were laid out in order to help the pt feel more in control of things and feel less stressed.  The pt's strengths were identified in order to help identify abilities to use to better face/overcome challenges. Some humor was used in session as it is one of the pt's coping skills. Humor was used too, to help the pt see things as less challenging and more manageable than they first seemed. Humor was used as well, to model use of the skill as a way to decrease tension ongoing. Homework was assigned tailored to pt's needs. The pt expressed gratitude for today's session.    Mental Status Exam  Hygiene:  good  Dress:  normal  Attitude:  cooperative and proactive  Motor Activity: normal  Speech: normal  Mood:  level  Affect:  congruent  Thought Processes: normal  Thought Content:  normal  Suicidal Thoughts:  not endorsed  Homicidal Thoughts:  not endorsed  Crisis Safety Plan: not needed  Hallucinations:  none      Patient's Support Network Includes:  family, friends      Progress toward goal: there is evidence to suggest that she struggles with mood swings, but moods are calmer as of late       Functional Status: moderate       Prognosis: good with counseling, medication    Assessment      The pt presented to be struggling with managing her moods related to having bipolar disorder, though moods are calmer as of late. She seems to benefit from venting, having her experiences validated, and being assisted in discovering her own solutions to her problems.       Plan      In order to diminish symptoms of bipolar disorder: the pt is to continue with counseling (ongoing), continue with psychotropic medication as prescribed (ongoing), and make a point to use the communication techniques practiced in session with her daughters and G (ongoing).    Maribel Hernandez, PhD, LP

## 2022-12-19 ENCOUNTER — OFFICE VISIT (OUTPATIENT)
Dept: BEHAVIORAL HEALTH | Facility: CLINIC | Age: 57
End: 2022-12-19

## 2022-12-19 DIAGNOSIS — Z63.9 RELATIONSHIP PROBLEMS: ICD-10-CM

## 2022-12-19 DIAGNOSIS — F31.9 BIPOLAR 1 DISORDER: Primary | ICD-10-CM

## 2022-12-19 PROCEDURE — 90834 PSYTX W PT 45 MINUTES: CPT | Performed by: PSYCHOLOGIST

## 2022-12-19 SDOH — SOCIAL STABILITY - SOCIAL INSECURITY: PROBLEM RELATED TO PRIMARY SUPPORT GROUP, UNSPECIFIED: Z63.9

## 2022-12-20 NOTE — PROGRESS NOTES
PROGRESS NOTE    Data:  Marcela Ramírez is a 57 y.o. female who met with the undersigned for a scheduled telehealth therapy session from 3:00 - 3:45pm.      Clinical Maneuvering/Intervention:      The pt talked about her recent stressors including mood swings, sadness not being able to see her son this [place] (as he is in half-way), and feeling guilty for not being a better girlfriend to MARKELL. Stressors were processed individually and in detail. Venting of frustrations was conducted in order to help the pt feel less tense emotionally and gain insight into issues. Feelings were processed and validated several times in session. Active listening was conducted in order to help the pt make sense of stressors and start moving towards potential solutions. The pt was assisted with finding solutions based on existing skill-set and abilities. Perspective taking was conducted multiple times in order to help the pt feel less stuck, less overwhelmed, and see challenges as much more manageable. She was assisted with talking out her connections with MARKELL, and how she is doing in the area of letting go of resentment towards him. Time was allocated specifically to assess what is 'working' in the pt's life, versus what is 'not working,' (if anything) in terms of helping to improve mood and quality of life. Open and transparent communication was noted working and time was allotted in session in order to note additional ways to improve/maintain effective communication. The pt's strengths were identified in order to help identify abilities to use to better face/overcome challenges. Some humor was used in session as it is one of the pt's coping skills. Humor was used too, to help the pt see things as less challenging and more manageable than they first seemed. Humor was used as well, to model use of the skill as a way to decrease tension ongoing. The pt expressed gratitude for today's session.    Mental Status Exam  Hygiene:  good  Dress:  normal  Attitude:  cooperative and proactive  Motor Activity: normal  Speech: normal  Mood:  level  Affect:  congruent  Thought Processes: normal  Thought Content:  normal  Suicidal Thoughts:  not endorsed  Homicidal Thoughts:  not endorsed  Crisis Safety Plan: not needed  Hallucinations:  none      Patient's Support Network Includes:  family, friends      Progress toward goal: there is evidence to suggest that she struggles with mood swings, but moods are calmer as of late       Functional Status: moderate       Prognosis: good with counseling, medication    Assessment      The pt presented to be struggling with managing her moods related to having bipolar disorder, though moods are calmer as of late. She seems to benefit from venting, having her experiences validated, and being assisted in discovering her own solutions to her problems.       Plan      In order to diminish symptoms of bipolar disorder: the pt is to continue with counseling (ongoing), continue with psychotropic medication as prescribed (ongoing), and make a point to use the communication techniques practiced in session today with G (ongoing).    Maribel Hernandez, PhD, LP

## 2022-12-29 DIAGNOSIS — M65.311 TRIGGER THUMB OF BOTH HANDS: Primary | ICD-10-CM

## 2022-12-29 DIAGNOSIS — M65.312 TRIGGER THUMB OF BOTH HANDS: Primary | ICD-10-CM

## 2022-12-30 ENCOUNTER — OFFICE VISIT (OUTPATIENT)
Dept: BEHAVIORAL HEALTH | Facility: CLINIC | Age: 57
End: 2022-12-30

## 2022-12-30 DIAGNOSIS — F31.9 BIPOLAR 1 DISORDER: Primary | ICD-10-CM

## 2022-12-30 DIAGNOSIS — Z91.410 H/O ADULT PHYSICAL AND SEXUAL ABUSE: ICD-10-CM

## 2022-12-30 PROCEDURE — 90847 FAMILY PSYTX W/PT 50 MIN: CPT | Performed by: PSYCHOLOGIST
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
## 2023-01-09 ENCOUNTER — OFFICE VISIT (OUTPATIENT)
Dept: BEHAVIORAL HEALTH | Facility: CLINIC | Age: 58
End: 2023-01-09
Payer: COMMERCIAL

## 2023-01-09 DIAGNOSIS — F31.9 BIPOLAR 1 DISORDER: Primary | ICD-10-CM

## 2023-01-09 DIAGNOSIS — Z63.9 RELATIONSHIP PROBLEMS: ICD-10-CM

## 2023-01-09 DIAGNOSIS — R45.4 FEELING ANGRY: ICD-10-CM

## 2023-01-09 PROCEDURE — 90834 PSYTX W PT 45 MINUTES: CPT | Performed by: PSYCHOLOGIST

## 2023-01-09 SDOH — SOCIAL STABILITY - SOCIAL INSECURITY: PROBLEM RELATED TO PRIMARY SUPPORT GROUP, UNSPECIFIED: Z63.9

## 2023-01-10 ENCOUNTER — APPOINTMENT (OUTPATIENT)
Dept: GENERAL RADIOLOGY | Facility: HOSPITAL | Age: 58
End: 2023-01-10
Payer: COMMERCIAL

## 2023-01-10 NOTE — PROGRESS NOTES
PROGRESS NOTE    Data:  Marcela Ramírez is a 57 y.o. female who met with the undersigned for a scheduled telehealth therapy session from 3:00 - 3:45pm.      Clinical Maneuvering/Intervention:      The pt talked about her recent stressors including mood swings, feeling particularly irritated/angry lately, and having a falling out with her long-time partner (G). Stressors were processed individually and in detail. Venting of frustrations was conducted in order to help the pt feel less tense emotionally and gain insight into issues. Feelings were processed and validated several times in session. Perspective taking was conducted multiple times in order to help the pt feel less stuck, less overwhelmed, and see challenges as much more manageable. Active listening was conducted in order to help the pt make sense of stressors and start moving towards potential solutions. The pt was assisted with finding solutions based on existing skill-set and abilities. She was assisted with processing her role in the strain and emotional distress. The pt did not need much assistance with owning her role, going so far as to admit that she missed her medication for three days. The pt's strengths were identified in order to help identify abilities to use to better face/overcome challenges. She was doubting her intelligence and so such strengths (types of intelligence) were delineated. She was assisted with noting what is working in her relationship with G, including how they are doing well being open and transparent with one another. She has been particularly open, vulnerable with him, and transparent about her experiences and needs. She wanted to process other aspects of life and started worrying about different things in the future. She was assisted with doing 'first things first,' and examples were provided. Some humor was used in session as it is one of the pt's coping skills. Humor was used too, to help the pt see things as less  challenging and more manageable than they first seemed. Humor was used as well, to model use of the skill as a way to decrease tension ongoing. Homework was assigned tailored to pt's needs. The pt expressed gratitude for today's session.    Mental Status Exam  Hygiene:  good  Dress: normal  Attitude:  cooperative and proactive  Motor Activity: normal  Speech: normal  Mood:  tense and hurt  Affect:  congruent  Thought Processes: normal  Thought Content:  normal  Suicidal Thoughts:  not endorsed  Homicidal Thoughts:  not endorsed  Crisis Safety Plan: not needed  Hallucinations:  none      Patient's Support Network Includes:  family, friends      Progress toward goal: there is evidence to suggest that she struggles with mood swings      Functional Status: moderate       Prognosis: good with counseling, medication    Assessment      The pt presented to be struggling with managing her moods related to having bipolar disorder, and had a relapse of symptoms due to conflict with G.  She seems to benefit from venting, having her experiences validated, and being assisted in discovering her own solutions to her problems.       Plan      In order to diminish symptoms of bipolar disorder: the pt is to continue with counseling (ongoing), continue with psychotropic medication as prescribed (ongoing), and make a point to continue with the open and transparent communication with G as discussed in session (ongoing).    Maribel Hernandez, PhD, LP

## 2023-01-23 ENCOUNTER — OFFICE VISIT (OUTPATIENT)
Dept: BEHAVIORAL HEALTH | Facility: CLINIC | Age: 58
End: 2023-01-23
Payer: COMMERCIAL

## 2023-01-23 DIAGNOSIS — R45.4 FEELING ANGRY: ICD-10-CM

## 2023-01-23 DIAGNOSIS — F31.9 BIPOLAR 1 DISORDER: Primary | ICD-10-CM

## 2023-01-23 PROCEDURE — 90834 PSYTX W PT 45 MINUTES: CPT | Performed by: PSYCHOLOGIST
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
## 2023-01-30 ENCOUNTER — OFFICE VISIT (OUTPATIENT)
Dept: BEHAVIORAL HEALTH | Facility: CLINIC | Age: 58
End: 2023-01-30
Payer: COMMERCIAL

## 2023-01-30 DIAGNOSIS — F31.9 BIPOLAR 1 DISORDER: Primary | ICD-10-CM

## 2023-01-30 DIAGNOSIS — Z91.410 H/O ADULT PHYSICAL AND SEXUAL ABUSE: ICD-10-CM

## 2023-01-30 PROCEDURE — 90834 PSYTX W PT 45 MINUTES: CPT | Performed by: PSYCHOLOGIST

## 2023-01-30 RX ORDER — ATORVASTATIN CALCIUM 20 MG/1
TABLET, FILM COATED ORAL
Qty: 30 TABLET | Refills: 2 | Status: SHIPPED | OUTPATIENT
Start: 2023-01-30
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
## 2023-02-13 ENCOUNTER — OFFICE VISIT (OUTPATIENT)
Dept: BEHAVIORAL HEALTH | Facility: CLINIC | Age: 58
End: 2023-02-13
Payer: COMMERCIAL

## 2023-02-13 DIAGNOSIS — R45.4 FEELING ANGRY: ICD-10-CM

## 2023-02-13 DIAGNOSIS — Z91.410 H/O ADULT PHYSICAL AND SEXUAL ABUSE: ICD-10-CM

## 2023-02-13 DIAGNOSIS — F31.9 BIPOLAR 1 DISORDER: Primary | ICD-10-CM

## 2023-02-13 PROCEDURE — 90834 PSYTX W PT 45 MINUTES: CPT | Performed by: PSYCHOLOGIST
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
## 2023-02-27 ENCOUNTER — OFFICE VISIT (OUTPATIENT)
Dept: BEHAVIORAL HEALTH | Facility: CLINIC | Age: 58
End: 2023-02-27
Payer: COMMERCIAL

## 2023-02-27 DIAGNOSIS — F31.9 BIPOLAR 1 DISORDER: Primary | ICD-10-CM

## 2023-02-27 DIAGNOSIS — Z63.9 RELATIONSHIP PROBLEMS: ICD-10-CM

## 2023-02-27 PROCEDURE — 90834 PSYTX W PT 45 MINUTES: CPT | Performed by: PSYCHOLOGIST

## 2023-02-27 SDOH — SOCIAL STABILITY - SOCIAL INSECURITY: PROBLEM RELATED TO PRIMARY SUPPORT GROUP, UNSPECIFIED: Z63.9

## 2023-02-28 DIAGNOSIS — F51.01 PRIMARY INSOMNIA: ICD-10-CM

## 2023-02-28 RX ORDER — ALPRAZOLAM 1 MG/1
TABLET ORAL
Qty: 30 TABLET | Refills: 0 | Status: SHIPPED | OUTPATIENT
Start: 2023-02-28 | End: 2023-03-06 | Stop reason: SDUPTHER

## 2023-02-28 NOTE — PROGRESS NOTES
PROGRESS NOTE    Data:  Marcela Ramírez is a 57 y.o. female who met with the undersigned for a scheduled telehealth therapy session from 3:00 - 3:45pm.      Clinical Maneuvering/Intervention:      The pt talked about her recent stressors including mood swings, work stress, and some family conflict. Stressors were processed individually and in detail. Venting of frustrations was conducted in order to help the pt feel less tense emotionally and gain insight into issues. Feelings were processed and validated several times in session. Perspective taking was conducted multiple times in order to help the pt feel less stuck, less overwhelmed, and see challenges as much more manageable. Active listening was conducted in order to help the pt make sense of stressors and start moving towards potential solutions. The pt was assisted with finding solutions based on existing skill-set and abilities. Maladaptive thought patterns were identified, challenged, and evaluated for validity in order to allow for the pt to chose different and more adaptive ways of thinking. An action plan was designed to empower the pt to know what to do. Simple steps of one, two, three were laid out in order to help the pt feel more in control of things and feel less stressed. The pt's strengths were identified in order to help identify abilities to use to better face/overcome challenges. She is articulate, intelligent, and witty. The pt tended to downplay these characteristics and she was assisted with owning them instead. She talked about frustrations with medication in terms of deadening her feelings at times, but after pros and cons of taking her medication, she decided it was best to stay on it. Discussion of consulting with a psychiatrist took place and she was encouraged to do this. Some humor was used in session as it is one of the pt's coping skills. Humor was used too, to help the pt see things as less challenging and more manageable than  they first seemed. Humor was used as well, to model use of the skill as a way to decrease tension ongoing. Homework was assigned tailored to pt's needs. The pt expressed gratitude for today's session.    Mental Status Exam  Hygiene:  good  Dress: normal  Attitude:  cooperative and proactive  Motor Activity: normal  Speech: normal  Mood:  slightly tense  Affect:  congruent  Thought Processes: normal  Thought Content:  normal  Suicidal Thoughts:  not endorsed  Homicidal Thoughts:  not endorsed  Crisis Safety Plan: not needed  Hallucinations:  none      Patient's Support Network Includes:  family, friends      Progress toward goal: there is evidence to suggest that she struggles with mood swings      Functional Status: moderate       Prognosis: good with counseling, medication    Assessment      The pt presented to be struggling with managing her moods related to having bipolar disorder, but seemed more level today than in recent sessions.  She seems to benefit from venting, having her experiences validated, and being assisted in discovering her own solutions to her problems.       Plan      In order to diminish symptoms of bipolar disorder: the pt is to continue with counseling (ongoing), continue with psychotropic medication as prescribed (ongoing), and consult with a psychiatrist about the best medication for her to take to cope with bipolar disorder (after looking into coverage through her new insurance).    Maribel Hernandez, PhD, LP

## 2023-03-06 ENCOUNTER — TELEPHONE (OUTPATIENT)
Dept: NEUROLOGY | Facility: CLINIC | Age: 58
End: 2023-03-06

## 2023-03-06 DIAGNOSIS — E11.42 DIABETIC PERIPHERAL NEUROPATHY: ICD-10-CM

## 2023-03-06 DIAGNOSIS — F51.01 PRIMARY INSOMNIA: ICD-10-CM

## 2023-03-06 RX ORDER — ALPRAZOLAM 1 MG/1
1 TABLET ORAL NIGHTLY PRN
Qty: 10 TABLET | Refills: 0 | Status: SHIPPED | OUTPATIENT
Start: 2023-03-06 | End: 2023-03-17 | Stop reason: SDUPTHER

## 2023-03-06 NOTE — TELEPHONE ENCOUNTER
Patient missed her appointment last week due to no ride. She rescheduled for 3/17/23, wanted to know if she can get a refill or enough Xanax called in to do until her appointment.     Xanax 1 mg at night    [facility] [id number] - [place], [place] - [address] [place], [place] [id number] AT [facility] - [phone]  - [phone] [address] [address]   Phone: [phone] Fax: [phone]       Please let her know if this can't be done for her and what options she may have.

## 2023-03-08 DIAGNOSIS — E11.42 DIABETIC PERIPHERAL NEUROPATHY: ICD-10-CM

## 2023-03-08 RX ORDER — PREGABALIN 75 MG/1
75 CAPSULE ORAL 3 TIMES DAILY
Qty: 90 CAPSULE | Refills: 5 | Status: SHIPPED | OUTPATIENT
Start: 2023-03-08

## 2023-03-08 RX ORDER — PREGABALIN 75 MG/1
75 CAPSULE ORAL 3 TIMES DAILY
Qty: 90 CAPSULE | Refills: 3 | Status: CANCELLED | OUTPATIENT
Start: 2023-03-08

## 2023-03-08 NOTE — TELEPHONE ENCOUNTER
Caller: Marcela Ramírez    Relationship: Self    Who are you requesting to speak with (clinical staff, provider,  specific staff member): CHARLA    What was the call regarding: PATIENT IS F/U ON RX REFILL AS SHE IS HAVING TROUBLE SLEEPING DUE TO HER FEET. SHE HAS BEEN OUT OF HER LYRICA FOR 2 DAYS. SHE IS A PREV [facility] PATIENT & HAS F/U SCHEDULED W/ CHARLA 6/8/23.    PLEASE FILL OR CALL TO ADVISE    THANK YOU     Do you require a callback: NO  
Caller: Darrell Marcelamichael Guo    Relationship: Self    Best call back number: [phone]    Requested Prescriptions:   Requested Prescriptions     Pending Prescriptions Disp Refills   • pregabalin (LYRICA) 75 MG capsule 90 capsule 5     Sig: Take 1 capsule by mouth 3 (Three) Times a Day.        Pharmacy where request should be sent: [facility] [id number] [address] AT [facility] [phone] [facility] [phone] FX     Additional details provided by patient: PT IS OUT OF MEDICATION.      Does the patient have less than a 3 day supply:  [x] Yes  [] No    Would you like a call back once the refill request has been completed: [x] Yes [] No    If the office needs to give you a call back, can they leave a voicemail: [x] Yes [] No    Armida Maher Rep   03/06/23 12:07 EST         
Rx Refill Note  Requested Prescriptions     Pending Prescriptions Disp Refills   • pregabalin (LYRICA) 75 MG capsule 90 capsule 5     Sig: Take 1 capsule by mouth 3 (Three) Times a Day.      Last filled: 8/16/22 with 5 pending to  to get her through until she can get in to see her in June as she is a previous [facility] patient.   Last office visit with prescribing clinician: 8/16/22    Next office visit with prescribing clinician: 6/8/2023     Anh Mckinney MA  03/08/23, 11:13 EST  
Patient

## 2023-03-13 ENCOUNTER — OFFICE VISIT (OUTPATIENT)
Dept: BEHAVIORAL HEALTH | Facility: CLINIC | Age: 58
End: 2023-03-13
Payer: COMMERCIAL

## 2023-03-13 DIAGNOSIS — F31.9 BIPOLAR 1 DISORDER: Primary | ICD-10-CM

## 2023-03-13 PROCEDURE — 90834 PSYTX W PT 45 MINUTES: CPT | Performed by: PSYCHOLOGIST

## 2023-03-14 NOTE — PROGRESS NOTES
PROGRESS NOTE    Data:  Marcela Ramírez is a 57 y.o. female who met with the undersigned for a scheduled telehealth therapy session from 3:00 - 3:45pm.      Clinical Maneuvering/Intervention:      The pt talked about her recent stressors including mood swings, worrying about her son in rehab/his struggle to recover from substance abuse, and having crying spells. Stressors were processed individually and in detail. Venting of frustrations was conducted in order to help the pt feel less tense emotionally and gain insight into issues. Feelings were processed and validated several times in session. Perspective taking was conducted multiple times in order to help the pt feel less stuck, less overwhelmed, and see challenges as much more manageable. Active listening was conducted in order to help the pt make sense of stressors and start moving towards potential solutions. The pt was assisted with finding solutions based on existing skill-set and abilities. She was assisted with processing issues, but getting to the core issues under the stressors as well. The pt was assisted with putting feelings into words several times in session in order to both help diminish emotional tension and to highlight direction for change. Fears of what her son may do/not do in the future and discomforts of things he is being taught in rehab surfaced. Maladaptive thought patterns were identified, challenged, and evaluated for validity in order to allow for the pt to chose different and more adaptive ways of thinking. Finding her role and identifying how she is quite skilled in her role being his mother were noted. She expressed fear and guilt of failing her children. CBT was used to help her note that there is no evidence for this at this time. The pt's strengths were identified in order to help identify abilities to use to better face/overcome challenges. Some humor was used in session as it is one of the pt's coping skills. Humor was used  too, to help the pt see things as less challenging and more manageable than they first seemed. Humor was used as well, to model use of the skill as a way to decrease tension ongoing. Homework was assigned tailored to pt's needs. The pt expressed gratitude for today's session.    Mental Status Exam  Hygiene:  good  Dress: normal  Attitude:  cooperative and proactive  Motor Activity: normal  Speech: normal  Mood:  slightly tense  Affect:  congruent  Thought Processes: normal  Thought Content:  normal  Suicidal Thoughts:  not endorsed  Homicidal Thoughts:  not endorsed  Crisis Safety Plan: not needed  Hallucinations:  none      Patient's Support Network Includes:  family, friends      Progress toward goal: there is evidence to suggest that she struggles with mood swings      Functional Status: moderate       Prognosis: good with counseling, medication    Assessment      The pt presented to be struggling with managing her moods related to having bipolar disorder, and had crying spells recently. She seems to benefit from her medication and empowerment therapy, particularly helping her believe in herself. She seems to benefit from venting, having her experiences validated, and being assisted in discovering her own solutions to her problems.       Plan      In order to diminish symptoms of bipolar disorder: the pt is to continue with counseling (ongoing), continue with psychotropic medication as prescribed (ongoing), and make a point for self care activities today/ongoing.     Maribel Hernandez, PhD, LP

## 2023-03-17 ENCOUNTER — OFFICE VISIT (OUTPATIENT)
Dept: FAMILY MEDICINE CLINIC | Facility: CLINIC | Age: 58
End: 2023-03-17
Payer: COMMERCIAL

## 2023-03-17 VITALS
WEIGHT: 246.5 LBS | HEIGHT: 67 IN | SYSTOLIC BLOOD PRESSURE: 122 MMHG | TEMPERATURE: 96.8 F | DIASTOLIC BLOOD PRESSURE: 82 MMHG | HEART RATE: 80 BPM | OXYGEN SATURATION: 98 % | BODY MASS INDEX: 38.69 KG/M2 | RESPIRATION RATE: 16 BRPM

## 2023-03-17 DIAGNOSIS — F41.9 ANXIETY: ICD-10-CM

## 2023-03-17 DIAGNOSIS — F51.01 PRIMARY INSOMNIA: ICD-10-CM

## 2023-03-17 DIAGNOSIS — E66.01 MORBID OBESITY DUE TO EXCESS CALORIES: ICD-10-CM

## 2023-03-17 DIAGNOSIS — E11.65 UNCONTROLLED TYPE 2 DIABETES MELLITUS WITH HYPERGLYCEMIA: Primary | ICD-10-CM

## 2023-03-17 DIAGNOSIS — I10 ESSENTIAL HYPERTENSION: ICD-10-CM

## 2023-03-17 LAB
EXPIRATION DATE: NORMAL
HBA1C MFR BLD: 5.9 %
Lab: NORMAL

## 2023-03-17 PROCEDURE — 99214 OFFICE O/P EST MOD 30 MIN: CPT | Performed by: FAMILY MEDICINE

## 2023-03-17 RX ORDER — ALPRAZOLAM 1 MG/1
1 TABLET ORAL NIGHTLY PRN
Qty: 30 TABLET | Refills: 5 | Status: SHIPPED | OUTPATIENT
Start: 2023-03-17

## 2023-03-17 NOTE — PROGRESS NOTES
Subjective   Marcela Ramírez is a 57 y.o. female    Chief Complaint    Anxiety  Diabetes mellitus    History of Present Illness  The patient presents today for a refill of her alprazolam, which she takes on a fairly routine basis at bedtime to help with anxiety and sleep. AZIZA is reviewed and is appropriate. Hemoglobin A1C today is 5.9 percent.    She wakes up nauseated every morning. Last week, the patient had a stomach bug for 4 days. She crawled back in bed with stomach pain and nausea. The patient had a fever of 102 degrees Fahrenheit. She was unable to eat. The patient did not urinate because she was becoming dehydrated. The patient said ginger ale helps. She woke up and feels better, but her stomach still hurts. Her back and chest hurt, but she thinks it is from dry heaving.     The patient does not drink soda. She has been drinking flavored water. The patient has noticed that in the last month she has lowered her sugar. When she makes her coffee, she uses Stevia. The patient is not using any real sugar. She has noticed that the neuropathy is not gone, but she does not have those weird nights where she cannot sleep. The patient said if she lowers her sugar intake, her neuropathy will not ever go away, but maybe she can make her life a little bit more livable.    The patient had a really bad bladder infection last month. The urgency was bad, but nothing was coming out, but blood and blood clots. She did not know if she had a stone, but it turned out to be a bladder infection. The patient said they did an MRI. She said the doctor could see where she had the sleeve surgery. The patient has gained weight since she had the surgery.    The following portions of the patient's history were reviewed and updated as appropriate: allergies, current medications, past social history and problem list    Review of Systems   Constitutional: Negative for appetite change, diaphoresis, fatigue and unexpected weight change.    Eyes: Negative for visual disturbance.   Respiratory: Negative for chest tightness and shortness of breath.    Cardiovascular: Negative for chest pain, palpitations and leg swelling.   Gastrointestinal: Negative for abdominal pain, diarrhea, nausea and vomiting.   Endocrine: Negative for polydipsia, polyphagia and polyuria.   Skin: Negative for color change.   Neurological: Negative for dizziness, tremors, weakness, light-headedness, numbness and headaches.   Psychiatric/Behavioral: Positive for dysphoric mood and sleep disturbance. Negative for agitation, behavioral problems, confusion, decreased concentration and suicidal ideas. The patient is nervous/anxious.        Objective     Vitals:    03/17/23 0803   BP: 122/82   Pulse: 80   Resp: 16   Temp: 96.8 °F (36 °C)   SpO2: 98%       Physical Exam  Vitals and nursing note reviewed.   Constitutional:       General: She is not in acute distress.     Appearance: Normal appearance. She is well-developed. She is not ill-appearing, toxic-appearing or diaphoretic.   Neck:      Thyroid: No thyromegaly.      Vascular: No JVD.   Cardiovascular:      Rate and Rhythm: Normal rate and regular rhythm.      Pulses: Normal pulses.      Heart sounds: Normal heart sounds. No murmur heard.  Pulmonary:      Effort: Pulmonary effort is normal.      Breath sounds: Normal breath sounds.   Abdominal:      Palpations: Abdomen is soft. There is no mass.      Tenderness: There is no abdominal tenderness.   Musculoskeletal:      Cervical back: Neck supple.   Lymphadenopathy:      Cervical: No cervical adenopathy.   Skin:     General: Skin is warm and dry.   Neurological:      Mental Status: She is alert.      Sensory: No sensory deficit.         Assessment & Plan   Problems Addressed this Visit        Endocrine and Metabolic    Morbid obesity due to excess calories (HCC)       Mental Health    Anxiety   Other Visit Diagnoses     Uncontrolled type 2 diabetes mellitus with hyperglycemia (HCC)     -  Primary    Relevant Orders    POC Glycosylated Hemoglobin (Hb A1C) (Completed)    Primary insomnia        Relevant Medications    ALPRAZolam (XANAX) 1 MG tablet    Essential hypertension          Diagnoses       Codes Comments    Uncontrolled type 2 diabetes mellitus with hyperglycemia (HCC)    -  Primary ICD-10-CM: E11.65  ICD-9-CM: 250.02     Primary insomnia     ICD-10-CM: F51.01  ICD-9-CM: 307.42     Anxiety     ICD-10-CM: F41.9  ICD-9-CM: 300.00     Essential hypertension     ICD-10-CM: I10  ICD-9-CM: 401.9     Morbid obesity due to excess calories (HCC)     ICD-10-CM: E66.01  ICD-9-CM: 278.01         I spent 25 minutes in patient care: Reviewing records prior to the visit, examining the patient, entering orders and documentation    Part of this note may be an electronic transcription/translation of spoken language to printed text using the Dragon Dictation System.      Transcribed from ambient dictation for KAT Acosta MD by Akosua Goodrich.  03/17/23   09:32 EDT    Patient or patient representative verbalized consent to the visit recording.  I have personally performed the services described in this document as transcribed by the above individual, and it is both accurate and complete.

## 2023-03-20 ENCOUNTER — OFFICE VISIT (OUTPATIENT)
Dept: BEHAVIORAL HEALTH | Facility: CLINIC | Age: 58
End: 2023-03-20
Payer: COMMERCIAL

## 2023-03-20 DIAGNOSIS — F31.9 BIPOLAR 1 DISORDER: Primary | ICD-10-CM

## 2023-03-20 DIAGNOSIS — Z63.9 RELATIONSHIP PROBLEMS: ICD-10-CM

## 2023-03-20 DIAGNOSIS — R45.4 FEELING ANGRY: ICD-10-CM

## 2023-03-20 DIAGNOSIS — Z91.410 H/O ADULT PHYSICAL AND SEXUAL ABUSE: ICD-10-CM

## 2023-03-20 PROCEDURE — 1159F MED LIST DOCD IN RCRD: CPT | Performed by: PSYCHOLOGIST

## 2023-03-20 PROCEDURE — 90837 PSYTX W PT 60 MINUTES: CPT | Performed by: PSYCHOLOGIST

## 2023-03-20 PROCEDURE — 1160F RVW MEDS BY RX/DR IN RCRD: CPT | Performed by: PSYCHOLOGIST

## 2023-03-20 SDOH — SOCIAL STABILITY - SOCIAL INSECURITY: PROBLEM RELATED TO PRIMARY SUPPORT GROUP, UNSPECIFIED: Z63.9
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
## 2023-03-27 ENCOUNTER — OFFICE VISIT (OUTPATIENT)
Dept: BEHAVIORAL HEALTH | Facility: CLINIC | Age: 58
End: 2023-03-27
Payer: COMMERCIAL

## 2023-03-27 DIAGNOSIS — Z63.9 RELATIONSHIP PROBLEMS: ICD-10-CM

## 2023-03-27 DIAGNOSIS — F31.9 BIPOLAR 1 DISORDER: Primary | ICD-10-CM

## 2023-03-27 DIAGNOSIS — R45.4 FEELING ANGRY: ICD-10-CM

## 2023-03-27 PROCEDURE — 90834 PSYTX W PT 45 MINUTES: CPT | Performed by: PSYCHOLOGIST

## 2023-03-27 SDOH — SOCIAL STABILITY - SOCIAL INSECURITY: PROBLEM RELATED TO PRIMARY SUPPORT GROUP, UNSPECIFIED: Z63.9

## 2023-03-28 NOTE — PROGRESS NOTES
PROGRESS NOTE    Data:  Marcela Ramírez is a 57 y.o. female who met with the undersigned for a scheduled telehealth therapy session from 3:00 - 4:00pm.      Clinical Maneuvering/Intervention:      The pt talked about her recent stressors including mood swings and feeling hurt/angry with her long-term boyfriend MARKELL. She expressed feeling like he does not understand why she feels hurt and angry, which makes her feel more hurt and angry. Stressors were processed individually and in detail. Venting of frustrations was conducted in order to help the pt feel less tense emotionally and gain insight into issues. Feelings were processed and validated several times in session. Perspective taking was conducted multiple times in order to help the pt feel less stuck, less overwhelmed, and see challenges as much more manageable. Active listening was conducted in order to help the pt make sense of stressors and start moving towards potential solutions. The pt was assisted with finding solutions based on existing skill-set and abilities. Time was allocated specifically to assess what is 'working' in the pt's life, versus what is 'not working,' (if anything) in terms of helping to improve mood and quality of life. She tends to do better when talking with G, and being transparent with him. She was assisted in noting how she can further improve their communication so that the two of them might support each other better. Role playing was conducted in order to help the pt gain insight into how to improve her communication skills, decrease tension with the other person, and notice things the pt may be doing (and needs to stop doing) in order to improve the quality of the relationship. A take-home assignment was given to her, which was to be even more transparent about what she would like to hear from him exactly in order to feel validated/feel better. Homework was assigned tailored to pt's needs. The pt expressed gratitude for today's  session.    Mental Status Exam  Hygiene:  good  Dress: normal  Attitude:  cooperative and proactive  Motor Activity: normal  Speech: normal  Mood:  tense  Affect:  congruent  Thought Processes: normal  Thought Content:  normal  Suicidal Thoughts:  not endorsed  Homicidal Thoughts:  not endorsed  Crisis Safety Plan: not needed  Hallucinations:  none      Patient's Support Network Includes:  family, friends      Progress toward goal: there is evidence to suggest that she struggles with mood swings      Functional Status: moderate       Prognosis: good with counseling, medication    Assessment      The pt presented to be struggling with managing her moods related to having bipolar disorder, and has been quite angry lately. She seems to benefit from her medication and empowerment therapy, particularly helping her believe in herself. She seems to benefit from venting, having her experiences validated, and being assisted in discovering her own solutions to her problems.       Plan      In order to diminish symptoms of bipolar disorder/anger: the pt is to continue with counseling (ongoing), continue with psychotropic medication as prescribed (ongoing), and make a point to do the take-home exercise practiced today in order to improve communication with G (as soon as possible).    Maribel Hernandez, PhD, LP

## 2023-04-10 ENCOUNTER — OFFICE VISIT (OUTPATIENT)
Dept: BEHAVIORAL HEALTH | Facility: CLINIC | Age: 58
End: 2023-04-10
Payer: COMMERCIAL

## 2023-04-10 DIAGNOSIS — F31.9 BIPOLAR 1 DISORDER: Primary | ICD-10-CM

## 2023-04-10 DIAGNOSIS — Z63.9 RELATIONSHIP PROBLEMS: ICD-10-CM

## 2023-04-10 PROCEDURE — 90837 PSYTX W PT 60 MINUTES: CPT | Performed by: PSYCHOLOGIST

## 2023-04-10 SDOH — SOCIAL STABILITY - SOCIAL INSECURITY: PROBLEM RELATED TO PRIMARY SUPPORT GROUP, UNSPECIFIED: Z63.9

## 2023-04-10 NOTE — PROGRESS NOTES
PROGRESS NOTE    Data:  Marcela Ramírez is a 57 y.o. female who met with the undersigned for a scheduled telehealth therapy session from 3:00 - 4:00pm.      Clinical Maneuvering/Intervention:      The pt talked about her recent stressors including work stress, her father falling ill, and chronic pain. Stressors were processed individually and in detail. Venting of frustrations was conducted in order to help the pt feel less tense emotionally and gain insight into issues. Feelings were processed and validated several times in session. Perspective taking was conducted multiple times in order to help the pt feel less stuck, less overwhelmed, and see challenges as much more manageable. Active listening was conducted in order to help the pt make sense of stressors and start moving towards potential solutions. The pt was assisted with finding solutions based on existing skill-set and abilities. Improving communication with loved ones was a theme of the session and tied into what helps her with mood management. The pt was assisted in recognizing progress in order to show encouragement and promote motivation to keep making positive changes in life. She is connecting more with G, being more transparent with him, and setting boundaries with others as appropriate (with co-workers and children). Time was allocated specifically to assess what is 'working' in the pt's life, versus what is 'not working,' (if anything) in terms of helping to improve mood and quality of life. She talked about frustrations she is having with parents, but without much prompting, she was able to talk about how she can still love and accept them. Her ability to appreciate those who she finds stressful was noted to her in order to empower and help her build insight. The pt's strengths were identified in order to help identify abilities to use to better face/overcome challenges. Some humor was used in session as it is one of the pt's coping skills.  Humor was used too, to help the pt see things as less challenging and more manageable than they first seemed. Humor was used as well, to model use of the skill as a way to decrease tension ongoing. Homework was assigned tailored to pt's needs. The pt expressed gratitude for today's session.    Mental Status Exam  Hygiene:  good  Dress: normal  Attitude:  cooperative and proactive  Motor Activity: normal  Speech: normal  Mood:  level  Affect:  congruent  Thought Processes: normal  Thought Content:  normal  Suicidal Thoughts:  not endorsed  Homicidal Thoughts:  not endorsed  Crisis Safety Plan: not needed  Hallucinations:  none      Patient's Support Network Includes:  family, friends      Progress toward goal: there is evidence to suggest that she struggles with mood swings      Functional Status: moderate       Prognosis: good with counseling, medication    Assessment      The pt presented to be struggling with managing her moods related to having bipolar disorder over time, but seems more centered today. She seems to benefit from empowerment therapy, particularly helping her believe in herself. She seems to benefit from venting, having her experiences validated, and being assisted in discovering her own solutions to her problems.       Plan      In order to diminish symptoms of bipolar disorder: the pt is to continue with counseling, being open/transparent with G, and setting healthy boundaries with co-workers and her children (ongoing).    Maribel Hernandez, PhD, LP

## 2023-04-24 ENCOUNTER — OFFICE VISIT (OUTPATIENT)
Dept: BEHAVIORAL HEALTH | Facility: CLINIC | Age: 58
End: 2023-04-24
Payer: COMMERCIAL

## 2023-04-24 DIAGNOSIS — Z63.8 FAMILY CONFLICT: ICD-10-CM

## 2023-04-24 DIAGNOSIS — F31.9 BIPOLAR 1 DISORDER: Primary | ICD-10-CM

## 2023-04-24 PROCEDURE — 90834 PSYTX W PT 45 MINUTES: CPT | Performed by: PSYCHOLOGIST

## 2023-04-24 SDOH — SOCIAL STABILITY - SOCIAL INSECURITY: OTHER SPECIFIED PROBLEMS RELATED TO PRIMARY SUPPORT GROUP: Z63.8

## 2023-04-25 NOTE — PROGRESS NOTES
PROGRESS NOTE    Data:  Marcela Ramírez is a 57 y.o. female who met with the undersigned for a scheduled telehealth therapy session from 3:00 - 3:45pm.      Clinical Maneuvering/Intervention:      The pt talked about her recent stressors mood swings, financial stress, and family conflict. She talked in detail about feeling stress and upset by her son, PORTIA, losing work and not being able to pay his rent. Stressors were processed individually and in detail. Venting of frustrations was conducted in order to help the pt feel less tense emotionally and gain insight into issues. Feelings were processed and validated several times in session. Perspective taking was conducted multiple times in order to help the pt feel less stuck, less overwhelmed, and see challenges as much more manageable. Active listening was conducted in order to help the pt make sense of stressors and start moving towards potential solutions. The pt was assisted with finding solutions based on existing skill-set and abilities. Diminishing stress was conducted, helping her see how she will be able to pay off her debt after loaning her son money, and noting what she would do/not do in the future based on this experience. Time was allocated specifically to assess what is 'working' in the pt's life, versus what is 'not working,' (if anything) in terms of helping to improve mood and quality of life. The pt's strengths were identified in order to help identify abilities to use to better face/overcome challenges. Some humor was used in session as it is one of the pt's coping skills. Humor was used too, to help the pt see things as less challenging and more manageable than they first seemed. Humor was used as well, to model use of the skill as a way to decrease tension ongoing. Homework was assigned tailored to pt's needs. The pt expressed gratitude for today's session.    Mental Status Exam  Hygiene:  good  Dress: normal  Attitude:  cooperative and  proactive  Motor Activity: normal  Speech: normal  Mood:  stressed  Affect:  congruent  Thought Processes: normal  Thought Content:  normal  Suicidal Thoughts:  not endorsed  Homicidal Thoughts:  not endorsed  Crisis Safety Plan: not needed  Hallucinations:  none      Patient's Support Network Includes:  family, friends      Progress toward goal: there is evidence to suggest that she struggles with mood swings      Functional Status: moderate       Prognosis: good with counseling, medication    Assessment      The pt presented to be struggling with managing her moods related to having bipolar disorder and having family conflict. She seems to benefit from empowerment therapy, particularly helping her believe in herself. She seems to benefit from venting, having her experiences validated, and being assisted in discovering her own solutions to her problems.       Plan      In order to diminish symptoms of bipolar disorder: the pt is to continue with counseling, being open/transparent with family, and setting healthy boundaries with others (ongoing).    Maribel Hernandez, PhD, LP

## 2023-05-02 RX ORDER — METOPROLOL SUCCINATE 50 MG/1
TABLET, EXTENDED RELEASE ORAL
Qty: 30 TABLET | Refills: 5 | Status: SHIPPED | OUTPATIENT
Start: 2023-05-02

## 2023-05-02 RX ORDER — LISINOPRIL 40 MG/1
TABLET ORAL
Qty: 30 TABLET | Refills: 5 | Status: SHIPPED | OUTPATIENT
Start: 2023-05-02

## 2023-05-02 RX ORDER — ATORVASTATIN CALCIUM 20 MG/1
TABLET, FILM COATED ORAL
Qty: 30 TABLET | Refills: 2 | Status: SHIPPED | OUTPATIENT
Start: 2023-05-02

## 2023-05-04 ENCOUNTER — OFFICE VISIT (OUTPATIENT)
Dept: FAMILY MEDICINE CLINIC | Facility: CLINIC | Age: 58
End: 2023-05-04
Payer: COMMERCIAL

## 2023-05-04 VITALS
RESPIRATION RATE: 16 BRPM | OXYGEN SATURATION: 98 % | SYSTOLIC BLOOD PRESSURE: 142 MMHG | WEIGHT: 245 LBS | DIASTOLIC BLOOD PRESSURE: 90 MMHG | BODY MASS INDEX: 38.45 KG/M2 | HEART RATE: 71 BPM | HEIGHT: 67 IN | TEMPERATURE: 96.6 F

## 2023-05-04 DIAGNOSIS — I10 ESSENTIAL HYPERTENSION: Primary | ICD-10-CM

## 2023-05-04 DIAGNOSIS — N39.498 OTHER URINARY INCONTINENCE: ICD-10-CM

## 2023-05-04 DIAGNOSIS — G89.29 CHRONIC PAIN OF LEFT KNEE: ICD-10-CM

## 2023-05-04 DIAGNOSIS — M65.311 TRIGGER THUMB OF BOTH HANDS: ICD-10-CM

## 2023-05-04 DIAGNOSIS — M25.562 CHRONIC PAIN OF LEFT KNEE: ICD-10-CM

## 2023-05-04 DIAGNOSIS — M65.312 TRIGGER THUMB OF BOTH HANDS: ICD-10-CM

## 2023-05-04 DIAGNOSIS — F41.1 GAD (GENERALIZED ANXIETY DISORDER): ICD-10-CM

## 2023-05-04 DIAGNOSIS — F31.9 BIPOLAR 1 DISORDER: ICD-10-CM

## 2023-05-04 PROCEDURE — 99214 OFFICE O/P EST MOD 30 MIN: CPT | Performed by: FAMILY MEDICINE

## 2023-05-04 RX ORDER — HYDROCHLOROTHIAZIDE 25 MG/1
25 TABLET ORAL DAILY
Qty: 90 TABLET | Refills: 3 | Status: SHIPPED | OUTPATIENT
Start: 2023-05-04

## 2023-05-04 NOTE — PROGRESS NOTES
Subjective   Marcela Ramírez is a 58 y.o. female    Bipolar disease  Hypertension    History of Present Illness  The patient reports that she stopped 2 of her medications 3 months ago. These include Lamictal and Pristiq.    The patient said she is still seeing Dr. Domingo, her psychiatrist.. She said she does not miss those appointments as they have been quite helpful for her. The patient said she stopped taking her medications officially on 03/20/2023. She said the side effects have gotten to the point where the zero libido is hurting her relationship. The patient said it is 18 years strong. She said she could care less about it. The patient said she still had a hysterectomy that affects it as well. She said she has done really good so far. The patient has been taking Delta 8 gummies and brought them with her. The patient said they have a low THC and barely go even by them. She cut them up into quarters. She said if she takes this quarter when she gets up in the morning, there are people at work who she would normally want to choke without her medication that she does not want to choke. The patient is learning different techniques to breathe and remind herself that she may not have the same problems. She said other people have other people have things worse going on in their life. Her son is in rehab and he is about to graduate and be on the outside part of it. The patient said he is working. She said anger is still her biggest thing. The patient said she will take that in the morning and get through work. She said work is 10 hours and then sometime after second break, she will take another one. The patient said on Mondays and Tuesdays because things are very stressful. She said Sunday is super easy at work, but on Monday and Tuesday, she has to leave early to  the newest grandchild from  for her daughter. The patient said her daughter works the same shifts they do, but she works for the [employer].    The  patient said her mental health goes. She said she does not want to kill people at work. The patient said she would never hurt anyone. She said her depression part is almost she is not cured. The patient said if things ever get really bad, she will go back on her medication and then she will ask for a referral to see a psychiatrist. She said she is doing good as far as that goes. The patient said she is teaching herself instead of the side effects. She said she started to feel like it was a crutch. The patient said it is no matter what is going on in her life. She said it is still going on in her life with or without the medication with or without the gummy. The patient said she has a chemical imbalance that is a chemical that has been with the tests and stuff that have been done helps out with so many disorders. She said she still takes the Xanax. The patient said she did not give up and that is only because that helps her sleep.    The patient needs a thumb referral. She said they tend to call her while she is at work and she cannot answer her phone, so she missed the referral. The patient said she needs another referral for her left knee. She said she had surgery on the right tore a meniscus and ACL. The patient said it swells, especially after being on it for 10 hours a day at [employer] wearing safety shoes that are so great with neuropathy. The patient said she is working on losing weight, so she has less weight on her knee. She said she knows that her weight is not helping her knee at all. The patient said it hurts so bad that she limps. She has a brace for her knee. The patient said it is only when she is at home. She said it hurts, but after being off of it, the concrete floor and the safety shoes and neuropathy, none of that works well. 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 She was told that she needed a note from me stating that she cannot  "unload the truck. The patient said she does not want to be on light duty sitting 10 hours a day and making boxes. She said she even thought about a cane.    The patient said she saw commercial for a medication for neuropathy that has to be applied at the doctor's office once every 3 months. She said it is supposed to help with the pain. She has a new neurologist because\"Dr. Wilder\" is gone and she does not see that person until 06/2023 because they had to move all of her patients.    The patient said she cannot empty her bladder all the way. She has had this problem for a couple of years. The patient said it started to get progressively get worse. She is afraid that may have led to that really bad bladder infection she had a couple of months ago. The patient said she leaks only when she is laughing. She said she feels the pressure first thing in the morning. The patient said she has to make a conscious effort to think about it. She said she feels like her brain has forgotten how to urinate and she has to tell it. The patient said she started doing different aerobic moves to try to move her bladder. She had that problem when she was pregnant with her daughter and she did not urinate for almost 2 days. The patient said he told her she needed to move things around because her child is using her bladder as a bed.    The patient said she wakes up every morning nauseated. She is not pregnant.    The following portions of the patient's history were reviewed and updated as appropriate: allergies, current medications, past social history and problem list    Review of Systems   Constitutional: Positive for fatigue. Negative for appetite change.   HENT: Negative.    Respiratory: Negative for chest tightness and shortness of breath.    Cardiovascular: Negative for chest pain and palpitations.   Gastrointestinal: Negative for abdominal pain, diarrhea and nausea.   Genitourinary: Positive for difficulty urinating, frequency and " urgency. Negative for dysuria and hematuria.   Musculoskeletal: Positive for arthralgias, gait problem and myalgias.   Skin: Negative.    Neurological: Negative for dizziness, tremors, weakness, light-headedness and headaches.   Hematological: Negative.    Psychiatric/Behavioral: Positive for decreased concentration, dysphoric mood and sleep disturbance. Negative for agitation, behavioral problems, confusion and suicidal ideas. The patient is nervous/anxious.        Objective     Vitals:    05/04/23 0829   BP: 142/90   Pulse: 71   Resp: 16   Temp: 96.6 °F (35.9 °C)   SpO2: 98%       Physical Exam  Vitals and nursing note reviewed.   Constitutional:       Appearance: Normal appearance. She is obese.   HENT:      Head: Normocephalic and atraumatic.   Eyes:      Conjunctiva/sclera: Conjunctivae normal.      Pupils: Pupils are equal, round, and reactive to light.   Neck:      Vascular: No carotid bruit.   Cardiovascular:      Rate and Rhythm: Normal rate and regular rhythm.   Pulmonary:      Effort: Pulmonary effort is normal. No respiratory distress.      Breath sounds: Normal breath sounds.   Abdominal:      Palpations: Abdomen is soft.      Tenderness: There is no abdominal tenderness.   Musculoskeletal:      Cervical back: Neck supple.      Right lower leg: No edema.      Left lower leg: No edema.   Lymphadenopathy:      Cervical: No cervical adenopathy.   Skin:     General: Skin is warm and dry.   Neurological:      Mental Status: She is alert and oriented to person, place, and time.   Psychiatric:         Mood and Affect: Mood is anxious. Affect is labile.         Speech: Speech is rapid and pressured.         Behavior: Behavior is cooperative.         Thought Content: Thought content normal.         Assessment & Plan     Problems Addressed this Visit        Mental Health    Bipolar 1 disorder   Other Visit Diagnoses     Essential hypertension    -  Primary    Relevant Medications    hydroCHLOROthiazide  (HYDRODIURIL) 25 MG tablet    Trigger thumb of both hands        Relevant Orders    Ambulatory Referral to Orthopedic Surgery    Chronic pain of left knee        Relevant Orders    Ambulatory Referral to Orthopedic Surgery    Other urinary incontinence        Relevant Orders    Ambulatory Referral to Urology    ANA ROSA (generalized anxiety disorder)          Diagnoses       Codes Comments    Essential hypertension    -  Primary ICD-10-CM: I10  ICD-9-CM: 401.9     Trigger thumb of both hands     ICD-10-CM: M65.311, M65.312  ICD-9-CM: 727.03     Chronic pain of left knee     ICD-10-CM: M25.562, G89.29  ICD-9-CM: 719.46, 338.29     Other urinary incontinence     ICD-10-CM: N39.498  ICD-9-CM: 788.39     Bipolar 1 disorder     ICD-10-CM: F31.9  ICD-9-CM: 296.7     ANA ROSA (generalized anxiety disorder)     ICD-10-CM: F41.1  ICD-9-CM: 300.02         Plan    Continue follow-up with specialists as scheduled.    New referrals as above    Refills as needed    Follow-up 6 months for annual exam.    I spent 40 minutes in patient care: Reviewing records prior to the visit, examining the patient, entering orders and documentation    Part of this note may be an electronic transcription/translation of spoken language to printed text using the Dragon Dictation System.         Transcribed from ambient dictation for KAT Acosta MD by Marcela Hidalgo.  05/04/23   10:19 EDT  Patient or patient representative verbalized consent to the visit recording.  I have personally performed the services described in this document as transcribed by the above individual, and it is both accurate and complete.

## 2023-05-05 ENCOUNTER — OFFICE VISIT (OUTPATIENT)
Dept: BEHAVIORAL HEALTH | Facility: CLINIC | Age: 58
End: 2023-05-05
Payer: COMMERCIAL

## 2023-05-05 DIAGNOSIS — F31.9 BIPOLAR 1 DISORDER: Primary | ICD-10-CM

## 2023-05-05 DIAGNOSIS — Z63.9 RELATIONSHIP PROBLEMS: ICD-10-CM

## 2023-05-05 DIAGNOSIS — F43.9 STRESS AT HOME: ICD-10-CM

## 2023-05-05 PROCEDURE — 90834 PSYTX W PT 45 MINUTES: CPT | Performed by: PSYCHOLOGIST

## 2023-05-05 SDOH — SOCIAL STABILITY - SOCIAL INSECURITY: PROBLEM RELATED TO PRIMARY SUPPORT GROUP, UNSPECIFIED: Z63.9

## 2023-05-05 NOTE — PROGRESS NOTES
PROGRESS NOTE    Data:  Marcela Ramírez is a 58 y.o. female who met with the undersigned for a scheduled telehealth therapy session from 11:15am - 12:00pm.      Clinical Maneuvering/Intervention:      The pt talked about her recent stressors: worried about her son relapsing with drug use, problems in her relationship with G (e.g., not feeling connected), and feeling easily irritated. Stressors were processed individually and in detail. Venting of frustrations was conducted in order to help the pt feel less tense emotionally and gain insight into issues. Feelings were processed and validated several times in session. Perspective taking was conducted multiple times in order to help the pt feel less stuck, less overwhelmed, and see challenges as much more manageable. Active listening was conducted in order to help the pt make sense of stressors and start moving towards potential solutions. The pt was assisted with finding solutions based on existing skill-set and abilities. She was assisted with noting core issues, how improving care of herself is needed and how improving her health will help he cope better with stress. The pt was assisted in recognizing progress in order to show encouragement and promote motivation to keep making positive changes in life. She is talking more about self-care than in the past. An action plan was designed to empower the pt to know what to do. Simple steps of one, two, three were laid out in order to help the pt feel more in control of things and feel less stressed. She will walk more, keep a journal, and spend more quality time with G. Other coping skills were discussed (getting time to herself as needed). The pt's strengths were identified in order to help identify abilities to use to better face/overcome challenges. Some humor was used in session as it is one of the pt's coping skills. Humor was used too, to help the pt see things as less challenging and more manageable than they  first seemed. Humor was used as well, to model use of the skill as a way to decrease tension ongoing. Homework was assigned tailored to pt's needs. The pt expressed gratitude for today's session.    Mental Status Exam  Hygiene:  good  Dress: normal  Attitude:  cooperative and proactive  Motor Activity: normal  Speech: normal  Mood:  stressed  Affect:  congruent  Thought Processes: normal  Thought Content:  normal  Suicidal Thoughts:  not endorsed  Homicidal Thoughts:  not endorsed  Crisis Safety Plan: not needed  Hallucinations:  none      Patient's Support Network Includes:  family, friends      Progress toward goal: there is evidence to suggest that she struggles with mood swings      Functional Status: moderate       Prognosis: good with counseling, medication    Assessment      The pt presented to be struggling with managing her moods related to having bipolar disorder and having family conflict. She seems to benefit from empowerment therapy, particularly helping her believe in herself. She seems to benefit from venting, having her experiences validated, and being assisted in discovering her own solutions to her problems.       Plan      In order to diminish symptoms of bipolar disorder: the pt is to continue with counseling, being open/transparent with family, and conduct the homework assignment steps as delineated above (starting this week).    Maribel Hernandez, PhD, LP

## 2023-05-15 ENCOUNTER — OFFICE VISIT (OUTPATIENT)
Dept: BEHAVIORAL HEALTH | Facility: CLINIC | Age: 58
End: 2023-05-15
Payer: COMMERCIAL

## 2023-05-15 DIAGNOSIS — F31.9 BIPOLAR 1 DISORDER: Primary | ICD-10-CM

## 2023-05-15 DIAGNOSIS — Z63.9 RELATIONSHIP PROBLEMS: ICD-10-CM

## 2023-05-15 PROCEDURE — 90834 PSYTX W PT 45 MINUTES: CPT | Performed by: PSYCHOLOGIST

## 2023-05-15 SDOH — SOCIAL STABILITY - SOCIAL INSECURITY: PROBLEM RELATED TO PRIMARY SUPPORT GROUP, UNSPECIFIED: Z63.9

## 2023-05-15 NOTE — PROGRESS NOTES
PROGRESS NOTE    Data:  Marcela Ramírez is a 58 y.o. female who met with the undersigned for a scheduled telehealth therapy session from 2:50 - 3:48 pm.      Clinical Maneuvering/Intervention:      The pt talked about her recent stressor including financial stress, foot pain, knee pain, and frequent feelings of irritability (particularly with R). Stressors were processed individually and in detail. Venting of frustrations was conducted in order to help the pt feel less tense emotionally and gain insight into issues. Feelings were processed and validated several times in session. Perspective taking was conducted multiple times in order to help the pt feel less stuck, less overwhelmed, and see challenges as much more manageable. Active listening was conducted in order to help the pt make sense of stressors and start moving towards potential solutions. The pt was assisted with finding solutions based on existing skill-set and abilities. She was assisted with processing numerous issues and through each step, encouraged to solve her own issues based on existing skill sets. Time was allocated specifically to assess what is 'working' in the pt's life, versus what is 'not working,' (if anything) in terms of helping to improve mood and quality of life. The pt was assisted in recognizing progress in order to show encouragement and promote motivation to keep making positive changes in life. She is, overtime, communicating more effectively with loved ones. She was assisted with finding the good, or gratitude in different relationships (particularly with G) in order to de-stress. Some humor was used in session as it is one of the pt's coping skills. Humor was used too, to help the pt see things as less challenging and more manageable than they first seemed. Humor was used as well, to model use of the skill as a way to decrease tension ongoing. Homework was assigned tailored to pt's needs. The pt expressed gratitude for  today's session.    Mental Status Exam  Hygiene:  good  Dress: normal  Attitude:  cooperative and proactive  Motor Activity: normal  Speech: normal  Mood:  tense  Affect:  congruent  Thought Processes: normal  Thought Content:  normal  Suicidal Thoughts:  not endorsed  Homicidal Thoughts:  not endorsed  Crisis Safety Plan: not needed  Hallucinations:  none      Patient's Support Network Includes:  family, friends      Progress toward goal: there is evidence to suggest that she struggles with mood swings      Functional Status: moderate       Prognosis: good with counseling, medication    Assessment      The pt presented to be struggling with managing her moods related to having bipolar disorder and having family conflict. She seems to benefit from empowerment therapy, particularly helping her believe in herself. She seems to benefit from venting, having her experiences validated, and being assisted in discovering her own solutions to her problems.       Plan      In order to diminish symptoms of bipolar disorder: the pt is to continue with counseling, being open/transparent with family (particularly G), and conduct the homework assignment steps as delineated above (starting this week).    Maribel Hernandez, PhD, LP

## 2023-05-18 PROBLEM — N18.30 STAGE 3 CHRONIC KIDNEY DISEASE: Status: ACTIVE | Noted: 2023-05-18
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
## 2023-05-19 ENCOUNTER — OFFICE VISIT (OUTPATIENT)
Dept: BARIATRICS/WEIGHT MGMT | Facility: CLINIC | Age: 58
End: 2023-05-19
Payer: COMMERCIAL

## 2023-05-19 VITALS
SYSTOLIC BLOOD PRESSURE: 146 MMHG | WEIGHT: 236.6 LBS | OXYGEN SATURATION: 99 % | BODY MASS INDEX: 37.13 KG/M2 | DIASTOLIC BLOOD PRESSURE: 82 MMHG | HEART RATE: 57 BPM | HEIGHT: 67 IN

## 2023-05-19 DIAGNOSIS — E66.9 OBESITY, CLASS II, BMI 35-39.9: Primary | ICD-10-CM

## 2023-05-19 DIAGNOSIS — M19.90 OSTEOARTHRITIS, UNSPECIFIED OSTEOARTHRITIS TYPE, UNSPECIFIED SITE: ICD-10-CM

## 2023-05-19 DIAGNOSIS — Z51.81 THERAPEUTIC DRUG MONITORING: ICD-10-CM

## 2023-05-19 DIAGNOSIS — R53.83 OTHER FATIGUE: ICD-10-CM

## 2023-05-19 DIAGNOSIS — I10 ESSENTIAL HYPERTENSION: ICD-10-CM

## 2023-05-19 DIAGNOSIS — F31.9 BIPOLAR AFFECTIVE DISORDER, REMISSION STATUS UNSPECIFIED: ICD-10-CM

## 2023-05-19 DIAGNOSIS — E11.42 DIABETIC POLYNEUROPATHY ASSOCIATED WITH TYPE 2 DIABETES MELLITUS: ICD-10-CM

## 2023-05-19 DIAGNOSIS — E11.69 DIABETES MELLITUS TYPE 2 IN OBESE: ICD-10-CM

## 2023-05-19 DIAGNOSIS — E66.9 DIABETES MELLITUS TYPE 2 IN OBESE: ICD-10-CM

## 2023-05-19 PROBLEM — E66.812 OBESITY, CLASS II, BMI 35-39.9: Status: ACTIVE | Noted: 2023-05-19

## 2023-05-19 PROBLEM — R33.9 URINARY RETENTION: Status: ACTIVE | Noted: 2023-05-19

## 2023-05-19 PROBLEM — M25.562 ARTHRALGIA OF LEFT KNEE: Status: ACTIVE | Noted: 2023-05-19

## 2023-05-19 NOTE — ASSESSMENT & PLAN NOTE
Topics of discussion included obesity as a disease, nutritional education on food groups, exercise, and medications. Patient was instructed in adequate protein, controlled carb and controlled fat intake.   Patient received instructions on using the medicines as a tool in controlling their weight with nutritional and behavioral changes. Risks and benefits were discussed. I believe the potential benefits of medication helping to decrease weight outweighs the risks. Patient is to try nutritonal/behavioral changes only first.   Patient received our clinic education booklet.   Our patient consent form was reviewed including potential risks of weight loss. We also reviewed our confidentiality and HIPPA statements. Patients current FITT score was reviewed along with current capability for exercise tolerance and a patient will work towards a FITT score of:     Frequency   Intensity Time Strength Training   []   0 None  []   0 None  []   0 None  []   0 None    []   1 (1-2x/week) []   1 (light) []   1 (<10 min) []   1 (1x/week)   []   2 (3-5x/week) []   2 (moderate) []   2 (10-20 min) []   2 (2x/week)   []   3 (daily)   []   3 (moderately hard)  []   4 (very hard) []   3 (20-30 min)  []   4 (>30 min) []   3 (3-4x/week)       Patient's past medical history was reviewed in detail and barriers to weight loss were identified and discussed. Past efforts at weight reduction on their own as well as under physician supervision were documented and discussed.  I advised patient to continue routine care with their Primary Care Provider.     Nutritional recommendations and goals were reviewed including Calories:2863-6993 daily adjusted for exercise calories burnt, Protein: g daily, Net carbs (total carb - fiber) of 50-75g per day.  Start to keep a food journal and bring into next visit in 2 weeks for review. Practice the behavioral modification technique of mindful eating. Take one MVI daily and 2000mg fish oil daily. Take other  medications and supplements as directed.    Specific goals:    Decrease snacking.  Don't skip breakfast.  Supper: either cook at home or better fast food ([employer], naked chicken nuggets and salad at [employer]).  Eliminate soda.    Sympathomimetics may exacerbate unmedicated bipolar disorder.  Would not be my first line choice.  Would need psych approval before Wellbutrin/Contrave.  Has insurance approval for GLP-1 with diabetes, and this may be a good choice for her.
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
## 2023-05-20 LAB
25(OH)D3+25(OH)D2 SERPL-MCNC: 53.3 NG/ML (ref 30–100)
ALBUMIN SERPL-MCNC: 4.8 G/DL (ref 3.8–4.9)
ALBUMIN/GLOB SERPL: 1.5 {RATIO} (ref 1.2–2.2)
ALP SERPL-CCNC: 105 IU/L (ref 44–121)
ALT SERPL-CCNC: 17 IU/L (ref 0–32)
AMPHETAMINES UR QL SCN: NEGATIVE NG/ML
AST SERPL-CCNC: 20 IU/L (ref 0–40)
BARBITURATES UR QL SCN: NEGATIVE NG/ML
BASOPHILS # BLD AUTO: 0.1 X10E3/UL (ref 0–0.2)
BASOPHILS NFR BLD AUTO: 1 %
BENZODIAZ UR QL SCN: POSITIVE NG/ML
BILIRUB SERPL-MCNC: 0.6 MG/DL (ref 0–1.2)
BUN SERPL-MCNC: 28 MG/DL (ref 6–24)
BUN/CREAT SERPL: 23 (ref 9–23)
BZE UR QL SCN: NEGATIVE NG/ML
CALCIUM SERPL-MCNC: 9.9 MG/DL (ref 8.7–10.2)
CANNABINOIDS UR QL SCN: POSITIVE NG/ML
CHLORIDE SERPL-SCNC: 100 MMOL/L (ref 96–106)
CHOLEST SERPL-MCNC: 177 MG/DL (ref 100–199)
CO2 SERPL-SCNC: 27 MMOL/L (ref 20–29)
CREAT SERPL-MCNC: 1.21 MG/DL (ref 0.57–1)
CREAT UR-MCNC: 141.4 MG/DL (ref 20–300)
EGFRCR SERPLBLD CKD-EPI 2021: 53 ML/MIN/1.73
EOSINOPHIL # BLD AUTO: 0.2 X10E3/UL (ref 0–0.4)
EOSINOPHIL NFR BLD AUTO: 3 %
ERYTHROCYTE [DISTWIDTH] IN BLOOD BY AUTOMATED COUNT: 13 % (ref 11.7–15.4)
GLOBULIN SER CALC-MCNC: 3.1 G/DL (ref 1.5–4.5)
GLUCOSE SERPL-MCNC: 113 MG/DL (ref 70–99)
HBA1C MFR BLD: 6.2 % (ref 4.8–5.6)
HCT VFR BLD AUTO: 43.6 % (ref 34–46.6)
HDLC SERPL-MCNC: 57 MG/DL
HGB BLD-MCNC: 14.1 G/DL (ref 11.1–15.9)
IMM GRANULOCYTES # BLD AUTO: 0 X10E3/UL (ref 0–0.1)
IMM GRANULOCYTES NFR BLD AUTO: 0 %
INSULIN SERPL-ACNC: 28 UIU/ML (ref 2.6–24.9)
LABORATORY COMMENT REPORT: ABNORMAL
LDLC SERPL CALC-MCNC: 91 MG/DL (ref 0–99)
LDLC/HDLC SERPL: 1.6 RATIO (ref 0–3.2)
LYMPHOCYTES # BLD AUTO: 2.5 X10E3/UL (ref 0.7–3.1)
LYMPHOCYTES NFR BLD AUTO: 32 %
MCH RBC QN AUTO: 29.7 PG (ref 26.6–33)
MCHC RBC AUTO-ENTMCNC: 32.3 G/DL (ref 31.5–35.7)
MCV RBC AUTO: 92 FL (ref 79–97)
METHADONE UR QL SCN: NEGATIVE NG/ML
MONOCYTES # BLD AUTO: 0.6 X10E3/UL (ref 0.1–0.9)
MONOCYTES NFR BLD AUTO: 7 %
NEUTROPHILS # BLD AUTO: 4.5 X10E3/UL (ref 1.4–7)
NEUTROPHILS NFR BLD AUTO: 57 %
OPIATES UR QL SCN: NEGATIVE NG/ML
OXYCODONE+OXYMORPHONE UR QL SCN: NEGATIVE NG/ML
PCP UR QL: NEGATIVE NG/ML
PH UR: 5.4 [PH] (ref 4.5–8.9)
PLATELET # BLD AUTO: 246 X10E3/UL (ref 150–450)
POTASSIUM SERPL-SCNC: 4.5 MMOL/L (ref 3.5–5.2)
PROPOXYPH UR QL SCN: NEGATIVE NG/ML
PROT SERPL-MCNC: 7.9 G/DL (ref 6–8.5)
RBC # BLD AUTO: 4.75 X10E6/UL (ref 3.77–5.28)
SODIUM SERPL-SCNC: 142 MMOL/L (ref 134–144)
TRIGL SERPL-MCNC: 170 MG/DL (ref 0–149)
TSH SERPL DL<=0.005 MIU/L-ACNC: 2.1 UIU/ML (ref 0.45–4.5)
VLDLC SERPL CALC-MCNC: 29 MG/DL (ref 5–40)
WBC # BLD AUTO: 7.8 X10E3/UL (ref 3.4–10.8)

## 2023-05-25 ENCOUNTER — OFFICE VISIT (OUTPATIENT)
Dept: UROLOGY | Facility: CLINIC | Age: 58
End: 2023-05-25

## 2023-05-25 ENCOUNTER — OFFICE VISIT (OUTPATIENT)
Dept: ORTHOPEDIC SURGERY | Facility: CLINIC | Age: 58
End: 2023-05-25
Payer: COMMERCIAL

## 2023-05-25 VITALS
SYSTOLIC BLOOD PRESSURE: 130 MMHG | WEIGHT: 234 LBS | BODY MASS INDEX: 36.73 KG/M2 | HEIGHT: 67 IN | DIASTOLIC BLOOD PRESSURE: 102 MMHG

## 2023-05-25 VITALS — HEIGHT: 67 IN | BODY MASS INDEX: 36.65 KG/M2

## 2023-05-25 DIAGNOSIS — M65.312 TRIGGER THUMB OF LEFT HAND: ICD-10-CM

## 2023-05-25 DIAGNOSIS — S83.242A ACUTE MEDIAL MENISCUS TEAR OF LEFT KNEE, INITIAL ENCOUNTER: ICD-10-CM

## 2023-05-25 DIAGNOSIS — N39.498 OTHER URINARY INCONTINENCE: ICD-10-CM

## 2023-05-25 DIAGNOSIS — M25.562 LEFT KNEE PAIN, UNSPECIFIED CHRONICITY: Primary | ICD-10-CM

## 2023-05-25 DIAGNOSIS — R39.11 URINARY HESITANCY: Primary | ICD-10-CM

## 2023-05-25 DIAGNOSIS — M17.12 PRIMARY OSTEOARTHRITIS OF LEFT KNEE: ICD-10-CM

## 2023-05-25 LAB
BILIRUB BLD-MCNC: NEGATIVE MG/DL
CLARITY, POC: CLEAR
COLOR UR: YELLOW
EXPIRATION DATE: NORMAL
GLUCOSE UR STRIP-MCNC: NEGATIVE MG/DL
KETONES UR QL: NEGATIVE
LEUKOCYTE EST, POC: NEGATIVE
Lab: NORMAL
NITRITE UR-MCNC: NEGATIVE MG/ML
PH UR: 6 [PH] (ref 5–8)
PROT UR STRIP-MCNC: NEGATIVE MG/DL
RBC # UR STRIP: NEGATIVE /UL
SP GR UR: 1.02 (ref 1–1.03)
UROBILINOGEN UR QL: NORMAL

## 2023-05-25 RX ORDER — TRIAMCINOLONE ACETONIDE 40 MG/ML
40 INJECTION, SUSPENSION INTRA-ARTICULAR; INTRAMUSCULAR
Status: COMPLETED | OUTPATIENT
Start: 2023-05-25 | End: 2023-05-25

## 2023-05-25 RX ORDER — LIDOCAINE HYDROCHLORIDE 10 MG/ML
0.5 INJECTION, SOLUTION EPIDURAL; INFILTRATION; INTRACAUDAL; PERINEURAL
Status: COMPLETED | OUTPATIENT
Start: 2023-05-25 | End: 2023-05-25

## 2023-05-25 RX ORDER — TAMSULOSIN HYDROCHLORIDE 0.4 MG/1
1 CAPSULE ORAL DAILY
Qty: 30 CAPSULE | Refills: 1 | Status: SHIPPED | OUTPATIENT
Start: 2023-05-25

## 2023-05-25 RX ADMIN — LIDOCAINE HYDROCHLORIDE 0.5 ML: 10 INJECTION, SOLUTION EPIDURAL; INFILTRATION; INTRACAUDAL; PERINEURAL at 12:02

## 2023-05-25 RX ADMIN — TRIAMCINOLONE ACETONIDE 40 MG: 40 INJECTION, SUSPENSION INTRA-ARTICULAR; INTRAMUSCULAR at 12:02

## 2023-05-25 NOTE — PROGRESS NOTES
Procedure   - Small Joint Arthrocentesis: L thumb [facility] on 5/25/2023 12:02 PM  Indications: pain  Details: 25 G needle, radial approach  Medications: 0.5 mL lidocaine PF 1% 1 %; 40 mg triamcinolone acetonide 40 MG/ML  Outcome: tolerated well, no immediate complications  Procedure, treatment alternatives, risks and benefits explained, specific risks discussed. Consent was given by the patient. Immediately prior to procedure a time out was called to verify the correct patient, procedure, equipment, support staff and site/side marked as required. Patient was prepped and draped in the usual sterile fashion.

## 2023-05-25 NOTE — PROGRESS NOTES
[facility] [place] [facility] Office Visit     Office Visit       Date: 05/25/2023   Patient Name: Marcela Ramírez  MRN: [id number]  YOB: 1965    Referring Physician: Sukhwinder Acosta*     Chief Complaint:   Chief Complaint   Patient presents with   • Left Knee - Pain       History of Present Illness:   Marcela Ramírez is a 58 y.o. female who presents with left knee pain for 1 year(s). Onset mechanical fall. Pain is localized to the medial joint line and is a 3/10 on the pain scale. Pain is described as burning and shooting. Associated symptoms include pain, swelling, popping, stiffness and giving way/buckling. The pain is worse with standing, climbing stairs, working and rising from seated position; resting, sitting, ice, heat, assistive device (cane/walker), pain medication and/or NSAID, lying down and elevating the extremity make it better. Previous treatments have included: bracing and weight loss for 3 months duration or longer. Although some transient relief was reported with these interventions, these conservative measures have failed and symptoms have persisted. The patient is limited in daily activities and has had a significant decrease in quality of life as a result. She denies fevers, chills, or constitutional symptoms.    Subjective   Review of Systems: Review of Systems   Constitutional: Negative.  Negative for chills, fatigue and fever.   HENT: Negative.  Negative for congestion and dental problem.    Eyes: Negative.  Negative for blurred vision.   Respiratory: Negative.  Negative for shortness of breath.    Cardiovascular: Negative.  Negative for leg swelling.   Gastrointestinal: Negative.  Negative for abdominal pain.   Endocrine: Negative.  Negative for polyuria.   Genitourinary: Negative.  Negative for difficulty urinating.   Musculoskeletal: Positive for arthralgias.   Skin: Negative.    Allergic/Immunologic: Negative.    Neurological:  Negative.    Hematological: Negative.  Negative for adenopathy.   Psychiatric/Behavioral: Negative.  Negative for behavioral problems.        I have reviewed the following portions of the patient's history:History of Present Illness and review of systems.    Past Medical History:   Past Medical History:   Diagnosis Date   • Arthritis     knees, otc arthritis meds prn, no h/o injections.    • Asthma     has not required inhaler   • Bilateral ovarian cysts    • Bipolar 1 disorder    • Boil     opens them herself   • Breast injury 07/13/2021    bruise on lateral rt breast from fall   • Carpal tunnel syndrome    • Chronic pain disorder    • CKD (chronic kidney disease), symptom management only, stage 3 (moderate)     Creatinine 1.04 GFR 56   • Colon polyp    • Depression    • Diabetes mellitus     Diagnosed 2017, was on metformin in the past. Last A1C 5.5%   • Diverticulosis    • Fatigue    • GERD (gastroesophageal reflux disease)     controlled with omeprazole 20mg. Remote EGD- esophagitis.  EGD no hiatal hernia Z line 40 cm very thick mucosal folds cannot rule out varices.  CT scan thickened fundus, no varices seen   • Headache    • History of bladder infections    • History of blood transfusion 2006    2/2 heavy menses   • History of blood transfusion    • History of bronchitis    • Hyperlipidemia    • Hypertension    • Hypothyroidism    • Lower extremity edema    • Morbid obesity with BMI of 40.0-44.9, adult    • Peripheral neuropathy     2/2 DM   • RLS (restless legs syndrome)    • S/P spenser    • S/P cholecystectomy     2/2 cholelithiasis   • Shortness of breath on exertion    • Thyroid disease    • TIA (transient ischemic attack)     patient states 8-9 episodes of right sided drooping/ weakness/ vision changes. Workup was negative for CVA- thought to be related to anxiety. last episode 12/2017       Past Surgical History:   Past Surgical History:   Procedure Laterality Date   • COLONOSCOPY  remote     diverticulosis   • ENDOMETRIAL ABLATION     • ENDOSCOPY  remote    esophagitis    • ENDOSCOPY N/A 8/22/2019    Procedure: ESOPHAGOGASTRODUODENOSCOPY;  Surgeon: Guido Greenberg MD;  Location:  [facility];  Service: Bariatric   • GASTRIC SLEEVE LAPAROSCOPIC N/A 8/22/2019    Procedure: GASTRIC SLEEVE LAPAROSCOPIC;  Surgeon: Guido Greenberg MD;  Location:  [facility];  Service: Bariatric   • KNEE ACL RECONSTRUCTION Right 2013    with miniscal repair   • LAPAROSCOPIC CHOLECYSTECTOMY  2001    cholelithiasis   • LAPAROSCOPIC HYSTERECTOMY  2013    right ovary remains   • PARAESOPHAGEAL HERNIA REPAIR N/A 8/22/2019    Procedure: PARAESOPHAGEAL HERNIA REPAIR LAPAROSCOPIC;  Surgeon: Guido Greenberg MD;  Location:  [facility];  Service: Bariatric   • SINUS SURGERY     • TUBAL ABDOMINAL LIGATION         Family History:   Family History   Problem Relation Age of Onset   • Arthritis Mother    • Arthritis Father    • Diabetes Father    • Hyperlipidemia Father    • Hypertension Father    • Arthritis Sister    • Diabetes Brother    • Hypertension Brother    • Asthma Brother    • Other Brother    • Obesity Brother    • Obesity Daughter    • Hypertension Daughter    • Depression Daughter    • Cancer Maternal Aunt    • Depression Paternal Uncle    • Cancer Paternal Grandmother    • Heart disease Paternal Grandfather    • Hypertension Paternal Grandfather    • Breast cancer Neg Hx    • Ovarian cancer Neg Hx        Social History:   Social History     Socioeconomic History   • Marital status:    Tobacco Use   • Smoking status: Never   • Smokeless tobacco: Never   Vaping Use   • Vaping Use: Never used   Substance and Sexual Activity   • Alcohol use: Not Currently   • Drug use: Not Currently     Types: Marijuana, Cocaine(coke)     Comment: about 4 years ago, 32 years ago - cocaine and THC   • Sexual activity: Defer     Comment: no hormones       Medications:   Current Outpatient Medications:   •  ALPRAZolam (XANAX) 1 MG  "tablet, Take 1 tablet by mouth At Night As Needed for Anxiety., Disp: 30 tablet, Rfl: 5  •  amLODIPine (NORVASC) 5 MG tablet, Take 1 tablet by mouth Daily., Disp: 30 tablet, Rfl: 2  •  atorvastatin (LIPITOR) 20 MG tablet, TAKE ONE TABLET BY MOUTH DAILY, Disp: 30 tablet, Rfl: 2  •  hydroCHLOROthiazide (HYDRODIURIL) 25 MG tablet, Take 1 tablet by mouth Daily., Disp: 90 tablet, Rfl: 3  •  levothyroxine (SYNTHROID, LEVOTHROID) 50 MCG tablet, Take 1 tablet by mouth Every Morning., Disp: 30 tablet, Rfl: 2  •  lisinopril (PRINIVIL,ZESTRIL) 40 MG tablet, TAKE ONE TABLET BY MOUTH DAILY, Disp: 30 tablet, Rfl: 5  •  metFORMIN ER (GLUCOPHAGE-XR) 500 MG 24 hr tablet, TAKE ONE TABLET BY MOUTH TWICE A DAY BEFORE MEALS (Patient taking differently: 1 tablet Daily.), Disp: 60 tablet, Rfl: 5  •  metoprolol succinate XL (TOPROL-XL) 50 MG 24 hr tablet, TAKE ONE TABLET BY MOUTH DAILY, Disp: 30 tablet, Rfl: 5  •  Neupro 1 MG/24HR patch 24 hour 24 hour patch, APPLY ONE PATCH TO THE SKIN DAILY, Disp: 30 patch, Rfl: 10  •  omeprazole (priLOSEC) 20 MG capsule, TAKE ONE CAPSULE BY MOUTH DAILY, Disp: 30 capsule, Rfl: 11  •  ONE TOUCH ULTRA TEST test strip, TEST GLUCOSE TWO TIMES A DAY, Disp: 100 each, Rfl: 12  •  pregabalin (LYRICA) 75 MG capsule, Take 1 capsule by mouth 3 (Three) Times a Day., Disp: 90 capsule, Rfl: 5    Allergies:   Allergies   Allergen Reactions   • Contrast Dye (Echo Or Unknown Ct/Mr) Other (See Comments)     Nasal congestion/ snot, IV contrast only       I reviewed the patient's chief complaint, history of present illness, review of systems, past medical history, surgical history, family history, social history, medications and allergy list.     Objective    Vital Signs:   Vitals:    05/25/23 1112   BP: (!) 130/102  Comment: see ppcp for bp   Weight: 106 kg (234 lb)   Height: 170.2 cm (67\")     Body mass index is 36.65 kg/m².   Class 2 Severe Obesity (BMI >=35 and <=39.9). Obesity-related health conditions include the " following: hypertension, coronary heart disease, diabetes mellitus and dyslipidemias. Obesity is newly identified. BMI is is above average; BMI management plan is completed. We discussed portion control and increasing exercise.     Patient reports that she is a non-smoker and has not ever been a smoker.  This behavior was applauded and she was encouraged to continue in smoking cessation.  We will continue to monitor at subsequent visits.    Ortho Exam:  Constitutional: General Appearance: healthy-appearing, NAD, and normal body habitus.   Cardiovascular System: Arterial Pulses Right: radial pulse normal and ulnar pulse normal. Arterial Pulses Left: radial pulse normal and ulnar pulse normal. Edema Right: none. Edema Left: none. Varicosities Right: no varicosities and capillary refill test normal. Varicosities Left: no varicosities and capillary refill test normal.   Psychiatric: Orientation: oriented to person, place, and time. Mood and Affect: normal affect and mood and active and alert.   Hands and Digits: Inspection Right: no deformities, atrophy, swelling, warmth, mass, or erythema and normal attitude. Inspection Left: no deformities, atrophy, swelling, warmth, mass, or erythema and normal attitude. Thumb Left: tenderness at the A1 pulley, palpable node at the A1 pulley, and triggering at the A1 pulley.   Neurological System: Sensation on the Right: normal ulnar nerve distribution, radial nerve distribution, and median nerve distribution. Sensation on the Left: normal ulnar nerve distribution, radial nerve distribution, and median nerve distribution.   Skin: Right Upper Extremity: normal. Left Upper Extremity: normal.  left knee: No erythema, ecchymosis, swelling.  Tender to palpation over the medial joint line.  Full range of motion in flexion extension but pain is experienced with deep knee flexion.  She can get to 0 degrees extension, 130 degrees in flexion.  Stable to varus and valgus stress.  Negative  anterior posterior drawer.  Negative Radha's.  Sensation intact to light touch.  5/5 strength.  2+ posterior tibial pulse.    Results Review:   Imaging Results (Last 24 Hours)     Procedure Component Value Units Date/Time    XR Knee 4+ View Left [[id number]] Resulted: 05/25/23 1147     Updated: 05/25/23 1149    Narrative:      Indication: Left knee pain    Views: Weightbearing views of the knee are submitted.     Impression: There is no fracture subluxation or dislocation. The patella   sits normally in the trochlea. There are no acute findings. There is   medial joint space narrowing with osteophyte formation.  There are also   osteophytes in the patellofemoral joint.    Comparison: Worsening of medial joint space narrowing compared to similar   images from 4/27/2018.           Procedures    Assessment / Plan    Assessment/Plan:   Diagnoses and all orders for this visit:    1. Left knee pain, unspecified chronicity (Primary)  -     XR Knee 4+ View Left  -     XR Knee 4+ View Left    2. Primary osteoarthritis of left knee    3. Trigger thumb of left hand    4. Acute medial meniscus tear of left knee, initial encounter  -     MRI Knee Left Without Contrast; Future    Other orders  -     - Small Joint Arthrocentesis: L thumb [facility]      Acute on chronic left knee pain.  She has had issues in the past with mild arthritis.  However, she acutely turned a week ago and felt a strong pop in her knee.  She said swelling and decreased function since that time.  She has been out of continue working on a ladder capacity.  Swelling and pain present in the medial joint line.  He has positive findings concerning for medial meniscus tear.  Given her poor function at this time, will obtain MRI of the left knee for further evaluation of her injury.  She also exhibits triggering of the left thumb.  He had an issue with this in the past but it resolved with injection.  She would like to repeat this today.  Risks and benefits were  discussed.  She tolerated procedure well.  See procedure note.  I will have her follow-up after the MRI to discuss results and next steps in treatment.    Previous documentation reviewed: 5/4/2023-Sukhwinder Acosta MD-office visit.    Previous laboratory results reviewed: 5/19/2023-hemoglobin A1c 6.2%.  Creatinine 1.21, EGFR 53.    Follow Up:   Return for MRI LEFT KNEE REVIEW.      Jimmy Finley MD  [facility]

## 2023-05-25 NOTE — PROGRESS NOTES
[facility] Female Office Visit      Patient Name: Marcela Ramírez  : 1965   MRN: [id number]     Chief Complaint:  Lower Urinary Tract Symptoms.     Chief Complaint   Patient presents with   • Urinary Incontinence       Referring Provider: Sukhwinder Acosta*    History of Present Illness: Mr. Ramírez is a 58 y.o. female with history lower urinary tract symptoms. Referred by PCP for urinary incontinence concerns. Patient endorses history of bipolar disorder.     History of type II DM, Last A1c was 6.2 23. On Metformin.   History of CKD, Cr 1.21, GFR 53.   About to go on weight loss meds for obesity, states prior gastric sleeve procedure.     She states in morning, she has urinary hesitancy. She has trouble initiating her stream. She complains of incomplete bladder emptying sensation.     She works at [employer], works 10 hour days, she may be doing some bladder holding. She states she holds her bladder and leaks when she coughs or strains. She states if she is unable to urinate with ease, she will hold her bladder until the sensation goes away. States she had a bad UTI Dec 2022 with gross hematuria. Urine culture 2022 was no growth however UA was dirty and nitrite positive, this cleared with antibiotics.     She is a never smoker.     CT in Dec demonstrates no kidney stones or obvious abnormalities.     PVR 20 mL.     Offered her empiric Flomax vs Urodynamics.   Discussed timed and double voiding.   Discussed pelvic floor PT.     Empiric Flomax for hesitancy and weak stream, may be some component of sphincter hesitancy/dysfunction.   If no improvement will perform urodynamics for further evaluation.     Bladder & Bowel Symptom Questionnaire    1. How often do you usually urinate during the day ?   3 - About every 1-2 hours   2.   How many timed do you urinate at night?   2 - About every 2-3 hours    3.   What is the reason that you usually urinate?   2 - About every 2-3 hours    4.   Once you get the  urge to go, how long can you     comfortably delay?   2 - About every 2-3 hours    5.   How often do you get a sudden urge that makes you rush to the bathroom?   2 - About every 2-3 hours    6.   How often does a sudden urge to urinate result in you leaking urine or wetting pads?   0 - No more often than once in 4 hours    7.  In your opinion, how good is your bladder control?   3 - About every 1-2 hours   8.  Do you have accidental bowel leakage?   yes   9.  Do you have difficulty fully emptying your bladder?   yes   10.  Do you experience accidental leakage when performing some physical activity such as coughing, sneezing, laughing or exercise?   yes   11. Have you tried medications to help improve your symptoms?   no   12. Would you be interested in learning about a long-lasting option that may help you with your symptoms?   yes                                                                             Total Score   14     0-7 (Mild) 8-16 (Moderate) 17-28 (Severe)      Subjective      Review of System: Review of Systems   Genitourinary: Positive for difficulty urinating.      I have reviewed the ROS documented by my clinical staff, I have updated appropriately and I agree. Malik Vazquez MD    Past Medical History:  Past Medical History:   Diagnosis Date   • Arthritis     knees, otc arthritis meds prn, no h/o injections.    • Asthma     has not required inhaler   • Bilateral ovarian cysts    • Bipolar 1 disorder    • Boil     opens them herself   • Breast injury 07/13/2021    bruise on lateral rt breast from fall   • Carpal tunnel syndrome    • Chronic pain disorder    • CKD (chronic kidney disease), symptom management only, stage 3 (moderate)     Creatinine 1.04 GFR 56   • Colon polyp    • Depression    • Diabetes mellitus     Diagnosed 2017, was on metformin in the past. Last A1C 5.5%   • Diverticulosis    • Fatigue    • GERD (gastroesophageal reflux disease)     controlled with omeprazole 20mg. Remote  EGD- esophagitis.  EGD no hiatal hernia Z line 40 cm very thick mucosal folds cannot rule out varices.  CT scan thickened fundus, no varices seen   • Headache    • History of bladder infections    • History of blood transfusion 2006 2/2 heavy menses   • History of blood transfusion    • History of bronchitis    • Hyperlipidemia    • Hypertension    • Hypothyroidism    • Lower extremity edema    • Morbid obesity with BMI of 40.0-44.9, adult    • Peripheral neuropathy     2/2 DM   • RLS (restless legs syndrome)    • S/P spenser    • S/P cholecystectomy     2/2 cholelithiasis   • Shortness of breath on exertion    • Thyroid disease    • TIA (transient ischemic attack)     patient states 8-9 episodes of right sided drooping/ weakness/ vision changes. Workup was negative for CVA- thought to be related to anxiety. last episode 12/2017       Past Surgical History:  Past Surgical History:   Procedure Laterality Date   • COLONOSCOPY  remote    diverticulosis   • ENDOMETRIAL ABLATION     • ENDOSCOPY  remote    esophagitis    • ENDOSCOPY N/A 8/22/2019    Procedure: ESOPHAGOGASTRODUODENOSCOPY;  Surgeon: Guido Greenberg MD;  Location:  [facility];  Service: Bariatric   • GASTRIC SLEEVE LAPAROSCOPIC N/A 8/22/2019    Procedure: GASTRIC SLEEVE LAPAROSCOPIC;  Surgeon: Guido Greenberg MD;  Location:  [facility];  Service: Bariatric   • KNEE ACL RECONSTRUCTION Right 2013    with miniscal repair   • LAPAROSCOPIC CHOLECYSTECTOMY  2001    cholelithiasis   • LAPAROSCOPIC HYSTERECTOMY  2013    right ovary remains   • PARAESOPHAGEAL HERNIA REPAIR N/A 8/22/2019    Procedure: PARAESOPHAGEAL HERNIA REPAIR LAPAROSCOPIC;  Surgeon: Guido Greenberg MD;  Location:  [facility];  Service: Bariatric   • SINUS SURGERY     • TUBAL ABDOMINAL LIGATION         Medications:    Current Outpatient Medications:   •  ALPRAZolam (XANAX) 1 MG tablet, Take 1 tablet by mouth At Night As Needed for Anxiety., Disp: 30 tablet, Rfl: 5  •  amLODIPine (NORVASC)  5 MG tablet, Take 1 tablet by mouth Daily., Disp: 30 tablet, Rfl: 2  •  atorvastatin (LIPITOR) 20 MG tablet, TAKE ONE TABLET BY MOUTH DAILY, Disp: 30 tablet, Rfl: 2  •  hydroCHLOROthiazide (HYDRODIURIL) 25 MG tablet, Take 1 tablet by mouth Daily., Disp: 90 tablet, Rfl: 3  •  levothyroxine (SYNTHROID, LEVOTHROID) 50 MCG tablet, Take 1 tablet by mouth Every Morning., Disp: 30 tablet, Rfl: 2  •  lisinopril (PRINIVIL,ZESTRIL) 40 MG tablet, TAKE ONE TABLET BY MOUTH DAILY, Disp: 30 tablet, Rfl: 5  •  metFORMIN ER (GLUCOPHAGE-XR) 500 MG 24 hr tablet, TAKE ONE TABLET BY MOUTH TWICE A DAY BEFORE MEALS (Patient taking differently: 1 tablet Daily.), Disp: 60 tablet, Rfl: 5  •  metoprolol succinate XL (TOPROL-XL) 50 MG 24 hr tablet, TAKE ONE TABLET BY MOUTH DAILY, Disp: 30 tablet, Rfl: 5  •  Neupro 1 MG/24HR patch 24 hour 24 hour patch, APPLY ONE PATCH TO THE SKIN DAILY, Disp: 30 patch, Rfl: 10  •  omeprazole (priLOSEC) 20 MG capsule, TAKE ONE CAPSULE BY MOUTH DAILY, Disp: 30 capsule, Rfl: 11  •  ONE TOUCH ULTRA TEST test strip, TEST GLUCOSE TWO TIMES A DAY, Disp: 100 each, Rfl: 12  •  pregabalin (LYRICA) 75 MG capsule, Take 1 capsule by mouth 3 (Three) Times a Day., Disp: 90 capsule, Rfl: 5  •  tamsulosin (FLOMAX) 0.4 MG capsule 24 hr capsule, Take 1 capsule by mouth Daily., Disp: 30 capsule, Rfl: 1    Allergies:  Allergies   Allergen Reactions   • Contrast Dye (Echo Or Unknown Ct/Mr) Other (See Comments)     Nasal congestion/ snot, IV contrast only       Social History:  Social History     Socioeconomic History   • Marital status:    Tobacco Use   • Smoking status: Never   • Smokeless tobacco: Never   Vaping Use   • Vaping Use: Never used   Substance and Sexual Activity   • Alcohol use: Not Currently   • Drug use: Not Currently     Types: Marijuana, Cocaine(coke)     Comment: about 4 years ago, 32 years ago - cocaine and THC   • Sexual activity: Defer     Comment: no hormones       Family History:  Family History  "  Problem Relation Age of Onset   • Arthritis Mother    • Arthritis Father    • Diabetes Father    • Hyperlipidemia Father    • Hypertension Father    • Arthritis Sister    • Diabetes Brother    • Hypertension Brother    • Asthma Brother    • Other Brother    • Obesity Brother    • Obesity Daughter    • Hypertension Daughter    • Depression Daughter    • Cancer Maternal Aunt    • Depression Paternal Uncle    • Cancer Paternal Grandmother    • Heart disease Paternal Grandfather    • Hypertension Paternal Grandfather    • Breast cancer Neg Hx    • Ovarian cancer Neg Hx          Post void residual bladder scan:    20 mL     Objective     Physical Exam:   Vital Signs:   Vitals:    05/25/23 1348   Height: 170.2 cm (67\")     Body mass index is 36.65 kg/m².     Physical Exam  Constitutional:       Appearance: Normal appearance. She is obese.   HENT:      Head: Normocephalic and atraumatic.      Nose: Nose normal.      Mouth/Throat:      Mouth: Mucous membranes are moist.      Pharynx: Oropharynx is clear.   Eyes:      Extraocular Movements: Extraocular movements intact.      Pupils: Pupils are equal, round, and reactive to light.   Pulmonary:      Effort: Pulmonary effort is normal. No respiratory distress.   Musculoskeletal:         General: No swelling or deformity. Normal range of motion.      Cervical back: Normal range of motion and neck supple.   Skin:     General: Skin is warm and dry.   Neurological:      General: No focal deficit present.      Mental Status: She is alert and oriented to person, place, and time. Mental status is at baseline.   Psychiatric:         Mood and Affect: Mood normal.         Behavior: Behavior normal.         Labs:   Brief Urine Lab Results  (Last result in the past 365 days)      Color   Clarity   Blood   Leuk Est   Nitrite   Protein   CREAT   Urine HCG        05/25/23 1408 Yellow   Clear   Negative   Negative   Negative   Negative                 Urine Culture        12/10/2022    15:49 "   Urine Culture   Urine Culture No growth          Lab Results   Component Value Date    GLUCOSE 113 (H) 05/19/2023    CALCIUM 9.9 05/19/2023     05/19/2023    K 4.5 05/19/2023    CO2 27 05/19/2023     05/19/2023    BUN 28 (H) 05/19/2023    CREATININE 1.21 (H) 05/19/2023    EGFRIFAFRI 73 11/01/2019    EGFRIFNONA 39 (L) 10/19/2021    BCR 23 05/19/2023    ANIONGAP 9.0 12/10/2022       Lab Results   Component Value Date    WBC 7.8 05/19/2023    HGB 14.1 05/19/2023    HCT 43.6 05/19/2023    MCV 92 05/19/2023     05/19/2023       Images:   No Images in the past 120 days found..    Measures:   Tobacco:   Marcela Ramírez  reports that she has never smoked. She has never used smokeless tobacco.           Urine Incontinence: (NOUI)  Patient reports that she is not currently experiencing any symptoms of urinary incontinence.      Assessment / Plan      Assessment:  Mrs. Ramírez is a 58 y.o. female who presented today with lower urinary tract symptoms.  Primary concern at this point is sensation of incomplete bladder emptying and urinary hesitancy.  May be a pelvic floor dysfunction, offered pelvic floor PT, she would like to defer at this point.  We offered empiric tamsulosin trial to determine if any easier initiating her stream on this medication.  We discussed risk side effects and benefits.  If no improvement, we can also perform urodynamics in the future to further assess for urinary issues.  Appears to be emptying well with PVR that is reassuring at 20 mL.  Also with a history of type 2 diabetes, could be a diabetic cystopathy or a neurogenic component to her bladder dysfunction.  Given her urinary hesitancy I would like to avoid anticholinergics or beta 3 agonist at this time given risk of making her urinary hesitancy worse.    Diagnoses and all orders for this visit:    1. Urinary hesitancy (Primary)  -     tamsulosin (FLOMAX) 0.4 MG capsule 24 hr capsule; Take 1 capsule by mouth Daily.  Dispense:  30 capsule; Refill: 1    2. Other urinary incontinence  -     POC Urinalysis Dipstick, Automated           Follow Up:   Return in about 6 weeks (around 7/6/2023).    I spent approximately 30 minutes providing clinical care for this patient; including review of patient's chart and provider documentation, face to face time spent with patient in examination room (obtaining history, performing physical exam, discussing diagnosis and management options), placing orders, and completing patient documentation.     Malik Vazquez MD  [facility] [place]

## 2023-06-02 ENCOUNTER — OFFICE VISIT (OUTPATIENT)
Dept: BARIATRICS/WEIGHT MGMT | Facility: CLINIC | Age: 58
End: 2023-06-02

## 2023-06-02 ENCOUNTER — OFFICE VISIT (OUTPATIENT)
Dept: BEHAVIORAL HEALTH | Facility: CLINIC | Age: 58
End: 2023-06-02

## 2023-06-02 VITALS
DIASTOLIC BLOOD PRESSURE: 80 MMHG | SYSTOLIC BLOOD PRESSURE: 122 MMHG | OXYGEN SATURATION: 98 % | HEART RATE: 59 BPM | HEIGHT: 67 IN | BODY MASS INDEX: 36.47 KG/M2 | WEIGHT: 232.4 LBS

## 2023-06-02 DIAGNOSIS — E11.42 DIABETIC POLYNEUROPATHY ASSOCIATED WITH TYPE 2 DIABETES MELLITUS: ICD-10-CM

## 2023-06-02 DIAGNOSIS — E11.42 TYPE 2 DIABETES MELLITUS WITH DIABETIC POLYNEUROPATHY, WITHOUT LONG-TERM CURRENT USE OF INSULIN: ICD-10-CM

## 2023-06-02 DIAGNOSIS — E66.9 DIABETES MELLITUS TYPE 2 IN OBESE: ICD-10-CM

## 2023-06-02 DIAGNOSIS — R45.86 MOOD SWINGS: ICD-10-CM

## 2023-06-02 DIAGNOSIS — F31.9 BIPOLAR AFFECTIVE DISORDER, REMISSION STATUS UNSPECIFIED: ICD-10-CM

## 2023-06-02 DIAGNOSIS — E11.69 DIABETES MELLITUS TYPE 2 IN OBESE: ICD-10-CM

## 2023-06-02 DIAGNOSIS — M19.90 OSTEOARTHRITIS, UNSPECIFIED OSTEOARTHRITIS TYPE, UNSPECIFIED SITE: ICD-10-CM

## 2023-06-02 DIAGNOSIS — I10 PRIMARY HYPERTENSION: ICD-10-CM

## 2023-06-02 DIAGNOSIS — F31.9 BIPOLAR 1 DISORDER: ICD-10-CM

## 2023-06-02 DIAGNOSIS — K21.9 GASTROESOPHAGEAL REFLUX DISEASE, UNSPECIFIED WHETHER ESOPHAGITIS PRESENT: ICD-10-CM

## 2023-06-02 DIAGNOSIS — E66.9 OBESITY, CLASS II, BMI 35-39.9: Primary | ICD-10-CM

## 2023-06-02 DIAGNOSIS — R53.83 FATIGUE, UNSPECIFIED TYPE: ICD-10-CM

## 2023-06-02 DIAGNOSIS — E78.5 HYPERLIPIDEMIA, UNSPECIFIED HYPERLIPIDEMIA TYPE: ICD-10-CM

## 2023-06-02 DIAGNOSIS — Z63.8 FAMILY CONFLICT: ICD-10-CM

## 2023-06-02 DIAGNOSIS — F31.9 BIPOLAR 1 DISORDER: Primary | ICD-10-CM

## 2023-06-02 DIAGNOSIS — I10 ESSENTIAL HYPERTENSION: ICD-10-CM

## 2023-06-02 RX ORDER — SEMAGLUTIDE 0.25 MG/.5ML
0.25 INJECTION, SOLUTION SUBCUTANEOUS WEEKLY
Qty: 2 ML | Refills: 0 | COMMUNITY
Start: 2023-06-02

## 2023-06-02 SDOH — SOCIAL STABILITY - SOCIAL INSECURITY: OTHER SPECIFIED PROBLEMS RELATED TO PRIMARY SUPPORT GROUP: Z63.8

## 2023-06-02 NOTE — PROGRESS NOTES
PROGRESS NOTE    Data:  Marcela Ramírez is a 58 y.o. female who met with the undersigned for a scheduled telehealth therapy session from 10:40 - 11:25am.      Clinical Maneuvering/Intervention:      The pt talked about her recent stressors such as arguing with her children, feeling tense/irritable, and struggling to control anger at one point this week. Stressors were processed individually and in detail. Venting of frustrations was conducted in order to help the pt feel less tense emotionally and gain insight into issues. Feelings were processed and validated several times in session. Perspective taking was conducted multiple times in order to help the pt feel less stuck, less overwhelmed, and see challenges as much more manageable. Active listening was conducted in order to help the pt make sense of stressors and start moving towards potential solutions. The pt was assisted with finding solutions based on existing skill-set and abilities. She expressed that she is considering going back on medication for bipolar disorder but is not sure. She was assisted with decision-making in this regard. She was advised to see a psychiatrist, have a medication consult, and then see if this helps her make a more informed decision. Maladaptive thought patterns were identified, challenged, and evaluated for validity in order to allow for the pt to chose different and more adaptive ways of thinking. Core issues regarding family conflict were addressed, including noting how self-care is needed not just when she is burned out, but ahead of time. Letting go of guilt of getting time to herself was addressed. Bigger picture issues were addressed as well, noting how many aspects of her life are better than in the past and she is now losing weight. The pt was assisted in recognizing progress in order to show encouragement and promote motivation to keep making positive changes in life. Some humor was used in session as it is one of the  pt's coping skills. Humor was used too, to help the pt see things as less challenging and more manageable than they first seemed. Humor was used as well, to model use of the skill as a way to decrease tension ongoing. Homework was assigned tailored to pt's needs. The pt expressed gratitude for today's session.    Mental Status Exam  Hygiene:  good  Dress: normal  Attitude:  cooperative and proactive  Motor Activity: normal  Speech: normal  Mood:  tense  Affect:  congruent  Thought Processes: normal  Thought Content:  normal  Suicidal Thoughts:  not endorsed  Homicidal Thoughts:  not endorsed  Crisis Safety Plan: not needed  Hallucinations:  none      Patient's Support Network Includes:  family, friends      Progress toward goal: there is evidence to suggest that she struggles with mood swings      Functional Status: moderate       Prognosis: good with counseling, medication    Assessment      The pt presented to be struggling with managing her moods related to having bipolar disorder and having family conflict. She seems to benefit from empowerment therapy, particularly helping her believe in herself. She seems to benefit from venting, having her experiences validated, and being assisted in discovering her own solutions to her problems.       Plan      In order to diminish symptoms of bipolar disorder: the pt is to continue with counseling, being open/transparent with family (particularly G), and conduct the homework assignment in terms of having a medication consult with a psychiatrist to help better treat bipolar symptoms (this week).    Maribel Hernandez, PhD, LP

## 2023-06-02 NOTE — PROGRESS NOTES
[facility]  [address]  [place] [address]     Office Note      Date: 2023  Patient Name: Marcela Ramírez  MRN: [id number]  : 1965  Subjective  Subjective     Chief Complaint  Obesity Management follow-up          Marcela Ramírez presents to [facility] for obesity management.   Patient is satisfied with weight loss progress. Appetite is poorly controlled. Reports no side effects of prescribed medications today. The patient is taking multivitamin and is taking fish oil.  The patient is using a food journal.      She started logging and realized she was having 3000+ calories per day.  She tried to do 1200 per day, but it was too much of a shock for her body.  Getting average 1350 per day.    She is very pleased that she has lost weight and is less than 240.  Trying to meal prep.  No longer eating out of snack machines at work.    Diet recall:    Breakfast: eggs, Cypriot forrester breadless sandwich, but sometimes just coffee.    Snack: cheese, everything but the bagel crackers (previously Honey Bun)  Lunch: preps ahead of time, meatballs 4 oz with vegetables or salmon salad  Snack: cheese, olives or grapes/strawberries  Dinner: had Whopper only one night, but usually meat + veg  Snack: sometimes popcorn, eating much less    Beverages: still some soda.  Didn't quit drinking b/c she just bought a bunch of it and didn't want to waste.  Has decreased to 6 oz per day, down from 60 oz per day.    Average calories per day: 1350  Average protein per day: 100+  Average carbs per day: <100      The patient is exercising with a FITT score of:     Walking and swimming.    Frequency Intensity Time Strength Training   []   0, none []   0 []   0 []   0   []   1 (1-2x/week) [x]   1 (light) []   1 (<10 min) [x]   1 (1x/week)   [x]   2 (3-5x/week) []   2 (moderate) []   2 (10-20 min) []   2 (2x/week)   []   3 (daily) []   3 (moderately  hard)  []   4 (very hard) [x]   3 (20-30 min)  []   4 (>30 min) []   3 (3-4x/week)       Review of Systems   Constitutional: Negative for appetite change and fatigue.   Eyes: Negative for blurred vision, double vision and visual disturbance.   Cardiovascular: Negative for chest pain and palpitations.   Gastrointestinal: Negative for abdominal pain, constipation, diarrhea, nausea, vomiting and GERD.   Endocrine: Negative for polydipsia, polyphagia and polyuria.   Musculoskeletal: Negative for arthralgias, back pain and myalgias.   Neurological: Negative for dizziness, tremors, light-headedness, headache and memory problem.        Parasthesias negative   Psychiatric/Behavioral: Negative for sleep disturbance, depressed mood and stress. The patient is not nervous/anxious.        Objective   Start weight: 236.9 pounds.      Total Loss lb/%Loss of beginning body weight (BBW): -4.5lb/-1.90%    Change in weight since last visit: -4.5    Body mass index is 36.4 kg/m².   Body composition analysis completed and showed:   %body fat: 51.7    Measurements (in inches)  Waist: 48.5    Allergies   Allergen Reactions   • Contrast Dye (Echo Or Unknown Ct/Mr) Other (See Comments)     Nasal congestion/ snot, IV contrast only       Current Outpatient Medications:   •  ALPRAZolam (XANAX) 1 MG tablet, Take 1 tablet by mouth At Night As Needed for Anxiety., Disp: 30 tablet, Rfl: 5  •  amLODIPine (NORVASC) 5 MG tablet, Take 1 tablet by mouth Daily., Disp: 30 tablet, Rfl: 2  •  atorvastatin (LIPITOR) 20 MG tablet, TAKE ONE TABLET BY MOUTH DAILY, Disp: 30 tablet, Rfl: 2  •  hydroCHLOROthiazide (HYDRODIURIL) 25 MG tablet, Take 1 tablet by mouth Daily., Disp: 90 tablet, Rfl: 3  •  levothyroxine (SYNTHROID, LEVOTHROID) 50 MCG tablet, Take 1 tablet by mouth Every Morning., Disp: 30 tablet, Rfl: 2  •  lisinopril (PRINIVIL,ZESTRIL) 40 MG tablet, TAKE ONE TABLET BY MOUTH DAILY, Disp: 30 tablet, Rfl: 5  •  metFORMIN ER (GLUCOPHAGE-XR) 500 MG 24 hr  "tablet, TAKE ONE TABLET BY MOUTH TWICE A DAY BEFORE MEALS (Patient taking differently: 1 tablet Daily.), Disp: 60 tablet, Rfl: 5  •  metoprolol succinate XL (TOPROL-XL) 50 MG 24 hr tablet, TAKE ONE TABLET BY MOUTH DAILY, Disp: 30 tablet, Rfl: 5  •  Neupro 1 MG/24HR patch 24 hour 24 hour patch, APPLY ONE PATCH TO THE SKIN DAILY, Disp: 30 patch, Rfl: 10  •  omeprazole (priLOSEC) 20 MG capsule, TAKE ONE CAPSULE BY MOUTH DAILY, Disp: 30 capsule, Rfl: 11  •  ONE TOUCH ULTRA TEST test strip, TEST GLUCOSE TWO TIMES A DAY, Disp: 100 each, Rfl: 12  •  pregabalin (LYRICA) 75 MG capsule, Take 1 capsule by mouth 3 (Three) Times a Day., Disp: 90 capsule, Rfl: 5  •  tamsulosin (FLOMAX) 0.4 MG capsule 24 hr capsule, Take 1 capsule by mouth Daily., Disp: 30 capsule, Rfl: 1  •  Semaglutide,0.25 or 0.5MG/DOS, (OZEMPIC) 2 MG/3ML solution pen-injector, Inject 0.25 mg under the skin into the appropriate area as directed 1 (One) Time Per Week. After 4 weeks, then increase to 0.5 mg per week, Disp: 3 mL, Rfl: 0    Vital Signs:   /80   Pulse 59   Ht 170.2 cm (67\")   Wt 105 kg (232 lb 6.4 oz)   SpO2 98%   BMI 36.40 kg/m²     Physical Exam   General appears stated age and normal appearance   HEENT PERRLA, EOM intact and conjunctivae normal   Chest/lungs Normal rate and Breathing is unlabored   Extremities without edema   Neuro Good historian and No focal deficit   Skin Warm, dry, intact   Psych normal behavior, normal thought content and normal concentration     Result Review :           Labs reviewed:     UDS +benzos, MJ    From lab review  \"Your labs were reviewed.  Kidney function is slightly worse than normal: make sure you share these results with your primary care provider.     Overall cholesterol was fine, but triglycerides are mildly elevated.     Hemoglobin A1c is 6.2, showing that diabetes is not completely controlled.     You have insulin resistance consistent with your known diabetes. (GEORGETTE-IR = 7.8).     Your thyroid " "function and vitamin D are both normal.\"       Assessment / Plan          Diagnoses and all orders for this visit:    1. Obesity, Class II, BMI 35-39.9 (Primary)  Assessment & Plan:  Patient's (Body mass index is 36.4 kg/m².) indicates that they are morbidly/severely obese (BMI > 40 or > 35 with obesity - related health condition) with health conditions that include hypertension, diabetes mellitus and dyslipidemias . Weight is improving with lifestyle modifications. BMI  is above average; BMI management plan is completed. We discussed portion control and increasing exercise.     Doing well with diet changes and logging.  Continue good changes, discontinuing soda.  Beware of hunger with the marijuana products.     Start ozempic. Denies family or personal history of pancreatitis, MTC, or MEN 2. Discussed common side effects of nausea, diarrhea, vomiting, constipation, stomach pain, headache, fatigue, upset stomach, dizziness, feeling bloated, belching, gas, stomach flu, heartburn.     First injection of Ozempic 0.25mg was administered in the office today, no complications. Sample pen provided to patient along with content regarding use of the pen, dosing schedule, savings opportunities, and helpful tips.         2. Osteoarthritis, unspecified osteoarthritis type, unspecified site    3. Essential hypertension    4. Diabetic polyneuropathy associated with type 2 diabetes mellitus    5. Bipolar affective disorder, remission status unspecified    6. Fatigue, unspecified type    7. Diabetes mellitus type 2 in obese    8. Hyperlipidemia, unspecified hyperlipidemia type    9. Primary hypertension    10. Type 2 diabetes mellitus with diabetic polyneuropathy, without long-term current use of insulin    11. Gastroesophageal reflux disease, unspecified whether esophagitis present    12. Bipolar 1 disorder    Other orders  -     Semaglutide,0.25 or 0.5MG/DOS, (OZEMPIC) 2 MG/3ML solution pen-injector; Inject 0.25 mg under the skin " into the appropriate area as directed 1 (One) Time Per Week. After 4 weeks, then increase to 0.5 mg per week  Dispense: 3 mL; Refill: 0      I spent 20 minutes caring for Marcela on this date of service. This time includes time spent by me in the following activities:preparing for the visit, reviewing tests, obtaining and/or reviewing a separately obtained history, performing a medically appropriate examination and/or evaluation , counseling and educating the patient/family/caregiver, ordering medications, tests, or procedures, referring and communicating with other health care professionals , documenting information in the medical record and independently interpreting results and communicating that information with the patient/family/caregiver    Follow Up         Return in about 1 month (around 7/2/2023).  Patient was given instructions and counseling regarding her condition or for health maintenance advice. Please see specific information pulled into the AVS if appropriate.     Lisseth Young M.D.    06/02/2023

## 2023-06-02 NOTE — ASSESSMENT & PLAN NOTE
Patient's (Body mass index is 36.4 kg/m².) indicates that they are morbidly/severely obese (BMI > 40 or > 35 with obesity - related health condition) with health conditions that include hypertension, diabetes mellitus and dyslipidemias . Weight is improving with lifestyle modifications. BMI  is above average; BMI management plan is completed. We discussed portion control and increasing exercise.     Doing well with diet changes and logging.  Continue good changes, discontinuing soda.  Beware of hunger with the marijuana products.     Start ozempic. Denies family or personal history of pancreatitis, MTC, or MEN 2. Discussed common side effects of nausea, diarrhea, vomiting, constipation, stomach pain, headache, fatigue, upset stomach, dizziness, feeling bloated, belching, gas, stomach flu, heartburn.     First injection of Wegovy 0.25mg (no samples of Ozempic available) was administered in the office today, no complications. Sample pen provided to patient along with content regarding use of the pen, dosing schedule, savings opportunities, and helpful tips.

## 2023-06-08 ENCOUNTER — OFFICE VISIT (OUTPATIENT)
Dept: NEUROLOGY | Facility: CLINIC | Age: 58
End: 2023-06-08
Payer: COMMERCIAL

## 2023-06-08 VITALS
WEIGHT: 234 LBS | BODY MASS INDEX: 36.73 KG/M2 | HEIGHT: 67 IN | SYSTOLIC BLOOD PRESSURE: 112 MMHG | DIASTOLIC BLOOD PRESSURE: 88 MMHG | HEART RATE: 61 BPM | OXYGEN SATURATION: 100 %

## 2023-06-08 DIAGNOSIS — G25.81 RLS (RESTLESS LEGS SYNDROME): ICD-10-CM

## 2023-06-08 DIAGNOSIS — E11.42 DIABETIC PERIPHERAL NEUROPATHY: Primary | ICD-10-CM

## 2023-06-08 DIAGNOSIS — G56.03 BILATERAL CARPAL TUNNEL SYNDROME: ICD-10-CM

## 2023-06-08 RX ORDER — DESVENLAFAXINE SUCCINATE 50 MG/1
TABLET, EXTENDED RELEASE ORAL
COMMUNITY
Start: 2023-06-02 | End: 2023-06-08

## 2023-06-08 RX ORDER — LAMOTRIGINE 200 MG/1
TABLET ORAL
COMMUNITY
Start: 2023-06-01 | End: 2023-06-08

## 2023-06-08 RX ORDER — ROTIGOTINE 1 MG/24H
1 PATCH, EXTENDED RELEASE TRANSDERMAL EVERY 24 HOURS
Qty: 30 PATCH | Refills: 10 | Status: SHIPPED | OUTPATIENT
Start: 2023-06-08

## 2023-06-08 RX ORDER — PREGABALIN 100 MG/1
100 CAPSULE ORAL 3 TIMES DAILY
Qty: 90 CAPSULE | Refills: 5 | Status: SHIPPED | OUTPATIENT
Start: 2023-06-08 | End: 2024-06-07

## 2023-06-08 NOTE — PROGRESS NOTES
Subjective:    CC: Marcela Ramírez is seen today in consultation at the request of JESS Mclean for Peripheral Neuropathy       HPI:  58-year-old female with a history of hypertension, bipolar disorder, hyperlipidemia, diabetes mellitus type 2, obesity status post weight loss surgery, hypothyroidism presents with symptoms of neuropathy/RLS.  As per patient she started having burning pain in her feet several years ago.  It has worsened over time.  She previously tried gabapentin and is currently on Lyrica 75 mg 3 times daily which helps some however she is still getting severe pain in her feet more so on on the right as well as cramping of the feet.  She cannot withstand the air or the sheets touching her feet.  She also has some numbness.  She states that her symptoms can worsen with prolonged standing at work as she has to wear safety shoes (works at [employer]).  Her last A1c was fairly well controlled at 6.2.  TSH was normal.  She has been started on Wegovy recently for weight loss.  She also reports to having abnormal sensations in her legs.  Was diagnosed with RLS and initially started on Requip which along with Lyrica caused side effects.  Was subsequently started on Neupro patch 1 mg daily which is helping.  Last ferritin level was normal at 88.  She had an EMG/NCS in 2018 that showed moderate sensorimotor axonal/demyelinating neuropathy with moderate bilateral carpal tunnel and mild ulnar neuropathy bilaterally.  She does not wear wrist braces.  Of note-I reviewed Gladys's notes as follows-    57 y.o. female with a history of DM who comes to clinic today for evaluation of neuropathy. She initially noted symptoms in 2015 marked by numbness, paresthesias, and shooting pain in her upper and lower extremities bilaterally. This has worsened over time. She notes associated balance impairment as well as decreased  strength, though denies any clear associated weakness. Her symptoms are worse with increased  activity. She is currently taking Lyrica, which is somewhat beneficial. She has previously taken GBP.     She also notes RLS symptoms in her feet primarily at night. She is currently taking neupro 1mg daily. She previously tried ropinirole.      Prior evaluation has included an EMG of the upper and lower extremities bilaterally which was notable for a moderate axonal and demyelinating peripheral neuropathy, moderate CTS bilaterally, mild-moderate ulnar neuropathy on the left and mild ulnar neuropathy on the right. Screening bloodwork was notable for a hemoglobin A1C of 7.3.     Today: Since her last visit in 2/22, she notes that her numbness, paresthesias and shooting pains have worsened and now radiate up to her thighs bilaterally. Lyrica 75mg TID has been beneficial. She has not consumed alcohol in approximately 9 months.    The following portions of the patient's history were reviewed today and updated as of 06/08/2023  : allergies, current medications, past family history, past medical history, past social history, past surgical history, and problem list  These document will be scanned to patient's chart.      Current Outpatient Medications:     ALPRAZolam (XANAX) 1 MG tablet, Take 1 tablet by mouth At Night As Needed for Anxiety., Disp: 30 tablet, Rfl: 5    amLODIPine (NORVASC) 5 MG tablet, Take 1 tablet by mouth Daily., Disp: 30 tablet, Rfl: 2    atorvastatin (LIPITOR) 20 MG tablet, TAKE ONE TABLET BY MOUTH DAILY, Disp: 30 tablet, Rfl: 2    hydroCHLOROthiazide (HYDRODIURIL) 25 MG tablet, Take 1 tablet by mouth Daily., Disp: 90 tablet, Rfl: 3    levothyroxine (SYNTHROID, LEVOTHROID) 50 MCG tablet, Take 1 tablet by mouth Every Morning., Disp: 30 tablet, Rfl: 2    lisinopril (PRINIVIL,ZESTRIL) 40 MG tablet, TAKE ONE TABLET BY MOUTH DAILY, Disp: 30 tablet, Rfl: 5    metFORMIN ER (GLUCOPHAGE-XR) 500 MG 24 hr tablet, TAKE ONE TABLET BY MOUTH TWICE A DAY BEFORE MEALS (Patient taking differently: 1 tablet Daily.),  Disp: 60 tablet, Rfl: 5    metoprolol succinate XL (TOPROL-XL) 50 MG 24 hr tablet, TAKE ONE TABLET BY MOUTH DAILY, Disp: 30 tablet, Rfl: 5    omeprazole (priLOSEC) 20 MG capsule, TAKE ONE CAPSULE BY MOUTH DAILY, Disp: 30 capsule, Rfl: 11    rotigotine (Neupro) 1 MG/24HR patch 24 hour 24 hour patch, Place 1 patch on the skin as directed by provider Daily., Disp: 30 patch, Rfl: 10    Semaglutide-Weight Management (Wegovy) 0.25 MG/0.5ML solution auto-injector, Inject 0.25 mg under the skin into the appropriate area as directed 1 (One) Time Per Week., Disp: 2 mL, Rfl: 0    tamsulosin (FLOMAX) 0.4 MG capsule 24 hr capsule, Take 1 capsule by mouth Daily., Disp: 30 capsule, Rfl: 1    pregabalin (Lyrica) 100 MG capsule, Take 1 capsule by mouth 3 (Three) Times a Day., Disp: 90 capsule, Rfl: 5   Past Medical History:   Diagnosis Date    Arthritis     knees, otc arthritis meds prn, no h/o injections.     Asthma     has not required inhaler    Bilateral ovarian cysts     Bipolar 1 disorder     Boil     opens them herself    Breast injury 07/13/2021    bruise on lateral rt breast from fall    Carpal tunnel syndrome     Chronic pain disorder     CKD (chronic kidney disease), symptom management only, stage 3 (moderate)     Creatinine 1.04 GFR 56    Colon polyp     Depression     Diabetes mellitus     Diagnosed 2017, was on metformin in the past. Last A1C 5.5%    Diverticulosis     Fatigue     GERD (gastroesophageal reflux disease)     controlled with omeprazole 20mg. Remote EGD- esophagitis.  EGD no hiatal hernia Z line 40 cm very thick mucosal folds cannot rule out varices.  CT scan thickened fundus, no varices seen    Headache     History of bladder infections     History of blood transfusion 2006    2/2 heavy menses    History of blood transfusion     History of bronchitis     Hyperlipidemia     Hypertension     Hypothyroidism     Lower extremity edema     Morbid obesity with BMI of 40.0-44.9, adult     Peripheral neuropathy      2/2 DM    RLS (restless legs syndrome)     S/P spenser     S/P cholecystectomy     2/2 cholelithiasis    Shortness of breath on exertion     Thyroid disease     TIA (transient ischemic attack)     patient states 8-9 episodes of right sided drooping/ weakness/ vision changes. Workup was negative for CVA- thought to be related to anxiety. last episode 12/2017      Past Surgical History:   Procedure Laterality Date    COLONOSCOPY  remote    diverticulosis    ENDOMETRIAL ABLATION      ENDOSCOPY  remote    esophagitis     ENDOSCOPY N/A 8/22/2019    Procedure: ESOPHAGOGASTRODUODENOSCOPY;  Surgeon: Guido Greenberg MD;  Location:  [facility];  Service: Bariatric    GASTRIC SLEEVE LAPAROSCOPIC N/A 8/22/2019    Procedure: GASTRIC SLEEVE LAPAROSCOPIC;  Surgeon: Guido Greenberg MD;  Location:  [facility];  Service: Bariatric    KNEE ACL RECONSTRUCTION Right 2013    with miniscal repair    LAPAROSCOPIC CHOLECYSTECTOMY  2001    cholelithiasis    LAPAROSCOPIC HYSTERECTOMY  2013    right ovary remains    PARAESOPHAGEAL HERNIA REPAIR N/A 8/22/2019    Procedure: PARAESOPHAGEAL HERNIA REPAIR LAPAROSCOPIC;  Surgeon: Guido Greenberg MD;  Location:  [facility];  Service: Bariatric    SINUS SURGERY      TUBAL ABDOMINAL LIGATION        Family History   Problem Relation Age of Onset    Arthritis Mother     Arthritis Father     Diabetes Father     Hyperlipidemia Father     Hypertension Father     Arthritis Sister     Diabetes Brother     Hypertension Brother     Asthma Brother     Other Brother     Obesity Brother     Obesity Daughter     Hypertension Daughter     Depression Daughter     Cancer Maternal Aunt     Depression Paternal Uncle     Cancer Paternal Grandmother     Heart disease Paternal Grandfather     Hypertension Paternal Grandfather     Breast cancer Neg Hx     Ovarian cancer Neg Hx       Social History     Socioeconomic History    Marital status:    Tobacco Use    Smoking status: Never     Passive exposure:  "Never    Smokeless tobacco: Never   Vaping Use    Vaping Use: Never used   Substance and Sexual Activity    Alcohol use: Not Currently    Drug use: Not Currently     Types: Marijuana, Cocaine(coke)     Comment: about 4 years ago, 32 years ago - cocaine and THC    Sexual activity: Defer     Comment: no hormones     Review of Systems   Neurological:  Positive for numbness.   All other systems reviewed and are negative.    Objective:    /88   Pulse 61   Ht 170.2 cm (67\")   Wt 106 kg (234 lb)   LMP  (LMP Unknown)   SpO2 100%   BMI 36.65 kg/m²     Neurology Exam:    General apperance: Obese    Mental status: Alert, awake and oriented to time place and person.    Recent and Remote memory: Intact.    Attention span and Concentration: Normal.     Language and Speech: Intact- No dysarthria.    Fluency, Naming , Repitition and Comprehension:  Intact    Cranial Nerves:   CN II: Visual fields are full. Intact. Fundi - Normal, No papillederma, Pupils - LEV  CN III, IV and VI: Extraocular movements are intact. Normal saccades.   CN V: Facial sensation is intact.   CN VII: Muscles of facial expression reveal no asymmetry. Intact.   CN VIII: Hearing is intact. Whispered voice intact.   CN IX and X: Palate elevates symmetrically. Intact  CN XI: Shoulder shrug is intact.   CN XII: Tongue is midline without evidence of atrophy or fasciculation.     Ophthalmoscopic exam of optic disc-normal    Motor:  Right UE muscle strength 5/5. Normal tone.     Left UE muscle strength 5/5. Normal tone.      Right LE muscle strength5/5. Normal tone.     Left LE muscle strength 5/5. Normal tone.      Sensory: Reduced to pinprick in the first 3 fingers of both hands as well as in the feet up to level of ankle with markedly reduced vibration/temperature sensation    DTRs: 2+ bilaterally in upper and lower extremities.    Babinski: Negative bilaterally.    Co-ordination: Normal finger-to-nose, heel to shin B/L.    Rhomberg: " Negative.    Gait: Normal.  Could do tandem walking    Cardiovascular: Regular rate and rhythm without murmur, gallop or rub.    Assessment and Plan:  1. Diabetic peripheral neuropathy  Last A1c was 6.2  Since she continues to have paresthesias in her feet I will increase her Lyrica to 100 mg 3 times daily  She can wear insoles in her safety shoes while at work   She should also continue vitamin B12 supplements    - pregabalin (Lyrica) 100 MG capsule; Take 1 capsule by mouth 3 (Three) Times a Day.  Dispense: 90 capsule; Refill: 5    2. RLS (restless legs syndrome)  Previously tried Requip which caused side effects  Currently on Neupro patch 1 mg daily which is helping    3. Bilateral carpal tunnel syndrome  EMG/NCS showed bilateral moderate carpal tunnel.  I have asked her to wear wrist brace especially at night       Return in about 5 months (around 11/8/2023).     I spent over 40 minutes with the patient face to face out of which over 50% (30 minutes) was spent in management, instructions and education.     Katelin Landis MD

## 2023-06-12 ENCOUNTER — TELEPHONE (OUTPATIENT)
Dept: BARIATRICS/WEIGHT MGMT | Facility: CLINIC | Age: 58
End: 2023-06-12
Payer: COMMERCIAL

## 2023-06-12 ENCOUNTER — OFFICE VISIT (OUTPATIENT)
Dept: BEHAVIORAL HEALTH | Facility: CLINIC | Age: 58
End: 2023-06-12
Payer: COMMERCIAL

## 2023-06-12 DIAGNOSIS — R45.86 MOOD SWINGS: ICD-10-CM

## 2023-06-12 DIAGNOSIS — F31.9 BIPOLAR 1 DISORDER: Primary | ICD-10-CM

## 2023-06-12 DIAGNOSIS — Z63.8 FAMILY CONFLICT: ICD-10-CM

## 2023-06-12 PROCEDURE — 90834 PSYTX W PT 45 MINUTES: CPT | Performed by: PSYCHOLOGIST

## 2023-06-12 RX ORDER — ONDANSETRON 4 MG/1
4 TABLET, FILM COATED ORAL EVERY 8 HOURS PRN
Qty: 30 TABLET | Refills: 0 | Status: SHIPPED | OUTPATIENT
Start: 2023-06-12

## 2023-06-12 SDOH — SOCIAL STABILITY - SOCIAL INSECURITY: OTHER SPECIFIED PROBLEMS RELATED TO PRIMARY SUPPORT GROUP: Z63.8

## 2023-06-12 NOTE — TELEPHONE ENCOUNTER
----- Message from April Armida Jang Rep sent at 6/12/2023  4:02 PM EDT -----  Regarding: CONRAD  PT IS REQUESTING NAUSEA MEDS         Patient was started on Wegovy and has been experiencing nausea for 24-48 hours after injections.  She was wondering if some Zofran could be sent in to help with this.

## 2023-06-13 NOTE — PROGRESS NOTES
PROGRESS NOTE    Data:  Marcela Ramírez is a 58 y.o. female who met with the undersigned for a scheduled telehealth therapy session from 3:00 - 3:45pm.      Clinical Maneuvering/Intervention:      The pt talked about her recent stressors such as having mood swings, struggling to connect with G, and work stress. Stressors were processed individually and in detail. Venting of frustrations was conducted in order to help the pt feel less tense emotionally and gain insight into issues. Feelings were processed and validated several times in session. Perspective taking was conducted multiple times in order to help the pt feel less stuck, less overwhelmed, and see challenges as much more manageable. Active listening was conducted in order to help the pt make sense of stressors and start moving towards potential solutions. The pt was assisted with finding solutions based on existing skill-set and abilities. Much of the session was dedicated to coping with mood swings, going back on medication, and dispelling fears/hesitancies in going back on medication. Time was allocated specifically to assess what is 'working' in the pt's life, versus what is 'not working,' (if anything) in terms of helping to improve mood and quality of life. An action plan was designed to empower the pt to know what to do. Simple steps of one, two, three were laid out in order to help the pt feel more in control of things and feel less stressed. Ways to connect more effectively with G were discussed as well. Some humor was used in session as it is one of the pt's coping skills. Humor was used too, to help the pt see things as less challenging and more manageable than they first seemed. Humor was used as well, to model use of the skill as a way to decrease tension ongoing. Homework was assigned tailored to pt's needs. The pt expressed gratitude for today's session.    Mental Status Exam  Hygiene:  good  Dress: normal  Attitude:  cooperative and  proactive  Motor Activity: normal  Speech: normal  Mood:  tense  Affect:  congruent  Thought Processes: normal  Thought Content:  normal  Suicidal Thoughts:  not endorsed  Homicidal Thoughts:  not endorsed  Crisis Safety Plan: not needed  Hallucinations:  none      Patient's Support Network Includes:  family, friends      Progress toward goal: there is evidence to suggest that she struggles with mood swings      Functional Status: moderate       Prognosis: good with counseling, medication    Assessment      The pt presented to be struggling with managing her moods related to having bipolar disorder and having family conflict. She seems to benefit from empowerment therapy, particularly helping her believe in herself. She seems to benefit from venting, having her experiences validated, and being assisted in discovering her own solutions to her problems.       Plan      In order to diminish symptoms of bipolar disorder: the pt is to continue with counseling, being open/transparent with family (particularly G), and conduct the homework assignment in terms of having a medication consult with a psychiatrist to help better treat bipolar symptoms (this week/as soon as feasible).    Maribel Hernandez, PhD, LP

## 2023-06-19 ENCOUNTER — TELEPHONE (OUTPATIENT)
Dept: FAMILY MEDICINE CLINIC | Facility: CLINIC | Age: 58
End: 2023-06-19
Payer: COMMERCIAL

## 2023-06-19 RX ORDER — LANCETS 30 GAUGE
EACH MISCELLANEOUS
Qty: 100 EACH | Refills: 3 | Status: SHIPPED | OUTPATIENT
Start: 2023-06-19

## 2023-06-19 NOTE — TELEPHONE ENCOUNTER
"Caller: Marcela Ramírez \"Helen\"    Relationship: Self    Best call back number: [phone]     What medication are you requesting: TEST STRIPS AND LANCETS FOR ONE TOUCH ULTRA 2    What are your current symptoms: TYPE 2 DIABETIC    How long have you been experiencing symptoms:     Have you had these symptoms before:    [x] Yes  [] No    Have you been treated for these symptoms before:   [x] Yes  [] No    If a prescription is needed, what is your preferred pharmacy and phone number: [facility] [id number] - [place] [id number] - [phone]  - [phone] FX     Additional notes: PATIENT HAS CALLED REQUESTING NEW PRESCRIPTIONS FOR TEST STRIPS AND LANCETS FOR ONE TOUCH ULTRA 2        DELETE AFTER READING TO PATIENT: “Thank you for sharing this information with me. I will send a message to the clinical team. Please allow 48 hours for the clinical staff to follow up on this request.”   "

## 2023-07-28 ENCOUNTER — TELEPHONE (OUTPATIENT)
Dept: FAMILY MEDICINE CLINIC | Facility: CLINIC | Age: 58
End: 2023-07-28
Payer: COMMERCIAL

## 2023-07-28 ENCOUNTER — OFFICE VISIT (OUTPATIENT)
Dept: BEHAVIORAL HEALTH | Facility: CLINIC | Age: 58
End: 2023-07-28
Payer: COMMERCIAL

## 2023-07-28 DIAGNOSIS — F31.9 BIPOLAR 1 DISORDER: Primary | ICD-10-CM

## 2023-07-28 DIAGNOSIS — Z56.6 WORK STRESS: ICD-10-CM

## 2023-07-28 DIAGNOSIS — R45.86 MOOD SWINGS: ICD-10-CM

## 2023-07-28 PROCEDURE — 90834 PSYTX W PT 45 MINUTES: CPT | Performed by: PSYCHOLOGIST

## 2023-07-28 RX ORDER — FLUCONAZOLE 150 MG/1
150 TABLET ORAL ONCE
Qty: 1 TABLET | Refills: 0 | Status: SHIPPED | OUTPATIENT
Start: 2023-07-28 | End: 2023-07-28

## 2023-07-28 SDOH — HEALTH STABILITY - MENTAL HEALTH: OTHER PHYSICAL AND MENTAL STRAIN RELATED TO WORK: Z56.6

## 2023-07-28 NOTE — PROGRESS NOTES
PROGRESS NOTE    Data:  Marcela Ramírez is a 58 y.o. female who met with the undersigned for a scheduled telehealth therapy session from 11:15 - 12:00 pm.      Clinical Maneuvering/Intervention:      The pt talked about her recent stressors, mainly having to do with work stress and mood swings. Stressors were processed individually and in detail. Venting of frustrations was conducted in order to help the pt feel less tense emotionally and gain insight into issues. Feelings were processed and validated several times in session. Perspective taking was conducted multiple times in order to help the pt feel less stuck, less overwhelmed, and see challenges as much more manageable. Active listening was conducted in order to help the pt make sense of stressors and start moving towards potential solutions. The pt was assisted with finding solutions based on existing skill-set and abilities. She was assisted with processing how difficult it has been with her trying to get along with a co-worker and the hurt feelings she has experienced with being insulted by this other person. Working through the anxiety and insecurities of, for example, a text message received by this person was conducted. Time was allocated specifically to assess what is 'working' in the pt's life, versus what is 'not working,' (if anything) in terms of helping to improve mood and quality of life. Ways of coping were reviewed: getting distance, no longer talking to this person, reporting the issue to HR, not taking statements personally, and leaning on G, for example. Keeping healthy boundaries with this person (and others) was reviewed as well. Mood management therein, along with staying on current medication and the notion of making an appointment with a psychiatrist were also discussed. The pt's strengths were identified in order to help identify abilities to use to better face/overcome challenges. Some humor was used in session as it is one of the  pt's coping skills. Humor was used too, to help the pt see things as less challenging and more manageable than they first seemed. Humor was used as well, to model use of the skill as a way to decrease tension ongoing. Homework was assigned tailored to pt's needs. The pt expressed gratitude for today's session.    Mental Status Exam  Hygiene:  good  Dress: normal  Attitude:  cooperative and proactive  Motor Activity: normal  Speech: normal  Mood:  tense, but more positive than recent sessions  Affect:  congruent  Thought Processes: normal  Thought Content:  normal  Suicidal Thoughts:  not endorsed  Homicidal Thoughts:  not endorsed  Crisis Safety Plan: not needed  Hallucinations:  none      Patient's Support Network Includes:  family, friends      Progress toward goal: there is evidence to suggest that she struggles with mood swings      Functional Status: moderate       Prognosis: good with counseling, medication    Assessment      The pt presented to be struggling with managing her moods related to having bipolar disorder and having work stress. She seems to benefit from medication and empowerment therapy. She seems to benefit from venting, having her experiences validated, and being assisted in discovering her own solutions to her problems.       Plan      In order to diminish symptoms of bipolar disorder: the pt is to continue with counseling, being open/transparent with family (particularly G), and continue on psychotropic medication as prescribed. She is to continue making a point to associate with those at work with whom she gets along and less time with those who are more of a challenge (ongoing).    Maribel Hernandez, PhD, LP

## 2023-07-29 ENCOUNTER — APPOINTMENT (OUTPATIENT)
Dept: CT IMAGING | Facility: HOSPITAL | Age: 58
End: 2023-07-29
Payer: COMMERCIAL

## 2023-07-29 ENCOUNTER — HOSPITAL ENCOUNTER (EMERGENCY)
Facility: HOSPITAL | Age: 58
Discharge: HOME | End: 2023-07-29
Attending: EMERGENCY MEDICINE
Payer: COMMERCIAL

## 2023-07-29 ENCOUNTER — APPOINTMENT (OUTPATIENT)
Dept: GENERAL RADIOLOGY | Facility: HOSPITAL | Age: 58
End: 2023-07-29
Payer: COMMERCIAL

## 2023-07-29 VITALS
SYSTOLIC BLOOD PRESSURE: 157 MMHG | BODY MASS INDEX: 36.41 KG/M2 | WEIGHT: 232 LBS | HEIGHT: 67 IN | OXYGEN SATURATION: 100 % | RESPIRATION RATE: 18 BRPM | DIASTOLIC BLOOD PRESSURE: 92 MMHG | HEART RATE: 64 BPM | TEMPERATURE: 99 F

## 2023-07-29 DIAGNOSIS — R42 VERTIGO: Primary | ICD-10-CM

## 2023-07-29 LAB
ALBUMIN SERPL-MCNC: 4.2 G/DL (ref 3.5–5.2)
ALBUMIN/GLOB SERPL: 1.3 G/DL
ALP SERPL-CCNC: 94 U/L (ref 39–117)
ALT SERPL W P-5'-P-CCNC: 19 U/L (ref 1–33)
ANION GAP SERPL CALCULATED.3IONS-SCNC: 12 MMOL/L (ref 5–15)
AST SERPL-CCNC: 21 U/L (ref 1–32)
BASOPHILS # BLD AUTO: 0.04 10*3/MM3 (ref 0–0.2)
BASOPHILS NFR BLD AUTO: 0.4 % (ref 0–1.5)
BILIRUB SERPL-MCNC: 0.4 MG/DL (ref 0–1.2)
BUN SERPL-MCNC: 15 MG/DL (ref 6–20)
BUN/CREAT SERPL: 13.5 (ref 7–25)
CALCIUM SPEC-SCNC: 9.4 MG/DL (ref 8.6–10.5)
CHLORIDE SERPL-SCNC: 103 MMOL/L (ref 98–107)
CO2 SERPL-SCNC: 29 MMOL/L (ref 22–29)
CREAT SERPL-MCNC: 1.11 MG/DL (ref 0.57–1)
DEPRECATED RDW RBC AUTO: 44.5 FL (ref 37–54)
EGFRCR SERPLBLD CKD-EPI 2021: 58.5 ML/MIN/1.73
EOSINOPHIL # BLD AUTO: 0.24 10*3/MM3 (ref 0–0.4)
EOSINOPHIL NFR BLD AUTO: 2.6 % (ref 0.3–6.2)
ERYTHROCYTE [DISTWIDTH] IN BLOOD BY AUTOMATED COUNT: 12.9 % (ref 12.3–15.4)
GLOBULIN UR ELPH-MCNC: 3.3 GM/DL
GLUCOSE SERPL-MCNC: 88 MG/DL (ref 65–99)
HCT VFR BLD AUTO: 40.3 % (ref 34–46.6)
HGB BLD-MCNC: 13 G/DL (ref 12–15.9)
HOLD SPECIMEN: NORMAL
IMM GRANULOCYTES # BLD AUTO: 0.02 10*3/MM3 (ref 0–0.05)
IMM GRANULOCYTES NFR BLD AUTO: 0.2 % (ref 0–0.5)
LYMPHOCYTES # BLD AUTO: 2.84 10*3/MM3 (ref 0.7–3.1)
LYMPHOCYTES NFR BLD AUTO: 31 % (ref 19.6–45.3)
MAGNESIUM SERPL-MCNC: 2 MG/DL (ref 1.6–2.6)
MCH RBC QN AUTO: 30.8 PG (ref 26.6–33)
MCHC RBC AUTO-ENTMCNC: 32.3 G/DL (ref 31.5–35.7)
MCV RBC AUTO: 95.5 FL (ref 79–97)
MONOCYTES # BLD AUTO: 0.49 10*3/MM3 (ref 0.1–0.9)
MONOCYTES NFR BLD AUTO: 5.3 % (ref 5–12)
NEUTROPHILS NFR BLD AUTO: 5.54 10*3/MM3 (ref 1.7–7)
NEUTROPHILS NFR BLD AUTO: 60.5 % (ref 42.7–76)
NRBC BLD AUTO-RTO: 0 /100 WBC (ref 0–0.2)
PLATELET # BLD AUTO: 190 10*3/MM3 (ref 140–450)
PMV BLD AUTO: 11.4 FL (ref 6–12)
POTASSIUM SERPL-SCNC: 3.8 MMOL/L (ref 3.5–5.2)
PROT SERPL-MCNC: 7.5 G/DL (ref 6–8.5)
RBC # BLD AUTO: 4.22 10*6/MM3 (ref 3.77–5.28)
SODIUM SERPL-SCNC: 144 MMOL/L (ref 136–145)
TROPONIN T SERPL HS-MCNC: 8 NG/L
WBC NRBC COR # BLD: 9.17 10*3/MM3 (ref 3.4–10.8)
WHOLE BLOOD HOLD COAG: NORMAL
WHOLE BLOOD HOLD SPECIMEN: NORMAL

## 2023-07-29 PROCEDURE — 93005 ELECTROCARDIOGRAM TRACING: CPT | Performed by: EMERGENCY MEDICINE

## 2023-07-29 PROCEDURE — 96361 HYDRATE IV INFUSION ADD-ON: CPT

## 2023-07-29 PROCEDURE — 99284 EMERGENCY DEPT VISIT MOD MDM: CPT

## 2023-07-29 PROCEDURE — 80053 COMPREHEN METABOLIC PANEL: CPT | Performed by: EMERGENCY MEDICINE

## 2023-07-29 PROCEDURE — 93005 ELECTROCARDIOGRAM TRACING: CPT

## 2023-07-29 PROCEDURE — 71045 X-RAY EXAM CHEST 1 VIEW: CPT

## 2023-07-29 PROCEDURE — 84484 ASSAY OF TROPONIN QUANT: CPT | Performed by: EMERGENCY MEDICINE

## 2023-07-29 PROCEDURE — 96374 THER/PROPH/DIAG INJ IV PUSH: CPT

## 2023-07-29 PROCEDURE — 70450 CT HEAD/BRAIN W/O DYE: CPT

## 2023-07-29 PROCEDURE — 83735 ASSAY OF MAGNESIUM: CPT | Performed by: EMERGENCY MEDICINE

## 2023-07-29 PROCEDURE — 25010000002 DIAZEPAM PER 5 MG: Performed by: EMERGENCY MEDICINE

## 2023-07-29 PROCEDURE — 85025 COMPLETE CBC W/AUTO DIFF WBC: CPT | Performed by: EMERGENCY MEDICINE

## 2023-07-29 RX ORDER — SODIUM CHLORIDE 0.9 % (FLUSH) 0.9 %
10 SYRINGE (ML) INJECTION AS NEEDED
Status: DISCONTINUED | OUTPATIENT
Start: 2023-07-29 | End: 2023-07-29 | Stop reason: HOSPADM

## 2023-07-29 RX ORDER — DIAZEPAM 5 MG/ML
2.5 INJECTION, SOLUTION INTRAMUSCULAR; INTRAVENOUS ONCE
Status: COMPLETED | OUTPATIENT
Start: 2023-07-29 | End: 2023-07-29

## 2023-07-29 RX ADMIN — SODIUM CHLORIDE 1000 ML: 9 INJECTION, SOLUTION INTRAVENOUS at 20:24

## 2023-07-29 RX ADMIN — DIAZEPAM 2.5 MG: 5 INJECTION, SOLUTION INTRAMUSCULAR; INTRAVENOUS at 20:27

## 2023-07-29 NOTE — Clinical Note
[facility] EMERGENCY DEPARTMENT  [address]  [facility] [address]  Phone: [phone]    Marcela Ramírez was seen and treated in our emergency department on 7/29/2023.  She may return to work on 08/02/2023.         Thank you for choosing [facility].    Kedar Locke MD

## 2023-07-29 NOTE — ED PROVIDER NOTES
Subjective  History of Present Illness:    Chief Complaint: Dizziness  History of Present Illness: 58-year-old female presents with dizziness, she has had the symptoms for 2 weeks, seen in urgent treatment center 2 weeks ago and was started on meclizine, she is continued to have dizziness, she does not take the meclizine that often because it makes her very drowsy.  Her symptoms are worse with movement especially moving to the left, she stands on her feet a lot for work, and has had difficulty with managing this dizziness, she also has associated nausea with the dizziness.  Onset: Sudden onset  Duration: 2 weeks ago  Exacerbating / Alleviating factors: Symptoms are worse with movement especially moving her head to the left  Associated symptoms: Nausea associated with the dizziness      Nurses Notes reviewed and agree, including vitals, allergies, social history and prior medical history.     REVIEW OF SYSTEMS: All systems reviewed and not pertinent unless noted.    Review of Systems   Gastrointestinal:  Positive for nausea.   Neurological:  Positive for dizziness.   All other systems reviewed and are negative.    Past Medical History:   Diagnosis Date    Arthritis     knees, otc arthritis meds prn, no h/o injections.     Asthma     has not required inhaler    Bilateral ovarian cysts     Bipolar 1 disorder     Boil     opens them herself    Breast injury 07/13/2021    bruise on lateral rt breast from fall    Carpal tunnel syndrome     Chronic pain disorder     CKD (chronic kidney disease), symptom management only, stage 3 (moderate)     Creatinine 1.04 GFR 56    Colon polyp     Depression     Diabetes mellitus     Diagnosed 2017, was on metformin in the past. Last A1C 5.5%    Diverticulosis     Fatigue     GERD (gastroesophageal reflux disease)     controlled with omeprazole 20mg. Remote EGD- esophagitis.  EGD no hiatal hernia Z line 40 cm very thick mucosal folds cannot rule out varices.  CT scan thickened fundus,  no varices seen    Headache     History of bladder infections     History of blood transfusion 2006    2/2 heavy menses    History of blood transfusion     History of bronchitis     Hyperlipidemia     Hypertension     Hypothyroidism     Lower extremity edema     Morbid obesity with BMI of 40.0-44.9, adult     Peripheral neuropathy     2/2 DM    RLS (restless legs syndrome)     S/P spenser     S/P cholecystectomy     2/2 cholelithiasis    Shortness of breath on exertion     Thyroid disease     TIA (transient ischemic attack)     patient states 8-9 episodes of right sided drooping/ weakness/ vision changes. Workup was negative for CVA- thought to be related to anxiety. last episode 12/2017       Allergies:    Contrast dye (echo or unknown ct/mr)      Past Surgical History:   Procedure Laterality Date    COLONOSCOPY  remote    diverticulosis    ENDOMETRIAL ABLATION      ENDOSCOPY  remote    esophagitis     ENDOSCOPY N/A 8/22/2019    Procedure: ESOPHAGOGASTRODUODENOSCOPY;  Surgeon: Guido Greenberg MD;  Location:  [facility];  Service: Bariatric    GASTRIC SLEEVE LAPAROSCOPIC N/A 8/22/2019    Procedure: GASTRIC SLEEVE LAPAROSCOPIC;  Surgeon: Guido Greenberg MD;  Location:  [facility];  Service: Bariatric    KNEE ACL RECONSTRUCTION Right 2013    with miniscal repair    LAPAROSCOPIC CHOLECYSTECTOMY  2001    cholelithiasis    LAPAROSCOPIC HYSTERECTOMY  2013    right ovary remains    PARAESOPHAGEAL HERNIA REPAIR N/A 8/22/2019    Procedure: PARAESOPHAGEAL HERNIA REPAIR LAPAROSCOPIC;  Surgeon: Guido Greenberg MD;  Location:  [facility];  Service: Bariatric    SINUS SURGERY      TUBAL ABDOMINAL LIGATION           Social History     Socioeconomic History    Marital status:    Tobacco Use    Smoking status: Never     Passive exposure: Never    Smokeless tobacco: Never   Vaping Use    Vaping Use: Never used   Substance and Sexual Activity    Alcohol use: Not Currently    Drug use: Not Currently     Types: Marijuana,  "Cocaine(coke)     Comment: about 4 years ago, 32 years ago - cocaine and THC    Sexual activity: Defer     Comment: no hormones         Family History   Problem Relation Age of Onset    Arthritis Mother     Arthritis Father     Diabetes Father     Hyperlipidemia Father     Hypertension Father     Arthritis Sister     Diabetes Brother     Hypertension Brother     Asthma Brother     Other Brother     Obesity Brother     Obesity Daughter     Hypertension Daughter     Depression Daughter     Cancer Maternal Aunt     Depression Paternal Uncle     Cancer Paternal Grandmother     Heart disease Paternal Grandfather     Hypertension Paternal Grandfather     Breast cancer Neg Hx     Ovarian cancer Neg Hx        Objective  Physical Exam:  /92   Pulse 64   Temp 99 °F (37.2 °C) (Oral)   Resp 18   Ht 170.2 cm (67\")   Wt 105 kg (232 lb)   LMP  (LMP Unknown)   SpO2 100%   BMI 36.34 kg/m²      Physical Exam  Vitals and nursing note reviewed.   Constitutional:       Appearance: She is well-developed.   HENT:      Head: Normocephalic.   Eyes:      Extraocular Movements: Extraocular movements intact.   Cardiovascular:      Rate and Rhythm: Normal rate and regular rhythm.   Pulmonary:      Effort: Pulmonary effort is normal.      Breath sounds: Normal breath sounds.   Abdominal:      Palpations: Abdomen is soft.   Musculoskeletal:         General: Normal range of motion.      Cervical back: Normal range of motion and neck supple.   Skin:     General: Skin is warm and dry.   Neurological:      General: No focal deficit present.      Mental Status: She is alert and oriented to person, place, and time.      Sensory: No sensory deficit.      Deep Tendon Reflexes: Reflexes are normal and symmetric.         Procedures    ED Course:    ED Course as of 07/30/23 0408   Sat Jul 29, 2023   2107 Symptoms improved with the Valium. [CS]      ED Course User Index  [CS] Raymond Alcantara Jr., EKATERINA       Lab Results (last 24 hours)       " Procedure Component Value Units Date/Time    CBC & Differential [[id number]]  (Normal) Collected: 07/29/23 1926    Specimen: Blood Updated: 07/29/23 1935    Narrative:      The following orders were created for panel order CBC & Differential.  Procedure                               Abnormality         Status                     ---------                               -----------         ------                     CBC Auto Differential[[id number]]        Normal              Final result                 Please view results for these tests on the individual orders.    Comprehensive Metabolic Panel [[id number]]  (Abnormal) Collected: 07/29/23 1926    Specimen: Blood Updated: 07/29/23 1954     Glucose 88 mg/dL      BUN 15 mg/dL      Creatinine 1.11 mg/dL      Sodium 144 mmol/L      Potassium 3.8 mmol/L      Chloride 103 mmol/L      CO2 29.0 mmol/L      Calcium 9.4 mg/dL      Total Protein 7.5 g/dL      Albumin 4.2 g/dL      ALT (SGPT) 19 U/L      AST (SGOT) 21 U/L      Alkaline Phosphatase 94 U/L      Total Bilirubin 0.4 mg/dL      Globulin 3.3 gm/dL      Comment: Calculated Result        A/G Ratio 1.3 g/dL      BUN/Creatinine Ratio 13.5     Anion Gap 12.0 mmol/L      eGFR 58.5 mL/min/1.73     Narrative:      GFR Normal >60  Chronic Kidney Disease <60  Kidney Failure <15      Single High Sensitivity Troponin T [[id number]]  (Normal) Collected: 07/29/23 1926    Specimen: Blood Updated: 07/29/23 1954     HS Troponin T 8 ng/L     Narrative:      High Sensitive Troponin T Reference Range:  <10.0 ng/L- Negative Female for AMI  <15.0 ng/L- Negative Male for AMI  >=10 - Abnormal Female indicating possible myocardial injury.  >=15 - Abnormal Male indicating possible myocardial injury.   Clinicians would have to utilize clinical acumen, EKG, Troponin, and serial changes to determine if it is an Acute Myocardial Infarction or myocardial injury due to an underlying chronic condition.         Magnesium [[id number]]  (Normal) Collected:  07/29/23 1926    Specimen: Blood Updated: 07/29/23 1954     Magnesium 2.0 mg/dL     CBC Auto Differential [[id number]]  (Normal) Collected: 07/29/23 1926    Specimen: Blood Updated: 07/29/23 1935     WBC 9.17 10*3/mm3      RBC 4.22 10*6/mm3      Hemoglobin 13.0 g/dL      Hematocrit 40.3 %      MCV 95.5 fL      MCH 30.8 pg      MCHC 32.3 g/dL      RDW 12.9 %      RDW-SD 44.5 fl      MPV 11.4 fL      Platelets 190 10*3/mm3      Neutrophil % 60.5 %      Lymphocyte % 31.0 %      Monocyte % 5.3 %      Eosinophil % 2.6 %      Basophil % 0.4 %      Immature Grans % 0.2 %      Neutrophils, Absolute 5.54 10*3/mm3      Lymphocytes, Absolute 2.84 10*3/mm3      Monocytes, Absolute 0.49 10*3/mm3      Eosinophils, Absolute 0.24 10*3/mm3      Basophils, Absolute 0.04 10*3/mm3      Immature Grans, Absolute 0.02 10*3/mm3      nRBC 0.0 /100 WBC              CT Head Without Contrast    Result Date: 7/29/2023  CT HEAD WO CONTRAST Date of Exam: 7/29/2023 7:50 PM EDT Indication: dizzy. Comparison: None available. Technique: Axial CT images were obtained of the head without contrast administration.  Automated exposure control and iterative construction methods were used. Findings: Parenchyma:No acute intraparenchymal hemorrhage. No loss of gray-white differentiation to suggest large territory infarct. Normal parenchymal volume. No substantial white matter disease. No midline shift or herniation. Ventricles and extra axial spaces:Normal caliber of ventricles and sulci. No extra axial fluid collection seen. Other:Orbits are grossly intact. Paranasal sinuses are clear. Mastoid air cells are clear. Calvarium is intact. Intracranial atherosclerotic calcification is present.     Impression: Impression: No evidence of acute intracranial hemorrhage or large territory infarct. Electronically Signed: Jared Paz  7/29/2023 8:12 PM EDT  Workstation ID: [id number]    XR Chest 1 View    Result Date: 7/29/2023  XR CHEST 1 VW Date of Exam: 7/29/2023  7:50 PM EDT Indication: Weakness, dizziness, altered mental status Comparison: 12/10/2022. Findings: The heart size is normal. The pulmonary vascular markings are normal. The lungs and pleural spaces are clear of active disease. There is degenerative spondylosis of the thoracic spine.     Impression: Impression: No active disease. Electronically Signed: Emil Tee  7/29/2023 8:08 PM EDT  Workstation ID: [id number]        Medical Decision Making  58-year-old female with dizziness, recently diagnosed with vertigo, symptoms persist she has been using meclizine intermittently, and she has an existing takes at night.  Differential include vertigo, dizziness, vestibular abnormality, sinusitis, intracranial abnormality, brain mass assessment, the patient did have vertigo especially with movement of her head to the left, and with change in position, she required assistance due to the vertigo when walking.  An IV was established, labs were drawn, and a CT scan was performed, she was also given IV Valium.  I reviewed and summarized previous medical records including her recent visit for vertigo to the clinic.  I interpreted all labs and discussed with the patient at the bedside, the CT scan was unremarkable also discussed these results.  On reassessment the BiPAP did help the patient tremendously, she has Xanax and meclizine at home.  I discussed the case with my supervising physician of Dr. Locke, we both agreed that this patient would benefit from seeing an ENT, she was given Dr. Garcia with ENTs number to follow-up with Monday, and a work excuse off for few days, she will continue the meclizine and Xanax at home based on the patient's presentation labs and CT scan results this is most consistent with vertigo, she will follow-up with ENT.    Problems Addressed:  Vertigo: complicated acute illness or injury    Amount and/or Complexity of Data Reviewed  Labs: ordered.  Radiology: ordered. Decision-making details documented in  ED Course.  ECG/medicine tests: ordered. Decision-making details documented in ED Course.    Risk  Prescription drug management.          Final diagnoses:   Vertigo          Raymond Alcantara Jr., EKATERINA  07/29/23 2500       Raymond Alcantara Jr., PA-C  07/30/23 0403

## 2023-07-30 LAB
QT INTERVAL: 380 MS
QTC INTERVAL: 401 MS

## 2023-08-04 ENCOUNTER — OFFICE VISIT (OUTPATIENT)
Dept: BEHAVIORAL HEALTH | Facility: CLINIC | Age: 58
End: 2023-08-04
Payer: COMMERCIAL

## 2023-08-04 ENCOUNTER — TELEPHONE (OUTPATIENT)
Dept: BARIATRICS/WEIGHT MGMT | Facility: CLINIC | Age: 58
End: 2023-08-04
Payer: COMMERCIAL

## 2023-08-04 DIAGNOSIS — F31.9 BIPOLAR 1 DISORDER: Primary | ICD-10-CM

## 2023-08-04 DIAGNOSIS — R45.86 MOOD SWINGS: ICD-10-CM

## 2023-08-04 DIAGNOSIS — Z63.8 FAMILY CONFLICT: ICD-10-CM

## 2023-08-04 SDOH — SOCIAL STABILITY - SOCIAL INSECURITY: OTHER SPECIFIED PROBLEMS RELATED TO PRIMARY SUPPORT GROUP: Z63.8

## 2023-08-23 ENCOUNTER — APPOINTMENT (OUTPATIENT)
Dept: GENERAL RADIOLOGY | Facility: HOSPITAL | Age: 58
End: 2023-08-23
Payer: COMMERCIAL

## 2023-08-23 ENCOUNTER — HOSPITAL ENCOUNTER (EMERGENCY)
Facility: HOSPITAL | Age: 58
Discharge: HOME | End: 2023-08-23
Attending: EMERGENCY MEDICINE
Payer: COMMERCIAL

## 2023-08-23 VITALS
DIASTOLIC BLOOD PRESSURE: 99 MMHG | OXYGEN SATURATION: 99 % | RESPIRATION RATE: 18 BRPM | BODY MASS INDEX: 36.57 KG/M2 | HEART RATE: 73 BPM | SYSTOLIC BLOOD PRESSURE: 166 MMHG | HEIGHT: 67 IN | WEIGHT: 233 LBS | TEMPERATURE: 98.2 F

## 2023-08-23 DIAGNOSIS — S83.91XA SPRAIN OF RIGHT KNEE, UNSPECIFIED LIGAMENT, INITIAL ENCOUNTER: Primary | ICD-10-CM

## 2023-08-23 DIAGNOSIS — S80.211A ABRASION OF RIGHT KNEE, INITIAL ENCOUNTER: ICD-10-CM

## 2023-08-23 DIAGNOSIS — S50.311A ABRASION OF RIGHT ELBOW, INITIAL ENCOUNTER: ICD-10-CM

## 2023-08-23 PROCEDURE — 73560 X-RAY EXAM OF KNEE 1 OR 2: CPT

## 2023-08-23 PROCEDURE — 99283 EMERGENCY DEPT VISIT LOW MDM: CPT

## 2023-08-23 RX ORDER — BACITRACIN ZINC, NEOMYCIN SULFATE AND POLYMYXIN B SULFATE 400; 5; 5000 [IU]/G; MG/G; [IU]/G
1 OINTMENT TOPICAL ONCE
Status: COMPLETED | OUTPATIENT
Start: 2023-08-23 | End: 2023-08-23

## 2023-08-23 RX ADMIN — BACITRACIN ZINC, NEOMYCIN SULFATE AND POLYMYXIN B SULFATE 1 APPLICATION: 400; 5; 5000 OINTMENT TOPICAL at 14:41

## 2023-08-23 NOTE — ED PROVIDER NOTES
Subjective   History of Present Illness  Pt is a 59 yo female presenting to ED with complaints of right knee pain. PMHx significant for DM (non insulin), HTN, HLD, CKD, Hypothyroidism, Bipolar, CKD, Neuropathy, TIA, RLS, Obesity, Depression and Arthritis. Pt reports tripping on edge of sidewalk yesterday landing on right knee and elbow. She came to ED for pain in right knee and knee feeling that it will give out. She has prior hx of ACL repair at  2017. She does not take blood thinners. She has been able to ambulate. She denies pain in right elbow. She cleaned abrasions with hydrogen peroxide and used Neosporin. She denies hitting her head or LOC. She denies neck or back pain. She denies tobacco, drug or ETOH use.     History provided by:  Patient and medical records    Review of Systems   Constitutional:  Negative for fever.   Eyes:  Negative for visual disturbance.   Respiratory:  Negative for shortness of breath.    Cardiovascular:  Negative for chest pain.   Gastrointestinal:  Negative for abdominal pain, diarrhea, nausea and vomiting.   Musculoskeletal:  Positive for arthralgias and joint swelling. Negative for back pain and neck pain.   Skin:  Positive for wound.   Neurological:  Negative for dizziness, weakness, numbness and headaches.     Past Medical History:   Diagnosis Date    Arthritis     knees, otc arthritis meds prn, no h/o injections.     Asthma     has not required inhaler    Bilateral ovarian cysts     Bipolar 1 disorder     Boil     opens them herself    Breast injury 07/13/2021    bruise on lateral rt breast from fall    Carpal tunnel syndrome     Chronic pain disorder     CKD (chronic kidney disease), symptom management only, stage 3 (moderate)     Creatinine 1.04 GFR 56    Colon polyp     Depression     Diabetes mellitus     Diagnosed 2017, was on metformin in the past. Last A1C 5.5%    Diverticulosis     Fatigue     GERD (gastroesophageal reflux disease)     controlled with omeprazole 20mg.  Remote EGD- esophagitis.  EGD no hiatal hernia Z line 40 cm very thick mucosal folds cannot rule out varices.  CT scan thickened fundus, no varices seen    Headache     History of bladder infections     History of blood transfusion 2006    2/2 heavy menses    History of blood transfusion     History of bronchitis     Hyperlipidemia     Hypertension     Hypothyroidism     Lower extremity edema     Morbid obesity with BMI of 40.0-44.9, adult     Peripheral neuropathy     2/2 DM    RLS (restless legs syndrome)     S/P spenser     S/P cholecystectomy     2/2 cholelithiasis    Shortness of breath on exertion     Thyroid disease     TIA (transient ischemic attack)     patient states 8-9 episodes of right sided drooping/ weakness/ vision changes. Workup was negative for CVA- thought to be related to anxiety. last episode 12/2017       Allergies   Allergen Reactions    Contrast Dye (Echo Or Unknown Ct/Mr) Other (See Comments)     Nasal congestion/ snot, IV contrast only       Past Surgical History:   Procedure Laterality Date    COLONOSCOPY  remote    diverticulosis    ENDOMETRIAL ABLATION      ENDOSCOPY  remote    esophagitis     ENDOSCOPY N/A 8/22/2019    Procedure: ESOPHAGOGASTRODUODENOSCOPY;  Surgeon: Guido Greenberg MD;  Location:  [facility];  Service: Bariatric    GASTRIC SLEEVE LAPAROSCOPIC N/A 8/22/2019    Procedure: GASTRIC SLEEVE LAPAROSCOPIC;  Surgeon: Guido Greenberg MD;  Location:  [facility];  Service: Bariatric    KNEE ACL RECONSTRUCTION Right 2013    with miniscal repair    LAPAROSCOPIC CHOLECYSTECTOMY  2001    cholelithiasis    LAPAROSCOPIC HYSTERECTOMY  2013    right ovary remains    PARAESOPHAGEAL HERNIA REPAIR N/A 8/22/2019    Procedure: PARAESOPHAGEAL HERNIA REPAIR LAPAROSCOPIC;  Surgeon: Guido Greenberg MD;  Location:  [facility];  Service: Bariatric    SINUS SURGERY      TUBAL ABDOMINAL LIGATION         Family History   Problem Relation Age of Onset    Arthritis Mother     Arthritis Father      Diabetes Father     Hyperlipidemia Father     Hypertension Father     Arthritis Sister     Diabetes Brother     Hypertension Brother     Asthma Brother     Other Brother     Obesity Brother     Obesity Daughter     Hypertension Daughter     Depression Daughter     Cancer Maternal Aunt     Depression Paternal Uncle     Cancer Paternal Grandmother     Heart disease Paternal Grandfather     Hypertension Paternal Grandfather     Breast cancer Neg Hx     Ovarian cancer Neg Hx        Social History     Socioeconomic History    Marital status:    Tobacco Use    Smoking status: Never     Passive exposure: Never    Smokeless tobacco: Never   Vaping Use    Vaping Use: Never used   Substance and Sexual Activity    Alcohol use: Not Currently    Drug use: Not Currently     Types: Marijuana, Cocaine(coke)     Comment: about 4 years ago, 32 years ago - cocaine and THC    Sexual activity: Defer     Comment: no hormones           Objective   Physical Exam  Vitals and nursing note reviewed.   Constitutional:       Appearance: She is well-developed.   HENT:      Head: Atraumatic.      Nose: Nose normal.   Eyes:      General: Lids are normal.      Conjunctiva/sclera: Conjunctivae normal.      Pupils: Pupils are equal, round, and reactive to light.   Cardiovascular:      Rate and Rhythm: Normal rate and regular rhythm.      Heart sounds: Normal heart sounds.   Pulmonary:      Effort: Pulmonary effort is normal.      Breath sounds: Normal breath sounds. No wheezing.   Abdominal:      General: There is no distension.      Palpations: Abdomen is soft.      Tenderness: There is no abdominal tenderness. There is no guarding or rebound.   Musculoskeletal:         General: Normal range of motion.      Right elbow: Swelling (mild with abrasion) present. Normal range of motion. No tenderness.      Cervical back: Normal range of motion and neck supple.      Right hip: No tenderness. Normal range of motion.      Right knee: Swelling  "(abrasion) present. No erythema. Normal range of motion. Tenderness present over the medial joint line.      Right lower leg: No swelling or tenderness.   Skin:     General: Skin is warm and dry.      Findings: No erythema or rash.   Neurological:      Mental Status: She is alert and oriented to person, place, and time.      Sensory: No sensory deficit.   Psychiatric:         Speech: Speech normal.         Behavior: Behavior normal.       Procedures           ED Course      No results found for this or any previous visit (from the past 24 hour(s)).  Note: In addition to lab results from this visit, the labs listed above may include labs taken at another facility or during a different encounter within the last 24 hours. Please correlate lab times with ED admission and discharge times for further clarification of the services performed during this visit.    XR Knee 1 or 2 View Right   Final Result   Impression:   No evidence of acute fracture or malalignment.       Postoperative changes of ACL reconstruction.      Moderate complement of degenerative changes with multiple loose bodies seen within the posterior joint.      Small amount of prepatellar soft tissue swelling.         Electronically Signed: Jared Paz MD     8/23/2023 2:16 PM EDT     Workstation ID: [id number]        Vitals:    08/23/23 1238   BP: 166/99   BP Location: Left arm   Patient Position: Sitting   Pulse: 73   Resp: 18   Temp: 98.2 øF (36.8 øC)   TempSrc: Oral   SpO2: 99%   Weight: 106 kg (233 lb)   Height: 170.2 cm (67\")     Medications   Triple Antibiotic [phone] MG-UNIT ointment 1 application  (1 application  Apply externally Given 8/23/23 1441)     ECG/EMG Results (last 24 hours)       ** No results found for the last 24 hours. **          No orders to display                                            Medical Decision Making  Pt is a 57 yo female presenting to ED with complaints of right knee pain after fall yesterday. Xray in ED " without acute fracture, noted post op changes of ACL repair and DDD. Noted loose bodies in posterior joint but no pain on exam or posterior leg wounds. Patient has abrasion to right knee and right elbow. Discussed results and tx plan. Placed in knee immobilizer and discussed wound care for abrasions. She will fu with PCP. Went over new / worse sx to return to ED.     DDx  Fracture, Sprain, Contusions, Ligament injury       Problems Addressed:  Abrasion of right elbow, initial encounter: complicated acute illness or injury  Abrasion of right knee, initial encounter: complicated acute illness or injury  Sprain of right knee, unspecified ligament, initial encounter: complicated acute illness or injury    Amount and/or Complexity of Data Reviewed  External Data Reviewed: notes.     Details: PCP  Labs:  Decision-making details documented in ED Course.  Radiology: ordered. Decision-making details documented in ED Course.    Risk  OTC drugs.        Final diagnoses:   Sprain of right knee, unspecified ligament, initial encounter   Abrasion of right knee, initial encounter   Abrasion of right elbow, initial encounter       ED Disposition  ED Disposition       ED Disposition   Discharge    Condition   Stable    Comment   --               Sukhwinder Acosta MD  [address] [address]  [phone]    Schedule an appointment as soon as possible for a visit       [facility] EMERGENCY DEPARTMENT  [address] [facility] [place] [id number] [phone]             Medication List        Changed      metFORMIN  MG 24 hr tablet  Commonly known as: GLUCOPHAGE-XR  TAKE ONE TABLET BY MOUTH TWICE A DAY BEFORE MEALS  What changed:   how to take this  when to take this                 Agustina Bills PA  08/23/23 2029

## 2023-08-24 ENCOUNTER — OFFICE VISIT (OUTPATIENT)
Dept: BARIATRICS/WEIGHT MGMT | Facility: CLINIC | Age: 58
End: 2023-08-24
Payer: COMMERCIAL

## 2023-08-24 VITALS
WEIGHT: 231.8 LBS | HEART RATE: 71 BPM | OXYGEN SATURATION: 95 % | DIASTOLIC BLOOD PRESSURE: 70 MMHG | HEIGHT: 67 IN | SYSTOLIC BLOOD PRESSURE: 128 MMHG | BODY MASS INDEX: 36.38 KG/M2

## 2023-08-24 DIAGNOSIS — F31.9 BIPOLAR 1 DISORDER: ICD-10-CM

## 2023-08-24 DIAGNOSIS — E11.42 TYPE 2 DIABETES MELLITUS WITH DIABETIC POLYNEUROPATHY, WITHOUT LONG-TERM CURRENT USE OF INSULIN: ICD-10-CM

## 2023-08-24 DIAGNOSIS — E66.9 OBESITY, CLASS II, BMI 35-39.9: Primary | ICD-10-CM

## 2023-08-24 RX ORDER — SEMAGLUTIDE 1.34 MG/ML
1 INJECTION, SOLUTION SUBCUTANEOUS WEEKLY
Qty: 3 ML | Refills: 0 | Status: SHIPPED | OUTPATIENT
Start: 2023-08-24

## 2023-08-24 RX ADMIN — CYANOCOBALAMIN 1000 MCG: 1000 INJECTION, SOLUTION INTRAMUSCULAR; SUBCUTANEOUS at 14:35

## 2023-08-24 NOTE — ASSESSMENT & PLAN NOTE
Psychological condition is worsening.  Continue current treatment regimen.  Regular aerobic exercise.  Meeting with psychology tomorrow.  Psychological condition  will be reassessed at the next regular appointment.

## 2023-08-24 NOTE — ASSESSMENT & PLAN NOTE
Diabetes is improving with treatment. Fasting AM glucose 100 and post prandial 111-115.   Continue home monitoring.   Regular aerobic exercise.  Discussed ways to avoid symptomatic hypoglycemia.  Discontinue metformin (due to hypoglycemic symptoms) and increase ozempic.   Diabetes will be reassessed in 1 month.

## 2023-08-24 NOTE — ASSESSMENT & PLAN NOTE
Patient's (Body mass index is 36.31 kg/mý.) indicates that they are morbidly/severely obese (BMI > 40 or > 35 with obesity - related health condition) with health conditions that include hypertension, diabetes mellitus, dyslipidemias, GERD, and osteoarthritis . Weight is unchanged. BMI  is above average; BMI management plan is completed. We discussed low calorie, low carb based diet program, portion control, increasing exercise, pharmacologic options including ozempic, and an koko-based approach such as Superbly Pal or Lose It.     I have instructed the patient to continue with pursuit of medical weight loss as a part of this program. Patient does meet criteria for use of anorectics at this time as BMI >30  and is not at treatment goal.     Continue nutritional focus and work towards new exercise FITT goal of: 2-2-4-2.  The current plan for this month includes:   - Do not weigh at home, this is adding to feelings of failure.  - Continue mindful food choices, start food journal for accountability if needed.   - Continue current exercise efforts  - Continue nutrition focus  - Increase ozempic to 1mg.  - Treatment goal <200lb.

## 2023-08-24 NOTE — PROGRESS NOTES
[facility]  [address]  [place] [address]     Office Note      Date: 2023  Patient Name: Marcela Ramírez  MRN: [id number]  : 1965  Subjective  Subjective     Chief Complaint  Obesity Management follow-up          Marcela Ramírez presents to [facility] for obesity management.  LSG 2019 GDW. Presurgery weight: 245lb. Patient is unsure with weight loss progress. Appetite is moderately controlled, can't tell much of a difference with taking ozempic 0.5mg. Still getting hungry but she is restricting what she allows herself to eat based on the scale. Reports no side effects of prescribed medications today. The patient is taking multivitamin and is taking fish oil.  The patient is not using a food journal.  She is checking her blood sugar twice a day (fasting am is <100) and post prandial is around 111-115. Had one episode of hypoglycemia when she first started ozempic, blood glucose was 73.  Hasn't worked in 2 days due to an injury- tripped and bruised her leg. Just had 3 weeks of vertigo, saw ENT and had a treatment and it resolved. Her depression is significant right now due to situations with her son. She is having suicidal thoughts, states she often thinks her family would be better off with her gone and that she feels like a failure as a mother and with her weight.  Has an appointment with Dr. Hernandez tomorrow and she is considering inpatient treatment.    24 hour recall:  B: coffee with splenda and low sugar creamer, protein shake  L: rye toast, ham (2 slices) and (2 slices) baby swiss rolled up (300)  S: microwave popcorn (225)  D: protein shake (150)  stops for dinner at fast food and she might get hamburger jovana.  The patient is not exercising, but when she works she is on her feet 10 hours a day ([employer]) with a FITT score of:    Frequency Intensity Time Strength Training   []   0, none []   0 []   0 []  "  0   [x]   1 (1-2x/week) [x]   1 (light) []   1 (<10 min) [x]   1 (1x/week)   [x]   2 (3-5x/week) []   2 (moderate) [x]   2 (10-20 min) []   2 (2x/week)   []   3 (daily) []   3 (moderately hard)  []   4 (very hard) []   3 (20-30 min)  []   4 (>30 min) []   3 (3-4x/week)     Review of Systems   Constitutional:  Negative for appetite change and fatigue.   Eyes:  Negative for blurred vision, double vision and visual disturbance.   Cardiovascular:  Negative for chest pain and palpitations.   Gastrointestinal:  Negative for abdominal pain, constipation, diarrhea, nausea, vomiting and GERD.   Endocrine: Negative for polydipsia, polyphagia and polyuria.   Musculoskeletal:  Positive for joint swelling. Negative for arthralgias, back pain and myalgias.   Neurological:  Negative for dizziness, tremors, light-headedness, headache and memory problem.        Parasthesias negative   Psychiatric/Behavioral:  Positive for depressed mood and stress. Negative for sleep disturbance. The patient is not nervous/anxious.      Objective   Start weight: 236.5 pounds.    Total Loss lb/%Loss of beginning body weight (BBW): -4.7lb/-1.99%  Change in weight since last visit: +1.8    Body mass index is 36.31 kg/mý.   Body composition analysis completed and showed: 48.2       Measurements (in inches) 42.5in         Vital Signs:   /70 (BP Location: Left arm, Patient Position: Sitting)   Pulse 71   Ht 170.2 cm (67\")   Wt 105 kg (231 lb 12.8 oz)   SpO2 95%   BMI 36.31 kg/mý     Physical Exam   General appears stated age and tearful   HEENT PERRLA, EOM intact, and conjunctivae normal   Chest/lungs Normal rate, Regular rhythm, and Breathing is unlabored   Extremities without edema   Neuro Good historian and No focal deficit   Skin Warm, dry, intact   Psych normal behavior, normal thought content, and normal concentration     Result Review :                Assessment / Plan        Diagnoses and all orders for this visit:    1. Obesity, " Class II, BMI 35-39.9 (Primary)  Assessment & Plan:  Patient's (Body mass index is 36.31 kg/mý.) indicates that they are morbidly/severely obese (BMI > 40 or > 35 with obesity - related health condition) with health conditions that include hypertension, diabetes mellitus, dyslipidemias, GERD, and osteoarthritis . Weight is unchanged. BMI  is above average; BMI management plan is completed. We discussed low calorie, low carb based diet program, portion control, increasing exercise, pharmacologic options including ozempic, and an koko-based approach such as DigitalSciro[employer] Pal or Lose It.     I have instructed the patient to continue with pursuit of medical weight loss as a part of this program. Patient does meet criteria for use of anorectics at this time as BMI >30  and is not at treatment goal.     Continue nutritional focus and work towards new exercise FITT goal of: 2-2-4-2.  The current plan for this month includes:   - Do not weigh at home, this is adding to feelings of failure.  - Continue mindful food choices, start food journal for accountability if needed.   - Continue current exercise efforts  - Continue nutrition focus  - Increase ozempic to 1mg.  - Treatment goal <200lb.       Orders:  -     Semaglutide, 1 MG/DOSE, (Ozempic, 1 MG/DOSE,) 4 MG/3ML solution pen-injector; Inject 1 mg under the skin into the appropriate area as directed 1 (One) Time Per Week.  Dispense: 3 mL; Refill: 0    2. Type 2 diabetes mellitus with diabetic polyneuropathy, without long-term current use of insulin  Assessment & Plan:  Diabetes is improving with treatment. Fasting AM glucose 100 and post prandial 111-115.   Continue home monitoring.   Regular aerobic exercise.  Discussed ways to avoid symptomatic hypoglycemia.  Discontinue metformin (due to hypoglycemic symptoms) and increase ozempic.   Diabetes will be reassessed in 1 month.      3. Bipolar 1 disorder  Assessment & Plan:  Psychological condition is worsening.  Continue current  treatment regimen.  Regular aerobic exercise.  Meeting with psychology tomorrow.  Psychological condition  will be reassessed at the next regular appointment.            Follow Up   Return in about 1 month (around 9/24/2023) for Next scheduled follow up.  Patient was given instructions and counseling regarding her condition or for health maintenance advice. Please see specific information pulled into the AVS if appropriate.     Zeynep Jenkins, NOE  08/24/2023

## 2023-08-25 ENCOUNTER — PRIOR AUTHORIZATION (OUTPATIENT)
Dept: BARIATRICS/WEIGHT MGMT | Facility: CLINIC | Age: 58
End: 2023-08-25
Payer: COMMERCIAL

## 2023-08-25 ENCOUNTER — TELEPHONE (OUTPATIENT)
Dept: BARIATRICS/WEIGHT MGMT | Facility: CLINIC | Age: 58
End: 2023-08-25
Payer: COMMERCIAL

## 2023-08-25 ENCOUNTER — OFFICE VISIT (OUTPATIENT)
Dept: BEHAVIORAL HEALTH | Facility: CLINIC | Age: 58
End: 2023-08-25
Payer: COMMERCIAL

## 2023-08-25 DIAGNOSIS — Z63.8 FAMILY CONFLICT: ICD-10-CM

## 2023-08-25 DIAGNOSIS — Z63.72 ALCOHOLISM AND DRUG ADDICTION IN FAMILY: ICD-10-CM

## 2023-08-25 DIAGNOSIS — F31.9 BIPOLAR 1 DISORDER: Primary | ICD-10-CM

## 2023-08-25 PROCEDURE — 90837 PSYTX W PT 60 MINUTES: CPT | Performed by: PSYCHOLOGIST

## 2023-08-25 SDOH — SOCIAL STABILITY - SOCIAL INSECURITY: OTHER SPECIFIED PROBLEMS RELATED TO PRIMARY SUPPORT GROUP: Z63.8

## 2023-08-25 NOTE — TELEPHONE ENCOUNTER
[facility] faxed over a PA request for the 1mg Ozempic. PT does have active PA for 0.25 mg but [facility] will not accept that for the 1mg.     Discussed with Maria Del Carmen, will try to submit another PA.

## 2023-08-25 NOTE — PROGRESS NOTES
PROGRESS NOTE    Data:  Marcela Ramírez is a 58 y.o. female who met with the undersigned for a scheduled telehealth therapy session from 11:15 - 12:10 pm.      Clinical Maneuvering/Intervention:      The pt talked about her recent stressors, mainly having to do with her son relapsing with substance abuse. She also talked about struggling with connecting to G. Stressors were processed individually and in detail. Venting of frustrations was conducted in order to help the pt feel less tense emotionally and gain insight into issues. Feelings were processed and validated several times in session. Perspective taking was conducted multiple times in order to help the pt feel less stuck, less overwhelmed, and see challenges as much more manageable. Active listening was conducted in order to help the pt make sense of stressors and start moving towards potential solutions. The pt was assisted with finding solutions based on existing skill-set and abilities. She was assisted with identifying ways to cope with stress of her son relapsing. She was quite tearful when talking about how she worries about him, what he is doing, and where he is staying. She was supported as she talked about processing her stress with different loved ones in her life and commended for reaching out (including doing counseling today) in order to cope. Time was allocated specifically to assess what is 'working' in the pt's life, versus what is 'not working,' (if anything) in terms of helping to improve mood and quality of life. Stress reduction was conducted today. An action plan was designed to empower the pt to know what to do. Simple steps of one, two, three were laid out in order to help the pt feel more in control of things and feel less stressed. She will continue to lean on loved ones in order to process how she feels and receive their support/understanding. She will look into Al-Anon/Dashawn-Anon and start attending these meetings in order to feel  more supported dealing with a loved one who abuses substances. Information on where and when to start these meetings was provided. She agreed to attend an Al-anon meeting this coming Thursday. Homework was assigned tailored to pt's needs. The pt expressed gratitude for today's session.    Mental Status Exam  Hygiene:  good  Dress: normal  Attitude:  cooperative and proactive  Motor Activity: normal  Speech: normal  Mood:  tense, upset  Affect:  congruent  Thought Processes: normal  Thought Content:  normal  Suicidal Thoughts:  not endorsed  Homicidal Thoughts:  not endorsed  Crisis Safety Plan: not needed  Hallucinations:  none      Patient's Support Network Includes:  family, friends      Progress toward goal: there is evidence to suggest that she struggles with mood swings      Functional Status: moderate       Prognosis: good with counseling, medication    Assessment      The pt presented to be struggling with managing her moods related to having bipolar disorder and family conflicts. She seems to benefit from medication and empowerment therapy. She seems to benefit from venting, having her experiences validated, and being assisted in discovering her own solutions to her problems.       Plan      In order to diminish symptoms of bipolar disorder: the pt is to continue with counseling, being open/transparent with family (particularly G), and continue on psychotropic medication as prescribed. She is to start attending Al-anon/Dashawn-anon meetings as soon as feasible in order to feel supported in dealing with a loved one in active addiction and in turn, feel less distressed.     Maribel Hernandez, PhD, LP

## 2023-08-25 NOTE — TELEPHONE ENCOUNTER
Marcela Ramírez (Key: [id number])  Ozempic (1 MG/DOSE) 4MG/3ML pen-injectors      The prior authorization request has been cancelled, has active PA approval.

## 2023-08-28 ENCOUNTER — PRIOR AUTHORIZATION (OUTPATIENT)
Dept: BARIATRICS/WEIGHT MGMT | Facility: CLINIC | Age: 58
End: 2023-08-28
Payer: COMMERCIAL

## 2023-08-30 NOTE — TELEPHONE ENCOUNTER
Member does not meet criteria for approval. Based on the information received from the doctor's office, member does not have clinical documentation supporting a diagnosis of type 2 diabetes    Will submit PA again with A1c and LOV notes.

## 2023-09-01 RX ORDER — DESVENLAFAXINE SUCCINATE 50 MG/1
TABLET, EXTENDED RELEASE ORAL
Qty: 30 TABLET | Refills: 1 | Status: SHIPPED | OUTPATIENT
Start: 2023-09-01

## 2023-09-01 RX ORDER — LAMOTRIGINE 200 MG/1
TABLET ORAL
Qty: 30 TABLET | Refills: 1 | Status: SHIPPED | OUTPATIENT
Start: 2023-09-01

## 2023-09-05 ENCOUNTER — PRIOR AUTHORIZATION (OUTPATIENT)
Dept: BARIATRICS/WEIGHT MGMT | Facility: CLINIC | Age: 58
End: 2023-09-05
Payer: COMMERCIAL

## 2023-09-05 DIAGNOSIS — E11.42 TYPE 2 DIABETES MELLITUS WITH DIABETIC POLYNEUROPATHY, WITHOUT LONG-TERM CURRENT USE OF INSULIN: Primary | ICD-10-CM

## 2023-09-05 NOTE — TELEPHONE ENCOUNTER
Called pts [employer] to submit PA over the phone due to Cover My Meds not working/sending PA properly.     Case ID: [id number]    Faxed LOV and A1c labs to [phone] as requested.     Will let pt know status of this today.

## 2023-09-07 ENCOUNTER — TELEPHONE (OUTPATIENT)
Dept: BARIATRICS/WEIGHT MGMT | Facility: CLINIC | Age: 58
End: 2023-09-07
Payer: COMMERCIAL

## 2023-09-07 NOTE — TELEPHONE ENCOUNTER
Called pt to give update on the Ozempic 1MG PA. Advised that her primary insurance is not approving her PA due to medication utilization requirements are not met due to A1c not currently greater than 6.5.     Sent message to Zeynep about sending in refill on the 0.5MG to keep her from missing a dose until an appeal can be done.    Pt understood with no further questions or concerns. Thanked me for all our efforts in trying to get this approved.     Will discuss appeal with SERGEI Joyce tomorrow 09/07

## 2023-09-07 NOTE — TELEPHONE ENCOUNTER
Called pt, advised that 0.5MG refill was sent in. Pt understood with no further questions or concerns.

## 2023-09-07 NOTE — TELEPHONE ENCOUNTER
PA for the 1mg was denied again due to not meeting Utilization Criteria (no longer having an A1c greater than 6.5)      Pt took her last dose of the 0.5mg last Friday 09/01, is due for her injection tomorrow, still has active PA with her Medicaid on this dosage, would like refill if able to since she cannot get the 1mg dosage.     Currently working with Maria Del Carmen on possible appeal. Please advise, thanks!

## 2023-09-14 ENCOUNTER — PRIOR AUTHORIZATION (OUTPATIENT)
Dept: BARIATRICS/WEIGHT MGMT | Facility: CLINIC | Age: 58
End: 2023-09-14
Payer: COMMERCIAL

## 2023-09-14 ENCOUNTER — TELEPHONE (OUTPATIENT)
Dept: BARIATRICS/WEIGHT MGMT | Facility: CLINIC | Age: 58
End: 2023-09-14
Payer: COMMERCIAL

## 2023-09-14 NOTE — TELEPHONE ENCOUNTER
Pt called left  asking for status update on the 1mg Ozempic.     I called pt back, advised that I am working with Maria Del Carmen on this, submitted to her [employer] insurance for approval. If approved will send to different pharmacy because [facility] will not fill rx without billing primary [employer] insurance.     Pt understood with no further questions or concerns. Thanked me for the update.

## 2023-09-15 NOTE — TELEPHONE ENCOUNTER
Called [facility] to verify that this went through, they will be able to fill this for her on Monday.     Called pt to let her know she will get able to get this on Monday from the pharmacy.

## 2023-09-22 ENCOUNTER — OFFICE VISIT (OUTPATIENT)
Dept: BEHAVIORAL HEALTH | Facility: CLINIC | Age: 58
End: 2023-09-22
Payer: COMMERCIAL

## 2023-09-22 ENCOUNTER — TELEPHONE (OUTPATIENT)
Dept: BARIATRICS/WEIGHT MGMT | Facility: CLINIC | Age: 58
End: 2023-09-22

## 2023-09-22 DIAGNOSIS — E11.42 TYPE 2 DIABETES MELLITUS WITH DIABETIC POLYNEUROPATHY, WITHOUT LONG-TERM CURRENT USE OF INSULIN: ICD-10-CM

## 2023-09-22 DIAGNOSIS — F31.9 BIPOLAR 1 DISORDER: Primary | ICD-10-CM

## 2023-09-22 DIAGNOSIS — R45.4 FEELING IRRITABLE: ICD-10-CM

## 2023-09-22 DIAGNOSIS — R45.86 MOOD SWINGS: ICD-10-CM

## 2023-09-22 DIAGNOSIS — Z56.6 WORK STRESS: ICD-10-CM

## 2023-09-22 DIAGNOSIS — F51.01 PRIMARY INSOMNIA: ICD-10-CM

## 2023-09-22 PROCEDURE — 90834 PSYTX W PT 45 MINUTES: CPT | Performed by: PSYCHOLOGIST

## 2023-09-22 SDOH — HEALTH STABILITY - MENTAL HEALTH: OTHER PHYSICAL AND MENTAL STRAIN RELATED TO WORK: Z56.6

## 2023-09-22 NOTE — TELEPHONE ENCOUNTER
Pt asking to get a week of her xanax to last her til next Thursday when she has an appointment with [facility]. She was unable to make her appt this week since she had to work. Please advise.

## 2023-09-22 NOTE — TELEPHONE ENCOUNTER
Patient stopped by to discuss PA issue that she is having. Attempted to switch pharmacy's through [facility] but [facility] on [address] was not seeing in there system.   Called [facility] to give a verbal to have ozempic 1 mg transferred over.     Goal is for patient to continue getting ozempic through her well care coverage which PA is approved. [facility] can not run the well care if [facility] has previously denied this.   Hopefull that [facility] can.

## 2023-09-22 NOTE — PROGRESS NOTES
PROGRESS NOTE    Data:  Marcela Ramírez is a 58 y.o. female who met with the undersigned for a scheduled psychotherapy session from 11:20 am - 12:15 pm.      Clinical Maneuvering/Intervention:      The pt talked about recent difficulties with bipolar symptoms. She had to leave work the other day because she felt too distressed/manic the other day. Her speech was pressured as she described recent stress. Medication management was reviewed to her and the importance of seeing a psychiatrist. Stressors were processed individually and in detail. Venting of frustrations was conducted in order to help the pt feel less tense emotionally and gain insight into issues. Feelings were processed and validated several times in session. Perspective taking was conducted multiple times in order to help the pt feel less stuck, less overwhelmed, and see challenges as much more manageable. Active listening was conducted in order to help the pt make sense of stressors and start moving towards potential solutions. The pt was assisted with finding solutions based on existing skill-set and abilities. Time was allocated specifically to assess what is 'working' in the pt's life, versus what is 'not working,' (if anything) in terms of helping to improve mood and quality of life. An action plan was designed to empower the pt to know what to do. She was assisted with making sense of her frustrations, whether it be G when it seems like he does not know how to support her, or when she feels prevented from doing her job at [employer] (e.g., when a reader or mechanical issue happens). The pt's strengths were identified in order to help identify abilities to use to better face/overcome challenges. It was noted that even when she is feeling very distressed, irritable, and angry, she tends to still be able to think clearly and she (less and less) is taking it out on others. She is quite skilled with be empathetic in general. Medication management was  reviewed at the end of session. Homework was assigned tailored to pt's needs. She is to have a medication consult with a psychiatrist as soon as feasible. An action plan was designed to empower the pt to know what to do. Simple steps of one, two, three were laid out in order to help the pt feel more in control of things and feel less stressed. The pt expressed gratitude for today's session.    Mental Status Exam  Hygiene:  good  Dress: normal  Attitude:  cooperative and proactive  Motor Activity: normal  Speech: normal  Mood:  tense, irritable  Affect:  congruent  Thought Processes: normal  Thought Content:  normal  Suicidal Thoughts:  not endorsed  Homicidal Thoughts:  not endorsed  Crisis Safety Plan: not needed  Hallucinations:  none      Patient's Support Network Includes:  family, friends      Progress toward goal: there is evidence to suggest that she struggles with mood swings      Functional Status: moderate       Prognosis: good with counseling, medication    Assessment      The pt presented to be struggling with managing her moods related to having bipolar disorder and job stress. She seemed open to medication management and seeing a psychiatrist to help her with managing bipolar symptoms (mood swings, irritability, anxiety). She seems to benefit from venting, having her experiences validated, and being assisted in discovering her own solutions to her problems.       Plan      In order to diminish symptoms of bipolar disorder: the pt is to continue with counseling ongoing, being open/transparent with family ongoing (particularly G), and meet with a psychiatrist as soon as feasible for medication/consultation and management.    Maribel Hernandez, PhD, LP

## 2023-09-25 RX ORDER — ALPRAZOLAM 1 MG/1
1 TABLET ORAL NIGHTLY PRN
Qty: 30 TABLET | Refills: 0 | Status: SHIPPED | OUTPATIENT
Start: 2023-09-25

## 2023-09-28 ENCOUNTER — LAB (OUTPATIENT)
Dept: FAMILY MEDICINE CLINIC | Facility: CLINIC | Age: 58
End: 2023-09-28
Payer: COMMERCIAL

## 2023-09-28 ENCOUNTER — TELEPHONE (OUTPATIENT)
Dept: FAMILY MEDICINE CLINIC | Facility: CLINIC | Age: 58
End: 2023-09-28
Payer: COMMERCIAL

## 2023-09-28 ENCOUNTER — OFFICE VISIT (OUTPATIENT)
Dept: FAMILY MEDICINE CLINIC | Facility: CLINIC | Age: 58
End: 2023-09-28
Payer: COMMERCIAL

## 2023-09-28 ENCOUNTER — OFFICE VISIT (OUTPATIENT)
Dept: BARIATRICS/WEIGHT MGMT | Facility: CLINIC | Age: 58
End: 2023-09-28
Payer: COMMERCIAL

## 2023-09-28 VITALS
WEIGHT: 232.6 LBS | BODY MASS INDEX: 36.51 KG/M2 | SYSTOLIC BLOOD PRESSURE: 130 MMHG | HEART RATE: 74 BPM | OXYGEN SATURATION: 100 % | HEIGHT: 67 IN | DIASTOLIC BLOOD PRESSURE: 82 MMHG

## 2023-09-28 VITALS
HEIGHT: 67 IN | BODY MASS INDEX: 37.26 KG/M2 | SYSTOLIC BLOOD PRESSURE: 128 MMHG | DIASTOLIC BLOOD PRESSURE: 76 MMHG | OXYGEN SATURATION: 99 % | RESPIRATION RATE: 16 BRPM | HEART RATE: 62 BPM | WEIGHT: 237.4 LBS | TEMPERATURE: 97.3 F

## 2023-09-28 DIAGNOSIS — E11.65 UNCONTROLLED TYPE 2 DIABETES MELLITUS WITH HYPERGLYCEMIA: ICD-10-CM

## 2023-09-28 DIAGNOSIS — E66.9 OBESITY, CLASS II, BMI 35-39.9: Primary | ICD-10-CM

## 2023-09-28 DIAGNOSIS — E11.42 TYPE 2 DIABETES MELLITUS WITH DIABETIC POLYNEUROPATHY, WITHOUT LONG-TERM CURRENT USE OF INSULIN: ICD-10-CM

## 2023-09-28 DIAGNOSIS — Z12.31 ENCOUNTER FOR SCREENING MAMMOGRAM FOR MALIGNANT NEOPLASM OF BREAST: ICD-10-CM

## 2023-09-28 DIAGNOSIS — Z12.11 COLON CANCER SCREENING: ICD-10-CM

## 2023-09-28 DIAGNOSIS — Z00.00 ROUTINE GENERAL MEDICAL EXAMINATION AT A HEALTH CARE FACILITY: Primary | ICD-10-CM

## 2023-09-28 LAB
ALBUMIN SERPL-MCNC: 4.3 G/DL (ref 3.5–5.2)
ALBUMIN/GLOB SERPL: 1.4 G/DL
ALP SERPL-CCNC: 89 U/L (ref 39–117)
ALT SERPL W P-5'-P-CCNC: 18 U/L (ref 1–33)
ANION GAP SERPL CALCULATED.3IONS-SCNC: 11.1 MMOL/L (ref 5–15)
AST SERPL-CCNC: 22 U/L (ref 1–32)
BILIRUB SERPL-MCNC: 0.4 MG/DL (ref 0–1.2)
BUN SERPL-MCNC: 13 MG/DL (ref 6–20)
BUN/CREAT SERPL: 10.2 (ref 7–25)
CALCIUM SPEC-SCNC: 9.6 MG/DL (ref 8.6–10.5)
CHLORIDE SERPL-SCNC: 99 MMOL/L (ref 98–107)
CHOLEST SERPL-MCNC: 147 MG/DL (ref 0–200)
CO2 SERPL-SCNC: 28.9 MMOL/L (ref 22–29)
CREAT SERPL-MCNC: 1.27 MG/DL (ref 0.57–1)
DEPRECATED RDW RBC AUTO: 43.1 FL (ref 37–54)
EGFRCR SERPLBLD CKD-EPI 2021: 49.7 ML/MIN/1.73
ERYTHROCYTE [DISTWIDTH] IN BLOOD BY AUTOMATED COUNT: 12.7 % (ref 12.3–15.4)
GLOBULIN UR ELPH-MCNC: 3 GM/DL
GLUCOSE SERPL-MCNC: 83 MG/DL (ref 65–99)
HCT VFR BLD AUTO: 37.9 % (ref 34–46.6)
HDLC SERPL-MCNC: 56 MG/DL (ref 40–60)
HGB BLD-MCNC: 12.7 G/DL (ref 12–15.9)
LDLC SERPL CALC-MCNC: 63 MG/DL (ref 0–100)
LDLC/HDLC SERPL: 1.03 {RATIO}
MCH RBC QN AUTO: 31.1 PG (ref 26.6–33)
MCHC RBC AUTO-ENTMCNC: 33.5 G/DL (ref 31.5–35.7)
MCV RBC AUTO: 92.7 FL (ref 79–97)
PLATELET # BLD AUTO: 195 10*3/MM3 (ref 140–450)
PMV BLD AUTO: 12.2 FL (ref 6–12)
POC CREATININE URINE: 8.8
POC MICROALBUMIN URINE: 80
POTASSIUM SERPL-SCNC: 4.1 MMOL/L (ref 3.5–5.2)
PROT SERPL-MCNC: 7.3 G/DL (ref 6–8.5)
RBC # BLD AUTO: 4.09 10*6/MM3 (ref 3.77–5.28)
SODIUM SERPL-SCNC: 139 MMOL/L (ref 136–145)
TRIGL SERPL-MCNC: 166 MG/DL (ref 0–150)
TSH SERPL DL<=0.05 MIU/L-ACNC: 2.56 UIU/ML (ref 0.27–4.2)
VLDLC SERPL-MCNC: 28 MG/DL (ref 5–40)
WBC NRBC COR # BLD: 7.49 10*3/MM3 (ref 3.4–10.8)

## 2023-09-28 PROCEDURE — 82044 UR ALBUMIN SEMIQUANTITATIVE: CPT | Performed by: PHYSICIAN ASSISTANT

## 2023-09-28 PROCEDURE — 99214 OFFICE O/P EST MOD 30 MIN: CPT | Performed by: NURSE PRACTITIONER

## 2023-09-28 PROCEDURE — 80050 GENERAL HEALTH PANEL: CPT | Performed by: PHYSICIAN ASSISTANT

## 2023-09-28 PROCEDURE — 3074F SYST BP LT 130 MM HG: CPT | Performed by: PHYSICIAN ASSISTANT

## 2023-09-28 PROCEDURE — 83036 HEMOGLOBIN GLYCOSYLATED A1C: CPT | Performed by: PHYSICIAN ASSISTANT

## 2023-09-28 PROCEDURE — 3078F DIAST BP <80 MM HG: CPT | Performed by: PHYSICIAN ASSISTANT

## 2023-09-28 PROCEDURE — 99396 PREV VISIT EST AGE 40-64: CPT | Performed by: PHYSICIAN ASSISTANT

## 2023-09-28 PROCEDURE — 3044F HG A1C LEVEL LT 7.0%: CPT | Performed by: PHYSICIAN ASSISTANT

## 2023-09-28 PROCEDURE — 36415 COLL VENOUS BLD VENIPUNCTURE: CPT | Performed by: PHYSICIAN ASSISTANT

## 2023-09-28 PROCEDURE — 80061 LIPID PANEL: CPT | Performed by: PHYSICIAN ASSISTANT

## 2023-09-28 RX ORDER — SEMAGLUTIDE 1.34 MG/ML
INJECTION, SOLUTION SUBCUTANEOUS
COMMUNITY
Start: 2023-09-22

## 2023-09-28 RX ORDER — SEMAGLUTIDE 2.68 MG/ML
2 INJECTION, SOLUTION SUBCUTANEOUS WEEKLY
Qty: 3 ML | Refills: 0 | Status: SHIPPED | OUTPATIENT
Start: 2023-09-28

## 2023-09-28 NOTE — PROGRESS NOTES
Subjective   Marcela Ramírez is a 58 y.o. female  Anxiety (RF Xanax ), Diabetes, and Annual Exam (Fasting for labs. Declines MMG, colorectal screening. )      History of Present Illness  Patient is a pleasant 58-year-old white female who comes in complaining of preventive medical examination is type 2 diabetes and chronic anxiety needs refill of medications.  The following portions of the patient's history were reviewed and updated as appropriate: allergies, current medications, past social history and problem list    Review of Systems   Constitutional: Negative.    HENT: Negative.     Eyes: Negative.    Respiratory: Negative.     Cardiovascular: Negative.    Gastrointestinal: Negative.    Endocrine: Negative.    Genitourinary: Negative.    Musculoskeletal: Negative.    Skin: Negative.    Allergic/Immunologic: Negative.    Neurological: Negative.    Hematological: Negative.    Psychiatric/Behavioral: Negative.     All other systems reviewed and are negative.    Objective     Vitals:    09/28/23 1122   BP: 128/76   Pulse: 62   Resp: 16   Temp: 97.3 °F (36.3 °C)   SpO2: 99%       Physical Exam  Vitals and nursing note reviewed.   Constitutional:       General: She is not in acute distress.     Appearance: Normal appearance. She is well-developed. She is not ill-appearing, toxic-appearing or diaphoretic.   HENT:      Head: Normocephalic and atraumatic.      Right Ear: External ear normal.      Left Ear: External ear normal.   Eyes:      Conjunctiva/sclera: Conjunctivae normal.      Pupils: Pupils are equal, round, and reactive to light.   Neck:      Thyroid: No thyromegaly.      Vascular: No carotid bruit or JVD.   Cardiovascular:      Rate and Rhythm: Normal rate and regular rhythm.      Pulses: Normal pulses.      Heart sounds: Normal heart sounds. No murmur heard.  Pulmonary:      Effort: Pulmonary effort is normal. No respiratory distress.      Breath sounds: Normal breath sounds.   Abdominal:      General:  Bowel sounds are normal.      Palpations: Abdomen is soft. There is no mass.      Tenderness: There is no abdominal tenderness.   Musculoskeletal:         General: No swelling. Normal range of motion.      Cervical back: Normal range of motion and neck supple.   Lymphadenopathy:      Cervical: No cervical adenopathy.   Skin:     General: Skin is warm and dry.      Findings: No lesion or rash.   Neurological:      Mental Status: She is alert and oriented to person, place, and time.      Cranial Nerves: No cranial nerve deficit.      Sensory: No sensory deficit.      Motor: No weakness.      Coordination: Coordination normal.      Gait: Gait normal.      Deep Tendon Reflexes: Reflexes are normal and symmetric.   Psychiatric:         Mood and Affect: Mood normal.         Behavior: Behavior normal.         Thought Content: Thought content normal.         Judgment: Judgment normal.       Assessment & Plan     Diagnoses and all orders for this visit:    1. Routine general medical examination at a health care facility (Primary)  -     Comprehensive Metabolic Panel; Future  -     Lipid Panel; Future  -     TSH; Future  -     CBC (No Diff); Future  -     Hemoglobin A1c; Future    2. Encounter for screening mammogram for malignant neoplasm of breast  -     Mammo Screening Digital Tomosynthesis Bilateral With CAD; Future    3. Colon cancer screening  -     Ambulatory Referral For Screening Colonoscopy    Xanax 1 mg 1 p.o. nightly 30x5 refills    Preventive medicine discussed, diet, exercise, healthy living discussed at length.  Discussed nutrition, physical activity, healthy weight, injury prevention, misuse of tobacco, alcohol and drugs, dental health, mental health, immunizations, screening    Part of this note may be an electronic transcription/translation of spoken language to printed text using the Dragon Dictation System.

## 2023-09-28 NOTE — ASSESSMENT & PLAN NOTE
Diabetes is improving with treatment.   Continue current treatment regimen.  Regular aerobic exercise.  Diabetes will be reassessed in 1 month.  Increase ozempic to 2mg for optimal treatment of diabetes and obesity.

## 2023-09-28 NOTE — PROGRESS NOTES
[facility]  [address]  [place] [address]     Office Note      Date: 2023  Patient Name: Marcela Ramírez  MRN: [id number]  : 1965  Subjective  Subjective     Chief Complaint  Obesity Management follow-up          Marcela Ramírez presents to [facility] for obesity management.  LSG 2019 GDW. Presurgery weight: 245lb.   Patient is satisfied with weight loss progress. Appetite is well controlled since increasing ozempic to 1mg. She is not as hungry between meals, not snacking as much. Also eating smaller portions. Reports no side effects of prescribed medications today. The patient is taking multivitamin and is taking fish oil.  The patient is not using a food journal.   24 hour recall:  B: yogurt  S: protein shake   L: deli ham and swiss cheese roll ups and flip cup yogurt  D: - Wednesday works 10 hour and eats more ham and cheese roll ups or frozen smart ones and lean cuisines   AM fasting glucose is 110-112, denies hypoglycemia.   She is sleeping better.   The patient is exercising with a FITT score of:    Frequency Intensity Time Strength Training   []   0, none []   0 []   0 [x]   0   [x]   1 (1-2x/week) [x]   1 (light) []   1 (<10 min) []   1 (1x/week)   []   2 (3-5x/week) []   2 (moderate) [x]   2 (10-20 min) []   2 (2x/week)   []   3 (daily) []   3 (moderately hard)  []   4 (very hard) []   3 (20-30 min)  []   4 (>30 min) []   3 (3-4x/week)     Review of Systems   Constitutional:  Negative for appetite change and fatigue.   Eyes:  Negative for blurred vision, double vision and visual disturbance.   Cardiovascular:  Negative for chest pain and palpitations.   Gastrointestinal:  Negative for abdominal pain, constipation, diarrhea, nausea, vomiting and GERD.   Endocrine: Negative for polydipsia, polyphagia and polyuria.   Musculoskeletal:  Negative for arthralgias, back pain and myalgias.  "  Neurological:  Negative for dizziness, tremors, light-headedness, headache and memory problem.        Parasthesias negative   Psychiatric/Behavioral:  Negative for sleep disturbance, depressed mood and stress. The patient is not nervous/anxious.      Objective   Start weight: 236.5 pounds.    Total Loss lb/%Loss of beginning body weight (BBW): -4.5lb/-1.90%  Change in weight since last visit: +0.5    Body mass index is 36.43 kg/m².   Body composition analysis completed and showed:   Body Fat %: 47.4 (improved from 48.2% last visit)    Measurements (in inches)  Waist Circumference: 46.5      Vital Signs:   /82   Pulse 74   Ht 170.2 cm (67\")   Wt 106 kg (232 lb 9.6 oz)   SpO2 100%   BMI 36.43 kg/m²     Physical Exam   General appears stated age and normal appearance   HEENT PERRLA, EOM intact, and conjunctivae normal   Chest/lungs Normal rate, Regular rhythm, and Breathing is unlabored   Extremities without edema   Neuro Good historian and No focal deficit   Skin Warm, dry, intact   Psych normal behavior, normal thought content, and normal concentration     Result Review :                Assessment / Plan        Diagnoses and all orders for this visit:    1. Obesity, Class II, BMI 35-39.9 (Primary)  Assessment & Plan:  Patient's (Body mass index is 36.43 kg/m².) indicates that they are morbidly/severely obese (BMI > 40 or > 35 with obesity - related health condition) with health conditions that include hypertension, diabetes mellitus, dyslipidemias, GERD, osteoarthritis, and OA  . Weight is unchanged. BMI  is above average; BMI management plan is completed. We discussed low calorie, low carb based diet program, portion control, increasing exercise, joining a fitness center or start home based exercise program, management of depression/anxiety/stress to control compensatory eating, pharmacologic options including ozempic, and an koko-based approach such as [employer] Pal or Lose It.     I have instructed the " patient to continue with pursuit of medical weight loss as a part of this program. Patient does meet criteria for use of anorectics at this time as BMI >30  and is not at treatment goal.     The current plan for this month includes:   - Continue nutrition focus  - Continue to prioritize protein, fiber, and hydration   - Increase ozempic to 2mg  - Treatment goal <200lb        2. Type 2 diabetes mellitus with diabetic polyneuropathy, without long-term current use of insulin  Assessment & Plan:  Diabetes is improving with treatment.   Continue current treatment regimen.  Regular aerobic exercise.  Diabetes will be reassessed in 1 month.  Increase ozempic to 2mg for optimal treatment of diabetes and obesity.     Orders:  -     Semaglutide, 2 MG/DOSE, (Ozempic, 2 MG/DOSE,) 8 MG/3ML solution pen-injector; Inject 2 mg under the skin into the appropriate area as directed 1 (One) Time Per Week.  Dispense: 3 mL; Refill: 0          Follow Up   Return in about 6 weeks (around 11/9/2023) for Next scheduled follow up.  Patient was given instructions and counseling regarding her condition or for health maintenance advice. Please see specific information pulled into the [facility] if appropriate.     Zeynep Jenkins, APRN  09/28/2023

## 2023-09-28 NOTE — TELEPHONE ENCOUNTER
PA done on [email or web address] but said that an active PA was on file and doesn't  until 3/24/24. Message left informing patient and to make sure that the pharmacy is running it through her correct/current insurance.

## 2023-09-28 NOTE — TELEPHONE ENCOUNTER
"Caller: Marcela Ramírez Sari \"Helen\"    Relationship: Self    Best call back number: [phone]     What medications are you currently taking:   Current Outpatient Medications on File Prior to Visit   Medication Sig Dispense Refill    ALPRAZolam (XANAX) 1 MG tablet Take 1 tablet by mouth At Night As Needed for Anxiety. 30 tablet 0    amLODIPine (NORVASC) 5 MG tablet Take 1 tablet by mouth Daily. 30 tablet 2    atorvastatin (LIPITOR) 20 MG tablet TAKE ONE TABLET BY MOUTH DAILY 30 tablet 2    desvenlafaxine (PRISTIQ) 50 MG 24 hr tablet TAKE ONE TABLET BY MOUTH DAILY 30 tablet 1    glucose blood test strip Use as instructed to check glucose twice a day Dispense Onetouch ultra 2 100 each 3    hydroCHLOROthiazide (HYDRODIURIL) 25 MG tablet Take 1 tablet by mouth Daily. 90 tablet 3    lamoTRIgine (LaMICtal) 200 MG tablet TAKE ONE TABLET BY MOUTH ONCE NIGHTLY 30 tablet 1    Lancets misc Use to check glucose twice a day Dispense Onetouch ultra 2 100 each 3    levothyroxine (SYNTHROID, LEVOTHROID) 50 MCG tablet Take 1 tablet by mouth Every Morning. 30 tablet 2    lisinopril (PRINIVIL,ZESTRIL) 40 MG tablet TAKE ONE TABLET BY MOUTH DAILY 30 tablet 5    meclizine (Antivert) 50 MG tablet Take 1 tablet by mouth 3 (Three) Times a Day As Needed for Dizziness or Nausea. 21 tablet 0    metFORMIN ER (GLUCOPHAGE-XR) 500 MG 24 hr tablet TAKE ONE TABLET BY MOUTH TWICE A DAY BEFORE MEALS (Patient taking differently: 1 tablet Daily.) 60 tablet 5    metoprolol succinate XL (TOPROL-XL) 50 MG 24 hr tablet TAKE ONE TABLET BY MOUTH DAILY 30 tablet 5    omeprazole (priLOSEC) 20 MG capsule TAKE ONE CAPSULE BY MOUTH DAILY 30 capsule 11    ondansetron (Zofran) 4 MG tablet Take 1 tablet by mouth Every 8 (Eight) Hours As Needed for Nausea or Vomiting. 30 tablet 0    Ozempic, 1 MG/DOSE, 4 MG/3ML solution pen-injector INJECT 1 MG ONCE A WEEK SUBCUTANEOUSLY      pregabalin (Lyrica) 100 MG capsule Take 1 capsule by mouth 3 (Three) Times a Day. 90 " capsule 5    rotigotine (Neupro) 1 MG/24HR patch 24 hour 24 hour patch Place 1 patch on the skin as directed by provider Daily. 30 patch 10    Semaglutide, 2 MG/DOSE, (Ozempic, 2 MG/DOSE,) 8 MG/3ML solution pen-injector Inject 2 mg under the skin into the appropriate area as directed 1 (One) Time Per Week. 3 mL 0    tamsulosin (FLOMAX) 0.4 MG capsule 24 hr capsule TAKE 1 CAPSULE BY MOUTH DAILY 30 capsule 5    [DISCONTINUED] Semaglutide,0.25 or 0.5MG/DOS, (OZEMPIC) 2 MG/3ML solution pen-injector Inject 0.5 mg under the skin into the appropriate area as directed 1 (One) Time Per Week. 3 mL 2     Current Facility-Administered Medications on File Prior to Visit   Medication Dose Route Frequency Provider Last Rate Last Admin    cyanocobalamin injection 1,000 mcg  1,000 mcg Intramuscular Q28 Days Zeynep Jenkins, APRN   1,000 mcg at 08/24/23 1435      When did you start taking these medications:     Which medication are you concerned about: ALPRAZolam (XANAX) 1 MG tablet    Who prescribed you this medication:     What are your concerns: PATIENT STATES THIS MEDICATION IS NEEDING A PRIOR AUTHORIZATION AND SHE ONLY HAS 2 DAYS LEFT OF THE MEDICATION.     How long have you had these concerns:

## 2023-09-28 NOTE — ASSESSMENT & PLAN NOTE
Patient's (Body mass index is 36.43 kg/m².) indicates that they are morbidly/severely obese (BMI > 40 or > 35 with obesity - related health condition) with health conditions that include hypertension, diabetes mellitus, dyslipidemias, GERD, osteoarthritis, and OA  . Weight is unchanged. BMI  is above average; BMI management plan is completed. We discussed low calorie, low carb based diet program, portion control, increasing exercise, joining a fitness center or start home based exercise program, management of depression/anxiety/stress to control compensatory eating, pharmacologic options including ozempic, and an koko-based approach such as [employer] Pal or Lose It.     I have instructed the patient to continue with pursuit of medical weight loss as a part of this program. Patient does meet criteria for use of anorectics at this time as BMI >30  and is not at treatment goal.     The current plan for this month includes:   - Continue nutrition focus  - Continue to prioritize protein, fiber, and hydration   - Increase ozempic to 2mg  - Treatment goal <200lb

## 2023-09-29 ENCOUNTER — PRIOR AUTHORIZATION (OUTPATIENT)
Dept: BARIATRICS/WEIGHT MGMT | Facility: CLINIC | Age: 58
End: 2023-09-29
Payer: COMMERCIAL

## 2023-09-29 LAB — HBA1C MFR BLD: 5.4 % (ref 4.8–5.6)

## 2023-10-05 ENCOUNTER — TRANSCRIBE ORDERS (OUTPATIENT)
Dept: FAMILY MEDICINE CLINIC | Facility: CLINIC | Age: 58
End: 2023-10-05
Payer: COMMERCIAL

## 2023-10-05 ENCOUNTER — TELEPHONE (OUTPATIENT)
Dept: FAMILY MEDICINE CLINIC | Facility: CLINIC | Age: 58
End: 2023-10-05
Payer: COMMERCIAL

## 2023-10-05 DIAGNOSIS — R92.8 ABNORMAL MAMMOGRAM: Primary | ICD-10-CM

## 2023-10-06 ENCOUNTER — OFFICE VISIT (OUTPATIENT)
Dept: BEHAVIORAL HEALTH | Facility: CLINIC | Age: 58
End: 2023-10-06
Payer: COMMERCIAL

## 2023-10-06 DIAGNOSIS — F31.9 BIPOLAR 1 DISORDER: Primary | ICD-10-CM

## 2023-10-06 DIAGNOSIS — R45.86 MOOD SWINGS: ICD-10-CM

## 2023-10-06 PROCEDURE — 90834 PSYTX W PT 45 MINUTES: CPT | Performed by: PSYCHOLOGIST

## 2023-10-06 NOTE — PROGRESS NOTES
PROGRESS NOTE    Data:    Marcela Ramírez is a 58 y.o. female who met with the undersigned for a scheduled psychotherapy session from 11:15 am - 12:04 pm.      Clinical Maneuvering/Intervention:      The pt talked about recent difficulties with bipolar symptoms including mood swings, having a manic episode a couple of weeks ago, and adjusting/possibly needing a medication adjustment. Worries, hesitancies, etc about starting with a new psychiatrist were addressed. Maladaptive thought patterns were identified, challenged, and evaluated for validity in order to allow for the pt to chose different and more adaptive ways of thinking. An action plan was designed to empower the pt to know what to do. Simple steps of one, two, three were laid out in order to help the pt feel more in control of things and feel less stressed. She was assisted in seeing how asking for and then getting the psychiatrist number already, are steps of progress. She agreed to call the number today and set up an appointment. Several issues were addressed today in addition to getting a medication consult such as work stress, adjusting to work, and friction in relationships with others. Difficulties were balanced out with positive things that are happening in her life. The pt was assisted in recognizing progress in order to show encouragement and promote motivation to keep making positive changes in life. She is doing well at work, despite feeling quite irritated/stressed at times. She is connecting better with G and is less worried about D as of late. At different points in the session, the pt was assisted in owning her progress (not attributing too much to external sources, luck, etc). Maladaptive thought patterns were identified, challenged, and evaluated for validity in order to allow for the pt to chose different and more adaptive ways of thinking. Homework was assigned tailored to pt's needs. The pt expressed gratitude for today's  session.    Mental Status Exam  Hygiene:  good  Dress: normal  Attitude:  cooperative and proactive  Motor Activity: normal  Speech: normal  Mood:  tense, irritable  Affect:  congruent  Thought Processes: normal  Thought Content:  normal  Suicidal Thoughts:  not endorsed  Homicidal Thoughts:  not endorsed  Crisis Safety Plan: not needed  Hallucinations:  none      Patient's Support Network Includes:  family, friends      Progress toward goal: there is evidence to suggest that she struggles with mood swings      Functional Status: moderate       Prognosis: good with counseling, medication    Assessment      The pt presented to be struggling with managing her moods related to having bipolar disorder and job stress. She seemed open to medication management and seeing a psychiatrist to help her with managing bipolar symptoms (mood swings, irritability, anxiety). She seems to benefit from venting, having her experiences validated, and being assisted in discovering her own solutions to her problems.       Plan      In order to diminish symptoms of bipolar disorder: the pt is to continue with counseling ongoing, being open/transparent with family ongoing (particularly G), and meet with a psychiatrist as soon as feasible for medication/consultation and management.    Maribel Hernandez, PhD, LP

## 2023-10-17 ENCOUNTER — TELEPHONE (OUTPATIENT)
Dept: BARIATRICS/WEIGHT MGMT | Facility: CLINIC | Age: 58
End: 2023-10-17
Payer: COMMERCIAL

## 2023-10-17 DIAGNOSIS — E11.42 TYPE 2 DIABETES MELLITUS WITH DIABETIC POLYNEUROPATHY, WITHOUT LONG-TERM CURRENT USE OF INSULIN: Primary | ICD-10-CM

## 2023-10-17 NOTE — TELEPHONE ENCOUNTER
Patient called because she can not find the 2mg.     We discussed that she would need to give herself 2 injection of the 1 mg and she is fine with that. Would like it called into [facility] on [address].

## 2023-10-18 RX ORDER — SEMAGLUTIDE 1.34 MG/ML
2 INJECTION, SOLUTION SUBCUTANEOUS WEEKLY
Qty: 6 ML | Refills: 2 | Status: SHIPPED | OUTPATIENT
Start: 2023-10-18

## 2023-10-19 ENCOUNTER — PRIOR AUTHORIZATION (OUTPATIENT)
Dept: BARIATRICS/WEIGHT MGMT | Facility: CLINIC | Age: 58
End: 2023-10-19
Payer: COMMERCIAL

## 2023-10-27 ENCOUNTER — OFFICE VISIT (OUTPATIENT)
Dept: BEHAVIORAL HEALTH | Facility: CLINIC | Age: 58
End: 2023-10-27
Payer: COMMERCIAL

## 2023-10-27 DIAGNOSIS — R45.86 MOOD SWINGS: ICD-10-CM

## 2023-10-27 DIAGNOSIS — F31.9 BIPOLAR 1 DISORDER: Primary | ICD-10-CM

## 2023-10-27 DIAGNOSIS — Z56.6 WORK STRESS: ICD-10-CM

## 2023-10-27 PROCEDURE — 90834 PSYTX W PT 45 MINUTES: CPT | Performed by: PSYCHOLOGIST

## 2023-10-27 SDOH — HEALTH STABILITY - MENTAL HEALTH: OTHER PHYSICAL AND MENTAL STRAIN RELATED TO WORK: Z56.6

## 2023-10-27 NOTE — PROGRESS NOTES
PROGRESS NOTE    Data:    Marcela Ramírez is a 58 y.o. female who met with the undersigned for a scheduled psychotherapy session from 11:20 am - 12:07 pm.      Clinical Maneuvering/Intervention:      The pt talked about recent difficulties with bipolar symptoms including mood swings, her son going to [employer], feeling ill lately, and arguing with her . Stressors were processed individually and in detail. Venting of frustrations was conducted in order to help the pt feel less tense emotionally and gain insight into issues. Feelings were processed and validated several times in session. Perspective taking was conducted multiple times in order to help the pt feel less stuck, less overwhelmed, and see challenges as much more manageable. Active listening was conducted in order to help the pt make sense of stressors and start moving towards potential solutions. The pt was assisted with finding solutions based on existing skill-set and abilities. She was assisted with making sense of her struggles and ways she is either already improving or needs to improve. She was assisted with building up the courage and motivation to see a psychiatrist in order to have a medication consult and have the opportunity to improve her medication for bipolar disorder. The pt was assisted in recognizing progress in order to show encouragement and promote motivation to keep making positive changes in life. She and G are being more open with each other. Role playing was conducted in order to help the pt gain insight into how to improve her communication skills, decrease tension with the other person, and notice things the pt may be doing (and needs to stop doing) in order to improve the quality of the relationship. Homework was assigned tailored to pt's needs. The pt expressed gratitude for today's session.    Mental Status Exam  Hygiene:  good  Dress: normal  Attitude:  cooperative and proactive  Motor Activity: normal  Speech:  normal  Mood:  tense, irritable  Affect:  congruent  Thought Processes: normal  Thought Content:  normal  Suicidal Thoughts:  not endorsed  Homicidal Thoughts:  not endorsed  Crisis Safety Plan: not needed  Hallucinations:  none      Patient's Support Network Includes:  family, friends      Progress toward goal: there is evidence to suggest that she struggles with mood swings      Functional Status: moderate       Prognosis: good with counseling, medication    Assessment      The pt presented to be struggling with managing her moods related to having bipolar disorder and job stress. She seemed open to medication management and seeing a psychiatrist to help her with managing bipolar symptoms (mood swings, irritability, anxiety). She seems to benefit from venting, having her experiences validated, and being assisted in discovering her own solutions to her problems.       Plan      In order to diminish symptoms of bipolar disorder: the pt is to continue with counseling ongoing, being open/transparent with family ongoing (particularly G), and meet with a psychiatrist as soon as feasible for medication/consultation and management.    Maribel Hernandez, PhD, LP

## 2023-10-30 RX ORDER — DESVENLAFAXINE SUCCINATE 50 MG/1
50 TABLET, EXTENDED RELEASE ORAL DAILY
Qty: 30 TABLET | Refills: 1 | Status: SHIPPED | OUTPATIENT
Start: 2023-10-30

## 2023-10-30 RX ORDER — LAMOTRIGINE 200 MG/1
TABLET ORAL
Qty: 30 TABLET | Refills: 1 | Status: SHIPPED | OUTPATIENT
Start: 2023-10-30

## 2023-11-01 RX ORDER — LISINOPRIL 40 MG/1
TABLET ORAL
Qty: 30 TABLET | Refills: 5 | Status: SHIPPED | OUTPATIENT
Start: 2023-11-01

## 2023-11-01 RX ORDER — METOPROLOL SUCCINATE 50 MG/1
TABLET, EXTENDED RELEASE ORAL
Qty: 30 TABLET | Refills: 5 | Status: SHIPPED | OUTPATIENT
Start: 2023-11-01

## 2023-11-02 ENCOUNTER — OFFICE VISIT (OUTPATIENT)
Dept: BARIATRICS/WEIGHT MGMT | Facility: CLINIC | Age: 58
End: 2023-11-02
Payer: COMMERCIAL

## 2023-11-02 VITALS
BODY MASS INDEX: 35.35 KG/M2 | RESPIRATION RATE: 16 BRPM | DIASTOLIC BLOOD PRESSURE: 82 MMHG | HEART RATE: 74 BPM | SYSTOLIC BLOOD PRESSURE: 124 MMHG | WEIGHT: 225.2 LBS | OXYGEN SATURATION: 97 % | HEIGHT: 67 IN

## 2023-11-02 DIAGNOSIS — E11.42 TYPE 2 DIABETES MELLITUS WITH DIABETIC POLYNEUROPATHY, WITHOUT LONG-TERM CURRENT USE OF INSULIN: ICD-10-CM

## 2023-11-02 DIAGNOSIS — E66.01 CLASS 2 SEVERE OBESITY DUE TO EXCESS CALORIES WITH SERIOUS COMORBIDITY AND BODY MASS INDEX (BMI) OF 35.0 TO 35.9 IN ADULT: Primary | ICD-10-CM

## 2023-11-02 NOTE — ASSESSMENT & PLAN NOTE
Diabetes is improving with treatment. A1c now normal.   Continue current treatment regimen.  Regular aerobic exercise.  Diabetes will be reassessed in 1 month.

## 2023-11-02 NOTE — PROGRESS NOTES
[facility]  [address]  [place] [address]     Office Note      Date: 2023  Patient Name: Marcela Ramírez  MRN: [id number]  : 1965  Subjective  Subjective     Chief Complaint  Obesity Management follow-up          Marcela Ramírez presents to [facility] for obesity management.  LSG 2019 GDW. Presurgery weight: 245lb   Patient is satisfied with weight loss progress. Appetite is moderately controlled. She is eating slower and trying to eat smaller bites. Has 8oz of cherry soda daily which is much less than previously. Reports no side effects of prescribed medications today. She feels bloated but that was a problem before starting ozempic. The patient is taking multivitamin and is taking fish oil.  The patient is not using a food journal.  24 hour recall:  B: coffee   L: 1 slice leftover pepperoni pizza (no crust)  D: 5 chicken nuggets and celcius energy drink    The patient is exercising with a FITT score of:    Frequency Intensity Time Strength Training   []   0, none []   0 []   0 [x]   0   [x]   1 (1-2x/week) [x]   1 (light) []   1 (<10 min) []   1 (1x/week)   []   2 (3-5x/week) []   2 (moderate) [x]   2 (10-20 min) []   2 (2x/week)   []   3 (daily) []   3 (moderately hard)  []   4 (very hard) []   3 (20-30 min)  []   4 (>30 min) []   3 (3-4x/week)     Review of Systems   Constitutional:  Negative for appetite change and fatigue.   Eyes:  Negative for blurred vision, double vision and visual disturbance.   Cardiovascular:  Negative for chest pain and palpitations.   Gastrointestinal:  Negative for abdominal pain, constipation, diarrhea, nausea, vomiting and GERD.   Endocrine: Negative for polydipsia, polyphagia and polyuria.   Musculoskeletal:  Negative for arthralgias, back pain and myalgias.   Neurological:  Negative for dizziness, tremors, light-headedness, headache and memory problem.        Parasthesias  "negative   Psychiatric/Behavioral:  Negative for sleep disturbance, depressed mood and stress. The patient is not nervous/anxious.    All other systems reviewed and are negative.      Objective   Start weight: 236.5 pounds.    Total Loss lb/%Loss of beginning body weight (BBW): -11.3 lb/-4.78%  Change in weight since last visit: -7.4    Body mass index is 35.27 kg/m².   Body composition analysis completed and showed:   Body Fat %: 46.7    Measurements (in inches)  Waist Circumference: 42.5      Vital Signs:   /82 (BP Location: Left arm, Patient Position: Sitting)   Pulse 74   Resp 16   Ht 170.2 cm (67\")   Wt 102 kg (225 lb 3.2 oz)   SpO2 97%   BMI 35.27 kg/m²     Physical Exam   General appears stated age and normal appearance   HEENT PERRLA, EOM intact, and conjunctivae normal   Chest/lungs Normal rate, Regular rhythm, and Breathing is unlabored   Extremities without edema   Neuro Good historian and No focal deficit   Skin Warm, dry, intact   Psych normal behavior, normal thought content, and normal concentration     Result Review :     The following data was reviewed by: NOE Hubbard on 11/02/2023:  No visits with results within 1 Month(s) from this visit.   Latest known visit with results is:   Office Visit on 09/28/2023   Component Date Value Ref Range Status    Glucose 09/28/2023 83  65 - 99 mg/dL Final    BUN 09/28/2023 13  6 - 20 mg/dL Final    Creatinine 09/28/2023 1.27 (H)  0.57 - 1.00 mg/dL Final    Sodium 09/28/2023 139  136 - 145 mmol/L Final    Potassium 09/28/2023 4.1  3.5 - 5.2 mmol/L Final    Chloride 09/28/2023 99  98 - 107 mmol/L Final    CO2 09/28/2023 28.9  22.0 - 29.0 mmol/L Final    Calcium 09/28/2023 9.6  8.6 - 10.5 mg/dL Final    Total Protein 09/28/2023 7.3  6.0 - 8.5 g/dL Final    Albumin 09/28/2023 4.3  3.5 - 5.2 g/dL Final    ALT (SGPT) 09/28/2023 18  1 - 33 U/L Final    AST (SGOT) 09/28/2023 22  1 - 32 U/L Final    Alkaline Phosphatase 09/28/2023 89  39 - 117 U/L " Final    Total Bilirubin 09/28/2023 0.4  0.0 - 1.2 mg/dL Final    Globulin 09/28/2023 3.0  gm/dL Final    A/G Ratio 09/28/2023 1.4  g/dL Final    BUN/Creatinine Ratio 09/28/2023 10.2  7.0 - 25.0 Final    Anion Gap 09/28/2023 11.1  5.0 - 15.0 mmol/L Final    eGFR 09/28/2023 49.7 (L)  >60.0 mL/min/1.73 Final    Total Cholesterol 09/28/2023 147  0 - 200 mg/dL Final    Triglycerides 09/28/2023 166 (H)  0 - 150 mg/dL Final    HDL Cholesterol 09/28/2023 56  40 - 60 mg/dL Final    LDL Cholesterol  09/28/2023 63  0 - 100 mg/dL Final    VLDL Cholesterol 09/28/2023 28  5 - 40 mg/dL Final    LDL/HDL Ratio 09/28/2023 1.03   Final    TSH 09/28/2023 2.560  0.270 - 4.200 uIU/mL Final    WBC 09/28/2023 7.49  3.40 - 10.80 10*3/mm3 Final    RBC 09/28/2023 4.09  3.77 - 5.28 10*6/mm3 Final    Hemoglobin 09/28/2023 12.7  12.0 - 15.9 g/dL Final    Hematocrit 09/28/2023 37.9  34.0 - 46.6 % Final    MCV 09/28/2023 92.7  79.0 - 97.0 fL Final    MCH 09/28/2023 31.1  26.6 - 33.0 pg Final    MCHC 09/28/2023 33.5  31.5 - 35.7 g/dL Final    RDW 09/28/2023 12.7  12.3 - 15.4 % Final    RDW-SD 09/28/2023 43.1  37.0 - 54.0 fl Final    MPV 09/28/2023 12.2 (H)  6.0 - 12.0 fL Final    Platelets 09/28/2023 195  140 - 450 10*3/mm3 Final    Hemoglobin A1C 09/28/2023 5.40  4.80 - 5.60 % Final    Microalbumin, Urine 09/28/2023 80   Final    Creatinine, Urine 09/28/2023 8.8   Final                  Assessment / Plan        Diagnoses and all orders for this visit:    1. Class 2 severe obesity due to excess calories with serious comorbidity and body mass index (BMI) of 35.0 to 35.9 in adult (Primary)  Assessment & Plan:  Patient's (Body mass index is 35.27 kg/m².) indicates that they are morbidly/severely obese (BMI > 40 or > 35 with obesity - related health condition) with health conditions that include hypertension, diabetes mellitus, dyslipidemias, and GERD . Weight is improving with treatment. BMI  is above average; BMI management plan is completed. We  discussed low calorie, low carb based diet program, portion control, increasing exercise, management of depression/anxiety/stress to control compensatory eating, pharmacologic options including ozempic, and an koko-based approach such as [employer] Pal or Lose It.     I have instructed the patient to continue with pursuit of medical weight loss as a part of this program. Patient does meet criteria for use of anorectics at this time as BMI >30  and is not at treatment goal.     The current plan for this month includes:   - Continue current exercise efforts  - Continue nutrition focus  - Continue to prioritize protein, fiber, and hydration.   - Continue ozempic at 1mg two injections weekly due to shortage of 2mg pen.   Treatment goal <200lb        2. Type 2 diabetes mellitus with diabetic polyneuropathy, without long-term current use of insulin  Assessment & Plan:  Diabetes is improving with treatment. A1c now normal.   Continue current treatment regimen.  Regular aerobic exercise.  Diabetes will be reassessed in 1 month.            Follow Up   Return in about 2 months (around 1/2/2024) for Next scheduled follow up.  Patient was given instructions and counseling regarding her condition or for health maintenance advice. Please see specific information pulled into the AVS if appropriate.     Zeynep Jenkins, APRN  11/02/2023

## 2023-11-02 NOTE — ASSESSMENT & PLAN NOTE
Patient's (Body mass index is 35.27 kg/m².) indicates that they are morbidly/severely obese (BMI > 40 or > 35 with obesity - related health condition) with health conditions that include hypertension, diabetes mellitus, dyslipidemias, and GERD . Weight is improving with treatment. BMI  is above average; BMI management plan is completed. We discussed low calorie, low carb based diet program, portion control, increasing exercise, management of depression/anxiety/stress to control compensatory eating, pharmacologic options including ozempic, and an koko-based approach such as [employer] Pal or Lose It.     I have instructed the patient to continue with pursuit of medical weight loss as a part of this program. Patient does meet criteria for use of anorectics at this time as BMI >30  and is not at treatment goal.     The current plan for this month includes:   - Continue current exercise efforts  - Continue nutrition focus  - Continue to prioritize protein, fiber, and hydration.   - Continue ozempic at 1mg two injections weekly due to shortage of 2mg pen.   Treatment goal <200lb

## 2023-11-03 ENCOUNTER — OFFICE VISIT (OUTPATIENT)
Dept: BEHAVIORAL HEALTH | Facility: CLINIC | Age: 58
End: 2023-11-03
Payer: COMMERCIAL

## 2023-11-03 DIAGNOSIS — R45.86 MOOD SWINGS: ICD-10-CM

## 2023-11-03 DIAGNOSIS — F31.9 BIPOLAR 1 DISORDER: Primary | ICD-10-CM

## 2023-11-04 DIAGNOSIS — F51.01 PRIMARY INSOMNIA: ICD-10-CM

## 2023-11-04 NOTE — PROGRESS NOTES
PROGRESS NOTE    Data:    Marcela Ramírez is a 58 y.o. female who met with the undersigned for a scheduled psychotherapy session from 11:15 am - 12:10 pm.      Clinical Maneuvering/Intervention:      The pt talked about recent difficulties with bipolar symptoms including mood swings and panic attacks. Stressors were processed individually and in detail. Venting of frustrations was conducted in order to help the pt feel less tense emotionally and gain insight into issues. Feelings were processed and validated several times in session. Perspective taking was conducted multiple times in order to help the pt feel less stuck, less overwhelmed, and see challenges as much more manageable. Active listening was conducted in order to help the pt make sense of stressors and start moving towards potential solutions. The pt was assisted with finding solutions based on existing skill-set and abilities. Direction and goals for therapy were reviewed today. The pt was assisted with putting feelings into words several times in session in order to both help diminish emotional tension and to highlight direction for change. She was assisted in noticing areas of work and growth: connecting with G (or struggling to do so), mood management (she was commended for setting up an appointment with a psychiatrist this month, keeping boundaries with her daughter, and and managing her stress overall. The pt was assisted in recognizing progress in order to show encouragement and promote motivation to keep making positive changes in life. She is communicating better with G, saying 'no' to things more often and in a positive way, and her health is improving (she has lost some weight, blood pressure is better, and her A1C is normal). She talked about needing help coming back to the main issues/core stressors in session, so this one conducted today and she was told that she will be assisted in this manner. Towards the end of session, the pt was  assisted in noticing for herself what she needs today in terms of self-care. She talked about what she will do, then started to worry about what's coming later this week. She was assisted in noticing what she will do just for today and how she will meet personal mental health needs. Refocusing was conducted. Homework was assigned tailored to pt's needs. The pt expressed gratitude for today's session.     Mental Status Exam  Hygiene:  good  Dress: normal  Attitude:  cooperative and proactive  Motor Activity: normal  Speech: normal  Mood:  tense  Affect:  congruent  Thought Processes: normal  Thought Content:  normal  Suicidal Thoughts:  not endorsed  Homicidal Thoughts:  not endorsed  Crisis Safety Plan: not needed  Hallucinations:  none      Patient's Support Network Includes:  family, friends      Progress toward goal: there is evidence to suggest that she struggles with mood swings      Functional Status: moderate       Prognosis: good with counseling, medication, and stronger coping skills     Assessment      The pt presented to be struggling with managing her moods related to having bipolar disorder. She seemed open to medication management and seeing a psychiatrist to help her with managing bipolar symptoms (mood swings, irritability, anxiety). She seems to benefit from venting, having her experiences validated, and being assisted in discovering her own solutions to her problems.       Plan      In order to diminish symptoms of bipolar disorder: the pt is to continue with counseling ongoing, being open/transparent with family ongoing (particularly G), and meet with a psychiatrist as soon as feasible for medication/consultation and management. She will do the self-care plan discussed at the end of session (today).    Maribel Hernandez, PhD, LP

## 2023-11-06 RX ORDER — ALPRAZOLAM 1 MG/1
1 TABLET ORAL NIGHTLY PRN
Qty: 30 TABLET | Refills: 2 | Status: SHIPPED | OUTPATIENT
Start: 2023-11-06

## 2023-11-08 DIAGNOSIS — R39.11 URINARY HESITANCY: ICD-10-CM

## 2023-11-08 RX ORDER — TAMSULOSIN HYDROCHLORIDE 0.4 MG/1
1 CAPSULE ORAL DAILY
Qty: 30 CAPSULE | Refills: 5 | Status: SHIPPED | OUTPATIENT
Start: 2023-11-08

## 2023-11-08 NOTE — TELEPHONE ENCOUNTER
Rx Refill Note  Requested Prescriptions     Pending Prescriptions Disp Refills    tamsulosin (FLOMAX) 0.4 MG capsule 24 hr capsule 30 capsule 5     Sig: Take 1 capsule by mouth Daily.      Last office visit with prescribing clinician: 7/13/2023   Last telemedicine visit with prescribing clinician: Visit date not found   Next office visit with prescribing clinician: 1/18/2024     Mattie Camargo MA  11/08/23, 12:51 EST

## 2023-11-20 ENCOUNTER — OFFICE VISIT (OUTPATIENT)
Dept: BEHAVIORAL HEALTH | Facility: CLINIC | Age: 58
End: 2023-11-20
Payer: COMMERCIAL

## 2023-11-20 DIAGNOSIS — F31.9 BIPOLAR 1 DISORDER: Primary | ICD-10-CM

## 2023-11-20 DIAGNOSIS — Z91.410 H/O ADULT PHYSICAL AND SEXUAL ABUSE: ICD-10-CM

## 2023-11-20 PROCEDURE — 90837 PSYTX W PT 60 MINUTES: CPT | Performed by: PSYCHOLOGIST
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
## 2023-11-27 RX ORDER — ATORVASTATIN CALCIUM 20 MG/1
20 TABLET, FILM COATED ORAL DAILY
Qty: 90 TABLET | Refills: 1 | Status: SHIPPED | OUTPATIENT
Start: 2023-11-27

## 2023-11-28 ENCOUNTER — OFFICE VISIT (OUTPATIENT)
Age: 58
End: 2023-11-28
Payer: COMMERCIAL

## 2023-11-28 VITALS
WEIGHT: 233.7 LBS | OXYGEN SATURATION: 98 % | SYSTOLIC BLOOD PRESSURE: 128 MMHG | DIASTOLIC BLOOD PRESSURE: 84 MMHG | HEART RATE: 66 BPM | HEIGHT: 67 IN | BODY MASS INDEX: 36.68 KG/M2

## 2023-11-28 DIAGNOSIS — Z79.899 CHRONIC PRESCRIPTION BENZODIAZEPINE USE: ICD-10-CM

## 2023-11-28 DIAGNOSIS — Z51.81 ENCOUNTER FOR THERAPEUTIC DRUG MONITORING: ICD-10-CM

## 2023-11-28 DIAGNOSIS — Z86.59 HISTORY OF BIPOLAR DISORDER: Primary | ICD-10-CM

## 2023-11-28 RX ORDER — LURASIDONE HYDROCHLORIDE 20 MG/1
20 TABLET, FILM COATED ORAL DAILY
Qty: 30 TABLET | Refills: 0 | Status: SHIPPED | OUTPATIENT
Start: 2023-11-28

## 2023-11-28 RX ORDER — LAMOTRIGINE 200 MG/1
200 TABLET ORAL DAILY
Qty: 30 TABLET | Refills: 0 | Status: SHIPPED | OUTPATIENT
Start: 2023-11-28

## 2023-11-28 NOTE — PROGRESS NOTES
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 risks, benefits, and potential side effects. Patient ackowledged and verbally consents to continue with current treatment plan and was educated on the importance of compliance with treatment and follow-up appointments.     Quality Measures:     TOBACCO USE:  Tobacco Use: Low Risk  (11/28/2023)    Patient History     Smoking Tobacco Use: Never     Smokeless Tobacco Use: Never     Passive Exposure: Never      Former smoker    I advised Marcela of the risks of tobacco use.     Follow Up:   Return in about 4 weeks (around 12/26/2023).    Alejandra Finney [facility] [address]

## 2023-11-29 ENCOUNTER — TELEPHONE (OUTPATIENT)
Age: 58
End: 2023-11-29
Payer: COMMERCIAL

## 2023-11-29 NOTE — TELEPHONE ENCOUNTER
Helen called and is wondering if the Lurasidone HCl (Latuda) 20 MG tablet 'compliments' the lamoTRIgine (LaMICtal) 200 MG tablet (i.e. can she take both of them together?).  Please call her at [phone] to advise.

## 2023-11-30 ENCOUNTER — OFFICE VISIT (OUTPATIENT)
Dept: NEUROLOGY | Facility: CLINIC | Age: 58
End: 2023-11-30
Payer: COMMERCIAL

## 2023-11-30 VITALS
HEART RATE: 73 BPM | WEIGHT: 225 LBS | OXYGEN SATURATION: 98 % | SYSTOLIC BLOOD PRESSURE: 126 MMHG | HEIGHT: 67 IN | DIASTOLIC BLOOD PRESSURE: 78 MMHG | BODY MASS INDEX: 35.31 KG/M2

## 2023-11-30 DIAGNOSIS — E11.42 DIABETIC PERIPHERAL NEUROPATHY: Primary | ICD-10-CM

## 2023-11-30 DIAGNOSIS — G56.03 BILATERAL CARPAL TUNNEL SYNDROME: ICD-10-CM

## 2023-11-30 DIAGNOSIS — G25.81 RLS (RESTLESS LEGS SYNDROME): ICD-10-CM

## 2023-11-30 PROCEDURE — 99214 OFFICE O/P EST MOD 30 MIN: CPT | Performed by: PSYCHIATRY & NEUROLOGY

## 2023-11-30 RX ORDER — DESVENLAFAXINE SUCCINATE 50 MG/1
50 TABLET, EXTENDED RELEASE ORAL DAILY
COMMUNITY
Start: 2023-11-30 | End: 2023-11-30 | Stop reason: ALTCHOICE

## 2023-11-30 RX ORDER — OMEPRAZOLE 20 MG/1
CAPSULE, DELAYED RELEASE ORAL
Qty: 30 CAPSULE | Refills: 11 | Status: SHIPPED | OUTPATIENT
Start: 2023-11-30

## 2023-11-30 RX ORDER — PREGABALIN 100 MG/1
100 CAPSULE ORAL 3 TIMES DAILY
Qty: 90 CAPSULE | Refills: 5 | Status: SHIPPED | OUTPATIENT
Start: 2023-11-30 | End: 2024-11-29

## 2023-11-30 NOTE — PROGRESS NOTES
Subjective:    CC: Marcela Ramírez is seen today in consultation at the request of JESS Mclean for Peripheral Neuropathy       Current visit-patient states that the pain in her feet and her RLS symptoms have improved since increasing Lyrica to 100 mg 3 times daily however she still gets pain at night or while sitting down specially in her right foot.  She does not have to use the Neupro patch regularly anymore.  Her diabetes is also much better controlled as she was started on Ozempic.  Her last A1c was 5.4 and LDL was 63.  She has also been started on Latuda recently for her bipolar disorder which she takes in addition to Xanax 1 mg nightly that helps her sleep and also helps with her neuropathy symptoms.  For her carpal tunnel she does not wear wrist braces anymore as her wrists have stopped hurting even though she is constantly working with boxes (works at [employer]).    Initial [id number]-[id number]-old female with a history of hypertension, bipolar disorder, hyperlipidemia, diabetes mellitus type 2, obesity status post weight loss surgery, hypothyroidism presents with symptoms of neuropathy/RLS.  As per patient she started having burning pain in her feet several years ago.  It has worsened over time.  She previously tried gabapentin and is currently on Lyrica 75 mg 3 times daily which helps some however she is still getting severe pain in her feet more so on on the right as well as cramping of the feet.  She cannot withstand the air or the sheets touching her feet.  She also has some numbness.  She states that her symptoms can worsen with prolonged standing at work as she has to wear safety shoes (works at [employer]).  Her last A1c was fairly well controlled at 6.2.  TSH was normal.  She has been started on Wegovy recently for weight loss.  She also reports to having abnormal sensations in her legs.  Was diagnosed with RLS and initially started on Requip which along with Lyrica caused side effects.  Was subsequently  started on Neupro patch 1 mg daily which is helping.  Last ferritin level was normal at 88.  She had an EMG/NCS in 2018 that showed moderate sensorimotor axonal/demyelinating neuropathy with moderate bilateral carpal tunnel and mild ulnar neuropathy bilaterally.  She does not wear wrist braces.  Of note-I reviewed Gladys's notes as follows-    57 y.o. female with a history of DM who comes to clinic today for evaluation of neuropathy. She initially noted symptoms in 2015 marked by numbness, paresthesias, and shooting pain in her upper and lower extremities bilaterally. This has worsened over time. She notes associated balance impairment as well as decreased  strength, though denies any clear associated weakness. Her symptoms are worse with increased activity. She is currently taking Lyrica, which is somewhat beneficial. She has previously taken GBP.     She also notes RLS symptoms in her feet primarily at night. She is currently taking neupro 1mg daily. She previously tried ropinirole.      Prior evaluation has included an EMG of the upper and lower extremities bilaterally which was notable for a moderate axonal and demyelinating peripheral neuropathy, moderate CTS bilaterally, mild-moderate ulnar neuropathy on the left and mild ulnar neuropathy on the right. Screening bloodwork was notable for a hemoglobin A1C of 7.3.     Today: Since her last visit in 2/22, she notes that her numbness, paresthesias and shooting pains have worsened and now radiate up to her thighs bilaterally. Lyrica 75mg TID has been beneficial. She has not consumed alcohol in approximately 9 months.    The following portions of the patient's history were reviewed today and updated as of 06/08/2023  : allergies, current medications, past family history, past medical history, past social history, past surgical history, and problem list  These document will be scanned to patient's chart.      Current Outpatient Medications:     ALPRAZolam  (XANAX) 1 MG tablet, TAKE ONE TABLET BY MOUTH ONCE NIGHTLY AS NEEDED FOR ANXIETY, Disp: 30 tablet, Rfl: 2    amLODIPine (NORVASC) 5 MG tablet, Take 1 tablet by mouth Daily., Disp: 30 tablet, Rfl: 2    atorvastatin (LIPITOR) 20 MG tablet, TAKE 1 TABLET BY MOUTH DAILY, Disp: 90 tablet, Rfl: 1    glucose blood test strip, Use as instructed to check glucose twice a day Dispense Onetouch ultra 2, Disp: 100 each, Rfl: 3    hydroCHLOROthiazide (HYDRODIURIL) 25 MG tablet, Take 1 tablet by mouth Daily., Disp: 90 tablet, Rfl: 3    lamoTRIgine (LaMICtal) 200 MG tablet, Take 1 tablet by mouth Daily., Disp: 30 tablet, Rfl: 0    Lancets misc, Use to check glucose twice a day Dispense Onetouch ultra 2, Disp: 100 each, Rfl: 3    levothyroxine (SYNTHROID, LEVOTHROID) 50 MCG tablet, Take 1 tablet by mouth Every Morning., Disp: 30 tablet, Rfl: 2    lisinopril (PRINIVIL,ZESTRIL) 40 MG tablet, TAKE ONE TABLET BY MOUTH DAILY, Disp: 30 tablet, Rfl: 5    Lurasidone HCl (Latuda) 20 MG tablet tablet, Take 1 tablet by mouth Daily., Disp: 30 tablet, Rfl: 0    meclizine (Antivert) 50 MG tablet, Take 1 tablet by mouth 3 (Three) Times a Day As Needed for Dizziness or Nausea., Disp: 21 tablet, Rfl: 0    metoprolol succinate XL (TOPROL-XL) 50 MG 24 hr tablet, TAKE ONE TABLET BY MOUTH DAILY, Disp: 30 tablet, Rfl: 5    omeprazole (priLOSEC) 20 MG capsule, TAKE ONE CAPSULE BY MOUTH DAILY, Disp: 30 capsule, Rfl: 11    Ozempic, 1 MG/DOSE, 4 MG/3ML solution pen-injector, Inject 2 mg under the skin into the appropriate area as directed 1 (One) Time Per Week., Disp: 6 mL, Rfl: 2    pregabalin (Lyrica) 100 MG capsule, Take 1 capsule by mouth 3 (Three) Times a Day., Disp: 90 capsule, Rfl: 5    rotigotine (Neupro) 1 MG/24HR patch 24 hour 24 hour patch, Place 1 patch on the skin as directed by provider Daily., Disp: 30 patch, Rfl: 10    tamsulosin (FLOMAX) 0.4 MG capsule 24 hr capsule, Take 1 capsule by mouth Daily., Disp: 30 capsule, Rfl: 5    Current  Facility-Administered Medications:     cyanocobalamin injection 1,000 mcg, 1,000 mcg, Intramuscular, Q28 Days, Zeynep Jenkins, APRN, 1,000 mcg at 08/24/23 1435   Past Medical History:   Diagnosis Date    Arthritis     knees, otc arthritis meds prn, no h/o injections.     Asthma     has not required inhaler    Bilateral ovarian cysts     Bipolar 1 disorder     Boil     opens them herself    Breast injury 07/13/2021    bruise on lateral rt breast from fall    Carpal tunnel syndrome     Chronic pain disorder     CKD (chronic kidney disease), symptom management only, stage 3 (moderate)     Creatinine 1.04 GFR 56    Colon polyp     Depression     Diabetes mellitus     Diagnosed 2017, was on metformin in the past. Last A1C 5.5%    Diverticulosis     Fatigue     GERD (gastroesophageal reflux disease)     controlled with omeprazole 20mg. Remote EGD- esophagitis.  EGD no hiatal hernia Z line 40 cm very thick mucosal folds cannot rule out varices.  CT scan thickened fundus, no varices seen    Headache     Headache, tension-type [id number]    History of bladder infections     History of blood transfusion 2006    2/2 heavy menses    History of blood transfusion     History of bronchitis     HL (hearing loss) [id number]    Hyperlipidemia     Hypertension     Hypothyroidism     Lower extremity edema     Memory loss 1994    Migraine [id number]    Morbid obesity with BMI of 40.0-44.9, adult     Neuropathy in diabetes     Not sure    Peripheral neuropathy     2/2 DM    RLS (restless legs syndrome)     S/P spenser     S/P cholecystectomy     2/2 cholelithiasis    Shortness of breath on exertion     Thyroid disease     TIA (transient ischemic attack)     patient states 8-9 episodes of right sided drooping/ weakness/ vision changes. Workup was negative for CVA- thought to be related to anxiety. last episode 12/2017      Past Surgical History:   Procedure Laterality Date    COLONOSCOPY  remote    diverticulosis    ENDOMETRIAL ABLATION       ENDOSCOPY  remote    esophagitis     ENDOSCOPY N/A 08/22/2019    Procedure: ESOPHAGOGASTRODUODENOSCOPY;  Surgeon: Guido Greenberg MD;  Location:  [facility];  Service: Bariatric    GASTRIC SLEEVE LAPAROSCOPIC N/A 08/22/2019    Procedure: GASTRIC SLEEVE LAPAROSCOPIC;  Surgeon: Guido Greenberg MD;  Location:  [facility];  Service: Bariatric    KNEE ACL RECONSTRUCTION Right 2013    with miniscal repair    LAPAROSCOPIC CHOLECYSTECTOMY  2001    cholelithiasis    LAPAROSCOPIC HYSTERECTOMY  2013    right ovary remains    PARAESOPHAGEAL HERNIA REPAIR N/A 08/22/2019    Procedure: PARAESOPHAGEAL HERNIA REPAIR LAPAROSCOPIC;  Surgeon: Guido Greenberg MD;  Location:  [facility];  Service: Bariatric    SINUS SURGERY      TUBAL ABDOMINAL LIGATION        Family History   Problem Relation Age of Onset    Arthritis Mother     Arthritis Father     Diabetes Father     Hyperlipidemia Father     Hypertension Father     Neuropathy Father     Arthritis Sister     Diabetes Brother     Hypertension Brother     Asthma Brother     Other Brother     Obesity Brother     Obesity Daughter     Hypertension Daughter     Depression Daughter     Cancer Maternal Aunt     Depression Paternal Uncle     Cancer Paternal Grandmother     Heart disease Paternal Grandfather     Hypertension Paternal Grandfather     Breast cancer Neg Hx     Ovarian cancer Neg Hx       Social History     Socioeconomic History    Marital status:    Tobacco Use    Smoking status: Never     Passive exposure: Never    Smokeless tobacco: Never   Vaping Use    Vaping Use: Never used   Substance and Sexual Activity    Alcohol use: Not Currently     Comment: Over a year ago. Before then, not often    Drug use: Not Currently     Types: Cocaine(coke), LSD, Marijuana, Psilocybin     Comment: At least 12 months or more for most listed    Sexual activity: Yes     Partners: Male     Birth control/protection: Hysterectomy, Same-sex partner, Surgical     Comment: no hormones  "    Review of Systems   Neurological:  Positive for numbness.   All other systems reviewed and are negative.      Objective:    /78   Pulse 73   Ht 170.2 cm (67.01\")   Wt 102 kg (225 lb)   LMP  (LMP Unknown)   SpO2 98%   BMI 35.23 kg/m²     Neurology Exam:    General apperance: Obese    Mental status: Alert, awake and oriented to time place and person.    Recent and Remote memory: Intact.    Attention span and Concentration: Normal.     Language and Speech: Intact- No dysarthria.    Fluency, Naming , Repitition and Comprehension:  Intact    Cranial Nerves:   CN II: Visual fields are full. Intact. Fundi - Normal, No papillederma, Pupils - LEV  CN III, IV and VI: Extraocular movements are intact. Normal saccades.   CN V: Facial sensation is intact.   CN VII: Muscles of facial expression reveal no asymmetry. Intact.   CN VIII: Hearing is intact. Whispered voice intact.   CN IX and X: Palate elevates symmetrically. Intact  CN XI: Shoulder shrug is intact.   CN XII: Tongue is midline without evidence of atrophy or fasciculation.     Ophthalmoscopic exam of optic disc-normal    Motor:  Right UE muscle strength 5/5. Normal tone.     Left UE muscle strength 5/5. Normal tone.      Right LE muscle strength5/5. Normal tone.     Left LE muscle strength 5/5. Normal tone.      Sensory: Reduced to pinprick in the first 3 fingers of both hands as well as in the feet up to level of ankle with markedly reduced vibration/temperature sensation    DTRs: 2+ bilaterally in upper and lower extremities.    Babinski: Negative bilaterally.    Co-ordination: Normal finger-to-nose, heel to shin B/L.    Rhomberg: Negative.    Gait: Normal.  Could do tandem walking    Cardiovascular: Regular rate and rhythm without murmur, gallop or rub.    Assessment and Plan:  1. Diabetic peripheral neuropathy  Last A1c was 5.4  She can continue Lyrica  100 mg 3 times daily.  I do not want to increase the dose any further as it could impair " concentration along with her other medications and cause weight gain  She can wear insoles in her safety shoes while at work   She should also continue vitamin B12 supplements    - pregabalin (Lyrica) 100 MG capsule; Take 1 capsule by mouth 3 (Three) Times a Day.  Dispense: 90 capsule; Refill: 5    2. RLS (restless legs syndrome)  Previously tried Requip which caused side effects  Currently on Neupro patch 1 mg that she uses sporadically as symptoms have improved    3. Bilateral carpal tunnel syndrome  EMG/NCS showed bilateral moderate carpal tunnel.  If symptoms flareup she could wear wrist braces       Return in about 6 months (around 5/30/2024).     I spent over 25 minutes with the patient face to face out of which over 50% (20 minutes) was spent in management, instructions and education.     Katelin Landis MD

## 2023-11-30 NOTE — TELEPHONE ENCOUNTER
Helen called.  I advised her that the Lurasidone HCl (Latuda) 20 MG tablet does compliment the lamoTRIgine (LaMICtal) 200 MG tablet, so she can take them both together.  She expressed understanding.

## 2023-11-30 NOTE — TELEPHONE ENCOUNTER
Called JUICE M to call back to receive providers message regarding Pts Rx.       Helen called and is wondering if the   Lurasidone HCl (Latuda) 20 MG tablet   compliments' the lamoTRIgine   (LaMICtal) 200 MG tablet   (i.e. can she take both of them together?).    Please call her at [phone] to advise.     Providers Answer;    Yes and yes, We discussed this in her appointment, Thanks

## 2023-12-01 ENCOUNTER — OFFICE VISIT (OUTPATIENT)
Dept: BEHAVIORAL HEALTH | Facility: CLINIC | Age: 58
End: 2023-12-01
Payer: COMMERCIAL

## 2023-12-01 DIAGNOSIS — F31.9 BIPOLAR 1 DISORDER: Primary | ICD-10-CM

## 2023-12-01 DIAGNOSIS — R45.86 MOOD SWINGS: ICD-10-CM

## 2023-12-01 DIAGNOSIS — Z63.9 RELATIONSHIP PROBLEMS: ICD-10-CM

## 2023-12-01 LAB
1OH-MIDAZOLAM UR QL SCN: NOT DETECTED NG/MG CREAT
6MAM UR QL SCN: NEGATIVE NG/ML
7AMINOCLONAZEPAM/CREAT UR: NOT DETECTED NG/MG CREAT
A-OH ALPRAZ/CREAT UR: 113 NG/MG CREAT
A-OH-TRIAZOLAM/CREAT UR CFM: NOT DETECTED NG/MG CREAT
ACP UR QL CFM: NOT DETECTED
ALPRAZ/CREAT UR CFM: 70 NG/MG CREAT
AMPHETAMINES UR QL SCN: NEGATIVE NG/ML
APAP UR QL SCN: NEGATIVE UG/ML
BARBITURATES UR QL SCN: NEGATIVE NG/ML
BENZODIAZ SCN METH UR: NORMAL
BUPRENORPHINE UR QL SCN: NEGATIVE
BUPRENORPHINE/CREAT UR: NOT DETECTED NG/MG CREAT
CANNABINOIDS UR QL SCN: NEGATIVE NG/ML
CARISOPRODOL UR QL: NEGATIVE NG/ML
CLONAZEPAM/CREAT UR CFM: NOT DETECTED NG/MG CREAT
COCAINE+BZE UR QL SCN: NEGATIVE NG/ML
CREAT UR-MCNC: 164 MG/DL
D-METHORPHAN UR-MCNC: NOT DETECTED NG/ML
D-METHORPHAN+LEVORPHANOL UR QL: NOT DETECTED
DESALKYLFLURAZ/CREAT UR: NOT DETECTED NG/MG CREAT
DIAZEPAM/CREAT UR: NOT DETECTED NG/MG CREAT
ETHANOL UR QL SCN: NEGATIVE G/DL
ETHANOL UR QL SCN: NEGATIVE NG/ML
FENTANYL CTO UR SCN-MCNC: NEGATIVE NG/ML
FENTANYL/CREAT UR: NOT DETECTED NG/MG CREAT
FLUNITRAZEPAM UR QL SCN: NOT DETECTED NG/MG CREAT
GABAPENTIN UR-MCNC: NEGATIVE UG/ML
HYPNOTICS UR QL SCN: NEGATIVE
KETAMINE UR QL: NOT DETECTED
LORAZEPAM/CREAT UR: NOT DETECTED NG/MG CREAT
MEPERIDINE UR QL SCN: NEGATIVE NG/ML
METHADONE UR QL SCN: NEGATIVE NG/ML
METHADONE+METAB UR QL SCN: NEGATIVE NG/ML
MIDAZOLAM/CREAT UR CFM: NOT DETECTED NG/MG CREAT
MISCELLANEOUS, UR: NEGATIVE
NORBUPRENORPHINE/CREAT UR: NOT DETECTED NG/MG CREAT
NORDIAZEPAM/CREAT UR: NOT DETECTED NG/MG CREAT
NORFENTANYL/CREAT UR: NOT DETECTED NG/MG CREAT
NORFLUNITRAZEPAM UR-MCNC: NOT DETECTED NG/MG CREAT
NORKETAMINE UR-MCNC: NOT DETECTED UG/ML
OPIATES UR SCN-MCNC: NEGATIVE NG/ML
OTHER HALLUCINOGENS UR: NEGATIVE
OXAZEPAM/CREAT UR: NOT DETECTED NG/MG CREAT
OXYCODONE CTO UR SCN-MCNC: NEGATIVE NG/ML
PCP UR QL SCN: NEGATIVE NG/ML
PRESCRIBED MEDICATIONS: NORMAL
PRESCRIBED MEDICATIONS: NORMAL
PROPOXYPH UR QL SCN: NEGATIVE NG/ML
TAPENTADOL CTO UR SCN-MCNC: NEGATIVE NG/ML
TEMAZEPAM/CREAT UR: NOT DETECTED NG/MG CREAT
TRAMADOL UR QL SCN: NEGATIVE NG/ML
ZALEPLON UR-MCNC: NOT DETECTED NG/ML
ZOLPIDEM PHENYL-4-CARB UR QL SCN: NOT DETECTED
ZOLPIDEM UR QL SCN: NOT DETECTED
ZOPICLONE-N-OXIDE UR-MCNC: NOT DETECTED NG/ML

## 2023-12-01 PROCEDURE — 1159F MED LIST DOCD IN RCRD: CPT | Performed by: PSYCHOLOGIST

## 2023-12-01 PROCEDURE — 1160F RVW MEDS BY RX/DR IN RCRD: CPT | Performed by: PSYCHOLOGIST

## 2023-12-01 PROCEDURE — 90837 PSYTX W PT 60 MINUTES: CPT | Performed by: PSYCHOLOGIST

## 2023-12-01 SDOH — SOCIAL STABILITY - SOCIAL INSECURITY: PROBLEM RELATED TO PRIMARY SUPPORT GROUP, UNSPECIFIED: Z63.9

## 2023-12-01 NOTE — PROGRESS NOTES
PROGRESS NOTE    Data:    Marcela Ramírez is a 58 y.o. female who met with the undersigned for a scheduled psychotherapy session from 11:15 am - 12:10 pm.      Clinical Maneuvering/Intervention:      The pt talked about recent difficulties with bipolar symptoms including mood swings and panic attacks. She recently started a new medication to manage mood/mood swings, and expressed feeling nervous about it. Work stress, healing from trauma, and missing her son who is in alf all came up today and were processed. She was assisted with getting to core and/or underlying issues however, throughout the session, so that she could feel less stressed when she left her session. Stressors were processed individually and in detail. Venting of frustrations was conducted in order to help the pt feel less tense emotionally and gain insight into issues. Feelings were processed and validated several times in session. Perspective taking was conducted multiple times in order to help the pt feel less stuck, less overwhelmed, and see challenges as much more manageable. Active listening was conducted in order to help the pt make sense of stressors and start moving towards potential solutions. The pt was assisted with finding solutions based on existing skill-set and abilities. An action plan was designed to empower the pt to know what to do. Simple steps of one, two, three were laid out in order to help the pt feel more in control of things and feel less stressed. She will continue with her medication, communicating effectively with others (if not doing, creative boundary session as was joked about), and connecting with loved ones in meaningful ways. The pt's strengths were identified in order to help identify abilities to use to better face/overcome challenges. Some humor was used in session as it is one of the pt's coping skills. Humor was used too, to help the pt see things as less challenging and more manageable than they first  seemed. Humor was used as well, to model use of the skill as a way to decrease tension ongoing. Homework was assigned tailored to pt's needs. The pt expressed gratitude for today's session.    Mental Status Exam  Hygiene:  good  Dress: normal  Attitude:  cooperative and proactive  Motor Activity: normal  Speech: normal  Mood:  tense  Affect:  congruent  Thought Processes: normal  Thought Content:  normal  Suicidal Thoughts:  not endorsed  Homicidal Thoughts:  not endorsed  Crisis Safety Plan: not needed  Hallucinations:  none      Patient's Support Network Includes:  family, friends      Progress toward goal: there is evidence to suggest that she struggles with mood swings      Functional Status: moderate       Prognosis: good with counseling, medication, and stronger coping skills     Assessment      The pt presented to be struggling with managing her moods related to having bipolar disorder. She seems to benefit from venting, having her experiences validated, and being assisted in discovering her own solutions to her problems.       Plan      In order to diminish symptoms of bipolar disorder: the pt is to continue with counseling ongoing, being open/transparent with family ongoing (particularly G), and continue on new medication for bipolar disorder as prescribed (ongoing).     Maribel Hernandez, PhD, LP

## 2023-12-09 DIAGNOSIS — E11.42 TYPE 2 DIABETES MELLITUS WITH DIABETIC POLYNEUROPATHY, WITHOUT LONG-TERM CURRENT USE OF INSULIN: ICD-10-CM

## 2023-12-11 RX ORDER — SEMAGLUTIDE 2.68 MG/ML
INJECTION, SOLUTION SUBCUTANEOUS
Qty: 3 ML | Refills: 0 | Status: SHIPPED | OUTPATIENT
Start: 2023-12-11

## 2023-12-11 NOTE — TELEPHONE ENCOUNTER
Rx Refill Note  Requested Prescriptions     Pending Prescriptions Disp Refills    Ozempic, 2 MG/DOSE, 8 MG/3ML solution pen-injector [Pharmacy Med Name: Ozempic (2 MG/DOSE) 8 MG/3ML Subcutaneous Solution Pen-injector] 3 mL 0     Sig: INJECT 2MG SUBCUTANEOUSLY ONCE A WEEK AS DITRECTED      Last office visit with prescribing clinician: 11/2/2023   Last telemedicine visit with prescribing clinician: Visit date not found   Next office visit with prescribing clinician: 1/18/2024                         Would you like a call back once the refill request has been completed: [] Yes [] No    If the office needs to give you a call back, can they leave a voicemail: [] Yes [] No    Zuleika Plunkett MA  12/11/23, 12:10 EST

## 2023-12-19 ENCOUNTER — TELEPHONE (OUTPATIENT)
Dept: FAMILY MEDICINE CLINIC | Facility: CLINIC | Age: 58
End: 2023-12-19

## 2023-12-19 RX ORDER — FLUCONAZOLE 150 MG/1
150 TABLET ORAL ONCE
Qty: 1 TABLET | Refills: 0 | Status: SHIPPED | OUTPATIENT
Start: 2023-12-19 | End: 2023-12-19

## 2023-12-19 NOTE — TELEPHONE ENCOUNTER
"Caller: Marcela Ramírez \"Helen\"    Relationship: Self    Best call back number: [phone]     What medication are you requesting: DIFLUCAN     What are your current symptoms: WHITE VAGINAL DISCHARGE, REDNESS, AND ITCHING    How long have you been experiencing symptoms: SINCE 12/18/2023    Have you had these symptoms before:    [x] Yes  [] No    Have you been treated for these symptoms before:   [x] Yes  [] No    If a prescription is needed, what is your preferred pharmacy and phone number: [facility] [id number] - [address] [facility] [phone] [facility] [phone]      Additional notes:  THE PATIENT  BELIEVES SHE HAS A VAGINAL YEAST INFECTION, REPORT SHE STARTED TAKING A PROBIOTIC AND IS UNSURE IT THIS IS LINKED TO HER SYMPTOMS. PLEASE CALL IF ANY QUESTIONS OR CONCERNS  "

## 2023-12-25 DIAGNOSIS — Z86.59 HISTORY OF BIPOLAR DISORDER: ICD-10-CM

## 2023-12-26 RX ORDER — LURASIDONE HYDROCHLORIDE 20 MG/1
20 TABLET, FILM COATED ORAL DAILY
Qty: 30 TABLET | Refills: 0 | OUTPATIENT
Start: 2023-12-26

## 2023-12-26 NOTE — TELEPHONE ENCOUNTER
Rx Refill Note  Requested Prescriptions     Pending Prescriptions Disp Refills    Lurasidone HCl (LATUDA) 20 MG tablet tablet [Pharmacy Med Name: LURASIDONE HCL 20 MG TABLET] 30 tablet 0     Sig: TAKE 1 TABLET BY MOUTH DAILY      Last office visit with prescribing clinician: 11/28/2023     Next office visit with prescribing clinician: 12/28/2023

## 2023-12-28 ENCOUNTER — OFFICE VISIT (OUTPATIENT)
Age: 58
End: 2023-12-28
Payer: COMMERCIAL

## 2023-12-28 VITALS
DIASTOLIC BLOOD PRESSURE: 84 MMHG | WEIGHT: 232.8 LBS | SYSTOLIC BLOOD PRESSURE: 128 MMHG | HEART RATE: 65 BPM | BODY MASS INDEX: 36.54 KG/M2 | OXYGEN SATURATION: 98 % | HEIGHT: 67 IN

## 2023-12-28 DIAGNOSIS — F99 INSOMNIA DUE TO OTHER MENTAL DISORDER: ICD-10-CM

## 2023-12-28 DIAGNOSIS — F51.05 INSOMNIA DUE TO OTHER MENTAL DISORDER: ICD-10-CM

## 2023-12-28 DIAGNOSIS — Z86.59 HISTORY OF BIPOLAR DISORDER: Primary | ICD-10-CM

## 2023-12-28 RX ORDER — LAMOTRIGINE 200 MG/1
200 TABLET ORAL DAILY
Qty: 30 TABLET | Refills: 0 | Status: SHIPPED | OUTPATIENT
Start: 2023-12-28

## 2023-12-28 RX ORDER — LURASIDONE HYDROCHLORIDE 40 MG/1
40 TABLET, FILM COATED ORAL DAILY
Qty: 30 TABLET | Refills: 0 | Status: SHIPPED | OUTPATIENT
Start: 2023-12-28

## 2023-12-28 RX ORDER — FLUCONAZOLE 150 MG/1
150 TABLET ORAL DAILY
COMMUNITY
Start: 2023-12-19 | End: 2023-12-29 | Stop reason: SDUPTHER

## 2023-12-28 RX ORDER — ALPRAZOLAM 1 MG/1
1 TABLET ORAL NIGHTLY PRN
COMMUNITY
Start: 2023-12-05 | End: 2023-12-28 | Stop reason: HOSPADM

## 2023-12-28 NOTE — PROGRESS NOTES
"     Office  Follow Up Visit      Patient Name: Marcela Ramírez  : 1965   MRN: [id number]     Referring Provider: Sukhwinder Acosta MD    Chief Complaint:       ICD-10-CM ICD-9-CM   1. History of bipolar disorder  Z86.59 V11.1   2. Insomnia due to other mental disorder  F51.05 300.9    F99 327.02      History of Present Illness:   Marcela Ramírez is a 58 y.o. female who is here today for follow up related to Bipolar Disorder. Patient states she notices some difference with Latuda \"but there are still some things.\"  Patient states she has some difficulty distinguishing normal feelings of sadness and depression from her \" bipolar illness.\" States she blames all her emotions on bipolar disorder.  Patient shares that her mother dying of COPD and likely will not survive til spring.  Her sister called and told her this would likely be her mother's last Eckert that she spent the [facility] home in [place] with her family. Her father is also in poor health and likely will not last long after her mother. She is also dealing with her son being in retirement. Her ANA ROSA scale improved from 18 to 8 and  PHQ improved from 21 to 16.  Patient denies active SI but states \" I don't think the question of \"if her family will be better off without her\" will ever not be there as she still feels a lot of guilt  Patient states she took provider's advice and  took half a xanax for 2 weeks and for 2 weeks sleep was \"awful\", so she went back to taking Xanax. Patient states her boss asked her if she was ok because she was falling asleep at work. Patient states falling asleep isn't a problem, staying asleep is the problem. Patient is taking Ozempic and eating much smaller portions throughout the day.     Previous Diagnoses: Alcoholism, Bipolar Disorder  Past Medications: Xanax, Pristiq, Xanax, Lamictal 200 mg daily, multiple antidepressesant that didn't work, lithium ineffective, abilify-SI, seroquel-tremors   Current Meds: " Latuda 40 mg Lamictal 200 mg, Xanax 1 mg hs prn  Therapy: Dr David griffin    Subjective      Review of Systems:   Review of Systems   Constitutional:  Positive for activity change, appetite change and fatigue.   Psychiatric/Behavioral:  Positive for dysphoric mood, sleep disturbance and suicidal ideas. Negative for self-injury. The patient is nervous/anxious.        PHQ-9 Depression Screening  Little interest or pleasure in doing things? 1-->several days   Feeling down, depressed, or hopeless? 2-->more than half the days   Trouble falling or staying asleep, or sleeping too much? 3-->nearly every day   Feeling tired or having little energy? 2-->more than half the days   Poor appetite or overeating? 1-->several days   Feeling bad about yourself - or that you are a failure or have let yourself or your family down? 2-->more than half the days   Trouble concentrating on things, such as reading the newspaper or watching television? 3-->nearly every day   Moving or speaking so slowly that other people could have noticed? Or the opposite - being so fidgety or restless that you have been moving around a lot more than usual? 0-->not at all   Thoughts that you would be better off dead, or of hurting yourself in some way? 2-->more than half the days   PHQ-9 Total Score 16   If you checked off any problems, how difficult have these problems made it for you to do your work, take care of things at home, or get along with other people? somewhat difficult       ANA ROSA-7      Over the last two weeks, how often have you been bothered by the following problems?  Feeling nervous, anxious or on edge: Several days  Not being able to stop or control worrying: More than half the days  Worrying too much about different things: More than half the days  Trouble Relaxing: Not at all  Being so restless that it is hard to sit still: Not at all  Becoming easily annoyed or irritable: More than half the days  Feeling afraid as if something awful  might happen: Several days  ANA ROSA 7 Total Score: 8  If you checked any problems, how difficult have these problems made it for you to do your work, take care of things at home, or get along with other people: Somewhat difficult    Patient History:   The following portions of the patient's history were reviewed and updated as appropriate: allergies, current medications, past family history, past medical history, past social history, past surgical history and problem list.     Social History     Socioeconomic History    Marital status:    Tobacco Use    Smoking status: Never     Passive exposure: Never    Smokeless tobacco: Never   Vaping Use    Vaping Use: Never used   Substance and Sexual Activity    Alcohol use: Not Currently     Comment: Over a year ago. Before then, not often    Drug use: Not Currently     Types: Cocaine(coke), LSD, Marijuana, Psilocybin     Comment: At least 12 months or more for most listed    Sexual activity: Yes     Partners: Male     Birth control/protection: Hysterectomy, Same-sex partner, Surgical     Comment: no hormones       Medications:     Current Outpatient Medications:     ALPRAZolam (XANAX) 1 MG tablet, Take 1 tablet by mouth At Night As Needed., Disp: , Rfl:     amLODIPine (NORVASC) 5 MG tablet, Take 1 tablet by mouth Daily., Disp: 30 tablet, Rfl: 2    atorvastatin (LIPITOR) 20 MG tablet, TAKE 1 TABLET BY MOUTH DAILY, Disp: 90 tablet, Rfl: 1    fluconazole (DIFLUCAN) 150 MG tablet, Take 1 tablet by mouth Daily., Disp: , Rfl:     glucose blood test strip, Use as instructed to check glucose twice a day Dispense Onetouch ultra 2, Disp: 100 each, Rfl: 3    hydroCHLOROthiazide (HYDRODIURIL) 25 MG tablet, Take 1 tablet by mouth Daily., Disp: 90 tablet, Rfl: 3    lamoTRIgine (LaMICtal) 200 MG tablet, Take 1 tablet by mouth Daily., Disp: 30 tablet, Rfl: 0    Lancets misc, Use to check glucose twice a day Dispense Onetouch ultra 2, Disp: 100 each, Rfl: 3    levothyroxine (SYNTHROID,  "LEVOTHROID) 50 MCG tablet, Take 1 tablet by mouth Every Morning., Disp: 30 tablet, Rfl: 2    lisinopril (PRINIVIL,ZESTRIL) 40 MG tablet, TAKE ONE TABLET BY MOUTH DAILY, Disp: 30 tablet, Rfl: 5    Lurasidone HCl (Latuda) 20 MG tablet tablet, Take 1 tablet by mouth Daily., Disp: 30 tablet, Rfl: 0    meclizine (Antivert) 50 MG tablet, Take 1 tablet by mouth 3 (Three) Times a Day As Needed for Dizziness or Nausea., Disp: 21 tablet, Rfl: 0    metoprolol succinate XL (TOPROL-XL) 50 MG 24 hr tablet, TAKE ONE TABLET BY MOUTH DAILY, Disp: 30 tablet, Rfl: 5    omeprazole (priLOSEC) 20 MG capsule, TAKE ONE CAPSULE BY MOUTH DAILY, Disp: 30 capsule, Rfl: 11    Ozempic, 2 MG/DOSE, 8 MG/3ML solution pen-injector, INJECT 2MG SUBCUTANEOUSLY ONCE A WEEK AS DITRECTED, Disp: 3 mL, Rfl: 0    pregabalin (Lyrica) 100 MG capsule, Take 1 capsule by mouth 3 (Three) Times a Day., Disp: 90 capsule, Rfl: 5    rotigotine (Neupro) 1 MG/24HR patch 24 hour 24 hour patch, Place 1 patch on the skin as directed by provider Daily., Disp: 30 patch, Rfl: 10    tamsulosin (FLOMAX) 0.4 MG capsule 24 hr capsule, Take 1 capsule by mouth Daily., Disp: 30 capsule, Rfl: 5    Current Facility-Administered Medications:     cyanocobalamin injection 1,000 mcg, 1,000 mcg, Intramuscular, Q28 Days, Zeynep Jenkins APRN, 1,000 mcg at 08/24/23 1435    Objective     Physical Exam:  Vital Signs:   Vitals:    12/28/23 1013   BP: 128/84   Pulse: 65   SpO2: 98%   Weight: 106 kg (232 lb 12.8 oz)   Height: 170.2 cm (67.01\")     Body mass index is 36.45 kg/m².       Mental Status Exam:   MENTAL STATUS EXAM   General Appearance:  Obese and other  Other Comment:  Casually dressed  Eye Contact:  Good eye contact  Attitude:  Cooperative  Motor Activity:  Normal gait, posture  Muscle Strength:  Normal  Speech:  Hyper talkative  Language:  Spontaneous  Mood and affect:  Normal, pleasant  Hopelessness:  Denies  Thought Process:  Tangential  Associations/ Thought Content:  No " delusions  Hallucinations:  None  Suicidal Ideations:  Not present  Homicidal Ideation:  Not present  Sensorium:  Alert and clear  Orientation:  Person, place, time and situation  Immediate Recall, Recent, and Remote Memory:  Intact  Attention Span/ Concentration:  Good  Fund of Knowledge:  Appropriate for age and educational level  Intellectual Functioning:  Average range  Insight:  Fair  Judgement:  Fair  Reliability:  Good  Impulse Control:  Fair        AZIZA  ispensed  Strength Quantity Days Supply Provider Pharmacy   12/06/2023 Alprazolam 1MG 30 each 30 MARYAN OLIVARES [id number]   12/06/2023 Pregabalin 100MG 90 each 30 CHARLAISIDRA [id number]   11/06/2023 [facility]raz[facility]am 1MG 30 each [facility]       The following data was reviewed by: NOE Burnett on 12/28/2023:  CMP          5/19/2023    11:24 7/29/2023    19:26 9/28/2023    12:15   CMP   Glucose 113  88  83    BUN 28  15  13    Creatinine 1.21  1.11  1.27    EGFR  58.5  49.7    Sodium 142  144  139    Potassium 4.5  3.8  4.1    Chloride 100  103  99    Calcium 9.9  9.4  9.6    Total Protein 7.9      Total Protein  7.5  7.3    Albumin 4.8  4.2  4.3    Globulin 3.1      Globulin  3.3  3.0    Total Bilirubin 0.6  0.4  0.4    Alkaline Phosphatase 105  94  89    AST (SGOT) 20  21  22    ALT (SGPT) 17  19  18    Albumin/Globulin Ratio  1.3  1.4    BUN/Creatinine Ratio 23  13.5  10.2    Anion Gap  12.0  11.1      CBC w/diff          5/19/2023    11:24 7/29/2023    19:26 9/28/2023    12:15   CBC w/Diff   WBC 7.8  9.17  7.49    RBC 4.75  4.22  4.09    Hemoglobin 14.1  13.0  12.7    Hematocrit 43.6  40.3  37.9    MCV 92  95.5  92.7    MCH 29.7  30.8  31.1    MCHC 32.3  32.3  33.5    RDW 13.0  12.9  12.7    Platelets 246  190  195    Neutrophil Rel % 57  60.5     Immature Granulocyte Rel %  0.2     Lymphocyte Rel % 32  31.0     Monocyte Rel % 7  5.3     Eosinophil Rel % 3  2.6     Basophil Rel % 1  0.4        Lipid Panel          5/19/2023    11:24 9/28/2023    12:15   Lipid Panel   Total Cholesterol  147    Total Cholesterol 177     Triglycerides 170  166    HDL Cholesterol 57  56    VLDL Cholesterol 29  28    LDL Cholesterol  91  63    LDL/HDL Ratio 1.6  1.03      TSH          5/19/2023    11:24 9/28/2023    12:15   TSH   TSH 2.100  2.560      Data reviewed : Cardiology studies 7/29/2023 EKG NSR, rate 67, QTc 401/      Assessment / Plan      Visit Diagnosis/Orders Placed This Visit:  Diagnoses and all orders for this visit:    1. History of bipolar disorder (Primary)  -     lamoTRIgine (LaMICtal) 200 MG tablet; Take 1 tablet by mouth Daily.  Dispense: 30 tablet; Refill: 0  -     lurasidone (Latuda) 40 MG tablet tablet; Take 1 tablet by mouth Daily.  Dispense: 30 tablet; Refill: 0    2. Insomnia due to other mental disorder  -     lurasidone (Latuda) 40 MG tablet tablet; Take 1 tablet by mouth Daily.  Dispense: 30 tablet; Refill: 0       Contraception-hysterectomy    -Continue psychotherapy     -Hx gastric sleeve     -Will need labs in Feb     -Continue Lamictal 200 mg daily     - Increase  Latuda 40 mg at night.  Be sure to take it with at least 350 dee to maximize absorption     -Xanax 1 mg prescribed by Dr. Lebron. -med discontinued. She should call our office for refills so I can initiate taper when appropriate     -Supplements: Takes women over 50 MVI, , B6, B12, iron, calcium bid, fish oil     -The benefits  of consuming a healthy diet, minimizing caffeine intake and engaging in  daily exercise were discussed with patient. Patient encouraged to consider lifestyle modification regarding diet and exercise patterns to maximize results of mental health treatment.       Plan:   Patient reports some improvement in depression since starting Latuda last visit.  She denies medication side effects.  Patient states she tried to taper off of Xanax but experienced significant sleep disruption and was falling asleep at  work.  I gently requested patient to call her behavioral health office should she need a refill for Xanax or any other psychotropic medication to avoid polypharmacy in any unsafe medication combinations..  She is agreeable.  Potential benefits, risks, side effects of increasing Latuda discussed.  Patient verbalizes understanding and agrees with this medication plan    Continue supportive psychotherapy efforts and medications as indicated. Treatment and medication options discussed during today's visit. Patient ackowledged and verbally consented to continue with current treatment plan and was educated on the importance of compliance with treatment and follow-up appointments. Patient seems reasonably able to adhere to treatment plan.      Medication Considerations:  Discussed medication options and treatment plan of prescribed medication(s) as well as the risks, benefits, and potential side effects. Patient is agreeable to call the office with any worsening of symptoms or onset of side effects. Patient is agreeable to call 911 or go to the nearest ER should he/she begin having SI/HI.    Start Time: 1030   Stop Time: 1049 ( 19) minutes was spent for psychotherapy. Assisted patient in processing patient's depression.  Acknowledged and normalized patient's thoughts, feelings, and concerns. Utilized cognitive behavioral therapy to assist the patient in recognizing more appropriate coping mechanisms when (she/he) becomes agitated/anxious which are proven effective in reducing the severity of frequency of symptoms. Strongly urged to continue to eat better balanced and healthier foods in reducing further health risks. Allowed patient to freely discuss issues without interruption or judgment.      Quality Measures:   Never smoker    I advised Marcela Ramírez of the risks of tobacco use.     Follow Up:   Return in about 4 weeks (around 1/25/2024).      NOE Urbina, PMHNP-BC

## 2023-12-29 RX ORDER — FLUCONAZOLE 150 MG/1
150 TABLET ORAL DAILY
Qty: 1 TABLET | Refills: 0 | Status: SHIPPED | OUTPATIENT
Start: 2023-12-29

## 2024-01-05 ENCOUNTER — OFFICE VISIT (OUTPATIENT)
Dept: BEHAVIORAL HEALTH | Facility: CLINIC | Age: 59
End: 2024-01-05
Payer: COMMERCIAL

## 2024-01-05 DIAGNOSIS — F31.9 BIPOLAR 1 DISORDER: Primary | ICD-10-CM

## 2024-01-05 DIAGNOSIS — R45.86 MOOD SWINGS: ICD-10-CM

## 2024-01-05 DIAGNOSIS — F43.9 FEELING STRESSED OUT: ICD-10-CM

## 2024-01-05 PROCEDURE — 90837 PSYTX W PT 60 MINUTES: CPT | Performed by: PSYCHOLOGIST

## 2024-01-05 NOTE — PROGRESS NOTES
PROGRESS NOTE    Data:    Marcela Ramírez is a 58 y.o. female who met with the undersigned for a scheduled psychotherapy session from 11:20 am - 12:15 pm.      Clinical Maneuvering/Intervention:      The pt talked about recent difficulties with bipolar symptoms including mood swings. She expressed feeling worried about possible side effects of her current changes in psychotropic medication. Worries were validated and normalized. She was commended for facing worries/fears in starting a new medication for bipolar disorder. The pt talked about stress at work, related to her mother's ailing health, and related to conflicts with her long-term boyfriend MARKELL. Stressors were processed individually and in detail. Venting of frustrations was conducted in order to help the pt feel less tense emotionally and gain insight into issues. Feelings were processed and validated several times in session. Perspective taking was conducted multiple times in order to help the pt feel less stuck, less overwhelmed, and see challenges as much more manageable. Active listening was conducted in order to help the pt make sense of stressors and start moving towards potential solutions. The pt was assisted with finding solutions based on existing skill-set and abilities. She was encouraged and supported with continuing to take her medication for bipolar disorder (Latuda) and finding gratitude in the benefits she has experienced thus far. She was assisted with noting ways she can cope with her mother's failing health. Time was allocated specifically to assess what is 'working' in the pt's life, versus what is 'not working,' (if anything) in terms of helping to improve mood and quality of life. The pt was assisted in recognizing progress in order to show encouragement and promote motivation to keep making positive changes in life. She is on better medication, is communicating with G better than ever, and she has continued working at her current  position (learning new things all the time), despite inherent stresses at this job ([employer]). The pt's strengths were identified in order to help identify abilities to use to better face/overcome challenges. She is witty, articulate, and knowledgeable about life in general. Some humor was used in session as it is one of the pt's coping skills. Humor was used too, to help the pt see things as less challenging and more manageable than they first seemed. Humor was used as well, to model use of the skill as a way to decrease tension ongoing. Homework was assigned tailored to pt's needs. The pt expressed gratitude for today's session.    Mental Status Exam  Hygiene:  good  Dress: normal  Attitude:  cooperative and proactive  Motor Activity: normal  Speech: normal  Mood:  tense, stressed  Affect:  congruent  Thought Processes: normal  Thought Content:  normal  Suicidal Thoughts:  not endorsed  Homicidal Thoughts:  not endorsed  Crisis Safety Plan: not needed  Hallucinations:  none      Patient's Support Network Includes:  family, friends      Progress toward goal: there is evidence to suggest that she struggles with mood swings      Functional Status: moderate to high       Prognosis: good with counseling, medication    Assessment      The pt presented to be struggling with managing her moods related to having bipolar disorder. She seems to be benefiting from current psychotropic medication. She seems to benefit from venting, having her experiences validated, and being assisted in discovering her own solutions to her problems.       Plan      In order to diminish symptoms of bipolar disorder: the pt is to continue with counseling ongoing, being open/transparent with family ongoing (particularly G), and continue on new medication for bipolar disorder as prescribed (ongoing). Resiliency will be reviewed/elicited in subsequent sessions in order to help her feel stronger over time.     Maribel Hernandez, PhD, LP

## 2024-01-06 DIAGNOSIS — E11.42 TYPE 2 DIABETES MELLITUS WITH DIABETIC POLYNEUROPATHY, WITHOUT LONG-TERM CURRENT USE OF INSULIN: ICD-10-CM

## 2024-01-08 RX ORDER — SEMAGLUTIDE 2.68 MG/ML
INJECTION, SOLUTION SUBCUTANEOUS
Qty: 3 ML | Refills: 0 | Status: SHIPPED | OUTPATIENT
Start: 2024-01-08

## 2024-01-08 NOTE — TELEPHONE ENCOUNTER
Rx Refill Note  Requested Prescriptions     Pending Prescriptions Disp Refills    Ozempic, 2 MG/DOSE, 8 MG/3ML solution pen-injector [Pharmacy Med Name: Ozempic (2 MG/DOSE) 8 MG/3ML Subcutaneous Solution Pen-injector] 3 mL 0     Sig: INJECT 2 MG SUBCUTANEOUSLY ONCE A WEEK      Last office visit with prescribing clinician: 11/2/2023   Last telemedicine visit with prescribing clinician: Visit date not found   Next office visit with prescribing clinician: 1/18/2024                         Would you like a call back once the refill request has been completed: [] Yes [] No    If the office needs to give you a call back, can they leave a voicemail: [] Yes [] No    Zuleika Plunkett MA  01/08/24, 13:09 EST

## 2024-01-23 DIAGNOSIS — F99 INSOMNIA DUE TO OTHER MENTAL DISORDER: ICD-10-CM

## 2024-01-23 DIAGNOSIS — Z86.59 HISTORY OF BIPOLAR DISORDER: ICD-10-CM

## 2024-01-23 DIAGNOSIS — F51.05 INSOMNIA DUE TO OTHER MENTAL DISORDER: ICD-10-CM

## 2024-01-23 RX ORDER — LURASIDONE HYDROCHLORIDE 40 MG/1
40 TABLET, FILM COATED ORAL DAILY
Qty: 30 TABLET | Refills: 0 | OUTPATIENT
Start: 2024-01-23

## 2024-01-23 NOTE — TELEPHONE ENCOUNTER
Rx Refill Note  Requested Prescriptions     Pending Prescriptions Disp Refills    lurasidone (LATUDA) 40 MG tablet tablet [Pharmacy Med Name: LURASIDONE HCL 40 MG TABLET] 30 tablet 0     Sig: TAKE 1 TABLET BY MOUTH DAILY      Last office visit with prescribing clinician: 12/28/2023     Next office visit with prescribing clinician: 1/26/2024     Chris Todd MA  01/23/24, 11:43 EST

## 2024-01-26 ENCOUNTER — OFFICE VISIT (OUTPATIENT)
Dept: BEHAVIORAL HEALTH | Facility: CLINIC | Age: 59
End: 2024-01-26
Payer: COMMERCIAL

## 2024-01-26 ENCOUNTER — OFFICE VISIT (OUTPATIENT)
Age: 59
End: 2024-01-26
Payer: COMMERCIAL

## 2024-01-26 VITALS
HEART RATE: 58 BPM | OXYGEN SATURATION: 96 % | DIASTOLIC BLOOD PRESSURE: 100 MMHG | WEIGHT: 233.1 LBS | BODY MASS INDEX: 36.58 KG/M2 | SYSTOLIC BLOOD PRESSURE: 142 MMHG | HEIGHT: 67 IN

## 2024-01-26 DIAGNOSIS — F31.9 BIPOLAR 1 DISORDER: Primary | ICD-10-CM

## 2024-01-26 DIAGNOSIS — F51.05 INSOMNIA DUE TO OTHER MENTAL DISORDER: ICD-10-CM

## 2024-01-26 DIAGNOSIS — R45.86 MOOD SWINGS: ICD-10-CM

## 2024-01-26 DIAGNOSIS — F99 INSOMNIA DUE TO OTHER MENTAL DISORDER: ICD-10-CM

## 2024-01-26 RX ORDER — LURASIDONE HYDROCHLORIDE 40 MG/1
40 TABLET, FILM COATED ORAL DAILY
Qty: 30 TABLET | Refills: 1 | Status: SHIPPED | OUTPATIENT
Start: 2024-01-26

## 2024-01-26 RX ORDER — LAMOTRIGINE 200 MG/1
200 TABLET ORAL DAILY
Qty: 30 TABLET | Refills: 1 | Status: SHIPPED | OUTPATIENT
Start: 2024-01-26

## 2024-01-26 RX ORDER — ESZOPICLONE 1 MG/1
1 TABLET, FILM COATED ORAL NIGHTLY PRN
Qty: 30 TABLET | Refills: 0 | Status: SHIPPED | OUTPATIENT
Start: 2024-01-26

## 2024-01-26 NOTE — PROGRESS NOTES
"     Office  Follow Up Visit      Patient Name: Marcela Ramírez  : 1965   MRN: [id number]     Referring Provider: Sukhwinder Acosta MD    Chief Complaint:       ICD-10-CM ICD-9-CM   1. Bipolar 1 disorder  F31.9 296.7   2. Insomnia due to other mental disorder  F51.05 300.9    F99 327.02     History of Present Illness:   Marcela Ramírez is a 58 y.o. female who is here today for follow up related to Bipolar Disorder.     Patient feels increasing her Latuda is helpful and makes her \"not give a fuck\".  Patient reports her boyfriend tells her he sees an improvement and fees the Latuda is effective. Patient reports she is still annoyed by people who  don't use their turn  signals, but does not feel this is related to her Bipolar disorder. \"Anger hasn't changed much and is still there, but has subsided some. \" PHQ score improved from 21 to  16. She reports passive SI goes back to guilt associated with  how she treated her childrn before she was diagnosed with Bipolar Disorder. She adamantly denies having a plan or intent to harm herself or anyone else. ANA ROSA score improved from 21 to 17. Reports she is very pleased with the Latuda efficacy and feels it has worked the best when compared with her previous medications. Reports she had an anxiety attack at work last week because she felt like she was being targeted. She has good support from her boss. Patient states without her xanax she has resorted to cold medicine and alcohol, which she hates. States she  takes 1 shot of -99 proof liquor to put her sleep, but it does not keep her sleep.  Patient states she is \"irritable, hateful and mean\" because she is not sleeping since xanax was discontinued.  She is only sleeping a couple hours because she has  \"vivid thoughts\" of worst case scenarios, like her grand daughter being shot while she is in school, BF dying in an accident, son being killed while he was in [employer]. Every bad thought imaginable. \"There is so " "much garbage in my head I can't get rid off. \" States she likes the xanax because it \"shut her brain off and  didn't make her drowsy the following day.    Previous Diagnoses: Alcoholism, Bipolar Disorder  Past Medications: Xanax, Pristiq, Xanax, Lamictal 200 mg daily, multiple antidepressesant that didn't work, lithium ineffective, abilify-SI, seroquel-tremors , Xanax  Current Meds: Latuda 40 mg Lamictal 200 mg, Lunesta  Therapy: Dr Hernandez -elias    Subjective      Review of Systems:   Review of Systems   Constitutional:  Positive for activity change, appetite change and fatigue.   Psychiatric/Behavioral:  Positive for dysphoric mood, sleep disturbance and suicidal ideas. Negative for self-injury. The patient is nervous/anxious.        PHQ-9 Depression Screening  Little interest or pleasure in doing things? 1-->several days   Feeling down, depressed, or hopeless? 2-->more than half the days   Trouble falling or staying asleep, or sleeping too much? 3-->nearly every day   Feeling tired or having little energy? 3-->nearly every day   Poor appetite or overeating? 1-->several days   Feeling bad about yourself - or that you are a failure or have let yourself or your family down?     Trouble concentrating on things, such as reading the newspaper or watching television? 3-->nearly every day   Moving or speaking so slowly that other people could have noticed? Or the opposite - being so fidgety or restless that you have been moving around a lot more than usual? 0-->not at all   Thoughts that you would be better off dead, or of hurting yourself in some way? 3-->nearly every day   PHQ-9 Total Score 16   If you checked off any problems, how difficult have these problems made it for you to do your work, take care of things at home, or get along with other people? very difficult       ANA ROSA-7      Over the last two weeks, how often have you been bothered by the following problems?  Feeling nervous, anxious or on edge: Several " days  Not being able to stop or control worrying: Nearly every day  Worrying too much about different things: Nearly every day  Trouble Relaxing: More than half the days  Being so restless that it is hard to sit still: More than half the days  Becoming easily annoyed or irritable: Nearly every day  Feeling afraid as if something awful might happen: Nearly every day  ANA ROSA 7 Total Score: 17  If you checked any problems, how difficult have these problems made it for you to do your work, take care of things at home, or get along with other people: Very difficult    Patient History:   The following portions of the patient's history were reviewed and updated as appropriate: allergies, current medications, past family history, past medical history, past social history, past surgical history and problem list.     Social History     Socioeconomic History    Marital status:    Tobacco Use    Smoking status: Never     Passive exposure: Never    Smokeless tobacco: Never   Vaping Use    Vaping Use: Never used   Substance and Sexual Activity    Alcohol use: Not Currently     Comment: Over a year ago. Before then, not often    Drug use: Not Currently     Types: Cocaine(coke), LSD, Marijuana, Psilocybin     Comment: At least 12 months or more for most listed    Sexual activity: Yes     Partners: Male     Birth control/protection: Hysterectomy, Same-sex partner, Surgical     Comment: no hormones       Medications:     Current Outpatient Medications:     amLODIPine (NORVASC) 5 MG tablet, Take 1 tablet by mouth Daily., Disp: 30 tablet, Rfl: 2    atorvastatin (LIPITOR) 20 MG tablet, TAKE 1 TABLET BY MOUTH DAILY, Disp: 90 tablet, Rfl: 1    glucose blood test strip, Use as instructed to check glucose twice a day Dispense Onetouch ultra 2, Disp: 100 each, Rfl: 3    hydroCHLOROthiazide (HYDRODIURIL) 25 MG tablet, Take 1 tablet by mouth Daily., Disp: 90 tablet, Rfl: 3    lamoTRIgine (LaMICtal) 200 MG tablet, Take 1 tablet by mouth  "Daily., Disp: 30 tablet, Rfl: 0    Lancets misc, Use to check glucose twice a day Dispense Onetouch ultra 2, Disp: 100 each, Rfl: 3    levothyroxine (SYNTHROID, LEVOTHROID) 50 MCG tablet, Take 1 tablet by mouth Every Morning., Disp: 30 tablet, Rfl: 2    lisinopril (PRINIVIL,ZESTRIL) 40 MG tablet, TAKE ONE TABLET BY MOUTH DAILY, Disp: 30 tablet, Rfl: 5    lurasidone (Latuda) 40 MG tablet tablet, Take 1 tablet by mouth Daily., Disp: 30 tablet, Rfl: 0    metoprolol succinate XL (TOPROL-XL) 50 MG 24 hr tablet, TAKE ONE TABLET BY MOUTH DAILY, Disp: 30 tablet, Rfl: 5    omeprazole (priLOSEC) 20 MG capsule, TAKE ONE CAPSULE BY MOUTH DAILY, Disp: 30 capsule, Rfl: 11    Ozempic, 2 MG/DOSE, 8 MG/3ML solution pen-injector, INJECT 2 MG SUBCUTANEOUSLY ONCE A WEEK, Disp: 3 mL, Rfl: 0    pregabalin (Lyrica) 100 MG capsule, Take 1 capsule by mouth 3 (Three) Times a Day., Disp: 90 capsule, Rfl: 5    rotigotine (Neupro) 1 MG/24HR patch 24 hour 24 hour patch, Place 1 patch on the skin as directed by provider Daily., Disp: 30 patch, Rfl: 10    tamsulosin (FLOMAX) 0.4 MG capsule 24 hr capsule, Take 1 capsule by mouth Daily., Disp: 30 capsule, Rfl: 5    Current Facility-Administered Medications:     cyanocobalamin injection 1,000 mcg, 1,000 mcg, Intramuscular, Q28 Days, Zeynep Jenkins APRN, 1,000 mcg at 08/24/23 1435    Objective     Physical Exam:  Vital Signs:   Vitals:    01/26/24 0944   BP: 142/100   Pulse: 58   SpO2: 96%   Weight: 106 kg (233 lb 1.6 oz)   Height: 170.2 cm (67.01\")     Body mass index is 36.5 kg/m².       Mental Status Exam:   MENTAL STATUS EXAM   General Appearance:  Obese and other  Other Comment:  Casually dressed  Eye Contact:  Good eye contact  Attitude:  Cooperative  Motor Activity:  Normal gait, posture  Muscle Strength:  Normal  Speech:  Hyper talkative  Language:  Spontaneous  Mood and affect:  Normal, pleasant  Hopelessness:  Denies  Thought Process:  Tangential  Associations/ Thought Content:  No " delusions  Hallucinations:  None  Suicidal Ideations:  Not present  Homicidal Ideation:  Passive ideation  Sensorium:  Alert and clear  Orientation:  Person, place, time and situation  Immediate Recall, Recent, and Remote Memory:  Intact  Attention Span/ Concentration:  Good  Fund of Knowledge:  Appropriate for age and educational level  Intellectual Functioning:  Average range  Insight:  Fair  Judgement:  Fair  Reliability:  Good  Impulse Control:  Fair      AZIZA     Dispensed  Strength Quantity Days Supply Provider Pharmacy   01/13/2024 [facility]MG [facility]   12/06/2023 Alprazolam 1MG 30 each 30 [id number]   12/06/2023 Pregabalin 100MG 90 each 30 [id number]   11/06/2023 Alprazolam 1MG 30 each 30 [id number]     The following data was reviewed by: NOE Burnett on 12/28/2023:  CMP          5/19/2023    11:24 7/29/2023    19:26 9/28/2023    12:15   CMP   Glucose 113  88  83    BUN 28  15  13    Creatinine 1.21  1.11  1.27    EGFR  58.5  49.7    Sodium 142  144  139    Potassium 4.5  3.8  4.1    Chloride 100  103  99    Calcium 9.9  9.4  9.6    Total Protein 7.9      Total Protein  7.5  7.3    Albumin 4.8  4.2  4.3    Globulin 3.1      Globulin  3.3  3.0    Total Bilirubin 0.6  0.4  0.4    Alkaline Phosphatase 105  94  89    AST (SGOT) 20  21  22    ALT (SGPT) 17  19  18    Albumin/Globulin Ratio  1.3  1.4    BUN/Creatinine Ratio 23  13.5  10.2    Anion Gap  12.0  11.1      CBC w/diff          5/19/2023    11:24 7/29/2023    19:26 9/28/2023    12:15   CBC w/Diff   WBC 7.8  9.17  7.49    RBC 4.75  4.22  4.09    Hemoglobin 14.1  13.0  12.7    Hematocrit 43.6  40.3  37.9    MCV 92  95.5  92.7    MCH 29.7  30.8  31.1    MCHC 32.3  32.3  33.5    RDW 13.0  12.9  12.7    Platelets 246  190  195    Neutrophil Rel % 57  60.5     Immature Granulocyte Rel %  0.2     Lymphocyte Rel % 32  31.0      Monocyte Rel % 7  5.3     Eosinophil Rel % 3  2.6     Basophil Rel % 1  0.4       Lipid Panel          5/19/2023    11:24 9/28/2023    12:15   Lipid Panel   Total Cholesterol  147    Total Cholesterol 177     Triglycerides 170  166    HDL Cholesterol 57  56    VLDL Cholesterol 29  28    LDL Cholesterol  91  63    LDL/HDL Ratio 1.6  1.03      TSH          5/19/2023    11:24 9/28/2023    12:15   TSH   TSH 2.100  2.560      Data reviewed : Cardiology studies 7/29/2023 EKG NSR, rate 67, QTc 401/      Assessment / Plan      Visit Diagnosis/Orders Placed This Visit:  Diagnoses and all orders for this visit:    1. Bipolar 1 disorder (Primary)  -     lamoTRIgine (LaMICtal) 200 MG tablet; Take 1 tablet by mouth Daily.  Dispense: 30 tablet; Refill: 1  -     lurasidone (Latuda) 40 MG tablet tablet; Take 1 tablet by mouth Daily.  Dispense: 30 tablet; Refill: 1  -     eszopiclone (Lunesta) 1 MG tablet; Take 1 tablet by mouth At Night As Needed for Sleep. Take immediately before bedtime  Dispense: 30 tablet; Refill: 0    2. Insomnia due to other mental disorder  -     eszopiclone (Lunesta) 1 MG tablet; Take 1 tablet by mouth At Night As Needed for Sleep. Take immediately before bedtime  Dispense: 30 tablet; Refill: 0      Contraception-hysterectomy    -Continue psychotherapy     -Hx gastric sleeve     -Will need labs in Feb-3 mos on Latuda    -Start Lunesta 1 mg hs prn for insomnia. Advised that if it is ineffective she can use 2mg and call clinic for refill when needed    -Continue Lamictal 200 mg daily     - Continue  Latuda 40 mg at night.  Be sure to take it with at least 350 dee to maximize absorption     -Start Lunesta 1 mg at night- will notify office if it is ineffective'    - DC Xanax      -Sleep Hygiene reviewed     -Supplements: Takes women over 50 MVI, , B6, B12, iron, calcium bid, fish oil     -The benefits  of consuming a healthy diet, minimizing caffeine intake and engaging in  daily exercise were discussed with  patient. Patient encouraged to consider lifestyle modification regarding diet and exercise patterns to maximize results of mental health treatment.       Plan:   Patient reports improvement in depression since increasing Latuda last visit.  She denies medication side effects. She reports increase in irritability she feels is related to insomnia she is experiencing after tapering herself off of xanax. Potential benefits, risks, side effects of Lunesta  discussed.  Patient verbalizes understanding and agrees with this medication plan    Continue supportive psychotherapy efforts and medications as indicated. Treatment and medication options discussed during today's visit. Patient ackowledged and verbally consented to continue with current treatment plan and was educated on the importance of compliance with treatment and follow-up appointments. Patient seems reasonably able to adhere to treatment plan.      Medication Considerations:  Discussed medication options and treatment plan of prescribed medication(s) as well as the risks, benefits, and potential side effects. Patient is agreeable to call the office with any worsening of symptoms or onset of side effects. Patient is agreeable to call 911 or go to the nearest ER should he/she begin having SI/HI.    Start Time: 1029  Stop Time: 1045 ( 16) minutes was spent for psychotherapy. Assisted patient in processing patient's depression, insomnia.  Acknowledged and normalized patient's thoughts, feelings, and concerns. Utilized cognitive behavioral therapy to assist the patient in recognizing more appropriate coping mechanisms when she becomes agitated/anxious which are proven effective in reducing the severity of frequency of symptoms. Strongly urged to continue to eat better balanced and healthier foods in reducing further health risks. Allowed patient to freely discuss issues without interruption or judgment.      Quality Measures:   Never smoker    I advised Marcela Guo  Green of the risks of tobacco use.     Follow Up:   Return in about 4 weeks (around 2/23/2024).      NOE Urbina, PMHNP-BC
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
## 2024-01-30 ENCOUNTER — TELEPHONE (OUTPATIENT)
Age: 59
End: 2024-01-30
Payer: COMMERCIAL

## 2024-01-30 NOTE — TELEPHONE ENCOUNTER
Helen called and wanted to let NOE Urbina know that eszopiclone (Lunesta) 1 MG tablet is causing anxiety and insomnia.  She said she stopped taking it due to side effects.  I scheduled a follow-up appointment for her on 2/1/24 at 10:15 am to address these concerns.

## 2024-02-01 ENCOUNTER — OFFICE VISIT (OUTPATIENT)
Age: 59
End: 2024-02-01
Payer: COMMERCIAL

## 2024-02-01 VITALS
WEIGHT: 229.8 LBS | SYSTOLIC BLOOD PRESSURE: 132 MMHG | OXYGEN SATURATION: 98 % | BODY MASS INDEX: 36.07 KG/M2 | HEIGHT: 67 IN | DIASTOLIC BLOOD PRESSURE: 88 MMHG | HEART RATE: 48 BPM

## 2024-02-01 DIAGNOSIS — F51.05 INSOMNIA DUE TO OTHER MENTAL DISORDER: ICD-10-CM

## 2024-02-01 DIAGNOSIS — F99 INSOMNIA DUE TO OTHER MENTAL DISORDER: ICD-10-CM

## 2024-02-01 DIAGNOSIS — F31.9 BIPOLAR 1 DISORDER: Primary | ICD-10-CM

## 2024-02-01 RX ORDER — ALPRAZOLAM 0.5 MG/1
0.5 TABLET ORAL NIGHTLY PRN
Qty: 10 TABLET | Refills: 0 | Status: SHIPPED | OUTPATIENT
Start: 2024-02-01 | End: 2024-02-08
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
## 2024-02-02 ENCOUNTER — OFFICE VISIT (OUTPATIENT)
Dept: BEHAVIORAL HEALTH | Facility: CLINIC | Age: 59
End: 2024-02-02
Payer: COMMERCIAL

## 2024-02-02 DIAGNOSIS — F31.9 BIPOLAR 1 DISORDER: Primary | ICD-10-CM

## 2024-02-02 DIAGNOSIS — R45.86 MOOD SWINGS: ICD-10-CM

## 2024-02-02 DIAGNOSIS — Z63.9 RELATIONSHIP PROBLEMS: ICD-10-CM

## 2024-02-02 SDOH — SOCIAL STABILITY - SOCIAL INSECURITY: PROBLEM RELATED TO PRIMARY SUPPORT GROUP, UNSPECIFIED: Z63.9

## 2024-02-02 NOTE — PROGRESS NOTES
PROGRESS NOTE    Data:    Marcela Ramírez is a 58 y.o. female who met with the undersigned for a scheduled psychotherapy session from 11:20 - 12:14 pm.      Clinical Maneuvering/Intervention:      The pt talked about recent difficulties with bipolar symptoms including mood swings, work stress, and feeling irritated/unhappy in her relationship with G. Stressors were processed individually and in detail. Venting of frustrations was conducted in order to help the pt feel less tense emotionally and gain insight into issues. Feelings were processed and validated several times in session. Perspective taking was conducted multiple times in order to help the pt feel less stuck, less overwhelmed, and see challenges as much more manageable. Active listening was conducted in order to help the pt make sense of stressors and start moving towards potential solutions. The pt was assisted with finding solutions based on existing skill-set and abilities. Time was allocated specifically to assess what is 'working' in the pt's life, versus what is 'not working,' (if anything) in terms of helping to improve mood and quality of life. Medication is helping her, as is open communication with others. Education of purposefully meeting person needs (making use of outlets, doing things to decompress) and doing so in a thoughtful/assertive way was provided. She was assisted with dealing with the guilt that comes when she gets time to herself away from G. Maladaptive thought patterns were identified, challenged, and evaluated for validity in order to allow for the pt to chose different and more adaptive ways of thinking. Role playing was conducted in order to help the pt gain insight into how to improve her communication skills, decrease tension with the other person, and notice things the pt may be doing (and needs to stop doing) in order to improve the quality of the relationship. Some humor was used in session as it is one of the pt's  coping skills. Humor was used too, to help the pt see things as less challenging and more manageable than they first seemed. Humor was used as well, to model use of the skill as a way to decrease tension ongoing. Homework was assigned tailored to pt's needs. The pt expressed gratitude for today's session.    Mental Status Exam  Hygiene:  good  Dress: normal  Attitude:  cooperative and proactive  Motor Activity: normal  Speech: pressured  Mood:  frustrated   Affect:  congruent  Thought Processes: normal  Thought Content:  normal  Suicidal Thoughts:  not endorsed  Homicidal Thoughts:  not endorsed  Crisis Safety Plan: not needed  Hallucinations:  none      Patient's Support Network Includes:  family, friends      Progress toward goal: there is evidence to suggest that she struggles with mood swings      Functional Status: moderate to high       Prognosis: good with counseling, medication    Assessment      The pt presented to be struggling with managing her moods related to having bipolar disorder. She seems to be benefiting from current psychotropic medication. She seems to benefit from venting, having her experiences validated, and being assisted in discovering her own solutions to her problems.       Plan      In order to diminish symptoms of bipolar disorder: the pt is to continue with counseling ongoing, being open/transparent with family ongoing (particularly G), and continue on new medication for bipolar disorder as prescribed (ongoing). She is to make a point to talk with G and come up with a plan for her to have several hours to herself in the week for her to get downtime/decompression time as discussed in session (as soon as possible).      Maribel Hernandez, PhD, LP

## 2024-02-05 ENCOUNTER — TELEPHONE (OUTPATIENT)
Age: 59
End: 2024-02-05
Payer: COMMERCIAL

## 2024-02-05 DIAGNOSIS — F51.05 INSOMNIA DUE TO OTHER MENTAL DISORDER: Primary | ICD-10-CM

## 2024-02-05 DIAGNOSIS — E11.42 TYPE 2 DIABETES MELLITUS WITH DIABETIC POLYNEUROPATHY, WITHOUT LONG-TERM CURRENT USE OF INSULIN: ICD-10-CM

## 2024-02-05 DIAGNOSIS — F99 INSOMNIA DUE TO OTHER MENTAL DISORDER: Primary | ICD-10-CM

## 2024-02-05 RX ORDER — ESZOPICLONE 3 MG/1
3 TABLET, FILM COATED ORAL NIGHTLY PRN
Qty: 30 TABLET | Refills: 0 | Status: SHIPPED | OUTPATIENT
Start: 2024-02-05

## 2024-02-05 RX ORDER — SEMAGLUTIDE 2.68 MG/ML
2 INJECTION, SOLUTION SUBCUTANEOUS WEEKLY
Qty: 3 ML | Refills: 0 | Status: SHIPPED | OUTPATIENT
Start: 2024-02-05

## 2024-02-05 NOTE — TELEPHONE ENCOUNTER
Called PT to inform her Alejandra has sent in Lunesta 3mg tablets. PT verbalized understanding and voiced appreciation. PT had no further questions at this time.

## 2024-02-05 NOTE — TELEPHONE ENCOUNTER
Rx Refill Note  Requested Prescriptions     Pending Prescriptions Disp Refills    Semaglutide, 2 MG/DOSE, (Ozempic, 2 MG/DOSE,) 8 MG/3ML solution pen-injector 3 mL 0      Last office visit with prescribing clinician: 11/2/2023   Last telemedicine visit with prescribing clinician: Visit date not found   Next office visit with prescribing clinician: 2/8/2024                         Would you like a call back once the refill request has been completed: [] Yes [] No    If the office needs to give you a call back, can they leave a voicemail: [] Yes [] No    Zuleika Plunkett MA  02/05/24, 11:51 EST

## 2024-02-05 NOTE — TELEPHONE ENCOUNTER
Helen called and wanted to let NOE Urbina know that she has been taking 3 eszopiclone (Lunesta) 1 MG tablets the past several days and has been sleeping very well.  She is requesting Ms. Finney to please send in Rx for 3 MG tablets.

## 2024-02-08 ENCOUNTER — OFFICE VISIT (OUTPATIENT)
Dept: BARIATRICS/WEIGHT MGMT | Facility: CLINIC | Age: 59
End: 2024-02-08
Payer: COMMERCIAL

## 2024-02-08 VITALS
SYSTOLIC BLOOD PRESSURE: 140 MMHG | HEIGHT: 67 IN | BODY MASS INDEX: 35.28 KG/M2 | DIASTOLIC BLOOD PRESSURE: 88 MMHG | WEIGHT: 224.8 LBS | HEART RATE: 59 BPM

## 2024-02-08 DIAGNOSIS — E66.9 OBESITY, CLASS II, BMI 35-39.9: Primary | ICD-10-CM

## 2024-02-08 DIAGNOSIS — E11.42 TYPE 2 DIABETES MELLITUS WITH DIABETIC POLYNEUROPATHY, WITHOUT LONG-TERM CURRENT USE OF INSULIN: ICD-10-CM

## 2024-02-08 PROCEDURE — 99214 OFFICE O/P EST MOD 30 MIN: CPT | Performed by: NURSE PRACTITIONER

## 2024-02-08 RX ORDER — SODIUM FLUORIDE 0.1 MG/ML
RINSE ORAL
COMMUNITY

## 2024-02-08 NOTE — PROGRESS NOTES
"  [facility]  [address]  [place] [address]     Office Note      Date: 2024  Patient Name: Marcela Ramírez  MRN: [id number]  : 1965  Subjective  Subjective     Chief Complaint  Obesity Management follow-up          Marcela Ramírez presents to [facility] for obesity management.  LSG 2019 GDW. Presurgery weight: 245lb   Patient is unsure with weight loss progress. Appetite is moderately controlled. Reports no side effects of prescribed medications today but she is concerned that she has \"ozempic face\" looking older than her actual age. She tried a probiotic for her abdominal mass and got a bacterial URI, only took it for three days but wonders if it is related. The patient is taking multivitamin and is taking fish oil.  The patient is using a food journal.  Drinking <20oz of squirt a day but knows she needs to decrease the added sugar.    The patient is exercising consistently for the first time in a while, still has to modify due to neuropathy in her feet. FITT score of:    Frequency Intensity Time Strength Training   []   0, none []   0 []   0 [x]   0   [x]   1 (1-2x/week) [x]   1 (light) []   1 (<10 min) []   1 (1x/week)   []   2 (3-5x/week) []   2 (moderate) [x]   2 (10-20 min) []   2 (2x/week)   []   3 (daily) []   3 (moderately hard)  []   4 (very hard) []   3 (20-30 min)  []   4 (>30 min) []   3 (3-4x/week)       Review of Systems   Constitutional:  Negative for appetite change and fatigue.   Eyes:  Negative for visual disturbance.   Cardiovascular:  Negative for chest pain and palpitations.   Gastrointestinal:  Negative for constipation and indigestion.   Neurological:  Negative for light-headedness.   All other systems reviewed and are negative.        Current Outpatient Medications:     amLODIPine (NORVASC) 5 MG tablet, Take 1 tablet by mouth Daily., Disp: 30 tablet, Rfl: 2    atorvastatin (LIPITOR) 20 " MG tablet, TAKE 1 TABLET BY MOUTH DAILY, Disp: 90 tablet, Rfl: 1    eszopiclone (Lunesta) 3 MG tablet, Take 1 tablet by mouth At Night As Needed for Sleep. Take immediately before bedtime.avoid activities that require alertness, Disp: 30 tablet, Rfl: 0    glucose blood test strip, Use as instructed to check glucose twice a day Dispense Onetouch ultra 2, Disp: 100 each, Rfl: 3    hydroCHLOROthiazide (HYDRODIURIL) 25 MG tablet, Take 1 tablet by mouth Daily., Disp: 90 tablet, Rfl: 3    lamoTRIgine (LaMICtal) 200 MG tablet, Take 1 tablet by mouth Daily., Disp: 30 tablet, Rfl: 1    Lancets misc, Use to check glucose twice a day Dispense Onetouch ultra 2, Disp: 100 each, Rfl: 3    levothyroxine (SYNTHROID, LEVOTHROID) 50 MCG tablet, Take 1 tablet by mouth Every Morning., Disp: 30 tablet, Rfl: 2    lisinopril (PRINIVIL,ZESTRIL) 40 MG tablet, TAKE ONE TABLET BY MOUTH DAILY, Disp: 30 tablet, Rfl: 5    lurasidone (Latuda) 40 MG tablet tablet, Take 1 tablet by mouth Daily., Disp: 30 tablet, Rfl: 1    metoprolol succinate XL (TOPROL-XL) 50 MG 24 hr tablet, TAKE ONE TABLET BY MOUTH DAILY, Disp: 30 tablet, Rfl: 5    omeprazole (priLOSEC) 20 MG capsule, TAKE ONE CAPSULE BY MOUTH DAILY, Disp: 30 capsule, Rfl: 11    pregabalin (Lyrica) 100 MG capsule, Take 1 capsule by mouth 3 (Three) Times a Day., Disp: 90 capsule, Rfl: 5    rotigotine (Neupro) 1 MG/24HR patch 24 hour 24 hour patch, Place 1 patch on the skin as directed by provider Daily., Disp: 30 patch, Rfl: 10    Semaglutide, 2 MG/DOSE, (Ozempic, 2 MG/DOSE,) 8 MG/3ML solution pen-injector, Inject 2 mg under the skin into the appropriate area as directed 1 (One) Time Per Week., Disp: 3 mL, Rfl: 0    tamsulosin (FLOMAX) 0.4 MG capsule 24 hr capsule, Take 1 capsule by mouth Daily., Disp: 30 capsule, Rfl: 5    Probiotic Product (Align) chewable tablet, Chew., Disp: , Rfl:     Current Facility-Administered Medications:     cyanocobalamin injection 1,000 mcg, 1,000 mcg, Intramuscular,  "Q28 Days, Zeynep Jenkins, APRN, 1,000 mcg at 08/24/23 1435    Objective   Start weight: 236.5 pounds.    Total Loss lb/%Loss of beginning body weight (BBW): -11.7 lb/-4.95 %  Change in weight since last visit: -0.4    Body mass index is 35.21 kg/m².   Body composition analysis completed and showed:   Body Fat %: 47.1    Measurements (in inches)  Waist Circumference: 45.5      Vital Signs:   /88 (BP Location: Left arm, Patient Position: Sitting)   Pulse 59   Ht 170.2 cm (67\")   Wt 102 kg (224 lb 12.8 oz)   BMI 35.21 kg/m²     Physical Exam   General appears stated age and normal appearance   HEENT PERRLA, EOM intact, and conjunctivae normal   Chest/lungs Normal rate, Regular rhythm, and Breathing is unlabored   Extremities without edema   Neuro Good historian and No focal deficit   Skin Warm, dry, intact   Psych normal behavior, normal thought content, and normal concentration     Result Review :                Assessment / Plan        Diagnoses and all orders for this visit:    1. Obesity, Class II, BMI 35-39.9 (Primary)  Assessment & Plan:  Patient's (Body mass index is 35.21 kg/m².) indicates that they are morbidly/severely obese (BMI > 40 or > 35 with obesity - related health condition) with health conditions that include hypertension, diabetes mellitus, dyslipidemias, osteoarthritis, and CKD, asthma  . Weight is improving with treatment. BMI  is above average; BMI management plan is completed. We discussed low calorie, low carb based diet program, portion control, increasing exercise, joining a fitness center or start home based exercise program, management of depression/anxiety/stress to control compensatory eating, pharmacologic options including ozempic, and an koko-based approach such as [employer] Pal or Lose It.     I have instructed the patient to continue with pursuit of medical weight loss as a part of this program. Patient does meet criteria for use of anorectics at this time as BMI >30  and " is not at treatment goal.     The current plan for this month includes:   - Continue current exercise efforts  - Continue to work on lifestyle behavioral changes  - Cut back on sugary beverage  - Continue ozempic, consider mounjaro if on formulary in the future.   - Try to add back probiotic. Likely was a coincidence that she developed a UTI when she started it.   - Treatment goal <200lb        2. Type 2 diabetes mellitus with diabetic polyneuropathy, without long-term current use of insulin  Assessment & Plan:  Diabetes is improving with treatment.   Continue current treatment regimen.  Regular aerobic exercise.  Diabetes will be reassessed in 3 months.  Continue ozempic, denied hypoglycemia.             Follow Up   Return in about 5 weeks (around 3/14/2024) for Next scheduled follow up.  Patient was given instructions and counseling regarding her condition or for health maintenance advice. Please see specific information pulled into the AVS if appropriate.     Zeynep Jenkins, NOE  02/08/2024

## 2024-02-08 NOTE — ASSESSMENT & PLAN NOTE
Diabetes is improving with treatment.   Continue current treatment regimen.  Regular aerobic exercise.  Diabetes will be reassessed in 3 months.  Continue ozempic, denied hypoglycemia.

## 2024-02-08 NOTE — ASSESSMENT & PLAN NOTE
Patient's (Body mass index is 35.21 kg/m².) indicates that they are morbidly/severely obese (BMI > 40 or > 35 with obesity - related health condition) with health conditions that include hypertension, diabetes mellitus, dyslipidemias, osteoarthritis, and CKD, asthma  . Weight is improving with treatment. BMI  is above average; BMI management plan is completed. We discussed low calorie, low carb based diet program, portion control, increasing exercise, joining a fitness center or start home based exercise program, management of depression/anxiety/stress to control compensatory eating, pharmacologic options including ozempic, and an koko-based approach such as Sand Sign Pal or Lose It.     I have instructed the patient to continue with pursuit of medical weight loss as a part of this program. Patient does meet criteria for use of anorectics at this time as BMI >30  and is not at treatment goal.     The current plan for this month includes:   - Continue current exercise efforts  - Continue to work on lifestyle behavioral changes  - Cut back on sugary beverage  - Continue ozempic, consider mounjaro if on formulary in the future.   - Try to add back probiotic. Likely was a coincidence that she developed a UTI when she started it.   - Treatment goal <200lb

## 2024-02-15 ENCOUNTER — TELEPHONE (OUTPATIENT)
Age: 59
End: 2024-02-15
Payer: COMMERCIAL

## 2024-02-16 ENCOUNTER — OFFICE VISIT (OUTPATIENT)
Age: 59
End: 2024-02-16
Payer: COMMERCIAL

## 2024-02-16 VITALS
WEIGHT: 230.4 LBS | DIASTOLIC BLOOD PRESSURE: 88 MMHG | BODY MASS INDEX: 36.16 KG/M2 | HEART RATE: 63 BPM | OXYGEN SATURATION: 98 % | HEIGHT: 67 IN | SYSTOLIC BLOOD PRESSURE: 132 MMHG

## 2024-02-16 DIAGNOSIS — F99 INSOMNIA DUE TO OTHER MENTAL DISORDER: ICD-10-CM

## 2024-02-16 DIAGNOSIS — F31.9 BIPOLAR 1 DISORDER: ICD-10-CM

## 2024-02-16 DIAGNOSIS — G24.01 TARDIVE DYSKINESIA: Primary | ICD-10-CM

## 2024-02-16 DIAGNOSIS — F51.05 INSOMNIA DUE TO OTHER MENTAL DISORDER: ICD-10-CM

## 2024-02-16 RX ORDER — VALBENAZINE 80 MG/1
80 CAPSULE ORAL DAILY
Qty: 7 CAPSULE | Refills: 0 | COMMUNITY
Start: 2024-02-16

## 2024-02-16 RX ORDER — VALBENAZINE 40 MG/1
CAPSULE ORAL
Qty: 53 CAPSULE | Refills: 0 | Status: SHIPPED | OUTPATIENT
Start: 2024-02-16 | End: 2024-03-17

## 2024-02-16 RX ORDER — VALBENAZINE 40 MG/1
CAPSULE ORAL
Qty: 53 CAPSULE | Refills: 0 | Status: SHIPPED | OUTPATIENT
Start: 2024-02-16 | End: 2024-02-16 | Stop reason: SDUPTHER

## 2024-02-16 RX ORDER — VALBENAZINE 40 MG/1
40 CAPSULE ORAL DAILY
Qty: 7 CAPSULE | Refills: 0 | COMMUNITY
Start: 2024-02-16 | End: 2024-02-23

## 2024-02-16 RX ORDER — VALBENAZINE 40 MG/1
CAPSULE ORAL
Qty: 53 CAPSULE | Refills: 0 | Status: SHIPPED | OUTPATIENT
Start: 2024-02-16 | End: 2024-02-16

## 2024-02-19 ENCOUNTER — TELEPHONE (OUTPATIENT)
Age: 59
End: 2024-02-19
Payer: COMMERCIAL

## 2024-02-19 ENCOUNTER — PRIOR AUTHORIZATION (OUTPATIENT)
Age: 59
End: 2024-02-19
Payer: COMMERCIAL

## 2024-02-19 NOTE — TELEPHONE ENCOUNTER
Called PT to ask PT providers questions;    ----- Message from NOE Burnett sent at 2/19/2024 12:18 PM EST -----  Would you mind calling patient to see how she is doing with muscle movements. I was reviewing her case and thought it might be helpful for her to see neurology if symptoms have not improved  as there are some atypical features with her movements and want to make sure all bases are covered.  Maybe just ask he rif she can have her neuro appt moved up. Facial numbness is atypical in TD. Thanks    PT states she really bad now, she had an event at work where she could not control her arms. PT a stated she was embarrassed and had to leave work. PT stated she didn't go to work today either. PT stated she stopped taking latuda completely last Thursday when I called her to inform her you said she could start to take half of her 40 mg dosage. PT stated she wishes to keep taking Latuda as it was working but believes it to be interacting with her new medication Ingrezza.

## 2024-02-19 NOTE — TELEPHONE ENCOUNTER
Called Pt to relay providers message regarding Latuda;    She can taper down to 20 mg til I see her next if she wants.     PT stated she would like to continue to take Latuda but cannot due to intolerable SE. PT stated she is experiencing the same SE as when she had taken Seroquel and Abilify at the same time. PT stated she will continue to take Ingrezza Rx as advised and will see the provider at her f/u on 2/22/24. PT asked if Ingrezza is a medication that would start working immediately or if it is a medication that will take time to build up? Informed PT I would really her message and question to Nayana. Reiterated the importance of rescheduling her neuro appt to an earlier date to rule out causes. PT verbalized understanding and had no further questions at this time.

## 2024-02-20 NOTE — TELEPHONE ENCOUNTER
"Called PT to inform  \"The prior authorization for your Ingrezza 40 mg prescription has been approved. Your pharmacy should now be able to bill your insurance and get this ready for you. Please let us know if you have any additional questions/concerns. You can reach our office at [phone].\"   "

## 2024-02-20 NOTE — TELEPHONE ENCOUNTER
"Called PT to relay providers message;    \"You treat it until symptoms jere, which is why I want her to see neuro... to make sure there is no functional movement disorder. I will talk to her more about that thurs when I see her\"    Also informed PT her PA for Ingrezza 40 mg was approved. PT verbalized understanding to all of the above and had no further questions. PT states she will call Neuro this Thursday 2/22/24 to try and schedule an earlier f/u.   "

## 2024-02-20 NOTE — TELEPHONE ENCOUNTER
You treat it until symptoms jere, which is why I want her to see neuro... to make sure there is no functional movement disorder. I will talk to her more about that thurs when I see her,

## 2024-02-22 ENCOUNTER — OFFICE VISIT (OUTPATIENT)
Age: 59
End: 2024-02-22
Payer: COMMERCIAL

## 2024-02-22 VITALS
OXYGEN SATURATION: 98 % | SYSTOLIC BLOOD PRESSURE: 132 MMHG | HEIGHT: 67 IN | BODY MASS INDEX: 35.99 KG/M2 | WEIGHT: 229.3 LBS | DIASTOLIC BLOOD PRESSURE: 86 MMHG | HEART RATE: 63 BPM

## 2024-02-22 DIAGNOSIS — Z51.81 ENCOUNTER FOR THERAPEUTIC DRUG MONITORING: ICD-10-CM

## 2024-02-22 DIAGNOSIS — G25.5 MUSCLE, JERKY MOVEMENTS (UNCONTROLLED): ICD-10-CM

## 2024-02-22 DIAGNOSIS — F51.05 INSOMNIA DUE TO OTHER MENTAL DISORDER: ICD-10-CM

## 2024-02-22 DIAGNOSIS — F31.9 BIPOLAR 1 DISORDER: Primary | ICD-10-CM

## 2024-02-22 DIAGNOSIS — F99 INSOMNIA DUE TO OTHER MENTAL DISORDER: ICD-10-CM

## 2024-02-22 RX ORDER — ESZOPICLONE 3 MG/1
3 TABLET, FILM COATED ORAL NIGHTLY PRN
Qty: 30 TABLET | Refills: 0 | Status: SHIPPED | OUTPATIENT
Start: 2024-02-22

## 2024-02-22 NOTE — PROGRESS NOTES
"     Office  Follow Up Visit      Patient Name: Marcela Ramírez  : 1965   MRN: [id number]     Referring Provider: Sukhwinder Acosta MD    Chief Complaint:       ICD-10-CM ICD-9-CM   1. Bipolar 1 disorder  F31.9 296.7   2. Muscle, jerky movements (uncontrolled)  G25.5 333.5   3. Insomnia due to other mental disorder  F51.05 300.9    F99 327.02   4. Encounter for therapeutic drug monitoring  Z51.81 V58.83       History of Present Illness:   Marcela Ramírez is a 58 y.o. female who is here today for follow up related to abnormal muscle movements and  Insomnia.     Patient reports she is doing better with \"movements.\" She had an \"episode\" at work Sun and Tues of  this week. She left work on Sun and went out into her car Tues for 45 minutes and movements  stopped. Reports she got so stressed out at work and it is nonstop busy. There is no time to even use the bathroom. She looks around the room and everyone else is standing around and she is running around like [employer]. Afterward, she experienced the arm movements and \"hyperventilating.\" Admits she punched herself in the arm to try and get movements to stop. Patient states before movements start, she feels like she wants to \"pull something apart and  doesn't want to sit or stand. \" Her boss put her in another dept to better accommodate her for now. Reports she \"could feel tension\" last night and helps herself and was \"able to avoid it\" (the movements). Reports she has not had any \"stress strokes\" in 3 years and  feels like the movements are caused by the Latuda. She has been taking Ingrezza and increases her dose to 80 mg tonight.  Aims score is 0 today    Patient states she was in a much better mood with Latuda and had started reading again, which she hadn't been able to do in year due to poor concentration. States she was actually happy for the first time in year. She has noticed decreased concentration over the last couple of days. Reports she  " "does not have suicidal thoughts, more like \"faint thoughts\"  she knows is there because she has lived with it for so long. \"Sleep is not great.\" \"Its restless.\" She tries to go to sleep and wakes in the morning feeling like she didn't get any sleep. Appetite is ok.     Previous Diagnoses: Alcoholism, Bipolar Disorder  Past Medications: Xanax, Pristiq, Xanax, Lamictal 200 mg daily, multiple antidepressesant that didn't work, lithium ineffective, abilify-SI, seroquel-tremors , Xanax  Current Meds: Latuda 40 mg Lamictal 200 mg, Lunesta  Therapy: Dr Hernandez -elias    Subjective      Review of Systems:   Review of Systems   Constitutional:  Positive for activity change, appetite change and fatigue.   Musculoskeletal:         Flexion/extension L arm, Bending back and forth at waist.    Neurological:  Positive for numbness.   Psychiatric/Behavioral:  Positive for dysphoric mood, sleep disturbance and suicidal ideas. Negative for self-injury. The patient is nervous/anxious.        PHQ-9 Depression Screening  Little interest or pleasure in doing things? 2-->more than half the days   Feeling down, depressed, or hopeless? 2-->more than half the days   Trouble falling or staying asleep, or sleeping too much? 2-->more than half the days   Feeling tired or having little energy? 2-->more than half the days   Poor appetite or overeating? 0-->not at all   Feeling bad about yourself - or that you are a failure or have let yourself or your family down? 2-->more than half the days   Trouble concentrating on things, such as reading the newspaper or watching television? 1-->several days   Moving or speaking so slowly that other people could have noticed? Or the opposite - being so fidgety or restless that you have been moving around a lot more than usual? 0-->not at all   Thoughts that you would be better off dead, or of hurting yourself in some way? 2-->more than half the days   PHQ-9 Total Score 13   If you checked off any problems, " how difficult have these problems made it for you to do your work, take care of things at home, or get along with other people? somewhat difficult       ANA ROSA-7      Over the last two weeks, how often have you been bothered by the following problems?  Feeling nervous, anxious or on edge: Not at all  Not being able to stop or control worrying: More than half the days  Worrying too much about different things: Nearly every day  Trouble Relaxing: Several days  Being so restless that it is hard to sit still: Nearly every day  Becoming easily annoyed or irritable: Nearly every day  Feeling afraid as if something awful might happen: Nearly every day  ANA ROSA 7 Total Score: 15  If you checked any problems, how difficult have these problems made it for you to do your work, take care of things at home, or get along with other people: Very difficult    AIMS Scale  Extremity Movements  Upper (arms, wrists, hands, fingers): None, normal  Lower (legs, knees, ankles, toes): None, normal  Trunk Movements  Neck, shoulders, hips: None, normal  Dental Status  Current problems with teeth and/or dentures?: No  Does patient usually wear dentures?: No       Patient History:   The following portions of the patient's history were reviewed and updated as appropriate: allergies, current medications, past family history, past medical history, past social history, past surgical history and problem list.     Social History     Socioeconomic History    Marital status:    Tobacco Use    Smoking status: Never     Passive exposure: Never    Smokeless tobacco: Never   Vaping Use    Vaping Use: Never used   Substance and Sexual Activity    Alcohol use: Not Currently     Comment: Over a year ago. Before then, not often    Drug use: Not Currently     Types: Cocaine(coke), LSD, Marijuana, Psilocybin     Comment: At least 12 months or more for most listed    Sexual activity: Yes     Partners: Male     Birth control/protection: Hysterectomy, Same-sex  partner, Surgical     Comment: no hormones       Medications:     Current Outpatient Medications:     amLODIPine (NORVASC) 5 MG tablet, Take 1 tablet by mouth Daily., Disp: 30 tablet, Rfl: 2    atorvastatin (LIPITOR) 20 MG tablet, TAKE 1 TABLET BY MOUTH DAILY, Disp: 90 tablet, Rfl: 1    eszopiclone (Lunesta) 3 MG tablet, Take 1 tablet by mouth At Night As Needed for Sleep. Take immediately before bedtime.avoid activities that require alertness, Disp: 30 tablet, Rfl: 0    glucose blood test strip, Use as instructed to check glucose twice a day Dispense Onetouch ultra 2, Disp: 100 each, Rfl: 3    hydroCHLOROthiazide (HYDRODIURIL) 25 MG tablet, Take 1 tablet by mouth Daily., Disp: 90 tablet, Rfl: 3    lamoTRIgine (LaMICtal) 200 MG tablet, Take 1 tablet by mouth Daily., Disp: 30 tablet, Rfl: 1    Lancets misc, Use to check glucose twice a day Dispense Onetouch ultra 2, Disp: 100 each, Rfl: 3    levothyroxine (SYNTHROID, LEVOTHROID) 50 MCG tablet, Take 1 tablet by mouth Every Morning., Disp: 30 tablet, Rfl: 2    lisinopril (PRINIVIL,ZESTRIL) 40 MG tablet, TAKE ONE TABLET BY MOUTH DAILY, Disp: 30 tablet, Rfl: 5    metoprolol succinate XL (TOPROL-XL) 50 MG 24 hr tablet, TAKE ONE TABLET BY MOUTH DAILY, Disp: 30 tablet, Rfl: 5    omeprazole (priLOSEC) 20 MG capsule, TAKE ONE CAPSULE BY MOUTH DAILY, Disp: 30 capsule, Rfl: 11    pregabalin (Lyrica) 100 MG capsule, Take 1 capsule by mouth 3 (Three) Times a Day., Disp: 90 capsule, Rfl: 5    rotigotine (Neupro) 1 MG/24HR patch 24 hour 24 hour patch, Place 1 patch on the skin as directed by provider Daily., Disp: 30 patch, Rfl: 10    Semaglutide, 2 MG/DOSE, (Ozempic, 2 MG/DOSE,) 8 MG/3ML solution pen-injector, Inject 2 mg under the skin into the appropriate area as directed 1 (One) Time Per Week., Disp: 3 mL, Rfl: 0    tamsulosin (FLOMAX) 0.4 MG capsule 24 hr capsule, Take 1 capsule by mouth Daily., Disp: 30 capsule, Rfl: 5    Valbenazine Tosylate (Ingrezza) 40 MG capsule, Take 1  "capsule by mouth Daily for 7 days, THEN 2 capsules Daily for 23 days., Disp: 53 capsule, Rfl: 0    Valbenazine Tosylate (Ingrezza) 40 MG capsule, Take 1 capsule by mouth Daily for 7 days., Disp: 7 capsule, Rfl: 0    Valbenazine Tosylate (Ingrezza) 80 MG capsule, Take 80 mg by mouth Daily., Disp: 7 capsule, Rfl: 0    Probiotic Product (Align) chewable tablet, Chew. (Patient not taking: Reported on 2/22/2024), Disp: , Rfl:     Current Facility-Administered Medications:     cyanocobalamin injection 1,000 mcg, 1,000 mcg, Intramuscular, Q28 Days, Zeynep Jenkins APRN, 1,000 mcg at 08/24/23 1435    Objective     Physical Exam:  Vital Signs:   Vitals:    02/22/24 1506   BP: 132/86   Pulse: 63   SpO2: 98%   Weight: 104 kg (229 lb 4.8 oz)   Height: 170.2 cm (67.01\")     Body mass index is 35.9 kg/m².       Mental Status Exam:   MENTAL STATUS EXAM   General Appearance:  Obese, other and cleanly groomed and dressed  Other Comment:  Casually dressed  Eye Contact:  Good eye contact  Attitude:  Cooperative  Motor Activity:  Normal gait, posture  Muscle Strength:  Normal  Speech:  Normal rate, tone, volume  Language:  Spontaneous  Mood and affect:  Frustrated and depressed  Hopelessness:  Denies  Thought Process:  Logical and goal-directed  Associations/ Thought Content:  No delusions  Hallucinations:  None  Suicidal Ideations:  Not present  Homicidal Ideation:  Not present  Sensorium:  Alert and clear  Orientation:  Person, place, time and situation  Immediate Recall, Recent, and Remote Memory:  Intact  Attention Span/ Concentration:  Good  Fund of Knowledge:  Appropriate for age and educational level  Intellectual Functioning:  Average range  Insight:  Fair  Judgement:  Fair  Reliability:  Good  Impulse Control:  Fair      AZIZA     Dispensed  Strength Quantity Days Supply Provider Pharmacy   02/10/2024 Pregabalin 100MG 90 each 30 [facility]   02/06/2024 Eszopiclone 3MG 30 each 30 [id number]   02/01/2024 Alprazolam 0.5MG 10 each 10 [facility]   01/26/2024 Eszopiclone 1MG 30 each 30 [facility]       The following data was reviewed by: NOE Burnett on 12/28/2023:  CMP          5/19/2023    11:24 7/29/2023    19:26 9/28/2023    12:15   CMP   Glucose 113  88  83    BUN 28  15  13    Creatinine 1.21  1.11  1.27    EGFR  58.5  49.7    Sodium 142  144  139    Potassium 4.5  3.8  4.1    Chloride 100  103  99    Calcium 9.9  9.4  9.6    Total Protein 7.9      Total Protein  7.5  7.3    Albumin 4.8  4.2  4.3    Globulin 3.1      Globulin  3.3  3.0    Total Bilirubin 0.6  0.4  0.4    Alkaline Phosphatase 105  94  89    AST (SGOT) 20  21  22    ALT (SGPT) 17  19  18    Albumin/Globulin Ratio  1.3  1.4    BUN/Creatinine Ratio 23  13.5  10.2    Anion Gap  12.0  11.1      CBC w/diff          5/19/2023    11:24 7/29/2023    19:26 9/28/2023    12:15   CBC w/Diff   WBC 7.8  9.17  7.49    RBC 4.75  4.22  4.09    Hemoglobin 14.1  13.0  12.7    Hematocrit 43.6  40.3  37.9    MCV 92  95.5  92.7    MCH 29.7  30.8  31.1    MCHC 32.3  32.3  33.5    RDW 13.0  12.9  12.7    Platelets 246  190  195    Neutrophil Rel % 57  60.5     Immature Granulocyte Rel %  0.2     Lymphocyte Rel % 32  31.0     Monocyte Rel % 7  5.3     Eosinophil Rel % 3  2.6     Basophil Rel % 1  0.4       Lipid Panel          5/19/2023    11:24 9/28/2023    12:15   Lipid Panel   Total Cholesterol  147    Total Cholesterol 177     Triglycerides 170  166    HDL Cholesterol 57  56    VLDL Cholesterol 29  28    LDL Cholesterol  91  63    LDL/HDL Ratio 1.6  1.03      TSH          5/19/2023    11:24 9/28/2023    12:15   TSH   TSH 2.100  2.560      Data reviewed : Cardiology studies 7/29/2023 EKG NSR, rate 67, QTc 401/      Assessment / Plan      Visit Diagnosis/Orders Placed This Visit:  Diagnoses and all orders for this visit:    1. Bipolar 1 disorder  (Primary)  Comments:  Lamictal 200 mg daily    2. Muscle, jerky movements (uncontrolled)  Comments:  continue Ingrezza for now    3. Insomnia due to other mental disorder  -     eszopiclone (Lunesta) 3 MG tablet; Take 1 tablet by mouth At Night As Needed for Sleep. Take immediately before bedtime.avoid activities that require alertness  Dispense: 30 tablet; Refill: 0    4. Encounter for therapeutic drug monitoring  -     CBC & Differential; Future  -     Comprehensive Metabolic Panel  -     Hemoglobin A1c; Future  -     Lipid Panel; Future  -     Magnesium; Future  -     TSH  -     T4, free  -     Vitamin B12 & Folate  -     Calcitriol (1,25 di-OH Vitamin D)      R/o functional movement disorder vs TD    Fasting labs ordered    Contraception-hysterectomy    -Continue psychotherapy     -Hx gastric sleeve     -Will need labs in Feb-3 mos on Latuda    -Continue Lunesta 1 mg hs prn for insomnia.      -Continue Lamictal 200 mg daily     - DC  Latuda due to abnormal movements    -Start Ingrezza 40 mg daily x 7 days and increase to 80 mg thereafter. 7 days of age dose provided in samples    -  Xanax  0.5 mg hs prn # 10. Use as last resort for insomnia     -Sleep Hygiene reviewed     -Supplements: Takes women over 50 MVI, , B6, B12, iron, calcium bid, fish oil     -The benefits  of consuming a healthy diet, minimizing caffeine intake and engaging in  daily exercise were discussed with patient. Patient encouraged to consider lifestyle modification regarding diet and exercise patterns to maximize results of mental health treatment.       Plan:   Patient agreeable to scheduling a evaluation with neurology to rule out functional movement disorder.  Agrees to continue Ingrezza until neurology can weigh in.    Continue supportive psychotherapy efforts and medications as indicated. Treatment and medication options discussed during today's visit. Patient ackowledged and verbally consented to continue with current treatment plan and  was educated on the importance of compliance with treatment and follow-up appointments. Patient seems reasonably able to adhere to treatment plan.      Medication Considerations:  Discussed medication options and treatment plan of prescribed medication(s) as well as the risks, benefits, and potential side effects. Patient is agreeable to call the office with any worsening of symptoms or onset of side effects. Patient is agreeable to call 911 or go to the nearest ER should he/she begin having SI/HI.      Quality Measures:   Never smoker    I advised Marcela Ramírez of the risks of tobacco use.     Follow Up:   Return in about 2 weeks (around 3/7/2024).      NOE Urbina, PMHNP-BC

## 2024-02-23 ENCOUNTER — OFFICE VISIT (OUTPATIENT)
Dept: BEHAVIORAL HEALTH | Facility: CLINIC | Age: 59
End: 2024-02-23
Payer: COMMERCIAL

## 2024-02-23 DIAGNOSIS — F31.9 BIPOLAR 1 DISORDER: Primary | ICD-10-CM

## 2024-02-23 DIAGNOSIS — R45.86 MOOD SWINGS: ICD-10-CM

## 2024-02-23 NOTE — PROGRESS NOTES
PROGRESS NOTE    Data:    Marcela Ramírez is a 58 y.o. female who met with the undersigned for a scheduled psychotherapy session from 11:15 - 12:10 pm.      Clinical Maneuvering/Intervention:      The pt talked about recent difficulties with bipolar symptoms including mood swings, problems with medication, and having family conflict. Stressors were processed individually and in detail. Venting of frustrations was conducted in order to help the pt feel less tense emotionally and gain insight into issues. Feelings were processed and validated several times in session. Perspective taking was conducted multiple times in order to help the pt feel less stuck, less overwhelmed, and see challenges as much more manageable. Active listening was conducted in order to help the pt make sense of stressors and start moving towards potential solutions. The pt was assisted with finding solutions based on existing skill-set and abilities. Several issues were processed today: staying with her psychiatrist for treatment of bipolar disorder, issues/difficulties with G, and identifying needs that seem to help promote her mental health. The pt was assisted in recognizing progress in order to show encouragement and promote motivation to keep making positive changes in life. She seemed more centered and positive than in most other sessions. Time was allocated specifically to assess what is 'working' in the pt's life, versus what is 'not working,' (if anything) in terms of helping to improve mood and quality of life.' She was assisted in seeing that several things help her: medication, time to herself to decompress, spending time with loved ones joking around (the pt is funny and witty even in stressful times), and doing counseling. Some humor was used in session as it is one of the pt's coping skills. Humor was used too, to help the pt see things as less challenging and more manageable than they first seemed. Humor was used as well, to  model use of the skill as a way to decrease tension ongoing. At the same time, fears were processed today as well. Maladaptive thought patterns were identified, challenged, and evaluated for validity in order to allow for the pt to chose different and more adaptive ways of thinking. Homework was assigned tailored to pt's needs. The pt expressed gratitude for today's session.    Mental Status Exam  Hygiene:  good  Dress: normal  Attitude:  cooperative and proactive  Motor Activity: normal  Speech: pressured  Mood:  frustrated   Affect:  congruent  Thought Processes: normal  Thought Content:  normal  Suicidal Thoughts:  not endorsed  Homicidal Thoughts:  not endorsed  Crisis Safety Plan: not needed  Hallucinations:  none      Patient's Support Network Includes:  family, friends      Progress toward goal: there is evidence to suggest that she struggles with mood swings      Functional Status: moderate to high       Prognosis: good with counseling, medication    Assessment      The pt presented to be struggling with managing her moods related to having bipolar disorder. She seems to be benefiting from current psychotropic medication. She seems to benefit from venting, having her experiences validated, and being assisted in discovering her own solutions to her problems.       Plan      In order to diminish symptoms of bipolar disorder: the pt is to continue with counseling ongoing, being open/transparent with family ongoing (particularly G), and continue on new medication for bipolar disorder as prescribed (ongoing). She is to make a point to act on needs/self-care activities discussed today (ongoing).    Maribel Hernandez, PhD, LP

## 2024-02-27 ENCOUNTER — TELEPHONE (OUTPATIENT)
Age: 59
End: 2024-02-27
Payer: COMMERCIAL

## 2024-02-27 NOTE — TELEPHONE ENCOUNTER
Helen called and said that, since going off lurasidone (Latuda) 40 MG tablet, she has been experiencing a lack of concentration and an increase in anger and frustration.  She said she is begging for NOE Urbina to either put her back on Latuda or try something different that will help her deal with everything.  I scheduled a follow-up for her on Thursday, 2/29/24 at 10:15 am to address these concerns.  Per Ms. Finney, I advised Helen that she highly recommends her scheduling a neurology appointment as soon as possible to see if there is something else going on that she may be unaware of that is contributing to these issues.  In Addition, Ms. Finney would like one of her medical assistants to please call her back at [phone] after she speaks with them.

## 2024-02-29 ENCOUNTER — OFFICE VISIT (OUTPATIENT)
Age: 59
End: 2024-02-29
Payer: COMMERCIAL

## 2024-02-29 VITALS
HEIGHT: 67 IN | SYSTOLIC BLOOD PRESSURE: 128 MMHG | WEIGHT: 229 LBS | OXYGEN SATURATION: 98 % | HEART RATE: 62 BPM | BODY MASS INDEX: 35.94 KG/M2 | DIASTOLIC BLOOD PRESSURE: 84 MMHG

## 2024-02-29 DIAGNOSIS — G25.5 MUSCLE, JERKY MOVEMENTS (UNCONTROLLED): ICD-10-CM

## 2024-02-29 DIAGNOSIS — F31.9 BIPOLAR 1 DISORDER: Primary | ICD-10-CM

## 2024-02-29 RX ORDER — DIVALPROEX SODIUM 125 MG/1
125 TABLET, DELAYED RELEASE ORAL 2 TIMES DAILY
Qty: 90 TABLET | Refills: 0 | Status: SHIPPED | OUTPATIENT
Start: 2024-02-29

## 2024-02-29 NOTE — PROGRESS NOTES
"     Office  Follow Up Visit      Patient Name: Marcela Ramírez  : 1965   MRN: [id number]     Referring Provider: Sukhwinder Acosta MD    Chief Complaint:       ICD-10-CM ICD-9-CM   1. Bipolar 1 disorder  F31.9 296.7   2. Muscle, jerky movements (uncontrolled)  G25.5 333.5     History of Present Illness:   Marcela Ramírez is a 58 y.o. female who is here today depression. She was worked into schedule after calling clinic 24 requesting a medication to help with her decreased concentration and increase in agitation     Patient reports  she is \"irritable and down right mean without Latuda\". Jokingly states she tried to get herself fired yesterday when she didn't hold her tongue with a superior at work when she felt like her superior was invalidating her mental illness. She is so irritated by hearing people breathe, which is the same way she felt before Latuda. She was able to read and concentrate and gained her clarity of thinking after she stopped Xanax.  \"I dont like the way I feel and Im so pissed off and haven't been able to pick the book back up.\"  Patient states Latuda was the best she felt in years and she came close to restarting  despite the SE. She states her  irritation is on \"level 10\" every day. Her boyfriend wants to be clingy and she doesn't want to hurt his feelings. \"I can't believe Im pissed off because someone tells me they love me.\" \"Im not happy but I'm not suicidal\".  PHQ score 17 and ANA ROSA score is 9.Patient states she has not had a \"movement episode\" since Tues before last. She is still taking Ingrezza 80 mg and has 5 left. She has a neuro appt in April and is on cancellation list.  She feels Latuda caused movements and reports she had movements with Seroquel and Abilify. She takes Lunesta at night. Some nights till sleeps only 4-5 hours. She is unable to identify reason for her insomnia. Thinks it might be due to anxiety and neuropathic pain. Report she has gained 5 " lbs. Feels like she is making poor food choices. She has started going back to the gym.     Previous Diagnoses: Alcoholism, Bipolar Disorder  Past Medications: Xanax, Pristiq, Xanax, Lamictal 200 mg daily, multiple antidepressesant that didn't work, lithium ineffective, abilify-SI, seroquel-tremors , Xanax  Current Meds: Latuda 40 mg Lamictal 200 mg, Lunesta  Therapy: Dr Hernandez -bariatrics    Subjective      Review of Systems:   Review of Systems   Constitutional:  Positive for activity change, appetite change and fatigue.   Musculoskeletal:  Positive for arthralgias.            Neurological:  Positive for numbness.   Psychiatric/Behavioral:  Positive for dysphoric mood, sleep disturbance and suicidal ideas. Negative for self-injury. The patient is nervous/anxious.        PHQ-9 Depression Screening  Little interest or pleasure in doing things? 3-->nearly every day   Feeling down, depressed, or hopeless? 2-->more than half the days   Trouble falling or staying asleep, or sleeping too much? 2-->more than half the days   Feeling tired or having little energy? 2-->more than half the days   Poor appetite or overeating? 1-->several days   Feeling bad about yourself - or that you are a failure or have let yourself or your family down? 2-->more than half the days   Trouble concentrating on things, such as reading the newspaper or watching television? 3-->nearly every day   Moving or speaking so slowly that other people could have noticed? Or the opposite - being so fidgety or restless that you have been moving around a lot more than usual? 1-->several days   Thoughts that you would be better off dead, or of hurting yourself in some way? 1-->several days   PHQ-9 Total Score 17   If you checked off any problems, how difficult have these problems made it for you to do your work, take care of things at home, or get along with other people?         ANA ROSA-7      Over the last two weeks, how often have you been bothered by the  following problems?  Feeling nervous, anxious or on edge: Not at all  Not being able to stop or control worrying: Not at all  Worrying too much about different things: Several days  Trouble Relaxing: Nearly every day  Being so restless that it is hard to sit still: Not at all  Becoming easily annoyed or irritable: Nearly every day  Feeling afraid as if something awful might happen: More than half the days  ANA ROSA 7 Total Score: 9  If you checked any problems, how difficult have these problems made it for you to do your work, take care of things at home, or get along with other people: Extremely difficult    AIMS Scale-0   AIMS Scale  Extremity Movements  Upper (arms, wrists, hands, fingers): None, normal  Lower (legs, knees, ankles, toes): None, normal  Trunk Movements  Neck, shoulders, hips: None, normal  Dental Status  Current problems with teeth and/or dentures?: No  Does patient usually wear dentures?: No       Patient History:   The following portions of the patient's history were reviewed and updated as appropriate: allergies, current medications, past family history, past medical history, past social history, past surgical history and problem list.     Social History     Socioeconomic History    Marital status:    Tobacco Use    Smoking status: Never     Passive exposure: Never    Smokeless tobacco: Never   Vaping Use    Vaping Use: Never used   Substance and Sexual Activity    Alcohol use: Not Currently     Comment: Over a year ago. Before then, not often    Drug use: Not Currently     Types: Cocaine(coke), LSD, Marijuana, Psilocybin     Comment: At least 12 months or more for most listed    Sexual activity: Yes     Partners: Male     Birth control/protection: Hysterectomy, Same-sex partner, Surgical     Comment: no hormones       Medications:     Current Outpatient Medications:     amLODIPine (NORVASC) 5 MG tablet, Take 1 tablet by mouth Daily., Disp: 30 tablet, Rfl: 2    atorvastatin (LIPITOR) 20 MG  tablet, TAKE 1 TABLET BY MOUTH DAILY, Disp: 90 tablet, Rfl: 1    eszopiclone (Lunesta) 3 MG tablet, Take 1 tablet by mouth At Night As Needed for Sleep. Take immediately before bedtime.avoid activities that require alertness, Disp: 30 tablet, Rfl: 0    glucose blood test strip, Use as instructed to check glucose twice a day Dispense Onetouch ultra 2, Disp: 100 each, Rfl: 3    hydroCHLOROthiazide (HYDRODIURIL) 25 MG tablet, Take 1 tablet by mouth Daily., Disp: 90 tablet, Rfl: 3    lamoTRIgine (LaMICtal) 200 MG tablet, Take 1 tablet by mouth Daily., Disp: 30 tablet, Rfl: 1    Lancets misc, Use to check glucose twice a day Dispense Onetouch ultra 2, Disp: 100 each, Rfl: 3    levothyroxine (SYNTHROID, LEVOTHROID) 50 MCG tablet, Take 1 tablet by mouth Every Morning., Disp: 30 tablet, Rfl: 2    lisinopril (PRINIVIL,ZESTRIL) 40 MG tablet, TAKE ONE TABLET BY MOUTH DAILY, Disp: 30 tablet, Rfl: 5    metoprolol succinate XL (TOPROL-XL) 50 MG 24 hr tablet, TAKE ONE TABLET BY MOUTH DAILY, Disp: 30 tablet, Rfl: 5    omeprazole (priLOSEC) 20 MG capsule, TAKE ONE CAPSULE BY MOUTH DAILY, Disp: 30 capsule, Rfl: 11    pregabalin (Lyrica) 100 MG capsule, Take 1 capsule by mouth 3 (Three) Times a Day., Disp: 90 capsule, Rfl: 5    rotigotine (Neupro) 1 MG/24HR patch 24 hour 24 hour patch, Place 1 patch on the skin as directed by provider Daily., Disp: 30 patch, Rfl: 10    Semaglutide, 2 MG/DOSE, (Ozempic, 2 MG/DOSE,) 8 MG/3ML solution pen-injector, Inject 2 mg under the skin into the appropriate area as directed 1 (One) Time Per Week., Disp: 3 mL, Rfl: 0    tamsulosin (FLOMAX) 0.4 MG capsule 24 hr capsule, Take 1 capsule by mouth Daily., Disp: 30 capsule, Rfl: 5    Valbenazine Tosylate (Ingrezza) 80 MG capsule, Take 80 mg by mouth Daily., Disp: 7 capsule, Rfl: 0    Current Facility-Administered Medications:     cyanocobalamin injection 1,000 mcg, 1,000 mcg, Intramuscular, Q28 Days, Zeynep Jenkins APRN, 1,000 mcg at 08/24/23  "1435    Objective     Physical Exam:  Vital Signs:   Vitals:    02/29/24 0940   BP: 128/84   Pulse: 62   SpO2: 98%   Weight: 104 kg (229 lb)   Height: 170.2 cm (67.01\")     Body mass index is 35.86 kg/m².       Mental Status Exam:   MENTAL STATUS EXAM   General Appearance:  Obese, other and cleanly groomed and dressed  Other Comment:  Casually dressed  Eye Contact:  Good eye contact  Attitude:  Cooperative  Motor Activity:  Normal gait, posture  Muscle Strength:  Normal  Speech:  Normal rate, tone, volume  Language:  Spontaneous  Mood and affect:  Frustrated and depressed  Hopelessness:  Denies  Thought Process:  Logical and goal-directed  Associations/ Thought Content:  No delusions  Hallucinations:  None  Suicidal Ideations:  Not present  Homicidal Ideation:  Not present  Sensorium:  Alert and clear  Orientation:  Person, place, time and situation  Immediate Recall, Recent, and Remote Memory:  Intact  Attention Span/ Concentration:  Good  Fund of Knowledge:  Appropriate for age and educational level  Intellectual Functioning:  Average range  Insight:  Fair  Judgement:  Fair  Reliability:  Good  Impulse Control:  Fair      AZIZA     Dispensed  Strength Quantity Days Supply Provider Pharmacy   02/10/2024 Pregabalin 100MG 90 each 30 ISIDRA DUNHAM [facility]   02/06/2024 Eszopiclone 3MG 30 each 30 BHAVIKRAFA [facility] L[facility]   02/01/2024 [facility]   01/26/2024 Eszopiclone 1MG 30 each 30 [facility]       The following data was reviewed by: NOE Burnett on 12/28/2023:  CMP          5/19/2023    11:24 7/29/2023    19:26 9/28/2023    12:15   CMP   Glucose 113  88  83    BUN 28  15  13    Creatinine 1.21  1.11  1.27    EGFR  58.5  49.7    Sodium 142  144  139    Potassium 4.5  3.8  4.1    Chloride 100  103  99    Calcium 9.9  9.4  9.6    Total Protein 7.9      Total Protein  7.5  7.3    Albumin 4.8  4.2  " 4.3    Globulin 3.1      Globulin  3.3  3.0    Total Bilirubin 0.6  0.4  0.4    Alkaline Phosphatase 105  94  89    AST (SGOT) 20  21  22    ALT (SGPT) 17  19  18    Albumin/Globulin Ratio  1.3  1.4    BUN/Creatinine Ratio 23  13.5  10.2    Anion Gap  12.0  11.1      CBC w/diff          5/19/2023    11:24 7/29/2023    19:26 9/28/2023    12:15   CBC w/Diff   WBC 7.8  9.17  7.49    RBC 4.75  4.22  4.09    Hemoglobin 14.1  13.0  12.7    Hematocrit 43.6  40.3  37.9    MCV 92  95.5  92.7    MCH 29.7  30.8  31.1    MCHC 32.3  32.3  33.5    RDW 13.0  12.9  12.7    Platelets 246  190  195    Neutrophil Rel % 57  60.5     Immature Granulocyte Rel %  0.2     Lymphocyte Rel % 32  31.0     Monocyte Rel % 7  5.3     Eosinophil Rel % 3  2.6     Basophil Rel % 1  0.4       Lipid Panel          5/19/2023    11:24 9/28/2023    12:15   Lipid Panel   Total Cholesterol  147    Total Cholesterol 177     Triglycerides 170  166    HDL Cholesterol 57  56    VLDL Cholesterol 29  28    LDL Cholesterol  91  63    LDL/HDL Ratio 1.6  1.03      TSH          5/19/2023    11:24 9/28/2023    12:15   TSH   TSH 2.100  2.560      Data reviewed : Cardiology studies 7/29/2023 EKG NSR, rate 67, QTc 401/      Assessment / Plan      Visit Diagnosis/Orders Placed This Visit:  Diagnoses and all orders for this visit:    1. Bipolar 1 disorder (Primary)  -     Valproic Acid Level, Total    2. Muscle, jerky movements (uncontrolled)    Other orders  -     divalproex (Depakote) 125 MG DR tablet; Take 1 tablet by mouth 2 (Two) Times a Day.  Dispense: 90 tablet; Refill: 0       -Hx gastric sleeve    -Depakote  bid    -Depakote level in 5 days-hold dose 12 hrs prior to draw    R/o functional movement disorder vs TD    Fasting labs ordered last visit, will have them all drawn in 5 days with depakote level    Contraception-hysterectomy    -Continue psychotherapy    -Continue Lunesta 1 mg hs prn for insomnia.      -Continue Lamictal 200 mg daily     - DC  Latuda  due to abnormal movements    -Continue Ingrezza for now; will dc when she runs out    -  Xanax  0.5 mg hs prn # 10. Use as last resort for insomnia     -Sleep Hygiene reviewed     -Supplements: Takes women over 50 MVI, , B6, B12, iron, calcium bid, fish oil     -The benefits  of consuming a healthy diet, minimizing caffeine intake and engaging in  daily exercise were discussed with patient. Patient encouraged to consider lifestyle modification regarding diet and exercise patterns to maximize results of mental health treatment.     Plan:   Patient agreeable to scheduling a evaluation with neurology to rule out functional movement disorder.  Agrees to continue Ingrezza until neurology can weigh in. Advised patient that dopaminergic medication should be avoided until she can be evaluated by neurology.  Discussed the use of low-dose Depakote to help with mood stabilization and agitation.  We discussed the need to have a level drawn in 5 days, to which she is agreeable. Case staffed with Dr Jc due to complexity.      Continue supportive psychotherapy efforts and medications as indicated. Treatment and medication options discussed during today's visit. Patient ackowledged and verbally consented to continue with current treatment plan and was educated on the importance of compliance with treatment and follow-up appointments. Patient seems reasonably able to adhere to treatment plan.      Medication Considerations:  Discussed medication options and treatment plan of prescribed medication(s) as well as the risks, benefits, and potential side effects. Patient is agreeable to call the office with any worsening of symptoms or onset of side effects. Patient is agreeable to call 911 or go to the nearest ER should he/she begin having SI/HI.      Quality Measures:   Never smoker    I advised Marcela Ramírez of the risks of tobacco use.     Follow Up:   Return in about 3 weeks (around 3/21/2024).      NOE Urbina,  PMHNP-BC

## 2024-03-01 ENCOUNTER — OFFICE VISIT (OUTPATIENT)
Dept: BEHAVIORAL HEALTH | Facility: CLINIC | Age: 59
End: 2024-03-01
Payer: COMMERCIAL

## 2024-03-01 DIAGNOSIS — F31.9 BIPOLAR 1 DISORDER: Primary | ICD-10-CM

## 2024-03-01 DIAGNOSIS — R45.86 MOOD SWINGS: ICD-10-CM

## 2024-03-01 DIAGNOSIS — Z63.8 FAMILY CONFLICT: ICD-10-CM

## 2024-03-01 DIAGNOSIS — E11.42 TYPE 2 DIABETES MELLITUS WITH DIABETIC POLYNEUROPATHY, WITHOUT LONG-TERM CURRENT USE OF INSULIN: ICD-10-CM

## 2024-03-01 PROCEDURE — 90837 PSYTX W PT 60 MINUTES: CPT | Performed by: PSYCHOLOGIST

## 2024-03-01 RX ORDER — SEMAGLUTIDE 2.68 MG/ML
INJECTION, SOLUTION SUBCUTANEOUS
Qty: 3 ML | Refills: 2 | Status: SHIPPED | OUTPATIENT
Start: 2024-03-01

## 2024-03-01 SDOH — SOCIAL STABILITY - SOCIAL INSECURITY: OTHER SPECIFIED PROBLEMS RELATED TO PRIMARY SUPPORT GROUP: Z63.8

## 2024-03-02 NOTE — PROGRESS NOTES
PROGRESS NOTE    Data:    Marcela Ramírez is a 58 y.o. female who met with the undersigned for a scheduled psychotherapy session from 11:15 - 12:10 pm.      Clinical Maneuvering/Intervention:      The pt talked about recent difficulties with bipolar symptoms including mood swings, problems with medication, and having family conflict (with G). Stressors were processed individually and in detail. Venting of frustrations was conducted in order to help the pt feel less tense emotionally and gain insight into issues. Feelings were processed and validated several times in session. Perspective taking was conducted multiple times in order to help the pt feel less stuck, less overwhelmed, and see challenges as much more manageable. Active listening was conducted in order to help the pt make sense of stressors and start moving towards potential solutions. The pt was assisted with finding solutions based on existing skill-set and abilities. Medication management and fears therein were discussed in detail. Worries about staring a new medication were normalized. The pt was assisted in recognizing progress in order to show encouragement and promote motivation to keep making positive changes in life. She is continuing to work with her psychiatrist and is a little more positive when it comes to her outlook on medication. Maladaptive thought patterns were identified, challenged, and evaluated for validity in order to allow for the pt to chose different and more adaptive ways of thinking. An action plan was designed to empower the pt to know what to do. Simple steps of one, two, three were laid out in order to help the pt feel more in control of things and feel less stressed. She was assisted with seeing how there is hope to feel better going forward and that there are many positive aspects to her life currently. Some humor was used in session as it is one of the pt's coping skills. Humor was used too, to help the pt see things as  less challenging and more manageable than they first seemed. Humor was used as well, to model use of the skill as a way to decrease tension ongoing. Homework was assigned tailored to pt's needs. The pt expressed gratitude for today's session.    Mental Status Exam  Hygiene:  good  Dress: normal  Attitude:  cooperative and proactive  Motor Activity: normal  Speech: pressured  Mood:  frustrated   Affect:  congruent  Thought Processes: normal  Thought Content:  normal  Suicidal Thoughts:  not endorsed  Homicidal Thoughts:  not endorsed  Crisis Safety Plan: not needed  Hallucinations:  none      Patient's Support Network Includes:  family, friends      Progress toward goal: there is evidence to suggest that she struggles with mood swings      Functional Status: moderate to high       Prognosis: good with counseling, medication    Assessment      The pt presented to be struggling with managing her moods related to having bipolar disorder. She seems to be nervous to start a new psychotropic medication, but also motivated to do so. She seems to benefit from venting, having her experiences validated, and being assisted in discovering her own solutions to her problems.       Plan      In order to diminish symptoms of bipolar disorder: the pt is to continue with counseling ongoing, being open/transparent with family ongoing (particularly G), and /start her new medication for bipolar disorder as prescribed (today). She is to make a point to act on needs/self-care activities discussed today (ongoing).    Maribel Hernandez, PhD, LP

## 2024-03-09 ENCOUNTER — TELEPHONE (OUTPATIENT)
Age: 59
End: 2024-03-09
Payer: COMMERCIAL

## 2024-03-09 DIAGNOSIS — F99 INSOMNIA DUE TO OTHER MENTAL DISORDER: ICD-10-CM

## 2024-03-09 DIAGNOSIS — F51.05 INSOMNIA DUE TO OTHER MENTAL DISORDER: ICD-10-CM

## 2024-03-09 DIAGNOSIS — G25.5 MUSCLE, JERKY MOVEMENTS (UNCONTROLLED): ICD-10-CM

## 2024-03-09 RX ORDER — VALBENAZINE 80 MG/1
80 CAPSULE ORAL DAILY
Qty: 7 CAPSULE | Refills: 0 | Status: CANCELLED | COMMUNITY
Start: 2024-03-09

## 2024-03-11 NOTE — TELEPHONE ENCOUNTER
Rx Refill Note  Requested Prescriptions     Pending Prescriptions Disp Refills    Valbenazine Tosylate (Ingrezza) 80 MG capsule 7 capsule 0     Sig: Take 80 mg by mouth Daily.    divalproex (Depakote) 125 MG DR tablet 90 tablet 0     Sig: Take 1 tablet by mouth 2 (Two) Times a Day.      Last office visit with prescribing clinician: 2/29/2024   Last telemedicine visit with prescribing clinician: Visit date not found   Next office visit with prescribing clinician: 3/21/2024     Chris Todd MA  03/11/24, 10:59 EDT

## 2024-03-12 RX ORDER — DIVALPROEX SODIUM 125 MG/1
125 TABLET, DELAYED RELEASE ORAL 2 TIMES DAILY
Qty: 90 TABLET | Refills: 0 | OUTPATIENT
Start: 2024-03-12

## 2024-03-12 RX ORDER — ESZOPICLONE 3 MG/1
3 TABLET, FILM COATED ORAL NIGHTLY PRN
Qty: 30 TABLET | Refills: 0 | Status: SHIPPED | OUTPATIENT
Start: 2024-03-12

## 2024-03-12 NOTE — TELEPHONE ENCOUNTER
I noticed she cancelled her neurology appt? Can you ask her about this when you call. Im going to drop her dose down to 40 mg daily bc I plan to taper her off. I can send to pharmacy or she can stop in for samples. Just let me know. thx

## 2024-03-12 NOTE — TELEPHONE ENCOUNTER
To early. Was supposed to have level done 5 days after she started it. Can you call and remind her

## 2024-03-13 ENCOUNTER — HOSPITAL ENCOUNTER (EMERGENCY)
Facility: HOSPITAL | Age: 59
Discharge: HOME | End: 2024-03-13
Attending: STUDENT IN AN ORGANIZED HEALTH CARE EDUCATION/TRAINING PROGRAM | Admitting: STUDENT IN AN ORGANIZED HEALTH CARE EDUCATION/TRAINING PROGRAM
Payer: COMMERCIAL

## 2024-03-13 VITALS
TEMPERATURE: 98.2 F | OXYGEN SATURATION: 100 % | WEIGHT: 229 LBS | SYSTOLIC BLOOD PRESSURE: 149 MMHG | RESPIRATION RATE: 16 BRPM | HEART RATE: 63 BPM | HEIGHT: 67 IN | DIASTOLIC BLOOD PRESSURE: 110 MMHG | BODY MASS INDEX: 35.94 KG/M2

## 2024-03-13 DIAGNOSIS — R22.0 LIP SWELLING: ICD-10-CM

## 2024-03-13 DIAGNOSIS — T78.40XA ALLERGIC REACTION, INITIAL ENCOUNTER: Primary | ICD-10-CM

## 2024-03-13 DIAGNOSIS — I10 HYPERTENSION, UNSPECIFIED TYPE: ICD-10-CM

## 2024-03-13 LAB
ALBUMIN SERPL-MCNC: 4.2 G/DL (ref 3.5–5.2)
ALBUMIN/GLOB SERPL: 1.4 G/DL
ALP SERPL-CCNC: 86 U/L (ref 39–117)
ALT SERPL W P-5'-P-CCNC: 15 U/L (ref 1–33)
ANION GAP SERPL CALCULATED.3IONS-SCNC: 11 MMOL/L (ref 5–15)
AST SERPL-CCNC: 20 U/L (ref 1–32)
BASOPHILS # BLD AUTO: 0.04 10*3/MM3 (ref 0–0.2)
BASOPHILS NFR BLD AUTO: 0.6 % (ref 0–1.5)
BILIRUB SERPL-MCNC: 0.6 MG/DL (ref 0–1.2)
BUN SERPL-MCNC: 18 MG/DL (ref 6–20)
BUN/CREAT SERPL: 15.1 (ref 7–25)
CALCIUM SPEC-SCNC: 9.2 MG/DL (ref 8.6–10.5)
CHLORIDE SERPL-SCNC: 100 MMOL/L (ref 98–107)
CO2 SERPL-SCNC: 31 MMOL/L (ref 22–29)
CREAT SERPL-MCNC: 1.19 MG/DL (ref 0.57–1)
DEPRECATED RDW RBC AUTO: 44.5 FL (ref 37–54)
EGFRCR SERPLBLD CKD-EPI 2021: 53.8 ML/MIN/1.73
EOSINOPHIL # BLD AUTO: 0.3 10*3/MM3 (ref 0–0.4)
EOSINOPHIL NFR BLD AUTO: 4.1 % (ref 0.3–6.2)
ERYTHROCYTE [DISTWIDTH] IN BLOOD BY AUTOMATED COUNT: 13 % (ref 12.3–15.4)
GLOBULIN UR ELPH-MCNC: 3 GM/DL
GLUCOSE SERPL-MCNC: 95 MG/DL (ref 65–99)
HCT VFR BLD AUTO: 41 % (ref 34–46.6)
HGB BLD-MCNC: 13.4 G/DL (ref 12–15.9)
IMM GRANULOCYTES # BLD AUTO: 0.02 10*3/MM3 (ref 0–0.05)
IMM GRANULOCYTES NFR BLD AUTO: 0.3 % (ref 0–0.5)
LYMPHOCYTES # BLD AUTO: 1.84 10*3/MM3 (ref 0.7–3.1)
LYMPHOCYTES NFR BLD AUTO: 25.3 % (ref 19.6–45.3)
MAGNESIUM SERPL-MCNC: 1.9 MG/DL (ref 1.6–2.6)
MCH RBC QN AUTO: 30.5 PG (ref 26.6–33)
MCHC RBC AUTO-ENTMCNC: 32.7 G/DL (ref 31.5–35.7)
MCV RBC AUTO: 93.2 FL (ref 79–97)
MONOCYTES # BLD AUTO: 0.59 10*3/MM3 (ref 0.1–0.9)
MONOCYTES NFR BLD AUTO: 8.1 % (ref 5–12)
NEUTROPHILS NFR BLD AUTO: 4.47 10*3/MM3 (ref 1.7–7)
NEUTROPHILS NFR BLD AUTO: 61.6 % (ref 42.7–76)
NRBC BLD AUTO-RTO: 0 /100 WBC (ref 0–0.2)
PLATELET # BLD AUTO: 150 10*3/MM3 (ref 140–450)
PMV BLD AUTO: 11.9 FL (ref 6–12)
POTASSIUM SERPL-SCNC: 3.9 MMOL/L (ref 3.5–5.2)
PROT SERPL-MCNC: 7.2 G/DL (ref 6–8.5)
RBC # BLD AUTO: 4.4 10*6/MM3 (ref 3.77–5.28)
SODIUM SERPL-SCNC: 142 MMOL/L (ref 136–145)
T4 FREE SERPL-MCNC: 1.14 NG/DL (ref 0.93–1.7)
TSH SERPL DL<=0.05 MIU/L-ACNC: 4.26 UIU/ML (ref 0.27–4.2)
WBC NRBC COR # BLD AUTO: 7.26 10*3/MM3 (ref 3.4–10.8)

## 2024-03-13 PROCEDURE — 96365 THER/PROPH/DIAG IV INF INIT: CPT

## 2024-03-13 PROCEDURE — 96361 HYDRATE IV INFUSION ADD-ON: CPT

## 2024-03-13 PROCEDURE — 96375 TX/PRO/DX INJ NEW DRUG ADDON: CPT

## 2024-03-13 PROCEDURE — 25010000002 DEXAMETHASONE SODIUM PHOSPHATE 100 MG/10ML SOLUTION: Performed by: STUDENT IN AN ORGANIZED HEALTH CARE EDUCATION/TRAINING PROGRAM

## 2024-03-13 PROCEDURE — 83735 ASSAY OF MAGNESIUM: CPT | Performed by: STUDENT IN AN ORGANIZED HEALTH CARE EDUCATION/TRAINING PROGRAM

## 2024-03-13 PROCEDURE — 25810000003 SODIUM CHLORIDE 0.9 % SOLUTION: Performed by: STUDENT IN AN ORGANIZED HEALTH CARE EDUCATION/TRAINING PROGRAM

## 2024-03-13 PROCEDURE — 99283 EMERGENCY DEPT VISIT LOW MDM: CPT

## 2024-03-13 PROCEDURE — 84439 ASSAY OF FREE THYROXINE: CPT | Performed by: STUDENT IN AN ORGANIZED HEALTH CARE EDUCATION/TRAINING PROGRAM

## 2024-03-13 PROCEDURE — 80050 GENERAL HEALTH PANEL: CPT | Performed by: STUDENT IN AN ORGANIZED HEALTH CARE EDUCATION/TRAINING PROGRAM

## 2024-03-13 PROCEDURE — 25010000002 DIPHENHYDRAMINE PER 50 MG: Performed by: STUDENT IN AN ORGANIZED HEALTH CARE EDUCATION/TRAINING PROGRAM

## 2024-03-13 PROCEDURE — 25010000002 HYDRALAZINE PER 20 MG: Performed by: STUDENT IN AN ORGANIZED HEALTH CARE EDUCATION/TRAINING PROGRAM

## 2024-03-13 RX ORDER — DIPHENHYDRAMINE HYDROCHLORIDE 50 MG/ML
25 INJECTION INTRAMUSCULAR; INTRAVENOUS ONCE
Status: COMPLETED | OUTPATIENT
Start: 2024-03-13 | End: 2024-03-13

## 2024-03-13 RX ORDER — LOSARTAN POTASSIUM 50 MG/1
50 TABLET ORAL
Status: DISCONTINUED | OUTPATIENT
Start: 2024-03-13 | End: 2024-03-13 | Stop reason: HOSPADM

## 2024-03-13 RX ORDER — FAMOTIDINE 10 MG/ML
20 INJECTION, SOLUTION INTRAVENOUS ONCE
Status: COMPLETED | OUTPATIENT
Start: 2024-03-13 | End: 2024-03-13

## 2024-03-13 RX ORDER — VALBENAZINE 40 MG/1
CAPSULE ORAL
Qty: 53 CAPSULE | Refills: 0 | OUTPATIENT
Start: 2024-03-13 | End: 2024-04-12

## 2024-03-13 RX ORDER — LOSARTAN POTASSIUM 25 MG/1
25 TABLET ORAL 2 TIMES DAILY
Qty: 60 TABLET | Refills: 0 | Status: SHIPPED | OUTPATIENT
Start: 2024-03-13 | End: 2024-04-12

## 2024-03-13 RX ORDER — HYDRALAZINE HYDROCHLORIDE 20 MG/ML
10 INJECTION INTRAMUSCULAR; INTRAVENOUS ONCE
Status: COMPLETED | OUTPATIENT
Start: 2024-03-13 | End: 2024-03-13

## 2024-03-13 RX ADMIN — FAMOTIDINE 20 MG: 10 INJECTION, SOLUTION INTRAVENOUS at 07:34

## 2024-03-13 RX ADMIN — DIPHENHYDRAMINE HYDROCHLORIDE 25 MG: 50 INJECTION INTRAMUSCULAR; INTRAVENOUS at 07:35

## 2024-03-13 RX ADMIN — SODIUM CHLORIDE 1000 ML: 9 INJECTION, SOLUTION INTRAVENOUS at 07:27

## 2024-03-13 RX ADMIN — HYDRALAZINE HYDROCHLORIDE 10 MG: 20 INJECTION INTRAMUSCULAR; INTRAVENOUS at 08:07

## 2024-03-13 RX ADMIN — DEXAMETHASONE SODIUM PHOSPHATE 10 MG: 10 INJECTION, SOLUTION INTRAMUSCULAR; INTRAVENOUS at 07:35

## 2024-03-13 NOTE — Clinical Note
[facility] EMERGENCY DEPARTMENT  [address]  [facility] [address]  Phone: [phone]    Marcela Ramírez was seen and treated in our emergency department on 3/13/2024.  She may return to work on 03/14/2024.         Thank you for choosing [facility].    Wenceslao Singer MD

## 2024-03-13 NOTE — DISCHARGE INSTRUCTIONS
Continue taking oral Benadryl and monitoring your symptoms I would replace your lisinopril with the provided losartan should this be angioedema.  While today's workup was reassuring if your symptoms change or worsen please return to the ED or seek other medical care

## 2024-03-13 NOTE — ED PROVIDER NOTES
EMERGENCY DEPARTMENT ENCOUNTER    Pt Name: Marcela Ramírez  MRN: [id number]  Pt :   1965  Room Number:    Date of encounter:  3/13/2024  PCP: Sukhwinder Acosta MD  ED Provider: Wenceslao Singer MD    Historian: Patient      HPI:  Chief Complaint: Lip swelling        Context: Marcela Ramírez is a 58-year-old woman who presents the emergency department because of swelling of her lower lip she thinks it may be an allergic reaction to some exotic food she had at work yesterday at noon.  She first noticed the swelling at 6 PM yesterday and at that time it was localized to her left lower lip but is now expanded to encompass her entire lower lip she does not appreciate any throat or tongue swelling.  She does have a history of hospitalization for anaphylaxis 20 years ago the cause of this was never identified.  She takes lisinopril for blood pressure.  Denies fevers or systemic symptoms.  Denies any other complaints at this time.      PAST MEDICAL HISTORY  Past Medical History:   Diagnosis Date    Arthritis     knees, otc arthritis meds prn, no h/o injections.     Asthma     has not required inhaler    Bilateral ovarian cysts     Bipolar 1 disorder     Boil     opens them herself    Breast injury 2021    bruise on lateral rt breast from fall    Carpal tunnel syndrome     Chronic pain disorder     CKD (chronic kidney disease), symptom management only, stage 3 (moderate)     Creatinine 1.04 GFR 56    Colon polyp     Depression     Diabetes mellitus     Diagnosed 2017, was on metformin in the past. Last A1C 5.5%    Diverticulosis     Fatigue     GERD (gastroesophageal reflux disease)     controlled with omeprazole 20mg. Remote EGD- esophagitis.  EGD no hiatal hernia Z line 40 cm very thick mucosal folds cannot rule out varices.  CT scan thickened fundus, no varices seen    Headache     Headache, tension-type [id number]    History of bladder infections     History of blood transfusion      2/2 heavy menses    History of blood transfusion     History of bronchitis     HL (hearing loss) [id number]    Hyperlipidemia     Hypertension     Hypothyroidism     Lower extremity edema     Memory loss 1994    Migraine [id number]    Morbid obesity with BMI of 40.0-44.9, adult     Neuropathy in diabetes     Not sure    Peripheral neuropathy     2/2 DM    RLS (restless legs syndrome)     S/P spenser     S/P cholecystectomy     2/2 cholelithiasis    Shortness of breath on exertion     Thyroid disease     TIA (transient ischemic attack)     patient states 8-9 episodes of right sided drooping/ weakness/ vision changes. Workup was negative for CVA- thought to be related to anxiety. last episode 12/2017         PAST SURGICAL HISTORY  Past Surgical History:   Procedure Laterality Date    COLONOSCOPY  remote    diverticulosis    ENDOMETRIAL ABLATION      ENDOSCOPY  remote    esophagitis     ENDOSCOPY N/A 08/22/2019    Procedure: ESOPHAGOGASTRODUODENOSCOPY;  Surgeon: Guido Greenberg MD;  Location:  [facility];  Service: Bariatric    GASTRIC SLEEVE LAPAROSCOPIC N/A 08/22/2019    Procedure: GASTRIC SLEEVE LAPAROSCOPIC;  Surgeon: Guido Greenberg MD;  Location:  [facility];  Service: Bariatric    KNEE ACL RECONSTRUCTION Right 2013    with miniscal repair    LAPAROSCOPIC CHOLECYSTECTOMY  2001    cholelithiasis    LAPAROSCOPIC HYSTERECTOMY  2013    right ovary remains    PARAESOPHAGEAL HERNIA REPAIR N/A 08/22/2019    Procedure: PARAESOPHAGEAL HERNIA REPAIR LAPAROSCOPIC;  Surgeon: Guido Greenberg MD;  Location:  [facility];  Service: Bariatric    SINUS SURGERY      TUBAL ABDOMINAL LIGATION           FAMILY HISTORY  Family History   Problem Relation Age of Onset    Arthritis Mother     Arthritis Father     Diabetes Father     Hyperlipidemia Father     Hypertension Father     Neuropathy Father     Arthritis Sister     Diabetes Brother     Hypertension Brother     Asthma Brother     Other Brother     Obesity Brother     Obesity  Daughter     Hypertension Daughter     Depression Daughter     Cancer Maternal Aunt     Depression Paternal Uncle     Cancer Paternal Grandmother     Heart disease Paternal Grandfather     Hypertension Paternal Grandfather     Breast cancer Neg Hx     Ovarian cancer Neg Hx          SOCIAL HISTORY  Social History     Socioeconomic History    Marital status:    Tobacco Use    Smoking status: Never     Passive exposure: Never    Smokeless tobacco: Never   Vaping Use    Vaping status: Never Used   Substance and Sexual Activity    Alcohol use: Not Currently     Comment: Over a year ago. Before then, not often    Drug use: Not Currently     Types: Cocaine(coke), LSD, Marijuana, Psilocybin     Comment: At least 12 months or more for most listed    Sexual activity: Yes     Partners: Male     Birth control/protection: Hysterectomy, Same-sex partner, Surgical     Comment: no hormones         ALLERGIES  Contrast dye (echo or unknown ct/mr)        REVIEW OF SYSTEMS  Review of Systems       All systems reviewed and negative except for those discussed in HPI.       PHYSICAL EXAM    I have reviewed the triage vital signs and nursing notes.    ED Triage Vitals [03/13/24 0645]   Temp Heart Rate Resp BP SpO2   98.2 °F (36.8 °C) 63 16 172/100 98 %      Temp src Heart Rate Source Patient Position BP Location FiO2 (%)   Oral Monitor;Right Sitting Left arm --       Physical Exam  GENERAL:   Appears in no acute distress.   HENT: Nares patent.  Localized symmetric edema to the lower lip without involvement of the upper lip, no oropharyngeal swelling, soft underneath the tongue, no stridor  EYES: No scleral icterus.  CV: Regular rhythm, regular rate.  RESPIRATORY: Normal effort.  No audible wheezes, rales or rhonchi.  ABDOMEN: Soft, nontender  MUSCULOSKELETAL: No deformities.   NEURO: Alert, moves all extremities, follows commands.  SKIN: Warm, dry, no rash visualized.      LAB RESULTS  Recent Results (from the past 24 hour(s))    Comprehensive Metabolic Panel    Collection Time: 03/13/24  7:27 AM    Specimen: Blood   Result Value Ref Range    Glucose 95 65 - 99 mg/dL    BUN 18 6 - 20 mg/dL    Creatinine 1.19 (H) 0.57 - 1.00 mg/dL    Sodium 142 136 - 145 mmol/L    Potassium 3.9 3.5 - 5.2 mmol/L    Chloride 100 98 - 107 mmol/L    CO2 31.0 (H) 22.0 - 29.0 mmol/L    Calcium 9.2 8.6 - 10.5 mg/dL    Total Protein 7.2 6.0 - 8.5 g/dL    Albumin 4.2 3.5 - 5.2 g/dL    ALT (SGPT) 15 1 - 33 U/L    AST (SGOT) 20 1 - 32 U/L    Alkaline Phosphatase 86 39 - 117 U/L    Total Bilirubin 0.6 0.0 - 1.2 mg/dL    Globulin 3.0 gm/dL    A/G Ratio 1.4 g/dL    BUN/Creatinine Ratio 15.1 7.0 - 25.0    Anion Gap 11.0 5.0 - 15.0 mmol/L    eGFR 53.8 (L) >60.0 mL/min/1.73   Magnesium    Collection Time: 03/13/24  7:27 AM    Specimen: Blood   Result Value Ref Range    Magnesium 1.9 1.6 - 2.6 mg/dL   TSH    Collection Time: 03/13/24  7:27 AM    Specimen: Blood   Result Value Ref Range    TSH 4.260 (H) 0.270 - 4.200 uIU/mL   T4, Free    Collection Time: 03/13/24  7:27 AM    Specimen: Blood   Result Value Ref Range    Free T4 1.14 0.93 - 1.70 ng/dL   CBC Auto Differential    Collection Time: 03/13/24  7:27 AM    Specimen: Blood   Result Value Ref Range    WBC 7.26 3.40 - 10.80 10*3/mm3    RBC 4.40 3.77 - 5.28 10*6/mm3    Hemoglobin 13.4 12.0 - 15.9 g/dL    Hematocrit 41.0 34.0 - 46.6 %    MCV 93.2 79.0 - 97.0 fL    MCH 30.5 26.6 - 33.0 pg    MCHC 32.7 31.5 - 35.7 g/dL    RDW 13.0 12.3 - 15.4 %    RDW-SD 44.5 37.0 - 54.0 fl    MPV 11.9 6.0 - 12.0 fL    Platelets 150 140 - 450 10*3/mm3    Neutrophil % 61.6 42.7 - 76.0 %    Lymphocyte % 25.3 19.6 - 45.3 %    Monocyte % 8.1 5.0 - 12.0 %    Eosinophil % 4.1 0.3 - 6.2 %    Basophil % 0.6 0.0 - 1.5 %    Immature Grans % 0.3 0.0 - 0.5 %    Neutrophils, Absolute 4.47 1.70 - 7.00 10*3/mm3    Lymphocytes, Absolute 1.84 0.70 - 3.10 10*3/mm3    Monocytes, Absolute 0.59 0.10 - 0.90 10*3/mm3    Eosinophils, Absolute 0.30 0.00 - 0.40  10*3/mm3    Basophils, Absolute 0.04 0.00 - 0.20 10*3/mm3    Immature Grans, Absolute 0.02 0.00 - 0.05 10*3/mm3    nRBC 0.0 0.0 - 0.2 /100 WBC       If labs were ordered, I independently reviewed the results and considered them in treating the patient.        RADIOLOGY  No Radiology Exams Resulted Within Past 24 Hours    I ordered and independently reviewed the above noted radiographic studies.        See radiologist's dictation for official interpretation.        PROCEDURES    Procedures    No orders to display       MEDICATIONS GIVEN IN ER    Medications   sodium chloride 0.9 % bolus 1,000 mL (0 mL Intravenous Stopped 3/13/24 0916)   dexAMETHasone (DECADRON) IVPB 10 mg (0 mg Intravenous Stopped 3/13/24 0755)   diphenhydrAMINE (BENADRYL) injection 25 mg (25 mg Intravenous Given 3/13/24 0735)   famotidine (PEPCID) injection 20 mg (20 mg Intravenous Given 3/13/24 0734)   hydrALAZINE (APRESOLINE) injection 10 mg (10 mg Intravenous Given 3/13/24 0807)         MEDICAL DECISION MAKING, PROGRESS, and CONSULTS    All labs, if obtained, have been independently reviewed by me.  All radiology studies, if obtained, have been reviewed by me and the radiologist dictating the report.  All EKG's, if obtained, have been independently viewed and interpreted by me/my attending physician.      Discussion below represents my analysis of pertinent findings related to patient's condition, differential diagnosis, treatment plan and final disposition.                         Differential diagnosis:    Allergic reaction, medication adverse reaction, angioedema, airway compromise, Mark angina, sepsis, anemia, electrolyte abnormality      Additional sources:    - Discussed/ obtained information from independent historians:      - External (non-ED) record review:  Chart review shows recent outpatient psychology notes for bipolar 1, mood swings, family conflict    - Chronic or social conditions impacting care: Hypertension on multiple  medications    - Shared decision making: Agreeable to discharge with strict return precautions and change of lisinopril to losartan      Orders placed during this visit:  Orders Placed This Encounter   Procedures    Comprehensive Metabolic Panel    Magnesium    TSH    T4, Free    CBC Auto Differential    CBC & Differential         Additional orders considered but not ordered:      ED Course:    Consultants:      ED Course as of 03/13/24 1313   Wed Mar 13, 2024   0648 Chart review shows recent outpatient psychology notes for bipolar 1, mood swings, family conflict [CC]   0700 In summary this 58-year-old woman who presents the emergency department because of swelling of her lower lip she thinks it may be an allergic reaction to some exotic food she had at work yesterday at noon.  She first noticed the swelling at 6 PM yesterday and at that time it was localized to her left lower lip but is now expanded to encompass her entire lower lip she does not appreciate any throat or tongue swelling.  She does have a history of hospitalization for anaphylaxis 20 years ago the cause of this was never identified.  She takes lisinopril for blood pressure.  Denies fevers or systemic symptoms.  Denies any other complaints at this time. [CC]   0930 She arrived awake and alert initially mildly hypertensive 172/100 eventually became significantly hypertensive with systolic around 200 near bradycardic so treated with hydralazine.  She does have significant lower lip swelling that is soft underneath the tongue and no evidence of respiratory compromise is unclear whether this is an allergic reaction or mild angioedema.  She does take lisinopril and I think the safest thing at this time would be to switch her to losartan which I am doing.  Treated here with IV fluids dexamethasone diphenhydramine and famotidine with improvement in her lip swelling.  At nearly 3 hours in the ED she is resting comfortably still no evidence of airway  compromise I think she can safely discharge she will continue use diphenhydramine throughout the day or switching her lisinopril to losartan.  She will follow-up with her PCP.  Discharged stable condition with strict return precautions. [CC]   0932 Labs generally reassuring she does have elevated serum creatinine at 1.19 and this appears stable with prior values.  She has hypothyroidism and TSH is elevated but normal free T4.  No other actionable items on labs. [CC]      ED Course User Index  [CC] Wenceslao Singer MD              Shared Decision Making:  After my consideration of clinical presentation and any laboratory/radiology studies obtained, I discussed the findings with the patient/patient representative who is in agreement with the treatment plan and the final disposition.   Risks and benefits of discharge and/or observation/admission were discussed.       AS OF 13:13 EDT VITALS:    BP - (!) 149/110  HR - 63  TEMP - 98.2 °F (36.8 °C) (Oral)  O2 SATS - 100%                  DIAGNOSIS  Final diagnoses:   Allergic reaction, initial encounter   Lip swelling   Hypertension, unspecified type         DISPOSITION  DISCHARGE    Patient discharged in stable condition.    Reviewed implications of results, diagnosis, meds, responsibility to follow up, warning signs and symptoms of possible worsening, potential complications and reasons to return to ER.    Patient/Family voiced understanding of above instructions.    Discussed plan for discharge, as there is no emergent indication for admission.  Pt/family is agreeable and understands need for follow up and possible repeat testing.  Pt/family is aware that discharge does not mean that nothing is wrong but that it indicates no emergency is currently present that requires admission and they must continue care with follow-up as given below or with a physician of their choice.     FOLLOW-UP  Sukhwinder Acosta MD  [address] [facility] [address]  [facility]  [address]  [phone]    Call            Medication List        New Prescriptions      losartan 25 MG tablet  Commonly known as: COZAAR  Take 1 tablet by mouth 2 (Two) Times a Day for 30 days.               Where to Get Your Medications        These medications were sent to [facility] [id number] - [place], [place] - [address] [place] [address] AT [facility] - [phone]  - [phone] [address] [address]      Phone: [phone]   losartan 25 MG tablet             Please note that portions of this document were completed with voice recognition software.        Wenceslao Singer MD  03/13/24 [id number]

## 2024-03-13 NOTE — Clinical Note
[facility] EMERGENCY DEPARTMENT  [address]  [facility] [address]  Phone: [phone]    Marcela Ramírez was seen and treated in our emergency department on 3/13/2024.  She may return to work on 03/14/2024.         Thank you for choosing [facility].    Wenceslao Singer MD

## 2024-03-13 NOTE — Clinical Note
[facility] EMERGENCY DEPARTMENT  [address]  [facility] [address]  Phone: [phone]    Marcela Ramírez was seen and treated in our emergency department on 3/13/2024.  She may return to work on 03/14/2024.         Thank you for choosing [facility].    Wenceslao Singer MD

## 2024-03-13 NOTE — TELEPHONE ENCOUNTER
Called [facility] LVM reiterating need per provider to get Depakote lvl drawn (valprouic Acid level) Informed her this lab is already in her chart. Also informed PT to call back regarding 40 mg Ingrezza, told PT we can either send another Rx in or she can  samples in the clinic. Informed PT either way to give us a call and let us know.

## 2024-03-16 DIAGNOSIS — R39.11 URINARY HESITANCY: ICD-10-CM

## 2024-03-16 DIAGNOSIS — I10 ESSENTIAL HYPERTENSION: ICD-10-CM

## 2024-03-18 RX ORDER — HYDROCHLOROTHIAZIDE 25 MG/1
25 TABLET ORAL DAILY
Qty: 90 TABLET | Refills: 3 | Status: SHIPPED | OUTPATIENT
Start: 2024-03-18

## 2024-03-18 RX ORDER — TAMSULOSIN HYDROCHLORIDE 0.4 MG/1
1 CAPSULE ORAL DAILY
Qty: 30 CAPSULE | Refills: 5 | OUTPATIENT
Start: 2024-03-18

## 2024-03-18 NOTE — TELEPHONE ENCOUNTER
Rx Refill Note  Requested Prescriptions     Pending Prescriptions Disp Refills    tamsulosin (FLOMAX) 0.4 MG capsule 24 hr capsule 30 capsule 5     Sig: Take 1 capsule by mouth Daily.      Last office visit with prescribing clinician: 7/13/2023   Last telemedicine visit with prescribing clinician: Visit date not found   Next office visit with prescribing clinician: Visit date not found     Mattie Camargo MA  03/18/24, 08:19 EDT

## 2024-03-21 ENCOUNTER — OFFICE VISIT (OUTPATIENT)
Age: 59
End: 2024-03-21
Payer: COMMERCIAL

## 2024-03-21 VITALS
HEIGHT: 67 IN | WEIGHT: 230 LBS | DIASTOLIC BLOOD PRESSURE: 96 MMHG | HEART RATE: 64 BPM | BODY MASS INDEX: 36.1 KG/M2 | OXYGEN SATURATION: 97 % | SYSTOLIC BLOOD PRESSURE: 132 MMHG

## 2024-03-21 DIAGNOSIS — F51.05 INSOMNIA DUE TO OTHER MENTAL DISORDER: ICD-10-CM

## 2024-03-21 DIAGNOSIS — G25.5 MUSCLE, JERKY MOVEMENTS (UNCONTROLLED): ICD-10-CM

## 2024-03-21 DIAGNOSIS — F31.9 BIPOLAR 1 DISORDER: Primary | ICD-10-CM

## 2024-03-21 DIAGNOSIS — F99 INSOMNIA DUE TO OTHER MENTAL DISORDER: ICD-10-CM

## 2024-03-21 RX ORDER — ALPRAZOLAM 0.5 MG/1
0.5 TABLET ORAL NIGHTLY PRN
Qty: 10 TABLET | Refills: 0 | Status: SHIPPED | OUTPATIENT
Start: 2024-03-21

## 2024-03-21 RX ORDER — ESZOPICLONE 3 MG/1
3 TABLET, FILM COATED ORAL NIGHTLY PRN
Qty: 30 TABLET | Refills: 0 | Status: SHIPPED | OUTPATIENT
Start: 2024-03-21 | End: 2025-03-21

## 2024-03-21 RX ORDER — LAMOTRIGINE 200 MG/1
200 TABLET ORAL DAILY
Qty: 90 TABLET | Refills: 0 | Status: SHIPPED | OUTPATIENT
Start: 2024-03-21

## 2024-03-21 RX ORDER — VALBENAZINE 40 MG/1
40 CAPSULE ORAL NIGHTLY
COMMUNITY
Start: 2024-03-04

## 2024-03-21 NOTE — PROGRESS NOTES
"     Office  Follow Up Visit      Patient Name: Marcela Ramírez  : 1965   MRN: [id number]     Referring Provider: Sukhwinder Acosta MD    Chief Complaint:       ICD-10-CM ICD-9-CM   1. Bipolar 1 disorder  F31.9 296.7   2. Muscle, jerky movements (uncontrolled)  G25.5 333.5   3. Insomnia due to other mental disorder  F51.05 300.9    F99 327.02     History of Present Illness:   Marcela Ramírez is a 58 y.o. female who is here today Bipolar disorder. Pt last evaluated 3/5/2024.    Patient reports having had only one \"restless wanna rip my skin off thing, but I don't know  if its really the TD or my frustration with humanity.\" Patient describes feeling frustrated toward a lady in the waiting area for playing rap music out loud where everyone could hear it. States she is having issues with her oldest son causing problems  for her and her job because they work at the same place and he reported a coworker to HR trying to get her fired. States she had cut him off for now. Patient reports she is taking Ingrezza 40 mg. She took Depakote until 4 days ago and took herself off of Depakote and Lunesta at the same time after a co-worker told her she gained 40 lbs on Depakote. \"I know Im hurting myself by taking off without talking to you first but I didn't think I  was taking them long enough to make a difference.\" Feels she gained 6 lbs on Depakote but admits she is  not sure if that is the sole reason she is gaining weight. Reports she has been \"munching\" on cheezits because  insomnia is so bad. States she was awake for 48 hrs after she stopped her Lunesta Has been taking half a xanax to fall asleep. \"Im so exhausted because my brain wont shut off. Im losing my mind. We have got to figure something out\".    States she missed 3 days of work  recently and having terrible nightmares.  PHQ score is 18. Reports passive SI but states she promised her family she would never  harm herself. ANA ROSA score 11. "       Previous Diagnoses: Alcoholism, Bipolar Disorder  Past Medications: Xanax, Pristiq, Xanax, Lamictal 200 mg daily, multiple antidepressesant that didn't work, lithium ineffective, abilify-SI, seroquel-tremors , Xanax  Current Meds: Latuda 40 mg Lamictal 200 mg, Lunesta  Therapy: Dr David smallwood    Subjective      Review of Systems:   Review of Systems   Constitutional:  Positive for activity change, appetite change and fatigue.   Musculoskeletal:  Positive for arthralgias.            Neurological:  Positive for numbness.   Psychiatric/Behavioral:  Positive for dysphoric mood, sleep disturbance and suicidal ideas. Negative for self-injury. The patient is nervous/anxious.        PHQ-9 Depression Screening  Little interest or pleasure in doing things? 1-->several days   Feeling down, depressed, or hopeless? 1-->several days   Trouble falling or staying asleep, or sleeping too much? 3-->nearly every day (Falling asleep and staying asleep)   Feeling tired or having little energy? 3-->nearly every day   Poor appetite or overeating? 2-->more than half the days (Overeating)   Feeling bad about yourself - or that you are a failure or have let yourself or your family down? 1-->several days   Trouble concentrating on things, such as reading the newspaper or watching television? 3-->nearly every day   Moving or speaking so slowly that other people could have noticed? Or the opposite - being so fidgety or restless that you have been moving around a lot more than usual? 1-->several days   Thoughts that you would be better off dead, or of hurting yourself in some way? 3-->nearly every day   PHQ-9 Total Score 18   If you checked off any problems, how difficult have these problems made it for you to do your work, take care of things at home, or get along with other people? extremely difficult       ANA ROSA-7      Over the last two weeks, how often have you been bothered by the following problems?  Feeling nervous, anxious or on  edge: More than half the days  Not being able to stop or control worrying: Several days  Worrying too much about different things: Nearly every day  Trouble Relaxing: More than half the days  Being so restless that it is hard to sit still: Several days (A few days)  Becoming easily annoyed or irritable: More than half the days  Feeling afraid as if something awful might happen: Not at all  ANA ROSA 7 Total Score: 11  If you checked any problems, how difficult have these problems made it for you to do your work, take care of things at home, or get along with other people: Somewhat difficult    AIMS Scale-0     Patient History:   The following portions of the patient's history were reviewed and updated as appropriate: allergies, current medications, past family history, past medical history, past social history, past surgical history and problem list.     Social History     Socioeconomic History    Marital status:    Tobacco Use    Smoking status: Never     Passive exposure: Never    Smokeless tobacco: Never   Vaping Use    Vaping status: Never Used   Substance and Sexual Activity    Alcohol use: Not Currently     Comment: Over a year ago. Before then, not often    Drug use: Not Currently     Types: Cocaine(coke), LSD, Marijuana, Psilocybin     Comment: At least 12 months or more for most listed    Sexual activity: Yes     Partners: Male     Birth control/protection: Hysterectomy, Same-sex partner, Surgical     Comment: no hormones       Medications:     Current Outpatient Medications:     atorvastatin (LIPITOR) 20 MG tablet, TAKE 1 TABLET BY MOUTH DAILY, Disp: 90 tablet, Rfl: 1    glucose blood test strip, Use as instructed to check glucose twice a day Dispense Onetouch ultra 2, Disp: 100 each, Rfl: 3    hydroCHLOROthiazide 25 MG tablet, Take 1 tablet by mouth Daily., Disp: 90 tablet, Rfl: 3    Ingrezza 40 MG capsule, Take 1 capsule by mouth Every Night., Disp: , Rfl:     lamoTRIgine (LaMICtal) 200 MG tablet, Take  1 tablet by mouth Daily., Disp: 30 tablet, Rfl: 1    Lancets misc, Use to check glucose twice a day Dispense Onetouch ultra 2, Disp: 100 each, Rfl: 3    losartan (COZAAR) 25 MG tablet, Take 1 tablet by mouth 2 (Two) Times a Day for 30 days., Disp: 60 tablet, Rfl: 0    omeprazole (priLOSEC) 20 MG capsule, TAKE ONE CAPSULE BY MOUTH DAILY, Disp: 30 capsule, Rfl: 11    pregabalin (Lyrica) 100 MG capsule, Take 1 capsule by mouth 3 (Three) Times a Day., Disp: 90 capsule, Rfl: 5    Semaglutide, 2 MG/DOSE, (Ozempic, 2 MG/DOSE,) 8 MG/3ML solution pen-injector, INJECT 2 MG SUBCUTANEOUSLY ONCE A WEEK, Disp: 3 mL, Rfl: 2    tamsulosin (FLOMAX) 0.4 MG capsule 24 hr capsule, Take 1 capsule by mouth Daily., Disp: 30 capsule, Rfl: 5    amLODIPine (NORVASC) 5 MG tablet, Take 1 tablet by mouth Daily. (Patient not taking: Reported on 3/21/2024), Disp: 30 tablet, Rfl: 2    divalproex (Depakote) 125 MG DR tablet, Take 1 tablet by mouth 2 (Two) Times a Day. (Patient not taking: Reported on 3/21/2024), Disp: 90 tablet, Rfl: 0    eszopiclone (Lunesta) 3 MG tablet, Take 1 tablet by mouth At Night As Needed for Sleep. Take immediately before bedtime.avoid activities that require alertness (Patient not taking: Reported on 3/21/2024), Disp: 30 tablet, Rfl: 0    levothyroxine (SYNTHROID, LEVOTHROID) 50 MCG tablet, Take 1 tablet by mouth Every Morning., Disp: 30 tablet, Rfl: 2    metoprolol succinate XL (TOPROL-XL) 50 MG 24 hr tablet, TAKE ONE TABLET BY MOUTH DAILY (Patient not taking: Reported on 3/21/2024), Disp: 30 tablet, Rfl: 5    rotigotine (Neupro) 1 MG/24HR patch 24 hour 24 hour patch, Place 1 patch on the skin as directed by provider Daily. (Patient not taking: Reported on 3/21/2024), Disp: 30 patch, Rfl: 10    Valbenazine Tosylate (Ingrezza) 80 MG capsule, Take 80 mg by mouth Daily. (Patient not taking: Reported on 3/21/2024), Disp: 7 capsule, Rfl: 0    Current Facility-Administered Medications:     cyanocobalamin injection 1,000 mcg,  "1,000 mcg, Intramuscular, Q28 Days, Zeynep Jenkins APRN, 1,000 mcg at 08/24/23 1435    Objective     Physical Exam:  Vital Signs:   Vitals:    03/21/24 0944   BP: 132/96   Pulse: 64   SpO2: 97%   Weight: 104 kg (230 lb)   Height: 170.2 cm (67.01\")     Body mass index is 36.01 kg/m².       Mental Status Exam:   MENTAL STATUS EXAM   General Appearance:  Obese, other and cleanly groomed and dressed  Other Comment:  Casually dressed  Eye Contact:  Good eye contact  Attitude:  Cooperative  Motor Activity:  Normal gait, posture  Muscle Strength:  Normal  Speech:  Normal rate, tone, volume  Language:  Spontaneous  Mood and affect:  Frustrated and depressed  Hopelessness:  Denies  Thought Process:  Logical and goal-directed  Associations/ Thought Content:  No delusions  Hallucinations:  None  Suicidal Ideations:  Not present  Homicidal Ideation:  Not present  Sensorium:  Alert and clear  Orientation:  Person, place, time and situation  Immediate Recall, Recent, and Remote Memory:  Intact  Attention Span/ Concentration:  Good  Fund of Knowledge:  Appropriate for age and educational level  Intellectual Functioning:  Average range  Insight:  Fair  Judgement:  Fair  Reliability:  Good  Impulse Control:  Fair      AZIZA     ispensed  Strength Quantity Days Supply Provider Pharmacy   03/12/2024 Eszopiclone 3MG 30 each 30 [id number]   03/09/2024 Pregabalin 100MG 90 each 30 [facility]   02/10/2024 Pregabalin 100MG 90 each 30 CHARLA,[facility]IDRA [facility]     The following data was reviewed by: NOE Burnett on 12/28/2023:  CMP          7/29/2023    19:26 9/28/2023    12:15 3/13/2024    07:27   CMP   Glucose 88  83  95    BUN 15  13  18    Creatinine 1.11  1.27  1.19    EGFR 58.5  49.7  53.8    Sodium 144  139  142    Potassium 3.8  4.1  3.9    Chloride 103  99  100    Calcium 9.4  9.6  9.2    Total Protein 7.5  7.3  7.2    Albumin 4.2  4.3  4.2    Globulin 3.3  " 3.0  3.0    Total Bilirubin 0.4  0.4  0.6    Alkaline Phosphatase 94  89  86    AST (SGOT) 21  22  20    ALT (SGPT) 19  18  15    Albumin/Globulin Ratio 1.3  1.4  1.4    BUN/Creatinine Ratio 13.5  10.2  15.1    Anion Gap 12.0  11.1  11.0      CBC w/diff          5/19/2023    11:24 7/29/2023    19:26 9/28/2023    12:15   CBC w/Diff   WBC 7.8  9.17  7.49    RBC 4.75  4.22  4.09    Hemoglobin 14.1  13.0  12.7    Hematocrit 43.6  40.3  37.9    MCV 92  95.5  92.7    MCH 29.7  30.8  31.1    MCHC 32.3  32.3  33.5    RDW 13.0  12.9  12.7    Platelets 246  190  195    Neutrophil Rel % 57  60.5     Immature Granulocyte Rel %  0.2     Lymphocyte Rel % 32  31.0     Monocyte Rel % 7  5.3     Eosinophil Rel % 3  2.6     Basophil Rel % 1  0.4       Lipid Panel          5/19/2023    11:24 9/28/2023    12:15   Lipid Panel   Total Cholesterol  147    Total Cholesterol 177     Triglycerides 170  166    HDL Cholesterol 57  56    VLDL Cholesterol 29  28    LDL Cholesterol  91  63    LDL/HDL Ratio 1.6  1.03      TSH          5/19/2023    11:24 9/28/2023    12:15 3/13/2024    07:27   TSH   TSH 2.100  2.560  4.260      Data reviewed : Cardiology studies 7/29/2023 EKG NSR, rate 67, QTc 401/      Assessment / Plan      Visit Diagnosis/Orders Placed This Visit:  Diagnoses and all orders for this visit:    1. Bipolar 1 disorder (Primary)  -     lamoTRIgine (LaMICtal) 200 MG tablet; Take 1 tablet by mouth Daily.  Dispense: 90 tablet; Refill: 0  -     eszopiclone (Lunesta) 3 MG tablet; Take 1 tablet by mouth At Night As Needed for Sleep. Take immediately before bedtime  Dispense: 30 tablet; Refill: 0  -     ALPRAZolam (Xanax) 0.5 MG tablet; Take 1 tablet by mouth At Night As Needed for Sleep.  Dispense: 10 tablet; Refill: 0    2. Muscle, jerky movements (uncontrolled)    3. Insomnia due to other mental disorder  -     ALPRAZolam (Xanax) 0.5 MG tablet; Take 1 tablet by mouth At Night As Needed for Sleep.  Dispense: 10 tablet; Refill: 0    R/o  functional movement disorder vs TD    Contraception-hysterectomy    -Continue psychotherapy    -Hx gastric sleeve    -DC Depakote- pt not taking    -Restart  Lunesta 1 mg hs prn for insomnia.      -Continue Lamictal 200 mg daily     - Offered to restart Latuda 20 mg; pt to call if she wants to retry    -Continue Ingrezza 40 mg for now     -  Continue Xanax  0.5 mg hs prn # 10. Use as last resort for insomnia     -Sleep Hygiene reviewed     -Supplements: Takes women over 50 MVI, , B6, B12, iron, calcium bid, fish oil     -The benefits  of consuming a healthy diet, minimizing caffeine intake and engaging in  daily exercise were discussed with patient. Patient encouraged to consider lifestyle modification regarding diet and exercise patterns to maximize results of mental health treatment.     Plan: l  Patient  continues to struggle with depression, agitation, insomnia. Stopped Lunesta a and Depakote without consulting provider and is experiencing exacerbation in insomnia. Provider discussed the option for  her to go back on Latuda and continue the Ingrezza. She seems uncertain at this time. Provider advised her to notify the clinic should she want to restart Latuda.        Continue supportive psychotherapy efforts and medications as indicated. Treatment and medication options discussed during today's visit. Patient ackowledged and verbally consented to continue with current treatment plan and was educated on the importance of compliance with treatment and follow-up appointments. Patient seems reasonably able to adhere to treatment plan.      Medication Considerations:  Discussed medication options and treatment plan of prescribed medication(s) as well as the risks, benefits, and potential side effects. Patient is agreeable to call the office with any worsening of symptoms or onset of side effects. Patient is agreeable to call 911 or go to the nearest ER should he/she begin having SI/HI.      Quality Measures:   Never  smoker    I advised Marcela Ramírez of the risks of tobacco use.     Follow Up:   Return in about 4 weeks (around 4/18/2024).      NOE Urbina, PMHNP-BC

## 2024-03-22 ENCOUNTER — OFFICE VISIT (OUTPATIENT)
Dept: BEHAVIORAL HEALTH | Facility: CLINIC | Age: 59
End: 2024-03-22
Payer: COMMERCIAL

## 2024-03-22 DIAGNOSIS — F31.9 BIPOLAR 1 DISORDER: Primary | ICD-10-CM

## 2024-03-22 DIAGNOSIS — R45.82 FEELING WORRIED: ICD-10-CM

## 2024-03-22 NOTE — PROGRESS NOTES
PROGRESS NOTE    Data:    Marcela Ramírez is a 58 y.o. female who met with the undersigned for a scheduled psychotherapy session from 11:15 - 12:10 pm.      Clinical Maneuvering/Intervention:      The pt talked about recent difficulties with bipolar symptoms including mood swings and having problems with medication. Stressors were processed individually and in detail. Venting of frustrations was conducted in order to help the pt feel less tense emotionally and gain insight into issues. Feelings were processed and validated several times in session. Perspective taking was conducted multiple times in order to help the pt feel less stuck, less overwhelmed, and see challenges as much more manageable. Active listening was conducted in order to help the pt make sense of stressors and start moving towards potential solutions. The pt was assisted with finding solutions based on existing skill-set and abilities. She was assisted with decision-making in terms of continuing to work with her psychiatrist, tapping into motivation to do so, and note progress therein. She was assisted in seeing how she is doing well, being courageous, and doing the right thing in terms of meeting psychotropic medication needs at this time. Maladaptive thought patterns were identified, challenged, and evaluated for validity in order to allow for the pt to chose different and more adaptive ways of thinking. An action plan was designed to empower the pt to know what to do. Simple steps of one, two, three were laid out in order to help the pt feel more in control of things and feel less stressed. She will continue to stay on top of all of her health and mental health care appointments, do the self-care actions discussed over time, and continue making a point to connect with G as discussed in sessions as well. Some humor was used in session as it is one of the pt's coping skills. Humor was used too, to help the pt see things as less challenging and  more manageable than they first seemed. Humor was used as well, to model use of the skill as a way to decrease tension ongoing. Homework was assigned tailored to pt's needs. The pt expressed gratitude for today's session.    Mental Status Exam  Hygiene:  good  Dress: normal  Attitude:  cooperative and proactive  Motor Activity: normal  Speech: pressured  Mood:  worried   Affect:  congruent  Thought Processes: normal  Thought Content:  normal  Suicidal Thoughts:  not endorsed  Homicidal Thoughts:  not endorsed  Crisis Safety Plan: not needed  Hallucinations:  none      Patient's Support Network Includes:  family, friends      Progress toward goal: there is evidence to suggest that she struggles with mood swings      Functional Status: moderate to high       Prognosis: good with counseling, medication    Assessment      The pt presented to be struggling with managing her moods related to having bipolar disorder. She seems to be nervous to start a new psychotropic medication, but also motivated to do so. She seems to benefit from venting, having her experiences validated, and being assisted in discovering her own solutions to her problems.       Plan      In order to diminish symptoms of bipolar disorder: the pt is to continue with counseling ongoing, being open/transparent with family ongoing (particularly G), and /start her new medication for bipolar disorder as prescribed (today). She is to make a point to act on needs/self-care activities discussed today (ongoing).    Maribel Hernandez, PhD, LP

## 2024-03-27 ENCOUNTER — TELEPHONE (OUTPATIENT)
Age: 59
End: 2024-03-27
Payer: COMMERCIAL

## 2024-03-27 NOTE — TELEPHONE ENCOUNTER
Reginaldo ([facility]) called and is requesting NOE Urbina to please send in a new Rx for Ingrezza 40 MG capsule.  Please contact this pharmacy at [phone] with any questions.

## 2024-03-28 ENCOUNTER — OFFICE VISIT (OUTPATIENT)
Dept: FAMILY MEDICINE CLINIC | Facility: CLINIC | Age: 59
End: 2024-03-28
Payer: COMMERCIAL

## 2024-03-28 VITALS
WEIGHT: 229 LBS | TEMPERATURE: 96.8 F | RESPIRATION RATE: 16 BRPM | DIASTOLIC BLOOD PRESSURE: 92 MMHG | HEART RATE: 76 BPM | OXYGEN SATURATION: 96 % | BODY MASS INDEX: 35.94 KG/M2 | HEIGHT: 67 IN | SYSTOLIC BLOOD PRESSURE: 148 MMHG

## 2024-03-28 DIAGNOSIS — I10 ESSENTIAL HYPERTENSION: Primary | ICD-10-CM

## 2024-03-28 DIAGNOSIS — E66.01 MORBID OBESITY DUE TO EXCESS CALORIES: ICD-10-CM

## 2024-03-28 DIAGNOSIS — F31.63 BIPOLAR DISORDER, CURRENT EPISODE MIXED, SEVERE, UNSPECIFIED WHETHER PSYCHOTIC FEATURES: ICD-10-CM

## 2024-03-28 DIAGNOSIS — E78.5 HYPERLIPIDEMIA, UNSPECIFIED HYPERLIPIDEMIA TYPE: ICD-10-CM

## 2024-03-28 RX ORDER — ATORVASTATIN CALCIUM 20 MG/1
20 TABLET, FILM COATED ORAL DAILY
Qty: 90 TABLET | Refills: 3 | Status: SHIPPED | OUTPATIENT
Start: 2024-03-28

## 2024-03-28 NOTE — PROGRESS NOTES
Subjective   Marcela Ramírez is a 58 y.o. female      Chief Complaint  Abdominal pain  Hyperlipidemia  Need refills       HPI  The patient is a 58-year-old female who is on Ozempic injections and is having some abdominal pain. She had a colonoscopy 8 years ago. She does have a history of some colon problems. She is due for refill of her Ozempic.    She is going to get Ozempic in [place] at [facility]. She had a gastric sleeve surgery in 2019. According to the doctor, her stomach is still the size it should be, but it is controlling her diabetes. Her A1c is 5.4 on the Ozempic. The Ozempic makes her defecate a lot. She needs to increase her exercise level. She works at [employer] and is on her feet 10 hours a day. She exercises when she can while she is at work. If she eats too much, it starts to build up and before she vomits. She talked to her psychiatrist about going off the Ozempic, but she feels like she is still staying at the same weight. She weighs 230 today. She is on 2 mg of Ozempic. It has not slowed down her cravings or hunger. She knows what she can and cannot eat. She had gotten down to 222.    Her new psychiatrist, Dr. Alejandra Finney, put her on Depakote, but she took herself off of it because it was not working. She started seeing her because the Pristiq was not working anymore. She is still taking Lamictal. Dr. Alejandra Finney took her off Xanax. She can not fall asleep or stay asleep without it. Dr. Mirna Finney does not want to give her narcotics anymore. She was given Xanax 0.5 mg, but only 10 of them to use an emergency. She put her on Latuda for 3 months, which worked. She can read, concentrate, and think everything, but then she started losing control of her arm. Dr. Alejandra Finney thinks she has tardive dyskinesia. She was put on Depakote, but she gained weight and it did not work. She went back to the same old, not concentrate, not being able to read, and not being able to  focus. She was put on Ingrezza to be off the symptoms. She has an appointment with her neurologist next month to talk about the tardive dyskinesia. She restarted Latuda 3 days ago. She was put on Lunesta 3 mg for sleep, but it does not work. She takes a quarter of THC-W. She was put on Abilify and Seroquel. Seroquel gave her tremors. Abilify made her want to kill herself.    She has been having some pain on the left side. She does not have her ovaries or tubes. This has only happened in the last 3 days. She thinks it is muscular. It only happens after she has been up for a long period of time. She had a colonoscopy 8 years ago. They saw a touch of diverticulitis and some polyps, but nothing to worry about.    Her urine is orange. She is not drinking as much water as she should. She has to cut her caffeine intake in half so she could sleep at night. She is drowsy all day long.      The following portions of the patient's history were reviewed and updated as appropriate: allergies, current medications, past social history and problem list.    Review of Systems   Constitutional:  Negative for appetite change, chills, fatigue, fever and unexpected weight change.   Respiratory:  Negative for cough, chest tightness, shortness of breath and wheezing.    Cardiovascular:  Negative for chest pain, palpitations and leg swelling.   Gastrointestinal:  Negative for abdominal pain, diarrhea, nausea and vomiting.   Endocrine: Negative for polydipsia, polyphagia and polyuria.   Genitourinary:  Negative for dysuria, frequency and urgency.   Musculoskeletal:  Negative for arthralgias, back pain and myalgias.   Skin:  Negative for color change and rash.   Neurological:  Negative for dizziness, tremors, syncope, weakness, light-headedness and headaches.   Hematological:  Negative for adenopathy. Does not bruise/bleed easily.   Psychiatric/Behavioral:  Positive for sleep disturbance. Negative for agitation, behavioral problems,  confusion, decreased concentration, dysphoric mood, hallucinations and suicidal ideas. The patient is not nervous/anxious and is not hyperactive.          Objective         Vitals:    03/28/24 1056   BP: 148/92   Pulse: 76   Resp: 16   Temp: 96.8 °F (36 °C)   SpO2: 96%         Physical Exam  Vitals and nursing note reviewed.   Constitutional:       General: She is not in acute distress.     Appearance: Normal appearance. She is well-developed. She is not ill-appearing, toxic-appearing or diaphoretic.   HENT:      Head: Normocephalic.   Eyes:      Conjunctiva/sclera: Conjunctivae normal.      Pupils: Pupils are equal, round, and reactive to light.   Neck:      Thyroid: No thyromegaly.      Vascular: No carotid bruit or JVD.   Cardiovascular:      Rate and Rhythm: Normal rate and regular rhythm.      Pulses: Normal pulses.      Heart sounds: Normal heart sounds. No murmur heard.  Pulmonary:      Effort: Pulmonary effort is normal. No respiratory distress.      Breath sounds: Normal breath sounds.   Abdominal:      General: Bowel sounds are normal.      Palpations: Abdomen is soft.      Tenderness: There is no abdominal tenderness.   Musculoskeletal:      Cervical back: Neck supple.   Lymphadenopathy:      Cervical: No cervical adenopathy.   Skin:     General: Skin is warm and dry.      Findings: No rash.   Neurological:      Mental Status: She is alert and oriented to person, place, and time.      Coordination: Coordination normal.   Psychiatric:         Attention and Perception: She is attentive.         Mood and Affect: Mood normal.         Behavior: Behavior normal.         Thought Content: Thought content normal.         Judgment: Judgment normal.              No results were obtained or interpreted today.      Assessment & Plan     Problems Addressed this Visit          Cardiac and Vasculature    Hyperlipidemia    Relevant Medications    atorvastatin (LIPITOR) 20 MG tablet       Endocrine and Metabolic    Morbid  obesity due to excess calories     Other Visit Diagnoses       Essential hypertension    -  Primary    Bipolar disorder, current episode mixed, severe, unspecified whether psychotic features              Diagnoses         Codes Comments    Essential hypertension    -  Primary ICD-10-CM: I10  ICD-9-CM: 401.9     Hyperlipidemia, unspecified hyperlipidemia type     ICD-10-CM: E78.5  ICD-9-CM: 272.4     Bipolar disorder, current episode mixed, severe, unspecified whether psychotic features     ICD-10-CM: F31.63  ICD-9-CM: 296.63     Morbid obesity due to excess calories     ICD-10-CM: E66.01  ICD-9-CM: 278.01           Plan    Refill medications that I prescribe as needed.  Defer mental health prescriptions to her psychiatrist    I spent 35 minutes in patient care: Reviewing records prior to the visit, examining the patient, entering orders and documentation    Part of this note may be an electronic transcription/translation of spoken language to printed text using the Dragon Dictation System.     Scribed for R Chris Acosta MD by Sanjay Jeter 3/30/2024  10:41 EDT

## 2024-03-29 DIAGNOSIS — E11.42 DIABETIC PERIPHERAL NEUROPATHY: ICD-10-CM

## 2024-03-29 DIAGNOSIS — R39.11 URINARY HESITANCY: ICD-10-CM

## 2024-03-29 RX ORDER — TAMSULOSIN HYDROCHLORIDE 0.4 MG/1
1 CAPSULE ORAL DAILY
Qty: 30 CAPSULE | Refills: 5 | Status: SHIPPED | OUTPATIENT
Start: 2024-03-29

## 2024-03-29 RX ORDER — LURASIDONE HYDROCHLORIDE 40 MG/1
40 TABLET, FILM COATED ORAL DAILY
Qty: 30 TABLET | Refills: 0 | Status: SHIPPED | OUTPATIENT
Start: 2024-03-29

## 2024-03-29 RX ORDER — PREGABALIN 100 MG/1
100 CAPSULE ORAL 3 TIMES DAILY
Qty: 90 CAPSULE | Refills: 5 | Status: SHIPPED | OUTPATIENT
Start: 2024-03-29 | End: 2025-03-29

## 2024-03-29 RX ORDER — VALBENAZINE 40 MG/1
40 CAPSULE ORAL DAILY
Qty: 30 CAPSULE | Refills: 0 | Status: SHIPPED | OUTPATIENT
Start: 2024-03-29

## 2024-03-29 NOTE — TELEPHONE ENCOUNTER
Rx Refill Note  Requested Prescriptions     Pending Prescriptions Disp Refills    tamsulosin (FLOMAX) 0.4 MG capsule 24 hr capsule 30 capsule 5     Sig: Take 1 capsule by mouth Daily.      Last office visit with prescribing clinician: 7/13/2023   Next office visit with prescribing clinician: Visit date not found       Chelsy Orozco MA  03/29/24, 12:11 EDT

## 2024-03-29 NOTE — TELEPHONE ENCOUNTER
Rx Refill Note  Requested Prescriptions     Pending Prescriptions Disp Refills    pregabalin (Lyrica) 100 MG capsule 90 capsule 5     Sig: Take 1 capsule by mouth 3 (Three) Times a Day.      Last filled:  Last office visit with prescribing clinician: 11/30/2023      Next office visit with prescribing clinician: 4/23/2024     Renetta Ross MA  03/29/24, 13:35 EDT

## 2024-04-05 ENCOUNTER — TELEPHONE (OUTPATIENT)
Age: 59
End: 2024-04-05

## 2024-04-05 DIAGNOSIS — F31.9 BIPOLAR 1 DISORDER: ICD-10-CM

## 2024-04-05 RX ORDER — ESZOPICLONE 3 MG/1
3 TABLET, FILM COATED ORAL NIGHTLY PRN
Qty: 30 TABLET | Refills: 0 | Status: CANCELLED | OUTPATIENT
Start: 2024-04-05 | End: 2025-04-05

## 2024-04-08 RX ORDER — LANCETS 30 GAUGE
EACH MISCELLANEOUS
Qty: 100 EACH | Refills: 3 | Status: SHIPPED | OUTPATIENT
Start: 2024-04-08

## 2024-04-23 ENCOUNTER — OFFICE VISIT (OUTPATIENT)
Dept: NEUROLOGY | Facility: CLINIC | Age: 59
End: 2024-04-23
Payer: COMMERCIAL

## 2024-04-23 VITALS
WEIGHT: 220 LBS | DIASTOLIC BLOOD PRESSURE: 90 MMHG | HEART RATE: 80 BPM | SYSTOLIC BLOOD PRESSURE: 134 MMHG | OXYGEN SATURATION: 97 % | BODY MASS INDEX: 34.53 KG/M2 | HEIGHT: 67 IN

## 2024-04-23 DIAGNOSIS — E11.42 DIABETIC PERIPHERAL NEUROPATHY: Primary | ICD-10-CM

## 2024-04-23 DIAGNOSIS — G56.03 BILATERAL CARPAL TUNNEL SYNDROME: ICD-10-CM

## 2024-04-23 DIAGNOSIS — G25.81 RLS (RESTLESS LEGS SYNDROME): ICD-10-CM

## 2024-04-23 PROCEDURE — 99214 OFFICE O/P EST MOD 30 MIN: CPT | Performed by: PSYCHIATRY & NEUROLOGY

## 2024-04-23 RX ORDER — PREGABALIN 100 MG/1
100 CAPSULE ORAL 3 TIMES DAILY
Qty: 90 CAPSULE | Refills: 5 | Status: SHIPPED | OUTPATIENT
Start: 2024-04-23 | End: 2025-04-23

## 2024-04-23 RX ORDER — BACLOFEN 10 MG/1
TABLET ORAL
Qty: 30 TABLET | Refills: 6 | Status: SHIPPED | OUTPATIENT
Start: 2024-04-23 | End: 2024-04-23 | Stop reason: SDUPTHER

## 2024-04-23 RX ORDER — BACLOFEN 10 MG/1
TABLET ORAL
Qty: 30 TABLET | Refills: 6 | Status: SHIPPED | OUTPATIENT
Start: 2024-04-23

## 2024-04-23 RX ORDER — LOSARTAN POTASSIUM 25 MG/1
25 TABLET ORAL 2 TIMES DAILY
Qty: 60 TABLET | Refills: 0 | Status: SHIPPED | OUTPATIENT
Start: 2024-04-23 | End: 2025-04-23

## 2024-04-23 NOTE — PROGRESS NOTES
Subjective:    CC: Marcela Ramírez is seen today for Diabetic peripheral neuropathy       Current visit-she states that her neuropathy is worse as she has pain and numbness up to her knees.  Cannot stand anybody touching her legs as they hurt so bad.  Her symptoms of further worsened by standing on concrete for several hours as she works at [employer].  By mid afternoon the bottoms of her feet started to cramp up.  At times they cramp at night as well preventing her from sleeping.  She continues to take Lyrica 100 mg 3 times daily.  Her diabetes is well-controlled.  She stopped taking the Neupro patch as her restless leg symptoms were better.  With regards to her carpal tunnel she has not been wearing her braces as she cannot sleep with them at night and also cannot work with them during the daytime.  She has also stopped taking Latuda for her bipolar disorder as it was causing abnormal movements.  Continues to be on Lamictal 200 mg daily.  Was also started on Ingrezza for possible tar dive dyskinesia.     Last visit-patient states that the pain in her feet and her RLS symptoms have improved since increasing Lyrica to 100 mg 3 times daily however she still gets pain at night or while sitting down specially in her right foot.  She does not have to use the Neupro patch regularly anymore.  Her diabetes is also much better controlled as she was started on Ozempic.  Her last A1c was 5.4 and LDL was 63.  She has also been started on Latuda recently for her bipolar disorder which she takes in addition to Xanax 1 mg nightly that helps her sleep and also helps with her neuropathy symptoms.  For her carpal tunnel she does not wear wrist braces anymore as her wrists have stopped hurting even though she is constantly working with boxes (works at [employer]).    Initial cmnng-8-lie[age]old female with a history of hypertension, bipolar disorder, hyperlipidemia, diabetes mellitus type 2, obesity status post weight loss surgery,  hypothyroidism presents with symptoms of neuropathy/RLS.  As per patient she started having burning pain in her feet several years ago.  It has worsened over time.  She previously tried gabapentin and is currently on Lyrica 75 mg 3 times daily which helps some however she is still getting severe pain in her feet more so on on the right as well as cramping of the feet.  She cannot withstand the air or the sheets touching her feet.  She also has some numbness.  She states that her symptoms can worsen with prolonged standing at work as she has to wear safety shoes (works at [employer]).  Her last A1c was fairly well controlled at 6.2.  TSH was normal.  She has been started on Wegovy recently for weight loss.  She also reports to having abnormal sensations in her legs.  Was diagnosed with RLS and initially started on Requip which along with Lyrica caused side effects.  Was subsequently started on Neupro patch 1 mg daily which is helping.  Last ferritin level was normal at 88.  She had an EMG/NCS in 2018 that showed moderate sensorimotor axonal/demyelinating neuropathy with moderate bilateral carpal tunnel and mild ulnar neuropathy bilaterally.  She does not wear wrist braces.  Of note-I reviewed Gladys's notes as follows-    57 y.o. female with a history of DM who comes to clinic today for evaluation of neuropathy. She initially noted symptoms in 2015 marked by numbness, paresthesias, and shooting pain in her upper and lower extremities bilaterally. This has worsened over time. She notes associated balance impairment as well as decreased  strength, though denies any clear associated weakness. Her symptoms are worse with increased activity. She is currently taking Lyrica, which is somewhat beneficial. She has previously taken GBP.     She also notes RLS symptoms in her feet primarily at night. She is currently taking neupro 1mg daily. She previously tried ropinirole.      Prior evaluation has included an EMG of the  upper and lower extremities bilaterally which was notable for a moderate axonal and demyelinating peripheral neuropathy, moderate CTS bilaterally, mild-moderate ulnar neuropathy on the left and mild ulnar neuropathy on the right. Screening bloodwork was notable for a hemoglobin A1C of 7.3.     Today: Since her last visit in 2/22, she notes that her numbness, paresthesias and shooting pains have worsened and now radiate up to her thighs bilaterally. Lyrica 75mg TID has been beneficial. She has not consumed alcohol in approximately 9 months.    The following portions of the patient's history were reviewed today and updated as of 06/08/2023  : allergies, current medications, past family history, past medical history, past social history, past surgical history, and problem list  These document will be scanned to patient's chart.      Current Outpatient Medications:     ALPRAZolam (Xanax) 0.5 MG tablet, Take 1 tablet by mouth At Night As Needed for Sleep., Disp: 10 tablet, Rfl: 0    atorvastatin (LIPITOR) 20 MG tablet, Take 1 tablet by mouth Daily., Disp: 90 tablet, Rfl: 3    baclofen (LIORESAL) 10 MG tablet, Take 1/2 tablet twice a day, Disp: 30 tablet, Rfl: 6    eszopiclone (Lunesta) 3 MG tablet, Take 1 tablet by mouth At Night As Needed for Sleep. Take immediately before bedtime, Disp: 30 tablet, Rfl: 0    glucose blood test strip, Use as instructed to check glucose twice a day Dispense Onetouch ultra 2, Disp: 100 each, Rfl: 3    hydroCHLOROthiazide 25 MG tablet, Take 1 tablet by mouth Daily., Disp: 90 tablet, Rfl: 3    lamoTRIgine (LaMICtal) 200 MG tablet, Take 1 tablet by mouth Daily., Disp: 90 tablet, Rfl: 0    Lancets misc, Use to check glucose twice a day Dispense Onetouch ultra 2, Disp: 100 each, Rfl: 3    lurasidone (Latuda) 40 MG tablet tablet, Take 1 tablet by mouth Daily., Disp: 30 tablet, Rfl: 0    omeprazole (priLOSEC) 20 MG capsule, TAKE ONE CAPSULE BY MOUTH DAILY, Disp: 30 capsule, Rfl: 11    pregabalin  (Lyrica) 100 MG capsule, Take 1 capsule by mouth 3 (Three) Times a Day., Disp: 90 capsule, Rfl: 5    Semaglutide, 2 MG/DOSE, (Ozempic, 2 MG/DOSE,) 8 MG/3ML solution pen-injector, INJECT 2 MG SUBCUTANEOUSLY ONCE A WEEK, Disp: 3 mL, Rfl: 2    tamsulosin (FLOMAX) 0.4 MG capsule 24 hr capsule, Take 1 capsule by mouth Daily., Disp: 30 capsule, Rfl: 5    Valbenazine Tosylate (Ingrezza) 40 MG capsule, Take 1 capsule by mouth Daily., Disp: 30 capsule, Rfl: 0    losartan (Cozaar) 25 MG tablet, Take 1 tablet by mouth 2 (Two) Times a Day., Disp: 60 tablet, Rfl: 0    Current Facility-Administered Medications:     cyanocobalamin injection 1,000 mcg, 1,000 mcg, Intramuscular, Q28 Days, Zeynep Jenkins, APRN, 1,000 mcg at 08/24/23 1435   Past Medical History:   Diagnosis Date    Arthritis     knees, otc arthritis meds prn, no h/o injections.     Asthma     has not required inhaler    Bilateral ovarian cysts     Bipolar 1 disorder     Boil     opens them herself    Breast injury 07/13/2021    bruise on lateral rt breast from fall    Carpal tunnel syndrome     Chronic pain disorder     CKD (chronic kidney disease), symptom management only, stage 3 (moderate)     Creatinine 1.04 GFR 56    Colon polyp     Depression     Diabetes mellitus     Diagnosed 2017, was on metformin in the past. Last A1C 5.5%    Diverticulosis     Fatigue     GERD (gastroesophageal reflux disease)     controlled with omeprazole 20mg. Remote EGD- esophagitis.  EGD no hiatal hernia Z line 40 cm very thick mucosal folds cannot rule out varices.  CT scan thickened fundus, no varices seen    Headache     Headache, tension-type [id number]    History of bladder infections     History of blood transfusion 2006    2/2 heavy menses    History of blood transfusion     History of bronchitis     HL (hearing loss) [id number]    Hyperlipidemia     Hypertension     Hypothyroidism     Lower extremity edema     Memory loss 1994    Migraine [id number]    Morbid obesity with BMI of  40.0-44.9, adult     Neuropathy in diabetes     Not sure    Peripheral neuropathy     2/2 DM    RLS (restless legs syndrome)     S/P spenser     S/P cholecystectomy     2/2 cholelithiasis    Shortness of breath on exertion     Thyroid disease     TIA (transient ischemic attack)     patient states 8-9 episodes of right sided drooping/ weakness/ vision changes. Workup was negative for CVA- thought to be related to anxiety. last episode 12/2017      Past Surgical History:   Procedure Laterality Date    COLONOSCOPY  remote    diverticulosis    ENDOMETRIAL ABLATION      ENDOSCOPY  remote    esophagitis     ENDOSCOPY N/A 08/22/2019    Procedure: ESOPHAGOGASTRODUODENOSCOPY;  Surgeon: Guido Greenberg MD;  Location:  [facility];  Service: Bariatric    GASTRIC SLEEVE LAPAROSCOPIC N/A 08/22/2019    Procedure: GASTRIC SLEEVE LAPAROSCOPIC;  Surgeon: Guido Greenberg MD;  Location:  [facility];  Service: Bariatric    KNEE ACL RECONSTRUCTION Right 2013    with miniscal repair    LAPAROSCOPIC CHOLECYSTECTOMY  2001    cholelithiasis    LAPAROSCOPIC HYSTERECTOMY  2013    right ovary remains    PARAESOPHAGEAL HERNIA REPAIR N/A 08/22/2019    Procedure: PARAESOPHAGEAL HERNIA REPAIR LAPAROSCOPIC;  Surgeon: Guido Greenberg MD;  Location:  [facility];  Service: Bariatric    SINUS SURGERY      TUBAL ABDOMINAL LIGATION        Family History   Problem Relation Age of Onset    Arthritis Mother     Arthritis Father     Diabetes Father     Hyperlipidemia Father     Hypertension Father     Neuropathy Father     Arthritis Sister     Diabetes Brother     Hypertension Brother     Asthma Brother     Other Brother     Obesity Brother     Obesity Daughter     Hypertension Daughter     Depression Daughter     Cancer Maternal Aunt     Depression Paternal Uncle     Cancer Paternal Grandmother     Heart disease Paternal Grandfather     Hypertension Paternal Grandfather     Breast cancer Neg Hx     Ovarian cancer Neg Hx       Social History  "    Socioeconomic History    Marital status:    Tobacco Use    Smoking status: Never     Passive exposure: Never    Smokeless tobacco: Never   Vaping Use    Vaping status: Never Used   Substance and Sexual Activity    Alcohol use: Not Currently     Comment: Over a year ago. Before then, not often    Drug use: Not Currently     Types: Cocaine(coke), LSD, Marijuana, Psilocybin     Comment: At least 12 months or more for most listed    Sexual activity: Yes     Partners: Male     Birth control/protection: Hysterectomy, Same-sex partner, Surgical     Comment: no hormones     Review of Systems   Neurological:  Positive for numbness.   All other systems reviewed and are negative.      Objective:    /90   Pulse 80   Ht 170.2 cm (67\")   Wt 99.8 kg (220 lb)   LMP  (LMP Unknown)   SpO2 97%   BMI 34.46 kg/m²     Neurology Exam:    General apperance: Obese    Mental status: Alert, awake and oriented to time place and person.    Recent and Remote memory: Intact.    Attention span and Concentration: Normal.     Language and Speech: Intact- No dysarthria.    Fluency, Naming , Repitition and Comprehension:  Intact    Cranial Nerves:   CN II: Visual fields are full. Intact. Fundi - Normal, No papillederma, Pupils - LEV  CN III, IV and VI: Extraocular movements are intact. Normal saccades.   CN V: Facial sensation is intact.   CN VII: Muscles of facial expression reveal no asymmetry. Intact.   CN VIII: Hearing is intact. Whispered voice intact.   CN IX and X: Palate elevates symmetrically. Intact  CN XI: Shoulder shrug is intact.   CN XII: Tongue is midline without evidence of atrophy or fasciculation.     Ophthalmoscopic exam of optic disc-normal    Motor:  Right UE muscle strength 5/5. Normal tone.     Left UE muscle strength 5/5. Normal tone.      Right LE muscle strength5/5. Normal tone.     Left LE muscle strength 5/5. Normal tone.      Sensory: Reduced to pinprick in the first 3 fingers of both hands as " well as in the feet up to level of ankle with markedly reduced vibration/temperature sensation    DTRs: 2+ bilaterally in upper and lower extremities.    Babinski: Negative bilaterally.    Co-ordination: Normal finger-to-nose, heel to shin B/L.    Rhomberg: Negative.    Gait: Normal.  Could do tandem walking    Cardiovascular: Regular rate and rhythm without murmur, gallop or rub.    Assessment and Plan:  1. Diabetic peripheral neuropathy  Last A1c was 5.4  She can continue Lyrica  100 mg 3 times daily.  She is reluctant to increase the dose  I will start her on baclofen 5 mg twice daily for the cramping in her feet  She will let me know if she needs a letter for work stating that she should be put on light duty work so that she gets to sit down during the day    - pregabalin (Lyrica) 100 MG capsule; Take 1 capsule by mouth 3 (Three) Times a Day.  Dispense: 90 capsule; Refill: 5    2. RLS (restless legs syndrome)  Previously tried Requip which caused side effects  She has also stopped the Neupro patch as her symptoms had improved    3. Bilateral carpal tunnel syndrome  EMG/NCS showed bilateral moderate carpal tunnel.  She should try to wear wrist braces during the day if possible    Of note-for her bipolar disorder I have told her to stop Ingrezza as I see no tardive dyskinesia movements today.  She could speak to her psychiatrist about increasing the dose of lamotrigine gradually to 200 mg twice daily    Return in about 6 months (around 10/23/2024).     I spent over 25 minutes with the patient face to face out of which over 50% (20 minutes) was spent in management, instructions and education.     Katelin Landis MD

## 2024-04-25 ENCOUNTER — OFFICE VISIT (OUTPATIENT)
Age: 59
End: 2024-04-25
Payer: COMMERCIAL

## 2024-04-25 ENCOUNTER — TELEPHONE (OUTPATIENT)
Dept: NEUROLOGY | Facility: CLINIC | Age: 59
End: 2024-04-25
Payer: COMMERCIAL

## 2024-04-25 ENCOUNTER — TELEPHONE (OUTPATIENT)
Age: 59
End: 2024-04-25

## 2024-04-25 VITALS
WEIGHT: 219.9 LBS | OXYGEN SATURATION: 97 % | BODY MASS INDEX: 34.51 KG/M2 | SYSTOLIC BLOOD PRESSURE: 132 MMHG | HEIGHT: 67 IN | HEART RATE: 70 BPM | DIASTOLIC BLOOD PRESSURE: 84 MMHG

## 2024-04-25 DIAGNOSIS — F31.9 BIPOLAR 1 DISORDER: Primary | ICD-10-CM

## 2024-04-25 DIAGNOSIS — F51.05 INSOMNIA DUE TO OTHER MENTAL DISORDER: ICD-10-CM

## 2024-04-25 DIAGNOSIS — F99 INSOMNIA DUE TO OTHER MENTAL DISORDER: ICD-10-CM

## 2024-04-25 RX ORDER — LAMOTRIGINE 100 MG/1
TABLET ORAL
Qty: 83 TABLET | Refills: 0 | Status: SHIPPED | OUTPATIENT
Start: 2024-04-25 | End: 2024-06-24

## 2024-04-25 RX ORDER — ESZOPICLONE 3 MG/1
3 TABLET, FILM COATED ORAL NIGHTLY PRN
Qty: 60 TABLET | Refills: 0 | Status: SHIPPED | OUTPATIENT
Start: 2024-05-08 | End: 2025-05-08

## 2024-04-25 NOTE — TELEPHONE ENCOUNTER
Provider: ISIDRA DUNHAM MD    Caller: SCOTT    Relationship to Patient: DENNY    Phone Number: [phone]    Reason for Call: CALLING REGARDING THE BACLOFEN.  STATED THAT [facility] IS CONFUSED ABOUT THE ORDER.   STATED THEY NEED CLARIFICATION ON THE INSTRUCTION OF HOW PT IS TO TAKE THE MEDICATION.      When was the patient last seen: 4-23-24    PLEASE CALL & ADVISE
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
## 2024-04-25 NOTE — TELEPHONE ENCOUNTER
LVM with [facility] on physician line. Per OV notes, correct dose is 5mg twice daily.     SERGEI Thurman

## 2024-04-26 ENCOUNTER — OFFICE VISIT (OUTPATIENT)
Dept: BEHAVIORAL HEALTH | Facility: CLINIC | Age: 59
End: 2024-04-26
Payer: COMMERCIAL

## 2024-04-26 DIAGNOSIS — F31.9 BIPOLAR 1 DISORDER: Primary | ICD-10-CM

## 2024-04-26 DIAGNOSIS — R45.86 MOOD SWINGS: ICD-10-CM

## 2024-04-26 DIAGNOSIS — Z63.8 FAMILY CONFLICT: ICD-10-CM

## 2024-04-26 SDOH — SOCIAL STABILITY - SOCIAL INSECURITY: OTHER SPECIFIED PROBLEMS RELATED TO PRIMARY SUPPORT GROUP: Z63.8

## 2024-04-26 NOTE — PROGRESS NOTES
PROGRESS NOTE    Data:    Marcela Ramírez is a 59 y.o. female who met with the undersigned for a scheduled psychotherapy session from 11:15 - 12:10 pm.      Clinical Maneuvering/Intervention:      The pt talked about recent difficulties with bipolar symptoms, getting on the right medication for bipolar disorder, arguing with G, and having concerns about her insurance running out. Stressors were processed individually and in detail. Venting of frustrations was conducted in order to help the pt feel less tense emotionally and gain insight into issues. Feelings were processed and validated several times in session. Perspective taking was conducted multiple times in order to help the pt feel less stuck, less overwhelmed, and see challenges as much more manageable. Active listening was conducted in order to help the pt make sense of stressors and start moving towards potential solutions. The pt was assisted with finding solutions based on existing skill-set and abilities. She was assisted in identifying plans of action in terms of dealing with her stressors. An action plan was designed to empower the pt to know what to do. Simple steps of one, two, three were laid out in order to help the pt feel more in control of things and feel less stressed. She was assisted with making sense of her stressors, but also where she is headed in terms of resolving them. Time was allocated specifically to assess what is 'working' in the pt's life, versus what is 'not working,' (if anything) in terms of helping to improve mood and quality of life. The pt's strengths were identified in order to help identify abilities to use to better face/overcome challenges. Some humor was used in session as it is one of the pt's coping skills. Humor was used too, to help the pt see things as less challenging and more manageable than they first seemed. Humor was used as well, to model use of the skill as a way to decrease tension ongoing. Homework was  assigned tailored to pt's needs. The pt expressed gratitude for today's session.    Mental Status Exam  Hygiene:  good  Dress: normal  Attitude:  cooperative and proactive  Motor Activity: normal  Speech: pressured  Mood:  tense  Affect:  congruent  Thought Processes: normal  Thought Content:  normal  Suicidal Thoughts:  not endorsed  Homicidal Thoughts:  not endorsed  Crisis Safety Plan: not needed  Hallucinations:  none      Patient's Support Network Includes:  family, friends      Progress toward goal: there is evidence to suggest that she struggles with mood swings      Functional Status: moderate to high       Prognosis: good with counseling, medication    Assessment      The pt presented to be struggling with managing her moods related to having bipolar disorder. She seems to be nervous to start a new psychotropic medication, but also motivated to do so. She seems to benefit from venting, having her experiences validated, and being assisted in discovering her own solutions to her problems.       Plan      In order to diminish symptoms of bipolar disorder: the pt is to continue with counseling ongoing, being open/transparent with family ongoing (particularly G), and /start her new medication for bipolar disorder as prescribed (today). She is to make a point to note her progress in terms of managing stress (this week).    Maribel Hernandez, PhD, LP

## 2024-05-21 ENCOUNTER — TELEPHONE (OUTPATIENT)
Age: 59
End: 2024-05-21
Payer: COMMERCIAL

## 2024-05-21 DIAGNOSIS — F99 INSOMNIA DUE TO OTHER MENTAL DISORDER: ICD-10-CM

## 2024-05-21 DIAGNOSIS — F31.9 BIPOLAR 1 DISORDER: ICD-10-CM

## 2024-05-21 DIAGNOSIS — F51.05 INSOMNIA DUE TO OTHER MENTAL DISORDER: ICD-10-CM

## 2024-05-21 RX ORDER — ESZOPICLONE 3 MG/1
3 TABLET, FILM COATED ORAL NIGHTLY PRN
Qty: 30 TABLET | Refills: 0 | Status: SHIPPED | OUTPATIENT
Start: 2024-05-21 | End: 2025-05-21

## 2024-05-21 RX ORDER — LOSARTAN POTASSIUM 25 MG/1
25 TABLET ORAL 2 TIMES DAILY
Qty: 60 TABLET | Refills: 3 | OUTPATIENT
Start: 2024-05-21

## 2024-05-21 RX ORDER — LOSARTAN POTASSIUM 25 MG/1
25 TABLET ORAL 2 TIMES DAILY
Qty: 60 TABLET | Refills: 3 | Status: SHIPPED | OUTPATIENT
Start: 2024-05-21 | End: 2025-05-21

## 2024-05-21 NOTE — TELEPHONE ENCOUNTER
Rx Refill Note  Requested Prescriptions     Pending Prescriptions Disp Refills    losartan (COZAAR) 25 MG tablet [Pharmacy Med Name: LOSARTAN POTASSIUM 25 MG TAB] 60 tablet 0     Sig: TAKE 1 TABLET BY MOUTH 2 TIMES A DAY      Last filled:4/23/24 0 refill  Last office visit with prescribing clinician: 4/23/2024      Next office visit with prescribing clinician: 8/29/2024     RAFA ARROYO  05/21/24, 11:03 EDT    Denied- has new pharmacy per patient

## 2024-05-21 NOTE — TELEPHONE ENCOUNTER
Rx Refill Note  Requested Prescriptions     Pending Prescriptions Disp Refills    losartan (Cozaar) 25 MG tablet 60 tablet 0     Sig: Take 1 tablet by mouth 2 (Two) Times a Day.      Last filled:4/23/24 0 refill  Last office visit with prescribing clinician: 4/23/2024      Next office visit with prescribing clinician: 8/29/2024     RAFA ARROYO  05/21/24, 11:27 EDT    Sending to new pharmacy per patient's request

## 2024-05-21 NOTE — TELEPHONE ENCOUNTER
Patient is needing a refill on her Lunesta, she will be out after tonight. She uses [facility] on [address]

## 2024-05-24 ENCOUNTER — OFFICE VISIT (OUTPATIENT)
Dept: BEHAVIORAL HEALTH | Facility: CLINIC | Age: 59
End: 2024-05-24
Payer: COMMERCIAL

## 2024-05-24 DIAGNOSIS — R45.86 MOOD SWINGS: ICD-10-CM

## 2024-05-24 DIAGNOSIS — F31.9 BIPOLAR 1 DISORDER: Primary | ICD-10-CM

## 2024-05-24 DIAGNOSIS — Z63.9 RELATIONSHIP PROBLEMS: ICD-10-CM

## 2024-05-24 DIAGNOSIS — Z59.9 FINANCIAL PROBLEMS: ICD-10-CM

## 2024-05-24 PROCEDURE — 90834 PSYTX W PT 45 MINUTES: CPT | Performed by: PSYCHOLOGIST

## 2024-05-24 SDOH — SOCIAL STABILITY - SOCIAL INSECURITY: PROBLEM RELATED TO PRIMARY SUPPORT GROUP, UNSPECIFIED: Z63.9

## 2024-05-24 SDOH — ECONOMIC STABILITY - INCOME SECURITY: PROBLEM RELATED TO HOUSING AND ECONOMIC CIRCUMSTANCES, UNSPECIFIED: Z59.9

## 2024-05-27 NOTE — PROGRESS NOTES
PROGRESS NOTE    Data:    Marcela Ramírez is a 59 y.o. female who met with the undersigned for a scheduled psychotherapy session from 1:30 - 2:15 pm.      Clinical Maneuvering/Intervention:      The pt talked about recent difficulties with bipolar symptoms, getting on the right medication for bipolar disorder, arguing with G, and having financial stress. Stressors were processed individually and in detail. Venting of frustrations was conducted in order to help the pt feel less tense emotionally and gain insight into issues. Feelings were processed and validated several times in session. Perspective taking was conducted multiple times in order to help the pt feel less stuck, less overwhelmed, and see challenges as much more manageable. Active listening was conducted in order to help the pt make sense of stressors and start moving towards potential solutions. The pt was assisted with finding solutions based on existing skill-set and abilities. She was assisted with working through stressors once at a time. The pt's strengths were identified in order to help identify abilities to use to better face/overcome challenges. She is articulate, uses resources to meet her needs, and is transparent with loved ones/supportive people in her life. Such strengths were used and applied to resolving problems with mood, G, and finances. She was encouraged to talk about how she will continue working with her psychiatrist and how she can see that her perseverance with care will eventually pay off. She was assisted with seeing how she can get back to working more hours as can G, so that they can 'get back on top,' of their finances. The pt was assisted with putting feelings into words several times in session in order to both help diminish emotional tension and to highlight direction for change. Some humor was used in session as it is one of the pt's coping skills. Humor was used too, to help the pt see things as less challenging and  more manageable than they first seemed. Humor was used as well, to model use of the skill as a way to decrease tension ongoing. Homework was assigned tailored to pt's needs. The pt expressed gratitude for today's session.     Mental Status Exam  Hygiene:  good  Dress: normal  Attitude:  cooperative and proactive  Motor Activity: normal  Speech: pressured  Mood:  tense  Affect:  congruent  Thought Processes: normal  Thought Content:  normal  Suicidal Thoughts:  not endorsed  Homicidal Thoughts:  not endorsed  Crisis Safety Plan: not needed  Hallucinations:  none      Patient's Support Network Includes:  family, friends      Progress toward goal: there is evidence to suggest that she struggles with mood swings      Functional Status: moderate to high       Prognosis: good with counseling, medication    Assessment      The pt presented to be struggling with managing her moods related to having bipolar disorder. Relationship problems with G and financial problems seem to exacerbate emotional distress. She seems to benefit from venting, having her experiences validated, and being assisted in discovering her own solutions to her problems.       Plan      In order to diminish symptoms of bipolar disorder and relationship problems with G: the pt is to continue with counseling ongoing, being open/transparent with family ongoing (particularly G), and continue with psychotropic medication as prescribed (ongoing). She is to get back to working full-time/adding hours as soon as possible in order to get caught up with bills and other financial expenses.    Maribel Hernandez, PhD, LP

## 2024-05-28 RX ORDER — ATORVASTATIN CALCIUM 20 MG/1
20 TABLET, FILM COATED ORAL DAILY
Qty: 90 TABLET | Refills: 3 | Status: SHIPPED | OUTPATIENT
Start: 2024-05-28

## 2024-05-29 RX ORDER — ROTIGOTINE 1 MG/24H
1 PATCH, EXTENDED RELEASE TRANSDERMAL DAILY
Qty: 30 PATCH | Refills: 10 | OUTPATIENT
Start: 2024-05-29

## 2024-05-29 NOTE — TELEPHONE ENCOUNTER
Rx Refill Note  Requested Prescriptions     Pending Prescriptions Disp Refills    Neupro 1 MG/24HR patch 24 hour 24 hour patch [Pharmacy Med Name: NEUPRO 1 MG/24 HR PATCH] 30 patch 10     Sig: PLACE ONE PATCH ON THE SKIN AS DIRECTED BY PROVIDER DAILY      Last filled:  Last office visit with prescribing clinician: 4/23/2024      Next office visit with prescribing clinician: 8/29/2024     Renetta Ross MA  05/29/24, 15:53 EDT  Denied, patient no longer taking.

## 2024-05-30 RX ORDER — LOSARTAN POTASSIUM 25 MG/1
25 TABLET ORAL 2 TIMES DAILY
Qty: 60 TABLET | Refills: 3 | OUTPATIENT
Start: 2024-05-30

## 2024-05-30 NOTE — TELEPHONE ENCOUNTER
Rx Refill Note  Requested Prescriptions     Pending Prescriptions Disp Refills    losartan (COZAAR) 25 MG tablet [Pharmacy Med Name: LOSARTAN POTASSIUM 25 MG TAB] 60 tablet 3     Sig: TAKE 1 TABLET BY MOUTH 2 TIMES A DAY      Last filled:5/21/24 3 refills  Last office visit with prescribing clinician: 4/23/2024      Next office visit with prescribing clinician: 8/29/2024     RAFA ARROYO  05/30/24, 08:38 EDT      Denied-duplicate

## 2024-05-31 RX ORDER — LOSARTAN POTASSIUM 25 MG/1
25 TABLET ORAL 2 TIMES DAILY
Qty: 60 TABLET | Refills: 3 | OUTPATIENT
Start: 2024-05-31 | End: 2025-05-31

## 2024-05-31 NOTE — TELEPHONE ENCOUNTER
Rx Refill Note  Requested Prescriptions     Pending Prescriptions Disp Refills    losartan (Cozaar) 25 MG tablet 60 tablet 3     Sig: Take 1 tablet by mouth 2 (Two) Times a Day.      Last filled:5/21/24 3 refill  Last office visit with prescribing clinician: 4/23/2024      Next office visit with prescribing clinician: 8/29/2024     RAFA ARROYO  05/31/24, 07:59 EDT    Duplicate request-filled 5/21/24 3 refills

## 2024-06-05 RX ORDER — LOSARTAN POTASSIUM 25 MG/1
25 TABLET ORAL 2 TIMES DAILY
Qty: 60 TABLET | Refills: 3 | OUTPATIENT
Start: 2024-06-05 | End: 2025-06-05

## 2024-06-05 NOTE — TELEPHONE ENCOUNTER
Spoke with Erika at pharmacy and she stated that Helen has 1 refill left and I asked if she would please get that ready for her as she has been requesting refill.  She stated she would.  I tried calling Helen to inform her of refills on file but there was no answer or voice mail available.  Will send my chart message.    Denied request due to refills on file.

## 2024-06-05 NOTE — TELEPHONE ENCOUNTER
Rx Refill Note  Requested Prescriptions     Pending Prescriptions Disp Refills    losartan (Cozaar) 25 MG tablet 60 tablet 3     Sig: Take 1 tablet by mouth 2 (Two) Times a Day.      Last filled:  Last office visit with prescribing clinician: 4/23/2024      Next office visit with prescribing clinician: 8/29/2024     Renetta Ross MA  06/05/24, 08:02 EDT

## 2024-06-06 ENCOUNTER — OFFICE VISIT (OUTPATIENT)
Age: 59
End: 2024-06-06
Payer: COMMERCIAL

## 2024-06-06 VITALS
WEIGHT: 217.1 LBS | HEART RATE: 69 BPM | SYSTOLIC BLOOD PRESSURE: 136 MMHG | HEIGHT: 67 IN | BODY MASS INDEX: 34.07 KG/M2 | OXYGEN SATURATION: 98 % | DIASTOLIC BLOOD PRESSURE: 104 MMHG

## 2024-06-06 DIAGNOSIS — F31.9 BIPOLAR 1 DISORDER: Primary | ICD-10-CM

## 2024-06-06 DIAGNOSIS — F41.9 ANXIETY: ICD-10-CM

## 2024-06-06 DIAGNOSIS — F51.05 INSOMNIA DUE TO OTHER MENTAL DISORDER: ICD-10-CM

## 2024-06-06 DIAGNOSIS — F99 INSOMNIA DUE TO OTHER MENTAL DISORDER: ICD-10-CM

## 2024-06-06 RX ORDER — LAMOTRIGINE 100 MG/1
TABLET ORAL
Qty: 180 TABLET | Refills: 0 | Status: SHIPPED | OUTPATIENT
Start: 2024-06-06

## 2024-06-06 RX ORDER — ESZOPICLONE 2 MG/1
2 TABLET, FILM COATED ORAL NIGHTLY PRN
Qty: 30 TABLET | Refills: 0 | Status: SHIPPED | OUTPATIENT
Start: 2024-06-06 | End: 2025-06-06
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
## 2024-06-13 DIAGNOSIS — E11.42 DIABETIC PERIPHERAL NEUROPATHY: ICD-10-CM

## 2024-06-14 DIAGNOSIS — R39.11 URINARY HESITANCY: ICD-10-CM

## 2024-06-14 RX ORDER — PREGABALIN 100 MG/1
100 CAPSULE ORAL 3 TIMES DAILY
Qty: 90 CAPSULE | Refills: 5 | OUTPATIENT
Start: 2024-06-14 | End: 2025-06-14

## 2024-06-14 RX ORDER — TAMSULOSIN HYDROCHLORIDE 0.4 MG/1
CAPSULE ORAL
Qty: 30 CAPSULE | Refills: 5 | Status: SHIPPED | OUTPATIENT
Start: 2024-06-14

## 2024-06-14 NOTE — TELEPHONE ENCOUNTER
"  Caller: Marcela Ramírez \"Helen\"    Relationship: Self    Best call back number: [phone]     What is the best time to reach you: ANY    Who are you requesting to speak with (clinical staff, provider,  specific staff member): PROVIDER/STAFF    What was the call regarding:   PATIENT TELEPHONED TO ADVISE SHE IS NEEDING A NEW SCRIPT FOR PREGABLIN (LYRICA) 100 MG CAPSULE TO [facility] @ [address], [phone] AS RX TRANSFER WAS DENIED RECENTLY.    PLEASE SEND NEW SCRIPT W/ REFILLS TO [facility]-[phone] AS PATIENT IS COMPLETELY OUT OF THIS MEDICINE     OR CALL TO ADVISE-THANK YOU   "

## 2024-06-14 NOTE — TELEPHONE ENCOUNTER
Rx Refill Note  Requested Prescriptions     Pending Prescriptions Disp Refills    pregabalin (Lyrica) 100 MG capsule 90 capsule 5     Sig: Take 1 capsule by mouth 3 (Three) Times a Day.      Last filled:04/23/2024  Last office visit with prescribing clinician: 4/23/2024      Next office visit with prescribing clinician: 8/29/2024     Renetta Ross MA  06/14/24, 09:42 EDT    Denied- patient had refill sent in on 04/23/2024 with 5 refills.

## 2024-06-14 NOTE — TELEPHONE ENCOUNTER
"  Caller: Marcela Ramírez \"Helen\"    Relationship: Self    Best call back number: [phone]    What is the best time to reach you: ANY    Who are you requesting to speak with (clinical staff, provider,  specific staff member): ANY    What was the call regarding: SCRIPT - PATIENT HAS 2 PILLS LEFT. UNABLE TO FILL SCRIPT AT [facility] NOW DUE TO INSURANCE ISSUES. HAD SCRIPT TRANSFERRED [facility] BUT THEY ARE CLOSED TODAY/THIS WEEKEND DUE TO NOT HAVING A PHARMACIST. PLEASE REVIEW AND ADVISE, THANK YOU.  "

## 2024-06-14 NOTE — TELEPHONE ENCOUNTER
Rx Refill Note  Requested Prescriptions     Pending Prescriptions Disp Refills    tamsulosin (FLOMAX) 0.4 MG capsule 24 hr capsule [Pharmacy Med Name: TAMSULOSIN 0.4MG CAPSULES] 30 capsule 5     Sig: TAKE ONE CAPSULE BY MOUTH ONCE A DAY      Last office visit with prescribing clinician: 7/13/2023   Next office visit with prescribing clinician: Visit date not found       Chelsy Orozco MA  06/14/24, 08:06 EDT

## 2024-07-08 DIAGNOSIS — R39.11 URINARY HESITANCY: ICD-10-CM

## 2024-07-08 DIAGNOSIS — F99 INSOMNIA DUE TO OTHER MENTAL DISORDER: ICD-10-CM

## 2024-07-08 DIAGNOSIS — F51.05 INSOMNIA DUE TO OTHER MENTAL DISORDER: ICD-10-CM

## 2024-07-08 RX ORDER — TAMSULOSIN HYDROCHLORIDE 0.4 MG/1
1 CAPSULE ORAL DAILY
Qty: 30 CAPSULE | Refills: 5 | Status: SHIPPED | OUTPATIENT
Start: 2024-07-08

## 2024-07-08 NOTE — TELEPHONE ENCOUNTER
Rx Refill Note  Requested Prescriptions     Pending Prescriptions Disp Refills    tamsulosin (FLOMAX) 0.4 MG capsule 24 hr capsule 30 capsule 5      Last office visit with prescribing clinician: 7/13/2023   Next office visit with prescribing clinician: Visit date not found       Chelsy Orozco MA  07/08/24, 13:25 EDT

## 2024-07-09 RX ORDER — ESZOPICLONE 2 MG/1
2 TABLET, FILM COATED ORAL NIGHTLY PRN
Qty: 30 TABLET | Refills: 0 | Status: SHIPPED | OUTPATIENT
Start: 2024-07-09 | End: 2025-07-09

## 2024-07-11 DIAGNOSIS — E11.42 DIABETIC PERIPHERAL NEUROPATHY: ICD-10-CM

## 2024-07-11 RX ORDER — PREGABALIN 100 MG/1
100 CAPSULE ORAL 3 TIMES DAILY
Qty: 90 CAPSULE | Refills: 0 | OUTPATIENT
Start: 2024-07-11

## 2024-07-11 NOTE — TELEPHONE ENCOUNTER
Rx Refill Note  Requested Prescriptions     Pending Prescriptions Disp Refills    pregabalin (LYRICA) 100 MG capsule [Pharmacy Med Name: PREGABALIN 100MG CAPSULES] 90 capsule      Sig: TAKE 1 CAPSULE BY MOUTH THREE TIMES DAILY      Last filled:4/23/24 5 REFILL  Last office visit with prescribing clinician: 4/23/2024      Next office visit with prescribing clinician: 8/29/2024     RAFA ARROYO  07/11/24, 14:02 EDT    Denied-refills on file

## 2024-07-18 ENCOUNTER — OFFICE VISIT (OUTPATIENT)
Age: 59
End: 2024-07-18
Payer: COMMERCIAL

## 2024-07-18 ENCOUNTER — LAB (OUTPATIENT)
Age: 59
End: 2024-07-18
Payer: COMMERCIAL

## 2024-07-18 VITALS
HEART RATE: 64 BPM | WEIGHT: 221.7 LBS | SYSTOLIC BLOOD PRESSURE: 144 MMHG | DIASTOLIC BLOOD PRESSURE: 98 MMHG | OXYGEN SATURATION: 99 % | BODY MASS INDEX: 34.71 KG/M2

## 2024-07-18 DIAGNOSIS — F99 INSOMNIA DUE TO OTHER MENTAL DISORDER: Chronic | ICD-10-CM

## 2024-07-18 DIAGNOSIS — Z51.81 ENCOUNTER FOR THERAPEUTIC DRUG MONITORING: Primary | ICD-10-CM

## 2024-07-18 DIAGNOSIS — F31.9 BIPOLAR 1 DISORDER: Primary | Chronic | ICD-10-CM

## 2024-07-18 DIAGNOSIS — Z51.81 ENCOUNTER FOR THERAPEUTIC DRUG MONITORING: ICD-10-CM

## 2024-07-18 DIAGNOSIS — F41.9 ANXIETY: Chronic | ICD-10-CM

## 2024-07-18 DIAGNOSIS — F51.05 INSOMNIA DUE TO OTHER MENTAL DISORDER: Chronic | ICD-10-CM

## 2024-07-18 LAB
FOLATE SERPL-MCNC: 13.2 NG/ML (ref 4.78–24.2)
VIT B12 BLD-MCNC: 1259 PG/ML (ref 211–946)

## 2024-07-18 PROCEDURE — 83036 HEMOGLOBIN GLYCOSYLATED A1C: CPT | Performed by: NURSE PRACTITIONER

## 2024-07-18 PROCEDURE — 82746 ASSAY OF FOLIC ACID SERUM: CPT | Performed by: NURSE PRACTITIONER

## 2024-07-18 PROCEDURE — 80061 LIPID PANEL: CPT | Performed by: NURSE PRACTITIONER

## 2024-07-18 PROCEDURE — 82607 VITAMIN B-12: CPT | Performed by: NURSE PRACTITIONER

## 2024-07-18 PROCEDURE — 80164 ASSAY DIPROPYLACETIC ACD TOT: CPT | Performed by: NURSE PRACTITIONER

## 2024-07-18 RX ORDER — LAMOTRIGINE 100 MG/1
TABLET ORAL
Qty: 180 TABLET | Refills: 0 | Status: SHIPPED | OUTPATIENT
Start: 2024-07-18

## 2024-07-18 RX ORDER — ESZOPICLONE 2 MG/1
2 TABLET, FILM COATED ORAL NIGHTLY PRN
Qty: 60 TABLET | Refills: 0 | Status: SHIPPED | OUTPATIENT
Start: 2024-08-06 | End: 2025-08-06

## 2024-07-18 NOTE — PROGRESS NOTES
"     Office  Follow Up Visit      Patient Name: Marcela Ramírez  : 1965   MRN: [id number]     Referring Provider: Sukhwinder Acosta MD    Chief Complaint:       ICD-10-CM ICD-9-CM   1. Bipolar 1 disorder  F31.9 296.7   2. Insomnia due to other mental disorder  F51.05 300.9    F99 327.02   3. Anxiety  F41.9 300.00   4. Encounter for therapeutic drug monitoring  Z51.81 V58.83      History of Present Illness     Marcela Ramírez is a 59 y.o. female who is here today for follow up related to  The patient is a 64-year-old female who presents for f/u for  bipolar disorder. She was last seen on 2023, at which time, her Lamictal dosage was increased and her Lunesta dosage was reduced. She reports that the current two medications are more effective than the previous ones.    Patient states  She believes her scores have reached the lowest since she started seeing me.  PHQ score is 12 and her ANA ROSA score is 9.She feels her condition has improved compared to when she was taking other medications. She still experiences anxiety, but has accepted the fact that it is not severe and is manageable. She attributes her anxiety to her work,   as she has a \"co-dependency problem\" and  classifies it as  situational, not constant. Pt states her mood is good and her irritability has improved compared to years ago when she was undiagnosed and unmedicated. She  reports her  family, friends, and coworkers, has noticed positive changes in her mood and she feels good  about this.  She denies SI/HI/AVH.    Reports she is always dreading going to work but this is improved since she now has a new manager who explains a lot of the business side that no one has ever told her. This  motivates her to be more involved in the work place so she  is volunteering for more duties at work and enjoys advancing in the workplace.  Patient shares her partner has been there for over 20 years and she has advanced to the same level in 4 " years. Pt feels communication has always been a key problem in her workplace, regardless of what line of work is.  She is more motivated to go to work because she feels her best boss trust her and explains to her the reason when he asked her to do something.  Her work situation has improved significantly, but she knows that she will still have bad days.    She still struggles with sleep, but she is working on it.  She makes an effort to go to bed early due to her work nights, but last night she stayed up until 5:00 AM playing video games. She did not take her Lunesta on the day of her visit, but acknowledges that the muscle relaxer prescribed by her doctor is beneficial.  Feels her Lunesta dose is well at 2 mg and denies any side effects. She has not taken Ozempic since the end of 04/2023 or the first of 05/2024 due to financial constraints. She sees Dr. Fox every 6 months.  Patient denies any abnormal muscle movements or tremors.     Previous Diagnoses: Alcoholism, Bipolar Disorder  Past Medications: Xanax, Pristiq, Xanax, Lamictal 200 mg daily, multiple antidepressesant that didn't work, lithium ineffective, abilify-SI, seroquel-tremors , Xanax  Current Meds: Latuda 40 mg Lamictal 200 mg, Lunesta  Therapy: Dr David smallwood    Subjective      Review of Systems:   Review of Systems   Constitutional:  Positive for activity change, appetite change and fatigue.   Musculoskeletal:  Positive for arthralgias and myalgias.   Psychiatric/Behavioral:  Positive for agitation and decreased concentration. The patient is nervous/anxious.    All other systems reviewed and are negative.      PHQ-9 Depression Screening  Little interest or pleasure in doing things? 1-->several days   Feeling down, depressed, or hopeless? 1-->several days   Trouble falling or staying asleep, or sleeping too much? 0-->not at all   Feeling tired or having little energy? 2-->more than half the days   Poor appetite or overeating? 2-->more than half  the days   Feeling bad about yourself - or that you are a failure or have let yourself or your family down? 2-->more than half the days   Trouble concentrating on things, such as reading the newspaper or watching television? 3-->nearly every day   Moving or speaking so slowly that other people could have noticed? Or the opposite - being so fidgety or restless that you have been moving around a lot more than usual? 1-->several days   Thoughts that you would be better off dead, or of hurting yourself in some way? 0-->not at all   PHQ-9 Total Score 12   If you checked off any problems, how difficult have these problems made it for you to do your work, take care of things at home, or get along with other people? somewhat difficult         ANA ROSA-7      Over the last two weeks, how often have you been bothered by the following problems?  Feeling nervous, anxious or on edge: Several days  Not being able to stop or control worrying: Several days  Worrying too much about different things: Several days  Trouble Relaxing: Several days  Being so restless that it is hard to sit still: Several days  Becoming easily annoyed or irritable: More than half the days  Feeling afraid as if something awful might happen: More than half the days  ANA ROSA 7 Total Score: 9  If you checked any problems, how difficult have these problems made it for you to do your work, take care of things at home, or get along with other people: Somewhat difficult    Patient History:   The following portions of the patient's history were reviewed and updated as appropriate: allergies, current medications, past family history, past medical history, past social history, past surgical history and problem list.     Social History     Socioeconomic History    Marital status:    Tobacco Use    Smoking status: Never     Passive exposure: Never    Smokeless tobacco: Never   Vaping Use    Vaping status: Never Used   Substance and Sexual Activity    Alcohol use: Not  Currently     Comment: Over a year ago. Before then, not often    Drug use: Not Currently     Types: Cocaine(coke), LSD, Marijuana, Psilocybin     Comment: At least 12 months or more for most listed    Sexual activity: Yes     Partners: Male     Birth control/protection: Hysterectomy, Same-sex partner, Surgical     Comment: no hormones       Medications:     Current Outpatient Medications:     ALPRAZolam (Xanax) 0.5 MG tablet, Take 1 tablet by mouth At Night As Needed for Sleep., Disp: 10 tablet, Rfl: 0    atorvastatin (LIPITOR) 20 MG tablet, TAKE 1 TABLET BY MOUTH DAILY, Disp: 90 tablet, Rfl: 3    baclofen (LIORESAL) 10 MG tablet, Take 1/2 tablet twice a day, Disp: 30 tablet, Rfl: 6    eszopiclone (Lunesta) 2 MG tablet, Take 1 tablet by mouth At Night As Needed for Sleep. Take immediately before bedtime, Disp: 30 tablet, Rfl: 0    glucose blood test strip, Use as instructed to check glucose twice a day Dispense Onetouch ultra 2, Disp: 100 each, Rfl: 3    hydroCHLOROthiazide 25 MG tablet, Take 1 tablet by mouth Daily., Disp: 90 tablet, Rfl: 3    lamoTRIgine (LaMICtal) 100 MG tablet, 200 mg in the morning and 100 mg at night, Disp: 180 tablet, Rfl: 0    Lancets misc, Use to check glucose twice a day Dispense Onetouch ultra 2, Disp: 100 each, Rfl: 3    losartan (Cozaar) 25 MG tablet, Take 1 tablet by mouth 2 (Two) Times a Day., Disp: 60 tablet, Rfl: 3    omeprazole (priLOSEC) 20 MG capsule, TAKE ONE CAPSULE BY MOUTH DAILY, Disp: 30 capsule, Rfl: 11    pregabalin (Lyrica) 100 MG capsule, Take 1 capsule by mouth 3 (Three) Times a Day., Disp: 90 capsule, Rfl: 5    Semaglutide, 2 MG/DOSE, (Ozempic, 2 MG/DOSE,) 8 MG/3ML solution pen-injector, INJECT 2 MG SUBCUTANEOUSLY ONCE A WEEK, Disp: 3 mL, Rfl: 2    tamsulosin (FLOMAX) 0.4 MG capsule 24 hr capsule, Take 1 capsule by mouth Daily., Disp: 30 capsule, Rfl: 5    Current Facility-Administered Medications:     cyanocobalamin injection 1,000 mcg, 1,000 mcg, Intramuscular, Q28  Gregory Baltazar Cassie L, APRN, 1,000 mcg at 08/24/23 1435    Objective     Physical Exam    Vital Signs:   Vitals:    07/18/24 1025   BP: 148/100   Pulse: 64   SpO2: 99%   Weight: 101 kg (221 lb 11.2 oz)     Body mass index is 34.71 kg/m².       Mental Status Exam:   MENTAL STATUS EXAM   General Appearance:  Cleanly groomed and dressed and obese  Eye Contact:  Good eye contact  Attitude:  Cooperative  Motor Activity:  Normal gait, posture  Muscle Strength:  Normal  Speech:  Normal rate, tone, volume  Language:  Spontaneous  Mood and affect:  Normal, pleasant  Hopelessness:  Denies  Loneliness: Denies  Thought Process:  Logical and goal-directed  Associations/ Thought Content:  No delusions  Hallucinations:  None  Suicidal Ideations:  Not present  Homicidal Ideation:  Not present  Sensorium:  Alert and clear  Orientation:  Person, place, time and situation  Immediate Recall, Recent, and Remote Memory:  Intact  Attention Span/ Concentration:  Good  Fund of Knowledge:  Appropriate for age and educational level  Intellectual Functioning:  Average range  Insight:  Good  Judgement:  Good  Reliability:  Good  Impulse Control:  Good       @RESULASTCBCDIFFPANEL,TSH,LABLIPI,QZPCSHBB07,XPNYPXET64,MG,FOLATE,PROLACTIN,CRPRESULT,CMP,G9TUCTZNDVA)@    Lab Results   Component Value Date    GLUCOSE 95 03/13/2024    BUN 18 03/13/2024    CREATININE 1.19 (H) 03/13/2024    EGFRRESULT 53 (L) 05/19/2023    EGFR 53.8 (L) 03/13/2024    BCR 15.1 03/13/2024    K 3.9 03/13/2024    CO2 31.0 (H) 03/13/2024    CALCIUM 9.2 03/13/2024    PROTENTOTREF 7.9 05/19/2023    ALBUMIN 4.2 03/13/2024    BILITOT 0.6 03/13/2024    AST 20 03/13/2024    ALT 15 03/13/2024       Lab Results   Component Value Date    WBC 7.26 03/13/2024    HGB 13.4 03/13/2024    HCT 41.0 03/13/2024    MCV 93.2 03/13/2024     03/13/2024       Lab Results   Component Value Date    CHOL 147 09/28/2023    CHLPL 177 05/19/2023    TRIG 166 (H) 09/28/2023    HDL 56 09/28/2023    LDL  63 09/28/2023      Latest Reference Range & Units 03/13/24 07:27   TSH Baseline 0.270 - 4.200 uIU/mL 4.260 (H)   Free T4 0.93 - 1.70 ng/dL 1.14   (H): Data is abnormally high  Results   Latest Reference Range & Units 09/28/23 12:15   Hemoglobin A1C 4.80 - 5.60 % 5.40               ispensed  Strength Quantity Days Supply Provider Pharmacy   07/12/2024 Pregabalin 100MG 90 each 30 ISIDRA DUNHAM [facility]   07/11/2024 Eszopiclone 2MG 30 each 30 RAFA GUTIERRES [facility]   06/14/2024 Pregabalin 100MG 90 each 30 [facility]IDRA [facility]UNH[id number]       Assessment / Plan      Visit Diagnosis/Orders Placed This Visit:  Diagnoses and all orders for this visit:    1. Bipolar 1 disorder (Primary)  -     eszopiclone (Lunesta) 2 MG tablet; Take 1 tablet by mouth At Night As Needed for Sleep. Take immediately before bedtime  Dispense: 60 tablet; Refill: 0  -     lamoTRIgine (LaMICtal) 100 MG tablet; 200 mg in the morning and 100 mg at night  Dispense: 180 tablet; Refill: 0    2. Insomnia due to other mental disorder  -     eszopiclone (Lunesta) 2 MG tablet; Take 1 tablet by mouth At Night As Needed for Sleep. Take immediately before bedtime  Dispense: 60 tablet; Refill: 0    3. Anxiety  -     lamoTRIgine (LaMICtal) 100 MG tablet; 200 mg in the morning and 100 mg at night  Dispense: 180 tablet; Refill: 0    4. Encounter for therapeutic drug monitoring       Assessment & Plan      Plan:    Contraception-hysterectomy   -Continue psychotherapy   -Hx gastric sleeve   -Continue Lunesta 2 mg hs prn for insomnia.     -Continue Lamictal 200 mg q am and 100 mg hs. Discussed SJS and sx and advised pt not notify office should they occur     Continue Xanax  0.5 mg hs prn # 10. Use as last resort for insomnia    Supplements: Takes women over 50 MVI, , B6, B12, iron, calcium bid, fish oil     Plan:   Patient feels like her mood and anxiety are stable on her current medications and is optimistic her sleep will improve she puts herself  on a schedule.  She denies medication side effects and prefers to follow-up in 3 months due to financial constraints.  She will notify the office should she have worsening symptoms or intolerable medication side effects.    Continue supportive psychotherapy efforts and medications as indicated. Treatment and medication options discussed during today's visit. Patient ackowledged and verbally consented to continue with current treatment plan and was educated on the importance of compliance with treatment and follow-up appointments. Patient seems reasonably able to adhere to treatment plan.      Medication Considerations:  Discussed medication options and treatment plan of prescribed medication(s) as well as the risks, benefits, and potential side effects. Patient is agreeable to call the office with any worsening of symptoms or onset of side effects. Patient is agreeable to call 911 or go to the nearest ER should he/she begin having SI/HI.    Quality Measures:   Former smoker    I advised Marcela Ramírez of the risks of tobacco use.     Follow Up:   Return in about 3 months (around 10/18/2024).      NOE Urbina, [facility]    Patient or patient representative verbalized consent for the use of Ambient Listening during the visit with  NOE Burnett for chart documentation. 7/18/2024  11:07 EDT

## 2024-07-19 LAB
CHOLEST SERPL-MCNC: 144 MG/DL (ref 0–200)
HBA1C MFR BLD: 5.3 % (ref 4.8–5.6)
HDLC SERPL-MCNC: 61 MG/DL (ref 40–60)
LDLC SERPL CALC-MCNC: 65 MG/DL (ref 0–100)
LDLC/HDLC SERPL: 1.03 {RATIO}
TRIGL SERPL-MCNC: 100 MG/DL (ref 0–150)
VALPROATE SERPL-MCNC: <2.8 MCG/ML (ref 50–125)
VLDLC SERPL-MCNC: 18 MG/DL (ref 5–40)

## 2024-07-22 LAB — 1,25(OH)2D SERPL-MCNC: 39 PG/ML (ref 24.8–81.5)

## 2024-08-12 ENCOUNTER — TELEPHONE (OUTPATIENT)
Age: 59
End: 2024-08-12
Payer: COMMERCIAL

## 2024-08-12 DIAGNOSIS — F99 INSOMNIA DUE TO OTHER MENTAL DISORDER: Chronic | ICD-10-CM

## 2024-08-12 DIAGNOSIS — F51.05 INSOMNIA DUE TO OTHER MENTAL DISORDER: Chronic | ICD-10-CM

## 2024-08-12 DIAGNOSIS — F31.9 BIPOLAR 1 DISORDER: Chronic | ICD-10-CM

## 2024-08-12 DIAGNOSIS — E11.42 DIABETIC PERIPHERAL NEUROPATHY: ICD-10-CM

## 2024-08-12 RX ORDER — ESZOPICLONE 2 MG/1
2 TABLET, FILM COATED ORAL NIGHTLY PRN
Qty: 30 TABLET | Refills: 0 | Status: SHIPPED | OUTPATIENT
Start: 2024-08-12 | End: 2025-08-12

## 2024-08-12 NOTE — TELEPHONE ENCOUNTER
Helen called and is requesting a refill on Lunesta to be sent to [facility] pharmacy at [address] , [address].  Per Helen, I have updated this as her default pharmacy.  Please advise.

## 2024-08-12 NOTE — TELEPHONE ENCOUNTER
PT CALLING TO LET OFFICE KNOW SHE WILL BE OUT OF RX TODAY NEEDS RX SENT ASAP     MAY NEED NEW SCRIPT  AND PA. SENT SINCE CHANGED PHARMACY TO [id number] - [place], [place] - [address]  AT [address]  & [address] - [phone]  - [phone]       PLEASE ADVICE

## 2024-08-13 RX ORDER — ESZOPICLONE 2 MG/1
2 TABLET, FILM COATED ORAL NIGHTLY PRN
Qty: 60 TABLET | Refills: 0 | OUTPATIENT
Start: 2024-08-13 | End: 2025-08-13

## 2024-08-13 RX ORDER — PREGABALIN 100 MG/1
100 CAPSULE ORAL 3 TIMES DAILY
Qty: 90 CAPSULE | Refills: 5 | Status: SHIPPED | OUTPATIENT
Start: 2024-08-13 | End: 2025-08-13

## 2024-08-13 RX ORDER — BACLOFEN 10 MG/1
TABLET ORAL
Qty: 30 TABLET | Refills: 6 | Status: SHIPPED | OUTPATIENT
Start: 2024-08-13

## 2024-08-13 NOTE — TELEPHONE ENCOUNTER
PT CALLING AGAIN SHE IS GETTING THE RUN AROUND FROM PHARMACY THEY TRANS TO OTHER PHARMACY ONLY TO BE CLOSED THE NEXT DAY ( [facility] CLOSED [facility] AND DONT WORK SAT AND SUN SO IT WAS MONDAY AND THEY STILL COULD NOT FILL?)   PHARMACY SAID NEED TO TALK TO OFFICE SINCE PT TRANS RX AND ITS CONTROLED SUB. TALKED TO INES AS PT IS CRYING AND UPSET AND NEEDS HELP GETTING RX.     M.A TOOK CALL TO HELP WITH RX.

## 2024-08-13 NOTE — TELEPHONE ENCOUNTER
Called Pt JUICE GANNON to inform Pt that Rx Lunesta 2MG was resent to [facility]- [place], [place] - [address]   Advised Pt to call clinic back if needed

## 2024-08-13 NOTE — TELEPHONE ENCOUNTER
Rx Refill Note  Requested Prescriptions     Pending Prescriptions Disp Refills    baclofen (LIORESAL) 10 MG tablet 30 tablet 6     Sig: Take 1/2 tablet twice a day    pregabalin (Lyrica) 100 MG capsule 90 capsule 5     Sig: Take 1 capsule by mouth 3 (Three) Times a Day.      Last filled:4/23/24 6 refill  Last office visit with prescribing clinician: 4/23/2024      Next office visit with prescribing clinician: 8/29/2024     Last filled:4/23/24  Last office visit with prescribing clinician: 4/23/2024      Next office visit with prescribing clinician: 8/29/2024    RAFA ARROYO  08/13/24, 10:36 EDT    Spoke with patient.  She has not been able to get her Lyrica filled due to [facility] location being short staffed and closing.  Script was transferred to another location which also closed.  Patient is wanting new script sent to [facility] on [address] if at all possible as she no longer wants to use [facility]..  She also stated she was able to get her baclofen filled just not her Lyrica.    Called [facility] and they stated they will be able to pull her info over to fill new prescription.      Denied baclofen as she was able to get refill.    Pending lyrica to provider to send to new pharmacy-[facility] [address]

## 2024-08-14 NOTE — TELEPHONE ENCOUNTER
Called to make sure that Helen was aware that her medication had been sent in last night.  She was very appreciative for the help.

## 2024-08-29 ENCOUNTER — OFFICE VISIT (OUTPATIENT)
Dept: NEUROLOGY | Facility: CLINIC | Age: 59
End: 2024-08-29
Payer: COMMERCIAL

## 2024-08-29 VITALS
BODY MASS INDEX: 34.53 KG/M2 | WEIGHT: 220 LBS | HEART RATE: 61 BPM | HEIGHT: 67 IN | OXYGEN SATURATION: 96 % | DIASTOLIC BLOOD PRESSURE: 90 MMHG | SYSTOLIC BLOOD PRESSURE: 132 MMHG

## 2024-08-29 DIAGNOSIS — E11.42 DIABETIC PERIPHERAL NEUROPATHY: Primary | ICD-10-CM

## 2024-08-29 PROCEDURE — 99213 OFFICE O/P EST LOW 20 MIN: CPT | Performed by: PSYCHIATRY & NEUROLOGY

## 2024-08-29 RX ORDER — BACLOFEN 10 MG/1
TABLET ORAL
Qty: 60 TABLET | Refills: 6 | Status: SHIPPED | OUTPATIENT
Start: 2024-08-29

## 2024-08-29 RX ORDER — LURASIDONE HYDROCHLORIDE 40 MG/1
1 TABLET, FILM COATED ORAL DAILY
COMMUNITY
Start: 2024-06-06 | End: 2024-08-29

## 2024-08-29 NOTE — PROGRESS NOTES
Subjective:    CC: Marcela Ramírez is seen today for Diabetic peripheral neuropathy       Current visit-patient states that the muscle spasms in the bottom of her feet and her legs have improved since starting baclofen currently at 5 mg twice daily.  She occasionally has the symptoms.  Her neuropathy symptoms are also well-controlled.  Continues to take Lyrica 100 mg 3 times daily.  Her last A1c was 5.3 (she had stopped Ozempic due to a higher co-pay but is planning to start back on it again.  Her last LDL was 65 and TSH was 4.5.  She continues to work at [employer].  With regards to her bipolar disorder her dose of lamotrigine was increased to 200/100 mg however she has been taking a dose of 200 mg twice daily which seems to help a lot.  She has now stopped taking Latuda due to side effects as well as Ingrezza that was prescribed for abnormal movements.      Last visit-she states that her neuropathy is worse as she has pain and numbness up to her knees.  Cannot stand anybody touching her legs as they hurt so bad.  Her symptoms of further worsened by standing on concrete for several hours as she works at [employer].  By mid afternoon the bottoms of her feet started to cramp up.  At times they cramp at night as well preventing her from sleeping.  She continues to take Lyrica 100 mg 3 times daily.  Her diabetes is well-controlled.  She stopped taking the Neupro patch as her restless leg symptoms were better.  With regards to her carpal tunnel she has not been wearing her braces as she cannot sleep with them at night and also cannot work with them during the daytime.  She has also stopped taking Latuda for her bipolar disorder as it was causing abnormal movements.  Continues to be on Lamictal 200 mg daily.  Was also started on Ingrezza for possible tar dive dyskinesia.     Last visit-patient states that the pain in her feet and her RLS symptoms have improved since increasing Lyrica to 100 mg 3 times daily however she still  gets pain at night or while sitting down specially in her right foot.  She does not have to use the Neupro patch regularly anymore.  Her diabetes is also much better controlled as she was started on Ozempic.  Her last A1c was 5.4 and LDL was 63.  She has also been started on Latuda recently for her bipolar disorder which she takes in addition to Xanax 1 mg nightly that helps her sleep and also helps with her neuropathy symptoms.  For her carpal tunnel she does not wear wrist braces anymore as her wrists have stopped hurting even though she is constantly working with boxes (works at [employer]).    Initial [age]im-82-djng-old female with a history of hypertension, bipolar disorder, hyperlipidemia, diabetes mellitus type 2, obesity status post weight loss surgery, hypothyroidism presents with symptoms of neuropathy/RLS.  As per patient she started having burning pain in her feet several years ago.  It has worsened over time.  She previously tried gabapentin and is currently on Lyrica 75 mg 3 times daily which helps some however she is still getting severe pain in her feet more so on on the right as well as cramping of the feet.  She cannot withstand the air or the sheets touching her feet.  She also has some numbness.  She states that her symptoms can worsen with prolonged standing at work as she has to wear safety shoes (works at [employer]).  Her last A1c was fairly well controlled at 6.2.  TSH was normal.  She has been started on Wegovy recently for weight loss.  She also reports to having abnormal sensations in her legs.  Was diagnosed with RLS and initially started on Requip which along with Lyrica caused side effects.  Was subsequently started on Neupro patch 1 mg daily which is helping.  Last ferritin level was normal at 88.  She had an EMG/NCS in 2018 that showed moderate sensorimotor axonal/demyelinating neuropathy with moderate bilateral carpal tunnel and mild ulnar neuropathy bilaterally.  She does not wear wrist  braces.  Of note-I reviewed Gladys's notes as follows-    57 y.o. female with a history of DM who comes to clinic today for evaluation of neuropathy. She initially noted symptoms in 2015 marked by numbness, paresthesias, and shooting pain in her upper and lower extremities bilaterally. This has worsened over time. She notes associated balance impairment as well as decreased  strength, though denies any clear associated weakness. Her symptoms are worse with increased activity. She is currently taking Lyrica, which is somewhat beneficial. She has previously taken GBP.     She also notes RLS symptoms in her feet primarily at night. She is currently taking neupro 1mg daily. She previously tried ropinirole.      Prior evaluation has included an EMG of the upper and lower extremities bilaterally which was notable for a moderate axonal and demyelinating peripheral neuropathy, moderate CTS bilaterally, mild-moderate ulnar neuropathy on the left and mild ulnar neuropathy on the right. Screening bloodwork was notable for a hemoglobin A1C of 7.3.     Today: Since her last visit in 2/22, she notes that her numbness, paresthesias and shooting pains have worsened and now radiate up to her thighs bilaterally. Lyrica 75mg TID has been beneficial. She has not consumed alcohol in approximately 9 months.    The following portions of the patient's history were reviewed today and updated as of 06/08/2023  : allergies, current medications, past family history, past medical history, past social history, past surgical history, and problem list  These document will be scanned to patient's chart.      Current Outpatient Medications:     ALPRAZolam (Xanax) 0.5 MG tablet, Take 1 tablet by mouth At Night As Needed for Sleep., Disp: 10 tablet, Rfl: 0    atorvastatin (LIPITOR) 20 MG tablet, TAKE 1 TABLET BY MOUTH DAILY, Disp: 90 tablet, Rfl: 3    baclofen (LIORESAL) 10 MG tablet, Take 1 tablet twice a day, Disp: 60 tablet, Rfl: 6     eszopiclone (Lunesta) 2 MG tablet, Take 1 tablet by mouth At Night As Needed for Sleep. Take immediately before bedtime, Disp: 30 tablet, Rfl: 0    glucose blood test strip, Use as instructed to check glucose twice a day Dispense Onetouch ultra 2, Disp: 100 each, Rfl: 3    hydroCHLOROthiazide 25 MG tablet, Take 1 tablet by mouth Daily., Disp: 90 tablet, Rfl: 3    lamoTRIgine (LaMICtal) 100 MG tablet, 200 mg in the morning and 100 mg at night, Disp: 180 tablet, Rfl: 0    Lancets misc, Use to check glucose twice a day Dispense Onetouch ultra 2, Disp: 100 each, Rfl: 3    losartan (Cozaar) 25 MG tablet, Take 1 tablet by mouth 2 (Two) Times a Day., Disp: 60 tablet, Rfl: 3    omeprazole (priLOSEC) 20 MG capsule, TAKE ONE CAPSULE BY MOUTH DAILY, Disp: 30 capsule, Rfl: 11    pregabalin (Lyrica) 100 MG capsule, Take 1 capsule by mouth 3 (Three) Times a Day., Disp: 90 capsule, Rfl: 5    Semaglutide, 2 MG/DOSE, (Ozempic, 2 MG/DOSE,) 8 MG/3ML solution pen-injector, INJECT 2 MG SUBCUTANEOUSLY ONCE A WEEK, Disp: 3 mL, Rfl: 2    tamsulosin (FLOMAX) 0.4 MG capsule 24 hr capsule, Take 1 capsule by mouth Daily., Disp: 30 capsule, Rfl: 5    Current Facility-Administered Medications:     cyanocobalamin injection 1,000 mcg, 1,000 mcg, Intramuscular, Q28 Days, Zeynep Jenkins APRN, 1,000 mcg at 08/24/23 1435   Past Medical History:   Diagnosis Date    Arthritis     knees, otc arthritis meds prn, no h/o injections.     Asthma     has not required inhaler    Bilateral ovarian cysts     Bipolar 1 disorder     Boil     opens them herself    Breast injury 07/13/2021    bruise on lateral rt breast from fall    Carpal tunnel syndrome     Chronic pain disorder     CKD (chronic kidney disease), symptom management only, stage 3 (moderate)     Creatinine 1.04 GFR 56    Colon polyp     Depression     Diabetes mellitus     Diagnosed 2017, was on metformin in the past. Last A1C 5.5%    Diverticulosis     Fatigue     GERD (gastroesophageal reflux  disease)     controlled with omeprazole 20mg. Remote EGD- esophagitis.  EGD no hiatal hernia Z line 40 cm very thick mucosal folds cannot rule out varices.  CT scan thickened fundus, no varices seen    Headache     Headache, tension-type [id number]    History of bladder infections     History of blood transfusion 2006    2/2 heavy menses    History of blood transfusion     History of bronchitis     HL (hearing loss) [id number]    Hyperlipidemia     Hypertension     Hypothyroidism     Lower extremity edema     Memory loss 1994    Migraine [id number]    Morbid obesity with BMI of 40.0-44.9, adult     Neuropathy in diabetes     Not sure    Peripheral neuropathy     2/2 DM    RLS (restless legs syndrome)     S/P spenser     S/P cholecystectomy     2/2 cholelithiasis    Shortness of breath on exertion     Stroke 2012    Unsure of date    Thyroid disease     TIA (transient ischemic attack)     patient states 8-9 episodes of right sided drooping/ weakness/ vision changes. Workup was negative for CVA- thought to be related to anxiety. last episode 12/2017    Vision loss       Past Surgical History:   Procedure Laterality Date    CEREBRAL ANGIOGRAM      COLONOSCOPY  remote    diverticulosis    ENDOMETRIAL ABLATION      ENDOSCOPY  remote    esophagitis     ENDOSCOPY N/A 08/22/2019    Procedure: ESOPHAGOGASTRODUODENOSCOPY;  Surgeon: Guido Greenberg MD;  Location:  [facility];  Service: Bariatric    GASTRIC SLEEVE LAPAROSCOPIC N/A 08/22/2019    Procedure: GASTRIC SLEEVE LAPAROSCOPIC;  Surgeon: Guido Greenberg MD;  Location:  [facility];  Service: Bariatric    KNEE ACL RECONSTRUCTION Right 2013    with miniscal repair    LAPAROSCOPIC CHOLECYSTECTOMY  2001    cholelithiasis    LAPAROSCOPIC HYSTERECTOMY  2013    right ovary remains    PARAESOPHAGEAL HERNIA REPAIR N/A 08/22/2019    Procedure: PARAESOPHAGEAL HERNIA REPAIR LAPAROSCOPIC;  Surgeon: Guido Greenberg MD;  Location:  [facility];  Service: Bariatric    SINUS SURGERY       "TUBAL ABDOMINAL LIGATION        Family History   Problem Relation Age of Onset    Arthritis Mother     Arthritis Father     Diabetes Father     Hyperlipidemia Father     Hypertension Father     Neuropathy Father     Arthritis Sister     Diabetes Brother     Hypertension Brother     Asthma Brother     Other Brother     Obesity Brother     Obesity Daughter     Hypertension Daughter     Depression Daughter     Cancer Maternal Aunt     Depression Paternal Uncle     Cancer Paternal Grandmother     Heart disease Paternal Grandfather     Hypertension Paternal Grandfather     Breast cancer Neg Hx     Ovarian cancer Neg Hx       Social History     Socioeconomic History    Marital status:    Tobacco Use    Smoking status: Never     Passive exposure: Never    Smokeless tobacco: Never   Vaping Use    Vaping status: Never Used   Substance and Sexual Activity    Alcohol use: Not Currently     Comment: Over a year ago. Before then, not often    Drug use: Not Currently     Types: Cocaine(coke), LSD, Marijuana, Psilocybin     Comment: At least 18 months or more for most listed    Sexual activity: Yes     Partners: Male     Birth control/protection: Hysterectomy, Partner of same sex, Surgical     Comment: no hormones     Review of Systems   Neurological:  Positive for numbness.   All other systems reviewed and are negative.      Objective:    /90   Pulse 61   Ht 170.2 cm (67\")   Wt 99.8 kg (220 lb)   LMP  (LMP Unknown)   SpO2 96%   BMI 34.46 kg/m²     Neurology Exam:    General apperance: Obese    Mental status: Alert, awake and oriented to time place and person.    Recent and Remote memory: Intact.    Attention span and Concentration: Normal.     Language and Speech: Intact- No dysarthria.    Fluency, Naming , Repitition and Comprehension:  Intact    Cranial Nerves:   CN II: Visual fields are full. Intact. Fundi - Normal, No papillederma, Pupils - LEV  CN III, IV and VI: Extraocular movements are intact. Normal " saccades.   CN V: Facial sensation is intact.   CN VII: Muscles of facial expression reveal no asymmetry. Intact.   CN VIII: Hearing is intact. Whispered voice intact.   CN IX and X: Palate elevates symmetrically. Intact  CN XI: Shoulder shrug is intact.   CN XII: Tongue is midline without evidence of atrophy or fasciculation.     Ophthalmoscopic exam of optic disc-normal    Motor:  Right UE muscle strength 5/5. Normal tone.     Left UE muscle strength 5/5. Normal tone.      Right LE muscle strength5/5. Normal tone.     Left LE muscle strength 5/5. Normal tone.      Sensory: Reduced to pinprick in the first 3 fingers of both hands as well as in the feet up to level of ankle with markedly reduced vibration/temperature sensation    DTRs: 2+ bilaterally in upper and lower extremities.    Babinski: Negative bilaterally.    Co-ordination: Normal finger-to-nose, heel to shin B/L.    Rhomberg: Negative.    Gait: Normal.  Could do tandem walking    Cardiovascular: Regular rate and rhythm without murmur, gallop or rub.    Assessment and Plan:  1. Diabetic peripheral neuropathy  Last A1c was 5.3  She can continue Lyrica  100 mg 3 times daily.  She is reluctant to increase the dose  She can increase her baclofen to 5/10 mg.    2.  Bipolar disorder  She should continue lamotrigine 200 mg twice daily as it is helping with her mood    3. Bilateral carpal tunnel syndrome  EMG/NCS showed bilateral moderate carpal tunnel.  She should try to wear wrist braces during the day if possible      Return in about 6 months (around 2/28/2025).         Katelin Landis MD

## 2024-09-09 NOTE — TELEPHONE ENCOUNTER
Rx Refill Note  Requested Prescriptions     Pending Prescriptions Disp Refills    losartan (COZAAR) 25 MG tablet [Pharmacy Med Name: LOSARTAN 25MG TABLETS] 60 tablet 3     Sig: TAKE 1 TABLET BY MOUTH TWICE DAILY      Last filled:05/21/2024  Last office visit with prescribing clinician: 8/29/2024      Next office visit with prescribing clinician: 12/19/2024     Renetta Ross MA  09/09/24, 11:08 EDT    Sent the Rx to patients pharmacy with _5_ refills.

## 2024-09-10 RX ORDER — LOSARTAN POTASSIUM 25 MG/1
25 TABLET ORAL 2 TIMES DAILY
Qty: 60 TABLET | Refills: 0 | Status: SHIPPED | OUTPATIENT
Start: 2024-09-10

## 2024-10-15 DIAGNOSIS — F41.9 ANXIETY: Chronic | ICD-10-CM

## 2024-10-15 DIAGNOSIS — F31.9 BIPOLAR 1 DISORDER: Chronic | ICD-10-CM

## 2024-10-15 RX ORDER — LAMOTRIGINE 100 MG/1
TABLET ORAL
Qty: 180 TABLET | Refills: 0 | Status: SHIPPED | OUTPATIENT
Start: 2024-10-15

## 2024-10-15 RX ORDER — LAMOTRIGINE 100 MG/1
TABLET ORAL
Qty: 180 TABLET | Refills: 0 | OUTPATIENT
Start: 2024-10-15

## 2024-10-15 NOTE — TELEPHONE ENCOUNTER
Called Pt to inform Rx (LaMICtal) 100 MG  was sent to [id number] - [place], [place] - [address]    Pt stated she does not use that pharmacy any longer as they are going out of business.   Pt stated she can stop at [facility] and see if they can pull the script through from [facility], if not Pt will call clinic back to have Rx resent.    Pt requested to delete all [facility] from chart and only have [facility] pharmacy.  Updated Pt's pharmacy list.  Pt did not have any questions at this time.

## 2024-10-15 NOTE — TELEPHONE ENCOUNTER
Rx Refill Note  Requested Prescriptions     Pending Prescriptions Disp Refills    lamoTRIgine (LaMICtal) 100 MG tablet [Pharmacy Med Name: LAMOTRIGINE 100MG TABLETS] 180 tablet 0     Sig: TAKE 2 TABLET BY MOUTH IN THE MORNING AND 1 TABLET BY MOUTH AT NIGHT      Last office visit with prescribing clinician: 7/18/2024     Next office visit with prescribing clinician: 10/17/2024       Jeanne Das MA  10/15/24, 11:40 EDT

## 2024-10-19 DIAGNOSIS — F99 INSOMNIA DUE TO OTHER MENTAL DISORDER: Chronic | ICD-10-CM

## 2024-10-19 DIAGNOSIS — F31.9 BIPOLAR 1 DISORDER: Chronic | ICD-10-CM

## 2024-10-19 DIAGNOSIS — F51.05 INSOMNIA DUE TO OTHER MENTAL DISORDER: Chronic | ICD-10-CM

## 2024-10-21 RX ORDER — LOSARTAN POTASSIUM 25 MG/1
25 TABLET ORAL 2 TIMES DAILY
Qty: 60 TABLET | Refills: 2 | Status: SHIPPED | OUTPATIENT
Start: 2024-10-21

## 2024-10-21 RX ORDER — ESZOPICLONE 2 MG/1
2 TABLET, FILM COATED ORAL NIGHTLY PRN
Qty: 30 TABLET | Refills: 0 | OUTPATIENT
Start: 2024-10-21 | End: 2025-10-21

## 2024-10-21 NOTE — TELEPHONE ENCOUNTER
Rx Refill Note  Requested Prescriptions     Pending Prescriptions Disp Refills    eszopiclone (Lunesta) 2 MG tablet 30 tablet 0     Sig: Take 1 tablet by mouth At Night As Needed for Sleep. Take immediately before bedtime      Last office visit with prescribing clinician: 7/18/2024     Next office visit with prescribing clinician: 10/31/2024       Jeanne Das MA  10/21/24, 12:30 EDT

## 2024-10-21 NOTE — TELEPHONE ENCOUNTER
Rx Refill Note  Requested Prescriptions     Pending Prescriptions Disp Refills    losartan (COZAAR) 25 MG tablet 60 tablet 0     Sig: Take 1 tablet by mouth 2 (Two) Times a Day.      Last filled:09/10/24  Last office visit with prescribing clinician: 8/29/2024      Next office visit with prescribing clinician: 12/19/2024     Renetta Ross MA  10/21/24, 16:05 EDT    Sent the Rx to patients pharmacy with 2 refills.

## 2024-10-22 NOTE — TELEPHONE ENCOUNTER
Called Pt's previous pharmacy where Rx Lunesta 2MG was sent to. Pharmacy stated this Rx was filled at a different [facility] ([phone]) Pharmacy stated it looks like it was filled for #55 tablets on 9/9, but pharmacy advised me to call that pharmacy b/c it looks like it could have also been filled for 5 tablets on 8/30.

## 2024-10-22 NOTE — TELEPHONE ENCOUNTER
"Called Pt to inform provider's message \"Per [facility], Pt had #55 Lunesta filled 9/9, so she should have enough to last until her f/u on 10/31. We will discuss refill at that time.\"    Pt stated pharmacy only gave her 30 tablets on 9/9.        "

## 2024-10-22 NOTE — TELEPHONE ENCOUNTER
Called Pt's previous pharmacy where Rx Lunesta 2MG was last sent to. Pharmacy stated Pt received #55 tablets on 9/20.     Sig: Take 1 tablet by mouth At Night As Needed for Sleep. Take immediately before bedtime

## 2024-10-23 NOTE — TELEPHONE ENCOUNTER
Called Pt NA LVM regarding the Rx refill on Lunesta 2MG. Advised Pt Provider will discuss Rx refill at next f/u appt on 10/31.    Advised Pt to call clinic back w/ any questions or concerns.

## 2024-10-23 NOTE — TELEPHONE ENCOUNTER
Per pharmacy Pt received #55 tablets on 9/9 and per [facility]. Will advise Pt Provider will discuss Rx refill at f/u appt on 10/31   55

## 2024-10-31 ENCOUNTER — OFFICE VISIT (OUTPATIENT)
Age: 59
End: 2024-10-31
Payer: COMMERCIAL

## 2024-10-31 VITALS
BODY MASS INDEX: 36.02 KG/M2 | SYSTOLIC BLOOD PRESSURE: 128 MMHG | HEART RATE: 60 BPM | WEIGHT: 229.5 LBS | DIASTOLIC BLOOD PRESSURE: 80 MMHG | OXYGEN SATURATION: 99 % | HEIGHT: 67 IN

## 2024-10-31 DIAGNOSIS — F41.9 ANXIETY: ICD-10-CM

## 2024-10-31 DIAGNOSIS — F51.05 INSOMNIA DUE TO OTHER MENTAL DISORDER: ICD-10-CM

## 2024-10-31 DIAGNOSIS — F31.9 BIPOLAR 1 DISORDER: Primary | ICD-10-CM

## 2024-10-31 DIAGNOSIS — F99 INSOMNIA DUE TO OTHER MENTAL DISORDER: ICD-10-CM

## 2024-10-31 RX ORDER — LAMOTRIGINE 100 MG/1
TABLET ORAL
Qty: 180 TABLET | Refills: 0 | Status: SHIPPED | OUTPATIENT
Start: 2024-10-31

## 2024-10-31 RX ORDER — LUMATEPERONE 10.5 MG/1
10 CAPSULE ORAL DAILY
Qty: 7 CAPSULE | Refills: 0 | COMMUNITY
Start: 2024-10-31

## 2024-10-31 RX ORDER — LUMATEPERONE 10.5 MG/1
10.5 CAPSULE ORAL DAILY
Qty: 14 CAPSULE | Refills: 0 | COMMUNITY
Start: 2024-10-31

## 2024-10-31 RX ORDER — ESZOPICLONE 2 MG/1
2 TABLET, FILM COATED ORAL NIGHTLY PRN
Qty: 30 TABLET | Refills: 0 | Status: SHIPPED | OUTPATIENT
Start: 2024-10-31
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
## 2024-12-03 DIAGNOSIS — I10 ESSENTIAL HYPERTENSION: ICD-10-CM

## 2024-12-05 RX ORDER — HYDROCHLOROTHIAZIDE 25 MG/1
25 TABLET ORAL DAILY
Qty: 90 TABLET | Refills: 3 | Status: SHIPPED | OUTPATIENT
Start: 2024-12-05

## 2024-12-05 RX ORDER — LANCETS 30 GAUGE
EACH MISCELLANEOUS
Qty: 100 EACH | Refills: 3 | Status: SHIPPED | OUTPATIENT
Start: 2024-12-05

## 2024-12-14 DIAGNOSIS — F41.9 ANXIETY: ICD-10-CM

## 2024-12-14 DIAGNOSIS — F31.9 BIPOLAR 1 DISORDER: ICD-10-CM

## 2024-12-14 DIAGNOSIS — F51.05 INSOMNIA DUE TO OTHER MENTAL DISORDER: ICD-10-CM

## 2024-12-14 DIAGNOSIS — F99 INSOMNIA DUE TO OTHER MENTAL DISORDER: ICD-10-CM

## 2024-12-14 DIAGNOSIS — E11.42 DIABETIC PERIPHERAL NEUROPATHY: ICD-10-CM

## 2024-12-14 RX ORDER — PREGABALIN 100 MG/1
100 CAPSULE ORAL 3 TIMES DAILY
Qty: 90 CAPSULE | Refills: 5 | Status: CANCELLED | OUTPATIENT
Start: 2024-12-14 | End: 2025-12-14

## 2024-12-16 DIAGNOSIS — F31.9 BIPOLAR 1 DISORDER: ICD-10-CM

## 2024-12-16 DIAGNOSIS — F41.9 ANXIETY: ICD-10-CM

## 2024-12-16 RX ORDER — LAMOTRIGINE 100 MG/1
TABLET ORAL
Qty: 180 TABLET | Refills: 0 | Status: SHIPPED | OUTPATIENT
Start: 2024-12-16 | End: 2024-12-19

## 2024-12-16 RX ORDER — ESZOPICLONE 2 MG/1
2 TABLET, FILM COATED ORAL NIGHTLY PRN
Qty: 30 TABLET | Refills: 0 | Status: SHIPPED | OUTPATIENT
Start: 2024-12-16

## 2024-12-17 RX ORDER — LAMOTRIGINE 100 MG/1
TABLET ORAL
Qty: 180 TABLET | Refills: 0 | OUTPATIENT
Start: 2024-12-17

## 2024-12-17 NOTE — TELEPHONE ENCOUNTER
Medication sent to [facility] pharmacy yesterday. See [employer] message.

## 2024-12-18 ENCOUNTER — TELEPHONE (OUTPATIENT)
Dept: FAMILY MEDICINE CLINIC | Facility: CLINIC | Age: 59
End: 2024-12-18

## 2024-12-18 RX ORDER — FLUCONAZOLE 150 MG/1
150 TABLET ORAL ONCE
Qty: 1 TABLET | Refills: 0 | Status: SHIPPED | OUTPATIENT
Start: 2024-12-18 | End: 2024-12-19 | Stop reason: ALTCHOICE

## 2024-12-18 NOTE — TELEPHONE ENCOUNTER
"Caller: Marcela Ramírez \"Helen\"    Relationship: Self    Best call back number: [phone] GIVE 1 MINUTE BEFORE ANSWERING    What medication are you requesting: DIFLUCAN    What are your current symptoms: YEAST INFECTION    How long have you been experiencing symptoms: 1 DAY    Have you had these symptoms before:    [x] Yes  [] No    Have you been treated for these symptoms before:   [x] Yes  [] No    If a prescription is needed, what is your preferred pharmacy and phone number:    [facility] [phone]  Additional notes:    PATIENT HAS FINISHED ANTIBIOTIC AND NOW HAS A YEAST INFECTION. PLEASE ADVISE    "

## 2024-12-19 ENCOUNTER — OFFICE VISIT (OUTPATIENT)
Dept: NEUROLOGY | Facility: CLINIC | Age: 59
End: 2024-12-19
Payer: COMMERCIAL

## 2024-12-19 VITALS
BODY MASS INDEX: 35.94 KG/M2 | HEART RATE: 63 BPM | SYSTOLIC BLOOD PRESSURE: 124 MMHG | HEIGHT: 67 IN | DIASTOLIC BLOOD PRESSURE: 82 MMHG | WEIGHT: 229 LBS | OXYGEN SATURATION: 98 %

## 2024-12-19 DIAGNOSIS — F31.60 BIPOLAR AFFECTIVE DISORDER, CURRENT EPISODE MIXED, CURRENT EPISODE SEVERITY UNSPECIFIED: ICD-10-CM

## 2024-12-19 DIAGNOSIS — G56.03 BILATERAL CARPAL TUNNEL SYNDROME: ICD-10-CM

## 2024-12-19 DIAGNOSIS — E11.42 DIABETIC PERIPHERAL NEUROPATHY: Primary | ICD-10-CM

## 2024-12-19 PROCEDURE — 99214 OFFICE O/P EST MOD 30 MIN: CPT | Performed by: PSYCHIATRY & NEUROLOGY

## 2024-12-19 RX ORDER — BACLOFEN 10 MG/1
TABLET ORAL
Qty: 60 TABLET | Refills: 11 | Status: SHIPPED | OUTPATIENT
Start: 2024-12-19

## 2024-12-19 RX ORDER — LAMOTRIGINE 200 MG/1
200 TABLET ORAL 2 TIMES DAILY
Qty: 60 TABLET | Refills: 5 | Status: SHIPPED | OUTPATIENT
Start: 2024-12-19 | End: 2025-12-19

## 2024-12-19 RX ORDER — PREGABALIN 100 MG/1
100 CAPSULE ORAL 3 TIMES DAILY
Qty: 90 CAPSULE | Refills: 5 | Status: SHIPPED | OUTPATIENT
Start: 2024-12-19 | End: 2025-12-19

## 2024-12-19 NOTE — PROGRESS NOTES
Subjective:    CC: Marcela Ramírez is seen today for Diabetic peripheral neuropathy       Current visit-patient states that she still has some pain in her feet with prolonged standing but overall her symptoms are well-controlled as long as she takes her Lyrica on time along with baclofen 10 mg twice daily.  Her cramps have definitely improved since starting baclofen.  Her last A1c was still 5.3, LDL was 5.3 and B12 was elevated.  She tells me today that some depressive symptoms due to her daughter's health and one of her sons and a recovering addict.  Her other son who is currently unemployed has also started living with them.  She had mentioned this to her psychiatrist who started her on a new medication  that made her very jittery therefore she stopped taking it.  Continues to take lamotrigine 200/100 mg but feels that she would benefit from a higher dose.      Last visit-patient states that the muscle spasms in the bottom of her feet and her legs have improved since starting baclofen currently at 5 mg twice daily.  She occasionally has the symptoms.  Her neuropathy symptoms are also well-controlled.  Continues to take Lyrica 100 mg 3 times daily.  Her last A1c was 5.3 (she had stopped Ozempic due to a higher co-pay but is planning to start back on it again.  Her last LDL was 65 and TSH was 4.5.  She continues to work at [employer].  With regards to her bipolar disorder her dose of lamotrigine was increased to 200/100 mg however she has been taking a dose of 200 mg twice daily which seems to help a lot.  She has now stopped taking Latuda due to side effects as well as Ingrezza that was prescribed for abnormal movements.      Last visit-she states that her neuropathy is worse as she has pain and numbness up to her knees.  Cannot stand anybody touching her legs as they hurt so bad.  Her symptoms of further worsened by standing on concrete for several hours as she works at [employer].  By mid afternoon the bottoms of her  feet started to cramp up.  At times they cramp at night as well preventing her from sleeping.  She continues to take Lyrica 100 mg 3 times daily.  Her diabetes is well-controlled.  She stopped taking the Neupro patch as her restless leg symptoms were better.  With regards to her carpal tunnel she has not been wearing her braces as she cannot sleep with them at night and also cannot work with them during the daytime.  She has also stopped taking Latuda for her bipolar disorder as it was causing abnormal movements.  Continues to be on Lamictal 200 mg daily.  Was also started on Ingrezza for possible tar dive dyskinesia.     Last visit-patient states that the pain in her feet and her RLS symptoms have improved since increasing Lyrica to 100 mg 3 times daily however she still gets pain at night or while sitting down specially in her right foot.  She does not have to use the Neupro patch regularly anymore.  Her diabetes is also much better controlled as she was started on Ozempic.  Her last A1c was 5.4 and LDL was 63.  She has also been started on Latuda recently for her bipolar disorder which she takes in addition to Xanax 1 mg nightly that helps her sleep and also helps with her neuropathy symptoms.  For her carpal tunnel she does not wear wrist braces anymore as her wrists have stopped hurting even though she is constantly working with boxes (works at [employer]).    Initial [id number]-old female with a history of hypertension, bipolar disorder, hyperlipidemia, diabetes mellitus type 2, obesity status post weight loss surgery, hypothyroidism presents with symptoms of neuropathy/RLS.  As per patient she started having burning pain in her feet several years ago.  It has worsened over time.  She previously tried gabapentin and is currently on Lyrica 75 mg 3 times daily which helps some however she is still getting severe pain in her feet more so on on the right as well as cramping of the feet.  She cannot withstand the  air or the sheets touching her feet.  She also has some numbness.  She states that her symptoms can worsen with prolonged standing at work as she has to wear safety shoes (works at [employer]).  Her last A1c was fairly well controlled at 6.2.  TSH was normal.  She has been started on Wegovy recently for weight loss.  She also reports to having abnormal sensations in her legs.  Was diagnosed with RLS and initially started on Requip which along with Lyrica caused side effects.  Was subsequently started on Neupro patch 1 mg daily which is helping.  Last ferritin level was normal at 88.  She had an EMG/NCS in 2018 that showed moderate sensorimotor axonal/demyelinating neuropathy with moderate bilateral carpal tunnel and mild ulnar neuropathy bilaterally.  She does not wear wrist braces.  Of note-I reviewed Gladys's notes as follows-    57 y.o. female with a history of DM who comes to clinic today for evaluation of neuropathy. She initially noted symptoms in 2015 marked by numbness, paresthesias, and shooting pain in her upper and lower extremities bilaterally. This has worsened over time. She notes associated balance impairment as well as decreased  strength, though denies any clear associated weakness. Her symptoms are worse with increased activity. She is currently taking Lyrica, which is somewhat beneficial. She has previously taken GBP.     She also notes RLS symptoms in her feet primarily at night. She is currently taking neupro 1mg daily. She previously tried ropinirole.      Prior evaluation has included an EMG of the upper and lower extremities bilaterally which was notable for a moderate axonal and demyelinating peripheral neuropathy, moderate CTS bilaterally, mild-moderate ulnar neuropathy on the left and mild ulnar neuropathy on the right. Screening bloodwork was notable for a hemoglobin A1C of 7.3.     Today: Since her last visit in 2/22, she notes that her numbness, paresthesias and shooting pains have  worsened and now radiate up to her thighs bilaterally. Lyrica 75mg TID has been beneficial. She has not consumed alcohol in approximately 9 months.    The following portions of the patient's history were reviewed today and updated as of 06/08/2023  : allergies, current medications, past family history, past medical history, past social history, past surgical history, and problem list  These document will be scanned to patient's chart.      Current Outpatient Medications:     albuterol sulfate  (90 Base) MCG/ACT inhaler, Inhale 2 puffs Every 4 (Four) Hours As Needed for Wheezing or Shortness of Air., Disp: 18 g, Rfl: 0    ALPRAZolam (Xanax) 0.5 MG tablet, Take 1 tablet by mouth At Night As Needed for Sleep., Disp: 10 tablet, Rfl: 0    atorvastatin (LIPITOR) 20 MG tablet, TAKE 1 TABLET BY MOUTH DAILY, Disp: 90 tablet, Rfl: 3    baclofen (LIORESAL) 10 MG tablet, Take 1 tablet twice a day, Disp: 60 tablet, Rfl: 11    eszopiclone (Lunesta) 2 MG tablet, Take 1 tablet by mouth At Night As Needed for Sleep. Take immediately before bedtime, Disp: 30 tablet, Rfl: 0    glucose blood test strip, Use as instructed to check glucose twice a day Dispense Onetouch ultra 2, Disp: 100 each, Rfl: 3    hydroCHLOROthiazide 25 MG tablet, Take 1 tablet by mouth Daily., Disp: 90 tablet, Rfl: 3    Lancets misc, Use to check glucose twice a day Dispense Onetouch ultra 2, Disp: 100 each, Rfl: 3    losartan (COZAAR) 25 MG tablet, Take 1 tablet by mouth 2 (Two) Times a Day., Disp: 60 tablet, Rfl: 2    Lumateperone Tosylate (Caplyta) 10.5 MG capsule, Take 1 capsule by mouth Daily., Disp: 14 capsule, Rfl: 0    omeprazole (priLOSEC) 20 MG capsule, TAKE ONE CAPSULE BY MOUTH DAILY, Disp: 30 capsule, Rfl: 11    pregabalin (Lyrica) 100 MG capsule, Take 1 capsule by mouth 3 (Three) Times a Day., Disp: 90 capsule, Rfl: 5    tamsulosin (FLOMAX) 0.4 MG capsule 24 hr capsule, Take 1 capsule by mouth Daily., Disp: 30 capsule, Rfl: 5    lamoTRIgine  (LaMICtal) 200 MG tablet, Take 1 tablet by mouth 2 (Two) Times a Day., Disp: 60 tablet, Rfl: 5    Current Facility-Administered Medications:     cyanocobalamin injection 1,000 mcg, 1,000 mcg, Intramuscular, Q28 Days, Zeynep Jenkins, APRN, 1,000 mcg at 08/24/23 1435   Past Medical History:   Diagnosis Date    Arthritis     knees, otc arthritis meds prn, no h/o injections.     Asthma     has not required inhaler    Bilateral ovarian cysts     Bipolar 1 disorder     Boil     opens them herself    Breast injury 07/13/2021    bruise on lateral rt breast from fall    Carpal tunnel syndrome     Chronic pain disorder     CKD (chronic kidney disease), symptom management only, stage 3 (moderate)     Creatinine 1.04 GFR 56    Colon polyp     Depression     Diabetes mellitus     Diagnosed 2017, was on metformin in the past. Last A1C 5.5%    Diverticulosis     Fatigue     GERD (gastroesophageal reflux disease)     controlled with omeprazole 20mg. Remote EGD- esophagitis.  EGD no hiatal hernia Z line 40 cm very thick mucosal folds cannot rule out varices.  CT scan thickened fundus, no varices seen    Headache     Headache, tension-type [id number]    History of bladder infections     History of blood transfusion 2006    2/2 heavy menses    History of blood transfusion     History of bronchitis     HL (hearing loss) 8755857    Hyperlipidemia     Hypertension     Hypothyroidism     Lower extremity edema     Memory loss 1994    Migraine [id number]    Morbid obesity with BMI of 40.0-44.9, adult     Neuropathy in diabetes     Not sure    Peripheral neuropathy     2/2 DM    RLS (restless legs syndrome)     S/P spenser     S/P cholecystectomy     2/2 cholelithiasis    Shortness of breath on exertion     Stroke 2012    Unsure of date    Thyroid disease     TIA (transient ischemic attack)     patient states 8-9 episodes of right sided drooping/ weakness/ vision changes. Workup was negative for CVA- thought to be related to anxiety. last episode  12/2017    Vision loss       Past Surgical History:   Procedure Laterality Date    CEREBRAL ANGIOGRAM      COLONOSCOPY  remote    diverticulosis    ENDOMETRIAL ABLATION      ENDOSCOPY  remote    esophagitis     ENDOSCOPY N/A 08/22/2019    Procedure: ESOPHAGOGASTRODUODENOSCOPY;  Surgeon: Guido Greenberg MD;  Location:  [facility];  Service: Bariatric    GASTRIC SLEEVE LAPAROSCOPIC N/A 08/22/2019    Procedure: GASTRIC SLEEVE LAPAROSCOPIC;  Surgeon: Guido Greenberg MD;  Location:  [facility];  Service: Bariatric    KNEE ACL RECONSTRUCTION Right 2013    with miniscal repair    LAPAROSCOPIC CHOLECYSTECTOMY  2001    cholelithiasis    LAPAROSCOPIC HYSTERECTOMY  2013    right ovary remains    PARAESOPHAGEAL HERNIA REPAIR N/A 08/22/2019    Procedure: PARAESOPHAGEAL HERNIA REPAIR LAPAROSCOPIC;  Surgeon: Guido Greenberg MD;  Location:  [facility];  Service: Bariatric    SINUS SURGERY      TUBAL ABDOMINAL LIGATION        Family History   Problem Relation Age of Onset    Arthritis Mother     Arthritis Father     Diabetes Father     Hyperlipidemia Father     Hypertension Father     Neuropathy Father     Arthritis Sister     Diabetes Brother     Hypertension Brother     Asthma Brother     Other Brother     Obesity Brother     Obesity Daughter     Hypertension Daughter     Depression Daughter     Cancer Maternal Aunt     Depression Paternal Uncle     Cancer Paternal Grandmother     Heart disease Paternal Grandfather     Hypertension Paternal Grandfather     Breast cancer Neg Hx     Ovarian cancer Neg Hx       Social History     Socioeconomic History    Marital status:    Tobacco Use    Smoking status: Never     Passive exposure: Never    Smokeless tobacco: Never   Vaping Use    Vaping status: Never Used   Substance and Sexual Activity    Alcohol use: Not Currently     Comment: Over a year ago. Before then, not often    Drug use: Not Currently     Types: Cocaine(coke), LSD, Marijuana, Psilocybin     Comment: At least  "18 months or more for most listed    Sexual activity: Yes     Partners: Male     Birth control/protection: Hysterectomy, Partner of same sex, Surgical     Comment: no hormones     Review of Systems   Neurological:  Positive for numbness.   All other systems reviewed and are negative.      Objective:    /82   Pulse 63   Ht 170.2 cm (67\")   Wt 104 kg (229 lb)   LMP  (LMP Unknown)   SpO2 98%   BMI 35.87 kg/m²     Neurology Exam:    General apperance: Obese    Mental status: Alert, awake and oriented to time place and person.    Recent and Remote memory: Intact.    Attention span and Concentration: Normal.     Language and Speech: Intact- No dysarthria.    Fluency, Naming , Repitition and Comprehension:  Intact    Cranial Nerves:   CN II: Visual fields are full. Intact. Fundi - Normal, No papillederma, Pupils - LEV  CN III, IV and VI: Extraocular movements are intact. Normal saccades.   CN V: Facial sensation is intact.   CN VII: Muscles of facial expression reveal no asymmetry. Intact.   CN VIII: Hearing is intact. Whispered voice intact.   CN IX and X: Palate elevates symmetrically. Intact  CN XI: Shoulder shrug is intact.   CN XII: Tongue is midline without evidence of atrophy or fasciculation.     Ophthalmoscopic exam of optic disc-normal    Motor:  Right UE muscle strength 5/5. Normal tone.     Left UE muscle strength 5/5. Normal tone.      Right LE muscle strength5/5. Normal tone.     Left LE muscle strength 5/5. Normal tone.      Sensory: Reduced to pinprick in the first 3 fingers of both hands as well as in the feet up to level of ankle with markedly reduced vibration/temperature sensation    DTRs: 2+ bilaterally in upper and lower extremities.    Babinski: Negative bilaterally.    Co-ordination: Normal finger-to-nose, heel to shin B/L.    Rhomberg: Negative.    Gait: Normal.  Could do tandem walking    Cardiovascular: Regular rate and rhythm without murmur, gallop or rub.    Assessment and " Plan:  1. Diabetic peripheral neuropathy  Last A1c was 5.3  She can continue Lyrica  100 mg 3 times daily and baclofen 10 mg twice daily    2.  Bipolar disorder  As she still has depressive symptoms I will increase her lamotrigine to 200 mg twice daily and will give her refills till she sees her psychiatrist    3. Bilateral carpal tunnel syndrome  EMG/NCS showed bilateral moderate carpal tunnel.  She should try to wear wrist braces during the day if possible      Return in about 8 months (around 8/19/2025).     25 minutes out of which 18 minutes was spent face-to-face    Katelin Landis MD

## 2024-12-30 DIAGNOSIS — R39.11 URINARY HESITANCY: ICD-10-CM

## 2024-12-30 RX ORDER — TAMSULOSIN HYDROCHLORIDE 0.4 MG/1
1 CAPSULE ORAL DAILY
Qty: 30 CAPSULE | Refills: 0 | Status: SHIPPED | OUTPATIENT
Start: 2024-12-30

## 2025-01-13 RX ORDER — LOSARTAN POTASSIUM 25 MG/1
25 TABLET ORAL 2 TIMES DAILY
Qty: 60 TABLET | Refills: 0 | OUTPATIENT
Start: 2025-01-13

## 2025-01-13 NOTE — TELEPHONE ENCOUNTER
Rx Refill Note  Requested Prescriptions     Pending Prescriptions Disp Refills    losartan (COZAAR) 25 MG tablet [Pharmacy Med Name: Losartan Potassium 25 MG Oral Tablet] 60 tablet 0     Sig: Take 1 tablet by mouth twice daily      Last filled:10/21/24  Last office visit with prescribing clinician: 12/19/2024      Next office visit with prescribing clinician: Visit date not found     Renetta Ross MA  01/13/25, 16:23 EST    Patient needs to receive refills from PCP like previous prescription stated

## 2025-01-18 DIAGNOSIS — F99 INSOMNIA DUE TO OTHER MENTAL DISORDER: ICD-10-CM

## 2025-01-18 DIAGNOSIS — F31.9 BIPOLAR 1 DISORDER: ICD-10-CM

## 2025-01-18 DIAGNOSIS — F51.05 INSOMNIA DUE TO OTHER MENTAL DISORDER: ICD-10-CM

## 2025-01-20 RX ORDER — ESZOPICLONE 2 MG/1
2 TABLET, FILM COATED ORAL NIGHTLY PRN
Qty: 30 TABLET | Refills: 0 | Status: SHIPPED | OUTPATIENT
Start: 2025-01-20

## 2025-01-20 RX ORDER — LOSARTAN POTASSIUM 25 MG/1
25 TABLET ORAL 2 TIMES DAILY
Qty: 60 TABLET | Refills: 2 | Status: SHIPPED | OUTPATIENT
Start: 2025-01-20

## 2025-01-21 RX ORDER — LOSARTAN POTASSIUM 25 MG/1
25 TABLET ORAL 2 TIMES DAILY
Qty: 60 TABLET | Refills: 0 | OUTPATIENT
Start: 2025-01-21

## 2025-01-21 NOTE — TELEPHONE ENCOUNTER
Rx Refill Note  Requested Prescriptions     Pending Prescriptions Disp Refills    losartan (COZAAR) 25 MG tablet [Pharmacy Med Name: Losartan Potassium 25 MG Oral Tablet] 60 tablet 0     Sig: Take 1 tablet by mouth twice daily      Last filled:1/20/25   Last office visit with prescribing clinician: 12/19/2024      Next office visit with prescribing clinician: Visit date not found     RAFA ARROYO GOVIND  01/21/25, 11:20 EST    The original prescription was reordered on 1/20/2025 by Diamond Yadav MA. Renewing this prescription may not be appropriate.       Duplicate request

## 2025-01-24 ENCOUNTER — TELEPHONE (OUTPATIENT)
Dept: UROLOGY | Facility: CLINIC | Age: 60
End: 2025-01-24
Payer: COMMERCIAL

## 2025-01-24 NOTE — TELEPHONE ENCOUNTER
----- Message from Miriam BRITTON sent at 1/24/2025  1:05 PM EST -----  Per the last refill encounter on 12-30-24, Dr. Vazquez has asked that the pt be seen by a a NANCY for her medication refill. Please reschedule pt with NANCY.    Miriam

## 2025-01-27 ENCOUNTER — TELEPHONE (OUTPATIENT)
Age: 60
End: 2025-01-27
Payer: COMMERCIAL

## 2025-01-27 NOTE — TELEPHONE ENCOUNTER
"Relay     \"LVM for PT that office had received her reschedule request for her upcoming appointment with Dr. Vazquez for 01/30/25. HUB - ok to reschedule. \"                 "

## 2025-01-27 NOTE — TELEPHONE ENCOUNTER
Left PT another VM and sent her a letter about rescheduling her appt. Per provider PT can see Marga or Carolyn for med refill.

## 2025-01-29 DIAGNOSIS — R39.11 URINARY HESITANCY: ICD-10-CM

## 2025-01-29 RX ORDER — TAMSULOSIN HYDROCHLORIDE 0.4 MG/1
1 CAPSULE ORAL DAILY
Qty: 90 CAPSULE | Refills: 1 | Status: SHIPPED | OUTPATIENT
Start: 2025-01-29

## 2025-01-29 NOTE — TELEPHONE ENCOUNTER
Rx Refill Note  Requested Prescriptions     Pending Prescriptions Disp Refills    tamsulosin (FLOMAX) 0.4 MG capsule 24 hr capsule [Pharmacy Med Name: Tamsulosin HCl 0.4 MG Oral Capsule] 30 capsule 0     Sig: Take 1 capsule by mouth once daily      Last office visit with prescribing clinician: 7/13/2023   Next office visit with prescribing clinician: Visit date not found       Chelsy Orozco MA  01/29/25, 07:32 EST

## 2025-02-06 ENCOUNTER — TELEMEDICINE (OUTPATIENT)
Age: 60
End: 2025-02-06
Payer: COMMERCIAL

## 2025-02-06 VITALS — BODY MASS INDEX: 35.87 KG/M2 | WEIGHT: 229 LBS

## 2025-02-06 DIAGNOSIS — F99 INSOMNIA DUE TO OTHER MENTAL DISORDER: ICD-10-CM

## 2025-02-06 DIAGNOSIS — F51.05 INSOMNIA DUE TO OTHER MENTAL DISORDER: ICD-10-CM

## 2025-02-06 DIAGNOSIS — Z51.81 ENCOUNTER FOR THERAPEUTIC DRUG MONITORING: ICD-10-CM

## 2025-02-06 DIAGNOSIS — F31.9 BIPOLAR 1 DISORDER: Primary | ICD-10-CM

## 2025-02-06 DIAGNOSIS — F41.9 ANXIETY: ICD-10-CM
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
## 2025-02-08 DIAGNOSIS — E11.42 DIABETIC PERIPHERAL NEUROPATHY: ICD-10-CM

## 2025-02-08 DIAGNOSIS — F31.9 BIPOLAR 1 DISORDER: ICD-10-CM

## 2025-02-08 DIAGNOSIS — F51.05 INSOMNIA DUE TO OTHER MENTAL DISORDER: ICD-10-CM

## 2025-02-08 DIAGNOSIS — F99 INSOMNIA DUE TO OTHER MENTAL DISORDER: ICD-10-CM

## 2025-02-08 RX ORDER — ESZOPICLONE 2 MG/1
2 TABLET, FILM COATED ORAL NIGHTLY PRN
Qty: 30 TABLET | Refills: 0 | Status: CANCELLED | OUTPATIENT
Start: 2025-02-08

## 2025-02-10 DIAGNOSIS — F51.05 INSOMNIA DUE TO OTHER MENTAL DISORDER: ICD-10-CM

## 2025-02-10 DIAGNOSIS — F31.9 BIPOLAR 1 DISORDER: ICD-10-CM

## 2025-02-10 DIAGNOSIS — F99 INSOMNIA DUE TO OTHER MENTAL DISORDER: ICD-10-CM

## 2025-02-10 RX ORDER — PREGABALIN 100 MG/1
100 CAPSULE ORAL 3 TIMES DAILY
Qty: 90 CAPSULE | Refills: 5 | OUTPATIENT
Start: 2025-02-10 | End: 2026-02-10

## 2025-02-10 RX ORDER — ESZOPICLONE 2 MG/1
2 TABLET, FILM COATED ORAL NIGHTLY PRN
Qty: 30 TABLET | Refills: 0 | Status: SHIPPED | OUTPATIENT
Start: 2025-02-18

## 2025-02-10 NOTE — TELEPHONE ENCOUNTER
Rx Refill Note  Requested Prescriptions     Pending Prescriptions Disp Refills    pregabalin (Lyrica) 100 MG capsule 90 capsule 5     Sig: Take 1 capsule by mouth 3 (Three) Times a Day.      Last filled:12/19/24  Last office visit with prescribing clinician: 12/19/2024      Next office visit with prescribing clinician: Visit date not found     Renetta Ross MA  02/10/25, 15:21 EST    Denied- Last filled on 12/19/24 with a quantity of 90 with 5 additional refills. Please contact your pharmacy for refills before contacting our office to ensure multiple requests are not sent into pharmacy.  Once you run out of refills then let us know within 4/5 days of running out of medication and we will send in a new script.

## 2025-02-11 NOTE — TELEPHONE ENCOUNTER
Called PT to inform Rx refill for 2 mg Lunesta was sen to [facility] off [address]. Informed PT Rx is post dated for 2/18/25 so she will be able to full this Rx then. PT V/U

## 2025-02-19 DIAGNOSIS — E11.42 DIABETIC PERIPHERAL NEUROPATHY: ICD-10-CM

## 2025-02-19 RX ORDER — PREGABALIN 100 MG/1
100 CAPSULE ORAL 3 TIMES DAILY
Qty: 90 CAPSULE | Refills: 5 | OUTPATIENT
Start: 2025-02-19 | End: 2026-02-19

## 2025-02-19 NOTE — TELEPHONE ENCOUNTER
Rx Refill Note  Requested Prescriptions     Pending Prescriptions Disp Refills    pregabalin (Lyrica) 100 MG capsule 90 capsule 5     Sig: Take 1 capsule by mouth 3 (Three) Times a Day.      Last filled:12/19/24  Last office visit with prescribing clinician: 12/19/2024      Next office visit with prescribing clinician: Visit date not found     Renetta Ross MA  02/19/25, 08:39 EST    Denied-Last filled 12/19/24 90 day supply with 5 additional refills.  Patient should have at least 3 refills remaining.

## 2025-02-20 ENCOUNTER — OFFICE VISIT (OUTPATIENT)
Dept: UROLOGY | Facility: CLINIC | Age: 60
End: 2025-02-20
Payer: COMMERCIAL

## 2025-02-20 VITALS — HEART RATE: 56 BPM | OXYGEN SATURATION: 99 %

## 2025-02-20 DIAGNOSIS — N95.2 VAGINAL ATROPHY: ICD-10-CM

## 2025-02-20 DIAGNOSIS — R39.11 URINARY HESITANCY: Primary | ICD-10-CM

## 2025-02-20 LAB
BILIRUB BLD-MCNC: NEGATIVE MG/DL
CLARITY, POC: CLEAR
COLOR UR: YELLOW
EXPIRATION DATE: NORMAL
GLUCOSE UR STRIP-MCNC: NEGATIVE MG/DL
KETONES UR QL: NEGATIVE
LEUKOCYTE EST, POC: NEGATIVE
Lab: NORMAL
NITRITE UR-MCNC: NEGATIVE MG/ML
PH UR: 6.5 [PH] (ref 5–8)
PROT UR STRIP-MCNC: NEGATIVE MG/DL
RBC # UR STRIP: NEGATIVE /UL
SP GR UR: 1.01 (ref 1–1.03)
UROBILINOGEN UR QL: NORMAL

## 2025-02-20 RX ORDER — TAMSULOSIN HYDROCHLORIDE 0.4 MG/1
1 CAPSULE ORAL DAILY
Qty: 90 CAPSULE | Refills: 1 | Status: SHIPPED | OUTPATIENT
Start: 2025-02-20

## 2025-02-20 RX ORDER — ESTRADIOL 0.1 MG/G
CREAM VAGINAL
Qty: 42.5 G | Refills: 6 | Status: SHIPPED | OUTPATIENT
Start: 2025-02-20

## 2025-02-20 NOTE — PROGRESS NOTES
[facility] Female Office Visit      Patient Name: Marcela Ramírez  : 1965   MRN: [id number]     Chief Complaint:  Lower Urinary Tract Symptoms.   Chief Complaint   Patient presents with    Urinary Hesitancy     FLOMAX refill needed       Referring Provider: No ref. provider found    History of Present Illness: Ms. Ramírez is a 59 y.o. female with past medical history including hypertension, hyperlipidemia, type 2 diabetes, hypothyroidism, GERD, stage III chronic kidney disease, depression, anxiety and bipolar disorder.  Patient reports history of gastric bypass in 2019 by Dr. Greenberg.  She presents to urology for follow-up regarding lower urinary tract symptoms.      Patient continues Flomax daily and reports significant  improvement with urinary hesitancy and sensation of incomplete bladder emptying.  Patient denies dysuria, gross hematuria or kidney stones. She does report vaginal dryness and dyspareunia.     Patient denies history of UTIs.Urinalysis negative for infection or blood.     Patient denies history of breast or gynecological carcinoma.      Subjective      Review of System: Review of Systems   All other systems reviewed and are negative.     I have reviewed the ROS documented by my clinical staff, I have updated appropriately and I agree. NOE Bender    Past Medical History:  Past Medical History:   Diagnosis Date    Arthritis     knees, otc arthritis meds prn, no h/o injections.     Asthma     has not required inhaler    Bilateral ovarian cysts     Bipolar 1 disorder     Boil     opens them herself    Breast injury 2021    bruise on lateral rt breast from fall    Carpal tunnel syndrome     Chronic pain disorder     CKD (chronic kidney disease), symptom management only, stage 3 (moderate)     Creatinine 1.04 GFR 56    Colon polyp     Depression     Diabetes mellitus     Diagnosed 2017, was on metformin in the past. Last A1C 5.5%    Diverticulosis     Fatigue     GERD (gastroesophageal  reflux disease)     controlled with omeprazole 20mg. Remote EGD- esophagitis.  EGD no hiatal hernia Z line 40 cm very thick mucosal folds cannot rule out varices.  CT scan thickened fundus, no varices seen    Headache     Headache, tension-type [id number]    History of bladder infections     History of blood transfusion 2006    2/2 heavy menses    History of blood transfusion     History of bronchitis     HL (hearing loss) [id number]    Hyperlipidemia     Hypertension     Hypothyroidism     Lower extremity edema     Memory loss 1994    Migraine [id number]    Morbid obesity with BMI of 40.0-44.9, adult     Neuropathy in diabetes     Not sure    Peripheral neuropathy     2/2 DM    RLS (restless legs syndrome)     S/P spenser     S/P cholecystectomy     2/2 cholelithiasis    Shortness of breath on exertion     Stroke 2012    Unsure of date    Thyroid disease     TIA (transient ischemic attack)     patient states 8-9 episodes of right sided drooping/ weakness/ vision changes. Workup was negative for CVA- thought to be related to anxiety. last episode 12/2017    Vision loss        Past Surgical History:  Past Surgical History:   Procedure Laterality Date    CEREBRAL ANGIOGRAM      COLONOSCOPY  remote    diverticulosis    ENDOMETRIAL ABLATION      ENDOSCOPY  remote    esophagitis     ENDOSCOPY N/A 08/22/2019    Procedure: ESOPHAGOGASTRODUODENOSCOPY;  Surgeon: Guido Greenberg MD;  Location:  [facility];  Service: Bariatric    GASTRIC SLEEVE LAPAROSCOPIC N/A 08/22/2019    Procedure: GASTRIC SLEEVE LAPAROSCOPIC;  Surgeon: Guido Greenberg MD;  Location:  [facility];  Service: Bariatric    KNEE ACL RECONSTRUCTION Right 2013    with miniscal repair    LAPAROSCOPIC CHOLECYSTECTOMY  2001    cholelithiasis    LAPAROSCOPIC HYSTERECTOMY  2013    right ovary remains    PARAESOPHAGEAL HERNIA REPAIR N/A 08/22/2019    Procedure: PARAESOPHAGEAL HERNIA REPAIR LAPAROSCOPIC;  Surgeon: Guido Greenberg MD;  Location:  [facility];  Service:  Bariatric    SINUS SURGERY      TUBAL ABDOMINAL LIGATION         Medications:    Current Outpatient Medications:     albuterol sulfate  (90 Base) MCG/ACT inhaler, Inhale 2 puffs Every 4 (Four) Hours As Needed for Wheezing or Shortness of Air., Disp: 18 g, Rfl: 0    atorvastatin (LIPITOR) 20 MG tablet, TAKE 1 TABLET BY MOUTH DAILY, Disp: 90 tablet, Rfl: 3    baclofen (LIORESAL) 10 MG tablet, Take 1 tablet twice a day, Disp: 60 tablet, Rfl: 11    eszopiclone (Lunesta) 2 MG tablet, Take 1 tablet by mouth At Night As Needed for Sleep. Take immediately before bedtime, Disp: 30 tablet, Rfl: 0    glucose blood test strip, Use as instructed to check glucose twice a day Dispense Onetouch ultra 2, Disp: 100 each, Rfl: 3    hydroCHLOROthiazide 25 MG tablet, Take 1 tablet by mouth Daily., Disp: 90 tablet, Rfl: 3    lamoTRIgine (LaMICtal) 200 MG tablet, Take 1 tablet by mouth 2 (Two) Times a Day., Disp: 60 tablet, Rfl: 5    Lancets misc, Use to check glucose twice a day Dispense Onetouch ultra 2, Disp: 100 each, Rfl: 3    losartan (COZAAR) 25 MG tablet, Take 1 tablet by mouth 2 (Two) Times a Day., Disp: 60 tablet, Rfl: 2    Lumateperone Tosylate (Caplyta) 10.5 MG capsule, Take 1 capsule by mouth Daily., Disp: 14 capsule, Rfl: 0    omeprazole (priLOSEC) 20 MG capsule, Take 1 capsule by mouth once daily, Disp: 30 capsule, Rfl: 5    pregabalin (Lyrica) 100 MG capsule, Take 1 capsule by mouth 3 (Three) Times a Day., Disp: 90 capsule, Rfl: 5    tamsulosin (FLOMAX) 0.4 MG capsule 24 hr capsule, Take 1 capsule by mouth Daily., Disp: 90 capsule, Rfl: 1    estradiol (ESTRACE) 0.1 MG/GM vaginal cream, Insert pea size amount to vaginal tissue three times a week. Monday, Wednesday, Friday, Disp: 42.5 g, Rfl: 6    Current Facility-Administered Medications:     cyanocobalamin injection 1,000 mcg, 1,000 mcg, Intramuscular, Q28 Days, Zeynep Jenkins, APRN, 1,000 mcg at 08/24/23 1430    Allergies:  Allergies   Allergen Reactions     Contrast Dye (Echo Or Unknown Ct/Mr) Other (See Comments)     Nasal congestion/ snot, IV contrast only       Social History:  Social History     Socioeconomic History    Marital status:    Tobacco Use    Smoking status: Never     Passive exposure: Never    Smokeless tobacco: Never   Vaping Use    Vaping status: Never Used   Substance and Sexual Activity    Alcohol use: Not Currently     Comment: Over a year ago. Before then, not often    Drug use: Not Currently     Types: Cocaine(coke), LSD, Marijuana, Psilocybin     Comment: At least 18 months or more for most listed    Sexual activity: Yes     Partners: Male     Birth control/protection: Hysterectomy, Partner of same sex, Surgical     Comment: no hormones       Family History:  Family History   Problem Relation Age of Onset    Arthritis Mother     Arthritis Father     Diabetes Father     Hyperlipidemia Father     Hypertension Father     Neuropathy Father     Arthritis Sister     Diabetes Brother     Hypertension Brother     Asthma Brother     Other Brother     Obesity Brother     Obesity Daughter     Hypertension Daughter     Depression Daughter     Cancer Maternal Aunt     Depression Paternal Uncle     Cancer Paternal Grandmother     Heart disease Paternal Grandfather     Hypertension Paternal Grandfather     Breast cancer Neg Hx     Ovarian cancer Neg Hx          0-7 (Mild) 8-16 (Moderate) 17-28 (Severe)      PVR 0 cc  Objective     Physical Exam:   Vital Signs:   Vitals:    02/20/25 1112   Pulse: 56   SpO2: 99%     There is no height or weight on file to calculate BMI.     Physical Exam  Vitals and nursing note reviewed.   Constitutional:       Appearance: Normal appearance.   Pulmonary:      Effort: Pulmonary effort is normal.   Neurological:      Mental Status: She is alert and oriented to person, place, and time.   Psychiatric:         Mood and Affect: Mood normal.         Behavior: Behavior normal.         Labs:   Brief Urine Lab Results  (Last  result in the past 365 days)        Color   Clarity   Blood   Leuk Est   Nitrite   Protein   CREAT   Urine HCG        02/20/25 1119 Yellow   Clear   Negative   Negative   Negative   Negative                        Lab Results   Component Value Date    GLUCOSE 95 03/13/2024    CALCIUM 9.2 03/13/2024     03/13/2024    K 3.9 03/13/2024    CO2 31.0 (H) 03/13/2024     03/13/2024    BUN 18 03/13/2024    CREATININE 1.19 (H) 03/13/2024    EGFRIFAFRI 73 11/01/2019    EGFRIFNONA 39 (L) 10/19/2021    BCR 15.1 03/13/2024    ANIONGAP 11.0 03/13/2024       Lab Results   Component Value Date    WBC 7.26 03/13/2024    HGB 13.4 03/13/2024    HCT 41.0 03/13/2024    MCV 93.2 03/13/2024     03/13/2024       Images:   No Images in the past 120 days found..    Measures:   Tobacco:   Marcela Ramírez  reports that she has never smoked. She has never been exposed to tobacco smoke. She has never used smokeless tobacco.         Urine Incontinence: Patient reports that she is not currently experiencing any symptoms of urinary incontinence.       Assessment / Plan      Assessment:  Mrs. Ramírez is a 59 y.o. female who presented today with lower urinary tract symptoms. Patient reports significant improvement with Flomax.  She would like to continue this medication. We discussed vaginal estrogen cream three times weekly for vaginal dryness and dyspareunia.     Patient is understanding and agreeable with plan of care. She will alert with questions or concerns in the interim if needed.            Diagnoses and all orders for this visit:    1. Urinary hesitancy (Primary)  -     POC Urinalysis Dipstick, Automated  -     tamsulosin (FLOMAX) 0.4 MG capsule 24 hr capsule; Take 1 capsule by mouth Daily.  Dispense: 90 capsule; Refill: 1    2. Vaginal atrophy  -     estradiol (ESTRACE) 0.1 MG/GM vaginal cream; Insert pea size amount to vaginal tissue three times a week. Monday, Wednesday, Friday  Dispense: 42.5 g; Refill: 6            Follow Up:   Return in about 1 year (around 2/20/2026) for Next scheduled follow up.    I spent approximately 40 minutes providing clinical care for this patient; including review of patient's chart and provider documentation, face to face time spent with patient in examination room (obtaining history, performing physical exam, discussing diagnosis and management options), placing orders, and completing patient documentation.     NOE Bender  [facility] [place]

## 2025-03-25 ENCOUNTER — OFFICE VISIT (OUTPATIENT)
Dept: FAMILY MEDICINE CLINIC | Facility: CLINIC | Age: 60
End: 2025-03-25
Payer: COMMERCIAL

## 2025-03-25 VITALS
RESPIRATION RATE: 16 BRPM | WEIGHT: 235.8 LBS | OXYGEN SATURATION: 98 % | SYSTOLIC BLOOD PRESSURE: 132 MMHG | HEIGHT: 67 IN | TEMPERATURE: 97.2 F | DIASTOLIC BLOOD PRESSURE: 86 MMHG | BODY MASS INDEX: 37.01 KG/M2 | HEART RATE: 63 BPM

## 2025-03-25 DIAGNOSIS — I10 ESSENTIAL HYPERTENSION: ICD-10-CM

## 2025-03-25 DIAGNOSIS — Z00.00 ROUTINE GENERAL MEDICAL EXAMINATION AT A HEALTH CARE FACILITY: Primary | ICD-10-CM

## 2025-03-25 DIAGNOSIS — Z11.59 NEED FOR HEPATITIS C SCREENING TEST: ICD-10-CM

## 2025-03-25 DIAGNOSIS — E66.01 MORBID OBESITY DUE TO EXCESS CALORIES: ICD-10-CM

## 2025-03-25 DIAGNOSIS — E03.9 ACQUIRED HYPOTHYROIDISM: ICD-10-CM

## 2025-03-25 DIAGNOSIS — Z12.11 COLON CANCER SCREENING: ICD-10-CM

## 2025-03-25 DIAGNOSIS — F31.63 BIPOLAR DISORDER, CURRENT EPISODE MIXED, SEVERE, UNSPECIFIED WHETHER PSYCHOTIC FEATURES: ICD-10-CM

## 2025-03-25 DIAGNOSIS — E78.5 HYPERLIPIDEMIA, UNSPECIFIED HYPERLIPIDEMIA TYPE: ICD-10-CM

## 2025-03-25 DIAGNOSIS — Z12.31 ENCOUNTER FOR SCREENING MAMMOGRAM FOR MALIGNANT NEOPLASM OF BREAST: ICD-10-CM

## 2025-03-25 DIAGNOSIS — E11.65 UNCONTROLLED TYPE 2 DIABETES MELLITUS WITH HYPERGLYCEMIA: ICD-10-CM

## 2025-03-26 RX ORDER — SODIUM, POTASSIUM,MAG SULFATES 17.5-3.13G
SOLUTION, RECONSTITUTED, ORAL ORAL
Qty: 354 ML | Refills: 0 | Status: SHIPPED | OUTPATIENT
Start: 2025-03-26

## 2025-03-27 NOTE — PROGRESS NOTES
Subjective   Marcela Ramírez is a 59 y.o. female    Chief Complaint    Annual physical exam  Type 2 diabetes mellitus  Hypertension  Hyperlipidemia  Hypothyroidism      History of Present Illness  History of Present Illness  The patient is a 59-year-old female who presents today for an annual physical examination. Her other medical problems include diabetes mellitus type 2 with hyperglycemia, hypertension, hyperlipidemia, hypothyroidism, morbid obesity, and bipolar disorder.    She has been experiencing constipation, with bowel movements occurring once to three times a week since June 2024. Each bowel movement is associated with pain. She manages her constipation with stool softeners or extra fiber on weekends to avoid diarrhea during her workdays.    She was previously prescribed Ozempic by her bariatric physician, which resulted in weight loss and improved control of her A1c levels. However, she discontinued the medication due to financial constraints. She is currently seeking a prescription for either Ozempic or Wegovy from her primary care physician.    She has a history of colonoscopy performed by Dr. Mark, but the exact date is uncertain. She recalls the presence of polyps during the procedure. Additionally, she underwent an upper endoscopy approximately 5 years ago in preparation for weight loss surgery.    SOCIAL HISTORY  She works at [employer].    FAMILY HISTORY  Her mother has COPD.  She has a family history of pseudotumor cerebri, now known as intracranial hypertension, affecting two sisters and one of her daughters.        The following portions of the patient's history were reviewed and updated as appropriate: allergies, current medications, past social history and problem list    Review of Systems   Constitutional: Negative.  Negative for appetite change, chills, diaphoresis, fatigue, fever and unexpected weight change.   HENT: Negative.  Negative for congestion and sore throat.    Eyes: Negative.   Negative for visual disturbance.   Respiratory: Negative.  Negative for cough, chest tightness, shortness of breath and wheezing.    Cardiovascular: Negative.  Negative for chest pain, palpitations and leg swelling.   Gastrointestinal: Negative.  Negative for abdominal pain, constipation, diarrhea, nausea and vomiting.   Endocrine: Negative.  Negative for cold intolerance, heat intolerance, polydipsia, polyphagia and polyuria.   Genitourinary: Negative.  Negative for dysuria, frequency and urgency.   Musculoskeletal: Negative.  Negative for arthralgias, back pain, myalgias and neck pain.   Skin: Negative.  Negative for color change and rash.   Allergic/Immunologic: Negative.    Neurological:  Positive for numbness. Negative for dizziness, tremors, syncope, weakness, light-headedness and headaches.   Hematological: Negative.  Negative for adenopathy. Does not bruise/bleed easily.   Psychiatric/Behavioral:  Positive for dysphoric mood and sleep disturbance. Negative for agitation, behavioral problems, confusion, decreased concentration and suicidal ideas. The patient is nervous/anxious.    All other systems reviewed and are negative.      Objective     Vitals:    03/25/25 1334   BP: 132/86   Pulse: 63   Resp: 16   Temp: 97.2 °F (36.2 °C)   SpO2: 98%       Physical Exam  Vitals and nursing note reviewed.   Constitutional:       General: She is not in acute distress.     Appearance: Normal appearance. She is well-developed. She is obese. She is not ill-appearing, toxic-appearing or diaphoretic.   HENT:      Head: Normocephalic and atraumatic.      Right Ear: External ear normal.      Left Ear: External ear normal.   Eyes:      Conjunctiva/sclera: Conjunctivae normal.      Pupils: Pupils are equal, round, and reactive to light.      Comments: No exopthalmos noted   Neck:      Thyroid: No thyroid mass, thyromegaly or thyroid tenderness.      Vascular: No carotid bruit or JVD.   Cardiovascular:      Rate and Rhythm: Normal  rate and regular rhythm.      Pulses: Normal pulses.      Heart sounds: Normal heart sounds. No murmur heard.  Pulmonary:      Effort: Pulmonary effort is normal. No respiratory distress.      Breath sounds: Normal breath sounds.   Abdominal:      General: Bowel sounds are normal.      Palpations: Abdomen is soft. There is no mass.      Tenderness: There is no abdominal tenderness.   Musculoskeletal:         General: No swelling. Normal range of motion.      Cervical back: Normal range of motion and neck supple.   Lymphadenopathy:      Cervical: No cervical adenopathy.   Skin:     General: Skin is warm and dry.      Findings: No lesion or rash.   Neurological:      Mental Status: She is alert and oriented to person, place, and time.      Cranial Nerves: No cranial nerve deficit.      Sensory: No sensory deficit.      Motor: No weakness.      Coordination: Coordination normal.      Gait: Gait normal.      Deep Tendon Reflexes: Reflexes are normal and symmetric.   Psychiatric:         Mood and Affect: Mood normal.         Behavior: Behavior normal.         Thought Content: Thought content normal.         Judgment: Judgment normal.       Physical Exam      Assessment & Plan   Assessment & Plan  1. Health maintenance.  A mammogram will be ordered, and she will be contacted to schedule it. Blood work, including an A1c test, will be ordered, and she can go to the lab on Thursday or Friday when fasting. A referral to Dr. Boo's office for a colonoscopy will be made.    2. Diabetes mellitus type 2 with hyperglycemia.  She will continue on Ozempic, and a prescription will be sent to [facility] on [address]. If her A1c is elevated, it will help in getting insurance coverage for Ozempic.    3. Constipation.  She reports constipation and will manage it with stool softeners or extra fiber on weekends to avoid diarrhea at work.    PROCEDURE  The patient had a colonoscopy performed by Dr. Mark in the past, but the exact date is  uncertain. She also underwent an upper endoscopy approximately 5 years ago in preparation for weight loss surgery.    Problems Addressed this Visit          Cardiac and Vasculature    Hyperlipidemia    Relevant Orders    Comprehensive Metabolic Panel    Lipid Panel       Endocrine and Metabolic    Morbid obesity due to excess calories    Hypothyroidism    Relevant Orders    TSH    T4, Free     Other Visit Diagnoses         Routine general medical examination at a health care facility [Z00.00 (ICD-10-CM)]    -  Primary    Relevant Orders    CBC (No Diff)    Comprehensive Metabolic Panel    Lipid Panel    TSH    T4, Free      Uncontrolled type 2 diabetes mellitus with hyperglycemia        Relevant Medications    Semaglutide,0.25 or 0.5MG/DOS, (OZEMPIC) 2 MG/3ML solution pen-injector    Other Relevant Orders    Comprehensive Metabolic Panel    Hemoglobin A1c      Essential hypertension        Relevant Orders    Comprehensive Metabolic Panel    TSH    T4, Free      Bipolar disorder, current episode mixed, severe, unspecified whether psychotic features          Colon cancer screening        Relevant Orders    Ambulatory Referral For Screening Colonoscopy      Encounter for screening mammogram for malignant neoplasm of breast        Relevant Orders    Mammo Screening Digital Tomosynthesis Bilateral With CAD      Need for hepatitis C screening test        Relevant Orders    Hepatitis C Antibody          Diagnoses         Codes Comments      Routine general medical examination at a health care facility [Z00.00 (ICD-10-CM)]    -  Primary ICD-10-CM: Z00.00  ICD-9-CM: V70.0       Uncontrolled type 2 diabetes mellitus with hyperglycemia     ICD-10-CM: E11.65  ICD-9-CM: 250.02       Essential hypertension     ICD-10-CM: I10  ICD-9-CM: 401.9       Hyperlipidemia, unspecified hyperlipidemia type     ICD-10-CM: E78.5  ICD-9-CM: 272.4       Morbid obesity due to excess calories     ICD-10-CM: E66.01  ICD-9-CM: 278.01       Bipolar  disorder, current episode mixed, severe, unspecified whether psychotic features     ICD-10-CM: F31.63  ICD-9-CM: 296.63       Acquired hypothyroidism     ICD-10-CM: E03.9  ICD-9-CM: 244.9       Colon cancer screening     ICD-10-CM: Z12.11  ICD-9-CM: V76.51       Encounter for screening mammogram for malignant neoplasm of breast     ICD-10-CM: Z12.31  ICD-9-CM: V76.12       Need for hepatitis C screening test     ICD-10-CM: Z11.59  ICD-9-CM: V73.89           Preventive medicine discussed, diet, exercise, healthy living discussed at length.  Discussed nutrition, physical activity, healthy weight, injury prevention, misuse of tobacco, alcohol and drugs, dental health, mental health, immunizations, screening    Part of this note may be an electronic transcription/translation of spoken language to printed text using the Dragon Dictation System.

## 2025-04-03 ENCOUNTER — TELEPHONE (OUTPATIENT)
Dept: FAMILY MEDICINE CLINIC | Facility: CLINIC | Age: 60
End: 2025-04-03

## 2025-04-03 NOTE — TELEPHONE ENCOUNTER
Caller: JAY JAY WITH [employer] [facility]    Relationship: Other    Best call back number: [phone] OPTION 2    What form or medical record are you requesting: CLINICAL NOTES AND CHART NOTES FOR PRIOR AUTH    Who is requesting this form or medical record from you: [facility]    How would you like to receive the form or medical records (pick-up, mail, fax): FAX  If fax, what is the fax number: [phone]    Timeframe paperwork needed: ASAP    Additional notes: A FAXED REQUEST FOR THIS IS BEING SENT 4.3.25

## 2025-04-04 ENCOUNTER — TELEPHONE (OUTPATIENT)
Dept: FAMILY MEDICINE CLINIC | Facility: CLINIC | Age: 60
End: 2025-04-04

## 2025-04-04 ENCOUNTER — OUTSIDE FACILITY SERVICE (OUTPATIENT)
Dept: GASTROENTEROLOGY | Facility: CLINIC | Age: 60
End: 2025-04-04
Payer: COMMERCIAL

## 2025-04-04 DIAGNOSIS — F51.05 INSOMNIA DUE TO OTHER MENTAL DISORDER: ICD-10-CM

## 2025-04-04 DIAGNOSIS — F31.9 BIPOLAR 1 DISORDER: ICD-10-CM

## 2025-04-04 DIAGNOSIS — F99 INSOMNIA DUE TO OTHER MENTAL DISORDER: ICD-10-CM

## 2025-04-04 RX ORDER — ESZOPICLONE 2 MG/1
TABLET, FILM COATED ORAL
Qty: 30 TABLET | Refills: 0 | OUTPATIENT
Start: 2025-04-04

## 2025-04-04 RX ORDER — ESZOPICLONE 2 MG/1
2 TABLET, FILM COATED ORAL NIGHTLY PRN
Qty: 30 TABLET | Refills: 0 | Status: SHIPPED | OUTPATIENT
Start: 2025-04-04

## 2025-04-04 NOTE — TELEPHONE ENCOUNTER
"Caller: Marcela Ramírez \"Helen\"    Relationship to patient: Self      Best call back number: [phone]    Provider: DR OLIVARES    Medication PA needed: OZEMPIC    Reason for call/Prior Auth: PHARMACY STATES THEY HAVE BEEN WAITING FOR A PRIOR AUTHORIZATION FOR MEDICATION. PLEASE ADVISE    "

## 2025-04-04 NOTE — TELEPHONE ENCOUNTER
Rx Refill Note  Requested Prescriptions     Pending Prescriptions Disp Refills    eszopiclone (Lunesta) 2 MG tablet 30 tablet 0     Sig: Take 1 tablet by mouth At Night As Needed for Sleep. Take immediately before bedtime      Last office visit with prescribing clinician: 10/31/2024   Last telemedicine visit with prescribing clinician: 2/6/2025   Next office visit with prescribing clinician: 8/12/2025     Patient comment: No refill. Last one is 4/5/25. Thank you!     Laurie Wynn MA  04/04/25, 13:44 EDT

## 2025-04-08 ENCOUNTER — TELEPHONE (OUTPATIENT)
Dept: FAMILY MEDICINE CLINIC | Facility: CLINIC | Age: 60
End: 2025-04-08
Payer: COMMERCIAL

## 2025-04-08 DIAGNOSIS — R92.8 ABNORMAL MAMMOGRAM: Primary | ICD-10-CM

## 2025-04-08 NOTE — TELEPHONE ENCOUNTER
Pt mammogram needs to be ordered as a bilateral diagnostic mammogram and an ultrasound right breast as she was suppose to have a 6 mo follow up on her right breast in 2021 but did not.      Diagnosis:  Abnormal Mammogram  Please advise.

## 2025-04-10 ENCOUNTER — PATIENT MESSAGE (OUTPATIENT)
Dept: FAMILY MEDICINE CLINIC | Facility: CLINIC | Age: 60
End: 2025-04-10
Payer: COMMERCIAL

## 2025-04-10 RX ORDER — LOSARTAN POTASSIUM 25 MG/1
25 TABLET ORAL 2 TIMES DAILY
Qty: 60 TABLET | Refills: 2 | Status: SHIPPED | OUTPATIENT
Start: 2025-04-10

## 2025-04-10 RX ORDER — LOSARTAN POTASSIUM 25 MG/1
25 TABLET ORAL 2 TIMES DAILY
Qty: 60 TABLET | Refills: 0 | OUTPATIENT
Start: 2025-04-10

## 2025-04-10 NOTE — TELEPHONE ENCOUNTER
"     Caller: Marcela Ramírez \"Helen\"    Relationship: Self    Best call back number: [phone]    Requested Prescriptions:   Requested Prescriptions     Pending Prescriptions Disp Refills    losartan (COZAAR) 25 MG tablet 60 tablet 2     Sig: Take 1 tablet by mouth 2 (Two) Times a Day.        Pharmacy where request should be sent: [address] [phone] [facility] [phone] [facility]     Last office visit with prescribing clinician: 3/25/2025   Last telemedicine visit with prescribing clinician: Visit date not found   Next office visit with prescribing clinician: 3/27/2026     Additional details provided by patient: PT WAS GETTING THIS AT [facility] OFFICE BUT THEY ARE ASKING PCP TO REFILL AS OF NOW. PER PT SHE HAS ABOUT 3 DAYS LEFT    Does the patient have less than a 3 day supply:  [x] Yes  [] No    Would you like a call back once the refill request has been completed: [x] Yes [] No    If the office needs to give you a call back, can they leave a voicemail: [x] Yes [] No    Armida Bravo Rep   04/10/25 10:25 EDT          "

## 2025-04-10 NOTE — TELEPHONE ENCOUNTER
Rx Refill Note  Requested Prescriptions     Pending Prescriptions Disp Refills    losartan (COZAAR) 25 MG tablet [Pharmacy Med Name: Losartan Potassium 25 MG Oral Tablet] 60 tablet 0     Sig: Take 1 tablet by mouth twice daily      Last filled:1/20/25 2 ref  Last office visit with prescribing clinician: 12/19/2024      Next office visit with prescribing clinician: Visit date not found     Alejandra Vargas MA  04/10/25, 09:32 EDT      Provider has stated PCP must take over this prescription as weritten on last prescription.

## 2025-04-11 ENCOUNTER — LAB (OUTPATIENT)
Dept: LAB | Facility: HOSPITAL | Age: 60
End: 2025-04-11
Payer: COMMERCIAL

## 2025-04-11 DIAGNOSIS — I10 ESSENTIAL HYPERTENSION: ICD-10-CM

## 2025-04-11 DIAGNOSIS — E03.9 ACQUIRED HYPOTHYROIDISM: ICD-10-CM

## 2025-04-11 DIAGNOSIS — Z51.81 ENCOUNTER FOR THERAPEUTIC DRUG MONITORING: ICD-10-CM

## 2025-04-11 DIAGNOSIS — Z11.59 NEED FOR HEPATITIS C SCREENING TEST: ICD-10-CM

## 2025-04-11 DIAGNOSIS — E78.5 HYPERLIPIDEMIA, UNSPECIFIED HYPERLIPIDEMIA TYPE: ICD-10-CM

## 2025-04-11 DIAGNOSIS — E11.65 UNCONTROLLED TYPE 2 DIABETES MELLITUS WITH HYPERGLYCEMIA: ICD-10-CM

## 2025-04-11 DIAGNOSIS — Z00.00 ROUTINE GENERAL MEDICAL EXAMINATION AT A HEALTH CARE FACILITY: ICD-10-CM

## 2025-04-11 LAB
ALBUMIN SERPL-MCNC: 3.9 G/DL (ref 3.5–5.2)
ALBUMIN/GLOB SERPL: 1.1 G/DL
ALP SERPL-CCNC: 95 U/L (ref 39–117)
ALT SERPL W P-5'-P-CCNC: 16 U/L (ref 1–33)
ANION GAP SERPL CALCULATED.3IONS-SCNC: 8.8 MMOL/L (ref 5–15)
AST SERPL-CCNC: 23 U/L (ref 1–32)
BILIRUB SERPL-MCNC: 0.4 MG/DL (ref 0–1.2)
BUN SERPL-MCNC: 17 MG/DL (ref 6–20)
BUN/CREAT SERPL: 14 (ref 7–25)
CALCIUM SPEC-SCNC: 9.8 MG/DL (ref 8.6–10.5)
CHLORIDE SERPL-SCNC: 102 MMOL/L (ref 98–107)
CHOLEST SERPL-MCNC: 153 MG/DL (ref 0–200)
CO2 SERPL-SCNC: 32.2 MMOL/L (ref 22–29)
CREAT SERPL-MCNC: 1.21 MG/DL (ref 0.57–1)
DEPRECATED RDW RBC AUTO: 39.7 FL (ref 37–54)
EGFRCR SERPLBLD CKD-EPI 2021: 52.4 ML/MIN/1.73
ERYTHROCYTE [DISTWIDTH] IN BLOOD BY AUTOMATED COUNT: 12.3 % (ref 12.3–15.4)
GLOBULIN UR ELPH-MCNC: 3.5 GM/DL
GLUCOSE SERPL-MCNC: 96 MG/DL (ref 65–99)
HBA1C MFR BLD: 5.8 % (ref 4.8–5.6)
HCT VFR BLD AUTO: 36.7 % (ref 34–46.6)
HCV AB SER QL: NORMAL
HDLC SERPL-MCNC: 56 MG/DL (ref 40–60)
HGB BLD-MCNC: 12.3 G/DL (ref 12–15.9)
LDLC SERPL CALC-MCNC: 74 MG/DL (ref 0–100)
LDLC/HDLC SERPL: 1.27 {RATIO}
MCH RBC QN AUTO: 29.8 PG (ref 26.6–33)
MCHC RBC AUTO-ENTMCNC: 33.5 G/DL (ref 31.5–35.7)
MCV RBC AUTO: 88.9 FL (ref 79–97)
PLATELET # BLD AUTO: 175 10*3/MM3 (ref 140–450)
PMV BLD AUTO: 12.4 FL (ref 6–12)
POTASSIUM SERPL-SCNC: 4 MMOL/L (ref 3.5–5.2)
PROT SERPL-MCNC: 7.4 G/DL (ref 6–8.5)
RBC # BLD AUTO: 4.13 10*6/MM3 (ref 3.77–5.28)
SODIUM SERPL-SCNC: 143 MMOL/L (ref 136–145)
T4 FREE SERPL-MCNC: 1.02 NG/DL (ref 0.92–1.68)
TRIGL SERPL-MCNC: 130 MG/DL (ref 0–150)
TSH SERPL DL<=0.05 MIU/L-ACNC: 4.14 UIU/ML (ref 0.27–4.2)
VLDLC SERPL-MCNC: 23 MG/DL (ref 5–40)
WBC NRBC COR # BLD AUTO: 7.07 10*3/MM3 (ref 3.4–10.8)

## 2025-04-11 PROCEDURE — 80050 GENERAL HEALTH PANEL: CPT

## 2025-04-11 PROCEDURE — 84439 ASSAY OF FREE THYROXINE: CPT

## 2025-04-11 PROCEDURE — 80061 LIPID PANEL: CPT

## 2025-04-11 PROCEDURE — 36415 COLL VENOUS BLD VENIPUNCTURE: CPT

## 2025-04-11 PROCEDURE — 86803 HEPATITIS C AB TEST: CPT

## 2025-04-11 PROCEDURE — 83036 HEMOGLOBIN GLYCOSYLATED A1C: CPT

## 2025-04-23 RX ORDER — SEMAGLUTIDE 0.25 MG/.5ML
0.5 INJECTION, SOLUTION SUBCUTANEOUS WEEKLY
Qty: 2 ML | Refills: 2 | Status: SHIPPED | OUTPATIENT
Start: 2025-04-23

## 2025-04-25 ENCOUNTER — TELEPHONE (OUTPATIENT)
Dept: FAMILY MEDICINE CLINIC | Facility: CLINIC | Age: 60
End: 2025-04-25
Payer: COMMERCIAL

## 2025-04-25 NOTE — TELEPHONE ENCOUNTER
"Caller: Marcela Ramírez \"Helen\"    Relationship: Self    Best call back number: [phone]     What medication are you requesting: WEGOVY    If a prescription is needed, what is your preferred pharmacy and phone number: [facility] [address] [phone] [facility] [phone]      Additional notes: INSURANCE IS NOT COVERING THE OZEMPIC ANYMORE, BUT THEY WILL COVER WEGOVY.  PATIENT IS TRYING TO KEEP HER A1C DOWN, SEEING IF WEGOVY WILL HELP       A1C HAS WENT 5.3 TO  5.8         "

## 2025-04-30 ENCOUNTER — PATIENT MESSAGE (OUTPATIENT)
Dept: FAMILY MEDICINE CLINIC | Facility: CLINIC | Age: 60
End: 2025-04-30
Payer: COMMERCIAL

## 2025-05-05 RX ORDER — ATORVASTATIN CALCIUM 20 MG/1
20 TABLET, FILM COATED ORAL DAILY
Qty: 90 TABLET | Refills: 3 | Status: SHIPPED | OUTPATIENT
Start: 2025-05-05

## 2025-05-09 ENCOUNTER — TELEPHONE (OUTPATIENT)
Dept: FAMILY MEDICINE CLINIC | Facility: CLINIC | Age: 60
End: 2025-05-09
Payer: COMMERCIAL

## 2025-05-09 NOTE — TELEPHONE ENCOUNTER
"Caller: Marcela Ramírez \"Helen\"    Relationship to patient: Self      Best call back number:    [phone] (Mobile)     Provider:     DR OLIVARES     Medication PA needed:       Semaglutide-Weight Management (Wegovy) 0.25 MG/0.5ML solution auto-injector     Reason for call/Prior Auth:     PATIENT STATED INSURANCE HAS NOT RECEIVED PRIOR AUTHORIZATION FOR MEDICATION    PLEASE CONTACT PATIENT WITH ANY UPDATES  "

## 2025-05-10 DIAGNOSIS — F31.9 BIPOLAR 1 DISORDER: ICD-10-CM

## 2025-05-10 DIAGNOSIS — F99 INSOMNIA DUE TO OTHER MENTAL DISORDER: ICD-10-CM

## 2025-05-10 DIAGNOSIS — F51.05 INSOMNIA DUE TO OTHER MENTAL DISORDER: ICD-10-CM

## 2025-05-12 RX ORDER — ESZOPICLONE 2 MG/1
TABLET, FILM COATED ORAL
Qty: 30 TABLET | Refills: 0 | OUTPATIENT
Start: 2025-05-12

## 2025-05-12 RX ORDER — LAMOTRIGINE 200 MG/1
200 TABLET ORAL 2 TIMES DAILY
Qty: 60 TABLET | Refills: 5 | OUTPATIENT
Start: 2025-05-12 | End: 2026-05-12

## 2025-05-12 RX ORDER — ESZOPICLONE 2 MG/1
2 TABLET, FILM COATED ORAL NIGHTLY PRN
Qty: 30 TABLET | Refills: 0 | Status: SHIPPED | OUTPATIENT
Start: 2025-05-12

## 2025-05-12 NOTE — TELEPHONE ENCOUNTER
Rx Refill Note  Requested Prescriptions     Pending Prescriptions Disp Refills    eszopiclone (Lunesta) 2 MG tablet 30 tablet 0     Sig: Take 1 tablet by mouth At Night As Needed for Sleep. Take immediately before bedtime      Last office visit with prescribing clinician: 10/31/2024   Last telemedicine visit with prescribing clinician: 2/6/2025   Next office visit with prescribing clinician: 8/12/2025     Laurie Wynn MA  05/12/25, 07:09 EDT

## 2025-05-12 NOTE — TELEPHONE ENCOUNTER
Rx Refill Note  Requested Prescriptions     Pending Prescriptions Disp Refills    lamoTRIgine (LaMICtal) 200 MG tablet 60 tablet 5     Sig: Take 1 tablet by mouth 2 (Two) Times a Day.      Last filled:12/19/24 5 refills-last refilled 5/10/25  Last office visit with prescribing clinician: 12/19/2024      Next office visit with prescribing clinician: Visit date not found     Alejandra Vargas MA  05/12/25, 16:19 EDT      Too soon for refills-denied

## 2025-06-05 RX ORDER — LAMOTRIGINE 200 MG/1
200 TABLET ORAL 2 TIMES DAILY
Qty: 60 TABLET | Refills: 0 | Status: SHIPPED | OUTPATIENT
Start: 2025-06-05

## 2025-06-05 NOTE — TELEPHONE ENCOUNTER
Rx Refill Note  Requested Prescriptions     Pending Prescriptions Disp Refills    lamoTRIgine (LaMICtal) 200 MG tablet [Pharmacy Med Name: lamoTRIgine 200 MG Oral Tablet] 60 tablet 0     Sig: Take 1 tablet by mouth twice daily      Last filled:12/19/24 5 ref  Last office visit with prescribing clinician: 12/19/2024      Next office visit with prescribing clinician: Visit date not found     Alejandra Vargas MA  06/05/25, 13:00 EDT

## 2025-06-14 DIAGNOSIS — F31.9 BIPOLAR 1 DISORDER: ICD-10-CM

## 2025-06-14 DIAGNOSIS — F99 INSOMNIA DUE TO OTHER MENTAL DISORDER: ICD-10-CM

## 2025-06-14 DIAGNOSIS — F51.05 INSOMNIA DUE TO OTHER MENTAL DISORDER: ICD-10-CM

## 2025-06-14 DIAGNOSIS — R39.11 URINARY HESITANCY: ICD-10-CM

## 2025-06-16 DIAGNOSIS — F31.9 BIPOLAR 1 DISORDER: ICD-10-CM

## 2025-06-16 DIAGNOSIS — F51.05 INSOMNIA DUE TO OTHER MENTAL DISORDER: ICD-10-CM

## 2025-06-16 DIAGNOSIS — F99 INSOMNIA DUE TO OTHER MENTAL DISORDER: ICD-10-CM

## 2025-06-16 RX ORDER — LAMOTRIGINE 200 MG/1
200 TABLET ORAL 2 TIMES DAILY
Qty: 60 TABLET | Refills: 0 | OUTPATIENT
Start: 2025-06-16

## 2025-06-16 RX ORDER — TAMSULOSIN HYDROCHLORIDE 0.4 MG/1
1 CAPSULE ORAL DAILY
Qty: 90 CAPSULE | Refills: 1 | Status: SHIPPED | OUTPATIENT
Start: 2025-06-16

## 2025-06-16 RX ORDER — ESZOPICLONE 2 MG/1
2 TABLET, FILM COATED ORAL NIGHTLY PRN
Qty: 30 TABLET | Refills: 0 | Status: SHIPPED | OUTPATIENT
Start: 2025-06-16

## 2025-06-16 RX ORDER — ESZOPICLONE 2 MG/1
2 TABLET, FILM COATED ORAL NIGHTLY PRN
Qty: 30 TABLET | Refills: 0 | OUTPATIENT
Start: 2025-06-16

## 2025-06-16 NOTE — TELEPHONE ENCOUNTER
Rx Refill Note  Requested Prescriptions     Pending Prescriptions Disp Refills    tamsulosin (FLOMAX) 0.4 MG capsule 24 hr capsule 90 capsule 1     Sig: Take 1 capsule by mouth Daily.      Last office visit with prescribing clinician: 2/20/2025   Next office visit with prescribing clinician: Visit date not found       Chelsy Orozco MA  06/16/25, 07:56 EDT

## 2025-06-16 NOTE — TELEPHONE ENCOUNTER
Rx Refill Note  Requested Prescriptions     Pending Prescriptions Disp Refills    lamoTRIgine (LaMICtal) 200 MG tablet 60 tablet 0     Sig: Take 1 tablet by mouth 2 (Two) Times a Day.      Last filled:6/5/25  Last office visit with prescribing clinician: 12/19/2024      Next office visit with prescribing clinician: Visit date not found     Alejandra Vargas MA  06/16/25, 11:44 EDT

## 2025-06-26 DIAGNOSIS — E11.42 DIABETIC PERIPHERAL NEUROPATHY: ICD-10-CM

## 2025-06-26 RX ORDER — PREGABALIN 100 MG/1
100 CAPSULE ORAL 3 TIMES DAILY
Qty: 90 CAPSULE | Refills: 5 | Status: SHIPPED | OUTPATIENT
Start: 2025-06-26 | End: 2026-06-26

## 2025-06-26 RX ORDER — LANCETS
EACH MISCELLANEOUS
Qty: 100 EACH | Refills: 0 | Status: SHIPPED | OUTPATIENT
Start: 2025-06-26

## 2025-06-26 NOTE — TELEPHONE ENCOUNTER
Rx Refill Note  Requested Prescriptions     Pending Prescriptions Disp Refills    pregabalin (Lyrica) 100 MG capsule 90 capsule 5     Sig: Take 1 capsule by mouth 3 (Three) Times a Day.      Last filled:12/19/24 5 ref  Last office visit with prescribing clinician: 12/19/2024      Next office visit with prescribing clinician: Visit date not found     Alejandra Vargas MA  06/26/25, 11:32 EDT    Pended to provider

## 2025-06-27 RX ORDER — PREGABALIN 100 MG/1
100 CAPSULE ORAL 3 TIMES DAILY
Qty: 90 CAPSULE | Refills: 0 | OUTPATIENT
Start: 2025-06-27

## 2025-06-27 NOTE — TELEPHONE ENCOUNTER
Rx Refill Note  Requested Prescriptions     Pending Prescriptions Disp Refills    pregabalin (LYRICA) 100 MG capsule [Pharmacy Med Name: Pregabalin 100 MG Oral Capsule] 90 capsule 0     Sig: TAKE 1 CAPSULE BY MOUTH THREE TIMES DAILY      Last filled:6/26/25 5 ref  Last office visit with prescribing clinician: 12/19/2024      Next office visit with prescribing clinician: 6/26/2025     Alejandra Vargas MA  06/27/25, 12:31 EDT      The original prescription was reordered on 6/26/2025 by Katelin Landis MD. Renewing this prescription may not be appropriate.

## 2025-07-07 ENCOUNTER — TELEPHONE (OUTPATIENT)
Dept: FAMILY MEDICINE CLINIC | Facility: CLINIC | Age: 60
End: 2025-07-07

## 2025-07-07 RX ORDER — FLUCONAZOLE 100 MG/1
100 TABLET ORAL DAILY
Qty: 5 TABLET | Refills: 0 | Status: SHIPPED | OUTPATIENT
Start: 2025-07-07

## 2025-07-09 RX ORDER — LAMOTRIGINE 200 MG/1
200 TABLET ORAL 2 TIMES DAILY
Qty: 60 TABLET | Refills: 3 | Status: SHIPPED | OUTPATIENT
Start: 2025-07-09

## 2025-07-09 RX ORDER — LOSARTAN POTASSIUM 25 MG/1
25 TABLET ORAL 2 TIMES DAILY
Qty: 60 TABLET | Refills: 5 | Status: SHIPPED | OUTPATIENT
Start: 2025-07-09

## 2025-07-09 RX ORDER — OMEPRAZOLE 20 MG/1
20 CAPSULE, DELAYED RELEASE ORAL DAILY
Qty: 30 CAPSULE | Refills: 5 | Status: SHIPPED | OUTPATIENT
Start: 2025-07-09

## 2025-07-09 NOTE — TELEPHONE ENCOUNTER
Rx Refill Note  Requested Prescriptions     Pending Prescriptions Disp Refills    lamoTRIgine (LaMICtal) 200 MG tablet [Pharmacy Med Name: lamoTRIgine 200 MG Oral Tablet] 60 tablet 0     Sig: Take 1 tablet by mouth twice daily      Last filled:6/5/25 0 ref  Last office visit with prescribing clinician: 12/19/2024      Next office visit with prescribing clinician: Visit date not found     Alejandra Vargas MA  07/09/25, 16:10 EDT    Pending to provider-due to last OV note stating :As she still has depressive symptoms I will increase her lamotrigine to 200 mg twice daily and will give her refills till she sees her psychiatrist

## 2025-07-17 DIAGNOSIS — F51.05 INSOMNIA DUE TO OTHER MENTAL DISORDER: ICD-10-CM

## 2025-07-17 DIAGNOSIS — F99 INSOMNIA DUE TO OTHER MENTAL DISORDER: ICD-10-CM

## 2025-07-17 DIAGNOSIS — F31.9 BIPOLAR 1 DISORDER: ICD-10-CM

## 2025-07-17 RX ORDER — ESZOPICLONE 2 MG/1
TABLET, FILM COATED ORAL
Qty: 30 TABLET | Refills: 0 | Status: SHIPPED | OUTPATIENT
Start: 2025-07-17

## 2025-07-17 NOTE — TELEPHONE ENCOUNTER
Rx Refill Note  Requested Prescriptions     Pending Prescriptions Disp Refills    eszopiclone (LUNESTA) 2 MG tablet [Pharmacy Med Name: Eszopiclone 2 MG Oral Tablet] 30 tablet 0     Sig: TAKE 1 TABLET BY MOUTH ONCE DAILY AT NIGHT AS NEEDED FOR SLEEP .  TAKE  IMMEDIATELY  BEFORE  BEDTIME      Last office visit with prescribing clinician: 10/31/2024   Last telemedicine visit with prescribing clinician: 2/6/2025   Next office visit with prescribing clinician: 8/12/2025     Laurie Wynn MA  07/17/25, 08:06 EDT

## 2025-08-05 ENCOUNTER — OFFICE VISIT (OUTPATIENT)
Age: 60
End: 2025-08-05
Payer: COMMERCIAL

## 2025-08-05 VITALS
WEIGHT: 230.6 LBS | DIASTOLIC BLOOD PRESSURE: 82 MMHG | HEIGHT: 67 IN | SYSTOLIC BLOOD PRESSURE: 130 MMHG | HEART RATE: 71 BPM | OXYGEN SATURATION: 98 % | BODY MASS INDEX: 36.19 KG/M2

## 2025-08-05 DIAGNOSIS — F51.05 INSOMNIA DUE TO OTHER MENTAL DISORDER: ICD-10-CM

## 2025-08-05 DIAGNOSIS — F99 INSOMNIA DUE TO OTHER MENTAL DISORDER: ICD-10-CM

## 2025-08-05 DIAGNOSIS — F31.9 BIPOLAR 1 DISORDER: Primary | ICD-10-CM

## 2025-08-05 DIAGNOSIS — F41.9 ANXIETY: ICD-10-CM

## 2025-08-05 DIAGNOSIS — G25.9 FUNCTIONAL MOVEMENT DISORDER: ICD-10-CM

## 2025-08-05 DIAGNOSIS — Z51.81 ENCOUNTER FOR THERAPEUTIC DRUG MONITORING: ICD-10-CM

## 2025-08-05 PROCEDURE — 96127 BRIEF EMOTIONAL/BEHAV ASSMT: CPT | Performed by: NURSE PRACTITIONER

## 2025-08-05 PROCEDURE — 99214 OFFICE O/P EST MOD 30 MIN: CPT | Performed by: NURSE PRACTITIONER

## 2025-08-05 RX ORDER — ESZOPICLONE 2 MG/1
2 TABLET, FILM COATED ORAL NIGHTLY
Qty: 30 TABLET | Refills: 0 | Status: SHIPPED | OUTPATIENT
Start: 2025-08-14

## 2025-08-05 RX ORDER — LAMOTRIGINE 200 MG/1
200 TABLET ORAL 2 TIMES DAILY
Qty: 60 TABLET | Refills: 3 | Status: SHIPPED | OUTPATIENT
Start: 2025-08-05

## 2025-08-08 LAB
1OH-MIDAZOLAM UR QL SCN: NOT DETECTED NG/MG CREAT
6MAM UR QL SCN: NEGATIVE NG/ML
7AMINOCLONAZEPAM/CREAT UR: NOT DETECTED NG/MG CREAT
A-OH ALPRAZ/CREAT UR: NOT DETECTED NG/MG CREAT
A-OH-TRIAZOLAM/CREAT UR CFM: NOT DETECTED NG/MG CREAT
ACP UR QL CFM: NOT DETECTED
ALPRAZ/CREAT UR CFM: NOT DETECTED NG/MG CREAT
AMPHET UR CFM-MCNC: NOT DETECTED NG/MG CREAT
AMPHETAMINES UR QL SCN: NORMAL NG/ML
AMPHETAMINES UR QL: NEGATIVE
APAP UR QL SCN: NEGATIVE UG/ML
BARBITURATES UR QL SCN: NEGATIVE NG/ML
BENZODIAZ SCN METH UR: NEGATIVE
BUPRENORPHINE UR QL SCN: NEGATIVE
BUPRENORPHINE/CREAT UR: NOT DETECTED NG/MG CREAT
CANNABINOIDS UR QL SCN: NEGATIVE NG/ML
CARISOPRODOL UR QL: NEGATIVE NG/ML
CLONAZEPAM/CREAT UR CFM: NOT DETECTED NG/MG CREAT
COCAINE+BZE UR QL SCN: NEGATIVE NG/ML
CREAT UR-MCNC: 123 MG/DL
D-METHORPHAN UR-MCNC: NOT DETECTED NG/ML
D-METHORPHAN+LEVORPHANOL UR QL: NOT DETECTED
DESALKYLFLURAZ/CREAT UR: NOT DETECTED NG/MG CREAT
DIAZEPAM/CREAT UR: NOT DETECTED NG/MG CREAT
ETHANOL UR QL SCN: NEGATIVE G/DL
ETHANOL UR QL SCN: NEGATIVE NG/ML
FENTANYL CTO UR SCN-MCNC: NEGATIVE NG/ML
FENTANYL/CREAT UR: NOT DETECTED NG/MG CREAT
FLUNITRAZEPAM UR QL SCN: NOT DETECTED NG/MG CREAT
GABAPENTIN UR CFM-MCNC: NOT DETECTED NG/ML
GABAPENTIN UR QL CFM: NEGATIVE
GABAPENTIN UR-MCNC: NORMAL UG/ML
HALLUCINOGENS UR: NEGATIVE
HYPNOTICS UR QL SCN: NEGATIVE
KETAMINE UR QL: NOT DETECTED
LORAZEPAM/CREAT UR: NOT DETECTED NG/MG CREAT
MDA UR CFM-MCNC: NOT DETECTED NG/MG CREAT
MDMA UR CFM-MCNC: NOT DETECTED NG/MG CREAT
MEPERIDINE UR QL SCN: NEGATIVE NG/ML
METHADONE UR QL SCN: NEGATIVE NG/ML
METHADONE+METAB UR QL SCN: NEGATIVE NG/ML
METHAMPHET UR CFM-MCNC: NOT DETECTED NG/MG CREAT
MIDAZOLAM/CREAT UR CFM: NOT DETECTED NG/MG CREAT
MISCELLANEOUS, UR: NEGATIVE
NORBUPRENORPHINE/CREAT UR: NOT DETECTED NG/MG CREAT
NORDIAZEPAM/CREAT UR: NOT DETECTED NG/MG CREAT
NORFENTANYL/CREAT UR: NOT DETECTED NG/MG CREAT
NORFLUNITRAZEPAM UR-MCNC: NOT DETECTED NG/MG CREAT
NORKETAMINE UR-MCNC: NOT DETECTED UG/ML
OPIATES UR SCN-MCNC: NEGATIVE NG/ML
OXAZEPAM/CREAT UR: NOT DETECTED NG/MG CREAT
OXYCODONE CTO UR SCN-MCNC: NEGATIVE NG/ML
PCP UR QL SCN: NEGATIVE NG/ML
PRESCRIBED MEDICATIONS: NORMAL
PROPOXYPH UR QL SCN: NEGATIVE NG/ML
TAPENTADOL CTO UR SCN-MCNC: NEGATIVE NG/ML
TEMAZEPAM/CREAT UR: NOT DETECTED NG/MG CREAT
TRAMADOL UR QL SCN: NEGATIVE NG/ML
ZALEPLON UR-MCNC: NOT DETECTED NG/ML
ZOLPIDEM PHENYL-4-CARB UR QL SCN: NOT DETECTED
ZOLPIDEM UR QL SCN: NOT DETECTED
ZOPICLONE-N-OXIDE UR-MCNC: NOT DETECTED NG/ML

## 2025-08-11 ENCOUNTER — TELEPHONE (OUTPATIENT)
Dept: NEUROLOGY | Facility: CLINIC | Age: 60
End: 2025-08-11
Payer: COMMERCIAL

## 2025-08-11 ENCOUNTER — OFFICE VISIT (OUTPATIENT)
Dept: NEUROLOGY | Facility: CLINIC | Age: 60
End: 2025-08-11
Payer: COMMERCIAL

## 2025-08-11 VITALS
DIASTOLIC BLOOD PRESSURE: 86 MMHG | WEIGHT: 234 LBS | HEIGHT: 67 IN | BODY MASS INDEX: 36.73 KG/M2 | OXYGEN SATURATION: 97 % | SYSTOLIC BLOOD PRESSURE: 142 MMHG | HEART RATE: 72 BPM

## 2025-08-11 DIAGNOSIS — G25.81 RLS (RESTLESS LEGS SYNDROME): ICD-10-CM

## 2025-08-11 DIAGNOSIS — E11.42 DIABETIC PERIPHERAL NEUROPATHY: Primary | ICD-10-CM

## 2025-08-11 DIAGNOSIS — F31.60 BIPOLAR AFFECTIVE DISORDER, CURRENT EPISODE MIXED, CURRENT EPISODE SEVERITY UNSPECIFIED: ICD-10-CM

## 2025-08-11 PROCEDURE — 99214 OFFICE O/P EST MOD 30 MIN: CPT | Performed by: PSYCHIATRY & NEUROLOGY

## 2025-08-11 RX ORDER — ROPINIROLE 0.5 MG/1
0.5 TABLET, FILM COATED ORAL NIGHTLY
Qty: 30 TABLET | Refills: 5 | Status: SHIPPED | OUTPATIENT
Start: 2025-08-11 | End: 2026-08-11

## 2025-08-12 RX ORDER — LANCETS 30 GAUGE
EACH MISCELLANEOUS
Qty: 100 EACH | Refills: 1 | Status: SHIPPED | OUTPATIENT
Start: 2025-08-12

## 2025-08-22 DIAGNOSIS — F41.9 ANXIETY: ICD-10-CM

## 2025-08-22 DIAGNOSIS — F51.05 INSOMNIA DUE TO OTHER MENTAL DISORDER: ICD-10-CM

## 2025-08-22 DIAGNOSIS — F31.9 BIPOLAR 1 DISORDER: ICD-10-CM

## 2025-08-22 DIAGNOSIS — F99 INSOMNIA DUE TO OTHER MENTAL DISORDER: ICD-10-CM

## 2025-08-22 RX ORDER — LAMOTRIGINE 200 MG/1
200 TABLET ORAL 2 TIMES DAILY
Qty: 60 TABLET | Refills: 3 | OUTPATIENT
Start: 2025-08-22

## 2025-08-22 RX ORDER — ESZOPICLONE 2 MG/1
2 TABLET, FILM COATED ORAL NIGHTLY
Qty: 30 TABLET | Refills: 0 | OUTPATIENT
Start: 2025-08-22

## (undated) DEVICE — ENDOPATH 5MM ENDOSCOPIC BLUNT TIP DISSECTORS (12 POUCHES CONTAINING 3 DISSECTORS EACH): Brand: ENDOPATH

## (undated) DEVICE — MARYLAND JAW LAPAROSCOPIC SEALER/DIVIDER COATED: Brand: LIGASURE

## (undated) DEVICE — Device: Brand: DEFENDO AIR/WATER/SUCTION AND BIOPSY VALVE

## (undated) DEVICE — GOWN,NON-REINFORCED,SIRUS,SET IN SLV,XXL: Brand: MEDLINE

## (undated) DEVICE — ENDOPATH XCEL BLADELESS TROCARS WITH STABILITY SLEEVES: Brand: ENDOPATH XCEL

## (undated) DEVICE — FLTR PLUMEPORT LAP W/CONN STRL

## (undated) DEVICE — SUT SILK 0 SH 30IN K834H

## (undated) DEVICE — POWER SHELL: Brand: SIGNIA

## (undated) DEVICE — GLV SURG SENSICARE MICRO PF LF 8.5 STRL

## (undated) DEVICE — GLV SURG SENSICARE MICRO PF LF 9 STRL

## (undated) DEVICE — TISSUE RETRIEVAL SYSTEM: Brand: INZII RETRIEVAL SYSTEM

## (undated) DEVICE — COVER,LIGHT HANDLE,FLX,1/PK: Brand: MEDLINE INDUSTRIES, INC.

## (undated) DEVICE — CLMP STD 25CM DISP

## (undated) DEVICE — [HIGH FLOW INSUFFLATOR,  DO NOT USE IF PACKAGE IS DAMAGED,  KEEP DRY,  KEEP AWAY FROM SUNLIGHT,  PROTECT FROM HEAT AND RADIOACTIVE SOURCES.]: Brand: PNEUMOSURE

## (undated) DEVICE — UNDRPD COMFRT GLD DRYPAD 36X57IN

## (undated) DEVICE — INTENDED USE FOR SURGICAL MARKING ON INTACT SKIN, ALSO PROVIDES A PERMANENT METHOD OF IDENTIFYING OBJECTS IN THE OPERATING ROOM: Brand: WRITESITE® REGULAR TIP SKIN MARKER

## (undated) DEVICE — TROCAR: Brand: KII FIOS FIRST ENTRY

## (undated) DEVICE — CONTN GRAD MEAS TRIANG 32OZ BLK

## (undated) DEVICE — SHT AIR TRANSFR COMFRT GLIDE LT LAT 40X80IN

## (undated) DEVICE — APPL CHLORAPREP W/TINT 26ML BLU

## (undated) DEVICE — PK BARIATRIC 10

## (undated) DEVICE — 3 RING SUTURE PASSER - 16 CM: Brand: 3 RING SUTURE PASSER - 16 CM

## (undated) DEVICE — SKIN AFFIX SURG ADHESIVE 72/CS 0.55ML: Brand: MEDLINE

## (undated) DEVICE — DRN PENRS 1/2X18IN LTX

## (undated) DEVICE — ENDOPATH XCEL UNIVERSAL TROCAR STABLILITY SLEEVES: Brand: ENDOPATH XCEL